# Patient Record
Sex: MALE | Race: WHITE | NOT HISPANIC OR LATINO | URBAN - METROPOLITAN AREA
[De-identification: names, ages, dates, MRNs, and addresses within clinical notes are randomized per-mention and may not be internally consistent; named-entity substitution may affect disease eponyms.]

---

## 2017-06-06 ENCOUNTER — EMERGENCY (EMERGENCY)
Facility: HOSPITAL | Age: 52
LOS: 0 days | Discharge: HOME | End: 2017-06-07
Admitting: FAMILY MEDICINE

## 2017-06-28 DIAGNOSIS — R11.2 NAUSEA WITH VOMITING, UNSPECIFIED: ICD-10-CM

## 2017-06-28 DIAGNOSIS — R42 DIZZINESS AND GIDDINESS: ICD-10-CM

## 2017-06-28 DIAGNOSIS — I10 ESSENTIAL (PRIMARY) HYPERTENSION: ICD-10-CM

## 2017-06-28 DIAGNOSIS — Z88.0 ALLERGY STATUS TO PENICILLIN: ICD-10-CM

## 2017-08-28 ENCOUNTER — OUTPATIENT (OUTPATIENT)
Dept: OUTPATIENT SERVICES | Facility: HOSPITAL | Age: 52
LOS: 1 days | Discharge: HOME | End: 2017-08-28

## 2017-08-28 DIAGNOSIS — I10 ESSENTIAL (PRIMARY) HYPERTENSION: ICD-10-CM

## 2017-08-30 ENCOUNTER — OUTPATIENT (OUTPATIENT)
Dept: OUTPATIENT SERVICES | Facility: HOSPITAL | Age: 52
LOS: 1 days | Discharge: HOME | End: 2017-08-30

## 2017-08-30 DIAGNOSIS — R52 PAIN, UNSPECIFIED: ICD-10-CM

## 2018-03-20 ENCOUNTER — OUTPATIENT (OUTPATIENT)
Dept: OUTPATIENT SERVICES | Facility: HOSPITAL | Age: 53
LOS: 1 days | Discharge: HOME | End: 2018-03-20

## 2018-03-20 DIAGNOSIS — I10 ESSENTIAL (PRIMARY) HYPERTENSION: ICD-10-CM

## 2018-10-01 ENCOUNTER — TRANSCRIPTION ENCOUNTER (OUTPATIENT)
Age: 53
End: 2018-10-01

## 2018-12-05 ENCOUNTER — OUTPATIENT (OUTPATIENT)
Dept: OUTPATIENT SERVICES | Facility: HOSPITAL | Age: 53
LOS: 1 days | Discharge: HOME | End: 2018-12-05

## 2018-12-05 DIAGNOSIS — I10 ESSENTIAL (PRIMARY) HYPERTENSION: ICD-10-CM

## 2019-09-10 ENCOUNTER — OUTPATIENT (OUTPATIENT)
Dept: OUTPATIENT SERVICES | Facility: HOSPITAL | Age: 54
LOS: 1 days | Discharge: HOME | End: 2019-09-10

## 2019-09-10 DIAGNOSIS — I10 ESSENTIAL (PRIMARY) HYPERTENSION: ICD-10-CM

## 2021-11-28 ENCOUNTER — TRANSCRIPTION ENCOUNTER (OUTPATIENT)
Age: 56
End: 2021-11-28

## 2023-09-19 ENCOUNTER — OUTPATIENT (OUTPATIENT)
Dept: OUTPATIENT SERVICES | Facility: HOSPITAL | Age: 58
LOS: 1 days | End: 2023-09-19
Payer: COMMERCIAL

## 2023-09-19 ENCOUNTER — RESULT REVIEW (OUTPATIENT)
Age: 58
End: 2023-09-19

## 2023-09-19 DIAGNOSIS — Z00.8 ENCOUNTER FOR OTHER GENERAL EXAMINATION: ICD-10-CM

## 2023-09-19 DIAGNOSIS — F43.9 REACTION TO SEVERE STRESS, UNSPECIFIED: ICD-10-CM

## 2023-09-19 PROCEDURE — 93018 CV STRESS TEST I&R ONLY: CPT

## 2023-09-19 PROCEDURE — 78452 HT MUSCLE IMAGE SPECT MULT: CPT | Mod: 26

## 2023-09-19 PROCEDURE — 78452 HT MUSCLE IMAGE SPECT MULT: CPT

## 2023-09-19 PROCEDURE — A9500: CPT

## 2023-09-20 DIAGNOSIS — F43.9 REACTION TO SEVERE STRESS, UNSPECIFIED: ICD-10-CM

## 2023-11-07 ENCOUNTER — APPOINTMENT (OUTPATIENT)
Dept: PULMONOLOGY | Facility: CLINIC | Age: 58
End: 2023-11-07
Payer: COMMERCIAL

## 2023-11-07 VITALS
OXYGEN SATURATION: 99 % | WEIGHT: 192 LBS | BODY MASS INDEX: 26.88 KG/M2 | HEIGHT: 71 IN | RESPIRATION RATE: 14 BRPM | HEART RATE: 95 BPM | SYSTOLIC BLOOD PRESSURE: 122 MMHG | DIASTOLIC BLOOD PRESSURE: 82 MMHG

## 2023-11-07 DIAGNOSIS — J44.9 CHRONIC OBSTRUCTIVE PULMONARY DISEASE, UNSPECIFIED: ICD-10-CM

## 2023-11-07 DIAGNOSIS — F17.200 NICOTINE DEPENDENCE, UNSPECIFIED, UNCOMPLICATED: ICD-10-CM

## 2023-11-07 DIAGNOSIS — Z80.1 FAMILY HISTORY OF MALIGNANT NEOPLASM OF TRACHEA, BRONCHUS AND LUNG: ICD-10-CM

## 2023-11-07 DIAGNOSIS — Z86.79 PERSONAL HISTORY OF OTHER DISEASES OF THE CIRCULATORY SYSTEM: ICD-10-CM

## 2023-11-07 PROBLEM — Z00.00 ENCOUNTER FOR PREVENTIVE HEALTH EXAMINATION: Status: ACTIVE | Noted: 2023-11-07

## 2023-11-07 PROCEDURE — G0296 VISIT TO DETERM LDCT ELIG: CPT

## 2023-11-07 PROCEDURE — 99204 OFFICE O/P NEW MOD 45 MIN: CPT | Mod: 25

## 2023-11-07 RX ORDER — ALBUTEROL SULFATE 90 UG/1
108 (90 BASE) INHALANT RESPIRATORY (INHALATION)
Qty: 1 | Refills: 0 | Status: ACTIVE | COMMUNITY
Start: 2023-11-07 | End: 1900-01-01

## 2023-11-07 RX ORDER — UMECLIDINIUM 62.5 UG/1
62.5 AEROSOL, POWDER ORAL
Qty: 1 | Refills: 3 | Status: ACTIVE | COMMUNITY
Start: 2023-11-07 | End: 1900-01-01

## 2023-11-07 RX ORDER — AMLODIPINE BESYLATE 5 MG/1
TABLET ORAL
Refills: 0 | Status: ACTIVE | COMMUNITY

## 2023-11-07 RX ORDER — CHLORTHALIDONE 50 MG/1
TABLET ORAL
Refills: 0 | Status: ACTIVE | COMMUNITY

## 2023-12-06 ENCOUNTER — RESULT REVIEW (OUTPATIENT)
Age: 58
End: 2023-12-06

## 2023-12-06 ENCOUNTER — OUTPATIENT (OUTPATIENT)
Dept: OUTPATIENT SERVICES | Facility: HOSPITAL | Age: 58
LOS: 1 days | End: 2023-12-06
Payer: COMMERCIAL

## 2023-12-06 DIAGNOSIS — R07.9 CHEST PAIN, UNSPECIFIED: ICD-10-CM

## 2023-12-06 DIAGNOSIS — Z00.8 ENCOUNTER FOR OTHER GENERAL EXAMINATION: ICD-10-CM

## 2023-12-06 PROCEDURE — 71271 CT THORAX LUNG CANCER SCR C-: CPT | Mod: 26

## 2023-12-06 PROCEDURE — 71271 CT THORAX LUNG CANCER SCR C-: CPT

## 2023-12-07 ENCOUNTER — NON-APPOINTMENT (OUTPATIENT)
Age: 58
End: 2023-12-07

## 2023-12-07 DIAGNOSIS — R07.9 CHEST PAIN, UNSPECIFIED: ICD-10-CM

## 2024-01-15 ENCOUNTER — INPATIENT (INPATIENT)
Facility: HOSPITAL | Age: 59
LOS: 0 days | Discharge: HOME | DRG: 921 | End: 2024-01-16
Attending: HOSPITALIST | Admitting: HOSPITALIST
Payer: COMMERCIAL

## 2024-01-15 VITALS
DIASTOLIC BLOOD PRESSURE: 58 MMHG | TEMPERATURE: 98 F | HEART RATE: 74 BPM | OXYGEN SATURATION: 98 % | SYSTOLIC BLOOD PRESSURE: 100 MMHG | RESPIRATION RATE: 18 BRPM | WEIGHT: 199.96 LBS

## 2024-01-15 DIAGNOSIS — M79.605 PAIN IN LEFT LEG: ICD-10-CM

## 2024-01-15 LAB
ALBUMIN SERPL ELPH-MCNC: 3.7 G/DL — SIGNIFICANT CHANGE UP (ref 3.5–5.2)
ALP SERPL-CCNC: 90 U/L — SIGNIFICANT CHANGE UP (ref 30–115)
ALT FLD-CCNC: 23 U/L — SIGNIFICANT CHANGE UP (ref 0–41)
ANION GAP SERPL CALC-SCNC: 10 MMOL/L — SIGNIFICANT CHANGE UP (ref 7–14)
APTT BLD: 46.3 SEC — HIGH (ref 27–39.2)
AST SERPL-CCNC: 18 U/L — SIGNIFICANT CHANGE UP (ref 0–41)
BASOPHILS # BLD AUTO: 0.1 K/UL — SIGNIFICANT CHANGE UP (ref 0–0.2)
BASOPHILS NFR BLD AUTO: 0.7 % — SIGNIFICANT CHANGE UP (ref 0–1)
BILIRUB SERPL-MCNC: 0.3 MG/DL — SIGNIFICANT CHANGE UP (ref 0.2–1.2)
BLD GP AB SCN SERPL QL: SIGNIFICANT CHANGE UP
BUN SERPL-MCNC: 18 MG/DL — SIGNIFICANT CHANGE UP (ref 10–20)
CALCIUM SERPL-MCNC: 8.8 MG/DL — SIGNIFICANT CHANGE UP (ref 8.4–10.5)
CHLORIDE SERPL-SCNC: 97 MMOL/L — LOW (ref 98–110)
CO2 SERPL-SCNC: 26 MMOL/L — SIGNIFICANT CHANGE UP (ref 17–32)
CREAT SERPL-MCNC: 1.2 MG/DL — SIGNIFICANT CHANGE UP (ref 0.7–1.5)
EGFR: 69 ML/MIN/1.73M2 — SIGNIFICANT CHANGE UP
EOSINOPHIL # BLD AUTO: 0.13 K/UL — SIGNIFICANT CHANGE UP (ref 0–0.7)
EOSINOPHIL NFR BLD AUTO: 0.9 % — SIGNIFICANT CHANGE UP (ref 0–8)
GLUCOSE SERPL-MCNC: 104 MG/DL — HIGH (ref 70–99)
HCT VFR BLD CALC: 28.9 % — LOW (ref 42–52)
HGB BLD-MCNC: 9.7 G/DL — LOW (ref 14–18)
IMM GRANULOCYTES NFR BLD AUTO: 1.1 % — HIGH (ref 0.1–0.3)
INR BLD: 1.56 RATIO — HIGH (ref 0.65–1.3)
LYMPHOCYTES # BLD AUTO: 1.23 K/UL — SIGNIFICANT CHANGE UP (ref 1.2–3.4)
LYMPHOCYTES # BLD AUTO: 8.3 % — LOW (ref 20.5–51.1)
MCHC RBC-ENTMCNC: 27.4 PG — SIGNIFICANT CHANGE UP (ref 27–31)
MCHC RBC-ENTMCNC: 33.6 G/DL — SIGNIFICANT CHANGE UP (ref 32–37)
MCV RBC AUTO: 81.6 FL — SIGNIFICANT CHANGE UP (ref 80–94)
MONOCYTES # BLD AUTO: 1.73 K/UL — HIGH (ref 0.1–0.6)
MONOCYTES NFR BLD AUTO: 11.7 % — HIGH (ref 1.7–9.3)
NEUTROPHILS # BLD AUTO: 11.39 K/UL — HIGH (ref 1.4–6.5)
NEUTROPHILS NFR BLD AUTO: 77.3 % — HIGH (ref 42.2–75.2)
NRBC # BLD: 0 /100 WBCS — SIGNIFICANT CHANGE UP (ref 0–0)
PLATELET # BLD AUTO: 420 K/UL — HIGH (ref 130–400)
PMV BLD: 9.4 FL — SIGNIFICANT CHANGE UP (ref 7.4–10.4)
POTASSIUM SERPL-MCNC: 4.6 MMOL/L — SIGNIFICANT CHANGE UP (ref 3.5–5)
POTASSIUM SERPL-SCNC: 4.6 MMOL/L — SIGNIFICANT CHANGE UP (ref 3.5–5)
PROT SERPL-MCNC: 6.4 G/DL — SIGNIFICANT CHANGE UP (ref 6–8)
PROTHROM AB SERPL-ACNC: 17.9 SEC — HIGH (ref 9.95–12.87)
RBC # BLD: 3.54 M/UL — LOW (ref 4.7–6.1)
RBC # FLD: 13.3 % — SIGNIFICANT CHANGE UP (ref 11.5–14.5)
SODIUM SERPL-SCNC: 133 MMOL/L — LOW (ref 135–146)
WBC # BLD: 14.74 K/UL — HIGH (ref 4.8–10.8)
WBC # FLD AUTO: 14.74 K/UL — HIGH (ref 4.8–10.8)

## 2024-01-15 PROCEDURE — 99285 EMERGENCY DEPT VISIT HI MDM: CPT

## 2024-01-15 PROCEDURE — 85025 COMPLETE CBC W/AUTO DIFF WBC: CPT

## 2024-01-15 PROCEDURE — 85610 PROTHROMBIN TIME: CPT

## 2024-01-15 PROCEDURE — 93308 TTE F-UP OR LMTD: CPT | Mod: 26

## 2024-01-15 PROCEDURE — 73502 X-RAY EXAM HIP UNI 2-3 VIEWS: CPT | Mod: 26,LT

## 2024-01-15 PROCEDURE — 93970 EXTREMITY STUDY: CPT | Mod: 26

## 2024-01-15 PROCEDURE — 83735 ASSAY OF MAGNESIUM: CPT

## 2024-01-15 PROCEDURE — 70450 CT HEAD/BRAIN W/O DYE: CPT | Mod: 26,MA

## 2024-01-15 PROCEDURE — 97162 PT EVAL MOD COMPLEX 30 MIN: CPT | Mod: GP

## 2024-01-15 PROCEDURE — 80053 COMPREHEN METABOLIC PANEL: CPT

## 2024-01-15 PROCEDURE — 36415 COLL VENOUS BLD VENIPUNCTURE: CPT

## 2024-01-15 PROCEDURE — 93925 LOWER EXTREMITY STUDY: CPT | Mod: 26

## 2024-01-15 PROCEDURE — 73706 CT ANGIO LWR EXTR W/O&W/DYE: CPT | Mod: 26,LT,MA

## 2024-01-15 PROCEDURE — 99223 1ST HOSP IP/OBS HIGH 75: CPT

## 2024-01-15 PROCEDURE — 85730 THROMBOPLASTIN TIME PARTIAL: CPT

## 2024-01-15 PROCEDURE — 0225U NFCT DS DNA&RNA 21 SARSCOV2: CPT

## 2024-01-15 PROCEDURE — 86803 HEPATITIS C AB TEST: CPT

## 2024-01-15 PROCEDURE — 93010 ELECTROCARDIOGRAM REPORT: CPT

## 2024-01-15 RX ORDER — MORPHINE SULFATE 50 MG/1
4 CAPSULE, EXTENDED RELEASE ORAL EVERY 6 HOURS
Refills: 0 | Status: DISCONTINUED | OUTPATIENT
Start: 2024-01-15 | End: 2024-01-16

## 2024-01-15 RX ORDER — AMIODARONE HYDROCHLORIDE 400 MG/1
200 TABLET ORAL
Refills: 0 | Status: DISCONTINUED | OUTPATIENT
Start: 2024-01-16 | End: 2024-01-16

## 2024-01-15 RX ORDER — MORPHINE SULFATE 50 MG/1
4 CAPSULE, EXTENDED RELEASE ORAL ONCE
Refills: 0 | Status: DISCONTINUED | OUTPATIENT
Start: 2024-01-15 | End: 2024-01-15

## 2024-01-15 RX ORDER — METOPROLOL TARTRATE 50 MG
0.5 TABLET ORAL
Refills: 0 | DISCHARGE

## 2024-01-15 RX ORDER — OXYCODONE HYDROCHLORIDE 5 MG/1
0.5 TABLET ORAL
Refills: 0 | DISCHARGE

## 2024-01-15 RX ORDER — PANTOPRAZOLE SODIUM 20 MG/1
1 TABLET, DELAYED RELEASE ORAL
Refills: 0 | DISCHARGE

## 2024-01-15 RX ORDER — AMLODIPINE BESYLATE 2.5 MG/1
1 TABLET ORAL
Refills: 0 | DISCHARGE

## 2024-01-15 RX ORDER — OXYCODONE HYDROCHLORIDE 5 MG/1
5 TABLET ORAL EVERY 6 HOURS
Refills: 0 | Status: DISCONTINUED | OUTPATIENT
Start: 2024-01-15 | End: 2024-01-16

## 2024-01-15 RX ORDER — FUROSEMIDE 40 MG
20 TABLET ORAL DAILY
Refills: 0 | Status: DISCONTINUED | OUTPATIENT
Start: 2024-01-15 | End: 2024-01-16

## 2024-01-15 RX ORDER — CLOPIDOGREL BISULFATE 75 MG/1
1 TABLET, FILM COATED ORAL
Refills: 0 | DISCHARGE

## 2024-01-15 RX ORDER — MORPHINE SULFATE 50 MG/1
2 CAPSULE, EXTENDED RELEASE ORAL ONCE
Refills: 0 | Status: DISCONTINUED | OUTPATIENT
Start: 2024-01-15 | End: 2024-01-15

## 2024-01-15 RX ORDER — NICOTINE POLACRILEX 2 MG
1 GUM BUCCAL DAILY
Refills: 0 | Status: DISCONTINUED | OUTPATIENT
Start: 2024-01-15 | End: 2024-01-16

## 2024-01-15 RX ORDER — POLYETHYLENE GLYCOL 3350 17 G/17G
17 POWDER, FOR SOLUTION ORAL DAILY
Refills: 0 | Status: DISCONTINUED | OUTPATIENT
Start: 2024-01-15 | End: 2024-01-16

## 2024-01-15 RX ORDER — ATORVASTATIN CALCIUM 80 MG/1
80 TABLET, FILM COATED ORAL AT BEDTIME
Refills: 0 | Status: DISCONTINUED | OUTPATIENT
Start: 2024-01-15 | End: 2024-01-16

## 2024-01-15 RX ORDER — FUROSEMIDE 40 MG
1 TABLET ORAL
Refills: 0 | DISCHARGE

## 2024-01-15 RX ORDER — NICOTINE POLACRILEX 2 MG
1 GUM BUCCAL
Refills: 0 | DISCHARGE

## 2024-01-15 RX ORDER — CLOPIDOGREL BISULFATE 75 MG/1
75 TABLET, FILM COATED ORAL DAILY
Refills: 0 | Status: DISCONTINUED | OUTPATIENT
Start: 2024-01-15 | End: 2024-01-16

## 2024-01-15 RX ORDER — POTASSIUM CHLORIDE 20 MEQ
1 PACKET (EA) ORAL
Refills: 0 | DISCHARGE

## 2024-01-15 RX ORDER — POTASSIUM CHLORIDE 20 MEQ
10 PACKET (EA) ORAL DAILY
Refills: 0 | Status: DISCONTINUED | OUTPATIENT
Start: 2024-01-15 | End: 2024-01-16

## 2024-01-15 RX ORDER — WARFARIN SODIUM 2.5 MG/1
2 TABLET ORAL
Refills: 0 | DISCHARGE

## 2024-01-15 RX ORDER — POTASSIUM CHLORIDE 20 MEQ
10 PACKET (EA) ORAL DAILY
Refills: 0 | Status: DISCONTINUED | OUTPATIENT
Start: 2024-01-15 | End: 2024-01-15

## 2024-01-15 RX ORDER — ACETAMINOPHEN 500 MG
3 TABLET ORAL
Refills: 0 | DISCHARGE

## 2024-01-15 RX ORDER — CELECOXIB 200 MG/1
200 CAPSULE ORAL DAILY
Refills: 0 | Status: DISCONTINUED | OUTPATIENT
Start: 2024-01-15 | End: 2024-01-16

## 2024-01-15 RX ORDER — SENNA PLUS 8.6 MG/1
2 TABLET ORAL AT BEDTIME
Refills: 0 | Status: DISCONTINUED | OUTPATIENT
Start: 2024-01-15 | End: 2024-01-16

## 2024-01-15 RX ORDER — AMIODARONE HYDROCHLORIDE 400 MG/1
1 TABLET ORAL
Refills: 0 | DISCHARGE

## 2024-01-15 RX ORDER — PANTOPRAZOLE SODIUM 20 MG/1
40 TABLET, DELAYED RELEASE ORAL
Refills: 0 | Status: DISCONTINUED | OUTPATIENT
Start: 2024-01-15 | End: 2024-01-16

## 2024-01-15 RX ORDER — ACETAMINOPHEN 500 MG
650 TABLET ORAL EVERY 6 HOURS
Refills: 0 | Status: DISCONTINUED | OUTPATIENT
Start: 2024-01-15 | End: 2024-01-16

## 2024-01-15 RX ORDER — METOPROLOL TARTRATE 50 MG
12.5 TABLET ORAL
Refills: 0 | Status: DISCONTINUED | OUTPATIENT
Start: 2024-01-15 | End: 2024-01-16

## 2024-01-15 RX ORDER — AMLODIPINE BESYLATE 2.5 MG/1
2.5 TABLET ORAL DAILY
Refills: 0 | Status: DISCONTINUED | OUTPATIENT
Start: 2024-01-15 | End: 2024-01-16

## 2024-01-15 RX ORDER — METHOCARBAMOL 500 MG/1
750 TABLET, FILM COATED ORAL
Refills: 0 | Status: DISCONTINUED | OUTPATIENT
Start: 2024-01-15 | End: 2024-01-16

## 2024-01-15 RX ORDER — ATORVASTATIN CALCIUM 80 MG/1
1 TABLET, FILM COATED ORAL
Refills: 0 | DISCHARGE

## 2024-01-15 RX ADMIN — MORPHINE SULFATE 4 MILLIGRAM(S): 50 CAPSULE, EXTENDED RELEASE ORAL at 13:12

## 2024-01-15 RX ADMIN — MORPHINE SULFATE 4 MILLIGRAM(S): 50 CAPSULE, EXTENDED RELEASE ORAL at 12:30

## 2024-01-15 RX ADMIN — MORPHINE SULFATE 4 MILLIGRAM(S): 50 CAPSULE, EXTENDED RELEASE ORAL at 14:30

## 2024-01-15 RX ADMIN — MORPHINE SULFATE 2 MILLIGRAM(S): 50 CAPSULE, EXTENDED RELEASE ORAL at 12:30

## 2024-01-15 RX ADMIN — CELECOXIB 200 MILLIGRAM(S): 200 CAPSULE ORAL at 22:57

## 2024-01-15 RX ADMIN — METHOCARBAMOL 750 MILLIGRAM(S): 500 TABLET, FILM COATED ORAL at 22:55

## 2024-01-15 RX ADMIN — MORPHINE SULFATE 2 MILLIGRAM(S): 50 CAPSULE, EXTENDED RELEASE ORAL at 11:24

## 2024-01-15 RX ADMIN — MORPHINE SULFATE 2 MILLIGRAM(S): 50 CAPSULE, EXTENDED RELEASE ORAL at 20:27

## 2024-01-15 RX ADMIN — Medication 650 MILLIGRAM(S): at 23:51

## 2024-01-15 RX ADMIN — MORPHINE SULFATE 4 MILLIGRAM(S): 50 CAPSULE, EXTENDED RELEASE ORAL at 11:23

## 2024-01-15 RX ADMIN — MORPHINE SULFATE 2 MILLIGRAM(S): 50 CAPSULE, EXTENDED RELEASE ORAL at 20:07

## 2024-01-15 NOTE — ED PROVIDER NOTE - PROGRESS NOTE DETAILS
Authored by Dr. Lino: Spoke to CT surgery team at [place] Dr. Paredes cardiac fellow, recommend neuro, recommend [facility] duplex arterial and venous which were negative.  CT angio of the left lower extremity performed. Reassessed on multiple occasions with significant pain in her left lower extremity resulting in weakness and inability to ambulate.  Neurology consulted with no concerns for stroke, CT head negative. Patient will be admitted. Authored by Dr. Lino: Spoke to CT surgery team at [place] Dr. Paredes cardiac fellow, recommend neuro, recommend VA duplex arterial and venous which were negative.  CT angio of the left lower extremity performed. Reassessed on multiple occasions with significant pain in her left lower extremity resulting in weakness and inability to ambulate.  Neurology consulted with no concerns for stroke, CT head negative. Patient will be admitted. Authored by Dr. Lino: Spoke to CT surgery team at [place] Dr. Paredes cardiac fellow, recommend neuro, recommend VA duplex arterial and venous which were negative.  CT angio of the left lower extremity performed. Reassessed on multiple occasions with significant pain in her left lower extremity resulting in weakness and inability to ambulate.  Neurology consulted with no concerns for stroke, CT head negative. Patient will be admitted. Authored by Dr. Lino: Spoke to CT surgery team at [place] Dr. Paredes cardiac fellow, recommend neuro, recommend VA duplex arterial and venous which were negative.  CT angio of the left lower extremity performed. Reassessed on multiple occasions with significant pain in her left lower extremity resulting in weakness and inability to ambulate.  Neurology consulted with no concerns for stroke, CT head negative. Patient will be admitted.

## 2024-01-15 NOTE — CONSULT NOTE ADULT - ATTENDING COMMENTS
58 year old male with PMH of HTN, Smoking, recent CABG presented to ED with severe pain in Left thigh since last evening. He underwent quadruple CABG on 01/05/23 in [facility] and was doing relatively better until yesterday. Last evening he started to experience severe pain in left thigh , the pain became so severe that he was unable to move his left leg and put weight on it.   CT head w/o contrast: IMPRESSION:  No acute intracranial hemorrhage.    Patient was seen on rounds this am.   Patient symptoms have improved with pain management.   Ct head negative.   No further inpatient neurological workup.   Will sign off. Do not hesitate to contact for any questions. 58 year old male with PMH of HTN, Smoking, recent CABG presented to ED with severe pain in Left thigh since last evening. He underwent quadruple CABG on 01/05/23 in [facility] and was doing relatively better until yesterday. Last evening he started to experience severe pain in left thigh , the pain became so severe that he was unable to move his left leg and put weight on it.   CT head w/o contrast: IMPRESSION:  No acute intracranial hemorrhage.    Patient was seen on rounds this am.   Patient symptoms have improved with pain management.   Ct head negative.   No further inpatient neurological workup.   Will sign off. Do not hesitate to contact for any questions. 58 year old male with PMH of HTN, Smoking, recent CABG presented to ED with severe pain in Left thigh since last evening. He underwent quadruple CABG on 01/05/23 in [facility] and was doing relatively better until yesterday. Last evening he started to experience severe pain in left thigh , the pain became so severe that he was unable to move his left leg and put weight on it.   CT head w/o contrast: IMPRESSION:  No acute intracranial hemorrhage.    Patient was seen on rounds this am.   Patient symptoms have improved with pain management.   Ct head negative.   No further inpatient neurological workup.   Will sign off. Do not hesitate to contact for any questions.

## 2024-01-15 NOTE — ED PROVIDER NOTE - CLINICAL SUMMARY MEDICAL DECISION MAKING FREE TEXT BOX
59 yo M presented to ED with LLE pain s/p recent CABG at [place] [place]. Labs and EKG were ordered and reviewed. Noted hemoglobin of 9.7 unknown baseline.  Imaging was ordered and reviewed by me.  CT imaging showing small hematomas. Appropriate medications for patient's presenting complaints were ordered and effects were reassessed.  Patient's records (prior hospital, ED visit, and/or nursing home notes if available) were reviewed.  Additional history was obtained from EMS, family, and/or PCP (where available).  Escalation to admission/observation was considered, pt unable to ambulate 2/2 to pain.  Patient requires inpatient hospitalization. 57 yo M presented to ED with LLE pain s/p recent CABG at [facility] [place]. Labs and EKG were ordered and reviewed. Noted hemoglobin of 9.7 unknown baseline.  Imaging was ordered and reviewed by me.  CT imaging showing small hematomas. Appropriate medications for patient's presenting complaints were ordered and effects were reassessed.  Patient's records (prior hospital, ED visit, and/or nursing home notes if available) were reviewed.  Additional history was obtained from EMS, family, and/or PCP (where available).  Escalation to admission/observation was considered, pt unable to ambulate 2/2 to pain.  Patient requires inpatient hospitalization.

## 2024-01-15 NOTE — PATIENT PROFILE ADULT - VISION (WITH CORRECTIVE LENSES IF THE PATIENT USUALLY WEARS THEM):
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  Adding on CBC.  -Will obtain bilateral hand and wrist x-rays  -Referral to rheumatology placed for high clinical suspicion for rheumatoid arthritis  -Patient to call clinic or present to the emergency department for acute worsening of symptoms including fever, chills, asymmetric severe joint swelling and pain.  -Follow up in 1 month  
Normal vision: sees adequately in most situations; can see medication labels, newsprint

## 2024-01-15 NOTE — ED PROVIDER NOTE - OTHER FREE TEXT FOR MDM DISCUSSED CASE WITH QUESTION
On call Cardiology at [facility] - Dr. Ramos recommending vascular studies, neurology consult and admission for ECHO - no need for transfer at this point in management On call Cardiology at [facility] - Dr. Ramos recommending vascular studies, neurology consult and admission for ECHO - no need for transfer at this point in management On call Cardiology at [facility] - Dr. Ramos recommending vascular studies, neurology consult and admission for ECHO - no need for transfer at this point in management On call Cardiology at [facility] - Dr. Ramos recommending vascular studies, neurology consult and admission for ECHO - no need for transfer at this point in management

## 2024-01-15 NOTE — ED ADULT NURSE REASSESSMENT NOTE - NS ED NURSE REASSESS COMMENT FT1
received handoff from RN. Pt ANOx4, IV intact, RR even and unlabored, no distress noted at this time.

## 2024-01-15 NOTE — H&P ADULT - ASSESSMENT
59 yo M with hx of HTN, HLD, CVA, CAD s/p CABG (Jan 2024), Paroxysmal A.fib (on coumadin) presents to ED for progressive Left lateral hip pain radiates down to LLE with difficulty ambulating.     #lateral hip pain- new onset  #medial knee pain  #difficulty ambulating/actively moving extremity  #greater trochanteric pain syndrome vs iliotibial band syndrome vs OA  -CTA LLE demonstrated small hematoma and diffuse atherosclerosis  -Left hip xray: No acute fracture or dislocation. Mild degenerative change in the hips.  -examination demonstrates little passive motion associated pain  -less likely iliotibial band syndrome (pt does not have lateral knee pain) or OA (while degenerative changes noted on xray pt has no groin pain)   -robaxin and tylenol standing  - morphine 4 mg q6 hr prn with bowel regimen  - celecoxib with PPI for 2 days  -f/u pt consult  -ice packs    #HTN  -c/w home meds    #CAD  #Recent CABG  #HLD  -c/w atorvastatin  -c/w clopidogrel  -pt's discharge note prohibits use of ASA 81 mg    #CVA  -f/u OP    #paroxysmal a fib s/p CABG  -c/w warfarin (reassess INR in am and dose accordingly; pt received home meds on night of admission)    #DVT ppx: warfarin  #Gi ppx: protonix  #Code: Full  #Diet: DASH diet   #Activity: IAT  #Dispo: med/surg  #pending: PT eval., warfarin am dosing         57 yo M with hx of HTN, HLD, CVA, CAD s/p CABG (Jan 2024), Paroxysmal A.fib (on coumadin) presents to ED for progressive Left lateral hip pain radiates down to LLE with difficulty ambulating.     #lateral hip pain- new onset  #medial knee pain  #difficulty ambulating/actively moving extremity  #greater trochanteric pain syndrome vs iliotibial band syndrome vs OA  -CTA LLE demonstrated small hematoma and diffuse atherosclerosis  -Left hip xray: No acute fracture or dislocation. Mild degenerative change in the hips.  -examination demonstrates little passive motion associated pain  -less likely iliotibial band syndrome (pt does not have lateral knee pain) or OA (while degenerative changes noted on xray pt has no groin pain)   -robaxin and tylenol standing  - morphine 4 mg q6 hr prn with bowel regimen  - celecoxib with PPI for 2 days  -f/u pt consult  -ice packs    #HTN  -c/w home meds    #CAD  #Recent CABG  #HLD  -c/w atorvastatin  -c/w clopidogrel  -pt's discharge note prohibits use of ASA 81 mg    #CVA  -f/u OP    #paroxysmal a fib s/p CABG  -c/w warfarin (reassess INR in am and dose accordingly; pt received home meds on night of admission)  -c/w amiodarone (200 mg BID for 7 days then 200 mg qd for 14 days)  -c/w lopressor    #DVT ppx: warfarin  #Gi ppx: protonix  #Code: Full  #Diet: DASH diet   #Activity: IAT  #Dispo: med/surg  #pending: PT eval., warfarin am dosing

## 2024-01-15 NOTE — ED ADULT TRIAGE NOTE - NS ED NURSE AMBULANCES
[facility]

## 2024-01-15 NOTE — ED PROVIDER NOTE - CARE PLAN
Principal Discharge DX:	Left leg pain  Secondary Diagnosis:	S/P CABG x 4   1 Principal Discharge DX:	Left leg pain  Assessment and plan of treatment:	Plan- labs ct angio LLE pain control reassess  Secondary Diagnosis:	S/P CABG x 4

## 2024-01-15 NOTE — H&P ADULT - NSHPPHYSICALEXAM_GEN_ALL_CORE
PHYSICAL EXAM:  GENERAL: NAD, lying in bed comfortably  HEAD:  Atraumatic, Normocephalic  EYES: EOMI, PERRLA, conjunctiva and sclera clear  ENT: Moist mucous membranes  NECK: Supple, No JVD  CHEST/LUNG: Clear to auscultation bilaterally; No rales, rhonchi, wheezing, or rubs. Unlabored respirations. Sternotomy incision clean. No drainage appreciated.   HEART: Regular rate and rhythm; No murmurs, rubs, or gallops  ABDOMEN: Bowel sounds present; Soft, Nontender, Nondistended. No hepatomegaly. Chest tube incisions noted.   EXTREMITIES:  2+ Peripheral Pulses, brisk capillary refill. No clubbing, cyanosis, or edema  NERVOUS SYSTEM:  Alert & Oriented X3, speech clear. No deficits   MSK: b/l UE full strength, Rt LE full strength, LLE severely reduced strength. Passive motion elicits significantly less pain. External/hip flexion active results in greater trochanteric pain/lateral hip.  SKIN: No rashes or lesions. Medial LLE incision (greater saphenous graft) and left wrist radial incision.

## 2024-01-15 NOTE — H&P ADULT - ATTENDING COMMENTS
Patient seen and examined at bedside independently of the residents. I read the resident's note and agree with the plan with the additions and corrections as noted below. My note supersedes the resident's note.     REVIEW OF SYSTEMS:  Negative except in HPI.     PMH:  HTN, HLD, CAD s/p CABG (Perez 10, 2024), Paroxysmal A.fib (coumadin)     FHx: Reviewed. No fhx of asthma/copd, No fhx of liver and pulmonary disease. No fhx of hematological disorder.     Physical Exam:  GEN: No acute distress. Awake, Alert and oriented x 3.   Head: Atraumatic, Normocephalic.   Eye: PEERLA. No sclera icterus. EOMI.   ENT: Normal oropharynx, no thyromegaly, no mass, no lymphadenopathy.   LUNGS: Clear to auscultation bilaterally. No wheeze/rales/crackles.   HEART: Normal. S1/S2 present. RRR. No murmur/gallops.   ABD: Soft, non-tender, non-distended. Bowel sounds present.   EXT: No pitting edema. No erythema. + tenderness to palpation of left lateral hip along from greater trochanter down the lateral knee. No significant groin pain/tenderness. Active ROM significant restricted due to severe pain from but passive ROM intact without significant pain.   Integumentary: No rash, No sore, No petechia.   NEURO: CN III-XII intact. Strength: 5/5 b/l ULE. Sensory intact b/l ULE.     Vital Signs Last 24 Hrs  T(C): 36.7 (15 Perez 2024 21:03), Max: 36.8 (15 Perez 2024 10:30)  T(F): 98 (15 Perez 2024 21:03), Max: 98.3 (15 Perez 2024 10:30)  HR: 70 (15 Perez 2024 21:03) (70 - 74)  BP: 98/55 (15 Perez 2024 21:03) (98/55 - 104/55)  BP(mean): 71 (15 Perez 2024 21:03) (71 - 76)  RR: 18 (15 Perez 2024 21:03) (18 - 18)  SpO2: 98% (15 Perez 2024 21:03) (98% - 98%)    Parameters below as of 15 Perez 2024 21:03  Patient On (Oxygen Delivery Method): room air      Please see the above notes for Labs and radiology.     Assessment and Plan:     57 yo M with hx of HTN, HLD, CAD s/p CABG (Perez 10, 2024), Paroxysmal A.fib (coumadin) presents to ED for progressive Left lateral hip pain radiates down to LLE with difficulty ambulation.     Inability to ambulate 2/2 Left lateral hip pain radiates down to lateral LLE  - Ddx: greater trochanteric pain syndrome vs. Iliotibial band  syndrome vs. OA   - On physical exam, patient is significant pain during active ROM but mild pain during passive ROM. Also has tenderness to palpation of left lateral hip along from greater trochanter down the lateral knee. No significant groin pain/tenderness.   - Both arterial duplex LE and venous duplex LE neg.   - CTA LLE: Small hematomas/postoperative change in the medial soft tissues of the proximal tibia. Degenerative change also seen in the hips.  - X-ray hip: No acute fracture or dislocation. Mild degenerative change in the hips.  - will start on robaxin and tylenol   - morphine prn with bowel regimen  - will give celecoxib with PPI tonight and tomorrow. Would avoid long term NSAIDs as patient is already on AC.   - PT/Rehab.   - Fall precaution.    HTN/ HLD - c/w home med  CAD s/p CABG - c/w home med  Paroxysmal A.fib - on amiodarone, lopressor and coumadin. Monitor INR and dose coumadin accordingly.     DVT ppx: coumadin   GI ppx:  PPI   Diet: DASH diet   Activity: as tolerated.     Date seen by the attendin/15/2024  Total time spent: 75 minutes. Patient seen and examined at bedside independently of the residents. I read the resident's note and agree with the plan with the additions and corrections as noted below. My note supersedes the resident's note.     REVIEW OF SYSTEMS:  Negative except in HPI.     PMH:  HTN, HLD, CAD s/p CABG (Perez 10, 2024), Paroxysmal A.fib (coumadin)     FHx: Reviewed. No fhx of asthma/copd, No fhx of liver and pulmonary disease. No fhx of hematological disorder.     Physical Exam:  GEN: No acute distress. Awake, Alert and oriented x 3.   Head: Atraumatic, Normocephalic.   Eye: PEERLA. No sclera icterus. EOMI.   ENT: Normal oropharynx, no thyromegaly, no mass, no lymphadenopathy.   LUNGS: Clear to auscultation bilaterally. No wheeze/rales/crackles.   HEART: Normal. S1/S2 present. RRR. No murmur/gallops.   ABD: Soft, non-tender, non-distended. Bowel sounds present.   EXT: No pitting edema. No erythema. + tenderness to palpation of left lateral hip along from greater trochanter down the lateral knee. No significant groin pain/tenderness. Active ROM significant restricted due to severe pain from but passive ROM intact without significant pain.   Integumentary: No rash, No sore, No petechia. All post-surgical scar incision looks clean.   NEURO: CN III-XII intact. Strength: 5/5 b/l ULE. Sensory intact b/l ULE.     Vital Signs Last 24 Hrs  T(C): 36.7 (15 Perez 2024 21:03), Max: 36.8 (15 Perez 2024 10:30)  T(F): 98 (15 Perez 2024 21:03), Max: 98.3 (15 Perez 2024 10:30)  HR: 70 (15 Perez 2024 21:03) (70 - 74)  BP: 98/55 (15 Perez 2024 21:03) (98/55 - 104/55)  BP(mean): 71 (15 Perez 2024 21:03) (71 - 76)  RR: 18 (15 Perez 2024 21:03) (18 - 18)  SpO2: 98% (15 Perez 2024 21:03) (98% - 98%)    Parameters below as of 15 Perez 2024 21:03  Patient On (Oxygen Delivery Method): room air      Please see the above notes for Labs and radiology.     Assessment and Plan:     59 yo M with hx of HTN, HLD, CAD s/p CABG (Pe[facility] 10, 2024), Paroxysmal A.fib (coumadin) presents to ED for progressive Left lateral hip pain radiates down to LLE with difficulty ambulation.     Inability to ambulate 2/2 Left lateral hip pain radiates down to lateral LLE  - Ddx: greater trochanteric pain syndrome vs. Iliotibial band  syndrome vs. OA   - On physical exam, patient is significant pain during active ROM but mild pain during passive ROM. Also has tenderness to palpation of left lateral hip along from greater trochanter down the lateral knee. No significant groin pain/tenderness.   - Both arterial duplex LE and venous duplex LE neg.   - CTA LLE: Small hematomas/postoperative change in the medial soft tissues of the proximal tibia. Degenerative change also seen in the hips.  - X-ray hip: No acute fracture or dislocation. Mild degenerative change in the hips.  - will start on robaxin and tylenol   - morphine prn with bowel regimen  - will give celecoxib with PPI tonight and tomorrow. Would avoid long term NSAIDs as patient is already on AC.   - PT/Rehab.   - Fall precaution.    HTN/ HLD - c/w home med  CAD s/p CABG - c/w home med  Paroxysmal A.fib - on amiodarone, lopressor and coumadin. Monitor INR and dose coumadin accordingly.     DVT ppx: coumadin   GI ppx:  PPI   Diet: DASH diet   Activity: as tolerated.     Date seen by the attendin/15/2024  Total time spent: 75 minutes.

## 2024-01-15 NOTE — ED PROVIDER NOTE - PHYSICAL EXAMINATION
VITAL SIGNS: I have reviewed nursing notes and confirm.  CONSTITUTIONAL: non-toxic, well appearing  SKIN: no petechiae. + anterior chest with well healing wounds, no oozing, no surrounding erythema   EYES: pink conjunctiva, anicteric  ENT: tongue midline, no exudates, MMM  NECK: Supple; no meningismus  CARD: RRR, no murmurs, equal radial pulses bilaterally 2+  RESP: CTAB, no respiratory distress  ABD: Soft, non-tender, non-distended  EXT: No edema, no erythema, LLE with pulses palpable to femoral, DP, PT, no pallor, no cyanosis, LLE femoral and tibia region with clean wounds, no oozing or surrounding erythema   NEURO: Alert, orientedx3, no facial droop, sensation intact, no dysmetria, upper extremities 5/5 b/l, RLE 5/5 motor strength, LL1/5 motor strength secondary to pain but able to wiggle toes    PSYCH: Cooperative, appropriate.

## 2024-01-15 NOTE — ED PROVIDER NOTE - ATTENDING CONTRIBUTION TO CARE
59 yo M pmhx HTN HLD CAD s/p CABG (on 1/5 at [facility] with Dr. Zac Castillo), on plavix and warfarin presents to ED for eval of LLE pain since last night radiating from L hip down entire L leg. Pt reports pain results in L leg weakness with now difficulty ambulating. No numbness or tingling. Pt reports they used LLE during CABG. Pt denies fever, nausea, vomiting, blurry vision, vision changes, abdominal pain, bloody stools, CP, SOB.    CONSTITUTIONAL: NAD.   SKIN: warm, dry  HEAD: Normocephalic; atraumatic.  EYES: no conjunctival injection. EOMI.   ENT: MMM.   NECK: Supple.  CARD: RRR. well healing anterior chest wall wounds no surrounding erythema or crepitus   RESP: No wheezes, rales or rhonchi.  ABD: soft ntnd.   EXT: distal pulses symmetric b/l. LLE with well healing surgical wounds no erythema no discharge to femoral and tibia, tenderness throughout L hip down L leg, no pallor or cyanosis of b/l LE, cap refill <2 seconds   NEURO: AAOx3, CN 2-12 grossly intact. 1/5 motor strength to LLE, 5/5 motor strength to rest of extremities, 5/5 sensation throughout. No slurred speech.   PSYCH: Cooperative, appropriate. 59 yo M pmhx HTN HLD CAD s/p CABG (on 1/5 at [facility] with Dr. Zac Castillo), on plavix and warfarin presents to ED for eval of LLE pain since last night radiating from L hip down entire L leg. Pt reports pain results in L leg weakness with now difficulty ambulating. No numbness or tingling. Pt reports they used LLE during CABG. Pt denies fever, nausea, vomiting, blurry vision, vision changes, abdominal pain, bloody stools, CP, SOB.    CONSTITUTIONAL: NAD.   SKIN: warm, dry  HEAD: Normocephalic; atraumatic.  EYES: no conjunctival injection. EOMI.   ENT: MMM.   NECK: Supple.  CARD: RRR. well healing anterior chest wall wounds no surrounding erythema or crepitus   RESP: No wheezes, rales or rhonchi.  ABD: soft ntnd.   EXT: distal pulses symmetric b/l. LLE with well healing surgical wounds no erythema no discharge to femoral and tibia, tenderness throughout L hip down L leg, no pallor or cyanosis of b/l LE, cap refill <2 seconds   NEURO: AAOx3, CN 2-12 grossly intact. 1/5 motor strength to LLE, 5/5 motor strength to rest of extremities, 5/5 sensation throughout. No slurred speech.   PSYCH: Cooperative, appropriate. 59 yo M pmhx HTN HLD CAD s/p CABG (on 1/5 at [facility] with Dr. Zac Castillo), on plavix and warfarin presents to ED for eval of LLE pain since last night radiating from L hip down entire L leg. Pt reports pain results in L leg weakness with now difficulty ambulating. No numbness or tingling. Pt reports they used LLE during CABG. Pt denies fever, nausea, vomiting, blurry vision, vision changes, abdominal pain, bloody stools, CP, SOB.    CONSTITUTIONAL: NAD.   SKIN: warm, dry  HEAD: Normocephalic; atraumatic.  EYES: no conjunctival injection. EOMI.   ENT: MMM.   NECK: Supple.  CARD: RRR. well healing anterior chest wall wounds no surrounding erythema or crepitus   RESP: No wheezes, rales or rhonchi.  ABD: soft ntnd.   EXT: distal pulses symmetric b/l. LLE with well healing surgical wounds no erythema no discharge to femoral and tibia, tenderness throughout L hip down L leg, no pallor or cyanosis of b/l LE, cap refill <2 seconds   NEURO: AAOx3, CN 2-12 grossly intact. 1/5 motor strength to LLE, 5/5 motor strength to rest of extremities, 5/5 sensation throughout. No slurred speech.   PSYCH: Cooperative, appropriate. 59 yo M pmhx HTN HLD CAD s/p CABG (on 1/5 at [facility] with Dr. Zac Castillo), on plavix and warfarin presents to ED for eval of LLE pain since last night radiating from L hip down entire L leg. Pt reports pain results in L leg weakness with now difficulty ambulating. No numbness or tingling. Pt reports they used LLE during CABG. Pt denies fever, nausea, vomiting, blurry vision, vision changes, abdominal pain, bloody stools, CP, SOB.    CONSTITUTIONAL: NAD.   SKIN: warm, dry  HEAD: Normocephalic; atraumatic.  EYES: no conjunctival injection. EOMI.   ENT: MMM.   NECK: Supple.  CARD: RRR. well healing anterior chest wall wounds no surrounding erythema or crepitus   RESP: No wheezes, rales or rhonchi.  ABD: soft ntnd.   EXT: distal pulses symmetric b/l. LLE with well healing surgical wounds no erythema no discharge to femoral and tibia, tenderness throughout L hip down L leg, no pallor or cyanosis of b/l LE, cap refill <2 seconds   NEURO: AAOx3, CN 2-12 grossly intact. 1/5 motor strength to LLE, 5/5 motor strength to rest of extremities, 5/5 sensation throughout. No slurred speech.   PSYCH: Cooperative, appropriate.

## 2024-01-15 NOTE — CONSULT NOTE ADULT - ASSESSMENT
58 year old male is being evaluated for severe pain and weakness of left thigh. His left thigh movement is severely restricted due to pain, and planter flexion/extension:5/5. The pain could be due to the hematomas seen on CT LLE. Unlikely to be stroke as the weakness is largely due to pain and restricted to proximal part, no sensory involvement.    Recommendation:  - Follow up CT head read: if no acute pathology, no further inpatient neurology workup.  - Will defer further management to primary team.    Discussed with Neurology attending

## 2024-01-15 NOTE — H&P ADULT - NSHPLABSRESULTS_GEN_ALL_CORE
LABS:  cret                        9.7    14.74 )-----------( 420      ( 15 Perez 2024 11:08 )             28.9     01-15    133<L>  |  97<L>  |  18  ----------------------------<  104<H>  4.6   |  26  |  1.2    Ca    8.8      15 Perez 2024 11:08    TPro  6.4  /  Alb  3.7  /  TBili  0.3  /  DBili  x   /  AST  18  /  ALT  23  /  AlkPhos  90  01-15    PT/INR - ( 15 Perez 2024 11:08 )   PT: 17.90 sec;   INR: 1.56 ratio         PTT - ( 15 Perez 2024 11:08 )  PTT:46.3 sec

## 2024-01-15 NOTE — CONSULT NOTE ADULT - SUBJECTIVE AND OBJECTIVE BOX
HPI:  58 year old male with PMH of HTN, Smoking, recent CABG presented to ED with severe pain in Left thigh since last evening. He underwent quadruple CABG on 01/05/23 in [facility] and was doing relatively better until yesterday. Last evening he started to experience severe pain in left thigh , the pain became so severe that he was unable to move his left leg and put weight on it. He never had similar symptom in the past. He denies any headache, weakness in other extremities, facial droop, trouble speaking, blurry/ double vision. He never had stroke in the past, but had an episode of dizziness 7-8 years ago and was told he had series of mini stroke??.       PAST MEDICAL & SURGICAL HISTORY:  HTN  CAD s/p CABG          Medications:      Vital Signs Last 24 Hrs  T(C): 36.8 (15 Perez 2024 10:30), Max: 36.8 (15 Perez 2024 10:30)  T(F): 98.3 (15 Perez 2024 10:30), Max: 98.3 (15 Perez 2024 10:30)  HR: 74 (15 Perez 2024 10:30) (74 - 74)  BP: 100/58 (15 Perez 2024 10:30) (100/58 - 100/58)  BP(mean): --  RR: 18 (15 Perez 2024 10:30) (18 - 18)  SpO2: 98% (15 Perez 2024 10:30) (98% - 98%)    Parameters below as of 15 Perez 2024 10:30  Patient On (Oxygen Delivery Method): room air        Neurological Exam:   Mental status: Awake, alert and oriented x3.  Recent and remote memory intact.  Naming, repetition and comprehension intact.  Attention/concentration intact.  No dysarthria, no aphasia.  Fund of knowledge appropriate.    Cranial nerves: Pupils equally round and reactive to light, visual fields full, no nystagmus, extraocular muscles intact, V1 through V3 intact bilaterally and symmetric, face symmetric,   Motor:  MRC grading : LLE: 1/5 proximally at hip, 2/5 at knee, 5/5 ankle: severely pain limited. Otherwise: 5/5 b/l UE and RLE   strength 5/5.  Normal tone and bulk.  No abnormal movements.    Sensation: Intact to light touch and pinprick B/L  Coordination: No dysmetria on finger-to-nose b/l  Reflexes: 2+ in bilateral UE/LE, downgoing toes bilaterally.       Labs:    Fingerstick Blood Glucose: CAPILLARY BLOOD GLUCOSE        LABS:                        9.7    14.74 )-----------( 420      ( 15 Perez 2024 11:08 )             28.9     01-15    133<L>  |  97<L>  |  18  ----------------------------<  104<H>  4.6   |  26  |  1.2    Ca    8.8      15 Perez 2024 11:08    TPro  6.4  /  Alb  3.7  /  TBili  0.3  /  DBili  x   /  AST  18  /  ALT  23  /  AlkPhos  90  01-15    PT/INR - ( 15 Perez 2024 11:08 )   PT: 17.90 sec;   INR: 1.56 ratio         PTT - ( 15 Perez 2024 11:08 )  PTT:46.3 sec      Urinalysis Basic - ( 15 Perez 2024 11:08 )    Color: x / Appearance: x / SG: x / pH: x  Gluc: 104 mg/dL / Ketone: x  / Bili: x / Urobili: x   Blood: x / Protein: x / Nitrite: x   Leuk Esterase: x / RBC: x / WBC x   Sq Epi: x / Non Sq Epi: x / Bacteria: x        < from: CT Angio Lower Extremity w/ IV Cont, Left (01.15.24 @ 13:36) >  1. Small hematomas/postoperative change in the medial soft tissues of the   proximal tibia.  2. Diffuse atherosclerosis.       HPI:  58 year old male with PMH of HTN, Smoking, recent CABG presented to ED with severe pain in Left thigh since last evening. He underwent quadruple CABG on 01/05/23 in [facility] and was doing relatively better until yesterday. Last evening he started to experience severe pain in left thigh , the pain became so severe that he was unable to move his left leg and put weight on it. He never had similar symptom in the past. He denies any headache, weakness in other extremities, facial droop, trouble speaking, blurry/ double vision. He never had stroke in the past, but had an episode of dizziness 7-8 years ago and was told he had series of mini stroke??.       PAST MEDICAL & SURGICAL HISTORY:  HTN  CAD s/p CABG          Medications:      Vital Signs Last 24 Hrs  T(C): 36.8 (15 Perez 2024 10:30), Max: 36.8 (15 Perez 2024 10:30)  T(F): 98.3 (15 Perez 2024 10:30), Max: 98.3 (15 Perez 2024 10:30)  HR: 74 (15 Perez 2024 10:30) (74 - 74)  BP: 100/58 (15 Perez 2024 10:30) (100/58 - 100/58)  BP(mean): --  RR: 18 (15 Perez 2024 10:30) (18 - 18)  SpO2: 98% (15 Perez 2024 10:30) (98% - 98%)    Parameters below as of 15 Perez 2024 10:30  Patient On (Oxygen Delivery Method): room air        Neurological Exam:   Mental status: Awake, alert and oriented x3.  Recent and remote memory intact.  Naming, repetition and comprehension intact.  Attention/concentration intact.  No dysarthria, no aphasia.  Fund of knowledge appropriate.    Cranial nerves: Pupils equally round and reactive to light, visual fields full, no nystagmus, extraocular muscles intact, V1 through V3 intact bilaterally and symmetric, face symmetric,   Motor:  MRC grading : LLE: 1/5 proximally at hip, 2/5 at knee, 5/5 ankle: severely pain limited. Otherwise: 5/5 b/l UE and RLE   strength 5/5.  Normal tone and bulk.  No abnormal movements.    Sensation: Intact to light touch and pinprick B/L  Coordination: No dysmetria on finger-to-nose b/l  Reflexes: 2+ in bilateral UE/LE, downgoing toes bilaterally.       Labs:    Fingerstick Blood Glucose: CAPILLARY BLOOD GLUCOSE        LABS:                        9.7    14.74 )-----------( 420      ( 15 Perez 2024 11:08 )             28.9     01-15    133<L>  |  97<L>  |  18  ----------------------------<  104<H>  4.6   |  26  |  1.2    Ca    8.8      15 Perez 2024 11:08    TPro  6.4  /  Alb  3.7  /  TBili  0.3  /  DBili  x   /  AST  18  /  ALT  23  /  AlkPhos  90  01-15    PT/INR - ( 15 Perez 2024 11:08 )   PT: 17.90 sec;   INR: 1.56 ratio         PTT - ( 15 Perez 2024 11:08 )  PTT:46.3 sec      Urinalysis Basic - ( 15 Perez 2024 11:08 )    Color: x / Appearance: x / SG: x / pH: x  Gluc: 104 mg/dL / Ketone: x  / Bili: x / Urobili: x   Blood: x / Protein: x / Nitrite: x   Leuk Esterase: x / RBC: x / WBC x   Sq Epi: x / Non Sq Epi: x / Bacteria: x        < from: CT Angio Lower Extremity w/ IV Cont, Left (01.15.24 @ 13:36) >  1. Small hematomas/postoperative change in the medial soft tissues of the   proximal tibia.  2. Diffuse atherosclerosis.       HPI:  58 year old male with PMH of HTN, Smoking, recent CABG presented to ED with severe pain in Left thigh since last evening. He underwent quadruple CABG on 01/05/23 in [facility] and was doing relatively better until yesterday. Last evening he started to experience severe pain in left thigh , the pain became so severe that he was unable to move his left leg and put weight on it. He never had similar symptom in the past. He denies any headache, weakness in other extremities, facial droop, trouble speaking, blurry/ double vision. He never had stroke in the past, but had an episode of dizziness 7-8 years ago and was told he had series of mini stroke??.       PAST MEDICAL & SURGICAL HISTORY:  HTN  CAD s/p CABG          Medications:      Vital Signs Last 24 Hrs  T(C): 36.8 (15 Perez 2024 10:30), Max: 36.8 (15 Perez 2024 10:30)  T(F): 98.3 (15 Perez 2024 10:30), Max: 98.3 (15 Perez 2024 10:30)  HR: 74 (15 Perez 2024 10:30) (74 - 74)  BP: 100/58 (15 Perez 2024 10:30) (100/58 - 100/58)  BP(mean): --  RR: 18 (15 Perez 2024 10:30) (18 - 18)  SpO2: 98% (15 Perez 2024 10:30) (98% - 98%)    Parameters below as of 15 Perez 2024 10:30  Patient On (Oxygen Delivery Method): room air        Neurological Exam:   Mental status: Awake, alert and oriented x3.  Recent and remote memory intact.  Naming, repetition and comprehension intact.  Attention/concentration intact.  No dysarthria, no aphasia.  Fund of knowledge appropriate.    Cranial nerves: Pupils equally round and reactive to light, visual fields full, no nystagmus, extraocular muscles intact, V1 through V3 intact bilaterally and symmetric, face symmetric,   Motor:  MRC grading : LLE: 1/5 proximally at hip, 2/5 at knee, 5/5 ankle: severely pain limited. Otherwise: 5/5 b/l UE and RLE   strength 5/5.  Normal tone and bulk.  No abnormal movements.    Sensation: Intact to light touch and pinprick B/L  Coordination: No dysmetria on finger-to-nose b/l  Reflexes: 2+ in bilateral UE/LE, downgoing toes bilaterally.       Labs:    Fingerstick Blood Glucose: CAPILLARY BLOOD GLUCOSE        LABS:                        9.7    14.74 )-----------( 420      ( 15 Perez 2024 11:08 )             28.9     01-15    133<L>  |  97<L>  |  18  ----------------------------<  104<H>  4.6   |  26  |  1.2    Ca    8.8      15 Perez 2024 11:08    TPro  6.4  /  Alb  3.7  /  TBili  0.3  /  DBili  x   /  AST  18  /  ALT  23  /  AlkPhos  90  01-15    PT/INR - ( 15 Perez 2024 11:08 )   PT: 17.90 sec;   INR: 1.56 ratio         PTT - ( 15 Perez 2024 11:08 )  PTT:46.3 sec      Urinalysis Basic - ( 15 Perez 2024 11:08 )    Color: x / Appearance: x / SG: x / pH: x  Gluc: 104 mg/dL / Ketone: x  / Bili: x / Urobili: x   Blood: x / Protein: x / Nitrite: x   Leuk Esterase: x / RBC: x / WBC x   Sq Epi: x / Non Sq Epi: x / Bacteria: x        < from: CT Angio Lower Extremity w/ IV Cont, Left (01.15.24 @ 13:36) >  1. Small hematomas/postoperative change in the medial soft tissues of the   proximal tibia.  2. Diffuse atherosclerosis.       HPI:  58 year old male with PMH of HTN, Smoking, recent CABG presented to ED with severe pain in Left thigh since last evening. He underwent quadruple CABG on 01/05/23 in [facility] and was doing relatively better until yesterday. Last evening he started to experience severe pain in left thigh , the pain became so severe that he was unable to move his left leg and put weight on it. He never had similar symptom in the past. He denies any headache, weakness in other extremities, facial droop, trouble speaking, blurry/ double vision. He never had stroke in the past, but had an episode of dizziness 7-8 years ago and was told he had series of mini stroke??.       PAST MEDICAL & SURGICAL HISTORY:  HTN  CAD s/p CABG          Medications:      Vital Signs Last 24 Hrs  T(C): 36.8 (15 Perez 2024 10:30), Max: 36.8 (15 Perez 2024 10:30)  T(F): 98.3 (15 Perez 2024 10:30), Max: 98.3 (15 Perez 2024 10:30)  HR: 74 (15 Perez 2024 10:30) (74 - 74)  BP: 100/58 (15 Perez 2024 10:30) (100/58 - 100/58)  BP(mean): --  RR: 18 (15 Perez 2024 10:30) (18 - 18)  SpO2: 98% (15 Perez 2024 10:30) (98% - 98%)    Parameters below as of 15 Perez 2024 10:30  Patient On (Oxygen Delivery Method): room air        Neurological Exam:   Mental status: Awake, alert and oriented x3.  Recent and remote memory intact.  Naming, repetition and comprehension intact.  Attention/concentration intact.  No dysarthria, no aphasia.  Fund of knowledge appropriate.    Cranial nerves: Pupils equally round and reactive to light, visual fields full, no nystagmus, extraocular muscles intact, V1 through V3 intact bilaterally and symmetric, face symmetric,   Motor:  MRC grading : LLE: 1/5 proximally at hip, 2/5 at knee, 5/5 ankle: severely pain limited. Otherwise: 5/5 b/l UE and RLE   strength 5/5.  Normal tone and bulk.  No abnormal movements.    Sensation: Intact to light touch and pinprick B/L  Coordination: No dysmetria on finger-to-nose b/l  Reflexes: 2+ in bilateral UE/LE, downgoing toes bilaterally.       Labs:    Fingerstick Blood Glucose: CAPILLARY BLOOD GLUCOSE        LABS:                        9.7    14.74 )-----------( 420      ( 15 Perez 2024 11:08 )             28.9     01-15    133<L>  |  97<L>  |  18  ----------------------------<  104<H>  4.6   |  26  |  1.2    Ca    8.8      15 Perez 2024 11:08    TPro  6.4  /  Alb  3.7  /  TBili  0.3  /  DBili  x   /  AST  18  /  ALT  23  /  AlkPhos  90  01-15    PT/INR - ( 15 Perez 2024 11:08 )   PT: 17.90 sec;   INR: 1.56 ratio         PTT - ( 15 Perez 2024 11:08 )  PTT:46.3 sec      Urinalysis Basic - ( 15 Perez 2024 11:08 )    Color: x / Appearance: x / SG: x / pH: x  Gluc: 104 mg/dL / Ketone: x  / Bili: x / Urobili: x   Blood: x / Protein: x / Nitrite: x   Leuk Esterase: x / RBC: x / WBC x   Sq Epi: x / Non Sq Epi: x / Bacteria: x        < from: CT Angio Lower Extremity w/ IV Cont, Left (01.15.24 @ 13:36) >  1. Small hematomas/postoperative change in the medial soft tissues of the   proximal tibia.  2. Diffuse atherosclerosis.

## 2024-01-15 NOTE — H&P ADULT - NSHPREVIEWOFSYSTEMS_GEN_ALL_CORE
REVIEW OF SYSTEMS:    CONSTITUTIONAL: No weakness, fevers or chills  EYES/ENT: No visual changes;  No vertigo or throat pain   NECK: No pain or stiffness  RESPIRATORY: No cough, wheezing, hemoptysis; No shortness of breath  CARDIOVASCULAR: moderate chest pain secondary to recent median sternotomy  GASTROINTESTINAL: No abdominal or epigastric pain. No nausea, vomiting, or hematemesis; No diarrhea or constipation. No melena or hematochezia.  GENITOURINARY: No dysuria, frequency or hematuria  NEUROLOGICAL: 9.5/10 pain while actively moving LLE. Left wrist/thumb pain (intermittent/spontaneous)   SKIN: No itching, rashes

## 2024-01-15 NOTE — ED PROVIDER NOTE - NS ED MD DISPO DIVISION
[facility]

## 2024-01-15 NOTE — H&P ADULT - HISTORY OF PRESENT ILLNESS
The pt is a 57 yo male w/ a PMH of HTN, HLD, CVA (approx. 2016), former smoker (quit 1/5/24), who recently underwent CABG at [place] where patient was admitted from January 4 to January 12 on warfarin and Plavix for post-operative  new-onset (paroxysmal) a. fib presents to the ED with severe pain in LLE. Pain begins in his left lateral hip and radiates down the leg. He is incapable of actively moving the extremity. Pt was doing relatively better upon discharge until yesterday. He never had similar symptom in the past.. Pt denies chest pain, acute sob, cough, palpitations, abd pain, headache, dysphagia, or blurry vision. Pt endorses intermittent sensation of racing heart rate.     VS in the ED were:   BP: 100 mm Hg/58 mm Hg  HR: 74 /min  RR: 18 /min  98.3 Degrees F  36.8 Degrees C  oral  SpO2 98 %  room air    < from: 12 Lead ECG (01.15.24 @ 12:17) >   Line Sinus rhythm with 1st degree A-V block  T wave abnormality, consider inferolateral ischemia  Abnormal ECG    < end of copied text >    Labs in the ED were: wbc 14.74k, Hgb/Hct 9.7/28.9, Na 133, K 4.6,    Imaging:   va duplex b/l LE arterial demonstrated nl vascular flow (prelim) and venous duplex demonstrated no DVTs (prelim).  CTH: no acute intracranial pathology  < from: CT Angio Lower Extremity w/ IV Cont, Left (01.15.24 @ 13:36) >    1. Small hematomas/postoperative change in the medial soft tissues of the   proximal tibia.  2. Diffuse atherosclerosis.    < from: Xray Hip 2-3 Views, Left (01.15.24 @ 11:59) >  No acute fracture or dislocation. Mild degenerative change in the hips.    The pt was assessed by Neurology, who recommended ordering CTH to r/o stroke. No further neurologic w/u was recommended. Pt was admitted to medicine for LLE lateral hip/knee pain.    The pt is a 59 yo male w/ a PMH of HTN, HLD, CVA (approx. 2016), former smoker (quit 1/5/24), who recently underwent CABG at [place] where patient was admitted from January 4 to January 12 on warfarin and Plavix for post-operative  new-onset (paroxysmal) a. fib presents to the ED with severe pain in LLE. Pain begins in his left lateral hip and radiates down the leg. He is incapable of actively moving the extremity. Pt was doing relatively better upon discharge until yesterday. He never had similar symptom in the past.. Pt denies chest pain, acute sob, cough, palpitations, abd pain, headache, dysphagia, or blurry vision. Pt endorses intermittent sensation of racing heart rate.     VS in the ED were:   BP: 100 mm Hg/58 mm Hg  HR: 74 /min  RR: 18 /min  98.3 Degrees F  36.8 Degrees C  oral  SpO2 98 %  room air    < from: 12 Lead ECG (01.15.24 @ 12:17) >   Line Sinus rhythm with 1st degree A-V block  T wave abnormality, consider inferolateral ischemia  Abnormal ECG    < end of copied text >    Labs in the ED were: wbc 14.74k, Hgb/Hct 9.7/28.9, Na 133, K 4.6,    Imaging:   va duplex b/l LE arterial demonstrated nl vascular flow (prelim) and venous duplex demonstrated no DVTs (prelim).  CTH: no acute intracranial pathology  < from: CT Angio Lower Extremity w/ IV Cont, Left (01.15.24 @ 13:36) >    1. Small hematomas/postoperative change in the medial soft tissues of the   proximal tibia.  2. Diffuse atherosclerosis.    < from: Xray Hip 2-3 Views, Left (01.15.24 @ 11:59) >  No acute fracture or dislocation. Mild degenerative change in the hips.    The pt was assessed by Neurology, who recommended ordering CTH to r/o stroke. No further neurologic w/u was recommended. Pt was admitted to medicine for LLE lateral hip/knee pain.

## 2024-01-15 NOTE — ED PROVIDER NOTE - OBJECTIVE STATEMENT
58 year old male with PMHx of HTN, HLD, Smoking, recent CABG at [place] where patient was admitted from January 4 to January 12 on warfarin and Plavix presents to the ED with severe pain in LLE. Pain begins in L hip and radiates down the leg. Pt is excruciating resulting in weakness. Pt was doing relatively better upon discharge until yesterday. He never had similar symptom in the past. Denies f/c, cp, sob, cough, palpitations, n/v, abd pain, headache, weakness in other extremities, facial droop, trouble speaking, blurry/double vision. 58 year old male with PMHx of HTN, HLD, Smoking, recent CABG at [place] where patient was admitted from January 4 to January 12 on warfarin and Plavix presents to the ED with severe pain in LLE. Pain begins in L hip and radiates down the leg. Pt is excruciating resulting in weakness. Pt was doing relatively better upon discharge until yesterday. He never had similar symptom in the past. Denies f/c, cp, sob, cough, palpitations, n/v, abd pain, headache, weakness in other extremities, facial droop, trouble speaking, blurry/double vision. 58 year old male with PMHx of HTN, HLD, Smoking, recent CABG at [place] where patient was admitted from January 4 to January 12 on warfarin and Plavix presents to the ED with severe pain in LLE. Pain begins in L hip and radiates down the leg. Pt is excruciating resulting in weakness. Pt was doing relatively better upon discharge until yesterday. He never had similar symptom in the past. Denies f/c, cp, sob, cough, palpitations, n/v, abd pain, headache, weakness in other extremities, facial droop, trouble speaking, blurry/double vision. 58 year old male with PMHx of HTN, HLD, Smoking, recent CABG at [place] where patient was admitted from January 4 to January 12 on warfarin and Plavix presents to the ED with severe pain in LLE. Pain begins in L hip and radiates down the leg. Pt is excruciating resulting in weakness. Pt was doing relatively better upon discharge until yesterday. He never had similar symptom in the past. Denies f/c, cp, sob, cough, palpitations, n/v, abd pain, headache, weakness in other extremities, facial droop, trouble speaking, blurry/double vision.

## 2024-01-15 NOTE — ED ADULT NURSE NOTE - NSFALLUNIVINTERV_ED_ALL_ED
Bed/Stretcher in lowest position, wheels locked, appropriate side rails in place/Call bell, personal items and telephone in reach/Instruct patient to call for assistance before getting out of bed/chair/stretcher/Non-slip footwear applied when patient is off stretcher/[place] to call system/Physically safe environment - no spills, clutter or unnecessary equipment/Purposeful proactive rounding/Room/bathroom lighting operational, light cord in reach Bed/Stretcher in lowest position, wheels locked, appropriate side rails in place/Call bell, personal items and telephone in reach/Instruct patient to call for assistance before getting out of bed/chair/stretcher/Non-slip footwear applied when patient is off stretcher/[place] to call system/Physically safe environment - no spills, clutter or unnecessary equipment/Purposeful proactive rounding/Room/bathroom lighting operational, light cord in reach Bed/Stretcher in lowest position, wheels locked, appropriate side rails in place/Call bell, personal items and telephone in reach/Instruct patient to call for assistance before getting out of bed/chair/stretcher/Non-slip footwear applied when patient is off stretcher/[place] to call system/Physically safe environment - no spills, clutter or unnecessary equipment/Purposeful proactive rounding/Room/bathroom lighting operational, light cord in reach

## 2024-01-16 ENCOUNTER — TRANSCRIPTION ENCOUNTER (OUTPATIENT)
Age: 59
End: 2024-01-16

## 2024-01-16 VITALS
DIASTOLIC BLOOD PRESSURE: 54 MMHG | TEMPERATURE: 98 F | HEART RATE: 74 BPM | SYSTOLIC BLOOD PRESSURE: 88 MMHG | RESPIRATION RATE: 18 BRPM

## 2024-01-16 LAB
ALBUMIN SERPL ELPH-MCNC: 3.6 G/DL — SIGNIFICANT CHANGE UP (ref 3.5–5.2)
ALP SERPL-CCNC: 92 U/L — SIGNIFICANT CHANGE UP (ref 30–115)
ALT FLD-CCNC: 18 U/L — SIGNIFICANT CHANGE UP (ref 0–41)
ANION GAP SERPL CALC-SCNC: 14 MMOL/L — SIGNIFICANT CHANGE UP (ref 7–14)
APTT BLD: 40.2 SEC — HIGH (ref 27–39.2)
AST SERPL-CCNC: 13 U/L — SIGNIFICANT CHANGE UP (ref 0–41)
BASOPHILS # BLD AUTO: 0.1 K/UL — SIGNIFICANT CHANGE UP (ref 0–0.2)
BASOPHILS NFR BLD AUTO: 0.8 % — SIGNIFICANT CHANGE UP (ref 0–1)
BILIRUB SERPL-MCNC: 0.4 MG/DL — SIGNIFICANT CHANGE UP (ref 0.2–1.2)
BUN SERPL-MCNC: 16 MG/DL — SIGNIFICANT CHANGE UP (ref 10–20)
CALCIUM SERPL-MCNC: 8.9 MG/DL — SIGNIFICANT CHANGE UP (ref 8.4–10.4)
CHLORIDE SERPL-SCNC: 98 MMOL/L — SIGNIFICANT CHANGE UP (ref 98–110)
CO2 SERPL-SCNC: 24 MMOL/L — SIGNIFICANT CHANGE UP (ref 17–32)
CREAT SERPL-MCNC: 1.4 MG/DL — SIGNIFICANT CHANGE UP (ref 0.7–1.5)
EGFR: 58 ML/MIN/1.73M2 — LOW
EOSINOPHIL # BLD AUTO: 0.25 K/UL — SIGNIFICANT CHANGE UP (ref 0–0.7)
EOSINOPHIL NFR BLD AUTO: 2 % — SIGNIFICANT CHANGE UP (ref 0–8)
GLUCOSE SERPL-MCNC: 122 MG/DL — HIGH (ref 70–99)
HCT VFR BLD CALC: 32 % — LOW (ref 42–52)
HCV AB S/CO SERPL IA: 0.04 COI — SIGNIFICANT CHANGE UP
HCV AB SERPL-IMP: SIGNIFICANT CHANGE UP
HGB BLD-MCNC: 10.7 G/DL — LOW (ref 14–18)
IMM GRANULOCYTES NFR BLD AUTO: 0.9 % — HIGH (ref 0.1–0.3)
INR BLD: 1.55 RATIO — HIGH (ref 0.65–1.3)
LYMPHOCYTES # BLD AUTO: 0.93 K/UL — LOW (ref 1.2–3.4)
LYMPHOCYTES # BLD AUTO: 7.5 % — LOW (ref 20.5–51.1)
MAGNESIUM SERPL-MCNC: 2.5 MG/DL — HIGH (ref 1.8–2.4)
MCHC RBC-ENTMCNC: 27.6 PG — SIGNIFICANT CHANGE UP (ref 27–31)
MCHC RBC-ENTMCNC: 33.4 G/DL — SIGNIFICANT CHANGE UP (ref 32–37)
MCV RBC AUTO: 82.7 FL — SIGNIFICANT CHANGE UP (ref 80–94)
MONOCYTES # BLD AUTO: 0.86 K/UL — HIGH (ref 0.1–0.6)
MONOCYTES NFR BLD AUTO: 6.9 % — SIGNIFICANT CHANGE UP (ref 1.7–9.3)
NEUTROPHILS # BLD AUTO: 10.15 K/UL — HIGH (ref 1.4–6.5)
NEUTROPHILS NFR BLD AUTO: 81.9 % — HIGH (ref 42.2–75.2)
NRBC # BLD: 0 /100 WBCS — SIGNIFICANT CHANGE UP (ref 0–0)
PLATELET # BLD AUTO: 488 K/UL — HIGH (ref 130–400)
PMV BLD: 9.5 FL — SIGNIFICANT CHANGE UP (ref 7.4–10.4)
POTASSIUM SERPL-MCNC: 4 MMOL/L — SIGNIFICANT CHANGE UP (ref 3.5–5)
POTASSIUM SERPL-SCNC: 4 MMOL/L — SIGNIFICANT CHANGE UP (ref 3.5–5)
PROT SERPL-MCNC: 6.4 G/DL — SIGNIFICANT CHANGE UP (ref 6–8)
PROTHROM AB SERPL-ACNC: 17.8 SEC — HIGH (ref 9.95–12.87)
RAPID RVP RESULT: SIGNIFICANT CHANGE UP
RBC # BLD: 3.87 M/UL — LOW (ref 4.7–6.1)
RBC # FLD: 13.6 % — SIGNIFICANT CHANGE UP (ref 11.5–14.5)
SARS-COV-2 RNA SPEC QL NAA+PROBE: SIGNIFICANT CHANGE UP
SODIUM SERPL-SCNC: 136 MMOL/L — SIGNIFICANT CHANGE UP (ref 135–146)
WBC # BLD: 12.4 K/UL — HIGH (ref 4.8–10.8)
WBC # FLD AUTO: 12.4 K/UL — HIGH (ref 4.8–10.8)

## 2024-01-16 PROCEDURE — 99221 1ST HOSP IP/OBS SF/LOW 40: CPT

## 2024-01-16 PROCEDURE — 99239 HOSP IP/OBS DSCHRG MGMT >30: CPT

## 2024-01-16 RX ORDER — METHOCARBAMOL 500 MG/1
1 TABLET, FILM COATED ORAL
Qty: 28 | Refills: 0
Start: 2024-01-16 | End: 2024-01-29

## 2024-01-16 RX ADMIN — METHOCARBAMOL 750 MILLIGRAM(S): 500 TABLET, FILM COATED ORAL at 05:51

## 2024-01-16 RX ADMIN — Medication 1 PATCH: at 11:37

## 2024-01-16 RX ADMIN — AMIODARONE HYDROCHLORIDE 200 MILLIGRAM(S): 400 TABLET ORAL at 05:51

## 2024-01-16 RX ADMIN — PANTOPRAZOLE SODIUM 40 MILLIGRAM(S): 20 TABLET, DELAYED RELEASE ORAL at 06:56

## 2024-01-16 RX ADMIN — CELECOXIB 200 MILLIGRAM(S): 200 CAPSULE ORAL at 13:02

## 2024-01-16 RX ADMIN — Medication 10 MILLIEQUIVALENT(S): at 11:38

## 2024-01-16 RX ADMIN — Medication 20 MILLIGRAM(S): at 06:00

## 2024-01-16 RX ADMIN — Medication 650 MILLIGRAM(S): at 11:36

## 2024-01-16 RX ADMIN — Medication 650 MILLIGRAM(S): at 13:02

## 2024-01-16 RX ADMIN — CLOPIDOGREL BISULFATE 75 MILLIGRAM(S): 75 TABLET, FILM COATED ORAL at 11:38

## 2024-01-16 RX ADMIN — CELECOXIB 200 MILLIGRAM(S): 200 CAPSULE ORAL at 11:36

## 2024-01-16 RX ADMIN — Medication 650 MILLIGRAM(S): at 06:01

## 2024-01-16 NOTE — PHYSICAL THERAPY INITIAL EVALUATION ADULT - LIVES WITH, PROFILE
pt lives with spouse in pvt home. + 2stairs, However pt would be able to stay in basement apaprtment. No ASD/spouse

## 2024-01-16 NOTE — DISCHARGE NOTE PROVIDER - NSDCCPCAREPLAN_GEN_ALL_CORE_FT
PRINCIPAL DISCHARGE DIAGNOSIS  Diagnosis: Left leg pain  Assessment and Plan of Treatment: You came to the hospital due to progressive Left lateral hip pain radiates down to Left lower extremity with difficulty ambulation. Both arterial duplex Lower extremity and venous duplex lower extremity negative.  CTA Left lower extremity demonstrated Small hematomas/postoperative change in the medial soft tissues of the proximal tibia. Degenerative change also seen in the hips.  X-ray hip demonstrated No acute fracture or dislocation. Mild degenerative change in the hips. PT saw you and recommended home PT. You have improved symptoms.  Please follow up with your primary care doctor within 1 week of discharge.

## 2024-01-16 NOTE — DISCHARGE NOTE PROVIDER - CARE PROVIDERS DIRECT ADDRESSES
,[email or web address]

## 2024-01-16 NOTE — PHYSICAL THERAPY INITIAL EVALUATION ADULT - PERTINENT HX OF CURRENT PROBLEM, REHAB EVAL
The pt is a 59 yo male w/ a PMH of HTN, HLD, CVA (approx. 2016), former smoker (quit 1/5/24), who recently underwent CABG at [place] where patient was admitted from January 4 to January 12 on warfarin and Plavix for post-operative  new-onset (paroxysmal) a. fib presents to the ED with severe pain in LLE The pt is a 57 yo male w/ a PMH of HTN, HLD, CVA (approx. 2016), former smoker (quit 1/5/24), who recently underwent CABG at [place] where patient was admitted from January 4 to January 12 on warfarin and Plavix for post-operative  new-onset (paroxysmal) a. fib presents to the ED with severe pain in LLE

## 2024-01-16 NOTE — PHYSICAL THERAPY INITIAL EVALUATION ADULT - GENERAL OBSERVATIONS, REHAB EVAL
PT IE [id number]. Chart reviewed. Pt encountered semi-reclined in hallway bed. + IV lock PT IE [id number]. Chart reviewed. Pt encountered semi-reclined in hallway bed. + IV lock

## 2024-01-16 NOTE — DISCHARGE NOTE PROVIDER - CARE PROVIDER_API CALL
Olivier [address], Admin - Room [address] [place] [address]  Phone: [phone]  Fax: [phone]  Follow Up Time: 1 week   Olivier [address], Admin - Room [address] [place] [address]  Phone: [phone]  Fax: [phone]  Follow Up Time: 1 week

## 2024-01-16 NOTE — DISCHARGE NOTE PROVIDER - NSDCMRMEDTOKEN_GEN_ALL_CORE_FT
acetaminophen 325 mg oral capsule: 3 cap(s) orally 3 times a day as needed for  mild pain  amLODIPine 2.5 mg oral tablet: 1 tab(s) orally once a day  clopidogrel 75 mg oral tablet: 1 tab(s) orally once a day  Cordarone 200 mg oral tablet: 1 tab(s) orally 2 times a day Starting on 1/12 take 1 tablet 2 times a day for 7 days Then 1 tablet daily for 14 days  Lasix 20 mg oral tablet: 1 tab(s) orally once a day  Lipitor 80 mg oral tablet: 1 tab(s) orally once a day  Metoprolol Tartrate 25 mg oral tablet: 0.5 tab(s) orally 2 times a day  nicotine 7 mg/24 hr transdermal film, extended release: 1 patch transdermally once a day  Percolone 5 mg oral tablet: 0.5 tab(s) orally every 4 hours as needed for  moderate pain  Potassium Chloride (Eqv-Klor-Con 10) 10 mEq oral tablet, extended release: 1 tab(s) orally once a day  Protonix 40 mg oral delayed release tablet: 1 tab(s) orally once a day before breakfast  warfarin 1 mg oral tablet: 2 tab(s) orally once a day   acetaminophen 325 mg oral capsule: 3 cap(s) orally 3 times a day as needed for  mild pain  amLODIPine 2.5 mg oral tablet: 1 tab(s) orally once a day  clopidogrel 75 mg oral tablet: 1 tab(s) orally once a day  Cordarone 200 mg oral tablet: 1 tab(s) orally 2 times a day Starting on 1/12 take 1 tablet 2 times a day for 7 days Then 1 tablet daily for 14 days  Lasix 20 mg oral tablet: 1 tab(s) orally once a day  Lipitor 80 mg oral tablet: 1 tab(s) orally once a day  methocarbamol 750 mg oral tablet: 1 tab(s) orally 2 times a day  Metoprolol Tartrate 25 mg oral tablet: 0.5 tab(s) orally 2 times a day  nicotine 7 mg/24 hr transdermal film, extended release: 1 patch transdermally once a day  Percolone 5 mg oral tablet: 0.5 tab(s) orally every 4 hours as needed for  moderate pain  Potassium Chloride (Eqv-Klor-Con 10) 10 mEq oral tablet, extended release: 1 tab(s) orally once a day  Protonix 40 mg oral delayed release tablet: 1 tab(s) orally once a day before breakfast  warfarin 1 mg oral tablet: 2 tab(s) orally once a day

## 2024-01-16 NOTE — DISCHARGE NOTE PROVIDER - HOSPITAL COURSE
59 yo M with hx of HTN, HLD, CAD s/p CABG (Perez 10, 2024), Paroxysmal A.fib (coumadin) presents to ED for progressive Left lateral hip pain radiates down to LLE with difficulty ambulation.     Inability to ambulate 2/2 Left lateral hip pain radiates down to lateral LLE  - Ddx: greater trochanteric pain syndrome vs. Iliotibial band  syndrome vs. OA   - On physical exam, patient is significant pain during active ROM but mild pain during passive ROM. Also has tenderness to palpation of left lateral hip along from greater trochanter down the lateral knee. No significant groin pain/tenderness.   - Both arterial duplex LE and venous duplex LE neg.   - CTA LLE: Small hematomas/postoperative change in the medial soft tissues of the proximal tibia. Degenerative change also seen in the hips.  - X-ray hip: No acute fracture or dislocation. Mild degenerative change in the hips.  - will start on robaxin and tylenol   - morphine prn with bowel regimen  - will give celecoxib with PPI tonight and tomorrow. Would avoid long term NSAIDs as patient is already on AC.   - PT/Rehab.   - Fall precaution.    HTN/ HLD - c/w home med  CAD s/p CABG - c/w home med  Paroxysmal A.fib - on amiodarone, lopressor and coumadin. Monitor INR and dose coumadin accordingly.     DVT ppx: coumadin   GI ppx:  PPI   Diet: DASH diet   Activity: as tolerated.

## 2024-01-16 NOTE — DISCHARGE NOTE PROVIDER - PROVIDER TOKENS
PROVIDER:[TOKEN:[[id number]],FOLLOWUP:[1 week]] PROVIDER:[TOKEN:[[id number]],FOLLOWUP:[1 week]]

## 2024-01-16 NOTE — DISCHARGE NOTE NURSING/CASE MANAGEMENT/SOCIAL WORK - NSDCPEFALRISK_GEN_ALL_CORE
For information on Fall & Injury Prevention, visit: [email or web address] OR  [email or web address] OR  https://www.cdc.gov/steadi/patient.html For information on Fall & Injury Prevention, visit: [email or web address] OR  [email or web address]/[email or web address]-[email or web address]-injury OR  https://www.cdc.gov/steadi/patient.html

## 2024-01-16 NOTE — PHYSICAL THERAPY INITIAL EVALUATION ADULT - PLANNED THERAPY INTERVENTIONS, PT EVAL
September 15, 2021     Patient: Toni Fontenot   YOB: 1977   Date of Visit: 9/14/2021       To Whom it May Concern:    Toni Fontenot was seen in my clinic on 9/14/2021 at 1:20 pm. Please allow Toni to return to work starting 9/16/21.     Sincerely,         Chan Lamar, DO    Medical information is confidential and cannot be disclosed without the written consent of the patient or his representative.      
September 16, 2021     Patient: Toni Fontenot   YOB: 1977   Date of Visit: 9/14/2021       To Whom it May Concern:    Toni Fontenot was seen in my clinic on 9/14/2021 at 1:20 pm.    Please excuse Toni for his absence from work from Saturday, 9/11/21 thru Wednesday 9/15/21 due to medical reasons. He may return to work as of Thursday, 9/16/21.    Sincerely,         Chan Lamar,     Medical information is confidential and cannot be disclosed without the written consent of the patient or his representative.      
stairs/balance training/bed mobility training/gait training/strengthening/transfer training

## 2024-01-16 NOTE — DISCHARGE NOTE NURSING/CASE MANAGEMENT/SOCIAL WORK - PATIENT PORTAL LINK FT
You can access the FollowMyHealth Patient Portal offered by [facility] by registering at the following website: [email or web address]. By joining [facility]’s FollowMyHealth portal, you will also be able to view your health information using other applications (apps) compatible with our system. You can access the FollowMyHealth Patient Portal offered by [facility] by registering at the following website: [email or web address]. By joining [employer] [facility]’s FollowMyHealth portal, you will also be able to view your health information using other applications (apps) compatible with our system.

## 2024-01-22 DIAGNOSIS — I10 ESSENTIAL (PRIMARY) HYPERTENSION: ICD-10-CM
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
## 2024-01-22 DIAGNOSIS — Z86.73 PERSONAL HISTORY OF TRANSIENT ISCHEMIC ATTACK (TIA), AND CEREBRAL INFARCTION WITHOUT RESIDUAL DEFICITS: ICD-10-CM

## 2024-01-22 DIAGNOSIS — I48.0 PAROXYSMAL ATRIAL FIBRILLATION: ICD-10-CM
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
## 2024-01-22 DIAGNOSIS — M96.841 POSTPROCEDURAL HEMATOMA OF A MUSCULOSKELETAL STRUCTURE FOLLOWING OTHER PROCEDURE: ICD-10-CM

## 2024-01-22 DIAGNOSIS — Z88.0 ALLERGY STATUS TO PENICILLIN: ICD-10-CM

## 2024-01-22 DIAGNOSIS — I44.0 ATRIOVENTRICULAR BLOCK, FIRST DEGREE: ICD-10-CM

## 2024-01-22 DIAGNOSIS — I25.10 ATHEROSCLEROTIC HEART DISEASE OF NATIVE CORONARY ARTERY WITHOUT ANGINA PECTORIS: ICD-10-CM

## 2024-01-22 DIAGNOSIS — Z95.1 PRESENCE OF AORTOCORONARY BYPASS GRAFT: ICD-10-CM

## 2024-01-22 DIAGNOSIS — M79.605 PAIN IN LEFT LEG: ICD-10-CM

## 2024-01-22 DIAGNOSIS — E78.5 HYPERLIPIDEMIA, UNSPECIFIED: ICD-10-CM

## 2024-01-22 DIAGNOSIS — F17.200 NICOTINE DEPENDENCE, UNSPECIFIED, UNCOMPLICATED: ICD-10-CM

## 2024-05-06 ENCOUNTER — APPOINTMENT (OUTPATIENT)
Dept: PULMONOLOGY | Facility: CLINIC | Age: 59
End: 2024-05-06
Payer: COMMERCIAL

## 2024-05-06 VITALS
WEIGHT: 209 LBS | DIASTOLIC BLOOD PRESSURE: 70 MMHG | HEART RATE: 76 BPM | BODY MASS INDEX: 29.15 KG/M2 | OXYGEN SATURATION: 99 % | SYSTOLIC BLOOD PRESSURE: 130 MMHG

## 2024-05-06 DIAGNOSIS — Z12.2 ENCOUNTER FOR SCREENING FOR MALIGNANT NEOPLASM OF RESPIRATORY ORGANS: ICD-10-CM

## 2024-05-06 DIAGNOSIS — R06.02 SHORTNESS OF BREATH: ICD-10-CM

## 2024-05-06 PROCEDURE — 99214 OFFICE O/P EST MOD 30 MIN: CPT

## 2024-05-06 RX ORDER — METOPROLOL TARTRATE 75 MG/1
TABLET, FILM COATED ORAL
Refills: 0 | Status: ACTIVE | COMMUNITY

## 2024-05-06 RX ORDER — CLOPIDOGREL 75 MG/1
75 TABLET, FILM COATED ORAL
Refills: 0 | Status: ACTIVE | COMMUNITY

## 2024-05-06 NOTE — PLAN
[TextEntry] : Encouraged to stay off smoking Counseled for weight reduction PFT was reordered Patient does not want albuterol to be ordered even as standby Told if any cough wheezing shortness of breath to call me Told that we need to repeat low-dose screening CAT scan in December 2024 Follow-up in November 2024

## 2024-05-06 NOTE — PHYSICAL EXAM
[No Acute Distress] : no acute distress [Normal Oropharynx] : normal oropharynx [Normal Appearance] : normal appearance [No Neck Mass] : no neck mass [Normal Rate/Rhythm] : normal rate/rhythm [Normal S1, S2] : normal s1, s2 [No Resp Distress] : no resp distress [Clear to Auscultation Bilaterally] : clear to auscultation bilaterally [No Abnormalities] : no abnormalities [Normal Gait] : normal gait [No Clubbing] : no clubbing [No Cyanosis] : no cyanosis [No Edema] : no edema [FROM] : FROM [Oriented x3] : oriented x3 [Normal Affect] : normal affect

## 2024-05-06 NOTE — REVIEW OF SYSTEMS
[Cough] : no cough [Dyspnea] : no dyspnea [SOB on Exertion] : no sob on exertion [Negative] : Endocrine [TextBox_44] : see HPI

## 2024-05-06 NOTE — HISTORY OF PRESENT ILLNESS
[TextBox_4] : Patient coming for follow-up seen once in November 2023 status post CT scan screening I have 4 mm nodule result discussed with patient told we need to repeat 1 end of this year He is status post cardiac catheterization followed by open heart surgery CABG four-vessel disease patient said that now he is feeling good no cough wheezing or shortness of breath even on exertion he did not start the Incruse He quit smoking he has been almost 4 months now

## 2024-05-15 ENCOUNTER — OUTPATIENT (OUTPATIENT)
Dept: OUTPATIENT SERVICES | Facility: HOSPITAL | Age: 59
LOS: 1 days | End: 2024-05-15
Payer: COMMERCIAL

## 2024-05-15 ENCOUNTER — APPOINTMENT (OUTPATIENT)
Dept: PULMONOLOGY | Facility: HOSPITAL | Age: 59
End: 2024-05-15
Payer: COMMERCIAL

## 2024-05-15 DIAGNOSIS — R06.02 SHORTNESS OF BREATH: ICD-10-CM

## 2024-05-15 PROCEDURE — 94060 EVALUATION OF WHEEZING: CPT | Mod: 26

## 2024-05-15 PROCEDURE — 94729 DIFFUSING CAPACITY: CPT | Mod: 26

## 2024-05-15 PROCEDURE — 94664 DEMO&/EVAL PT USE INHALER: CPT

## 2024-05-15 PROCEDURE — 94729 DIFFUSING CAPACITY: CPT

## 2024-05-15 PROCEDURE — 94727 GAS DIL/WSHOT DETER LNG VOL: CPT | Mod: 26

## 2024-05-15 PROCEDURE — 94070 EVALUATION OF WHEEZING: CPT

## 2024-05-15 PROCEDURE — 94726 PLETHYSMOGRAPHY LUNG VOLUMES: CPT

## 2024-05-16 DIAGNOSIS — R06.02 SHORTNESS OF BREATH: ICD-10-CM

## 2024-05-21 ENCOUNTER — NON-APPOINTMENT (OUTPATIENT)
Age: 59
End: 2024-05-21

## 2024-08-08 ENCOUNTER — APPOINTMENT (OUTPATIENT)
Dept: ORTHOPEDIC SURGERY | Facility: CLINIC | Age: 59
End: 2024-08-08

## 2024-08-08 PROBLEM — M77.8 TENDINITIS OF RIGHT WRIST: Status: ACTIVE | Noted: 2024-08-08

## 2024-08-08 PROCEDURE — 99203 OFFICE O/P NEW LOW 30 MIN: CPT

## 2024-08-08 PROCEDURE — 73130 X-RAY EXAM OF HAND: CPT | Mod: RT

## 2024-08-08 NOTE — ASSESSMENT
[FreeTextEntry1] : 58-year-old male for evaluation of pain of his right wrist.  Patient reports an aching pain on the dorsal aspect of his right wrist that which worsened after doing lots of painting a few weeks ago.  Reports the pain has been persistent.  Denies any trauma or falls.  Denies any numbness or tingling.  Physical examination of the right wrist: No swelling, ecchymosis, or erythema appreciated skin is intact.  Send palpation of the dorsal aspect of the right wrist.  Full range of motion.  Good strength.  Sensation is intact distally.  Neuro vas intact.  Negative Tinel's.  Negative Phalen's.  Negative TFCC grind.  xrays of right wrist:  No acute fractures, subluxations, or dislocations. radiocarpal arthritis appreciated.  My clinical suspicion is high for tendinitis of the right wrist given the patient's history, physical examination findings, and x-ray findings.  The patient is taking blood thinners and so he is unable to take NSAIDs.  Therefore, I advised him to take Tylenol as needed for pain.  I offered a cock up wrist brace already, he declined.  Encouraged him to range of motion activity modification within his imitations of pain.  Expresses full understanding.  Red flag symptoms were discussed. All questions and concerns addressed to patient's satisfaction. Patient expresses full understanding of treatment plan.

## 2024-11-04 ENCOUNTER — APPOINTMENT (OUTPATIENT)
Dept: PULMONOLOGY | Facility: CLINIC | Age: 59
End: 2024-11-04
Payer: COMMERCIAL

## 2024-11-04 VITALS
HEIGHT: 71 IN | SYSTOLIC BLOOD PRESSURE: 126 MMHG | DIASTOLIC BLOOD PRESSURE: 74 MMHG | RESPIRATION RATE: 14 BRPM | HEART RATE: 69 BPM | BODY MASS INDEX: 29.4 KG/M2 | OXYGEN SATURATION: 98 % | WEIGHT: 210 LBS

## 2024-11-04 DIAGNOSIS — J44.9 CHRONIC OBSTRUCTIVE PULMONARY DISEASE, UNSPECIFIED: ICD-10-CM

## 2024-11-04 DIAGNOSIS — Z12.2 ENCOUNTER FOR SCREENING FOR MALIGNANT NEOPLASM OF RESPIRATORY ORGANS: ICD-10-CM

## 2024-11-04 DIAGNOSIS — G47.33 OBSTRUCTIVE SLEEP APNEA (ADULT) (PEDIATRIC): ICD-10-CM

## 2024-11-04 DIAGNOSIS — E66.9 OBESITY, UNSPECIFIED: ICD-10-CM

## 2024-11-04 PROCEDURE — G2211 COMPLEX E/M VISIT ADD ON: CPT | Mod: NC

## 2024-11-04 PROCEDURE — 99214 OFFICE O/P EST MOD 30 MIN: CPT

## 2024-11-04 PROCEDURE — G0296 VISIT TO DETERM LDCT ELIG: CPT

## 2024-11-04 NOTE — HISTORY OF PRESENT ILLNESS
[TextBox_4] : Patient coming for follow-up he is not smoking he quit smoking for almost 11 months patient used to smoke cigarettes at least 1 pack/day for more than 25 years he meet the criteria for yearly screening CAT scan he is due for a CAT scan in December 2024. Patient status post PFT result reviewed discussed with him He is doing good he is off any inhaler only albuterol as needed and he said that he has not been requiring albuterol at all did not use it for the last couple of months Patient was counseled for sleep apnea he do snore at night he twist and turn while sleeping nocturia some daytime fatigue also complaining of insomnia waking up at night he has a Mallampati 4 He agree for sleep study

## 2024-11-04 NOTE — PLAN
[TextEntry] : Albuterol as needed Low-dose CT scan ordered told patient to call back to do the CAT scan will proceed with sleep study  counseled for weight reduction  told not to drive if feel tired  counseled for sleep hygiene

## 2024-11-09 ENCOUNTER — APPOINTMENT (OUTPATIENT)
Dept: SLEEP CENTER | Facility: HOSPITAL | Age: 59
End: 2024-11-09

## 2024-12-18 ENCOUNTER — OUTPATIENT (OUTPATIENT)
Dept: OUTPATIENT SERVICES | Facility: HOSPITAL | Age: 59
LOS: 1 days | End: 2024-12-18
Payer: COMMERCIAL

## 2024-12-18 ENCOUNTER — RESULT REVIEW (OUTPATIENT)
Age: 59
End: 2024-12-18

## 2024-12-18 DIAGNOSIS — F17.210 NICOTINE DEPENDENCE, CIGARETTES, UNCOMPLICATED: ICD-10-CM

## 2024-12-18 DIAGNOSIS — Z00.8 ENCOUNTER FOR OTHER GENERAL EXAMINATION: ICD-10-CM

## 2024-12-18 PROCEDURE — 71271 CT THORAX LUNG CANCER SCR C-: CPT | Mod: 26

## 2024-12-18 PROCEDURE — 71271 CT THORAX LUNG CANCER SCR C-: CPT

## 2024-12-19 DIAGNOSIS — F17.210 NICOTINE DEPENDENCE, CIGARETTES, UNCOMPLICATED: ICD-10-CM

## 2025-02-16 ENCOUNTER — NON-APPOINTMENT (OUTPATIENT)
Age: 60
End: 2025-02-16

## 2025-02-27 ENCOUNTER — APPOINTMENT (OUTPATIENT)
Dept: PULMONOLOGY | Facility: CLINIC | Age: 60
End: 2025-02-27
Payer: COMMERCIAL

## 2025-02-27 VITALS
RESPIRATION RATE: 14 BRPM | HEART RATE: 77 BPM | DIASTOLIC BLOOD PRESSURE: 70 MMHG | BODY MASS INDEX: 31.38 KG/M2 | OXYGEN SATURATION: 99 % | WEIGHT: 225 LBS | SYSTOLIC BLOOD PRESSURE: 124 MMHG

## 2025-02-27 DIAGNOSIS — G47.33 OBSTRUCTIVE SLEEP APNEA (ADULT) (PEDIATRIC): ICD-10-CM

## 2025-02-27 DIAGNOSIS — J44.9 CHRONIC OBSTRUCTIVE PULMONARY DISEASE, UNSPECIFIED: ICD-10-CM

## 2025-02-27 DIAGNOSIS — Z12.2 ENCOUNTER FOR SCREENING FOR MALIGNANT NEOPLASM OF RESPIRATORY ORGANS: ICD-10-CM

## 2025-02-27 PROCEDURE — 99213 OFFICE O/P EST LOW 20 MIN: CPT

## 2025-02-27 PROCEDURE — G2211 COMPLEX E/M VISIT ADD ON: CPT | Mod: NC

## 2025-02-27 NOTE — PLAN
[TextEntry] : Low-dose screening CAT scan December 2025 told need yearly CAT scan Albuterol as needed PFT ordered for next visit Counseled for weight reduction Refusing to do sleep study

## 2025-02-27 NOTE — HISTORY OF PRESENT ILLNESS
[TextBox_4] : Patient came for follow-up he said has been doing good not taking any inhaler he stopped before Incruse and said not requiring albuterol at all No cough wheezing or shortness of breath He quit smoking over 13 months ago he used to smoke at least 1 pack/day for more than 30 years CT scan December 2024 reviewed overall stable told we need repeat CAT scan December 2025

## 2025-03-26 ENCOUNTER — NON-APPOINTMENT (OUTPATIENT)
Age: 60
End: 2025-03-26

## 2025-04-01 ENCOUNTER — EMERGENCY (EMERGENCY)
Facility: HOSPITAL | Age: 60
LOS: 0 days | Discharge: HOME | End: 2025-04-01
Attending: STUDENT IN AN ORGANIZED HEALTH CARE EDUCATION/TRAINING PROGRAM
Payer: COMMERCIAL

## 2025-04-01 VITALS
HEART RATE: 102 BPM | SYSTOLIC BLOOD PRESSURE: 128 MMHG | HEIGHT: 71 IN | RESPIRATION RATE: 18 BRPM | OXYGEN SATURATION: 98 % | DIASTOLIC BLOOD PRESSURE: 78 MMHG | WEIGHT: 205.91 LBS | TEMPERATURE: 98 F

## 2025-04-01 DIAGNOSIS — Z79.02 LONG TERM (CURRENT) USE OF ANTITHROMBOTICS/ANTIPLATELETS: ICD-10-CM

## 2025-04-01 DIAGNOSIS — Z88.0 ALLERGY STATUS TO PENICILLIN: ICD-10-CM

## 2025-04-01 DIAGNOSIS — I25.10 ATHEROSCLEROTIC HEART DISEASE OF NATIVE CORONARY ARTERY WITHOUT ANGINA PECTORIS: ICD-10-CM

## 2025-04-01 DIAGNOSIS — Z87.891 PERSONAL HISTORY OF NICOTINE DEPENDENCE: ICD-10-CM

## 2025-04-01 DIAGNOSIS — K57.92 DIVERTICULITIS OF INTESTINE, PART UNSPECIFIED, WITHOUT PERFORATION OR ABSCESS WITHOUT BLEEDING: ICD-10-CM

## 2025-04-01 DIAGNOSIS — R11.0 NAUSEA: ICD-10-CM

## 2025-04-01 DIAGNOSIS — I10 ESSENTIAL (PRIMARY) HYPERTENSION: ICD-10-CM

## 2025-04-01 DIAGNOSIS — I72.3 ANEURYSM OF ILIAC ARTERY: ICD-10-CM

## 2025-04-01 DIAGNOSIS — Z95.1 PRESENCE OF AORTOCORONARY BYPASS GRAFT: ICD-10-CM

## 2025-04-01 DIAGNOSIS — E78.5 HYPERLIPIDEMIA, UNSPECIFIED: ICD-10-CM

## 2025-04-01 DIAGNOSIS — Z86.19 PERSONAL HISTORY OF OTHER INFECTIOUS AND PARASITIC DISEASES: ICD-10-CM

## 2025-04-01 DIAGNOSIS — R19.7 DIARRHEA, UNSPECIFIED: ICD-10-CM

## 2025-04-01 LAB
ALP SERPL-CCNC: 180 U/L — HIGH (ref 30–115)
ALT FLD-CCNC: 16 U/L — SIGNIFICANT CHANGE UP (ref 0–41)
ANION GAP SERPL CALC-SCNC: 13 MMOL/L — SIGNIFICANT CHANGE UP (ref 7–14)
APPEARANCE UR: CLEAR — SIGNIFICANT CHANGE UP
AST SERPL-CCNC: 20 U/L — SIGNIFICANT CHANGE UP (ref 0–41)
BACTERIA # UR AUTO: NEGATIVE /HPF — SIGNIFICANT CHANGE UP
BASOPHILS # BLD AUTO: 0.05 K/UL — SIGNIFICANT CHANGE UP (ref 0–0.2)
BASOPHILS NFR BLD AUTO: 0.7 % — SIGNIFICANT CHANGE UP (ref 0–1)
BILIRUB DIRECT SERPL-MCNC: 0.2 MG/DL — SIGNIFICANT CHANGE UP (ref 0–0.3)
BILIRUB INDIRECT FLD-MCNC: 0.5 MG/DL — SIGNIFICANT CHANGE UP (ref 0.2–1.2)
BILIRUB SERPL-MCNC: 0.7 MG/DL — SIGNIFICANT CHANGE UP (ref 0.2–1.2)
BUN SERPL-MCNC: 13 MG/DL — SIGNIFICANT CHANGE UP (ref 10–20)
CAST: 2 /LPF — SIGNIFICANT CHANGE UP (ref 0–4)
CHLORIDE SERPL-SCNC: 102 MMOL/L — SIGNIFICANT CHANGE UP (ref 98–110)
CO2 SERPL-SCNC: 25 MMOL/L — SIGNIFICANT CHANGE UP (ref 17–32)
COLOR SPEC: YELLOW — SIGNIFICANT CHANGE UP
CREAT SERPL-MCNC: 1.3 MG/DL — SIGNIFICANT CHANGE UP (ref 0.7–1.5)
DIFF PNL FLD: NEGATIVE — SIGNIFICANT CHANGE UP
EGFR: 62 ML/MIN/1.73M2 — SIGNIFICANT CHANGE UP
EGFR: 62 ML/MIN/1.73M2 — SIGNIFICANT CHANGE UP
EOSINOPHIL # BLD AUTO: 0.13 K/UL — SIGNIFICANT CHANGE UP (ref 0–0.7)
EOSINOPHIL NFR BLD AUTO: 1.8 % — SIGNIFICANT CHANGE UP (ref 0–8)
GLUCOSE SERPL-MCNC: 94 MG/DL — SIGNIFICANT CHANGE UP (ref 70–99)
GLUCOSE UR QL: NEGATIVE MG/DL — SIGNIFICANT CHANGE UP
HCT VFR BLD CALC: 38.1 % — LOW (ref 42–52)
HGB BLD-MCNC: 12.5 G/DL — LOW (ref 14–18)
IMM GRANULOCYTES NFR BLD AUTO: 0.8 % — HIGH (ref 0.1–0.3)
LEUKOCYTE ESTERASE UR-ACNC: NEGATIVE — SIGNIFICANT CHANGE UP
LIDOCAIN IGE QN: 59 U/L — SIGNIFICANT CHANGE UP (ref 7–60)
LYMPHOCYTES # BLD AUTO: 1.11 K/UL — LOW (ref 1.2–3.4)
LYMPHOCYTES # BLD AUTO: 15.1 % — LOW (ref 20.5–51.1)
MCHC RBC-ENTMCNC: 25.8 PG — LOW (ref 27–31)
MCHC RBC-ENTMCNC: 32.8 G/DL — SIGNIFICANT CHANGE UP (ref 32–37)
MCV RBC AUTO: 78.7 FL — LOW (ref 80–94)
MONOCYTES # BLD AUTO: 0.93 K/UL — HIGH (ref 0.1–0.6)
MONOCYTES NFR BLD AUTO: 12.7 % — HIGH (ref 1.7–9.3)
NITRITE UR-MCNC: NEGATIVE — SIGNIFICANT CHANGE UP
NRBC BLD AUTO-RTO: 0 /100 WBCS — SIGNIFICANT CHANGE UP (ref 0–0)
PH UR: 5.5 — SIGNIFICANT CHANGE UP (ref 5–8)
PLATELET # BLD AUTO: 264 K/UL — SIGNIFICANT CHANGE UP (ref 130–400)
PMV BLD: 9.3 FL — SIGNIFICANT CHANGE UP (ref 7.4–10.4)
POTASSIUM SERPL-MCNC: 4.2 MMOL/L — SIGNIFICANT CHANGE UP (ref 3.5–5)
POTASSIUM SERPL-SCNC: 4.2 MMOL/L — SIGNIFICANT CHANGE UP (ref 3.5–5)
PROT SERPL-MCNC: 6.9 G/DL — SIGNIFICANT CHANGE UP (ref 6–8)
PROT UR-MCNC: 30 MG/DL
RBC CASTS # UR COMP ASSIST: 3 /HPF — SIGNIFICANT CHANGE UP (ref 0–4)
SODIUM SERPL-SCNC: 140 MMOL/L — SIGNIFICANT CHANGE UP (ref 135–146)
SP GR SPEC: >1.03 — HIGH (ref 1–1.03)
SQUAMOUS # UR AUTO: 1 /HPF — SIGNIFICANT CHANGE UP (ref 0–5)
UROBILINOGEN FLD QL: 1 MG/DL — SIGNIFICANT CHANGE UP (ref 0.2–1)
WBC # BLD: 7.34 K/UL — SIGNIFICANT CHANGE UP (ref 4.8–10.8)
WBC # FLD AUTO: 7.34 K/UL — SIGNIFICANT CHANGE UP (ref 4.8–10.8)
WBC UR QL: 1 /HPF — SIGNIFICANT CHANGE UP (ref 0–5)

## 2025-04-01 PROCEDURE — 83605 ASSAY OF LACTIC ACID: CPT

## 2025-04-01 PROCEDURE — 83690 ASSAY OF LIPASE: CPT

## 2025-04-01 PROCEDURE — 99285 EMERGENCY DEPT VISIT HI MDM: CPT

## 2025-04-01 PROCEDURE — 99284 EMERGENCY DEPT VISIT MOD MDM: CPT | Mod: 25

## 2025-04-01 PROCEDURE — 74177 CT ABD & PELVIS W/CONTRAST: CPT | Mod: 26

## 2025-04-01 PROCEDURE — 96375 TX/PRO/DX INJ NEW DRUG ADDON: CPT

## 2025-04-01 PROCEDURE — 80048 BASIC METABOLIC PNL TOTAL CA: CPT

## 2025-04-01 PROCEDURE — 85025 COMPLETE CBC W/AUTO DIFF WBC: CPT

## 2025-04-01 PROCEDURE — 96374 THER/PROPH/DIAG INJ IV PUSH: CPT | Mod: XU

## 2025-04-01 PROCEDURE — 80076 HEPATIC FUNCTION PANEL: CPT

## 2025-04-01 PROCEDURE — 36415 COLL VENOUS BLD VENIPUNCTURE: CPT

## 2025-04-01 PROCEDURE — 74177 CT ABD & PELVIS W/CONTRAST: CPT | Mod: MC

## 2025-04-01 PROCEDURE — 81001 URINALYSIS AUTO W/SCOPE: CPT

## 2025-04-01 RX ORDER — AMOXICILLIN AND CLAVULANATE POTASSIUM 500; 125 MG/1; MG/1
875 TABLET, FILM COATED ORAL
Qty: 14 | Refills: 0
Start: 2025-04-01 | End: 2025-04-07

## 2025-04-01 RX ORDER — METRONIDAZOLE 250 MG
500 TABLET ORAL ONCE
Refills: 0 | Status: COMPLETED | OUTPATIENT
Start: 2025-04-01 | End: 2025-04-01

## 2025-04-01 RX ORDER — METRONIDAZOLE 250 MG
1 TABLET ORAL
Qty: 30 | Refills: 0
Start: 2025-04-01 | End: 2025-04-10

## 2025-04-01 RX ORDER — ONDANSETRON HCL/PF 4 MG/2 ML
4 VIAL (ML) INJECTION ONCE
Refills: 0 | Status: COMPLETED | OUTPATIENT
Start: 2025-04-01 | End: 2025-04-01

## 2025-04-01 RX ORDER — CIPROFLOXACIN HCL 250 MG
1 TABLET ORAL
Qty: 20 | Refills: 0
Start: 2025-04-01 | End: 2025-04-10

## 2025-04-01 RX ORDER — CIPROFLOXACIN HCL 250 MG
500 TABLET ORAL ONCE
Refills: 0 | Status: COMPLETED | OUTPATIENT
Start: 2025-04-01 | End: 2025-04-01

## 2025-04-01 RX ORDER — CIPROFLOXACIN HCL 250 MG
1 TABLET ORAL
Qty: 14 | Refills: 0
Start: 2025-04-01 | End: 2025-04-07

## 2025-04-01 RX ADMIN — Medication 500 MILLIGRAM(S): at 23:27

## 2025-04-01 RX ADMIN — Medication 4 MILLIGRAM(S): at 20:26

## 2025-04-01 RX ADMIN — Medication 1000 MILLILITER(S): at 20:26

## 2025-04-01 RX ADMIN — Medication 20 MILLIGRAM(S): at 20:26

## 2025-04-01 NOTE — ED ADULT NURSE NOTE - NSFALLUNIVINTERV_ED_ALL_ED
Bed/Stretcher in lowest position, wheels locked, appropriate side rails in place/Call bell, personal items and telephone in reach/Instruct patient to call for assistance before getting out of bed/chair/stretcher/Non-slip footwear applied when patient is off stretcher/[place] to call system/Physically safe environment - no spills, clutter or unnecessary equipment/Purposeful proactive rounding/Room/bathroom lighting operational, light cord in reach

## 2025-04-01 NOTE — ED PROVIDER NOTE - CARE PROVIDER_API CALL
Jeancarlos Sheriff.  Gastroenterology  [address] [place] [id number]  Phone: [phone]  Fax: [phone]  Follow Up Time:

## 2025-04-01 NOTE — ED PROVIDER NOTE - CLINICAL SUMMARY MEDICAL DECISION MAKING FREE TEXT BOX
59-year-old male history of coronary artery disease with CABG, hypertension hyperlipidemia presenting with abdominal pain nausea and diarrhea. Reports this all started 17 days ago after he took antibiotics for an eye infection. He reports bloating and diffuse abdominal cramping. Reports having diarrhea every time he eats and decreased appetite. Patient has not had surgery before. On exam his belly is soft and nontender. He looks otherwise well. He is not been able to have a bowel movement

## 2025-04-01 NOTE — ED PROVIDER NOTE - PATIENT PORTAL LINK FT
You can access the FollowMyHealth Patient Portal offered by [facility] by registering at the following website: [email or web address]. By joining [employer] [facility] [employer]’s FollowMyHealth portal, you will also be able to view your health information using other applications (apps) compatible with our system.

## 2025-04-01 NOTE — ED PROVIDER NOTE - OBJECTIVE STATEMENT
59-year-old male history of CAD status post CABG 01/2024 on Plavix, hypertension, hyperlipidemia presenting to ER for evaluation of abdominal pain nausea and diarrhea.  Patient states 17 days ago was started on Keflex due to infection of right eye.  After starting medication has had upper abdominal pain, bloating increased belching, nausea, decreased appetite multiple episodes of diarrhea and 20 pound weight loss.  Positive shift up subjective fever and chills.  No sick contacts, recent travel, urinary symptoms, flank pain, chest pain shortness of breath, prior intra-abdominal surgeries.

## 2025-04-01 NOTE — ED PROVIDER NOTE - NSFOLLOWUPINSTRUCTIONS_ED_ALL_ED_FT
Colitis    WHAT YOU NEED TO KNOW:    Colitis is swelling and irritation of your colon. Colitis may be caused by ulcers or a problem with your immune system. Bacteria, a virus, or a parasite may also cause colitis. The cause may not be known. You may have diarrhea, abdominal pain, fever, or blood or mucus in your bowel movement.    DISCHARGE INSTRUCTIONS:    Return to the emergency department if:    You have sudden trouble breathing.    Your bowel movements are black or have blood in them.    You have blood in your vomit.    You have severe abdominal pain or your abdomen is swollen and feels hard.    You have any of the following signs of dehydration:  Dizziness or weakness    Dry mouth, cracked lips, or severe thirst    Fast heartbeat or breathing    Urinating very little or not at all  Call your doctor if:    Your symptoms get worse or do not go away.    You have a fever, chills, cough, or feel weak and achy.    You suddenly lose weight without trying.    You have questions or concerns about your condition or care.  Medicines:    Medicines may be given to decrease inflammation in your colon and treat diarrhea.    Take your medicine as directed. Contact your healthcare provider if you think your medicine is not helping or if you have side effects. Tell your provider if you are allergic to any medicine. Keep a list of the medicines, vitamins, and herbs you take. Include the amounts, and when and why you take them. Bring the list or the pill bottles to follow-up visits. Carry your medicine list with you in case of an emergency.  Manage your symptoms:    Drink liquids as directed to help prevent dehydration. Good liquids to drink include water, juice, and broth. Ask how much liquid to drink each day. You may need to drink an oral rehydration solution (ORS). An ORS contains a balance of water, salt, and sugar to replace body fluids lost during diarrhea.    Eat a variety of healthy foods. Healthy foods include fruits, vegetables, whole-grain breads, beans, low-fat dairy products, lean meats, and fish. You may need to eat several small meals throughout the day instead of large meals. Avoid spicy foods, caffeine, chocolate, and foods high in fat.    Talk to your healthcare provider before you take NSAIDs. NSAIDs can cause worsen your symptoms if ulcers are causing your colitis.    Start to exercise when you feel better. Regular exercise helps your bowels work normally. Ask about the best exercise plan for you.  Prevent the spread of germs:      Wash your hands often. Wash your hands several times each day. Wash after you use the bathroom, change a child's diaper, and before you prepare or eat food. Use soap and water every time. Rub your soapy hands together, lacing your fingers. Wash the front and back of your hands, and in between your fingers. Use the fingers of one hand to scrub under the fingernails of the other hand. Wash for at least 20 seconds. Rinse with warm, running water for several seconds. Then dry your hands with a clean towel or paper towel. Use hand  that contains alcohol if soap and water are not available. Do not touch your eyes, nose, or mouth without washing your hands first.  Handwashing      Cover a sneeze or cough. Use a tissue that covers your mouth and nose. Throw the tissue away in a trash can right away. Use the bend of your arm if a tissue is not available. Wash your hands well with soap and water or use a hand .    Clean surfaces often. Clean doorknobs, countertops, cell phones, and other surfaces that are touched often. Use a disinfecting wipe, a single-use sponge, or a cloth you can wash and reuse. Use disinfecting  if you do not have wipes. You can create a disinfecting  by mixing 1 part bleach with 10 parts water.    Ask about vaccines you may need. Vaccines help prevent disease caused by some viruses and bacteria. Get the influenza (flu) vaccine as soon as recommended each year. The flu vaccine is usually available starting in September or October. Flu viruses change, so it is important to get a flu vaccine every year. Get the pneumonia vaccine if recommended. This vaccine is usually recommended every 5 years. Your provider will tell you when to get this vaccine, if needed. Your healthcare provider can tell you if you should get other vaccines, and when to get them.  Follow up with your doctor as directed: You may need to return for a colonoscopy or other tests. Write down how often you have a bowel movements and what they look like. Bring this to your follow-up visits. Write down your questions so you remember to ask them during your visits.eatmen

## 2025-04-02 NOTE — CHART NOTE - NSCHARTNOTEFT_GEN_A_CORE
[facility] MRN [id number] / Pt already f/u 4/2 - JL    SPECIALTY: gastroenterology

## 2025-04-03 ENCOUNTER — INPATIENT (INPATIENT)
Facility: HOSPITAL | Age: 60
LOS: 2 days | Discharge: HOME | DRG: 393 | End: 2025-04-06
Attending: INTERNAL MEDICINE | Admitting: STUDENT IN AN ORGANIZED HEALTH CARE EDUCATION/TRAINING PROGRAM
Payer: COMMERCIAL

## 2025-04-03 VITALS
HEIGHT: 71 IN | OXYGEN SATURATION: 99 % | WEIGHT: 199.08 LBS | SYSTOLIC BLOOD PRESSURE: 108 MMHG | HEART RATE: 84 BPM | DIASTOLIC BLOOD PRESSURE: 72 MMHG | TEMPERATURE: 98 F | RESPIRATION RATE: 18 BRPM

## 2025-04-03 DIAGNOSIS — R53.1 WEAKNESS: ICD-10-CM

## 2025-04-03 LAB
ALBUMIN SERPL ELPH-MCNC: 3.7 G/DL — SIGNIFICANT CHANGE UP (ref 3.5–5.2)
ALP SERPL-CCNC: 193 U/L — HIGH (ref 30–115)
ALT FLD-CCNC: 12 U/L — SIGNIFICANT CHANGE UP (ref 0–41)
ANION GAP SERPL CALC-SCNC: 11 MMOL/L — SIGNIFICANT CHANGE UP (ref 7–14)
AST SERPL-CCNC: 20 U/L — SIGNIFICANT CHANGE UP (ref 0–41)
BASOPHILS # BLD AUTO: 0.04 K/UL — SIGNIFICANT CHANGE UP (ref 0–0.2)
BASOPHILS NFR BLD AUTO: 0.7 % — SIGNIFICANT CHANGE UP (ref 0–1)
BILIRUB SERPL-MCNC: 0.7 MG/DL — SIGNIFICANT CHANGE UP (ref 0.2–1.2)
BUN SERPL-MCNC: 13 MG/DL — SIGNIFICANT CHANGE UP (ref 10–20)
CALCIUM SERPL-MCNC: 9.1 MG/DL — SIGNIFICANT CHANGE UP (ref 8.4–10.5)
CHLORIDE SERPL-SCNC: 98 MMOL/L — SIGNIFICANT CHANGE UP (ref 98–110)
CO2 SERPL-SCNC: 25 MMOL/L — SIGNIFICANT CHANGE UP (ref 17–32)
CREAT SERPL-MCNC: 1.1 MG/DL — SIGNIFICANT CHANGE UP (ref 0.7–1.5)
EGFR: 76 ML/MIN/1.73M2 — SIGNIFICANT CHANGE UP
EGFR: 76 ML/MIN/1.73M2 — SIGNIFICANT CHANGE UP
EOSINOPHIL # BLD AUTO: 0.07 K/UL — SIGNIFICANT CHANGE UP (ref 0–0.7)
EOSINOPHIL NFR BLD AUTO: 1.2 % — SIGNIFICANT CHANGE UP (ref 0–8)
GLUCOSE SERPL-MCNC: 94 MG/DL — SIGNIFICANT CHANGE UP (ref 70–99)
HCT VFR BLD CALC: 37.1 % — LOW (ref 42–52)
HGB BLD-MCNC: 12.2 G/DL — LOW (ref 14–18)
IMM GRANULOCYTES NFR BLD AUTO: 1.4 % — HIGH (ref 0.1–0.3)
LACTATE SERPL-SCNC: 0.9 MMOL/L — SIGNIFICANT CHANGE UP (ref 0.7–2)
LIDOCAIN IGE QN: 51 U/L — SIGNIFICANT CHANGE UP (ref 7–60)
LYMPHOCYTES # BLD AUTO: 0.72 K/UL — LOW (ref 1.2–3.4)
LYMPHOCYTES # BLD AUTO: 12.3 % — LOW (ref 20.5–51.1)
MAGNESIUM SERPL-MCNC: 2.2 MG/DL — SIGNIFICANT CHANGE UP (ref 1.8–2.4)
MCHC RBC-ENTMCNC: 25.9 PG — LOW (ref 27–31)
MCHC RBC-ENTMCNC: 32.9 G/DL — SIGNIFICANT CHANGE UP (ref 32–37)
MCV RBC AUTO: 78.8 FL — LOW (ref 80–94)
MONOCYTES # BLD AUTO: 0.7 K/UL — HIGH (ref 0.1–0.6)
MONOCYTES NFR BLD AUTO: 12 % — HIGH (ref 1.7–9.3)
NEUTROPHILS # BLD AUTO: 4.22 K/UL — SIGNIFICANT CHANGE UP (ref 1.4–6.5)
NEUTROPHILS NFR BLD AUTO: 72.4 % — SIGNIFICANT CHANGE UP (ref 42.2–75.2)
NRBC BLD AUTO-RTO: 0 /100 WBCS — SIGNIFICANT CHANGE UP (ref 0–0)
PLATELET # BLD AUTO: 262 K/UL — SIGNIFICANT CHANGE UP (ref 130–400)
PMV BLD: 9.5 FL — SIGNIFICANT CHANGE UP (ref 7.4–10.4)
POTASSIUM SERPL-MCNC: 4 MMOL/L — SIGNIFICANT CHANGE UP (ref 3.5–5)
POTASSIUM SERPL-SCNC: 4 MMOL/L — SIGNIFICANT CHANGE UP (ref 3.5–5)
PROT SERPL-MCNC: 6.5 G/DL — SIGNIFICANT CHANGE UP (ref 6–8)
RBC # BLD: 4.71 M/UL — SIGNIFICANT CHANGE UP (ref 4.7–6.1)
RBC # FLD: 13.4 % — SIGNIFICANT CHANGE UP (ref 11.5–14.5)
SODIUM SERPL-SCNC: 134 MMOL/L — LOW (ref 135–146)
TROPONIN T, HIGH SENSITIVITY RESULT: 11 NG/L — SIGNIFICANT CHANGE UP (ref 6–21)
WBC # BLD: 5.83 K/UL — SIGNIFICANT CHANGE UP (ref 4.8–10.8)
WBC # FLD AUTO: 5.83 K/UL — SIGNIFICANT CHANGE UP (ref 4.8–10.8)

## 2025-04-03 PROCEDURE — 86140 C-REACTIVE PROTEIN: CPT

## 2025-04-03 PROCEDURE — 99223 1ST HOSP IP/OBS HIGH 75: CPT

## 2025-04-03 PROCEDURE — 36415 COLL VENOUS BLD VENIPUNCTURE: CPT

## 2025-04-03 PROCEDURE — 87449 NOS EACH ORGANISM AG IA: CPT

## 2025-04-03 PROCEDURE — 82607 VITAMIN B-12: CPT

## 2025-04-03 PROCEDURE — 82728 ASSAY OF FERRITIN: CPT

## 2025-04-03 PROCEDURE — 83540 ASSAY OF IRON: CPT

## 2025-04-03 PROCEDURE — 87324 CLOSTRIDIUM AG IA: CPT

## 2025-04-03 PROCEDURE — 82248 BILIRUBIN DIRECT: CPT

## 2025-04-03 PROCEDURE — 87046 STOOL CULTR AEROBIC BACT EA: CPT

## 2025-04-03 PROCEDURE — 82150 ASSAY OF AMYLASE: CPT

## 2025-04-03 PROCEDURE — 83690 ASSAY OF LIPASE: CPT

## 2025-04-03 PROCEDURE — 84466 ASSAY OF TRANSFERRIN: CPT

## 2025-04-03 PROCEDURE — 83735 ASSAY OF MAGNESIUM: CPT

## 2025-04-03 PROCEDURE — 99285 EMERGENCY DEPT VISIT HI MDM: CPT

## 2025-04-03 PROCEDURE — 87507 IADNA-DNA/RNA PROBE TQ 12-25: CPT

## 2025-04-03 PROCEDURE — 85652 RBC SED RATE AUTOMATED: CPT

## 2025-04-03 PROCEDURE — 83550 IRON BINDING TEST: CPT

## 2025-04-03 PROCEDURE — 80053 COMPREHEN METABOLIC PANEL: CPT

## 2025-04-03 PROCEDURE — 0241U: CPT

## 2025-04-03 PROCEDURE — 87045 FECES CULTURE AEROBIC BACT: CPT

## 2025-04-03 PROCEDURE — 82746 ASSAY OF FOLIC ACID SERUM: CPT

## 2025-04-03 RX ORDER — ONDANSETRON HCL/PF 4 MG/2 ML
4 VIAL (ML) INJECTION ONCE
Refills: 0 | Status: COMPLETED | OUTPATIENT
Start: 2025-04-03 | End: 2025-04-03

## 2025-04-03 RX ORDER — SODIUM CHLORIDE 9 G/1000ML
1000 INJECTION, SOLUTION INTRAVENOUS ONCE
Refills: 0 | Status: COMPLETED | OUTPATIENT
Start: 2025-04-03 | End: 2025-04-03

## 2025-04-03 RX ORDER — SUCRALFATE 1 G
1 TABLET ORAL ONCE
Refills: 0 | Status: COMPLETED | OUTPATIENT
Start: 2025-04-03 | End: 2025-04-03

## 2025-04-03 RX ORDER — ONDANSETRON HCL/PF 4 MG/2 ML
4 VIAL (ML) INJECTION EVERY 6 HOURS
Refills: 0 | Status: DISCONTINUED | OUTPATIENT
Start: 2025-04-03 | End: 2025-04-06

## 2025-04-03 RX ADMIN — Medication 1 GRAM(S): at 18:14

## 2025-04-03 RX ADMIN — Medication 20 MILLIGRAM(S): at 18:14

## 2025-04-03 RX ADMIN — SODIUM CHLORIDE 1000 MILLILITER(S): 9 INJECTION, SOLUTION INTRAVENOUS at 19:30

## 2025-04-03 RX ADMIN — SODIUM CHLORIDE 1000 MILLILITER(S): 9 INJECTION, SOLUTION INTRAVENOUS at 17:33

## 2025-04-03 RX ADMIN — Medication 4 MILLIGRAM(S): at 23:12

## 2025-04-03 NOTE — H&P ADULT - NSHPPHYSICALEXAM_GEN_ALL_CORE
PHYSICAL EXAM:  GENERAL: NAD, well-groomed, well-developed  HEAD:  Atraumatic, Normocephalic  EYES: EOMI  NECK: Supple  NERVOUS SYSTEM:  Alert & Oriented X3, non focal   CHEST/LUNG: Clear to auscultation bilaterally; No rales, rhonchi, wheezing, or rubs  HEART: Regular rate and rhythm; No murmurs, rubs, or gallops  ABDOMEN: Soft, Nontender, Nondistended; Bowel sounds present  EXTREMITIES:  2+ Peripheral Pulses, No clubbing, cyanosis, or edema  LYMPH: No lymphadenopathy noted  SKIN: No rashes or lesions PHYSICAL EXAM:  GENERAL: NAD, well-groomed, well-developed  HEAD:  Atraumatic, Normocephalic  EYES: EOMI  NECK: Supple  NERVOUS SYSTEM:  Alert & Oriented X3, non focal   CHEST/LUNG: Clear to auscultation bilaterally; No rales, rhonchi, wheezing, or rubs  HEART: Regular rate and rhythm; No murmurs, rubs, or gallops  ABDOMEN: Soft, Nontender, Nondistended; no abdominal tenderness on palpation, pt endorses slight discomfort in RUQ, Vaughan's negative, no guarding or rigidity; Bowel sounds present  EXTREMITIES:  2+ Peripheral Pulses, No clubbing, cyanosis, or edema  LYMPH: No lymphadenopathy noted  SKIN: No rashes or lesions

## 2025-04-03 NOTE — PATIENT PROFILE ADULT - FALL HARM RISK - HARM RISK INTERVENTIONS
Assistance with ambulation/Assistance OOB with selected safe patient handling equipment/Communicate Risk of Fall with Harm to all staff/Discuss with provider need for PT consult/Monitor gait and stability/Reinforce activity limits and safety measures with patient and family/Tailored Fall Risk Interventions/Visual Cue: Yellow wristband and red socks/Bed in lowest position, wheels locked, appropriate side rails in place/Call bell, personal items and telephone in reach/Instruct patient to call for assistance before getting out of bed or chair/Non-slip footwear when patient is out of bed/[place] to call system/Physically safe environment - no spills, clutter or unnecessary equipment/Purposeful Proactive Rounding/Room/bathroom lighting operational, light cord in reach

## 2025-04-03 NOTE — ED PROVIDER NOTE - CLINICAL SUMMARY MEDICAL DECISION MAKING FREE TEXT BOX
58 yo M with PMHx of HTN, HLD, CAD s/p CABG (Perez 10, 2024), pAfib (on plavix), former smoker (30 pack years) presents to ED s/p intolerance PO antibiotic therapy for recent diagnosis of colitis. Pt came to ED 04/01, CTAP notable for submucosal fat in descending colon, chronic colitis related changes, discharged with 10 day course of cipro and flagyl.  Also, patient states for the past month he has lost his appetite, his ability to tolerate solid foods has declined where he avoids solids as they exacerbate the diffuse abdominal pain. No relief with Pepto Bismol.  Given pepcid, carafate, and fluids in ED.  Paitent intolerant to PO.  Admitted to medicine for further management. Render Risk Assessment In Note?: no Comment: Patient completed 60 days worth of doxycycline hyclate 100 mg capsule 1 PO QD, last took approximately 30 days ago. TW without AE.  Patient reports no flares with stopping Doxy.  \\n\\nPatient reports labs drawn by PCP to r/o PCOS were NEGATIVE for PCOS.\\n\\nPatient agrees acne is well controlled at this time and she is not breaking out. Patient agrees what remains are \"left over marks.\" Patient agrees no need for additional PO antibiotics at this time.  Discussed with patient importance of tretinoin and BPO in her acne routine, patient advised these are cornerstones of acne treatment.  Discussed with patient Doxycycline did its job to \"reset\" the skin and calm the flare, however, is not meant for a long term control/solution.  Patient advised acne may flare again, despite being improved and well controlled at this time.  Patient agrees to no change in Rx topical products at this time due to lack of new lesions. Detail Level: Detailed Detail Level: Zone Comment: Discussed at length difference between active lesions of PIP. Patient agrees what is \"left\" is old marks. \\n\\nEncourage QD SPF >40, hats when outside and in warmer climates.

## 2025-04-03 NOTE — H&P ADULT - HISTORY OF PRESENT ILLNESS
58 yo M with pmhx of HTN, HLD, CAD s/p CABG (Perez 10, 2024), pAfib (on ?), former smoker (30 pack years) presents to ED s/p intolerance PO antibiotic therapy (cipro and [id number] days) for recent diagnosis of colitis. Pt states    Vitals: T98.2 F, HR 84, /72, RR 18, SpO2 99% on RA.     Labs: Hgb 12.2 (no baseline), MCV 78.8, Na 134,   UA negative; trace blood and ketones.     EKG: NSR    Imagin/01 CTAP- Submucosal fat within the descending colon is noted which may be related to previous episodes of colitis. (601-93). Similar changes within the ascending colon also present 3.8 cm right common iliac artery aneurysm. 4.5 cm hiatal hernia.    S/p pepcid, carafate, and fluids in ED.    Admitted to medicine for further management.  58 yo M with pmhx of HTN, HLD, CAD s/p CABG (Perez 10, 2024), pAfib (on ?), former smoker (30 pack years) presents to ED s/p intolerance PO antibiotic therapy (cipro and [id number] days) for recent diagnosis of colitis c/b new onset light headedness and [id number] hours. Pt states that they had the flu early March, managed with 7 day course of antibiotic he cannot recall, where diffuse, dull aching abdominal pain began, predominantly in the right upper and lower quadrants that occurred intermittently. Shortly after patient develop right eye preseptal cellulitis which he received another antibiotic course for 10 days, during which he developed diarrhea and pre-existing abdominal became constant. Baseline BMs are daily and well formed, however during this time patient was having as many as 3-4 watery BMs per day. Associated with these symptoms was nausea and the urge to vomit, but no vomiting or dry heaving. Pt states for the past month he has lost appetite, his ability to tolerate solid foods has declined where he avoids solids as they exacerbate the diffuse abdominal pain. Pain is baseline 2/10, max 4/10.     Vitals: T98.2 F, HR 84, /72, RR 18, SpO2 99% on RA.     Labs: Hgb 12.2 (no baseline), MCV 78.8, Na 134,   UA negative; trace blood and ketones.     EKG: NSR    Imagin/01 CTAP- Submucosal fat within the descending colon is noted which may be related to previous episodes of colitis. (60165). Similar changes within the ascending colon also present 3.8 cm right common iliac artery aneurysm. 4.5 cm hiatal hernia.    S/p pepcid, carafate, and fluids in ED.    Admitted to medicine for further management.  60 yo M with pmhx of HTN, HLD, CAD s/p CABG (Perez 10, 2024), pAfib (on plavix), former smoker (30 pack years) presents to ED s/p intolerance PO antibiotic therapy (cipro and [id number] days) for recent diagnosis of colitis c/b new onset light headedness and [id number] hours. Pt states that they had the flu early March, managed with 7 day course of antibiotic he cannot recall, where diffuse, dull aching abdominal pain began, predominantly in the right upper and lower quadrants that occurred intermittently. Shortly after patient develop right eye preseptal cellulitis which he received another antibiotic course for 10 days, during which he developed diarrhea and pre-existing abdominal became constant. Baseline BMs are daily and well formed, however during this time patient was having as many as 3-4 watery BMs per day. Associated with these symptoms was nausea and the urge to vomit, but no vomiting or dry heaving. Pt states for the past month he has lost appetite, his ability to tolerate solid foods has declined where he avoids solids as they exacerbate the diffuse abdominal pain. Since Monday patient has been tolerating liquid diet.  Abdominal pain is baseline 2/10, max 4/10. Pt came to ED , CTAP notable for submucosal fat in descending colon, chronic colitis related changes, discharged with 10 day course of cipro and flagyl. Patient was taking medications but states in the past 48 hours he developed weakness, and since this morning has had lightheadedness, which prompted him to come to ED.     (-) heartburn, odynophagia, dysphagia, melena, hematochezia, mucoid stools, hematuria, hx of colicky pain/episodes,   (+) increased burping for 1 month,    Vitals: T98.2 F, HR 84, /72, RR 18, SpO2 99% on RA.     Labs: Hgb 12.2 (no baseline), MCV 78.8, Na 134,   UA negative; trace blood and ketones.     EKG: NSR    Imagin/01 CTAP- Submucosal fat within the descending colon is noted which may be related to previous episodes of colitis. (60165). Similar changes within the ascending colon also present 3.8 cm right common iliac artery aneurysm. 4.5 cm hiatal hernia.    S/p pepcid, carafate, and fluids in ED.    Admitted to medicine for further management.  58 yo M with pmhx of HTN, HLD, CAD s/p CABG (Perez 10, 2024), pAfib (on plavix), former smoker (30 pack years) presents to ED s/p intolerance PO antibiotic therapy (cipro and ppcerlv11 days) for recent diagnosis of colitis c/b new onset light headedness and [id number] hours. Pt states that they had the flu early March, managed with 7 day course of antibiotic he cannot recall, where diffuse, dull aching abdominal pain began, predominantly in the right upper and lower quadrants that occurred intermittently. Shortly after patient develop right eye preseptal cellulitis which he received another antibiotic course for 10 days, during which he developed diarrhea and pre-existing abdominal became constant. Baseline BMs are daily and well formed, however during this time patient was having as many as 3-4 watery BMs per day. Associated with these symptoms was nausea and the urge to vomit, but no vomiting or dry heaving. Pt states for the past month he has lost appetite, his ability to tolerate solid foods has declined where he avoids solids as they exacerbate the diffuse abdominal pain. No relief with Pepto Bismol. Since Monday patient has been tolerating liquid diet.  Abdominal pain is baseline 2/10, max 4/10. Pt came to ED , CTAP notable for submucosal fat in descending colon, chronic colitis related changes, discharged with 10 day course of cipro and flagyl. Patient was taking medications but states in the past 48 hours he developed weakness, and since this morning has had lightheadedness, which prompted him to come to ED.   Pt states he saw a gastroenterologist (unable to recall name, at [facility]?) who recommended EGD and colonoscopy in 1 month, OP follow up.     Endorses having a low fiber diet, poor water intake, but is conscious about fat and fried food intake; eats pizza and chips frequently though.   Occasionally smoke marijuana and social drinker.     (-) heartburn, odynophagia, dysphagia, melena, hematochezia, mucoid stools, hematuria, hx of colicky pain/episodes, recent travel, chest pain, SOB, orthopnea  (+) increased burping for 1 month, subjective fever, chills, night sweats, joint pain (b/l hips, knees) x 2wks, pet turtle at home    Vitals: T98.2 F, HR 84, /72, RR 18, SpO2 99% on RA.     Labs: Hgb 12.2 (no baseline), MCV 78.8, Na 134,   UA negative; trace blood and ketones.     EKG: NSR    Imagin/01 CTAP- Submucosal fat within the descending colon is noted which may be related to previous episodes of colitis. (261-74). Similar changes within the ascending colon also present 3.8 cm right common iliac artery aneurysm. 4.5 cm hiatal hernia.    S/p pepcid, carafate, and fluids in ED.    Admitted to medicine for further management.

## 2025-04-03 NOTE — H&P ADULT - ATTENDING COMMENTS
58 yo M with pmhx of HTN, HLD, CAD s/p CABG (Perez 10, 2024), pAfib (on ?), former smoker (30 pack years) presents to ED s/p intolerance PO antibiotic therapy (cipro and [id number] days) for recent diagnosis of colitis presents  with weakness.  Patient with decrease po intake and nausea with diarrhea x3 weeks, patient presented to ed for sx and diagnosed with colitis. patient dc on cipro/flagyl and report worsening weakness and nausea since. denies f/c, chest pain, shortness of breath, vomiting, syncope,      Agree  with assessment  except for changes below.   Vital Signs Last 24 Hrs  T(C): 36 (03 Apr 2025 21:22), Max: 36.9 (03 Apr 2025 20:52)  T(F): 96.8 (03 Apr 2025 21:22), Max: 98.4 (03 Apr 2025 20:52)  HR: 68 (03 Apr 2025 21:22) (68 - 84)  BP: 96/51 (03 Apr 2025 21:22) (96/51 - 118/70)  BP(mean): 86 (03 Apr 2025 20:52) (86 - 86)  RR: 18 (03 Apr 2025 21:22) (18 - 18)  SpO2: 97% (03 Apr 2025 21:22) (97% - 99%)    Parameters below as of 03 Apr 2025 21:22  Patient On (Oxygen Delivery Method): room air    04/01 CT A/P: Nonobstructive gas pattern. No pneumoperitoneum.  Submucosal fat within the descending colon is noted which may be related   to previous episodes of colitis. (60165). Similar changes within the   ascending colon also present  3.8 cm right common iliac artery aneurysm  4.5 cm hiatal hernia    IMPRESSION   Decreased Oral inmate and Generalized Weakness , Nausea and Diarrhea   Possible Abx induced  vs continuation of colitis   Sub Acute Colitis  Seen on prior admission  Discharged  on  PO Abx x10 Days  Ciprop Flagyl   Send GI PCR  Zofran   Calories count  Advance  Diet as tolerated   Switch abx in case  of side effects CTX   Send GI PCR           Hx HTN/ HLD - c/w home med  Hx CAD s/p CABG - c/w home med  - Paroxysmal A.fib - on amiodarone, lopressor and coumadin.  -Monitor INR and dose coumadin accordingly.

## 2025-04-03 NOTE — ED PROVIDER NOTE - PHYSICAL EXAMINATION
CONSTITUTIONAL: pale appearing male, nad  SKIN: skin exam is warm and dry  ENT: MMM  CARD: S1, S2 normal, no murmur  RESP: No wheezes, rales or rhonchi. Good air movement bilaterally  ABD: soft; non-distended; non-tender.   EXT: Normal ROM.   NEURO: awake, alert, following commands, oriented, grossly unremarkable. No Focal deficits. GCS 15.   PSYCH: Cooperative, appropriate.

## 2025-04-03 NOTE — ED ADULT NURSE NOTE - NSFALLUNIVINTERV_ED_ALL_ED
Bed/Stretcher in lowest position, wheels locked, appropriate side rails in place/Call bell, personal items and telephone in reach/Instruct patient to call for assistance before getting out of bed/chair/stretcher/Non-slip footwear applied when patient is off stretcher/[place] to call system/Physically safe environment - no spills, clutter or unnecessary equipment/Purposeful proactive rounding/Room/bathroom lighting operational, light cord in reach

## 2025-04-03 NOTE — ED ADULT TRIAGE NOTE - CHIEF COMPLAINT QUOTE
pt c/o abdominal pain and nausea unable to tolerate po for 3 weeks, today began to feel dizzy.  in triage

## 2025-04-03 NOTE — PATIENT PROFILE ADULT - FUNCTIONAL SCREEN CURRENT LEVEL: COMMUNICATION, MLM
Please drink plenty of fluids.  Please get plenty of rest.  Please return here or go to the Emergency Department for any concerns or worsening of condition.  Tamiflu prescription has been discussed and if prescribed, please take to completion unless you cannot tolerate the side effects.   If you were prescribed a narcotic medication, do not drive or operate heavy equipment or machinery while taking these medications.  If you were given a steroid shot in the clinic and have also been given a prescription for a steroid such as Prednisone or a Medrol Dose Pack, please begin taking them tomorrow.  If you do not have Hypertension or any history of palpitations, it is ok to take over the counter Sudafed or Mucinex D or Allegra-D or Claritin-D or Zyrtec-D.  If you do take one of the above, it is ok to combine that with plain over the counter Mucinex or Allegra or Claritin or Zyrtec.  If for example you are taking Zyrtec -D, you can combine that with Mucinex, but not Mucinex-D.  If you are taking Mucinex-D, you can combine that with plain Allegra or Claritin or Zyrtec.   If you do have Hypertension or palpitations, it is safe to take Coricidin HBP for relief of sinus symptoms.  If not allergic, please take over the counter Tylenol (Acetaminophen) and/or Motrin (Ibuprofen) as directed for control of pain and/or fever.  Please follow up with your primary care doctor or specialist as needed.    If you  smoke, please stop smoking.     0 = understands/communicates without difficulty

## 2025-04-03 NOTE — ED PROVIDER NOTE - CHILD ABUSE FACILITY
ASSESSMENT & PLAN: Ever Nava is a 42 y o  [id number] with normal gynecologic exam     1   Routine well woman exam done today  2  Pap and HPV:  The patient's last pap and hpv was  2018  It was abnormal   ASCUS, TERRI-  Endocervical in origin, Positive HPV, negative HPV 16 and 18  Colpo done 18-   Cx Bx-Suggestive of HPV, no dysplasia,  ECC benign endocervical    endometrial biopsy -early secretory endometrium, no hyperplasia seen  Will call results to patient  Pap and cotesting was done today  Current ASCCP Guidelines reviewed  3   The following were reviewed in today's visit: breast self exam, STD testing, adequate intake of calcium and vitamin D, exercise and healthy diet  4   Screening mammogram to be done after 38th birthday  Patient to have free mammogram through  [employer] program   Mammogram request given to patient    CC:  Annual Gynecologic Examination    HPI: Ever Nava is a 42 y o  [id number] who presents for annual gynecologic examination  Patient was last seen 19 for a yeast infection,  HSV culture was done at the time which was negative  Her yeast infection resolved completely  She has a tubal for contraception  She has the following concerns:   none    Health Maintenance:    She wears her seatbelt routinely  She does perform regular monthly self breast exams  She feels safe at home  Past Medical History:   Diagnosis Date    Heartburn        Past Surgical History:   Procedure Laterality Date    TUBAL LIGATION         Past OB/Gyn History:  OB History        8    Para   5    Term   5            AB   2    Living   5       SAB   1    TAB        Ectopic        Multiple        Live Births   5               Pt does not have menstrual issues  History of sexually transmitted infection: No   History of abnormal pap smears: Yes   Patient is currently sexually active    heterosexual   The current method of family planning is tubal Patient tore meniscus in . Disable parking permit given by Dr. Johnson. Patient requested paperwork from our office due to new pcp. Patient found additional paperwork stating permit does not  until Sep 2025. Patient states thank you but he's ok until 2025. Please advise.    ligation  Family History   Problem Relation Age of Onset    Arthritis Mother     Cervical cancer Mother     Mental illness Father     Cervical cancer Sister     Heart disease Maternal Grandmother        Social History:  Social History     Socioeconomic History    Marital status: /Civil Union     Spouse name: Not on file    Number of children: Not on file    Years of education: Not on file    Highest education level: Not on file   Occupational History    Not on file   Social Needs    Financial resource strain: Not on file    Food insecurity:     Worry: Not on file     Inability: Not on file    Transportation needs:     Medical: Not on file     Non-medical: Not on file   Tobacco Use    Smoking status: Former Smoker    Smokeless tobacco: Never Used   Substance and Sexual Activity    Alcohol use: Yes     Comment: social    Drug use: No    Sexual activity: Yes     Partners: Male     Birth control/protection: Female Sterilization   Lifestyle    Physical activity:     Days per week: Not on file     Minutes per session: Not on file    Stress: Not on file   Relationships    Social connections:     Talks on phone: Not on file     Gets together: Not on file     Attends [employer] service: Not on file     Active member of club or organization: Not on file     Attends meetings of clubs or organizations: Not on file     Relationship status: Not on file    Intimate partner violence:     Fear of current or ex partner: Not on file     Emotionally abused: Not on file     Physically abused: Not on file     Forced sexual activity: Not on file   Other Topics Concern    Not on file   Social History Narrative    Not on file     Presently lives with family  Patient is     Patient is currently employed     No Known Allergies      Current Outpatient Medications:     dicyclomine (BENTYL) 20 mg tablet, Take 1 tablet (20 mg total) by mouth 2 (two) times a day (Patient not taking: Reported on 7/15/2019), Disp: 20 tablet, Rfl: 0    omeprazole (PriLOSEC) 40 MG capsule, Take 1 capsule (40 mg total) by mouth daily for 30 days, Disp: 30 capsule, Rfl: 3    pseudoephedrine (SUDAFED) 60 mg tablet, Take 1 tablet (60 mg total) by mouth every 6 (six) hours as needed for congestion for up to 5 days, Disp: 20 tablet, Rfl: 0    ranitidine (ZANTAC) 150 MG capsule, Take 1 capsule (150 mg total) by mouth daily Take along with your nexium daily, Disp: 30 capsule, Rfl: 0      Review of Systems  Constitutional :no fever, feels well, no tiredness, no recent weight gain or loss  ENT: no ear ache, no loss of hearing, no nosebleeds or nasal discharge, no sore throat or hoarseness  Cardiovascular: no complaints of slow or fast heart beat, no chest pain, no palpitations, no leg claudication or lower extremity edema  Respiratory: no complaints of shortness of shortness of breath, no MANDUJANO  Breasts:no complaints of breast pain, breast lump, or nipple discharge  Gastrointestinal: no complaints of abdominal pain, constipation, nausea, vomiting, or diarrhea or bloody stools  Genitourinary : no complaints of dysuria, incontinence, pelvic pain, no dysmenorrhea, vaginal discharge or abnormal vaginal bleeding and as noted in HPI  Musculoskeletal: no complaints of arthralgia, no myalgia, no joint swelling or stiffness, no limb pain or swelling    Integumentary: no complaints of skin rash or lesion, itching or dry skin  Neurological: no complaints of headache, no confusion, no numbness or tingling, no dizziness or fainting    Objective      LMP 12/09/2019   General:   appears stated age, cooperative, alert normal mood and affect   Neck: normal, supple,trachea midline, no masses   Heart: regular rate and rhythm, S1, S2 normal, no murmur, click, rub or gallop   Lungs: clear to auscultation bilaterally   Breasts: normal appearance, no masses or tenderness, Inspection negative, No nipple retraction or dimpling, No nipple discharge or bleeding, No axillary or supraclavicular adenopathy, Normal to palpation without dominant masses, Taught monthly breast self examination   Abdomen: soft, non-tender, without masses or organomegaly   Vulva: normal female genitalia, Bartholin's, Urethra, [place] normal   Vagina: normal vagina, no discharge, exudate, lesion, or erythema   Urethra: normal   Cervix: PAP done  Friable  Nontender  Uterus: normal size, contour, position, consistency, mobility, non-tender and mobile   Adnexa: no mass, fullness, tenderness   Lymphatic palpation of lymph nodes in neck, axilla, groin and/or other locations: no lymphadenopathy or masses noted   Skin normal skin turgor and no rashes     Psychiatric orientation to person, place, and time: normal  mood and affect: normal [facility]

## 2025-04-03 NOTE — H&P ADULT - NSHPLABSRESULTS_GEN_ALL_CORE
LABS:                        12.2   5.83  )-----------( 262      ( 03 Apr 2025 17:48 )             37.1     04-03    134[L]  |  98  |  13  ----------------------------<  94  4.0   |  25  |  1.1    Ca    9.1      03 Apr 2025 17:48  Mg     2.2     04-03    TPro  6.5  /  Alb  3.7  /  TBili  0.7  /  DBili  x   /  AST  20  /  ALT  12  /  AlkPhos  193[H]  04-03      Urinalysis Basic - ( 03 Apr 2025 17:48 )    Color: x / Appearance: x / SG: x / pH: x  Gluc: 94 mg/dL / Ketone: x  / Bili: x / Urobili: x   Blood: x / Protein: x / Nitrite: x   Leuk Esterase: x / RBC: x / WBC x   Sq Epi: x / Non Sq Epi: x / Bacteria: x        Lactate, Blood: 0.9 mmol/L (04-03-25 @ 17:48)      Urinalysis with Rflx Culture (collected 01 Apr 2025 22:31)      RADIOLOGY:  04/01 CTAP- Submucosal fat within the descending colon is noted which may be related to previous episodes of colitis. (257-55). Similar changes within the ascending colon also present 3.8 cm right common iliac artery aneurysm. 4.5 cm hiatal hernia.

## 2025-04-03 NOTE — ED PROVIDER NOTE - OBJECTIVE STATEMENT
59 year old male, past medical history cad s/p cabg, who presents with weakness. patient with decrease po intake and nausea with diarrhea x3 weeks, patient presented to ed for sx and diagnosed with colitis. patient dc on cipro/flagyl and report worsening weakness and nausea since. denies f/c, chest pain, shortness of breath, vomiting, syncope, headache.

## 2025-04-03 NOTE — H&P ADULT - ASSESSMENT
#Acute colitis  -Pt diagnosed with colitis 04/01 in ED, sent with 10 day course of cipro and flagyl, not tolerating PO intake.   -Vitals and labs stable  -CTAP 04/01: Submucosal fat within the descending colon is noted which may be related to previous episodes of colitis. (891-69). Similar changes within the ascending colon also present 3.8 cm right common iliac artery aneurysm. 4.5 cm hiatal hernia.  -S/p fluids, carafate, and pepcid in ED.  -Physical exam is ...  Plan  -IV flagyl and cipro  -Liquid diet, advance as tolerated  -Pepcid and carafate PRN  -Last ED visit referred to vascular OP follow up for common iliac artery aneurysm     #HTN  #HLD  #CAD s/p CABG (01/10/24)  #pAfib (on ?)  -C/w home meds    DVT PPX: coumadin  GI PPX: pepcid  DIET: liquid  ACTIVITY: IAT  CODE STATUS: FULL  DISPOSITION: Acute    PENDING: tolerating PO intake, clinical improvement       60 yo M with pmhx of HTN, HLD, CAD s/p CABG (Perez 10, 2024), pAfib (on plavix), former smoker (30 pack years) presents to ED s/p intolerance PO antibiotic therapy (cipro and [id number] days) for recent diagnosis of colitis c/b new onset light headedness and [id number] hours. Pt states that they had the flu early March, managed with 7 day course of antibiotic he cannot recall, where diffuse, dull aching abdominal pain began, predominantly in the right upper and lower quadrants that occurred intermittently. Shortly after patient develop right eye preseptal cellulitis which he received another antibiotic course for 10 days, during which he developed diarrhea and pre-existing abdominal became constant. Baseline BMs are daily and well formed, however during this time patient was having as many as 3-4 watery BMs per day. Associated with these symptoms was nausea and the urge to vomit, but no vomiting or dry heaving. Pt states for the past month he has lost appetite, his ability to tolerate solid foods has declined where he avoids solids as they exacerbate the diffuse abdominal pain. No relief with Pepto Bismol. Since Monday patient has been tolerating liquid diet.  Abdominal pain is baseline 2/10, max 4/10. Pt came to ED 04/01, CTAP notable for submucosal fat in descending colon, chronic colitis related changes, discharged with 10 day course of cipro and flagyl. Patient was taking medications but states in the past 48 hours he developed weakness, and since this morning has had lightheadedness, which prompted him to come to ED.   Pt states he saw a gastroenterologist (unable to recall name, at [facility]?) who recommended EGD and colonoscopy in 1 month, OP follow up.     Endorses having a low fiber diet, poor water intake, but is conscious about fat and fried food intake; eats pizza and chips frequently though.   Occasionally smoke marijuana and social drinker.     (-) heartburn, odynophagia, dysphagia, melena, hematochezia, mucoid stools, hematuria, hx of colicky pain/episodes, recent travel, chest pain, SOB, orthopnea  (+) increased burping for 1 month, subjective fever, chills, night sweats, joint pain (b/l hips, knees) x 2wks, pet turtle at home    #Subacute Colitis in setting of antibiotic use vs progression of pre-existing colitis  #Normocytic anemia  -One month history of watery diarrhea and diffuse abdominal pain, exacerbated with meal intake, in the setting of prolonged and multiple courses of antibiotic use  -Pt diagnosed with colitis 04/01 in ED, sent with 10 day course of cipro and flagyl, new onset lightheadedness and weakness  -Vitals stable  -Labs Hgb 12.2 (no baseline), MCV 78.8, Na 134,   -CTAP 04/01: Submucosal fat within the descending colon is noted which may be related to previous episodes of colitis. (601-65). Similar changes within the ascending colon also present 3.8 cm right common iliac artery aneurysm. 4.5 cm hiatal hernia.  -S/p fluids, carafate, and pepcid in ED.  -Physical exam is negative for abdominal tenderness on palpation, pt endorses slight discomfort in RUQ, Vaughan's negative, no guarding or rigidity   Plan  -Monitor off abx  -GI PCR  -Anemia likely due to poor PO intake, f/u AM iron studies, B12, folate  -Liquid diet, advance as tolerated  -Pepcid  -Zofran and carafate PRN  -Pt has OP GI eval in 1 month for EGD and colonoscopy  -Last ED visit referred to vascular OP follow up for common iliac artery aneurysm     #HTN  #HLD  #CAD s/p CABG (01/10/24)  #pAfib (on plavix)  -C/w home meds    DVT PPX: plavix  GI PPX: pepcid  DIET: liquid  ACTIVITY: IAT  CODE STATUS: FULL  DISPOSITION: Acute    PENDING: GI PCR, tolerating PO intake, clinical improvement       58 yo M with pmhx of HTN, HLD, CAD s/p CABG (Perez 10, 2024), pAfib (on plavix), former smoker (30 pack years) presents to ED s/p intolerance PO antibiotic therapy (cipro and [id number] days) for recent diagnosis of colitis c/b new onset light headedness and [id number] hours. Pt states that they had the flu early March, managed with 7 day course of antibiotic he cannot recall, where diffuse, dull aching abdominal pain began, predominantly in the right upper and lower quadrants that occurred intermittently. Shortly after patient develop right eye preseptal cellulitis which he received another antibiotic course for 10 days, during which he developed diarrhea and pre-existing abdominal became constant. Baseline BMs are daily and well formed, however during this time patient was having as many as 3-4 watery BMs per day. Associated with these symptoms was nausea and the urge to vomit, but no vomiting or dry heaving. Pt states for the past month he has lost appetite, his ability to tolerate solid foods has declined where he avoids solids as they exacerbate the diffuse abdominal pain. No relief with Pepto Bismol. Since Monday patient has been tolerating liquid diet.  Abdominal pain is baseline 2/10, max 4/10. Pt came to ED 04/01, CTAP notable for submucosal fat in descending colon, chronic colitis related changes, discharged with 10 day course of cipro and flagyl. Patient was taking medications but states in the past 48 hours he developed weakness, and since this morning has had lightheadedness, which prompted him to come to ED.   Pt states he saw a gastroenterologist (unable to recall name, at [facility]?) who recommended EGD and colonoscopy in 1 month, OP follow up.     Endorses having a low fiber diet, poor water intake, but is conscious about fat and fried food intake; eats pizza and chips frequently though.   Occasionally smoke marijuana and social drinker.     (-) heartburn, odynophagia, dysphagia, melena, hematochezia, mucoid stools, hematuria, hx of colicky pain/episodes, recent travel, chest pain, SOB, orthopnea  (+) increased burping for 1 month, subjective fever, chills, night sweats, joint pain (b/l hips, knees) x 2wks, pet turtle at home    #Subacute Colitis in setting of antibiotic use vs progression of pre-existing colitis  #Normocytic anemia  -One month history of watery diarrhea and diffuse abdominal pain, exacerbated with meal intake, in the setting of prolonged and multiple courses of antibiotic use  -Pt diagnosed with colitis 04/01 in ED, sent with 10 day course of cipro and flagyl, new onset lightheadedness and weakness  -Vitals stable  -Labs Hgb 12.2 (no baseline), MCV 78.8, Na 134,   -CTAP 04/01: Submucosal fat within the descending colon is noted which may be related to previous episodes of colitis. ([id number]). Similar changes within the ascending colon also present 3.8 cm right common iliac artery aneurysm. 4.5 cm hiatal hernia.  -S/p fluids, carafate, and pepcid in ED.  -Physical exam is negative for abdominal tenderness on palpation, pt endorses slight discomfort in RUQ, Vaughan's negative, no guarding or rigidity   Plan  -Monitor off abx  -GI PCR  -Anemia likely due to poor PO intake, f/u AM iron studies, B12, folate  -Liquid diet, advance as tolerated  -Pepcid  -Zofran PRN  -Pt has OP GI eval in 1 month for EGD and colonoscopy  -Last ED visit referred to vascular OP follow up for common iliac artery aneurysm     #HTN  #HLD  #CAD s/p CABG (01/10/24)  #pAfib (on plavix)  -C/w home meds    DVT PPX: plavix  GI PPX: pepcid  DIET: liquid  ACTIVITY: IAT  CODE STATUS: FULL  DISPOSITION: Acute    PENDING: GI PCR, tolerating PO intake, clinical improvement       60 yo M with pmhx of HTN, HLD, CAD s/p CABG (Perez 10, 2024), pAfib (on plavix), former smoker (30 pack years) presents to ED s/p intolerance PO antibiotic therapy (cipro and [id number] days) for recent diagnosis of colitis c/b new onset light headedness and [id number] hours. Pt states that they had the flu early March, managed with 7 day course of antibiotic he cannot recall, where diffuse, dull aching abdominal pain began, predominantly in the right upper and lower quadrants that occurred intermittently. Shortly after patient develop right eye preseptal cellulitis which he received another antibiotic course for 10 days, during which he developed diarrhea and pre-existing abdominal became constant. Baseline BMs are daily and well formed, however during this time patient was having as many as 3-4 watery BMs per day. Associated with these symptoms was nausea and the urge to vomit, but no vomiting or dry heaving. Pt states for the past month he has lost appetite, his ability to tolerate solid foods has declined where he avoids solids as they exacerbate the diffuse abdominal pain. No relief with Pepto Bismol. Since Monday patient has been tolerating liquid diet.  Abdominal pain is baseline 2/10, max 4/10. Pt came to ED 04/01, CTAP notable for submucosal fat in descending colon, chronic colitis related changes, discharged with 10 day course of cipro and flagyl. Patient was taking medications but states in the past 48 hours he developed weakness, and since this morning has had lightheadedness, which prompted him to come to ED.   Pt states he saw a gastroenterologist (unable to recall name, at [facility]?) who recommended EGD and colonoscopy in 1 month, OP follow up.     Endorses having a low fiber diet, poor water intake, but is conscious about fat and fried food intake; eats pizza and chips frequently though.   Occasionally smoke marijuana and social drinker.     (-) heartburn, odynophagia, dysphagia, melena, hematochezia, mucoid stools, hematuria, hx of colicky pain/episodes, recent travel, chest pain, SOB, orthopnea  (+) increased burping for 1 month, subjective fever, chills, night sweats, joint pain (b/l hips, knees) x 2wks, pet turtle at home    #Subacute Colitis in setting of antibiotic use vs progression of pre-existing colitis  #Normocytic anemia  -One month history of watery diarrhea and diffuse abdominal pain, exacerbated with meal intake, in the setting of prolonged and multiple courses of antibiotic use  -Pt diagnosed with colitis 04/01 in ED, sent with 10 day course of cipro and flagyl, new onset lightheadedness and weakness  -Vitals stable  -Labs Hgb 12.2 (no baseline), MCV 78.8, Na 134,   -CTAP 04/01: Submucosal fat within the descending colon is noted which may be related to previous episodes of colitis. (601-65). Similar changes within the ascending colon also present 3.8 cm right common iliac artery aneurysm. 4.5 cm hiatal hernia.  -S/p fluids, carafate, and pepcid in ED.  -Physical exam is negative for abdominal tenderness on palpation, pt endorses slight discomfort in RUQ, Vaughan's negative, no guarding or rigidity   Plan  -Monitor off abx  -GI PCR  -Anemia likely due to poor PO intake, f/u AM iron studies, B12, folate  -Liquid diet, advance as tolerated  -Pepcid  -Zofran PRN  -Pt has OP GI eval in 1 month for EGD and colonoscopy  -Robaxin POx 1 dose for back pain  -Last ED visit referred to vascular OP follow up for common iliac artery aneurysm     #HTN  #HLD  #CAD s/p CABG (01/10/24)  #pAfib (on plavix)  -C/w home meds    DVT PPX: plavix  GI PPX: pepcid  DIET: liquid  ACTIVITY: IAT  CODE STATUS: FULL  DISPOSITION: Acute    PENDING: GI PCR, tolerating PO intake, clinical improvement

## 2025-04-04 LAB
ALBUMIN SERPL ELPH-MCNC: 3.3 G/DL — LOW (ref 3.5–5.2)
ALP SERPL-CCNC: 162 U/L — HIGH (ref 30–115)
ALT FLD-CCNC: 9 U/L — SIGNIFICANT CHANGE UP (ref 0–41)
AMYLASE P1 CFR SERPL: 55 U/L — SIGNIFICANT CHANGE UP (ref 25–115)
ANION GAP SERPL CALC-SCNC: 12 MMOL/L — SIGNIFICANT CHANGE UP (ref 7–14)
AST SERPL-CCNC: 18 U/L — SIGNIFICANT CHANGE UP (ref 0–41)
BILIRUB SERPL-MCNC: 0.7 MG/DL — SIGNIFICANT CHANGE UP (ref 0.2–1.2)
BUN SERPL-MCNC: 12 MG/DL — SIGNIFICANT CHANGE UP (ref 10–20)
C DIFF GDH STL QL: NEGATIVE — SIGNIFICANT CHANGE UP
C DIFF GDH STL QL: SIGNIFICANT CHANGE UP
CALCIUM SERPL-MCNC: 8.4 MG/DL — SIGNIFICANT CHANGE UP (ref 8.4–10.5)
CHLORIDE SERPL-SCNC: 102 MMOL/L — SIGNIFICANT CHANGE UP (ref 98–110)
CO2 SERPL-SCNC: 21 MMOL/L — SIGNIFICANT CHANGE UP (ref 17–32)
CREAT SERPL-MCNC: 1 MG/DL — SIGNIFICANT CHANGE UP (ref 0.7–1.5)
CRP SERPL-MCNC: 62 MG/L — HIGH
EGFR: 86 ML/MIN/1.73M2 — SIGNIFICANT CHANGE UP
EGFR: 86 ML/MIN/1.73M2 — SIGNIFICANT CHANGE UP
ERYTHROCYTE [SEDIMENTATION RATE] IN BLOOD: 48 MM/HR — HIGH (ref 0–10)
FERRITIN SERPL-MCNC: 699 NG/ML — HIGH (ref 30–400)
FOLATE SERPL-MCNC: 7.6 NG/ML — SIGNIFICANT CHANGE UP
GI PCR PANEL: SIGNIFICANT CHANGE UP
GLUCOSE SERPL-MCNC: 78 MG/DL — SIGNIFICANT CHANGE UP (ref 70–99)
IRON SATN MFR SERPL: 18 % — SIGNIFICANT CHANGE UP (ref 15–50)
IRON SATN MFR SERPL: 34 UG/DL — LOW (ref 35–150)
LIDOCAIN IGE QN: 58 U/L — SIGNIFICANT CHANGE UP (ref 7–60)
MAGNESIUM SERPL-MCNC: 1.9 MG/DL — SIGNIFICANT CHANGE UP (ref 1.8–2.4)
POTASSIUM SERPL-MCNC: 4.1 MMOL/L — SIGNIFICANT CHANGE UP (ref 3.5–5)
POTASSIUM SERPL-SCNC: 4.1 MMOL/L — SIGNIFICANT CHANGE UP (ref 3.5–5)
PROT SERPL-MCNC: 5.8 G/DL — LOW (ref 6–8)
SODIUM SERPL-SCNC: 135 MMOL/L — SIGNIFICANT CHANGE UP (ref 135–146)
TIBC SERPL-MCNC: 187 UG/DL — LOW (ref 220–430)
TRANSFERRIN SERPL-MCNC: 150 MG/DL — LOW (ref 200–360)
UIBC SERPL-MCNC: 153 UG/DL — SIGNIFICANT CHANGE UP (ref 110–370)
VIT B12 SERPL-MCNC: 496 PG/ML — SIGNIFICANT CHANGE UP (ref 232–1245)

## 2025-04-04 PROCEDURE — 99233 SBSQ HOSP IP/OBS HIGH 50: CPT

## 2025-04-04 RX ORDER — LACTOBACILLUS ACIDOPHILUS/PECT 75 MM-100
1 CAPSULE ORAL
Refills: 0 | Status: DISCONTINUED | OUTPATIENT
Start: 2025-04-04 | End: 2025-04-06

## 2025-04-04 RX ORDER — MELATONIN 5 MG
3 TABLET ORAL AT BEDTIME
Refills: 0 | Status: DISCONTINUED | OUTPATIENT
Start: 2025-04-04 | End: 2025-04-06

## 2025-04-04 RX ORDER — CLOPIDOGREL BISULFATE 75 MG/1
75 TABLET, FILM COATED ORAL DAILY
Refills: 0 | Status: DISCONTINUED | OUTPATIENT
Start: 2025-04-04 | End: 2025-04-06

## 2025-04-04 RX ORDER — ENOXAPARIN SODIUM 100 MG/ML
40 INJECTION SUBCUTANEOUS EVERY 24 HOURS
Refills: 0 | Status: DISCONTINUED | OUTPATIENT
Start: 2025-04-04 | End: 2025-04-06

## 2025-04-04 RX ORDER — METOPROLOL SUCCINATE 50 MG/1
12.5 TABLET, EXTENDED RELEASE ORAL
Refills: 0 | Status: DISCONTINUED | OUTPATIENT
Start: 2025-04-04 | End: 2025-04-06

## 2025-04-04 RX ORDER — METHOCARBAMOL 500 MG/1
500 TABLET, FILM COATED ORAL ONCE
Refills: 0 | Status: COMPLETED | OUTPATIENT
Start: 2025-04-04 | End: 2025-04-04

## 2025-04-04 RX ORDER — SUCRALFATE 1 G
1 TABLET ORAL
Refills: 0 | Status: DISCONTINUED | OUTPATIENT
Start: 2025-04-04 | End: 2025-04-06

## 2025-04-04 RX ORDER — B1/B2/B3/B5/B6/B12/VIT C/FOLIC 500-0.5 MG
1 TABLET ORAL DAILY
Refills: 0 | Status: DISCONTINUED | OUTPATIENT
Start: 2025-04-04 | End: 2025-04-06

## 2025-04-04 RX ORDER — OMEPRAZOLE 20 MG/1
1 CAPSULE, DELAYED RELEASE ORAL
Refills: 0 | DISCHARGE

## 2025-04-04 RX ORDER — ATORVASTATIN CALCIUM 80 MG/1
80 TABLET, FILM COATED ORAL AT BEDTIME
Refills: 0 | Status: DISCONTINUED | OUTPATIENT
Start: 2025-04-04 | End: 2025-04-06

## 2025-04-04 RX ORDER — ACETAMINOPHEN 500 MG/5ML
650 LIQUID (ML) ORAL EVERY 6 HOURS
Refills: 0 | Status: DISCONTINUED | OUTPATIENT
Start: 2025-04-04 | End: 2025-04-06

## 2025-04-04 RX ADMIN — METOPROLOL SUCCINATE 12.5 MILLIGRAM(S): 50 TABLET, EXTENDED RELEASE ORAL at 17:07

## 2025-04-04 RX ADMIN — CLOPIDOGREL BISULFATE 75 MILLIGRAM(S): 75 TABLET, FILM COATED ORAL at 11:45

## 2025-04-04 RX ADMIN — Medication 650 MILLIGRAM(S): at 15:44

## 2025-04-04 RX ADMIN — Medication 1 APPLICATION(S): at 11:45

## 2025-04-04 RX ADMIN — Medication 3 MILLIGRAM(S): at 21:29

## 2025-04-04 RX ADMIN — Medication 20 MILLIGRAM(S): at 05:26

## 2025-04-04 RX ADMIN — ATORVASTATIN CALCIUM 80 MILLIGRAM(S): 80 TABLET, FILM COATED ORAL at 21:29

## 2025-04-04 RX ADMIN — Medication 4 MILLIGRAM(S): at 15:44

## 2025-04-04 RX ADMIN — Medication 1 TABLET(S): at 17:13

## 2025-04-04 RX ADMIN — Medication 1 GRAM(S): at 17:06

## 2025-04-04 RX ADMIN — METOPROLOL SUCCINATE 12.5 MILLIGRAM(S): 50 TABLET, EXTENDED RELEASE ORAL at 05:25

## 2025-04-04 RX ADMIN — Medication 650 MILLIGRAM(S): at 04:42

## 2025-04-04 RX ADMIN — ENOXAPARIN SODIUM 40 MILLIGRAM(S): 100 INJECTION SUBCUTANEOUS at 14:30

## 2025-04-04 RX ADMIN — Medication 20 MILLIGRAM(S): at 17:07

## 2025-04-04 RX ADMIN — Medication 40 MILLIGRAM(S): at 15:45

## 2025-04-04 RX ADMIN — METHOCARBAMOL 500 MILLIGRAM(S): 500 TABLET, FILM COATED ORAL at 04:42

## 2025-04-04 RX ADMIN — Medication 650 MILLIGRAM(S): at 22:06

## 2025-04-04 RX ADMIN — Medication 650 MILLIGRAM(S): at 16:53

## 2025-04-04 RX ADMIN — Medication 1 TABLET(S): at 14:30

## 2025-04-04 RX ADMIN — Medication 4 MILLIGRAM(S): at 05:27

## 2025-04-04 NOTE — CONSULT NOTE ADULT - SUBJECTIVE AND OBJECTIVE BOX
Gastroenterology Consultation:    Patient is a 59y old  Male who presents with a chief complaint of Abdominal pain and dizziness (03 Apr 2025 22:24)        Admitted on: 04-03-25      HPI:  58 yo M with pmhx of HTN, HLD, CAD s/p CABG (Perez 10, 2024) on plavix , pAfib, former smoker (30 pack years) presents to ED with worsening abdominal pain, nausea and multiple episodes of diarrhea.     Patient reports that he was diagnosed with flu early march and then developed right eye stye received antibiotics. After this, patient developed sharp mid abdominal pain 6/10, diarrhea, had an ER visit received cipro/flagyl  but since last two days his abdominal pain is worsening with dec PO intake and multiple episodes of diarrhea >3/day. Patient saw  and is scheduled for EGD/Colonoscopy in 1 month. CTAP notable for submucosal fat in descending colon, ascending colon. He reports that he lost 28lbs in the last month  GI consulted for further evaluation.    Patient denies any  vomiting, swallowing difficulty, blood in stool.  No recent travel,  SSRI use.      Prior EGD: never had any    Prior Colonoscopy: >5 years ago      PAST MEDICAL & SURGICAL HISTORY:        FAMILY HISTORY:  NO GI related malignancies/disorders/IBD    Social History:  Tobacco: denies  Alcohol: denies  Drugs: denies    Home Medications:  clopidogrel 75 mg oral tablet: 1 tab(s) orally once a day (04 Apr 2025 00:42)  Lipitor 80 mg oral tablet: 1 tab(s) orally once a day (04 Apr 2025 00:42)  Metoprolol Tartrate 25 mg oral tablet: 0.5 tab(s) orally 2 times a day (04 Apr 2025 00:42)  omeprazole 40 mg oral delayed release capsule: 1 cap(s) orally once a day (04 Apr 2025 00:41)  Pepcid 20 mg oral tablet: 1 tab(s) orally once a day (04 Apr 2025 00:42)        MEDICATIONS  (STANDING):  atorvastatin 80 milliGRAM(s) Oral at bedtime  clopidogrel Tablet 75 milliGRAM(s) Oral daily  enoxaparin Injectable 40 milliGRAM(s) SubCutaneous every 24 hours  famotidine    Tablet 20 milliGRAM(s) Oral two times a day  lactobacillus acidophilus 1 Tablet(s) Oral three times a day with meals  melatonin 3 milliGRAM(s) Oral at bedtime  metoprolol tartrate 12.5 milliGRAM(s) Oral two times a day  multivitamin  Chewable 1 Tablet(s) Oral daily  pantoprazole  Injectable 40 milliGRAM(s) IV Push every 12 hours  sucralfate suspension 1 Gram(s) Oral four times a day    MEDICATIONS  (PRN):  acetaminophen     Tablet .. 650 milliGRAM(s) Oral every 6 hours PRN Mild Pain (1 - 3)  ondansetron Injectable 4 milliGRAM(s) IV Push every 6 hours PRN Nausea and/or Vomiting      Allergies  penicillins (Unknown)      Review of Systems:   Constitutional:  No Fever, No Chills  ENT/Mouth:  No Hearing Changes,  No Difficulty Swallowing  Eyes:  No Eye Pain, No Vision Changes  Cardiovascular:  No Chest Pain, No Palpitations  Respiratory:  No Cough, No Dyspnea  Gastrointestinal:  As described in HPI          Physical Examination:  T(C): 36.9 (04-04-25 @ 13:01), Max: 36.9 (04-03-25 @ 20:52)  HR: 65 (04-04-25 @ 13:01) (65 - 84)  BP: 125/79 (04-04-25 @ 13:01) (96/51 - 125/79)  RR: 18 (04-04-25 @ 13:01) (18 - 18)  SpO2: 99% (04-04-25 @ 13:01) (97% - 99%)  Height (cm): 180.3 (04-03-25 @ 21:22)  Weight (kg): 90.3 (04-03-25 @ 21:22)        GENERAL: AAOx3, no acute distress.  HEAD:  Atraumatic, Normocephalic  EYES: conjunctiva and sclera clear  CHEST/LUNG: Clear to auscultation bilaterally; No wheeze, rhonchi, or rales  HEART: Regular rate and rhythm; normal S1, S2, No murmurs.  ABDOMEN: Soft, nontender, nondistended; Bowel sounds present  SKIN: Intact, no jaundice        Data:                        12.2   5.83  )-----------( 262      ( 03 Apr 2025 17:48 )             37.1     Hgb Trend:  12.2  04-03-25 @ 17:48  12.5  04-01-25 @ 20:30      04-04    135  |  102  |  12  ----------------------------<  78  4.1   |  21  |  1.0    Ca    8.4      04 Apr 2025 06:01  Mg     1.9     04-04    TPro  5.8[L]  /  Alb  3.3[L]  /  TBili  0.7  /  DBili  0.2  /  AST  18  /  ALT  9   /  AlkPhos  162[H]  04-04    Liver panel trend:  TBili 0.7   /   AST 18   /   ALT 9   /   AlkP 162   /   Tptn 5.8   /   Alb 3.3    /   DBili 0.2      04-04  TBili 0.7   /   AST 20   /   ALT 12   /   AlkP 193   /   Tptn 6.5   /   Alb 3.7    /   DBili --      04-03  TBili 0.7   /   AST 20   /   ALT 16   /   AlkP 180   /   Tptn 6.9   /   Alb 4.1    /   DBili 0.2      04-01          Urinalysis with Rflx Culture (collected 01 Apr 2025 22:31)          Radiology:      < from: CT Abdomen and Pelvis w/ IV Cont (04.01.25 @ 21:57) >    Nonobstructive gas pattern. No pneumoperitoneum.    Submucosal fat within the descending colon is noted which may be related   to previous episodes of colitis. (041-09). Similar changes within the   ascending colon also present    3.8 cm right common iliac artery aneurysm    4.5 cm hiatal hernia    < end of copied text >  
LIGIB

## 2025-04-04 NOTE — CONSULT NOTE ADULT - ASSESSMENT
60 yo M with pmhx of HTN, HLD, CAD s/p CABG (Perez 10, 2024) on plavix , pAfib, former smoker (30 pack years) presents to ED with worsening abdominal pain, nausea and multiple episodes of diarrhea. Patient reports that he was diagnosed with flu early march and then developed right eye stye received antibiotics. After this, patient developed sharp mid abdominal pain 6/10, diarrhea, had an ER visit received cipro/flagyl  but since last two days his abdominal pain is worsening with dec PO intake and multiple episodes of diarrhea >3/day. Patient saw  and is scheduled for EGD/Colonoscopy in 1 month. CTAP notable for submucosal fat in descending colon, ascending colon. He reports that he lost 28lbs in the last month. GI consulted for further evaluation.    #Abdominal pain with acute diarrhea, nausea r/o infectious vs inflammatory vs ischemic colitis vs others  #Large Hiatal Hernia  - CTAP: IC : Submucosal fat within the descending colon is noted which may be related to previous episodes of colitis. Similar changes within the ascending colon also present  - Currently  58 yo M with pmhx of HTN, HLD, CAD s/p CABG (Perez 10, 2024) on plavix , pAfib, former smoker (30 pack years) presents to ED with worsening abdominal pain, nausea and multiple episodes of diarrhea. Patient reports that he was diagnosed with flu early march and then developed right eye stye received antibiotics. After this, patient developed sharp mid abdominal pain 6/10, diarrhea, had an ER visit received cipro/flagyl  but since last two days his abdominal pain is worsening with dec PO intake and multiple episodes of diarrhea >3/day. Patient saw  and is scheduled for EGD/Colonoscopy in 1 month. CTAP notable for submucosal fat in descending colon, ascending colon. He reports that he lost 28lbs in the last month. GI consulted for further evaluation.    #Abdominal pain with acute diarrhea, nausea r/o infectious vs inflammatory vs ischemic colitis vs others  #Anemia with CHI component  #Large Hiatal Hernia  - CTAP: IC : Submucosal fat within the descending colon is noted which may be related to previous episodes of colitis. Similar changes within the ascending colon also present  - Currently abdominal pain improving and nausea better  - ESR:48  CRP: 62  - On plavix  -  C diff -    RECS  - Recommend to follow up with GI PCR, stool lactoferrin, calprotectin  - Full liquid diet and advance diet as tolerated  - Monitor BMs  - If infectious etiology will recommend to follow up with outpatient GI Dr. West  - WE will follow

## 2025-04-05 PROCEDURE — 99232 SBSQ HOSP IP/OBS MODERATE 35: CPT

## 2025-04-05 RX ORDER — BENZONATATE 100 MG
100 CAPSULE ORAL ONCE
Refills: 0 | Status: COMPLETED | OUTPATIENT
Start: 2025-04-05 | End: 2025-04-05

## 2025-04-05 RX ORDER — SIMETHICONE 80 MG
80 TABLET,CHEWABLE ORAL EVERY 6 HOURS
Refills: 0 | Status: DISCONTINUED | OUTPATIENT
Start: 2025-04-05 | End: 2025-04-06

## 2025-04-05 RX ADMIN — ENOXAPARIN SODIUM 40 MILLIGRAM(S): 100 INJECTION SUBCUTANEOUS at 13:12

## 2025-04-05 RX ADMIN — Medication 20 MILLIGRAM(S): at 05:11

## 2025-04-05 RX ADMIN — METOPROLOL SUCCINATE 12.5 MILLIGRAM(S): 50 TABLET, EXTENDED RELEASE ORAL at 17:40

## 2025-04-05 RX ADMIN — Medication 1 GRAM(S): at 23:59

## 2025-04-05 RX ADMIN — CLOPIDOGREL BISULFATE 75 MILLIGRAM(S): 75 TABLET, FILM COATED ORAL at 12:07

## 2025-04-05 RX ADMIN — Medication 80 MILLIGRAM(S): at 09:17

## 2025-04-05 RX ADMIN — Medication 1 GRAM(S): at 00:22

## 2025-04-05 RX ADMIN — Medication 40 MILLIGRAM(S): at 17:41

## 2025-04-05 RX ADMIN — Medication 1 GRAM(S): at 17:41

## 2025-04-05 RX ADMIN — Medication 100 MILLIGRAM(S): at 20:12

## 2025-04-05 RX ADMIN — ATORVASTATIN CALCIUM 80 MILLIGRAM(S): 80 TABLET, FILM COATED ORAL at 21:37

## 2025-04-05 RX ADMIN — Medication 3 MILLIGRAM(S): at 21:37

## 2025-04-05 RX ADMIN — Medication 1 TABLET(S): at 12:10

## 2025-04-05 RX ADMIN — METOPROLOL SUCCINATE 12.5 MILLIGRAM(S): 50 TABLET, EXTENDED RELEASE ORAL at 05:11

## 2025-04-05 RX ADMIN — Medication 40 MILLIGRAM(S): at 05:10

## 2025-04-05 RX ADMIN — Medication 1 TABLET(S): at 12:08

## 2025-04-05 RX ADMIN — Medication 650 MILLIGRAM(S): at 14:04

## 2025-04-05 RX ADMIN — Medication 20 MILLIGRAM(S): at 17:40

## 2025-04-05 RX ADMIN — Medication 1 TABLET(S): at 17:39

## 2025-04-05 RX ADMIN — Medication 650 MILLIGRAM(S): at 14:41

## 2025-04-05 RX ADMIN — Medication 1 TABLET(S): at 09:17

## 2025-04-05 RX ADMIN — Medication 1 GRAM(S): at 12:08

## 2025-04-05 RX ADMIN — Medication 1 GRAM(S): at 05:11

## 2025-04-05 NOTE — DISCHARGE NOTE PROVIDER - NSDCMRMEDTOKEN_GEN_ALL_CORE_FT
clopidogrel 75 mg oral tablet: 1 tab(s) orally once a day  lactobacillus acidophilus oral capsule: 1 tab(s) orally 3 times a day  Lipitor 80 mg oral tablet: 1 tab(s) orally once a day  Metoprolol Tartrate 25 mg oral tablet: 0.5 tab(s) orally 2 times a day  Multiple Vitamins oral tablet, chewable: 1 tab(s) orally once a day  omeprazole 40 mg oral delayed release capsule: 1 cap(s) orally once a day  Pepcid 20 mg oral tablet: 1 tab(s) orally once a day  simethicone 80 mg oral tablet, chewable: 1 tab(s) orally every 6 hours as needed for Gas  sucralfate 1 g/10 mL oral suspension: 10 milliliter(s) orally 4 times a day

## 2025-04-05 NOTE — DIETITIAN INITIAL EVALUATION ADULT - PERTINENT MEDS FT
MEDICATIONS  (STANDING):  atorvastatin 80 milliGRAM(s) Oral at bedtime  clopidogrel Tablet 75 milliGRAM(s) Oral daily  enoxaparin Injectable 40 milliGRAM(s) SubCutaneous every 24 hours  famotidine    Tablet 20 milliGRAM(s) Oral two times a day  lactobacillus acidophilus 1 Tablet(s) Oral three times a day with meals  melatonin 3 milliGRAM(s) Oral at bedtime  metoprolol tartrate 12.5 milliGRAM(s) Oral two times a day  multivitamin  Chewable 1 Tablet(s) Oral daily  pantoprazole  Injectable 40 milliGRAM(s) IV Push every 12 hours  sucralfate suspension 1 Gram(s) Oral four times a day

## 2025-04-05 NOTE — DIETITIAN INITIAL EVALUATION ADULT - NSFNSGIASSESSMENTFT_GEN_A_CORE
endoreses abdominal pain --   Last Bowel Movement: 04-Apr-2025 (04-05-25 @ 08:00)  Last Bowel Movement: 04-Apr-2025 (04-04-25 @ 19:43)  Last Bowel Movement: 04-Apr-2025 (04-04-25 @ 08:00)

## 2025-04-05 NOTE — DISCHARGE NOTE PROVIDER - HOSPITAL COURSE
60 yo M with pmhx of HTN, HLD, CAD s/p CABG (Perez 10, 2024), pAfib (on plavix), former smoker (30 pack years) presents to ED s/p intolerance PO antibiotic therapy (cipro and [id number] days) for recent diagnosis of colitis c/b new onset light headedness and [id number] hours. Pt states that they had the flu early March, managed with 7 day course of antibiotic he cannot recall, where diffuse, dull aching abdominal pain began, predominantly in the right upper and lower quadrants that occurred intermittently. Shortly after patient develop right eye preseptal cellulitis which he received another antibiotic course for 10 days, during which he developed diarrhea and pre-existing abdominal became constant. Baseline BMs are daily and well formed, however during this time patient was having as many as 3-4 watery BMs per day. Associated with these symptoms was nausea and the urge to vomit, but no vomiting or dry heaving. Pt states for the past month he has lost appetite, his ability to tolerate solid foods has declined where he avoids solids as they exacerbate the diffuse abdominal pain. No relief with Pepto Bismol. Since Monday patient has been tolerating liquid diet.  Abdominal pain is baseline 2/10, max 4/10. Pt came to ED 04/01, CTAP notable for submucosal fat in descending colon, chronic colitis related changes, discharged with 10 day course of cipro and flagyl. Patient was taking medications but states in the past 48 hours he developed weakness, and since this morning has had lightheadedness, which prompted him to come to ED.   Pt states he saw a gastroenterologist (unable to recall name, at [facility] who recommended EGD and colonoscopy in 1 month, OP follow up.     Endorses having a low fiber diet, poor water intake, but is conscious about fat and fried food intake; eats pizza and chips frequently though.   Occasionally smoke marijuana and social drinker.     (-) heartburn, odynophagia, dysphagia, melena, hematochezia, mucoid stools, hematuria, hx of colicky pain/episodes, recent travel, chest pain, SOB, orthopnea  (+) increased burping for 1 month, subjective fever, chills, night sweats, joint pain (b/l hips, knees) x 2wks, pet turtle at home    #Subacute Colitis in setting of antibiotic use vs progression of pre-existing colitis  #Normocytic anemia  -One month history of watery diarrhea and diffuse abdominal pain, exacerbated with meal intake, in the setting of prolonged and multiple courses of antibiotic use  -Pt diagnosed with colitis 04/01 in ED, sent with 10 day course of cipro and flagyl, new onset lightheadedness and weakness  -Vitals stable  -Labs Hgb 12.2 (no baseline), MCV 78.8, Na 134,   -CTAP 04/01: Submucosal fat within the descending colon is noted which may be related to previous episodes of colitis. ([id number]). Similar changes within the ascending colon also present 3.8 cm right common iliac artery aneurysm. 4.5 cm hiatal hernia.  -S/p fluids, carafate, and pepcid in ED.  -Physical exam is negative for abdominal tenderness on palpation, pt endorses slight discomfort in RUQ, Vaughan's negative, no guarding or rigidity   Plan  -Monitor off abx  -GI PCR---->negative   -Anemia likely due to poor PO intake, f/u AM iron studies, B12, folate  -Liquid diet, advance as tolerated  -Pepcid  -Zofran PRN  -Pt has OP GI eval in 1 month for EGD and colonoscopy  -Robaxin POx 1 dose for back pain  -Last ED visit referred to vascular OP follow up for common iliac artery aneurysm     #HTN  #HLD  #CAD s/p CABG (01/10/24)  #pAfib (on plavix)  -C/w home meds    DVT PPX: plavix  GI PPX: pepcid  DIET: liquid  ACTIVITY: IAT  CODE STATUS: FULL  DISPOSITION: Acute

## 2025-04-05 NOTE — DISCHARGE NOTE PROVIDER - PROVIDER TOKENS
PROVIDER:[TOKEN:[[id number]],FOLLOWUP:[1 week]],FREE:[LAST:[Primary Medical Doctor],PHONE:[(   )    -],FAX:[(   )    -],FOLLOWUP:[1 week]]

## 2025-04-05 NOTE — DISCHARGE NOTE PROVIDER - CARE PROVIDER_API CALL
Serjio West  Internal Medicine  [address] [place] [address]  Phone: [phone]  Fax: [phone]  Follow Up Time: 1 week    Primary Medical Doctor,   Phone: (   )    -  Fax: (   )    -  Follow Up Time: 1 week

## 2025-04-05 NOTE — DISCHARGE NOTE PROVIDER - NSDCCPCAREPLAN_GEN_ALL_CORE_FT
PRINCIPAL DISCHARGE DIAGNOSIS  Diagnosis: Colitis  Assessment and Plan of Treatment: you presented with nausea, diarrhea, weight loss. We believe that it is prolonged antibiotics caused your symptoms. You are now improving and we expect you to make full recovery. We did find yeast cells in your stool but we suspect that this is normal see of you intestines. Please follow up with PMD and GI. If your symptoms worsen, discuss with your doctors treating the yeast.  SEEK IMMEDIATE MEDICAL CARE IF YOU HAVE ANY OF THE FOLLOWING SYMPTOMS: severe headache, pain in your chest/abdomen/back, bleeding from your mouth or rectum, palpitations, shortness of breath, pain with breathing, seizure, inability to tolerate diet, confusion, or trouble walking.      SECONDARY DISCHARGE DIAGNOSES  Diagnosis: Intractable nausea  Assessment and Plan of Treatment:      PRINCIPAL DISCHARGE DIAGNOSIS  Diagnosis: Colitis  Assessment and Plan of Treatment: you presented with nausea, diarrhea, weight loss. We believe that it is prolonged antibiotics caused your symptoms. You are now improving and we expect you to make full recovery. We did find yeast cells in your stool but we suspect that this is normal see of you intestines. Please follow up with PMD and GI. If your symptoms worsen, discuss with your doctors treating the yeast.  SEEK IMMEDIATE MEDICAL CARE IF YOU HAVE ANY OF THE FOLLOWING SYMPTOMS: severe headache, pain in your chest/abdomen/back, bleeding from your mouth or rectum, palpitations, shortness of breath, pain with breathing, seizure, inability to tolerate diet, confusion, or trouble walking.      SECONDARY DISCHARGE DIAGNOSES  Diagnosis: Iliac artery aneurysm  Assessment and Plan of Treatment: please follow up with vascular surgeon

## 2025-04-05 NOTE — DIETITIAN INITIAL EVALUATION ADULT - PERTINENT LABORATORY DATA
04-04    135  |  102  |  12  ----------------------------<  78  4.1   |  21  |  1.0    Ca    8.4      04 Apr 2025 06:01  Mg     1.9     04-04    TPro  5.8[L]  /  Alb  3.3[L]  /  TBili  0.7  /  DBili  0.2  /  AST  18  /  ALT  9   /  AlkPhos  162[H]  04-04

## 2025-04-05 NOTE — DISCHARGE NOTE PROVIDER - NSDCFUSCHEDAPPT_GEN_ALL_CORE_FT
[facility] Physician [facility]  VASCULAR [address] [place] [address] A  Scheduled Appointment: 04/07/2025

## 2025-04-05 NOTE — DIETITIAN INITIAL EVALUATION ADULT - ORAL INTAKE PTA/DIET HISTORY
Pt Aox4.         Per [facility] HEI:  11/7/2023-87.1 kg  1/15/24- 90.7 kg   4/4/24-95.3 kg   2/27/.1 kg  4/1/25-93.4 kg  4/3/25- 90.3 kg    - 11.5% wt loss in 2 months vs -5.2% wt loss in 12 months.  IBW: 78 kg using HAMWI equation.  Pt Aox4. w/ family at bedside.  lbs 1 month ago.  lbs. Pt states he got sick in march and has had poor PO intake every since. Meeting <50% EER >/= 5 days (only drinking some apple juice) 2/2 lack of appetite. Pt endorses having 'diet of a 12 year old'. Prefers eating pizza. Rarely eats fruits/vegetables.     currently, diet advanced from clears to full liquid. Currently w/ good PO intake per flowsheet, % . pt endorses starting to feel hungry and increased appetite. Discussed Pre/probiotics w/ gut health. Provided examples of foods and symbiotic pills to aid in gut health. Discussed current diet order and provided formulary options for oral nutrition supplements. pt declined any. RD discussed option for Banana flakes to aid w/ bulkening stool . Pt declined option.     NFPE: pt at risk for malnutrition 2/2 Poor PO intake and rapid weight loss. 12.3% unintentional wt loss in 1 month.      Per [facility] HEI:  11/7/2023-87.1 kg  1/15/24- 90.7 kg   4/4/24-95.3 kg   2/27/.1 kg  3/1/25- 103 kg  4/1/25-93.4 kg  4/3/25- 90.3 kg    -  12.3% wt loss in 1 months vs -5.2% wt loss in 12 months.  IBW: 78 kg using HAMWI equation.

## 2025-04-05 NOTE — DIETITIAN INITIAL EVALUATION ADULT - NAME AND PHONE
Tiffanie Morales x [phone] or [address] [facility]   High risk follow up      Monitoring/Evaluating: PO intake, Labs, Lytes, Wts,  Diet and texture tolerance, NFPF, GI S/S, BM.

## 2025-04-05 NOTE — DIETITIAN INITIAL EVALUATION ADULT - OTHER CALCULATIONS
Energy: MSJ x 1.2-1.5 =  8861-7608  kcal/kg   Protein: 1.2-1.3 gm/kg = 108-117 gm protein /day  Fluid: 1 ml/kcal or per MD recommendations     Estimated energy and protein needs with consideration for weight maintenance, BMI, age, mobility, nutritional risk, skin integrity, and comorbidities.  Energy: MSJ x 1.2-1.5 =  4937-0781  kcal/kg   Protein: 1.2-1.3 gm/kg = 108-117 gm protein /day  Fluid: 1 ml/kcal or per MD recommendations     Estimated energy and protein needs with consideration for weight maintenance, BMI, age, inflammation mobility, nutritional risk, skin integrity, and comorbidities.

## 2025-04-05 NOTE — DIETITIAN INITIAL EVALUATION ADULT - NSFNSADHERENCEPTAFT_GEN_A_CORE
Reports 25lbs in 3weeks  Endorses having a low fiber diet, poor water intake, but is conscious about fat and fried food intake; eats pizza and chips frequently though.   Occasionally smoke marijuana and social drinker.

## 2025-04-05 NOTE — DISCHARGE NOTE PROVIDER - DETAILS OF MALNUTRITION DIAGNOSIS/DIAGNOSES
This patient has been assessed with a concern for Malnutrition and was treated during this hospitalization for the following Nutrition diagnosis/diagnoses:     -  04/05/2025: Severe protein-calorie malnutrition

## 2025-04-05 NOTE — DIETITIAN INITIAL EVALUATION ADULT - OTHER INFO
-One month history of watery diarrhea and diffuse abdominal pain, exacerbated with meal intake, in the setting of prolonged and multiple courses of antibiotic use  #Nausea/Vomiting/Diarrhea/Weight Loss  #Subacute Colitis in setting of antibiotic use vs progression of pre-existing colitis  #Normocytic anemia  #HTN  #HLD

## 2025-04-06 VITALS
TEMPERATURE: 98 F | OXYGEN SATURATION: 99 % | SYSTOLIC BLOOD PRESSURE: 108 MMHG | RESPIRATION RATE: 18 BRPM | HEART RATE: 82 BPM | DIASTOLIC BLOOD PRESSURE: 71 MMHG

## 2025-04-06 LAB
CULTURE RESULTS: ABNORMAL
FLUAV AG NPH QL: SIGNIFICANT CHANGE UP
FLUBV AG NPH QL: SIGNIFICANT CHANGE UP
RSV RNA NPH QL NAA+NON-PROBE: SIGNIFICANT CHANGE UP
SARS-COV-2 RNA SPEC QL NAA+PROBE: SIGNIFICANT CHANGE UP
SOURCE RESPIRATORY: SIGNIFICANT CHANGE UP
SPECIMEN SOURCE: SIGNIFICANT CHANGE UP

## 2025-04-06 PROCEDURE — 99239 HOSP IP/OBS DSCHRG MGMT >30: CPT

## 2025-04-06 RX ORDER — SUCRALFATE 1 G
10 TABLET ORAL
Qty: 600 | Refills: 0
Start: 2025-04-06 | End: 2025-04-20

## 2025-04-06 RX ORDER — DEXTROMETHORPHAN HBR, GUAIFENESIN 200 MG/10ML
600 LIQUID ORAL ONCE
Refills: 0 | Status: COMPLETED | OUTPATIENT
Start: 2025-04-06 | End: 2025-04-06

## 2025-04-06 RX ORDER — LACTOBACILLUS ACIDOPHILUS/PECT 75 MM-100
1 CAPSULE ORAL
Qty: 90 | Refills: 0
Start: 2025-04-06 | End: 2025-05-05

## 2025-04-06 RX ORDER — B1/B2/B3/B5/B6/B12/VIT C/FOLIC 500-0.5 MG
1 TABLET ORAL
Qty: 0 | Refills: 0 | DISCHARGE
Start: 2025-04-06

## 2025-04-06 RX ORDER — SIMETHICONE 80 MG
1 TABLET,CHEWABLE ORAL
Qty: 40 | Refills: 0
Start: 2025-04-06 | End: 2025-04-15

## 2025-04-06 RX ADMIN — Medication 1 TABLET(S): at 08:39

## 2025-04-06 RX ADMIN — METOPROLOL SUCCINATE 12.5 MILLIGRAM(S): 50 TABLET, EXTENDED RELEASE ORAL at 05:53

## 2025-04-06 RX ADMIN — Medication 40 MILLIGRAM(S): at 05:53

## 2025-04-06 RX ADMIN — Medication 650 MILLIGRAM(S): at 09:35

## 2025-04-06 RX ADMIN — CLOPIDOGREL BISULFATE 75 MILLIGRAM(S): 75 TABLET, FILM COATED ORAL at 11:53

## 2025-04-06 RX ADMIN — Medication 1 GRAM(S): at 11:53

## 2025-04-06 RX ADMIN — Medication 1 TABLET(S): at 11:54

## 2025-04-06 RX ADMIN — Medication 650 MILLIGRAM(S): at 03:08

## 2025-04-06 RX ADMIN — DEXTROMETHORPHAN HBR, GUAIFENESIN 600 MILLIGRAM(S): 200 LIQUID ORAL at 09:32

## 2025-04-06 RX ADMIN — Medication 1 GRAM(S): at 05:53

## 2025-04-06 NOTE — PROGRESS NOTE ADULT - SUBJECTIVE AND OBJECTIVE BOX
Patient is a 59y old  Male who presents with a chief complaint of Abdominal pain and dizziness (2025 14:47)    INTERVAL HPI/OVERNIGHT EVENTS: Patient was examined and seen at bedside. This morning pt is resting comfortably in bed and reports feeling better overall. Decreased AP. Had 1 semiformed BM last night. Tolerating regular diet. No other complaints.  ROS: Denies CP, SOB  All other systems reviewed and are within normal limits.  InitialHPI:  58 yo M with pmhx of HTN, HLD, CAD s/p CABG (Perez 10, 2024), pAfib (on plavix), former smoker (30 pack years) presents to ED s/p intolerance PO antibiotic therapy (cipro and uriqqos68 days) for recent diagnosis of colitis c/b new onset light headedness and [id number] hours. Pt states that they had the flu early March, managed with 7 day course of antibiotic he cannot recall, where diffuse, dull aching abdominal pain began, predominantly in the right upper and lower quadrants that occurred intermittently. Shortly after patient develop right eye preseptal cellulitis which he received another antibiotic course for 10 days, during which he developed diarrhea and pre-existing abdominal became constant. Baseline BMs are daily and well formed, however during this time patient was having as many as 3-4 watery BMs per day. Associated with these symptoms was nausea and the urge to vomit, but no vomiting or dry heaving. Pt states for the past month he has lost appetite, his ability to tolerate solid foods has declined where he avoids solids as they exacerbate the diffuse abdominal pain. No relief with Pepto Bismol. Since Monday patient has been tolerating liquid diet.  Abdominal pain is baseline 2/10, max 4/10. Pt came to ED , CTAP notable for submucosal fat in descending colon, chronic colitis related changes, discharged with 10 day course of cipro and flagyl. Patient was taking medications but states in the past 48 hours he developed weakness, and since this morning has had lightheadedness, which prompted him to come to ED.  Reports 25lbs in 3weeks  Pt states he saw a gastroenterologist (unable to recall name, at [facility]?) who recommended EGD and colonoscopy in 1 month, OP follow up.     Endorses having a low fiber diet, poor water intake, but is conscious about fat and fried food intake; eats pizza and chips frequently though.   Occasionally smoke marijuana and social drinker.     (-) heartburn, odynophagia, dysphagia, melena, hematochezia, mucoid stools, hematuria, hx of colicky pain/episodes, recent travel, chest pain, SOB, orthopnea  (+) increased burping for 1 month, subjective fever, chills, night sweats, joint pain (b/l hips, knees) x 2wks, pet turtle at home    Vitals: T98.2 F, HR 84, /72, RR 18, SpO2 99% on RA.     Labs: Hgb 12.2 (no baseline), MCV 78.8, Na 134,   UA negative; trace blood and ketones.     EKG: NSR    Imagin/01 CTAP- Submucosal fat within the descending colon is noted which may be related to previous episodes of colitis. (866-52). Similar changes within the ascending colon also present 3.8 cm right common iliac artery aneurysm. 4.5 cm hiatal hernia.    S/p pepcid, carafate, and fluids in ED.    Admitted to medicine for further management.  (2025 22:24)    PAST MEDICAL & SURGICAL HISTORY:      General: NAD, AAO3  HEENT:  EOMI, no LAD  CV: S1 S2  Resp: decreased breath sounds at bases  GI: NT/ND/S +BS, obese  MS: no clubbing/cyanosis/edema, + pulses b/l  Neuro: nonfocal, +reflexes thruout            Home Medications:  clopidogrel 75 mg oral tablet: 1 tab(s) orally once a day (2025 00:42)  Lipitor 80 mg oral tablet: 1 tab(s) orally once a day (2025 00:42)  Metoprolol Tartrate 25 mg oral tablet: 0.5 tab(s) orally 2 times a day (2025 00:42)  Multiple Vitamins oral tablet, chewable: 1 tab(s) orally once a day (2025 12:34)  omeprazole 40 mg oral delayed release capsule: 1 cap(s) orally once a day (2025 00:41)  Pepcid 20 mg oral tablet: 1 tab(s) orally once a day (2025 00:42)    MEDICATIONS  (STANDING):  atorvastatin 80 milliGRAM(s) Oral at bedtime  clopidogrel Tablet 75 milliGRAM(s) Oral daily  enoxaparin Injectable 40 milliGRAM(s) SubCutaneous every 24 hours  famotidine    Tablet 20 milliGRAM(s) Oral two times a day  lactobacillus acidophilus 1 Tablet(s) Oral three times a day with meals  melatonin 3 milliGRAM(s) Oral at bedtime  metoprolol tartrate 12.5 milliGRAM(s) Oral two times a day  multivitamin  Chewable 1 Tablet(s) Oral daily  pantoprazole  Injectable 40 milliGRAM(s) IV Push every 12 hours  sucralfate suspension 1 Gram(s) Oral four times a day    MEDICATIONS  (PRN):  acetaminophen     Tablet .. 650 milliGRAM(s) Oral every 6 hours PRN Mild Pain (1 - 3)  ondansetron Injectable 4 milliGRAM(s) IV Push every 6 hours PRN Nausea and/or Vomiting  simethicone 80 milliGRAM(s) Chew every 6 hours PRN Gas    Vital Signs Last 24 Hrs  T(C): 37 (2025 04:40), Max: 37.2 (2025 13:30)  T(F): 98.6 (2025 04:40), Max: 99 (2025 13:30)  HR: 80 (2025 04:40) (80 - 96)  BP: 113/71 (2025 04:40) (107/68 - 116/73)  BP(mean): 85 (2025 04:40) (84 - 85)  RR: 18 (2025 04:40) (18 - 18)  SpO2: 97% (2025 04:40) (97% - 99%)    Parameters below as of 2025 04:40  Patient On (Oxygen Delivery Method): room air      CAPILLARY BLOOD GLUCOSE        LABS:                            Culture - Stool (collected 2025 11:40)  Source: Stool Feces  Preliminary Report (2025 22:04):    No enteric pathogens to date: Final culture pending    No enteric gram negative rods isolated    Moderate Yeast like cells      Consultant Notes Reviewed:  [x ] YES  [ ] NO  Care Discussed with Consultants/Other Providers/ Housestaff [ x] YES  [ ] NO  Radiology, labs, EKGs, new studies personally reviewed.                                                                
[facility]      SUBJECTIVE/OVERNIGHT EVENTS  Today is hospital day 1d. Patient was seen and examined today at bedside. No acute events overnight     HOSPITAL COURSE  Day 1:   Day 2:   Day 3:     CODE STATUS:      MEDICATIONS  STANDING MEDICATIONS  atorvastatin 80 milliGRAM(s) Oral at bedtime  clopidogrel Tablet 75 milliGRAM(s) Oral daily  enoxaparin Injectable 40 milliGRAM(s) SubCutaneous every 24 hours  famotidine    Tablet 20 milliGRAM(s) Oral two times a day  lactobacillus acidophilus 1 Tablet(s) Oral three times a day with meals  melatonin 3 milliGRAM(s) Oral at bedtime  metoprolol tartrate 12.5 milliGRAM(s) Oral two times a day  multivitamin  Chewable 1 Tablet(s) Oral daily  pantoprazole  Injectable 40 milliGRAM(s) IV Push every 12 hours  sucralfate suspension 1 Gram(s) Oral four times a day    PRN MEDICATIONS  acetaminophen     Tablet .. 650 milliGRAM(s) Oral every 6 hours PRN  ondansetron Injectable 4 milliGRAM(s) IV Push every 6 hours PRN    VITALS  T(F): 98.4 (04-04-25 @ 13:01), Max: 98.4 (04-03-25 @ 20:52)  HR: 65 (04-04-25 @ 13:01) (65 - 84)  BP: 125/79 (04-04-25 @ 13:01) (96/51 - 125/79)  RR: 18 (04-04-25 @ 13:01) (18 - 18)  SpO2: 99% (04-04-25 @ 13:01) (97% - 99%)  POCT Blood Glucose.: 104 mg/dL (04-03-25 @ 16:39)        PHYSICAL EXAM:  GENERAL: NAD, lying in bed comfortably  HEAD:  Atraumatic, Normocephalic  EYES: EOMI, PERRLA, conjunctiva and sclera clear  ENT: Moist mucous membranes  NECK: Supple, No JVD  CHEST/LUNG: Clear to auscultation bilaterally; No rales, rhonchi, wheezing, or rubs. Unlabored respirations  HEART: Regular rate and rhythm; No murmurs, rubs, or gallops  ABDOMEN: Bowel sounds present; Soft, Nontender, Nondistended. No hepatomegaly  EXTREMITIES:  2+ Peripheral Pulses, brisk capillary refill. No clubbing, cyanosis, or edema  NERVOUS SYSTEM:  Alert & Oriented X3, speech clear. No deficits   MSK: FROM all 4 extremities, full and equal strength  SKIN: No rashes or lesions      LABS             12.2   5.83  )-----------( 262      ( 04-03-25 @ 17:48 )             37.1     135  |  102  |  12  -------------------------<  78   04-04-25 @ 06:01  4.1  |  21  |  1.0    Ca      8.4     04-04-25 @ 06:01  Mg     1.9     04-04-25 @ 06:01    TPro  5.8  /  Alb  3.3  /  TBili  0.7  /  DBili  0.2  /  AST  18  /  ALT  9   /  AlkPhos  162  /  GGT  x     04-04-25 @ 06:01      Troponin T, High Sensitivity Result: 11 ng/L (04-03-25 @ 19:28)    Urinalysis Basic - ( 04 Apr 2025 06:01 )    Color: x / Appearance: x / SG: x / pH: x  Gluc: 78 mg/dL / Ketone: x  / Bili: x / Urobili: x   Blood: x / Protein: x / Nitrite: x   Leuk Esterase: x / RBC: x / WBC x   Sq Epi: x / Non Sq Epi: x / Bacteria: x          Urinalysis with Rflx Culture (collected 01 Apr 2025 22:31)      IMAGING
Patient is a 59y old  Male who presents with a chief complaint of Abdominal pain and dizziness (2025 14:47)    INTERVAL HPI/OVERNIGHT EVENTS: Patient was examined and seen at bedside. This morning pt is resting comfortably in bed and reports feeling better overall. Decreased AP. Had 1lose BM last night. Feels "gassy". No other complaints.  ROS: Denies CP, SOB  All other systems reviewed and are within normal limits.  InitialHPI:  58 yo M with pmhx of HTN, HLD, CAD s/p CABG (Perez 10, 2024), pAfib (on plavix), former smoker (30 pack years) presents to ED s/p intolerance PO antibiotic therapy (cipro and [id number] days) for recent diagnosis of colitis c/b new onset light headedness and [id number] hours. Pt states that they had the flu early March, managed with 7 day course of antibiotic he cannot recall, where diffuse, dull aching abdominal pain began, predominantly in the right upper and lower quadrants that occurred intermittently. Shortly after patient develop right eye preseptal cellulitis which he received another antibiotic course for 10 days, during which he developed diarrhea and pre-existing abdominal became constant. Baseline BMs are daily and well formed, however during this time patient was having as many as 3-4 watery BMs per day. Associated with these symptoms was nausea and the urge to vomit, but no vomiting or dry heaving. Pt states for the past month he has lost appetite, his ability to tolerate solid foods has declined where he avoids solids as they exacerbate the diffuse abdominal pain. No relief with Pepto Bismol. Since Monday patient has been tolerating liquid diet.  Abdominal pain is baseline 2/10, max 4/10. Pt came to ED , CTAP notable for submucosal fat in descending colon, chronic colitis related changes, discharged with 10 day course of cipro and flagyl. Patient was taking medications but states in the past 48 hours he developed weakness, and since this morning has had lightheadedness, which prompted him to come to ED.  Reports 25lbs in 3weeks  Pt states he saw a gastroenterologist (unable to recall name, at [facility]?) who recommended EGD and colonoscopy in 1 month, OP follow up.     Endorses having a low fiber diet, poor water intake, but is conscious about fat and fried food intake; eats pizza and chips frequently though.   Occasionally smoke marijuana and social drinker.     (-) heartburn, odynophagia, dysphagia, melena, hematochezia, mucoid stools, hematuria, hx of colicky pain/episodes, recent travel, chest pain, SOB, orthopnea  (+) increased burping for 1 month, subjective fever, chills, night sweats, joint pain (b/l hips, knees) x 2wks, pet turtle at home    Vitals: T98.2 F, HR 84, /72, RR 18, SpO2 99% on RA.     Labs: Hgb 12.2 (no baseline), MCV 78.8, Na 134,   UA negative; trace blood and ketones.     EKG: NSR    Imagin/01 CTAP- Submucosal fat within the descending colon is noted which may be related to previous episodes of colitis. (149-81). Similar changes within the ascending colon also present 3.8 cm right common iliac artery aneurysm. 4.5 cm hiatal hernia.    S/p pepcid, carafate, and fluids in ED.    Admitted to medicine for further management.  (2025 22:24)    PAST MEDICAL & SURGICAL HISTORY:      General: NAD, AAO3  HEENT:  EOMI, no LAD  CV: S1 S2  Resp: decreased breath sounds at bases  GI: NT/ND/S +BS, obese  MS: no clubbing/cyanosis/edema, + pulses b/l  Neuro: nonfocal, +reflexes thruout          Home Medications:  clopidogrel 75 mg oral tablet: 1 tab(s) orally once a day (2025 00:42)  Lipitor 80 mg oral tablet: 1 tab(s) orally once a day (2025 00:42)  Metoprolol Tartrate 25 mg oral tablet: 0.5 tab(s) orally 2 times a day (2025 00:42)  omeprazole 40 mg oral delayed release capsule: 1 cap(s) orally once a day (2025 00:41)  Pepcid 20 mg oral tablet: 1 tab(s) orally once a day (2025 00:42)    MEDICATIONS  (STANDING):  atorvastatin 80 milliGRAM(s) Oral at bedtime  clopidogrel Tablet 75 milliGRAM(s) Oral daily  enoxaparin Injectable 40 milliGRAM(s) SubCutaneous every 24 hours  famotidine    Tablet 20 milliGRAM(s) Oral two times a day  lactobacillus acidophilus 1 Tablet(s) Oral three times a day with meals  melatonin 3 milliGRAM(s) Oral at bedtime  metoprolol tartrate 12.5 milliGRAM(s) Oral two times a day  multivitamin  Chewable 1 Tablet(s) Oral daily  pantoprazole  Injectable 40 milliGRAM(s) IV Push every 12 hours  sucralfate suspension 1 Gram(s) Oral four times a day    MEDICATIONS  (PRN):  acetaminophen     Tablet .. 650 milliGRAM(s) Oral every 6 hours PRN Mild Pain (1 - 3)  ondansetron Injectable 4 milliGRAM(s) IV Push every 6 hours PRN Nausea and/or Vomiting  simethicone 80 milliGRAM(s) Chew every 6 hours PRN Gas    Vital Signs Last 24 Hrs  T(C): 36.6 (2025 05:01), Max: 36.9 (2025 13:01)  T(F): 97.8 (2025 05:01), Max: 98.4 (2025 13:01)  HR: 78 (2025 05:01) (65 - 114)  BP: 115/70 (2025 05:01) (114/69 - 125/79)  BP(mean): 85 (2025 05:01) (84 - 95)  RR: 18 (2025 05:01) (16 - 18)  SpO2: 99% (2025 05:01) (99% - 100%)    Parameters below as of 2025 05:01  Patient On (Oxygen Delivery Method): room air      CAPILLARY BLOOD GLUCOSE        LABS:                        12.2   5.83  )-----------( 262      ( 2025 17:48 )             37.1     04-04    135  |  102  |  12  ----------------------------<  78  4.1   |  21  |  1.0    Ca    8.4      2025 06:01  Mg     1.9     04-04    TPro  5.8[L]  /  Alb  3.3[L]  /  TBili  0.7  /  DBili  0.2  /  AST  18  /  ALT  9   /  AlkPhos  162[H]  04-04    LIVER FUNCTIONS - ( 2025 06:01 )  Alb: 3.3 g/dL / Pro: 5.8 g/dL / ALK PHOS: 162 U/L / ALT: 9 U/L / AST: 18 U/L / GGT: x                 Urinalysis Basic - ( 2025 06:01 )    Color: x / Appearance: x / SG: x / pH: x  Gluc: 78 mg/dL / Ketone: x  / Bili: x / Urobili: x   Blood: x / Protein: x / Nitrite: x   Leuk Esterase: x / RBC: x / WBC x   Sq Epi: x / Non Sq Epi: x / Bacteria: x              Consultant Notes Reviewed:  [x ] YES  [ ] NO  Care Discussed with Consultants/Other Providers/ Housestaff [ x] YES  [ ] NO  Radiology, labs, EKGs, new studies personally reviewed.                                                    
[facility]      SUBJECTIVE/OVERNIGHT EVENTS  Today is hospital day 2d. Patient was seen and examined today at bedside. No acute events overnight     HOSPITAL COURSE  Day 1:   Day 2:   Day 3:     CODE STATUS:      MEDICATIONS  STANDING MEDICATIONS  atorvastatin 80 milliGRAM(s) Oral at bedtime  clopidogrel Tablet 75 milliGRAM(s) Oral daily  enoxaparin Injectable 40 milliGRAM(s) SubCutaneous every 24 hours  famotidine    Tablet 20 milliGRAM(s) Oral two times a day  lactobacillus acidophilus 1 Tablet(s) Oral three times a day with meals  melatonin 3 milliGRAM(s) Oral at bedtime  metoprolol tartrate 12.5 milliGRAM(s) Oral two times a day  multivitamin  Chewable 1 Tablet(s) Oral daily  pantoprazole  Injectable 40 milliGRAM(s) IV Push every 12 hours  sucralfate suspension 1 Gram(s) Oral four times a day    PRN MEDICATIONS  acetaminophen     Tablet .. 650 milliGRAM(s) Oral every 6 hours PRN  ondansetron Injectable 4 milliGRAM(s) IV Push every 6 hours PRN  simethicone 80 milliGRAM(s) Chew every 6 hours PRN    VITALS  T(F): 97.8 (04-05-25 @ 05:01), Max: 98.4 (04-04-25 @ 13:01)  HR: 78 (04-05-25 @ 05:01) (65 - 114)  BP: 115/70 (04-05-25 @ 05:01) (114/69 - 125/79)  RR: 18 (04-05-25 @ 05:01) (16 - 18)  SpO2: 99% (04-05-25 @ 05:01) (99% - 100%)        PHYSICAL EXAM:  GENERAL: NAD, lying in bed comfortably  HEAD:  Atraumatic, Normocephalic  EYES: EOMI, PERRLA, conjunctiva and sclera clear  ENT: Moist mucous membranes  NECK: Supple, No JVD  CHEST/LUNG: Clear to auscultation bilaterally; No rales, rhonchi, wheezing, or rubs. Unlabored respirations  HEART: Regular rate and rhythm; No murmurs, rubs, or gallops  ABDOMEN: Bowel sounds present; Soft, Nontender, Nondistended. No hepatomegaly  EXTREMITIES:  2+ Peripheral Pulses, brisk capillary refill. No clubbing, cyanosis, or edema  NERVOUS SYSTEM:  Alert & Oriented X3, speech clear. No deficits   MSK: FROM all 4 extremities, full and equal strength  SKIN: No rashes or lesions      LABS             12.2   5.83  )-----------( 262      ( 04-03-25 @ 17:48 )             37.1     135  |  102  |  12  -------------------------<  78   04-04-25 @ 06:01  4.1  |  21  |  1.0    Ca      8.4     04-04-25 @ 06:01  Mg     1.9     04-04-25 @ 06:01    TPro  5.8  /  Alb  3.3  /  TBili  0.7  /  DBili  0.2  /  AST  18  /  ALT  9   /  AlkPhos  162  /  GGT  x     04-04-25 @ 06:01      Troponin T, High Sensitivity Result: 11 ng/L (04-03-25 @ 19:28)    Urinalysis Basic - ( 04 Apr 2025 06:01 )    Color: x / Appearance: x / SG: x / pH: x  Gluc: 78 mg/dL / Ketone: x  / Bili: x / Urobili: x   Blood: x / Protein: x / Nitrite: x   Leuk Esterase: x / RBC: x / WBC x   Sq Epi: x / Non Sq Epi: x / Bacteria: x          IMAGING
Patient is a 59y old  Male who presents with a chief complaint of Abdominal pain and dizziness (2025 14:47)    INTERVAL HPI/OVERNIGHT EVENTS: Patient was examined and seen at bedside. This morning pt is resting comfortably in bed and reports feeling better overall. Decreased AP. Pain is mostly sub-epigastric. 2-3 lose BMs in 24 hrs. Reports 25lbs in 3weeks  No other complaints.  ROS: Denies CP, SOB  All other systems reviewed and are within normal limits.  InitialHPI:  60 yo M with pmhx of HTN, HLD, CAD s/p CABG (Perez 10, 2024), pAfib (on plavix), former smoker (30 pack years) presents to ED s/p intolerance PO antibiotic therapy (cipro and [id number] days) for recent diagnosis of colitis c/b new onset light headedness and [id number] hours. Pt states that they had the flu early March, managed with 7 day course of antibiotic he cannot recall, where diffuse, dull aching abdominal pain began, predominantly in the right upper and lower quadrants that occurred intermittently. Shortly after patient develop right eye preseptal cellulitis which he received another antibiotic course for 10 days, during which he developed diarrhea and pre-existing abdominal became constant. Baseline BMs are daily and well formed, however during this time patient was having as many as 3-4 watery BMs per day. Associated with these symptoms was nausea and the urge to vomit, but no vomiting or dry heaving. Pt states for the past month he has lost appetite, his ability to tolerate solid foods has declined where he avoids solids as they exacerbate the diffuse abdominal pain. No relief with Pepto Bismol. Since Monday patient has been tolerating liquid diet.  Abdominal pain is baseline 2/10, max 410. Pt came to ED , CTAP notable for submucosal fat in descending colon, chronic colitis related changes, discharged with 10 day course of cipro and flagyl. Patient was taking medications but states in the past 48 hours he developed weakness, and since this morning has had lightheadedness, which prompted him to come to ED.   Pt states he saw a gastroenterologist (unable to recall name, at [facility]?) who recommended EGD and colonoscopy in 1 month, OP follow up.     Endorses having a low fiber diet, poor water intake, but is conscious about fat and fried food intake; eats pizza and chips frequently though.   Occasionally smoke marijuana and social drinker.     (-) heartburn, odynophagia, dysphagia, melena, hematochezia, mucoid stools, hematuria, hx of colicky pain/episodes, recent travel, chest pain, SOB, orthopnea  (+) increased burping for 1 month, subjective fever, chills, night sweats, joint pain (b/l hips, knees) x 2wks, pet turtle at home    Vitals: T98.2 F, HR 84, /72, RR 18, SpO2 99% on RA.     Labs: Hgb 12.2 (no baseline), MCV 78.8, Na 134,   UA negative; trace blood and ketones.     EKG: NSR    Imagin/01 CTAP- Submucosal fat within the descending colon is noted which may be related to previous episodes of colitis. (891-99). Similar changes within the ascending colon also present 3.8 cm right common iliac artery aneurysm. 4.5 cm hiatal hernia.    S/p pepcid, carafate, and fluids in ED.    Admitted to medicine for further management.  (2025 22:24)    PAST MEDICAL & SURGICAL HISTORY:      General: NAD, AAO3  HEENT:  EOMI, no LAD  CV: S1 S2  Resp: decreased breath sounds at bases  GI: NT/ND/S +BS  MS: no clubbing/cyanosis/edema, + pulses b/l  Neuro: nonfocal, +reflexes thruout    MEDICATIONS  (STANDING):  atorvastatin 80 milliGRAM(s) Oral at bedtime  clopidogrel Tablet 75 milliGRAM(s) Oral daily  enoxaparin Injectable 40 milliGRAM(s) SubCutaneous every 24 hours  famotidine    Tablet 20 milliGRAM(s) Oral two times a day  lactobacillus acidophilus 1 Tablet(s) Oral three times a day with meals  melatonin 3 milliGRAM(s) Oral at bedtime  metoprolol tartrate 12.5 milliGRAM(s) Oral two times a day  multivitamin  Chewable 1 Tablet(s) Oral daily  pantoprazole  Injectable 40 milliGRAM(s) IV Push every 12 hours  sucralfate suspension 1 Gram(s) Oral four times a day    MEDICATIONS  (PRN):  acetaminophen     Tablet .. 650 milliGRAM(s) Oral every 6 hours PRN Mild Pain (1 - 3)  ondansetron Injectable 4 milliGRAM(s) IV Push every 6 hours PRN Nausea and/or Vomiting    Vital Signs Last 24 Hrs  T(C): 36.9 (2025 13:01), Max: 36.9 (2025 20:52)  T(F): 98.4 (2025 13:01), Max: 98.4 (2025 20:52)  HR: 65 (:) (65 - 84)  BP: 125/79 (:) (96/51 - 125/79)  BP(mean): 95 (:) (81 - 95)  RR: 18 (:) (18 - 18)  SpO2: 99% (:) (97% - 99%)    Parameters below as of 2025 13:  Patient On (Oxygen Delivery Method): room air      CAPILLARY BLOOD GLUCOSE      POCT Blood Glucose.: 104 mg/dL (2025 16:39)                          12.2   5.83  )-----------( 262      ( 2025 17:48 )             37.1     04-04    135  |  102  |  12  ----------------------------<  78  4.1   |  21  |  1.0    Ca    8.4      2025 06:01  Mg     1.9     04-04    TPro  5.8[L]  /  Alb  3.3[L]  /  TBili  0.7  /  DBili  0.2  /  AST  18  /  ALT  9   /  AlkPhos  162[H]  04-04    LIVER FUNCTIONS - ( 2025 06:01 )  Alb: 3.3 g/dL / Pro: 5.8 g/dL / ALK PHOS: 162 U/L / ALT: 9 U/L / AST: 18 U/L / GGT: x                 Urinalysis Basic - ( 2025 06:01 )    Color: x / Appearance: x / SG: x / pH: x  Gluc: 78 mg/dL / Ketone: x  / Bili: x / Urobili: x   Blood: x / Protein: x / Nitrite: x   Leuk Esterase: x / RBC: x / WBC x   Sq Epi: x / Non Sq Epi: x / Bacteria: x              Urinalysis with Rflx Culture (collected 2025 22:31)      Chart, Consultant(s) Notes Reviewed:  [x ] YES  [ ] NO  Care Discussed with Consultants/Other Providers/ Housestaff [ x] YES  [ ] NO  Radiology, labs, old available records personally reviewed.

## 2025-04-06 NOTE — PROGRESS NOTE ADULT - REASON FOR ADMISSION
Abdominal pain and dizziness

## 2025-04-06 NOTE — PROGRESS NOTE ADULT - ASSESSMENT
58 yo M with pmhx of HTN, HLD, CAD s/p CABG (Perez 10, 2024), pAfib (on plavix), former smoker (30 pack years) presents to ED s/p intolerance PO antibiotic therapy (cipro and [id number] days) for recent diagnosis of colitis c/b new onset light headedness and [id number] hours. Pt states that they had the flu early March, managed with 7 day course of antibiotic he cannot recall, where diffuse, dull aching abdominal pain began, predominantly in the right upper and lower quadrants that occurred intermittently. Shortly after patient develop right eye preseptal cellulitis which he received another antibiotic course for 10 days, during which he developed diarrhea and pre-existing abdominal became constant. Baseline BMs are daily and well formed, however during this time patient was having as many as 3-4 watery BMs per day. Associated with these symptoms was nausea and the urge to vomit, but no vomiting or dry heaving. Pt states for the past month he has lost appetite, his ability to tolerate solid foods has declined where he avoids solids as they exacerbate the diffuse abdominal pain. No relief with Pepto Bismol. Since Monday patient has been tolerating liquid diet.  Abdominal pain is baseline 2/10, max 4/10. Pt came to ED 04/01, CTAP notable for submucosal fat in descending colon, chronic colitis related changes, discharged with 10 day course of cipro and flagyl. Patient was taking medications but states in the past 48 hours he developed weakness, and since this morning has had lightheadedness, which prompted him to come to ED.   Pt states he saw a gastroenterologist (unable to recall name, at [facility]?) who recommended EGD and colonoscopy in 1 month, OP follow up.     Endorses having a low fiber diet, poor water intake, but is conscious about fat and fried food intake; eats pizza and chips frequently though.   Occasionally smoke marijuana and social drinker.     (-) heartburn, odynophagia, dysphagia, melena, hematochezia, mucoid stools, hematuria, hx of colicky pain/episodes, recent travel, chest pain, SOB, orthopnea  (+) increased burping for 1 month, subjective fever, chills, night sweats, joint pain (b/l hips, knees) x 2wks, pet turtle at home    #Nausea/Vomiting/Diarrhea/Weight Loss  #Subacute Colitis in setting of antibiotic use vs progression of pre-existing colitis  #Normocytic anemia  -One month history of watery diarrhea and diffuse abdominal pain, exacerbated with meal intake, in the setting of prolonged and multiple courses of antibiotic use  -Pt diagnosed with colitis 04/01 in ED, sent with 10 day course of cipro and flagyl, new onset lightheadedness and weakness  -Vitals stable  -Labs Hgb 12.2 (no baseline), MCV 78.8, Na 134,   -CTAP 04/01: Submucosal fat within the descending colon is noted which may be related to previous episodes of colitis. (601-65). Similar changes within the ascending colon also present 3.8 cm right common iliac artery aneurysm. 4.5 cm hiatal hernia.  -S/p fluids, carafate, and pepcid in ED.  -Physical exam is negative for abdominal tenderness on palpation, pt endorses slight discomfort in RUQ, Vaughan's negative, no guarding or rigidity   Plan  -Monitor off abx  -f/u lactoferrin, calprotectin, GI PCR NG, stool culture, C. dif NG  -Anemia likely due to poor PO intake, f/u AM iron studies, B12, folate  -Liquid diet, advance as tolerated to soft GI  -Protonix IV BID, Carafate  -Zofran PRN  -Pt has OP GI eval in 1 month for EGD and colonoscopy  -Robaxin POx 1 dose for back pain  -Last ED visit referred to vascular OP follow up for common iliac artery aneurysm     #HTN  #HLD  #CAD s/p CABG (01/10/24)  #H/o pAfib (on plavix). No longer on A/c  -C/w home meds    DVT PPX: plavix  GI PPX: pepcid  DIET: liquid  ACTIVITY: IAT  CODE STATUS: FULL  DISPOSITION: Acute    #Progress Note Handoff  Pending: Clinical improvement and stability__x___Tolerance of diet  Pt/Family discussion: Pt/family informed and agree with the current plan  Disposition: Home_    My note supersedes the residents note should a discrepancy arise.    Chart and notes personally reviewed.  Care Discussed with Consultants/Other Providers/ Housestaff [ x] YES [ ] NO   Radiology, labs, old records personally reviewed.    discussed w/ housestaff, nursing, case management    Time-based billing (NON-critical care).     50 minutes spent on total encounter. The necessity of the time spent during the encounter on this date of service was due to:     time spent on review of labs, imaging studies, old records, obtaining history, personally examining patient, counselling and communicating with patient/ family, entering orders for medications/tests/etc, discussions with other health care providers, documentation in electronic health records, independent interpretation of labs, imaging/procedure results and care coordination.    
58 yo M with pmhx of HTN, HLD, CAD s/p CABG (Perez 10, 2024), pAfib (on plavix), former smoker (30 pack years) presents to ED s/p intolerance PO antibiotic therapy (cipro and [id number] days) for recent diagnosis of colitis c/b new onset light headedness and [id number] hours. Pt states that they had the flu early March, managed with 7 day course of antibiotic he cannot recall, where diffuse, dull aching abdominal pain began, predominantly in the right upper and lower quadrants that occurred intermittently. Shortly after patient develop right eye preseptal cellulitis which he received another antibiotic course for 10 days, during which he developed diarrhea and pre-existing abdominal became constant. Baseline BMs are daily and well formed, however during this time patient was having as many as 3-4 watery BMs per day. Associated with these symptoms was nausea and the urge to vomit, but no vomiting or dry heaving. Pt states for the past month he has lost appetite, his ability to tolerate solid foods has declined where he avoids solids as they exacerbate the diffuse abdominal pain. No relief with Pepto Bismol. Since Monday patient has been tolerating liquid diet.  Abdominal pain is baseline 2/10, max 4/10. Pt came to ED 04/01, CTAP notable for submucosal fat in descending colon, chronic colitis related changes, discharged with 10 day course of cipro and flagyl. Patient was taking medications but states in the past 48 hours he developed weakness, and since this morning has had lightheadedness, which prompted him to come to ED.   Pt states he saw a gastroenterologist (unable to recall name, at [facility]?) who recommended EGD and colonoscopy in 1 month, OP follow up.     Endorses having a low fiber diet, poor water intake, but is conscious about fat and fried food intake; eats pizza and chips frequently though.   Occasionally smoke marijuana and social drinker.     (-) heartburn, odynophagia, dysphagia, melena, hematochezia, mucoid stools, hematuria, hx of colicky pain/episodes, recent travel, chest pain, SOB, orthopnea  (+) increased burping for 1 month, subjective fever, chills, night sweats, joint pain (b/l hips, knees) x 2wks, pet turtle at home    #Nausea/Vomiting/Diarrhea/Weight Loss  #Subacute Colitis in setting of antibiotic use vs progression of pre-existing colitis  #Normocytic anemia  -One month history of watery diarrhea and diffuse abdominal pain, exacerbated with meal intake, in the setting of prolonged and multiple courses of antibiotic use  -Pt diagnosed with colitis 04/01 in ED, sent with 10 day course of cipro and flagyl, new onset lightheadedness and weakness  -Vitals stable  -Labs Hgb 12.2 (no baseline), MCV 78.8, Na 134,   -CTAP 04/01: Submucosal fat within the descending colon is noted which may be related to previous episodes of colitis. (601-65). Similar changes within the ascending colon also present 3.8 cm right common iliac artery aneurysm. 4.5 cm hiatal hernia.  -S/p fluids, carafate, and pepcid in ED.  -Physical exam is negative for abdominal tenderness on palpation, pt endorses slight discomfort in RUQ, Vaughan's negative, no guarding or rigidity   Plan  -Monitor off abx  -GI PCR, lactoferrin, calprotectin, GI PCR, stool culture, C. dif  -Anemia likely due to poor PO intake, f/u AM iron studies, B12, folate  -Liquid diet, advance as tolerated  -Protonix IV BID, Carafate  -Zofran PRN  -Pt has OP GI eval in 1 month for EGD and colonoscopy  -Robaxin POx 1 dose for back pain  -Last ED visit referred to vascular OP follow up for common iliac artery aneurysm     #HTN  #HLD  #CAD s/p CABG (01/10/24)  #H/o pAfib (on plavix). No longer on A/c  -C/w home meds    DVT PPX: plavix  GI PPX: pepcid  DIET: liquid  ACTIVITY: IAT  CODE STATUS: FULL  DISPOSITION: Acute    #Progress Note Handoff  Pending: Clinical improvement and stability__x___Tolerance of diet  Pt/Family discussion: Pt/family informed and agree with the current plan  Disposition: Home_    My note supersedes the residents note should a discrepancy arise.    Chart and notes personally reviewed.  Care Discussed with Consultants/Other Providers/ Housestaff [ x] YES [ ] NO   Radiology, labs, old records personally reviewed.    discussed w/ housestaff, nursing, case management    Attestation Statements:    Attestation Statements:  Risk Statement (NON-critical care).     On this date of service, level of risk to patient is considered: High.     Due to: pt with illness causing risk to life or bodily fxn    Time-based billing (NON-critical care).     50 minutes spent on total encounter. The necessity of the time spent during the encounter on this date of service was due to:     time spent on review of labs, imaging studies, old records, obtaining history, personally examining patient, counselling and communicating with patient/ family, entering orders for medications/tests/etc, discussions with other health care providers, documentation in electronic health records, independent interpretation of labs, imaging/procedure results and care coordination.    
  #Subacute Colitis in setting of antibiotic use vs progression of pre-existing colitis  #Normocytic anemia  -One month history of watery diarrhea and diffuse abdominal pain, exacerbated with meal intake, in the setting of prolonged and multiple courses of antibiotic use  -Pt diagnosed with colitis 04/01 in ED, sent with 10 day course of cipro and flagyl, new onset lightheadedness and weakness  -Vitals stable  -Labs Hgb 12.2 (no baseline), MCV 78.8, Na 134,   -CTAP 04/01: Submucosal fat within the descending colon is noted which may be related to previous episodes of colitis. (601-65). Similar changes within the ascending colon also present 3.8 cm right common iliac artery aneurysm. 4.5 cm hiatal hernia.  -S/p fluids, carafate, and pepcid in ED.  -Physical exam is negative for abdominal tenderness on palpation, pt endorses slight discomfort in RUQ, Vaughan's negative, no guarding or rigidity   Plan  -Monitor off abx  -GI PCR---->negative   -Anemia likely due to poor PO intake, f/u AM iron studies, B12, folate  -Liquid diet, advance as tolerated  -Pepcid  -Zofran PRN  -Pt has OP GI eval in 1 month for EGD and colonoscopy  -Robaxin POx 1 dose for back pain  -Last ED visit referred to vascular OP follow up for common iliac artery aneurysm     #HTN  #HLD  #CAD s/p CABG (01/10/24)  #pAfib (on plavix)  -C/w home meds    DVT PPX: plavix  GI PPX: pepcid  DIET: liquid  ACTIVITY: IAT  CODE STATUS: FULL  DISPOSITION: Acute  
  #Subacute Colitis in setting of antibiotic use vs progression of pre-existing colitis  #Normocytic anemia  -One month history of watery diarrhea and diffuse abdominal pain, exacerbated with meal intake, in the setting of prolonged and multiple courses of antibiotic use  -Pt diagnosed with colitis 04/01 in ED, sent with 10 day course of cipro and flagyl, new onset lightheadedness and weakness  -Vitals stable  -Labs Hgb 12.2 (no baseline), MCV 78.8, Na 134,   -CTAP 04/01: Submucosal fat within the descending colon is noted which may be related to previous episodes of colitis. (601-65). Similar changes within the ascending colon also present 3.8 cm right common iliac artery aneurysm. 4.5 cm hiatal hernia.  -S/p fluids, carafate, and pepcid in ED.  -Physical exam is negative for abdominal tenderness on palpation, pt endorses slight discomfort in RUQ, Vaughan's negative, no guarding or rigidity   Plan  -Monitor off abx  -GI PCR  -Anemia likely due to poor PO intake, f/u AM iron studies, B12, folate  -Liquid diet, advance as tolerated  -Pepcid  -Zofran PRN  -Pt has OP GI eval in 1 month for EGD and colonoscopy  -Robaxin POx 1 dose for back pain  -Last ED visit referred to vascular OP follow up for common iliac artery aneurysm     #HTN  #HLD  #CAD s/p CABG (01/10/24)  #pAfib (on plavix)  -C/w home meds    DVT PPX: plavix  GI PPX: pepcid  DIET: liquid  ACTIVITY: IAT  CODE STATUS: FULL  DISPOSITION: Acute  
58 yo M with pmhx of HTN, HLD, CAD s/p CABG (Perez 10, 2024), pAfib (on plavix), former smoker (30 pack years) presents to ED s/p intolerance PO antibiotic therapy (cipro and [id number] days) for recent diagnosis of colitis c/b new onset light headedness and [id number] hours. Pt states that they had the flu early March, managed with 7 day course of antibiotic he cannot recall, where diffuse, dull aching abdominal pain began, predominantly in the right upper and lower quadrants that occurred intermittently. Shortly after patient develop right eye preseptal cellulitis which he received another antibiotic course for 10 days, during which he developed diarrhea and pre-existing abdominal became constant. Baseline BMs are daily and well formed, however during this time patient was having as many as 3-4 watery BMs per day. Associated with these symptoms was nausea and the urge to vomit, but no vomiting or dry heaving. Pt states for the past month he has lost appetite, his ability to tolerate solid foods has declined where he avoids solids as they exacerbate the diffuse abdominal pain. No relief with Pepto Bismol. Since Monday patient has been tolerating liquid diet.  Abdominal pain is baseline 2/10, max 4/10. Pt came to ED 04/01, CTAP notable for submucosal fat in descending colon, chronic colitis related changes, discharged with 10 day course of cipro and flagyl. Patient was taking medications but states in the past 48 hours he developed weakness, and since this morning has had lightheadedness, which prompted him to come to ED.   Pt states he saw a gastroenterologist (unable to recall name, at [facility]?) who recommended EGD and colonoscopy in 1 month, OP follow up.     Endorses having a low fiber diet, poor water intake, but is conscious about fat and fried food intake; eats pizza and chips frequently though.   Occasionally smoke marijuana and social drinker.     (-) heartburn, odynophagia, dysphagia, melena, hematochezia, mucoid stools, hematuria, hx of colicky pain/episodes, recent travel, chest pain, SOB, orthopnea  (+) increased burping for 1 month, subjective fever, chills, night sweats, joint pain (b/l hips, knees) x 2wks, pet turtle at home    #Nausea/Vomiting/Diarrhea/Weight Loss  #Subacute Colitis in setting of antibiotic use vs progression of pre-existing colitis  #Normocytic anemia  -One month history of watery diarrhea and diffuse abdominal pain, exacerbated with meal intake, in the setting of prolonged and multiple courses of antibiotic use  -Pt diagnosed with colitis 04/01 in ED, sent with 10 day course of cipro and flagyl, new onset lightheadedness and weakness  -Vitals stable  -Labs Hgb 12.2 (no baseline), MCV 78.8, Na 134,   -CTAP 04/01: Submucosal fat within the descending colon is noted which may be related to previous episodes of colitis. (601-65). Similar changes within the ascending colon also present 3.8 cm right common iliac artery aneurysm. 4.5 cm hiatal hernia.  -S/p fluids, carafate, and pepcid in ED.  -Physical exam is negative for abdominal tenderness on palpation, pt endorses slight discomfort in RUQ, Vaughan's negative, no guarding or rigidity   Plan  -Monitor off abx  -f/u lactoferrin, calprotectin, GI PCR NG, stool culture, C. dif NG  -Anemia likely due to poor PO intake, f/u AM iron studies, B12, folate  -Liquid diet, advance as tolerated to soft GI  -Protonix IV BID, Carafate  -Zofran PRN  -Pt has OP GI eval in 1 month for EGD and colonoscopy  -Robaxin POx 1 dose for back pain  -Last ED visit referred to vascular OP follow up for common iliac artery aneurysm   4/6 sky regular diet. Yeast in stool. D/w ID, likely colonizer and no need to treat    #HTN  #HLD  #CAD s/p CABG (01/10/24)  #H/o pAfib (on plavix). No longer on A/c  -C/w home meds    Discharge instructions discussed and patient/spouse know when to seek immediate medical attention.  Patient has proper follow up.  All results discussed and patient/spouse aware they require further follow up/work up.  Stressed importance of proper follow up.  Medications prescribed and changes discussed.  All questions and concerns from patient and family addressed. Understanding of instructions verbalized.    My note supersedes the residents note should a discrepancy arise.    Chart and notes personally reviewed.  Care Discussed with Consultants/Other Providers/ Housestaff [ x] YES [ ] NO   Radiology, labs, old records personally reviewed.    discussed w/ housestaff, nursing, case management, spouse    Time-based billing (NON-critical care).     35 minutes spent on total encounter. The necessity of the time spent during the encounter on this date of service was due to:     time spent on review of labs, imaging studies, old records, obtaining history, personally examining patient, counselling and communicating with patient/ family, entering orders for medications/tests/etc, discussions with other health care providers, documentation in electronic health records, independent interpretation of labs, imaging/procedure results and care coordination.

## 2025-04-07 ENCOUNTER — APPOINTMENT (OUTPATIENT)
Dept: VASCULAR SURGERY | Facility: CLINIC | Age: 60
End: 2025-04-07
Payer: COMMERCIAL

## 2025-04-07 VITALS
SYSTOLIC BLOOD PRESSURE: 131 MMHG | BODY MASS INDEX: 30.1 KG/M2 | DIASTOLIC BLOOD PRESSURE: 73 MMHG | HEIGHT: 71 IN | WEIGHT: 215 LBS

## 2025-04-07 DIAGNOSIS — I70.219 ATHEROSCLEROSIS OF NATIVE ARTERIES OF EXTREMITIES WITH INTERMITTENT CLAUDICATION, UNSPECIFIED EXTREMITY: ICD-10-CM

## 2025-04-07 DIAGNOSIS — I72.4 ANEURYSM OF ARTERY OF LOWER EXTREMITY: ICD-10-CM

## 2025-04-07 DIAGNOSIS — I72.3 ANEURYSM OF ILIAC ARTERY: ICD-10-CM

## 2025-04-07 PROCEDURE — 93925 LOWER EXTREMITY STUDY: CPT

## 2025-04-07 PROCEDURE — 99204 OFFICE O/P NEW MOD 45 MIN: CPT

## 2025-04-07 NOTE — DATA REVIEWED
[FreeTextEntry1] : Arterial duplex   ACC: [id number] EXAM: CT ABDOMEN AND PELVIS IC ORDERED BY: MEGGAN LOCKE  PROCEDURE DATE: 04/01/2025    INTERPRETATION: Reason for study: Upper abdominal pain and nausea. TECHNIQUE: Contiguous axial CT images were obtained from the diaphragms to the pubic symphysis after administration intravenous contrast.  CONTRAST VOLUME: Omnipaque 350. 95 cc administered. 5 cc discarded.  Reformatted images in the coronal and sagittal planes were acquired.    COMPARISON: No previous abdominal CT scans. CT chest December 18, 2024   FINDINGS: LOWER THORAX: Sternotomy. Aortic and coronary artery calcifications. 4.5 cm hiatal hernia, previously 3.7 cm HEPATOBILIARY: Unremarkable GALLBLADDER: Unremarkable SPLEEN: Unremarkable PANCREAS: Unremarkable ADRENAL GLANDS:unremarkable KIDNEYS: Symmetric renal enhancement. No hydronephrosis. 1.8 cm cyst superior pole right kidney (3-100)..   ABDOMINOPELVIC NODES: No lymphadenopathy. PELVIC ORGANS: Normal sized prostate gland with central calcifications. Submucosal fat within the descending colon is noted which may be related to previous episodes of colitis. (451-75). Similar changes within the ascending colon also present.. Unremarkable urinary bladder PERITONEUM /MESENTERY/BOWEL: Nonobstructive bowel gas pattern. ABDOMINAL WALL: Unremarkable. BONES/SOFT TISSUES: No suspicious osseous lesions. VASCULAR ANATOMY: Approximate 3.8 cm wide right iliac artery aneurysm, 3-2 36. Normal caliber abdominal aorta with extensive calcifications. Patent common femoral arteries measuring 1.6 cm in diameter.   IMPRESSION:  Nonobstructive gas pattern. No pneumoperitoneum.  Submucosal fat within the descending colon is noted which may be related to previous episodes of colitis. (674-79). Similar changes within the ascending colon also present  3.8 cm right common iliac artery aneurysm  4.5 cm hiatal hernia  --- End of Report ---      RADHA CALL MD; Attending Radiologist  Arterial duplex right there is focal aneurysmal dilatation seen in the common femoral with a maximum diameter of 1.7 cm comparing to the proximal segment diameter of 9 mm.  The proximal superficial femoral artery with large lumen of thrombus measuring 1.55 cm.  All major veins are patent and compressible with no evidence of significant disease.  Left multiple aneurysmal dilatation noted in the throughout the femoral-popliteal arteries location diameter dilatations common femoral artery 1.7 cm proximal and distal superficial femoral artery 1.4 cm popliteal artery 1.5 cm.  There is no evidence of hemodynamically significant disease in lower extremity from the groin to the knee.

## 2025-04-07 NOTE — HISTORY OF PRESENT ILLNESS
[FreeTextEntry1] : The patient is a 59-year-old male who was recently admitted to the hospital for C. difficile colitis.  The patient is currently on metronidazole 500 mg p.o. every 12.  During his hospitalization the patient had a CT scan of his abdomen pelvis which showed an incidental finding of a 3.8 cm right common iliac artery aneurysm.  The patient has a history of coronary artery bypass graft in 2024.

## 2025-04-07 NOTE — ASSESSMENT
[FreeTextEntry1] : The patient has a large right common iliac artery aneurysm present.  I have discussed the risk benefits of surgery with the patient including the risk of bleeding thrombosis rupture.  The patient is in agreement.  I have discussed this in detail also with Dr. Orozco and he reviewed the CAT scan as well.  The patient will require a right endovascular repair of a right common iliac artery aneurysm. I performed an arterial duplex of his lower extremities today my office to rule out popliteal artery aneurysms.  The patient has bilateral common iliac artery aneurysms 1.7 cm as well as bilateral popliteal artery aneurysms 1.5 cm. The patient will require preoperative cardiac medical clearance prior to the procedure.  I have spent 40 minutes with this patient performing physical exam, reviewing duplex results,  scan findings, discussing treatment plan and followup with this patient.

## 2025-04-10 DIAGNOSIS — I10 ESSENTIAL (PRIMARY) HYPERTENSION: ICD-10-CM

## 2025-04-10 DIAGNOSIS — K44.9 DIAPHRAGMATIC HERNIA WITHOUT OBSTRUCTION OR GANGRENE: ICD-10-CM

## 2025-04-10 DIAGNOSIS — Z87.891 PERSONAL HISTORY OF NICOTINE DEPENDENCE: ICD-10-CM

## 2025-04-10 DIAGNOSIS — I25.10 ATHEROSCLEROTIC HEART DISEASE OF NATIVE CORONARY ARTERY WITHOUT ANGINA PECTORIS: ICD-10-CM

## 2025-04-10 DIAGNOSIS — Z79.01 LONG TERM (CURRENT) USE OF ANTICOAGULANTS: ICD-10-CM

## 2025-04-10 DIAGNOSIS — D50.9 IRON DEFICIENCY ANEMIA, UNSPECIFIED: ICD-10-CM

## 2025-04-10 DIAGNOSIS — E78.5 HYPERLIPIDEMIA, UNSPECIFIED: ICD-10-CM

## 2025-04-10 DIAGNOSIS — Z88.0 ALLERGY STATUS TO PENICILLIN: ICD-10-CM

## 2025-04-10 DIAGNOSIS — I72.3 ANEURYSM OF ILIAC ARTERY: ICD-10-CM

## 2025-04-10 DIAGNOSIS — K52.1 TOXIC GASTROENTERITIS AND COLITIS: ICD-10-CM

## 2025-04-10 DIAGNOSIS — R63.4 ABNORMAL WEIGHT LOSS: ICD-10-CM

## 2025-04-10 DIAGNOSIS — R11.0 NAUSEA: ICD-10-CM

## 2025-04-10 DIAGNOSIS — Z95.1 PRESENCE OF AORTOCORONARY BYPASS GRAFT: ICD-10-CM

## 2025-04-10 DIAGNOSIS — T36.95XA ADVERSE EFFECT OF UNSPECIFIED SYSTEMIC ANTIBIOTIC, INITIAL ENCOUNTER: ICD-10-CM

## 2025-04-10 DIAGNOSIS — E43 UNSPECIFIED SEVERE PROTEIN-CALORIE MALNUTRITION: ICD-10-CM

## 2025-04-10 DIAGNOSIS — I48.0 PAROXYSMAL ATRIAL FIBRILLATION: ICD-10-CM

## 2025-04-18 ENCOUNTER — INPATIENT (INPATIENT)
Facility: HOSPITAL | Age: 60
LOS: 17 days | Discharge: HOME HEALTH | DRG: 374 | End: 2025-05-06
Attending: INTERNAL MEDICINE | Admitting: INTERNAL MEDICINE
Payer: COMMERCIAL

## 2025-04-18 VITALS
HEART RATE: 116 BPM | DIASTOLIC BLOOD PRESSURE: 78 MMHG | RESPIRATION RATE: 18 BRPM | HEIGHT: 71 IN | WEIGHT: 202.38 LBS | OXYGEN SATURATION: 98 % | TEMPERATURE: 98 F | SYSTOLIC BLOOD PRESSURE: 122 MMHG

## 2025-04-18 DIAGNOSIS — K52.9 NONINFECTIVE GASTROENTERITIS AND COLITIS, UNSPECIFIED: ICD-10-CM

## 2025-04-18 LAB
ALBUMIN SERPL ELPH-MCNC: 3.8 G/DL — SIGNIFICANT CHANGE UP (ref 3.5–5.2)
ALP SERPL-CCNC: 275 U/L — HIGH (ref 30–115)
ALT FLD-CCNC: 23 U/L — SIGNIFICANT CHANGE UP (ref 0–41)
ANION GAP SERPL CALC-SCNC: 15 MMOL/L — HIGH (ref 7–14)
APPEARANCE UR: CLEAR — SIGNIFICANT CHANGE UP
AST SERPL-CCNC: 31 U/L — SIGNIFICANT CHANGE UP (ref 0–41)
BASOPHILS # BLD AUTO: 0.06 K/UL — SIGNIFICANT CHANGE UP (ref 0–0.2)
BASOPHILS NFR BLD AUTO: 0.9 % — SIGNIFICANT CHANGE UP (ref 0–1)
BILIRUB SERPL-MCNC: 0.7 MG/DL — SIGNIFICANT CHANGE UP (ref 0.2–1.2)
BILIRUB UR-MCNC: NEGATIVE — SIGNIFICANT CHANGE UP
BUN SERPL-MCNC: 17 MG/DL — SIGNIFICANT CHANGE UP (ref 10–20)
CALCIUM SERPL-MCNC: 9.5 MG/DL — SIGNIFICANT CHANGE UP (ref 8.4–10.5)
CHLORIDE SERPL-SCNC: 100 MMOL/L — SIGNIFICANT CHANGE UP (ref 98–110)
CO2 SERPL-SCNC: 23 MMOL/L — SIGNIFICANT CHANGE UP (ref 17–32)
COLOR SPEC: YELLOW — SIGNIFICANT CHANGE UP
CREAT SERPL-MCNC: 1.2 MG/DL — SIGNIFICANT CHANGE UP (ref 0.7–1.5)
DIFF PNL FLD: NEGATIVE — SIGNIFICANT CHANGE UP
EGFR: 69 ML/MIN/1.73M2 — SIGNIFICANT CHANGE UP
EGFR: 69 ML/MIN/1.73M2 — SIGNIFICANT CHANGE UP
EOSINOPHIL # BLD AUTO: 0 K/UL — SIGNIFICANT CHANGE UP (ref 0–0.7)
EOSINOPHIL NFR BLD AUTO: 0 % — SIGNIFICANT CHANGE UP (ref 0–8)
GLUCOSE SERPL-MCNC: 121 MG/DL — HIGH (ref 70–99)
GLUCOSE UR QL: NEGATIVE MG/DL — SIGNIFICANT CHANGE UP
HCT VFR BLD CALC: 36.2 % — LOW (ref 42–52)
HGB BLD-MCNC: 11.6 G/DL — LOW (ref 14–18)
KETONES UR-MCNC: ABNORMAL MG/DL
LACTATE SERPL-SCNC: 1.2 MMOL/L — SIGNIFICANT CHANGE UP (ref 0.7–2)
LEUKOCYTE ESTERASE UR-ACNC: NEGATIVE — SIGNIFICANT CHANGE UP
LIDOCAIN IGE QN: 67 U/L — HIGH (ref 7–60)
LYMPHOCYTES # BLD AUTO: 1.22 K/UL — SIGNIFICANT CHANGE UP (ref 1.2–3.4)
LYMPHOCYTES # BLD AUTO: 17.2 % — LOW (ref 20.5–51.1)
MCHC RBC-ENTMCNC: 25.4 PG — LOW (ref 27–31)
MCHC RBC-ENTMCNC: 32 G/DL — SIGNIFICANT CHANGE UP (ref 32–37)
MCV RBC AUTO: 79.2 FL — LOW (ref 80–94)
MONOCYTES # BLD AUTO: 0.37 K/UL — SIGNIFICANT CHANGE UP (ref 0.1–0.6)
MONOCYTES NFR BLD AUTO: 5.2 % — SIGNIFICANT CHANGE UP (ref 1.7–9.3)
NEUTROPHILS # BLD AUTO: 5.33 K/UL — SIGNIFICANT CHANGE UP (ref 1.4–6.5)
NEUTROPHILS NFR BLD AUTO: 75 % — SIGNIFICANT CHANGE UP (ref 42.2–75.2)
NITRITE UR-MCNC: NEGATIVE — SIGNIFICANT CHANGE UP
PH UR: 5.5 — SIGNIFICANT CHANGE UP (ref 5–8)
PLATELET # BLD AUTO: 222 K/UL — SIGNIFICANT CHANGE UP (ref 130–400)
PMV BLD: 9.2 FL — SIGNIFICANT CHANGE UP (ref 7.4–10.4)
POTASSIUM SERPL-MCNC: 3.9 MMOL/L — SIGNIFICANT CHANGE UP (ref 3.5–5)
POTASSIUM SERPL-SCNC: 3.9 MMOL/L — SIGNIFICANT CHANGE UP (ref 3.5–5)
PROT SERPL-MCNC: 6.9 G/DL — SIGNIFICANT CHANGE UP (ref 6–8)
PROT UR-MCNC: SIGNIFICANT CHANGE UP MG/DL
RBC # BLD: 4.57 M/UL — LOW (ref 4.7–6.1)
RBC # FLD: 14.1 % — SIGNIFICANT CHANGE UP (ref 11.5–14.5)
SODIUM SERPL-SCNC: 138 MMOL/L — SIGNIFICANT CHANGE UP (ref 135–146)
SP GR SPEC: >1.03 — HIGH (ref 1–1.03)
UROBILINOGEN FLD QL: 1 MG/DL — SIGNIFICANT CHANGE UP (ref 0.2–1)
WBC # BLD: 7.11 K/UL — SIGNIFICANT CHANGE UP (ref 4.8–10.8)
WBC # FLD AUTO: 7.11 K/UL — SIGNIFICANT CHANGE UP (ref 4.8–10.8)

## 2025-04-18 PROCEDURE — 84466 ASSAY OF TRANSFERRIN: CPT

## 2025-04-18 PROCEDURE — 97110 THERAPEUTIC EXERCISES: CPT | Mod: GP

## 2025-04-18 PROCEDURE — 88360 TUMOR IMMUNOHISTOCHEM/MANUAL: CPT

## 2025-04-18 PROCEDURE — 99285 EMERGENCY DEPT VISIT HI MDM: CPT

## 2025-04-18 PROCEDURE — 99222 1ST HOSP IP/OBS MODERATE 55: CPT

## 2025-04-18 PROCEDURE — 86901 BLOOD TYPING SEROLOGIC RH(D): CPT

## 2025-04-18 PROCEDURE — 72100 X-RAY EXAM L-S SPINE 2/3 VWS: CPT

## 2025-04-18 PROCEDURE — 71045 X-RAY EXAM CHEST 1 VIEW: CPT | Mod: 26

## 2025-04-18 PROCEDURE — 83631 LACTOFERRIN FECAL (QUANT): CPT

## 2025-04-18 PROCEDURE — 80053 COMPREHEN METABOLIC PANEL: CPT

## 2025-04-18 PROCEDURE — 71045 X-RAY EXAM CHEST 1 VIEW: CPT

## 2025-04-18 PROCEDURE — 85025 COMPLETE CBC W/AUTO DIFF WBC: CPT

## 2025-04-18 PROCEDURE — 87040 BLOOD CULTURE FOR BACTERIA: CPT

## 2025-04-18 PROCEDURE — 80048 BASIC METABOLIC PNL TOTAL CA: CPT

## 2025-04-18 PROCEDURE — C1052: CPT

## 2025-04-18 PROCEDURE — 93005 ELECTROCARDIOGRAM TRACING: CPT

## 2025-04-18 PROCEDURE — 83550 IRON BINDING TEST: CPT

## 2025-04-18 PROCEDURE — 82728 ASSAY OF FERRITIN: CPT

## 2025-04-18 PROCEDURE — 88342 IMHCHEM/IMCYTCHM 1ST ANTB: CPT

## 2025-04-18 PROCEDURE — 97530 THERAPEUTIC ACTIVITIES: CPT | Mod: GP

## 2025-04-18 PROCEDURE — 83540 ASSAY OF IRON: CPT

## 2025-04-18 PROCEDURE — 88341 IMHCHEM/IMCYTCHM EA ADD ANTB: CPT

## 2025-04-18 PROCEDURE — 86850 RBC ANTIBODY SCREEN: CPT

## 2025-04-18 PROCEDURE — 88312 SPECIAL STAINS GROUP 1: CPT

## 2025-04-18 PROCEDURE — C1874: CPT

## 2025-04-18 PROCEDURE — 82150 ASSAY OF AMYLASE: CPT

## 2025-04-18 PROCEDURE — 82977 ASSAY OF GGT: CPT

## 2025-04-18 PROCEDURE — C1769: CPT

## 2025-04-18 PROCEDURE — 85610 PROTHROMBIN TIME: CPT

## 2025-04-18 PROCEDURE — 97116 GAIT TRAINING THERAPY: CPT | Mod: GP

## 2025-04-18 PROCEDURE — 86140 C-REACTIVE PROTEIN: CPT

## 2025-04-18 PROCEDURE — 85730 THROMBOPLASTIN TIME PARTIAL: CPT

## 2025-04-18 PROCEDURE — 74177 CT ABD & PELVIS W/CONTRAST: CPT | Mod: 26

## 2025-04-18 PROCEDURE — 85027 COMPLETE CBC AUTOMATED: CPT

## 2025-04-18 PROCEDURE — 86900 BLOOD TYPING SEROLOGIC ABO: CPT

## 2025-04-18 PROCEDURE — 83735 ASSAY OF MAGNESIUM: CPT

## 2025-04-18 PROCEDURE — 88305 TISSUE EXAM BY PATHOLOGIST: CPT

## 2025-04-18 PROCEDURE — C1889: CPT

## 2025-04-18 PROCEDURE — 97162 PT EVAL MOD COMPLEX 30 MIN: CPT | Mod: GP

## 2025-04-18 PROCEDURE — 85652 RBC SED RATE AUTOMATED: CPT

## 2025-04-18 PROCEDURE — 83993 ASSAY FOR CALPROTECTIN FECAL: CPT

## 2025-04-18 PROCEDURE — 71260 CT THORAX DX C+: CPT | Mod: MC

## 2025-04-18 PROCEDURE — 36415 COLL VENOUS BLD VENIPUNCTURE: CPT

## 2025-04-18 RX ORDER — ONDANSETRON HCL/PF 4 MG/2 ML
4 VIAL (ML) INJECTION ONCE
Refills: 0 | Status: COMPLETED | OUTPATIENT
Start: 2025-04-18 | End: 2025-04-18

## 2025-04-18 RX ADMIN — Medication 4 MILLIGRAM(S): at 19:47

## 2025-04-18 RX ADMIN — Medication 2 MILLIGRAM(S): at 21:42

## 2025-04-18 RX ADMIN — Medication 4 MILLIGRAM(S): at 19:46

## 2025-04-18 RX ADMIN — Medication 1000 MILLILITER(S): at 19:47

## 2025-04-18 RX ADMIN — Medication 20 MILLIGRAM(S): at 19:40

## 2025-04-18 NOTE — ED ADULT NURSE NOTE - NSFALLRISKINTERV_ED_ALL_ED

## 2025-04-18 NOTE — H&P ADULT - HISTORY OF PRESENT ILLNESS
58 yo M pmhx htn, hiatal hernia, hld, cad, afib, recently diagnosed with colitis and treated during his admission 4/1-4/5 presenting to the ED for evaluation of persistent abdominal pain and weakness x 3 weeks. pt states he also has lower back pain. admits to chills, states he had a fever 3 days ago. denies chest pain, sob, nausea, vomiting, diarrhea, urinary symptoms. 60 yo M with a PMH of HTN, hiatal hernia, HLD, CAD s/p CABG in Jan 2024, paroxysmal A. fib on plavix, former smoker (30 pack years), recently diagnosed with colitis and treated during his admission 4/1-4/5 presenting to the ED for evaluation of persistent abdominal pain and weakness x 3 weeks. Patient states he also has lower back pain and endorses chills, states he had a fever 3 days ago. Patient denies chest pain, sob, nausea, vomiting, diarrhea, urinary symptoms.    Of note, patient was admitted 4/3-4/5 for colitis 2/2 abx use on 4/1 (cipro and flagyl x10d). Patient was monitored off abx during this admission.     In the ED:  - Vitals were /78, , RR 18, Temp 98F, SpO2 98% on room air  - Labs were significant for WBC wnl, Hb 11.6 (baseline 12), MCV 79.2, Plt wnl, AG 15, Alk Phos 275, LFTs wnl, lactate wnl, lipase 67; UA: trace ketone, inc specific gravity  - Imaging:       - CXR- Low lung volume. No radiographic evidence of acute cardiopulmonary disease.       - CT abd/pelv w/ IVC- Redemonstrated prominent submucosal fat/bowel wall thickening involving descending colon, possibly related to prior episodes of colitis. Otherwise, no new acute findings in the abdomen or pelvis  - Interventions: Zofran 4mg IVP x1, Morphine 2mg IVP x1, Morphine 4mg IVP x1, Famotidine 20mg IVP x1, NS bolus 1L x1  Admitted for abdominal pain. 60 yo M with a PMH of HTN, hiatal hernia, HLD, CAD s/p CABG in Jan 2024, paroxysmal A. fib not on AC, former smoker (30 pack years, quit 1yr ago), recently diagnosed with colitis and treated during his admission 4/1-4/5 presenting to the ED for evaluation of persistent abdominal pain and weakness x 3 weeks. Patient states he also has lower back pain and endorses chills, states he had a fever 3 days ago. Patient endorses a history of diarrhea that resolved 1 week ago as well as decreased PO intake 2/2 pain with eating. Patient denies chest pain, sob, nausea, vomiting, diarrhea, constipation urinary symptoms.    Of note, patient was admitted 4/3-4/5 for colitis 2/2 abx use on 4/1 (cipro and flagyl x10d). Patient was monitored off abx during this admission.     In the ED:  - Vitals were /78, , RR 18, Temp 98F, SpO2 98% on room air  - Labs were significant for WBC wnl, Hb 11.6 (baseline 12), MCV 79.2, Plt wnl, AG 15, Alk Phos 275, LFTs wnl, lactate wnl, lipase 67; UA: trace ketone, inc specific gravity  - Imaging:       - CXR- Low lung volume. No radiographic evidence of acute cardiopulmonary disease.       - CT abd/pelv w/ IVC- Redemonstrated prominent submucosal fat/bowel wall thickening involving descending colon, possibly related to prior episodes of colitis. Otherwise, no new acute findings in the abdomen or pelvis  - Interventions: Zofran 4mg IVP x1, Morphine 2mg IVP x1, Morphine 4mg IVP x1, Famotidine 20mg IVP x1, NS bolus 1L x1  Admitted for abdominal pain.

## 2025-04-18 NOTE — H&P ADULT - NSHPPHYSICALEXAM_GEN_ALL_CORE
Vital Signs Last 24 Hrs  T(C): 37.1 (18 Apr 2025 23:22), Max: 37.1 (18 Apr 2025 23:22)  T(F): 98.8 (18 Apr 2025 23:22), Max: 98.8 (18 Apr 2025 23:22)  HR: 73 (18 Apr 2025 23:22) (73 - 116)  BP: 120/75 (18 Apr 2025 23:22) (120/75 - 122/78)  BP(mean): --  RR: 18 (18 Apr 2025 23:22) (18 - 18)  SpO2: 97% (18 Apr 2025 23:22) (97% - 98%)    Parameters below as of 18 Apr 2025 23:22  Patient On (Oxygen Delivery Method): room air      PHYSICAL EXAM:  GENERAL: NAD, well-groomed, well-developed  HEAD:  Atraumatic, Normocephalic  EYES: EOMI, PERRLA, conjunctiva and sclera clear  ENMT: No tonsillar erythema, exudates, or enlargement; Moist mucous membranes  NECK: Supple, No JVD, Normal thyroid  HEART: Regular rate and rhythm; No murmurs, rubs, or gallops  RESPIRATORY: CTA B/L, No W/R/R  ABDOMEN: Soft, Nontender, Nondistended; Bowel sounds present  NEUROLOGY: A&Ox3, nonfocal, moving all extremities  EXTREMITIES:  2+ Peripheral Pulses, No clubbing, cyanosis, or edema  SKIN: warm, dry, normal color, no rash or abnormal lesions Vital Signs Last 24 Hrs  T(C): 37.1 (18 Apr 2025 23:22), Max: 37.1 (18 Apr 2025 23:22)  T(F): 98.8 (18 Apr 2025 23:22), Max: 98.8 (18 Apr 2025 23:22)  HR: 73 (18 Apr 2025 23:22) (73 - 116)  BP: 120/75 (18 Apr 2025 23:22) (120/75 - 122/78)  BP(mean): --  RR: 18 (18 Apr 2025 23:22) (18 - 18)  SpO2: 97% (18 Apr 2025 23:22) (97% - 98%)    Parameters below as of 18 Apr 2025 23:22  Patient On (Oxygen Delivery Method): room air      PHYSICAL EXAM:  GENERAL: NAD, well-groomed, well-developed  HEAD:  Atraumatic, Normocephalic  EYES: EOMI  ENMT: Moist mucous membranes  NECK: Supple,   HEART: Regular rate and rhythm; No murmurs, rubs, or gallops  RESPIRATORY: CTA B/L, No W/R/R  ABDOMEN: Soft, Nontender, Distended; Bowel sounds present  NEUROLOGY: A&Ox3, nonfocal, moving all extremities  EXTREMITIES:  2+ Peripheral Pulses, No clubbing, cyanosis, or edema  SKIN: warm, dry, normal color, no rash or abnormal lesions

## 2025-04-18 NOTE — H&P ADULT - NSHPLABSRESULTS_GEN_ALL_CORE
11.6   7.11  )-----------( 222      ( 18 Apr 2025 19:24 )             36.2       04-18    138  |  100  |  17  ----------------------------<  121[H]  3.9   |  23  |  1.2    Ca    9.5      18 Apr 2025 19:24    TPro  6.9  /  Alb  3.8  /  TBili  0.7  /  DBili  x   /  AST  31  /  ALT  23  /  AlkPhos  275[H]  04-18              Urinalysis Basic - ( 18 Apr 2025 19:24 )    Color: Yellow / Appearance: Clear / SG: >1.030 / pH: x  Gluc: 121 mg/dL / Ketone: Trace mg/dL  / Bili: Negative / Urobili: 1.0 mg/dL   Blood: x / Protein: Trace mg/dL / Nitrite: Negative   Leuk Esterase: Negative / RBC: x / WBC x   Sq Epi: x / Non Sq Epi: x / Bacteria: x            Lactate Trend  04-18 @ 19:24 Lactate:1.2             CAPILLARY BLOOD GLUCOSE            Culture Results:   No enteric pathogens isolated.  (Stool culture examined for Salmonella,  Shigella, Campylobacter, Aeromonas, Plesiomonas,  Vibrio, E.coli O157 and Yersinia)  No enteric gram negative rods isolated  Moderate Yeast like cells (04-04 @ 11:40)    IMAGING  < from: CT Abdomen and Pelvis w/ IV Cont (04.18.25 @ 21:24) >    IMPRESSION: Since CT abdomen and pelvis April 1, 2025,    Redemonstrated prominent submucosal fat/bowel wall thickening involving   descending colon, possibly related to prior episodes of colitis.    Otherwise, no new acute findings in the abdomen or pelvis.    < end of copied text >    < from: Xray Chest 1 View- PORTABLE-Urgent (04.18.25 @ 20:07) >    IMPRESSION: Low lung volume.    No radiographic evidence of acute cardiopulmonary disease.    < end of copied text >

## 2025-04-18 NOTE — H&P ADULT - NSHPREVIEWOFSYSTEMS_GEN_ALL_CORE
REVIEW OF SYSTEMS:    CONSTITUTIONAL: No weakness, fevers or chills  EYES/ENT: No visual changes;  No vertigo or throat pain   NECK: No pain or stiffness  RESPIRATORY: No cough, wheezing, hemoptysis; No shortness of breath  CARDIOVASCULAR: No chest pain or palpitations  GASTROINTESTINAL: No abdominal or epigastric pain. No nausea, vomiting, or hematemesis; No diarrhea or constipation. No melena or hematochezia.  GENITOURINARY: No dysuria, frequency or hematuria  NEUROLOGICAL: No numbness or weakness  SKIN: No itching, rashes REVIEW OF SYSTEMS:    CONSTITUTIONAL: + weakness, No fevers or chills  EYES/ENT: No visual changes;  No vertigo or throat pain   NECK: No pain or stiffness  RESPIRATORY: No cough, wheezing, hemoptysis; No shortness of breath  CARDIOVASCULAR: No chest pain or palpitations  GASTROINTESTINAL: + abdominal and epigastric pain. No nausea, vomiting, or hematemesis; No diarrhea or constipation. No melena or hematochezia.  GENITOURINARY: No dysuria, frequency or hematuria  NEUROLOGICAL: No numbness or weakness  SKIN: No itching, rashes

## 2025-04-18 NOTE — H&P ADULT - NSHPSOCIALHISTORY_GEN_ALL_CORE
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  Lives with wife  Occasional marijuana use

## 2025-04-18 NOTE — H&P ADULT - NSICDXPASTMEDICALHX_GEN_ALL_CORE_FT
PAST MEDICAL HISTORY:  CAD (coronary artery disease)     Hiatal hernia     History of colitis     HLD (hyperlipidemia)     HTN (hypertension)     Paroxysmal atrial fibrillation

## 2025-04-18 NOTE — H&P ADULT - ATTENDING COMMENTS
HPI:  60 yo M with a PMH of HTN, hiatal hernia, HLD, CAD s/p CABG in Jan 2024, paroxysmal A. fib not on AC, former smoker (30 pack years, quit 1yr ago), recently diagnosed with colitis and treated during his admission 4/1-4/5 presenting to the ED for evaluation of persistent abdominal pain and weakness x 3 weeks. Patient states he also has lower back pain and endorses chills, states he had a fever 3 days ago. Patient endorses a history of diarrhea that resolved 1 week ago as well as decreased PO intake 2/2 pain with eating. Patient denies chest pain, sob, nausea, vomiting, diarrhea, constipation urinary symptoms.    Of note, patient was admitted 4/3-4/5 for colitis 2/2 abx use on 4/1 (cipro and flagyl x10d). Patient was monitored off abx during this admission.     In the ED:  - Vitals were /78, , RR 18, Temp 98F, SpO2 98% on room air  - Labs were significant for WBC wnl, Hb 11.6 (baseline 12), MCV 79.2, Plt wnl, AG 15, Alk Phos 275, LFTs wnl, lactate wnl, lipase 67; UA: trace ketone, inc specific gravity  - Imaging:       - CXR- Low lung volume. No radiographic evidence of acute cardiopulmonary disease.       - CT abd/pelv w/ IVC- Re-demonstrated prominent submucosal fat/bowel wall thickening involving descending colon, possibly related to prior episodes of colitis. Otherwise, no new acute findings in the abdomen or pelvis  - Interventions: Zofran 4mg IVP x1, Morphine 2mg IVP x1, Morphine 4mg IVP x1, Famotidine 20mg IVP x1, NS bolus 1L x1  Admitted for abdominal pain. (18 Apr 2025 23:49)    REVIEW OF SYSTEMS: see cc/HPI   CONSTITUTIONAL: No weakness, fevers or chills  EYES/ENT: No visual changes;  No vertigo or throat pain   NECK: No pain or stiffness  RESPIRATORY: No cough, wheezing, hemoptysis; No shortness of breath  CARDIOVASCULAR: No chest pain or palpitations  GASTROINTESTINAL: No abdominal or epigastric pain. No nausea, vomiting, or hematemesis; No diarrhea or constipation. No melena or hematochezia.  GENITOURINARY: No dysuria, frequency or hematuria  NEUROLOGICAL: No numbness or weakness  SKIN: No itching, rashes  ENDO: No hyperglycemia, No thyroid disorder, No dyslipidemia   HEM: No bleeding, No easy bruising, No anemia   PSYCHE: No psychosis, No mood disorder No hallucinations No delusion   MSK: No deformity, No fracture, No Joint swelling    Physical Exam:  General: WN/WD NAD  Neurology: A&Ox3, nonfocal, follows commands  Eyes: PERRLA/ EOMI  ENT/Neck: Neck supple, trachea midline, No JVD  Respiratory: CTA B/L, No wheezing, rales, rhonchi  CV: Normal rate regular rhythm, S1S2, no murmurs, rubs or gallops  Abdominal: Soft, NT, ND +BS,   Extremities: No edema, + peripheral pulses  Skin: No Rashes, Hematoma, Ecchymosis    A/p HPI:  58 yo M with a PMH of HTN, hiatal hernia, HLD, CAD s/p CABG in Jan 2024, paroxysmal A. fib not on AC, former smoker (30 pack years, quit 1yr ago), recently diagnosed with colitis and treated during his admission 4/1-4/5 presenting to the ED for evaluation of persistent abdominal pain and weakness x 3 weeks. Patient states he also has lower back pain and endorses chills, states he had a fever 3 days ago. Patient endorses a history of diarrhea that resolved 1 week ago as well as decreased PO intake 2/2 pain with eating. Patient denies chest pain, sob, nausea, vomiting, diarrhea, constipation urinary symptoms.    Of note, patient was admitted 4/3-4/5 for colitis 2/2 abx use on 4/1 (cipro and flagyl x10d). Patient was monitored off abx during this admission.     In the ED:  - Vitals were /78, , RR 18, Temp 98F, SpO2 98% on room air  - Labs were significant for WBC wnl, Hb 11.6 (baseline 12), MCV 79.2, Plt wnl, AG 15, Alk Phos 275, LFTs wnl, lactate wnl, lipase 67; UA: trace ketone, inc specific gravity  - Imaging:       - CXR- Low lung volume. No radiographic evidence of acute cardiopulmonary disease.       - CT abd/pelv w/ IVC- Re-demonstrated prominent submucosal fat/bowel wall thickening involving descending colon, possibly related to prior episodes of colitis. Otherwise, no new acute findings in the abdomen or pelvis  - Interventions: Zofran 4mg IVP x1, Morphine 2mg IVP x1, Morphine 4mg IVP x1, Famotidine 20mg IVP x1, NS bolus 1L x1  Admitted for abdominal pain. (18 Apr 2025 23:49)    REVIEW OF SYSTEMS: see cc/HPI   CONSTITUTIONAL: No weakness, fevers or chills  EYES/ENT: No visual changes;  No vertigo or throat pain   NECK: No pain or stiffness  RESPIRATORY: No cough, wheezing, hemoptysis; No shortness of breath  CARDIOVASCULAR: No chest pain or palpitations  GASTROINTESTINAL: No abdominal or epigastric pain. No nausea, vomiting, or hematemesis; No diarrhea or constipation. No melena or hematochezia.  GENITOURINARY: No dysuria, frequency or hematuria  NEUROLOGICAL: No numbness or weakness  SKIN: No itching, rashes  ENDO: No hyperglycemia, No thyroid disorder, No dyslipidemia   HEM: No bleeding, No easy bruising, No anemia   PSYCHE: No psychosis, No mood disorder No hallucinations No delusion   MSK: No deformity, No fracture, No Joint swelling    Physical Exam:  General: WN/WD NAD  Neurology: A&Ox3, nonfocal, follows commands  Eyes: PERRLA/ EOMI  ENT/Neck: Neck supple, trachea midline, No JVD  Respiratory: CTA B/L, No wheezing, rales, rhonchi  CV: Normal rate regular rhythm, S1S2, no murmurs, rubs or gallops  Abdominal: Soft, ND +BS, mild epigastric tenderness, (lower abd pain has resolved)  Extremities: No edema, + peripheral pulses  Skin: No Rashes, Hematoma, Ecchymosis    A/p  Abdominal pain / colitis recently admitted and treated ( no recent fever)  Diarrhea possibly related to diarrhea  - IV fluids   -if diarrhea continue consider stool studies   -PRN pain Rx   -check blood Cx     Chronic anemia -stable   -agree w/ iron studies and PRN supplementation     H/o CAD   H.o chronic P. A. fib   HTN   -c/w OP Rx     R iliac artery aneurysm  -Vascular eval - impatient ( patient was to have surgery 4/21)    DVT prophylaxis HPI:  60 yo M with a PMH of HTN, hiatal hernia, HLD, CAD s/p CABG in Jan 2024, paroxysmal A. fib not on AC, former smoker (30 pack years, quit 1yr ago), recently diagnosed with colitis and treated during his admission 4/1-4/5 presenting to the ED for evaluation of persistent abdominal pain and weakness x 3 weeks. Patient states he also has lower back pain and endorses chills, states he had a fever 3 days ago. Patient endorses a history of diarrhea that resolved 1 week ago as well as decreased PO intake 2/2 pain with eating. Patient denies chest pain, sob, nausea, vomiting, diarrhea, constipation urinary symptoms.    Of note, patient was admitted 4/3-4/5 for colitis 2/2 abx use on 4/1 (cipro and flagyl x10d). Patient was monitored off abx during this admission.     In the ED:  - Vitals were /78, , RR 18, Temp 98F, SpO2 98% on room air  - Labs were significant for WBC wnl, Hb 11.6 (baseline 12), MCV 79.2, Plt wnl, AG 15, Alk Phos 275, LFTs wnl, lactate wnl, lipase 67; UA: trace ketone, inc specific gravity  - Imaging:       - CXR- Low lung volume. No radiographic evidence of acute cardiopulmonary disease.       - CT abd/pelv w/ IVC- Re-demonstrated prominent submucosal fat/bowel wall thickening involving descending colon, possibly related to prior episodes of colitis. Otherwise, no new acute findings in the abdomen or pelvis  - Interventions: Zofran 4mg IVP x1, Morphine 2mg IVP x1, Morphine 4mg IVP x1, Famotidine 20mg IVP x1, NS bolus 1L x1  Admitted for abdominal pain. (18 Apr 2025 23:49)    REVIEW OF SYSTEMS: see cc/HPI   CONSTITUTIONAL: No weakness, fevers or chills  EYES/ENT: No visual changes;  No vertigo or throat pain   NECK: No pain or stiffness  RESPIRATORY: No cough, wheezing, hemoptysis; No shortness of breath  CARDIOVASCULAR: No chest pain or palpitations  GASTROINTESTINAL: No abdominal or epigastric pain. No nausea, vomiting, or hematemesis; No diarrhea or constipation. No melena or hematochezia.  GENITOURINARY: No dysuria, frequency or hematuria  NEUROLOGICAL: No numbness or weakness  SKIN: No itching, rashes  ENDO: No hyperglycemia, No thyroid disorder, No dyslipidemia   HEM: No bleeding, No easy bruising, No anemia   PSYCHE: No psychosis, No mood disorder No hallucinations No delusion   MSK: No deformity, No fracture, No Joint swelling    Physical Exam:  General: WN/WD NAD  Neurology: A&Ox3, nonfocal, follows commands  Eyes: PERRLA/ EOMI  ENT/Neck: Neck supple, trachea midline, No JVD  Respiratory: CTA B/L, No wheezing, rales, rhonchi  CV: Normal rate regular rhythm, S1S2, no murmurs, rubs or gallops  Abdominal: Soft, ND +BS, mild epigastric tenderness, (lower abd pain has resolved)  Extremities: No edema, + peripheral pulses  Skin: No Rashes, Hematoma, Ecchymosis    A/p  Abdominal pain / colitis recently admitted and treated ( no recent fever)  Diarrhea possibly related to antibiotics   Mildly elevated lipase /.alk phos  but now biliary/pancreatic disease on imagine  - IV fluids  -full liquid diet for now and advance as tolerated  -if diarrhea continue consider stool studies   -PRN pain Rx   -check blood Cx   -trend lipase / alk phos     Chronic anemia, CHI -stable   -Fe supplementation     H/o CAD   H.o chronic P. A. fib   HTN   -c/w OP Rx     R iliac artery aneurysm  -Vascular eval - impatient ( patient was to have surgery 4/21)    DVT prophylaxis

## 2025-04-18 NOTE — ED PROVIDER NOTE - OBJECTIVE STATEMENT
60 yo M pmhx htn, hiatal hernia, hld, cad, afib, recently diagnosed with colitis and treated during his admission 4/1-4/5 presenting to the ED for evaluation of persistent abdominal pain and weakness x 3 weeks. pt states he also has lower back pain. admits to chills, states he had a fever 3 days ago. denies chest pain, sob, nausea, vomiting, diarrhea, urinary symptoms.

## 2025-04-18 NOTE — H&P ADULT - ASSESSMENT
58 yo M with a PMH of HTN, hiatal hernia, HLD, CAD s/p CABG in Jan 2024, paroxysmal A. fib on plavix, former smoker (30 pack years), recently diagnosed with colitis and treated during his admission 4/1-4/5 presenting to the ED for evaluation of persistent abdominal pain and weakness x 3 weeks. Patient states he also has lower back pain and endorses chills, states he had a fever 3 days ago. Patient denies chest pain, sob, nausea, vomiting, diarrhea, urinary symptoms.    #Abdominal Pain 2/2 recent colitis  #Inc Alk Phos  - CT abd/pelv w/ IVC- Redemonstrated prominent submucosal fat/bowel wall thickening involving descending colon, possibly related to prior episodes of colitis. Otherwise, no new acute findings in the abdomen or pelvis.  - F/u GGT  - F/u GI c/s for possible irritable bowel management  - Monitor off antibiotics  - Pain control: tylenol and toradol    # 60 yo M with a PMH of HTN, hiatal hernia, HLD, CAD s/p CABG in Jan 2024, paroxysmal A. fib on plavix, former smoker (30 pack years), recently diagnosed with colitis and treated during his admission 4/1-4/5 presenting to the ED for evaluation of persistent abdominal pain and weakness x 3 weeks. Patient states he also has lower back pain and endorses chills, states he had a fever 3 days ago. Patient denies chest pain, sob, nausea, vomiting, diarrhea, urinary symptoms.    #Abdominal Pain 2/2 recent colitis  #Inc Alk Phos  - CT abd/pelv w/ IVC- Redemonstrated prominent submucosal fat/bowel wall thickening involving descending colon, possibly related to prior episodes of colitis. Otherwise, no new acute findings in the abdomen or pelvis.  - F/u GGT  - F/u GI c/s for possible irritable bowel management  - Monitor off antibiotics  - Pain control: tylenol and toradol    #Chronic Anemia  - In the ED, Hb 11.6, baseline ~12  - MCV 79.2  - F/u iron studies    #3.8 cm wide R iliac artery aneurysm  - F/u vascular o/p    #HTN  #CAD s/p CABG in Jan 2024  #Paroxysmal A.fib  - C/w home meds    #MISC  - DVT ppx:   - GI ppx: protonix  - Diet: DASH  - Activity: as tolerated    Pending: GGT, GI c/s, iron studies 58 yo M with a PMH of HTN, hiatal hernia, HLD, CAD s/p CABG in Jan 2024, paroxysmal A. fib on plavix, former smoker (30 pack years), recently diagnosed with colitis and treated during his admission 4/1-4/5 presenting to the ED for evaluation of persistent abdominal pain and weakness x 3 weeks. Patient states he also has lower back pain and endorses chills, states he had a fever 3 days ago. Patient denies chest pain, sob, nausea, vomiting, diarrhea, urinary symptoms.    #Abdominal Pain 2/2 recent colitis  #Inc Alk Phos  - CT abd/pelv w/ IVC- Redemonstrated prominent submucosal fat/bowel wall thickening involving descending colon, possibly related to prior episodes of colitis. Otherwise, no new acute findings in the abdomen or pelvis.  - F/u GGT  - F/u GI c/s for possible irritable bowel management  - Monitor off antibiotics  - Pain control: tylenol and toradol  - F/u amylase    #Chronic Anemia  - In the ED, Hb 11.6, baseline ~12  - MCV 79.2  - F/u iron studies    #3.8 cm wide R iliac artery aneurysm- stable  - F/u vascular o/p- surgery scheduled with Dr. Tavera on 4/21/2025    #HTN  #CAD s/p CABG in Jan 2024  #Paroxysmal A.fib  - C/w home meds    #MISC  - DVT ppx: lovenox  - GI ppx: protonix  - Diet: DASH  - Activity: as tolerated    Pending: GGT, GI c/s, iron studies, amylase 60 yo M with a PMH of HTN, hiatal hernia, HLD, CAD s/p CABG in Jan 2024, paroxysmal A. fib on plavix, former smoker (30 pack years), recently diagnosed with colitis and treated during his admission 4/1-4/5 presenting to the ED for evaluation of persistent abdominal pain and weakness x 3 weeks. Patient states he also has lower back pain and endorses chills, states he had a fever 3 days ago. Patient denies chest pain, sob, nausea, vomiting, diarrhea, urinary symptoms.    #Abdominal Pain 2/2 recent colitis vs possible Pancreatitis   - CT abd/pelv w/ IVC- Redemonstrated prominent submucosal fat/bowel wall thickening involving descending colon, possibly related to prior episodes of colitis. Otherwise, no new acute findings in the abdomen or pelvis.  - F/u GGT  - F/u GI c/s for possible irritable bowel management  - Monitor off antibiotics  - Pain control: tylenol and toradol  - F/u amylase  - CLD   - Lipase increased  - F/u Bcx  - Started LR at 48    #Chronic Anemia  - In the ED, Hb 11.6, baseline ~12  - MCV 79.2  - F/u iron studies    #3.8 cm wide R iliac artery aneurysm- stable  - F/u vascular o/p- surgery scheduled with Dr. Tavera on 4/21/2025    #HTN  #CAD s/p CABG in Jan 2024  #Paroxysmal A.fib  - C/w home meds    #MISC  - DVT ppx: lovenox  - GI ppx: protonix  - Diet: DASH  - Activity: as tolerated    Pending: GGT, GI c/s, iron studies, amylase

## 2025-04-18 NOTE — ED PROVIDER NOTE - PHYSICAL EXAMINATION
GENERAL: Well-nourished, Well-developed. NAD.  HEAD: No visible or palpable bumps or hematomas. No ecchymosis behind ears B/L.  Eyes: PERRLA, EOMI.   ENMT: MMM.   Neck: Supple. FROM  CVS: Normal S1,S2. No murmurs appreciated on auscultation   RESP: No use of accessory muscles. Chest rise symmetrical with good expansion. Lungs clear to auscultation B/L. No wheezing, rales, or rhonchi auscultated.  GI: Normal auscultation of bowel sounds in all 4 quadrants. (+)mild lower abd ttp. Soft, Nondistended. No guarding or rebound tenderness. No CVAT B/L.  Skin: Warm, Dry. No rashes or lesions. Good cap refill < 2 sec B/L.  EXT: Radial and pedal pulses present B/L. No calf tenderness or swelling B/L. No palpable cords. No pedal edema B/L.

## 2025-04-18 NOTE — ED PROVIDER NOTE - ATTENDING APP SHARED VISIT CONTRIBUTION OF CARE
abd pain, h/o recent colitiis s/p admission now with similar symptoms.  CT shows colitis, no WBC elevation, no lactate.  will admit for GI consult and eval for IBD.

## 2025-04-18 NOTE — ED PROVIDER NOTE - COVID-19 RESULT DATE/TIME
PHYSICIAN NEXT STEPS:  Review Only    CHIEF COMPLAINT:  Chief Complaint/Protocol Used: Fever - 3 Months or Older  Onset: 10th      ASSESSMENT:  ? Onset: 10th  ? Fever Level: 101.3   ? Measurement: Temporal  ? Onset: 7pm tonight, but has been running intermittently since the 10th.   ? Child's Appearance: Acting ok, age appropriate, eating and drinking well.   ? Pain: Denies  ? Symptoms: Cough present for last week.   ? Cause: Unknown  ? Contacts: Sister was sick last week, doing better now.   ? Fever Medicine: Ibuprofen.   -------------------------------------------------------    DISPOSITION:  Disposition Recommendation: See Physician within 24 Hours  Questions that led to disposition:  ? Fever present > 3 days (72 hours)  Patient Directed To: Unspecified  Patient Intended Action: Unspecified    CALL NOTES:  07/21/2021 at 9:09 PM by Mary Olivarez  ? Guideline reached with no fever ongoing over 72 hours, upgraded.07/21/2021 at 9:08 PM by Mary Olivarez  ? Due to tech issues, doctor is calling mom back to discuss. 07/21/2021 at 8:50 PM by Mary Olivarez  ? Correction fever since the 15th.     DISPOSITION OVERRIDE/PROVIDER CONSULT:  Disposition Override: N/A  Override Source: Unspecified  Consulted with PCP: No  Consulted with On-Call Physician: No    CALLER CONTACT INFO:  Name: constance (Mother)  Phone 1: [phone]      ENCOUNTER STARTED:  07/21/21 08:38:38 PM  ENCOUNTER ASSIGNED TO/CLOSED BY:  Mary Olivarez @ 07/21/21 09:09:30 PM      -------------------------------------------------------    CARE ADVICE given per Fever - 3 Months or Older guideline.  SEE PHYSICIAN WITHIN 24 HOURS:  * IF OFFICE WILL BE OPEN: Your child needs to be examined within the next 24 hours. Call your child's doctor when the office opens, and make an appointment.  * IF OFFICE WILL BE CLOSED AND NO PCP TRIAGE: Your child needs to be examined within the next 24 hours. An Urgent Care Center is often a good source of care if your  doctor's office is closed. Go to _________ .  * IF OFFICE WILL BE CLOSED AND PCP TRIAGE REQUIRED: Your child may need to be seen within the next 24 hours. Your doctor will want to talk with you to decide what's best. I'll page him now. (Exception: from 10 pm to 7 am. Since this isn't serious, we'll   hold the page until morning.)  * IF PATIENT HAS NO PCP: Refer patient to an Urgent Care Center or Retail clinic.  Also try to help caller find a PCP (medical home) for their child.      UNDERSTANDS CARE ADVICE: No    AGREES WITH CARE ADVICE: No    WILL FOLLOW CARE ADVICE: No    -------------------------------------------------------   06-Apr-2025 13:11

## 2025-04-19 DIAGNOSIS — Z95.1 PRESENCE OF AORTOCORONARY BYPASS GRAFT: Chronic | ICD-10-CM

## 2025-04-19 LAB
ALBUMIN SERPL ELPH-MCNC: 3.4 G/DL — LOW (ref 3.5–5.2)
ALP SERPL-CCNC: 249 U/L — HIGH (ref 30–115)
ALT FLD-CCNC: 21 U/L — SIGNIFICANT CHANGE UP (ref 0–41)
AMYLASE P1 CFR SERPL: 62 U/L — SIGNIFICANT CHANGE UP (ref 25–115)
ANION GAP SERPL CALC-SCNC: 14 MMOL/L — SIGNIFICANT CHANGE UP (ref 7–14)
APTT BLD: 34 SEC — SIGNIFICANT CHANGE UP (ref 27–39.2)
AST SERPL-CCNC: 28 U/L — SIGNIFICANT CHANGE UP (ref 0–41)
BASOPHILS # BLD AUTO: 0.04 K/UL — SIGNIFICANT CHANGE UP (ref 0–0.2)
BASOPHILS NFR BLD AUTO: 0.6 % — SIGNIFICANT CHANGE UP (ref 0–1)
BILIRUB SERPL-MCNC: 0.7 MG/DL — SIGNIFICANT CHANGE UP (ref 0.2–1.2)
BUN SERPL-MCNC: 16 MG/DL — SIGNIFICANT CHANGE UP (ref 10–20)
CALCIUM SERPL-MCNC: 8.8 MG/DL — SIGNIFICANT CHANGE UP (ref 8.4–10.5)
CHLORIDE SERPL-SCNC: 100 MMOL/L — SIGNIFICANT CHANGE UP (ref 98–110)
CO2 SERPL-SCNC: 23 MMOL/L — SIGNIFICANT CHANGE UP (ref 17–32)
CREAT SERPL-MCNC: 0.9 MG/DL — SIGNIFICANT CHANGE UP (ref 0.7–1.5)
CRP SERPL-MCNC: 60 MG/L — HIGH
EGFR: 97 ML/MIN/1.73M2 — SIGNIFICANT CHANGE UP
EGFR: 97 ML/MIN/1.73M2 — SIGNIFICANT CHANGE UP
EOSINOPHIL # BLD AUTO: 0.07 K/UL — SIGNIFICANT CHANGE UP (ref 0–0.7)
EOSINOPHIL NFR BLD AUTO: 1 % — SIGNIFICANT CHANGE UP (ref 0–8)
GGT SERPL-CCNC: 30 U/L — SIGNIFICANT CHANGE UP (ref 1–40)
GLUCOSE SERPL-MCNC: 89 MG/DL — SIGNIFICANT CHANGE UP (ref 70–99)
HCT VFR BLD CALC: 31.3 % — LOW (ref 42–52)
HGB BLD-MCNC: 10.3 G/DL — LOW (ref 14–18)
IMM GRANULOCYTES NFR BLD AUTO: 5.1 % — HIGH (ref 0.1–0.3)
INR BLD: 1.36 RATIO — HIGH (ref 0.65–1.3)
IRON SATN MFR SERPL: 29 % — SIGNIFICANT CHANGE UP (ref 15–50)
IRON SATN MFR SERPL: 54 UG/DL — SIGNIFICANT CHANGE UP (ref 35–150)
LYMPHOCYTES # BLD AUTO: 1.29 K/UL — SIGNIFICANT CHANGE UP (ref 1.2–3.4)
LYMPHOCYTES # BLD AUTO: 18.4 % — LOW (ref 20.5–51.1)
MAGNESIUM SERPL-MCNC: 2 MG/DL — SIGNIFICANT CHANGE UP (ref 1.8–2.4)
MCHC RBC-ENTMCNC: 25.7 PG — LOW (ref 27–31)
MCHC RBC-ENTMCNC: 32.9 G/DL — SIGNIFICANT CHANGE UP (ref 32–37)
MCV RBC AUTO: 78.1 FL — LOW (ref 80–94)
MONOCYTES # BLD AUTO: 0.8 K/UL — HIGH (ref 0.1–0.6)
MONOCYTES NFR BLD AUTO: 11.4 % — HIGH (ref 1.7–9.3)
NEUTROPHILS # BLD AUTO: 4.46 K/UL — SIGNIFICANT CHANGE UP (ref 1.4–6.5)
NEUTROPHILS NFR BLD AUTO: 63.5 % — SIGNIFICANT CHANGE UP (ref 42.2–75.2)
NRBC BLD AUTO-RTO: 0 /100 WBCS — SIGNIFICANT CHANGE UP (ref 0–0)
PLATELET # BLD AUTO: 187 K/UL — SIGNIFICANT CHANGE UP (ref 130–400)
PMV BLD: 9.7 FL — SIGNIFICANT CHANGE UP (ref 7.4–10.4)
POTASSIUM SERPL-MCNC: 4.4 MMOL/L — SIGNIFICANT CHANGE UP (ref 3.5–5)
POTASSIUM SERPL-SCNC: 4.4 MMOL/L — SIGNIFICANT CHANGE UP (ref 3.5–5)
PROT SERPL-MCNC: 6.1 G/DL — SIGNIFICANT CHANGE UP (ref 6–8)
PROTHROM AB SERPL-ACNC: 16.1 SEC — HIGH (ref 9.95–12.87)
RBC # BLD: 4.01 M/UL — LOW (ref 4.7–6.1)
RBC # FLD: 14.2 % — SIGNIFICANT CHANGE UP (ref 11.5–14.5)
SODIUM SERPL-SCNC: 137 MMOL/L — SIGNIFICANT CHANGE UP (ref 135–146)
TIBC SERPL-MCNC: 186 UG/DL — LOW (ref 220–430)
TRANSFERRIN SERPL-MCNC: 156 MG/DL — LOW (ref 200–360)
UIBC SERPL-MCNC: 132 UG/DL — SIGNIFICANT CHANGE UP (ref 110–370)
WBC # BLD: 7.02 K/UL — SIGNIFICANT CHANGE UP (ref 4.8–10.8)
WBC # FLD AUTO: 7.02 K/UL — SIGNIFICANT CHANGE UP (ref 4.8–10.8)

## 2025-04-19 PROCEDURE — 93010 ELECTROCARDIOGRAM REPORT: CPT

## 2025-04-19 PROCEDURE — 99232 SBSQ HOSP IP/OBS MODERATE 35: CPT

## 2025-04-19 RX ORDER — LACTOBACILLUS ACIDOPHILUS/PECT 75 MM-100
1 CAPSULE ORAL
Refills: 0 | Status: DISCONTINUED | OUTPATIENT
Start: 2025-04-19 | End: 2025-05-06

## 2025-04-19 RX ORDER — B1/B2/B3/B5/B6/B12/VIT C/FOLIC 500-0.5 MG
1 TABLET ORAL DAILY
Refills: 0 | Status: DISCONTINUED | OUTPATIENT
Start: 2025-04-19 | End: 2025-05-06

## 2025-04-19 RX ORDER — SIMETHICONE 80 MG
80 TABLET,CHEWABLE ORAL EVERY 6 HOURS
Refills: 0 | Status: DISCONTINUED | OUTPATIENT
Start: 2025-04-19 | End: 2025-05-06

## 2025-04-19 RX ORDER — SUCRALFATE 1 G
1 TABLET ORAL
Refills: 0 | Status: DISCONTINUED | OUTPATIENT
Start: 2025-04-19 | End: 2025-05-06

## 2025-04-19 RX ORDER — KETOROLAC TROMETHAMINE 30 MG/ML
15 INJECTION, SOLUTION INTRAMUSCULAR; INTRAVENOUS EVERY 6 HOURS
Refills: 0 | Status: DISCONTINUED | OUTPATIENT
Start: 2025-04-19 | End: 2025-04-20

## 2025-04-19 RX ORDER — ACETAMINOPHEN 500 MG/5ML
650 LIQUID (ML) ORAL EVERY 6 HOURS
Refills: 0 | Status: DISCONTINUED | OUTPATIENT
Start: 2025-04-19 | End: 2025-04-28

## 2025-04-19 RX ORDER — ENOXAPARIN SODIUM 100 MG/ML
40 INJECTION SUBCUTANEOUS EVERY 24 HOURS
Refills: 0 | Status: DISCONTINUED | OUTPATIENT
Start: 2025-04-19 | End: 2025-05-06

## 2025-04-19 RX ORDER — SODIUM CHLORIDE 9 G/1000ML
1000 INJECTION, SOLUTION INTRAVENOUS
Refills: 0 | Status: DISCONTINUED | OUTPATIENT
Start: 2025-04-19 | End: 2025-04-22

## 2025-04-19 RX ORDER — ATORVASTATIN CALCIUM 80 MG/1
80 TABLET, FILM COATED ORAL AT BEDTIME
Refills: 0 | Status: DISCONTINUED | OUTPATIENT
Start: 2025-04-19 | End: 2025-05-06

## 2025-04-19 RX ORDER — METOPROLOL SUCCINATE 50 MG/1
12.5 TABLET, EXTENDED RELEASE ORAL
Refills: 0 | Status: DISCONTINUED | OUTPATIENT
Start: 2025-04-19 | End: 2025-05-06

## 2025-04-19 RX ORDER — CLOPIDOGREL BISULFATE 75 MG/1
75 TABLET, FILM COATED ORAL DAILY
Refills: 0 | Status: DISCONTINUED | OUTPATIENT
Start: 2025-04-19 | End: 2025-04-21

## 2025-04-19 RX ADMIN — Medication 1 TABLET(S): at 12:06

## 2025-04-19 RX ADMIN — KETOROLAC TROMETHAMINE 15 MILLIGRAM(S): 30 INJECTION, SOLUTION INTRAMUSCULAR; INTRAVENOUS at 01:16

## 2025-04-19 RX ADMIN — Medication 1 GRAM(S): at 05:25

## 2025-04-19 RX ADMIN — Medication 20 MILLIGRAM(S): at 11:31

## 2025-04-19 RX ADMIN — CLOPIDOGREL BISULFATE 75 MILLIGRAM(S): 75 TABLET, FILM COATED ORAL at 11:31

## 2025-04-19 RX ADMIN — Medication 1 TABLET(S): at 11:30

## 2025-04-19 RX ADMIN — KETOROLAC TROMETHAMINE 15 MILLIGRAM(S): 30 INJECTION, SOLUTION INTRAMUSCULAR; INTRAVENOUS at 15:56

## 2025-04-19 RX ADMIN — Medication 1 TABLET(S): at 08:37

## 2025-04-19 RX ADMIN — Medication 1 TABLET(S): at 18:10

## 2025-04-19 RX ADMIN — SODIUM CHLORIDE 50 MILLILITER(S): 9 INJECTION, SOLUTION INTRAVENOUS at 04:08

## 2025-04-19 RX ADMIN — Medication 1 APPLICATION(S): at 12:05

## 2025-04-19 RX ADMIN — ENOXAPARIN SODIUM 40 MILLIGRAM(S): 100 INJECTION SUBCUTANEOUS at 08:38

## 2025-04-19 RX ADMIN — Medication 40 MILLIGRAM(S): at 05:27

## 2025-04-19 RX ADMIN — Medication 1 GRAM(S): at 23:07

## 2025-04-19 RX ADMIN — Medication 1 GRAM(S): at 11:31

## 2025-04-19 RX ADMIN — METOPROLOL SUCCINATE 12.5 MILLIGRAM(S): 50 TABLET, EXTENDED RELEASE ORAL at 18:10

## 2025-04-19 RX ADMIN — Medication 1 GRAM(S): at 18:10

## 2025-04-19 RX ADMIN — KETOROLAC TROMETHAMINE 15 MILLIGRAM(S): 30 INJECTION, SOLUTION INTRAMUSCULAR; INTRAVENOUS at 08:45

## 2025-04-19 RX ADMIN — ATORVASTATIN CALCIUM 80 MILLIGRAM(S): 80 TABLET, FILM COATED ORAL at 21:54

## 2025-04-19 RX ADMIN — Medication 80 MILLIGRAM(S): at 05:27

## 2025-04-19 RX ADMIN — KETOROLAC TROMETHAMINE 15 MILLIGRAM(S): 30 INJECTION, SOLUTION INTRAMUSCULAR; INTRAVENOUS at 21:54

## 2025-04-19 RX ADMIN — METOPROLOL SUCCINATE 12.5 MILLIGRAM(S): 50 TABLET, EXTENDED RELEASE ORAL at 05:26

## 2025-04-19 NOTE — PROGRESS NOTE ADULT - SUBJECTIVE AND OBJECTIVE BOX
patient seen and examined.  still c/o abd. discomfort and decreased appetite.  no vomiting. no actual nausea. no diarrhea. no constipation. no fever. no chills.    Vital Signs Last 24 Hrs  T(C): 36.2 (19 Apr 2025 07:46), Max: 37.1 (18 Apr 2025 23:22)  T(F): 97.1 (19 Apr 2025 07:46), Max: 98.8 (18 Apr 2025 23:22)  HR: 68 (19 Apr 2025 07:46) (68 - 116)  BP: 115/70 (19 Apr 2025 07:46) (107/68 - 122/78)  BP(mean): --  RR: 16 (19 Apr 2025 07:46) (16 - 18)  SpO2: 97% (19 Apr 2025 07:46) (97% - 98%)    Parameters below as of 19 Apr 2025 07:46  Patient On (Oxygen Delivery Method): room air    conj pink, no jaundice  neck supple. no JVD  lungs clear to auscultation. no crackles or wheezing  heart regular rate and rhythm. no murmur or gallop  abd BS pos. soft pos. tender mid-epigastrium and larisa-umbilical but no rebound or rigidity  extremiites no edema. skin turgor OK    Labs:                    10.3   7.02  )-----------( 187      ( 19 Apr 2025 06:20 )             31.3     04-19    137  |  100  |  16  ----------------------------<  89  4.4   |  23  |  0.9    Ca    8.8      19 Apr 2025 06:20  Mg     2.0     04-19    TPro  6.1  /  Alb  3.4[L]  /  TBili  0.7  /  DBili  x   /  AST  28  /  ALT  21  /  AlkPhos  249[H]  04-19      MEDICATIONS  (STANDING):  atorvastatin 80 milliGRAM(s) Oral at bedtime  chlorhexidine 2% Cloths 1 Application(s) Topical daily  clopidogrel Tablet 75 milliGRAM(s) Oral daily  enoxaparin Injectable 40 milliGRAM(s) SubCutaneous every 24 hours  famotidine    Tablet 20 milliGRAM(s) Oral daily  lactated ringers. 1000 milliLiter(s) (50 mL/Hr) IV Continuous <Continuous>  lactobacillus acidophilus 1 Tablet(s) Oral three times a day with meals  metoprolol tartrate 12.5 milliGRAM(s) Oral two times a day  multivitamin  Chewable 1 Tablet(s) Oral daily  pantoprazole    Tablet 40 milliGRAM(s) Oral before breakfast  sucralfate 1 Gram(s) Oral four times a day    MEDICATIONS  (PRN):  acetaminophen     Tablet .. 650 milliGRAM(s) Oral every 6 hours PRN Temp greater or equal to 38C (100.4F), Mild Pain (1 - 3)  ketorolac   Injectable 15 milliGRAM(s) IV Push every 6 hours PRN Moderate Pain (4 - 6)  simethicone 80 milliGRAM(s) Chew every 6 hours PRN Gas

## 2025-04-19 NOTE — PROGRESS NOTE ADULT - ASSESSMENT
Abdominal pain -  pain started s/p multiple courses of antibiotics which does suggest C. diff however C. diff antigen negative.    patient has elevated alkaline phosphatase suggesting cholestatic pattern but no dilatation of biliary tract noted on CT and liver normal size and consistency on CT   CT does show thickened colon so inflammatory bowel disease or possible an infection, such as a parasitic infection that would not be treated with antibiotics that patient took may be responsible.  another possibility is ischemic bowel since he has extensive calcification of aorta but lactate was normal    CAD - s/p CABG earlier this year - asymptomatic  h/o tobacco use - quit more than 1 year ago  Anemia - iron studies normal.  hypochromic, microcytic - ?anemia of chronic disease but no obvious chronic disease    Plan:  patient seen by GI, they advise outpatient colonoscopy  will check GGTP to see if alk phos is bone vs liver  consider MRCP if alk phos remains elevated to further elucidate possibility of bilirary tract disease  anti mitochondrial and antimicrosomal antibodies to r/o primary biliary cirrhosis (unlikely in this male patient)

## 2025-04-19 NOTE — PATIENT PROFILE ADULT - FALL HARM RISK - RISK INTERVENTIONS

## 2025-04-19 NOTE — CONSULT NOTE ADULT - SUBJECTIVE AND OBJECTIVE BOX
Gastroenterology Consultation:    Patient is a 59y old  Male who presents with a chief complaint of abdominal pain (18 Apr 2025 23:49)        Admitted on: 04-18-25      HPI:  58 yo M with a PMH of HTN, hiatal hernia, HLD, CAD s/p CABG in Jan 2024, paroxysmal A. fib not on AC, former smoker (30 pack years, quit 1yr ago), recently diagnosed with colitis and treated during his admission 4/1-4/5 presenting to the ED for evaluation of persistent abdominal pain and weakness x 3 weeks. Patient states he also has lower back pain and endorses chills, states he had a fever 3 days ago. Patient endorses a history of diarrhea that resolved 1 week ago as well as decreased PO intake 2/2 pain with eating. Patient denies chest pain, sob, nausea, vomiting, diarrhea, constipation urinary symptoms.    Of note, patient was admitted 4/3-4/5 for colitis 2/2 abx use on 4/1 (cipro and flagyl x10d). Patient was monitored off abx during this admission.     In the ED:  - Vitals were /78, , RR 18, Temp 98F, SpO2 98% on room air  - Labs were significant for WBC wnl, Hb 11.6 (baseline 12), MCV 79.2, Plt wnl, AG 15, Alk Phos 275, LFTs wnl, lactate wnl, lipase 67; UA: trace ketone, inc specific gravity  - Imaging:       - CXR- Low lung volume. No radiographic evidence of acute cardiopulmonary disease.       - CT abd/pelv w/ IVC- Redemonstrated prominent submucosal fat/bowel wall thickening involving descending colon, possibly related to prior episodes of colitis. Otherwise, no new acute findings in the abdomen or pelvis  - Interventions: Zofran 4mg IVP x1, Morphine 2mg IVP x1, Morphine 4mg IVP x1, Famotidine 20mg IVP x1, NS bolus 1L x1  Admitted for abdominal pain. (18 Apr 2025 23:49)          Prior EGD: never had any    Prior Colonoscopy: >5 years ago        PAST MEDICAL & SURGICAL HISTORY:  HTN (hypertension)  Hiatal hernia  HLD (hyperlipidemia)  CAD (coronary artery disease)  Paroxysmal atrial fibrillation  History of colitis  S/P CABG (coronary artery bypass graft)            FAMILY HISTORY:  FHx: lung cancer (Father)        Social History:  former smoker (30 pack years, quit 1yr ago)    Home Medications:  clopidogrel 75 mg oral tablet: 1 tab(s) orally once a day (04 Apr 2025 00:42)  Lipitor 80 mg oral tablet: 1 tab(s) orally once a day (04 Apr 2025 00:42)  Metoprolol Tartrate 25 mg oral tablet: 0.5 tab(s) orally 2 times a day (04 Apr 2025 00:42)  Multiple Vitamins oral tablet, chewable: 1 tab(s) orally once a day (06 Apr 2025 12:34)  omeprazole 40 mg oral delayed release capsule: 1 cap(s) orally once a day (04 Apr 2025 00:41)  Pepcid 20 mg oral tablet: 1 tab(s) orally once a day (04 Apr 2025 00:42)        MEDICATIONS  (STANDING):  atorvastatin 80 milliGRAM(s) Oral at bedtime  chlorhexidine 2% Cloths 1 Application(s) Topical daily  clopidogrel Tablet 75 milliGRAM(s) Oral daily  enoxaparin Injectable 40 milliGRAM(s) SubCutaneous every 24 hours  famotidine    Tablet 20 milliGRAM(s) Oral daily  lactated ringers. 1000 milliLiter(s) (50 mL/Hr) IV Continuous <Continuous>  lactobacillus acidophilus 1 Tablet(s) Oral three times a day with meals  metoprolol tartrate 12.5 milliGRAM(s) Oral two times a day  multivitamin  Chewable 1 Tablet(s) Oral daily  pantoprazole    Tablet 40 milliGRAM(s) Oral before breakfast  sucralfate 1 Gram(s) Oral four times a day    MEDICATIONS  (PRN):  acetaminophen     Tablet .. 650 milliGRAM(s) Oral every 6 hours PRN Temp greater or equal to 38C (100.4F), Mild Pain (1 - 3)  ketorolac   Injectable 15 milliGRAM(s) IV Push every 6 hours PRN Moderate Pain (4 - 6)  simethicone 80 milliGRAM(s) Chew every 6 hours PRN Gas      Allergies  penicillins (Unknown)      Review of Systems:   Constitutional:  No Fever, No Chills  ENT/Mouth:  No Hearing Changes,  No Difficulty Swallowing  Eyes:  No Eye Pain, No Vision Changes  Cardiovascular:  No Chest Pain, No Palpitations  Respiratory:  No Cough, No Dyspnea  Gastrointestinal:  As described in HPI  Musculoskeletal:  No Joint Swelling, No Back Pain  Skin:  No Skin Lesions, No Jaundice  Neuro:  No Syncope, No Dizziness  Heme/Lymph:  No Bruising, No Bleeding.          Physical Examination:  T(C): 36.2 (04-19-25 @ 07:46), Max: 37.1 (04-18-25 @ 23:22)  HR: 68 (04-19-25 @ 07:46) (68 - 116)  BP: 115/70 (04-19-25 @ 07:46) (107/68 - 122/78)  RR: 16 (04-19-25 @ 07:46) (16 - 18)  SpO2: 97% (04-19-25 @ 07:46) (97% - 98%)  Height (cm): 180.3 (04-19-25 @ 00:00)  Weight (kg): 90.718 (04-19-25 @ 00:00)    04-19-25 @ 07:01  -  04-19-25 @ 09:51  --------------------------------------------------------  IN: 0 mL / OUT: 200 mL / NET: -200 mL          GENERAL: AAOx3, no acute distress.  HEAD:  Atraumatic, Normocephalic  EYES: conjunctiva and sclera clear  NECK: Supple, no JVD or thyromegaly  CHEST/LUNG: Clear to auscultation bilaterally; No wheeze, rhonchi, or rales  HEART: Regular rate and rhythm; normal S1, S2, No murmurs.  ABDOMEN: Soft, nontender, nondistended; Bowel sounds present  NEUROLOGY: No asterixis or tremor.   SKIN: Intact, no jaundice        Data:                        10.3   7.02  )-----------( 187      ( 19 Apr 2025 06:20 )             31.3     Hgb Trend:  10.3  04-19-25 @ 06:20  11.6  04-18-25 @ 19:24        04-19    137  |  100  |  16  ----------------------------<  89  4.4   |  23  |  0.9    Ca    8.8      19 Apr 2025 06:20  Mg     2.0     04-19    TPro  6.1  /  Alb  3.4[L]  /  TBili  0.7  /  DBili  x   /  AST  28  /  ALT  21  /  AlkPhos  249[H]  04-19    Liver panel trend:  TBili 0.7   /   AST 28   /   ALT 21   /   AlkP 249   /   Tptn 6.1   /   Alb 3.4    /   DBili --      04-19  TBili 0.7   /   AST 31   /   ALT 23   /   AlkP 275   /   Tptn 6.9   /   Alb 3.8    /   DBili --      04-18      PT/INR - ( 19 Apr 2025 06:20 )   PT: 16.10 sec;   INR: 1.36 ratio         PTT - ( 19 Apr 2025 06:20 )  PTT:34.0 sec        Radiology:  CT Abdomen and Pelvis w/ IV Cont:   ACC: [id number] EXAM:  CT ABDOMEN AND PELVIS IC   ORDERED BY: DENNYS MARKS     PROCEDURE DATE:  04/18/2025          INTERPRETATION:  CLINICAL INFORMATION: Lower abdominal and back pain.    COMPARISON: CT abdomen and pelvis April 1, 2025.    CONTRAST/COMPLICATIONS:  IV Contrast: Omnipaque 350  95 cc administered   5 cc discarded  Oral Contrast: NONE      PROCEDURE:  CT of the Abdomen and Pelvis was performed.  Sagittal and coronal reformats were performed.    FINDINGS:    LOWER THORAX: Sternotomy. Aortic and coronary artery calcifications.   Redemonstrated hiatal hernia.  HEPATOBILIARY: Unremarkable  GALLBLADDER: Unremarkable  SPLEEN: Unremarkable  PANCREAS: Unremarkable  ADRENAL GLANDS:unremarkable  KIDNEYS: Symmetric renal enhancement. No hydronephrosis. Right renal cyst.  ABDOMINOPELVIC NODES: No lymphadenopathy.  PELVIC ORGANS: Prostate calcifications.  Unremarkable urinary bladder  PERITONEUM /MESENTERY/BOWEL: No bowel obstruction. No free fluid or   pneumoperitoneum. Colonic diverticulosis. Redemonstrated prominent   submucosal fat/bowel wall thickening involving descending colon.  ABDOMINAL WALL: Fat-containing umbilical hernia.  BONES/SOFT TISSUES: No suspicious osseous lesions. No acute fracture.  VASCULAR ANATOMY: Redemonstrated 3.8 cm wide right iliac artery aneurysm.   Normal caliber abdominal aorta with extensive calcifications.      IMPRESSION: Since CT abdomen and pelvis April 1, 2025,    Redemonstrated prominent submucosal fat/bowel wall thickening involving   descending colon, possibly related to prior episodes of colitis.    Otherwise, no new acute findings in the abdomen or pelvis.    --- End of Report ---            LEANDER HIRSCH MD; Attending Radiologist  This document has been electronically signed. Apr 18 2025 10:29PM (04-18-25 @ 21:24)

## 2025-04-19 NOTE — CONSULT NOTE ADULT - ASSESSMENT
58 yo M with pmhx of HTN, HLD, CAD s/p CABG (Perez 10, 2024) on plavix , pAfib, former smoker (30 pack years) presents to ED with worsening abdominal pain, nausea and multiple episodes of diarrhea. Patient reports that he was diagnosed with flu early march and then developed right eye stye received antibiotics. After this, patient developed sharp mid abdominal pain 6/10, diarrhea, had an ER visit received cipro/flagyl  but since last two days his abdominal pain is worsening with dec PO intake and multiple episodes of diarrhea >3/day. Patient saw  and is scheduled for EGD/Colonoscopy in 1 month. CTAP notable for submucosal fat in descending colon, ascending colon. He reports that he lost 28lbs in the last month. GI consulted for further evaluation.    #Abdominal pain with prior diagnosis of colitis  # Diarrhea that has now resolves  #Microcytic anemia   #Large Hiatal Hernia  # Elevated Alk Phos with normal GGT   - Not septic on admission   - CTAP 4/18/25: Redemonstrated prominent submucosal fat/bowel wall thickening involving  descending colon, possibly related to prior episodes of colitis. Otherwise, no new acute findings in the abdomen or pelvis.  - CTAP 4/1/25 : IC : Submucosal fat within the descending colon is noted which may be related to previous episodes of colitis. Similar changes within the ascending colon also present  - Currently abdominal pain improving and nausea better  - ESR 4/4/25:48  CRP: 62  - On Plavix  - C diff and GI PCR 4/4/25: negative  - Lipase 67   - Alk Phos noted with elevation since April 1, previously normal in Jan, however GGT normal,  rest of LFTs wnl      RECS  - Obtain Calprotectin and Lactoferrin   - Trend ESR and CRP   - Repeat GI pcr and C diff if recurrent diarrhea   - Diet as tolerated  - Monitor BMs  - will benefit from colonoscopy as OP    60 yo M with pmhx of HTN, HLD, CAD s/p CABG (Perez 10, 2024) on plavix , pAfib, former smoker (30 pack years) presents to ED with worsening abdominal pain, nausea and multiple episodes of diarrhea. Patient reports that he was diagnosed with flu early march and then developed right eye stye received antibiotics. After this, patient developed sharp mid abdominal pain 6/10, diarrhea, had an ER visit received cipro/flagyl  but since last two days his abdominal pain is worsening with dec PO intake and multiple episodes of diarrhea >3/day. Patient saw  and is scheduled for EGD/Colonoscopy in 1 month. CTAP notable for submucosal fat in descending colon, ascending colon. He reports that he lost 28lbs in the last month. GI consulted for further evaluation.    # Abdominal pain with prior diagnosis of colitis  # Weight loss  # Former smoker   # Diarrhea that has now resolves  # Microcytic anemia   # Large Hiatal Hernia  # Elevated Alk Phos with normal GGT   - Not septic on admission   - CTAP 4/18/25: Redemonstrated prominent submucosal fat/bowel wall thickening involving  descending colon, possibly related to prior episodes of colitis. Otherwise, no new acute findings in the abdomen or pelvis.  - CTAP 4/1/25 : IC : Submucosal fat within the descending colon is noted which may be related to previous episodes of colitis. Similar changes within the ascending colon also present  - Currently abdominal pain improving and nausea better  - ESR 4/4/25:48  CRP: 62  - On Plavix  - C diff and GI PCR 4/4/25: negative  - Lipase 67   - Alk Phos noted with elevation since April 1, previously normal in Jan, however GGT normal,  rest of LFTs wnl      RECS  - will benefit from colonoscopy as OP vs inpatient if no clinical improvement   - Will need cardio clearance to see if okay to hold Plavix for 5 days prior to procedure   - Obtain Calprotectin and Lactoferrin   - Trend ESR and CRP   - Repeat GI pcr and C diff if recurrent diarrhea   - Diet as tolerated  - Monitor BMs      60 yo M with pmhx of HTN, HLD, CAD s/p CABG (Perez 10, 2024) on plavix , pAfib, former smoker (30 pack years) presents to ED with worsening abdominal pain, nausea and multiple episodes of diarrhea. Patient reports that he was diagnosed with flu early march and then developed right eye stye received antibiotics. After this, patient developed sharp mid abdominal pain 6/10, diarrhea, had an ER visit received cipro/flagyl  but since last two days his abdominal pain is worsening with dec PO intake and multiple episodes of diarrhea >3/day. Patient saw  and is scheduled for EGD/Colonoscopy in 1 month. CTAP notable for submucosal fat in descending colon, ascending colon. He reports that he lost 28lbs in the last month. GI consulted for further evaluation.    # Abdominal pain with prior diagnosis of colitis  # Weight loss  # Former smoker   # Diarrhea that has now resolved  # Microcytic anemia   # Large Hiatal Hernia  # Elevated Alk Phos with normal GGT   - Not septic on admission   - CTAP 4/18/25: Redemonstrated prominent submucosal fat/bowel wall thickening involving  descending colon, possibly related to prior episodes of colitis. Otherwise, no new acute findings in the abdomen or pelvis.  - CTAP 4/1/25 : IC : Submucosal fat within the descending colon is noted which may be related to previous episodes of colitis. Similar changes within the ascending colon also present  - Currently abdominal pain improving and nausea better  - ESR 4/4/25:48  CRP: 62  - On Plavix  - C diff and GI PCR 4/4/25: negative  - Lipase 67   - Alk Phos noted with elevation since April 1, previously normal in Jan, however GGT normal,  rest of LFTs wnl      RECS  - will benefit from colonoscopy/ EGD as OP vs inpatient if no clinical improvement   - Will need cardio clearance to see if okay to hold Plavix for 5 days prior to procedure   - Obtain Calprotectin and Lactoferrin   - Trend ESR and CRP   - Repeat GI pcr and C diff if recurrent diarrhea   - Diet as tolerated  - Monitor BMs

## 2025-04-20 LAB
ALBUMIN SERPL ELPH-MCNC: 3.5 G/DL — SIGNIFICANT CHANGE UP (ref 3.5–5.2)
ALP SERPL-CCNC: 255 U/L — HIGH (ref 30–115)
ALT FLD-CCNC: 22 U/L — SIGNIFICANT CHANGE UP (ref 0–41)
ANION GAP SERPL CALC-SCNC: 11 MMOL/L — SIGNIFICANT CHANGE UP (ref 7–14)
AST SERPL-CCNC: 36 U/L — SIGNIFICANT CHANGE UP (ref 0–41)
BASOPHILS # BLD AUTO: 0.05 K/UL — SIGNIFICANT CHANGE UP (ref 0–0.2)
BASOPHILS NFR BLD AUTO: 0.9 % — SIGNIFICANT CHANGE UP (ref 0–1)
BILIRUB SERPL-MCNC: 0.6 MG/DL — SIGNIFICANT CHANGE UP (ref 0.2–1.2)
BUN SERPL-MCNC: 13 MG/DL — SIGNIFICANT CHANGE UP (ref 10–20)
CALCIUM SERPL-MCNC: 8.7 MG/DL — SIGNIFICANT CHANGE UP (ref 8.4–10.5)
CHLORIDE SERPL-SCNC: 105 MMOL/L — SIGNIFICANT CHANGE UP (ref 98–110)
CO2 SERPL-SCNC: 25 MMOL/L — SIGNIFICANT CHANGE UP (ref 17–32)
CREAT SERPL-MCNC: 0.8 MG/DL — SIGNIFICANT CHANGE UP (ref 0.7–1.5)
CULTURE RESULTS: SIGNIFICANT CHANGE UP
EGFR: 101 ML/MIN/1.73M2 — SIGNIFICANT CHANGE UP
EGFR: 101 ML/MIN/1.73M2 — SIGNIFICANT CHANGE UP
EOSINOPHIL # BLD AUTO: 0.13 K/UL — SIGNIFICANT CHANGE UP (ref 0–0.7)
EOSINOPHIL NFR BLD AUTO: 2.4 % — SIGNIFICANT CHANGE UP (ref 0–8)
FERRITIN SERPL-MCNC: 1192 NG/ML — HIGH (ref 30–400)
GLUCOSE SERPL-MCNC: 88 MG/DL — SIGNIFICANT CHANGE UP (ref 70–99)
HCT VFR BLD CALC: 32.1 % — LOW (ref 42–52)
HGB BLD-MCNC: 10.5 G/DL — LOW (ref 14–18)
IMM GRANULOCYTES NFR BLD AUTO: 7.6 % — HIGH (ref 0.1–0.3)
INR BLD: 1.36 RATIO — HIGH (ref 0.65–1.3)
LYMPHOCYTES # BLD AUTO: 1.15 K/UL — LOW (ref 1.2–3.4)
LYMPHOCYTES # BLD AUTO: 20.8 % — SIGNIFICANT CHANGE UP (ref 20.5–51.1)
MAGNESIUM SERPL-MCNC: 2.2 MG/DL — SIGNIFICANT CHANGE UP (ref 1.8–2.4)
MCHC RBC-ENTMCNC: 25.9 PG — LOW (ref 27–31)
MCHC RBC-ENTMCNC: 32.7 G/DL — SIGNIFICANT CHANGE UP (ref 32–37)
MCV RBC AUTO: 79.3 FL — LOW (ref 80–94)
MELD SCORE WITH DIALYSIS: 23 POINTS — SIGNIFICANT CHANGE UP
MELD SCORE WITHOUT DIALYSIS: 10 POINTS — SIGNIFICANT CHANGE UP
MONOCYTES # BLD AUTO: 0.51 K/UL — SIGNIFICANT CHANGE UP (ref 0.1–0.6)
MONOCYTES NFR BLD AUTO: 9.2 % — SIGNIFICANT CHANGE UP (ref 1.7–9.3)
NEUTROPHILS # BLD AUTO: 3.26 K/UL — SIGNIFICANT CHANGE UP (ref 1.4–6.5)
NEUTROPHILS NFR BLD AUTO: 59.1 % — SIGNIFICANT CHANGE UP (ref 42.2–75.2)
NRBC BLD AUTO-RTO: 0 /100 WBCS — SIGNIFICANT CHANGE UP (ref 0–0)
PLATELET # BLD AUTO: 181 K/UL — SIGNIFICANT CHANGE UP (ref 130–400)
PMV BLD: 9.5 FL — SIGNIFICANT CHANGE UP (ref 7.4–10.4)
POTASSIUM SERPL-MCNC: 4.7 MMOL/L — SIGNIFICANT CHANGE UP (ref 3.5–5)
POTASSIUM SERPL-SCNC: 4.7 MMOL/L — SIGNIFICANT CHANGE UP (ref 3.5–5)
PROT SERPL-MCNC: 5.9 G/DL — LOW (ref 6–8)
PROTHROM AB SERPL-ACNC: 16.1 SEC — HIGH (ref 9.95–12.87)
RBC # BLD: 4.05 M/UL — LOW (ref 4.7–6.1)
RBC # FLD: 14.2 % — SIGNIFICANT CHANGE UP (ref 11.5–14.5)
SODIUM SERPL-SCNC: 141 MMOL/L — SIGNIFICANT CHANGE UP (ref 135–146)
SPECIMEN SOURCE: SIGNIFICANT CHANGE UP
WBC # BLD: 5.52 K/UL — SIGNIFICANT CHANGE UP (ref 4.8–10.8)
WBC # FLD AUTO: 5.52 K/UL — SIGNIFICANT CHANGE UP (ref 4.8–10.8)

## 2025-04-20 PROCEDURE — 99232 SBSQ HOSP IP/OBS MODERATE 35: CPT

## 2025-04-20 RX ORDER — KETOROLAC TROMETHAMINE 30 MG/ML
10 INJECTION, SOLUTION INTRAMUSCULAR; INTRAVENOUS ONCE
Refills: 0 | Status: DISCONTINUED | OUTPATIENT
Start: 2025-04-20 | End: 2025-04-20

## 2025-04-20 RX ADMIN — Medication 650 MILLIGRAM(S): at 01:49

## 2025-04-20 RX ADMIN — Medication 1 GRAM(S): at 23:40

## 2025-04-20 RX ADMIN — KETOROLAC TROMETHAMINE 15 MILLIGRAM(S): 30 INJECTION, SOLUTION INTRAMUSCULAR; INTRAVENOUS at 11:50

## 2025-04-20 RX ADMIN — Medication 1 APPLICATION(S): at 11:53

## 2025-04-20 RX ADMIN — METOPROLOL SUCCINATE 12.5 MILLIGRAM(S): 50 TABLET, EXTENDED RELEASE ORAL at 17:23

## 2025-04-20 RX ADMIN — Medication 1 TABLET(S): at 17:22

## 2025-04-20 RX ADMIN — Medication 20 MILLIGRAM(S): at 11:51

## 2025-04-20 RX ADMIN — Medication 1 GRAM(S): at 11:51

## 2025-04-20 RX ADMIN — Medication 1 TABLET(S): at 13:40

## 2025-04-20 RX ADMIN — CLOPIDOGREL BISULFATE 75 MILLIGRAM(S): 75 TABLET, FILM COATED ORAL at 11:50

## 2025-04-20 RX ADMIN — KETOROLAC TROMETHAMINE 10 MILLIGRAM(S): 30 INJECTION, SOLUTION INTRAMUSCULAR; INTRAVENOUS at 09:23

## 2025-04-20 RX ADMIN — ENOXAPARIN SODIUM 40 MILLIGRAM(S): 100 INJECTION SUBCUTANEOUS at 09:23

## 2025-04-20 RX ADMIN — Medication 1 GRAM(S): at 17:22

## 2025-04-20 RX ADMIN — Medication 1 TABLET(S): at 09:23

## 2025-04-20 RX ADMIN — KETOROLAC TROMETHAMINE 15 MILLIGRAM(S): 30 INJECTION, SOLUTION INTRAMUSCULAR; INTRAVENOUS at 04:04

## 2025-04-20 RX ADMIN — ATORVASTATIN CALCIUM 80 MILLIGRAM(S): 80 TABLET, FILM COATED ORAL at 21:37

## 2025-04-20 RX ADMIN — Medication 1 TABLET(S): at 11:51

## 2025-04-20 RX ADMIN — Medication 40 MILLIGRAM(S): at 05:20

## 2025-04-20 RX ADMIN — Medication 1 GRAM(S): at 05:20

## 2025-04-20 NOTE — PROGRESS NOTE ADULT - SUBJECTIVE AND OBJECTIVE BOX
SUBJECTIVE/OVERNIGHT EVENTS  Today is hospital day 2d. This morning patient was seen and examined at bedside, resting comfortably in bed. No acute or major events overnight.    MEDICATIONS  STANDING MEDICATIONS  atorvastatin 80 milliGRAM(s) Oral at bedtime  chlorhexidine 2% Cloths 1 Application(s) Topical daily  clopidogrel Tablet 75 milliGRAM(s) Oral daily  enoxaparin Injectable 40 milliGRAM(s) SubCutaneous every 24 hours  famotidine    Tablet 20 milliGRAM(s) Oral daily  lactated ringers. 1000 milliLiter(s) IV Continuous <Continuous>  lactobacillus acidophilus 1 Tablet(s) Oral three times a day with meals  metoprolol tartrate 12.5 milliGRAM(s) Oral two times a day  multivitamin  Chewable 1 Tablet(s) Oral daily  pantoprazole    Tablet 40 milliGRAM(s) Oral before breakfast  sucralfate 1 Gram(s) Oral four times a day    PRN MEDICATIONS  acetaminophen     Tablet .. 650 milliGRAM(s) Oral every 6 hours PRN  ketorolac   Injectable 15 milliGRAM(s) IV Push every 6 hours PRN  simethicone 80 milliGRAM(s) Chew every 6 hours PRN    VITALS  T(F): 98.5 (04-19-25 @ 15:42), Max: 98.5 (04-19-25 @ 15:42)  HR: 69 (04-19-25 @ 15:42) (68 - 87)  BP: 110/69 (04-19-25 @ 15:42) (107/68 - 115/70)  RR: 14 (04-19-25 @ 15:42) (14 - 16)  SpO2: 98% (04-19-25 @ 15:42) (97% - 98%)    PHYSICAL EXAM  GENERAL  ( + ) NAD, lying in bed comfortably     (  ) obtunded     (  ) lethargic     (  ) somnolent    HEAD  ( + ) Atraumatic     (  ) hematoma     (  ) laceration (specify location:       )     NECK  (+  ) Supple     (  ) neck stiffness     (  ) nuchal rigidity     (  )  no JVD     (  ) JVD present ( -- cm)    HEART  Rate -->  (  +) normal rate    (  ) bradycardic    (  ) tachycardic  Rhythm -->  ( + ) regular    (  ) regularly irregular    (  ) irregularly irregular  Murmurs -->  (  ) normal s1/s2    (  ) systolic murmur    (  ) diastolic murmur    (  ) continuous murmur     (  ) S3 present    (  ) S4 present    LUNGS  ( + )Unlabored respirations     (  ) tachypnea  ( + ) B/L air entry     (  ) decreased breath sounds in:  (location     )    (  ) no adventitious sound     (  ) crackles     (  ) wheezing      (  ) rhonchi      (specify location:       )  (  ) chest wall tenderness (specify location:       )    ABDOMEN  ( + ) Soft     (  ) tense   |   (  ) nondistended     (+  ) distended   |   (  ) +BS     (  ) hypoactive bowel sounds     (  ) hyperactive bowel sounds  (  ) nontender     (  ) RUQ tenderness     (  ) RLQ tenderness     (  ) LLQ tenderness     (  ) epigastric tenderness     (  ) diffuse tenderness  (  ) Splenomegaly      (  ) Hepatomegaly      (  ) Jaundice     (  ) ecchymosis     EXTREMITIES  (+  ) Normal     (  ) Rash     (  ) ecchymosis     (  ) varicose veins      (  ) pitting edema     (  ) non-pitting edema   (  ) ulceration     (  ) gangrene:     (location:     )    NERVOUS SYSTEM  (+  ) A&Ox3     (  ) confused     (  ) lethargic  CN II-XII:     (  ) Intact     (  ) focal deficits  (Specify:     )   Upper extremities:     (  ) strength X/5     (  ) focal deficit (specify:    )  Lower extremities:     (  ) strength  X/5    (  ) focal deficit (specify:    )    SKIN  (+  ) No rashes or lesions     (  ) maculopapular rash     (  ) pustules     (  ) vesicles     (  ) ulcer     (  ) ecchymosis     (specify location:     )    LABS             10.3   7.02  )-----------( 187      ( 04-19-25 @ 06:20 )             31.3     137  |  100  |  16  -------------------------<  89   04-19-25 @ 06:20  4.4  |  23  |  0.9    Ca      8.8     04-19-25 @ 06:20  Mg     2.0     04-19-25 @ 06:20    TPro  6.1  /  Alb  3.4  /  TBili  0.7  /  DBili  x   /  AST  28  /  ALT  21  /  AlkPhos  249  /  GGT  30    04-19-25 @ 06:20    PT/INR - ( 04-19-25 @ 06:20 )   PT: 16.10 sec[H];   INR: 1.36 ratio[H]  PTT - ( 04-19-25 @ 06:20 )  PTT:34.0 sec    Urinalysis Basic - ( 19 Apr 2025 06:20 )    Color: x / Appearance: x / SG: x / pH: x  Gluc: 89 mg/dL / Ketone: x  / Bili: x / Urobili: x   Blood: x / Protein: x / Nitrite: x   Leuk Esterase: x / RBC: x / WBC x   Sq Epi: x / Non Sq Epi: x / Bacteria: x

## 2025-04-20 NOTE — PROGRESS NOTE ADULT - ASSESSMENT
60 yo M with a PMH of HTN, hiatal hernia, HLD, CAD s/p CABG in Jan 2024, paroxysmal A. fib on plavix, former smoker (30 pack years), recently diagnosed with colitis and treated during his admission 4/1-4/5 presenting to the ED for evaluation of persistent abdominal pain and weakness x 3 weeks. Patient states he also has lower back pain and endorses chills, states he had a fever 3 days ago. Patient denies chest pain, sob, nausea, vomiting, diarrhea, urinary symptoms.    #abdominal Pain 2/2 recent colitis vs possible pancreatitis   - CT abd/pelv w/ IVC- Redemonstrated prominent submucosal fat/bowel wall thickening involving descending colon, possibly related to prior episodes of colitis. Otherwise, no new acute findings in the abdomen or pelvis.  - F/u GGT  - F/u GI c/s for possible irritable bowel management  - Monitor off antibiotics  - Pain control: tylenol and toradol  - amylase 62  - Lipase increased  - F/u Bcx  - Started LR at 50  - seen by GI, they advise outpatient colonoscopy  - f/u GGTP to see if alk phos is bone vs liver  - consider MRCP if alk phos remains elevated to further elucidate possibility of bilirary tract disease  - anti mitochondrial and antimicrosomal antibodies to r/o primary biliary cirrhosis     #Chronic Anemia  - In the ED, Hb 11.6, baseline ~12  - MCV 79.2  - F/u iron studies    #3.8 cm wide R iliac artery aneurysm- stable  - F/u vascular o/p- surgery scheduled with Dr. Tavera on 4/21/2025    #HTN  #CAD s/p CABG in Jan 2024  #Paroxysmal A.fib  - C/w home meds    DVT ppx: lovenox  GI ppx: protonix  Diet: DASH  Activity: as tolerated  Pending: GGT, GI c/s, iron studies

## 2025-04-20 NOTE — PROGRESS NOTE ADULT - ASSESSMENT
58 yo man with a PMH of HTN, hiatal hernia, HLD, CAD s/p CABG in Jan 2024, paroxysmal A. fib on plavix, former smoker (30 pack years), recently diagnosed with colitis and treated during his admission 4/1-4/5 presented for persistent abdominal pain and weakness x 3 weeks. Patient states he also has lower back pain .    #abdominal Pain 2/2 recent colitis vs possible pancreatitis   - CT abd/pelv w/ IVC- Redemonstrated prominent submucosal fat/bowel wall thickening involving descending colon, possibly related to prior episodes of colitis. Otherwise, no new acute findings in the abdomen or pelvis.  - F/u GGT  - F/u GI c/s for possible irritable bowel management  - Monitor off antibiotics  - Pain control: percocet as needed avoid NSAIDS  - Lipase increased  - F/u Bcx  - Started LR at 50  - seen by GI, they advise outpatient colonoscopy  - f/u GGTP to see if alk phos is bone vs liver  - consider MRCP if alk phos remains elevated to further elucidate possibility of bilirary tract disease  - anti mitochondrial and antimicrosomal antibodies to r/o primary biliary cirrhosis     #Chronic Anemia  -No active signs of bleeding, continue trending H/H  -Follow up PCP outpatient for age/risk appropriate cancer screening     #3.8 cm wide R iliac artery aneurysm- stable  - F/u vascular o/p- surgery scheduled with Dr. Tavera on 4/21/2025    #HTN  #CAD s/p CABG in Jan 2024    #Paroxysmal A.fib  - C/w home meds    DVT ppx: lovenox  GI ppx: protonix  Diet: CLD will advance to FLD today   Activity: as tolerated  Pending: Clinical improvement

## 2025-04-20 NOTE — PROGRESS NOTE ADULT - SUBJECTIVE AND OBJECTIVE BOX
Subjective: No acute event overnight. Patient seen and examine by the bedside. Patient c/o middle back dull pain, 8/10 not much relieved by pain medications. Denies chest pain, nausea, vomiting, diarrhea, cough, fever, chills, dysuria, hematuria, blood in stools or black stool endorse weight loss due to GI issues         MEDICATIONS  STANDING MEDICATIONS  atorvastatin 80 milliGRAM(s) Oral at bedtime  chlorhexidine 2% Cloths 1 Application(s) Topical daily  clopidogrel Tablet 75 milliGRAM(s) Oral daily  enoxaparin Injectable 40 milliGRAM(s) SubCutaneous every 24 hours  famotidine    Tablet 20 milliGRAM(s) Oral daily  lactated ringers. 1000 milliLiter(s) IV Continuous <Continuous>  lactobacillus acidophilus 1 Tablet(s) Oral three times a day with meals  metoprolol tartrate 12.5 milliGRAM(s) Oral two times a day  multivitamin  Chewable 1 Tablet(s) Oral daily  pantoprazole    Tablet 40 milliGRAM(s) Oral before breakfast  sucralfate 1 Gram(s) Oral four times a day    PRN MEDICATIONS  acetaminophen     Tablet .. 650 milliGRAM(s) Oral every 6 hours PRN  ketorolac   Injectable 15 milliGRAM(s) IV Push every 6 hours PRN  oxycodone    5 mG/acetaminophen 325 mG 1 Tablet(s) Oral every 4 hours PRN  simethicone 80 milliGRAM(s) Chew every 6 hours PRN    VITALS:  T(F): 97.8  HR: 63  BP: 129/76  RR: 18  SpO2: 97%    PHYSICAL EXAM  GEN: Sitting comfortably, NAD  HEENT: NC/AT, EOMI, trachea midline, no lymphadenopathy  PULM: BS heard b/l equal, No wheezing  CVS: S1/S2 present, no rubs or gallops  ABD: Soft, non-distended, no guarding; non-tender  EXT: No lower extremity edema or varicose   : No suprapubic tenderness  Skin: no rashes  NEURO: A&Ox3, speech clear, follow commands, moving all extremities    LABS                        10.5   5.52  )-----------( 181      ( 20 Apr 2025 06:27 )             32.1     04-20    141  |  105  |  13  ----------------------------<  88  4.7   |  25  |  0.8    Ca    8.7      20 Apr 2025 06:27  Mg     2.2     04-20    TPro  5.9[L]  /  Alb  3.5  /  TBili  0.6  /  DBili  x   /  AST  36  /  ALT  22  /  AlkPhos  255[H]  04-20    PT/INR - ( 20 Apr 2025 06:27 )   PT: 16.10 sec;   INR: 1.36 ratio         PTT - ( 19 Apr 2025 06:20 )  PTT:34.0 sec  Urinalysis Basic - ( 20 Apr 2025 06:27 )    Color: x / Appearance: x / SG: x / pH: x  Gluc: 88 mg/dL / Ketone: x  / Bili: x / Urobili: x   Blood: x / Protein: x / Nitrite: x   Leuk Esterase: x / RBC: x / WBC x   Sq Epi: x / Non Sq Epi: x / Bacteria: x            Culture - Blood (collected 19 Apr 2025 06:20)  Source: Blood None  Preliminary Report (20 Apr 2025 11:02):    No growth at 24 hours    Culture - Urine (collected 18 Apr 2025 19:24)  Source: Clean Catch Clean Catch (Midstream)  Final Report (20 Apr 2025 07:37):    <10,000 CFU/mL Normal Urogenital Berna          RADIOLOGY

## 2025-04-21 PROCEDURE — 99233 SBSQ HOSP IP/OBS HIGH 50: CPT

## 2025-04-21 PROCEDURE — 99232 SBSQ HOSP IP/OBS MODERATE 35: CPT

## 2025-04-21 RX ORDER — ONDANSETRON HCL/PF 4 MG/2 ML
4 VIAL (ML) INJECTION ONCE
Refills: 0 | Status: COMPLETED | OUTPATIENT
Start: 2025-04-21 | End: 2025-04-21

## 2025-04-21 RX ADMIN — Medication 1 GRAM(S): at 05:12

## 2025-04-21 RX ADMIN — METOPROLOL SUCCINATE 12.5 MILLIGRAM(S): 50 TABLET, EXTENDED RELEASE ORAL at 18:14

## 2025-04-21 RX ADMIN — Medication 1 TABLET(S): at 12:11

## 2025-04-21 RX ADMIN — Medication 1 TABLET(S): at 09:14

## 2025-04-21 RX ADMIN — Medication 1 GRAM(S): at 18:15

## 2025-04-21 RX ADMIN — Medication 20 MILLIGRAM(S): at 12:11

## 2025-04-21 RX ADMIN — Medication 40 MILLIGRAM(S): at 05:12

## 2025-04-21 RX ADMIN — ATORVASTATIN CALCIUM 80 MILLIGRAM(S): 80 TABLET, FILM COATED ORAL at 21:35

## 2025-04-21 RX ADMIN — Medication 1 TABLET(S): at 18:14

## 2025-04-21 RX ADMIN — Medication 1 APPLICATION(S): at 12:13

## 2025-04-21 RX ADMIN — Medication 1 GRAM(S): at 12:11

## 2025-04-21 RX ADMIN — Medication 1 TABLET(S): at 14:15

## 2025-04-21 RX ADMIN — ENOXAPARIN SODIUM 40 MILLIGRAM(S): 100 INJECTION SUBCUTANEOUS at 09:14

## 2025-04-21 RX ADMIN — METOPROLOL SUCCINATE 12.5 MILLIGRAM(S): 50 TABLET, EXTENDED RELEASE ORAL at 05:11

## 2025-04-21 RX ADMIN — Medication 4 MILLIGRAM(S): at 09:14

## 2025-04-21 NOTE — PROGRESS NOTE ADULT - SUBJECTIVE AND OBJECTIVE BOX
SUBJECTIVE/OVERNIGHT EVENTS  Today is hospital day 3d. This morning patient was seen and examined at bedside, resting comfortably in bed. No acute or major events overnight.    MEDICATIONS  STANDING MEDICATIONS  atorvastatin 80 milliGRAM(s) Oral at bedtime  chlorhexidine 2% Cloths 1 Application(s) Topical daily  clopidogrel Tablet 75 milliGRAM(s) Oral daily  enoxaparin Injectable 40 milliGRAM(s) SubCutaneous every 24 hours  famotidine    Tablet 20 milliGRAM(s) Oral daily  lactated ringers. 1000 milliLiter(s) IV Continuous <Continuous>  lactobacillus acidophilus 1 Tablet(s) Oral three times a day with meals  metoprolol tartrate 12.5 milliGRAM(s) Oral two times a day  multivitamin  Chewable 1 Tablet(s) Oral daily  pantoprazole    Tablet 40 milliGRAM(s) Oral before breakfast  sucralfate 1 Gram(s) Oral four times a day    PRN MEDICATIONS  acetaminophen     Tablet .. 650 milliGRAM(s) Oral every 6 hours PRN  ketorolac   Injectable 15 milliGRAM(s) IV Push every 6 hours PRN  oxycodone    5 mG/acetaminophen 325 mG 1 Tablet(s) Oral every 4 hours PRN  simethicone 80 milliGRAM(s) Chew every 6 hours PRN    VITALS  T(F): 98.4 (04-21-25 @ 08:06), Max: 98.4 (04-21-25 @ 08:06)  HR: 65 (04-21-25 @ 08:06) (65 - 76)  BP: 126/71 (04-21-25 @ 08:06) (117/74 - 137/77)  RR: 18 (04-21-25 @ 08:06) (17 - 18)  SpO2: 99% (04-21-25 @ 08:06) (98% - 100%)    PHYSICAL EXAM  GENERAL  ( +  ) NAD, lying in bed comfortably     (  ) obtunded     (  ) lethargic     (  ) somnolent    HEAD  ( + ) Atraumatic     (  ) hematoma     (  ) laceration (specify location:       )     NECK  ( + ) Supple     (  ) neck stiffness     (  ) nuchal rigidity     (  )  no JVD     (  ) JVD present ( -- cm)    HEART  Rate -->  ( + ) normal rate    (  ) bradycardic    (  ) tachycardic  Rhythm -->  ( + ) regular    (  ) regularly irregular    (  ) irregularly irregular  Murmurs -->  (  ) normal s1/s2    (  ) systolic murmur    (  ) diastolic murmur    (  ) continuous murmur     (  ) S3 present    (  ) S4 present    LUNGS  ( + )Unlabored respirations     (  ) tachypnea  ( + ) B/L air entry     (  ) decreased breath sounds in:  (location     )    (  ) no adventitious sound     (  ) crackles     (  ) wheezing      (  ) rhonchi      (specify location:       )  (  ) chest wall tenderness (specify location:       )    ABDOMEN  ( + ) Soft     (+  ) mild tenderness |   (+  ) nondistended     (  ) distended   |   (  ) +BS     (  ) hypoactive bowel sounds     (  ) hyperactive bowel sounds  (  ) nontender     (  ) RUQ tenderness     (  ) RLQ tenderness     (  ) LLQ tenderness     (  ) epigastric tenderness     (  ) diffuse tenderness  (  ) Splenomegaly      (  ) Hepatomegaly      (  ) Jaundice     (  ) ecchymosis     EXTREMITIES  (+  ) Normal     (  ) Rash     (  ) ecchymosis     (  ) varicose veins      (  ) pitting edema     (  ) non-pitting edema   (  ) ulceration     (  ) gangrene:     (location:     )    NERVOUS SYSTEM  ( + ) A&Ox3     (  ) confused     (  ) lethargic  CN II-XII:     (  ) Intact     (  ) focal deficits  (Specify:     )   Upper extremities:     (  ) strength X/5     (  ) focal deficit (specify:    )  Lower extremities:     (  ) strength  X/5    (  ) focal deficit (specify:    )    SKIN  ( + ) No rashes or lesions     (  ) maculopapular rash     (  ) pustules     (  ) vesicles     (  ) ulcer     (  ) ecchymosis     (specify location:     )    LABS             10.5   5.52  )-----------( 181      ( 04-20-25 @ 06:27 )             32.1     141  |  105  |  13  -------------------------<  88   04-20-25 @ 06:27  4.7  |  25  |  0.8    Ca      8.7     04-20-25 @ 06:27  Mg     2.2     04-20-25 @ 06:27    TPro  5.9  /  Alb  3.5  /  TBili  0.6  /  DBili  x   /  AST  36  /  ALT  22  /  AlkPhos  255  /  GGT  x     04-20-25 @ 06:27    PT/INR - ( 04-20-25 @ 06:27 )   PT: 16.10 sec[H];   INR: 1.36 ratio[H]  Urinlysis Basic - ( 20 Apr 2025 06:27 )    Color: x / Appearance: x / SG: x / pH: x  Gluc: 88 mg/dL / Ketone: x  / Bili: x / Urobili: x   Blood: x / Protein: x / Nitrite: x   Leuk Esterase: x / RBC: x / WBC x   Sq Epi: x / Non Sq Epi: x / Bacteria: x    Culture - Blood (collected 19 Apr 2025 06:20)  Source: Blood None  Preliminary Report (20 Apr 2025 11:02):    No growth at 24 hours    Culture - Urine (collected 18 Apr 2025 19:24)  Source: Clean Catch Clean Catch (Midstream)  Final Report (20 Apr 2025 07:37):    <10,000 CFU/mL Normal Urogenital Berna

## 2025-04-21 NOTE — PROGRESS NOTE ADULT - ASSESSMENT
58 yo man with a PMH of HTN, hiatal hernia, HLD, CAD s/p CABG in Jan 2024, paroxysmal A. fib on plavix, former smoker (30 pack years), recently diagnosed with colitis and treated during his admission 4/1-4/5 presented for persistent abdominal pain and weakness x 3 weeks. Patient states he also has lower back pain .    #abdominal Pain 2/2 recent colitis vs possible pancreatitis   - CT abd/pelv w/ IVC- Redemonstrated prominent submucosal fat/bowel wall thickening involving descending colon, possibly related to prior episodes of colitis. Otherwise, no new acute findings in the abdomen or pelvis.  GI following for planned endoscopic investigation     #Chronic Anemia    #3.8 cm wide R iliac artery aneurysm- stable  vascular op     #HTN  BP: 126/71 (21 Apr 2025 08:06) (117/74 - 137/77)  controlled     #CAD s/p CABG in Jan 2024    #Paroxysmal A.fib  - C/w home meds    PROGRESS NOTE HANDOFF    Pending:  GI follow up  , pain improvement     Family discussion: patient verbalized understanding and agreeable to plan of care     Disposition: Home

## 2025-04-21 NOTE — DIETITIAN INITIAL EVALUATION ADULT - MALNUTRITION
Ndc (300 Mg Prefilled Pen): [address]-22 Ndc (300 Mg Prefilled Pen): [address]-40 Severe protein-calorie malnutrition in the context of chronic illness or injury

## 2025-04-21 NOTE — DIETITIAN INITIAL EVALUATION ADULT - ADD RECOMMEND
Interventions:  -Continue current diet order; advance as tolerated when medically feasible  -Cater to food preferences as able  -Consider appetite stimulant if patient is agreeable    Monitor:  -PO intake  -Diet/texture tolerance  -Weight  -Nutrition related labs  -Nutrition focused physical findings    High Nutrition Risk Follow Up

## 2025-04-21 NOTE — DIETITIAN INITIAL EVALUATION ADULT - PERTINENT MEDS FT
MEDICATIONS  (STANDING):  atorvastatin 80 milliGRAM(s) Oral at bedtime  chlorhexidine 2% Cloths 1 Application(s) Topical daily  enoxaparin Injectable 40 milliGRAM(s) SubCutaneous every 24 hours  famotidine    Tablet 20 milliGRAM(s) Oral daily  lactated ringers. 1000 milliLiter(s) (50 mL/Hr) IV Continuous <Continuous>  lactobacillus acidophilus 1 Tablet(s) Oral three times a day with meals  metoprolol tartrate 12.5 milliGRAM(s) Oral two times a day  multivitamin  Chewable 1 Tablet(s) Oral daily  pantoprazole    Tablet 40 milliGRAM(s) Oral before breakfast  sucralfate 1 Gram(s) Oral four times a day    MEDICATIONS  (PRN):  acetaminophen     Tablet .. 650 milliGRAM(s) Oral every 6 hours PRN Temp greater or equal to 38C (100.4F), Mild Pain (1 - 3)  ketorolac   Injectable 15 milliGRAM(s) IV Push every 6 hours PRN Moderate Pain (4 - 6)  oxycodone    5 mG/acetaminophen 325 mG 1 Tablet(s) Oral every 4 hours PRN Severe Pain (7 - 10)  simethicone 80 milliGRAM(s) Chew every 6 hours PRN Gas

## 2025-04-21 NOTE — PROGRESS NOTE ADULT - ASSESSMENT
60 yo M with pmhx of HTN, HLD, CAD s/p CABG ([facility] 10, 2024) on plavix , pAfib, former smoker (30 pack years) presents to ED with worsening abdominal pain, nausea and multiple episodes of diarrhea. Patient reports that he was diagnosed with flu early march and then developed right eye stye received antibiotics. After this, patient developed sharp mid abdominal pain 6/10, diarrhea, had an ER visit received cipro/flagyl  but since last two days his abdominal pain is worsening with dec PO intake and multiple episodes of diarrhea >3/day. Patient saw  and is scheduled for EGD/Colonoscopy in 1 month. CTAP notable for submucosal fat in descending colon, ascending colon. He reports that he lost 28lbs in the last month. GI following for abdominal pain.    # Abdominal pain with prior diagnosis of colitis- mostly post prandial   # Weight loss  # Former smoker   # Diarrhea that has now resolved  # Microcytic anemia   # Large Hiatal Hernia  # Elevated Alk Phos with normal GGT   - Not septic on admission   - CTAP 4/18/25: Redemonstrated prominent submucosal fat/bowel wall thickening involving  descending colon, possibly related to prior episodes of colitis. Otherwise, no new acute findings in the abdomen or pelvis.  - CTAP 4/1/25 : IC : Submucosal fat within the descending colon is noted which may be related to previous episodes of colitis. Similar changes within the ascending colon also present  - Currently abdominal pain improving and nausea better  - ESR 4/4/25:48  CRP: 62 repeat 60  - On Plavix for CABG last dose 4/20   - C diff and GI PCR 4/4/25: negative  - Lipase 67   - Alk Phos noted with elevation since April 1, previously normal in Jan, however GGT normal,  rest of LFTs wnl      RECS  - Start Abx Cipro and Flagyl   - will benefit from colonoscopy/ EGD as OP vs inpatient if no clinical improvement   - Will need cardio clearance to see if okay to hold Plavix for 5 days prior to procedure   - Obtain Calprotectin and Lactoferrin   - Trend ESR and CRP Q48 hrs   - Repeat GI pcr and C diff if recurrent diarrhea   - Diet as tolerated  - Monitor BMs  - we will follow    60 yo M with pmhx of HTN, HLD, CAD s/p CABG ([facility] 10, 2024) on plavix , pAfib, former smoker (30 pack years) presents to ED with worsening abdominal pain, nausea and multiple episodes of diarrhea. Patient reports that he was diagnosed with flu early march and then developed right eye stye received antibiotics. After this, patient developed sharp mid abdominal pain 6/10, diarrhea, had an ER visit received cipro/flagyl  but since last two days his abdominal pain is worsening with dec PO intake and multiple episodes of diarrhea >3/day. Patient saw  and is scheduled for EGD/Colonoscopy in 1 month. CTAP notable for submucosal fat in descending colon, ascending colon. He reports that he lost 28lbs in the last month. GI following for abdominal pain.    # Abdominal pain with prior diagnosis of colitis- mostly post prandial   # Weight loss  # Former smoker   # Diarrhea that has now resolved  # Microcytic anemia   # Large Hiatal Hernia  # Elevated Alk Phos with normal GGT   - Not septic on admission   - CTAP 4/18/25: Redemonstrated prominent submucosal fat/bowel wall thickening involving  descending colon, possibly related to prior episodes of colitis. Otherwise, no new acute findings in the abdomen or pelvis.  - CTAP 4/1/25 : IC : Submucosal fat within the descending colon is noted which may be related to previous episodes of colitis. Similar changes within the ascending colon also present  - Currently abdominal pain improving and nausea better  - ESR 4/4/25:48  CRP: 62 repeat 60  - On Plavix for CABG last dose 4/20   - C diff and GI PCR 4/4/25: negative  - Lipase 67   - Alk Phos noted with elevation since April 1, previously normal in Jan, however GGT normal,  rest of LFTs wnl      RECS  - Optimize hemodynamics and volume status   - Cipro and Flagyl if no contraindication   - will benefit from colonoscopy/ EGD as OP vs inpatient if no clinical improvement   - Will need cardio clearance to see if okay to hold Plavix for 5 days prior to procedure   - Obtain Calprotectin and Lactoferrin   - Trend ESR and CRP Q48 hrs   - Repeat GI pcr and C diff if recurrent diarrhea   - Diet as tolerated  - Monitor BMs  - we will follow

## 2025-04-21 NOTE — PROGRESS NOTE ADULT - SUBJECTIVE AND OBJECTIVE BOX
Gastroenterology progress note:     Patient is a 59y old  Male who presents with a chief complaint of abdominal pain (21 Apr 2025 10:10)       Admitted on: 04-18-25    We are following the patient for: colitis        Interval History:    No acute events overnight.   - Diet - Full liquids   - last BM - today formed   - Abdominal pain - still present 1/10 and 4/10 when he eats       PAST MEDICAL & SURGICAL HISTORY:  HTN (hypertension)  Hiatal hernia  HLD (hyperlipidemia)  CAD (coronary artery disease)  Paroxysmal atrial fibrillation  History of colitis  S/P CABG (coronary artery bypass graft)          MEDICATIONS  (STANDING):  atorvastatin 80 milliGRAM(s) Oral at bedtime  chlorhexidine 2% Cloths 1 Application(s) Topical daily  enoxaparin Injectable 40 milliGRAM(s) SubCutaneous every 24 hours  famotidine    Tablet 20 milliGRAM(s) Oral daily  lactated ringers. 1000 milliLiter(s) (50 mL/Hr) IV Continuous <Continuous>  lactobacillus acidophilus 1 Tablet(s) Oral three times a day with meals  metoprolol tartrate 12.5 milliGRAM(s) Oral two times a day  multivitamin  Chewable 1 Tablet(s) Oral daily  pantoprazole    Tablet 40 milliGRAM(s) Oral before breakfast  sucralfate 1 Gram(s) Oral four times a day    MEDICATIONS  (PRN):  acetaminophen     Tablet .. 650 milliGRAM(s) Oral every 6 hours PRN Temp greater or equal to 38C (100.4F), Mild Pain (1 - 3)  ketorolac   Injectable 15 milliGRAM(s) IV Push every 6 hours PRN Moderate Pain (4 - 6)  oxycodone    5 mG/acetaminophen 325 mG 1 Tablet(s) Oral every 4 hours PRN Severe Pain (7 - 10)  simethicone 80 milliGRAM(s) Chew every 6 hours PRN Gas      Allergies  penicillins (Unknown)      Review of Systems:   Cardiovascular:  No Chest Pain, No Palpitations  Respiratory:  No Cough, No Dyspnea  Gastrointestinal:  As described in HPI  Skin:  No Skin Lesions, No Jaundice  Neuro:  No Syncope, No Dizziness    Physical Examination:  T(C): 36.9 (04-21-25 @ 08:06), Max: 36.9 (04-21-25 @ 08:06)  HR: 65 (04-21-25 @ 08:06) (65 - 76)  BP: 126/71 (04-21-25 @ 08:06) (117/74 - 137/77)  RR: 18 (04-21-25 @ 08:06) (17 - 18)  SpO2: 99% (04-21-25 @ 08:06) (98% - 100%)      04-20-25 @ 07:01  -  04-21-25 @ 07:00  --------------------------------------------------------  IN: 660 mL / OUT: 500 mL / NET: 160 mL        GENERAL: AAOx3, no acute distress.  HEAD:  Atraumatic, Normocephalic  EYES: conjunctiva and sclera clear  NECK: Supple, no JVD or thyromegaly  CHEST/LUNG: Clear to auscultation bilaterally; No wheeze, rhonchi, or rales  HEART: Regular rate and rhythm; normal S1, S2, No murmurs.  ABDOMEN: Soft, nontender, nondistended; Bowel sounds present  NEUROLOGY: No asterixis or tremor.   SKIN: Intact, no jaundice     Data:                        10.5   5.52  )-----------( 181      ( 20 Apr 2025 06:27 )             32.1     Hgb trend:  10.5  04-20-25 @ 06:27  10.3  04-19-25 @ 06:20  11.6  04-18-25 @ 19:24        04-20    141  |  105  |  13  ----------------------------<  88  4.7   |  25  |  0.8    Ca    8.7      20 Apr 2025 06:27  Mg     2.2     04-20    TPro  5.9[L]  /  Alb  3.5  /  TBili  0.6  /  DBili  x   /  AST  36  /  ALT  22  /  AlkPhos  255[H]  04-20    Liver panel trend:  TBili 0.6   /   AST 36   /   ALT 22   /   AlkP 255   /   Tptn 5.9   /   Alb 3.5    /   DBili --      04-20  TBili 0.7   /   AST 28   /   ALT 21   /   AlkP 249   /   Tptn 6.1   /   Alb 3.4    /   DBili --      04-19  TBili 0.7   /   AST 31   /   ALT 23   /   AlkP 275   /   Tptn 6.9   /   Alb 3.8    /   DBili --      04-18      PT/INR - ( 20 Apr 2025 06:27 )   PT: 16.10 sec;   INR: 1.36 ratio             Culture - Blood (collected 19 Apr 2025 06:20)  Source: Blood None  Preliminary Report (21 Apr 2025 11:01):    No growth at 48 Hours    Culture - Urine (collected 18 Apr 2025 19:24)  Source: Clean Catch Clean Catch (Midstream)  Final Report (20 Apr 2025 07:37):    <10,000 CFU/mL Normal Urogenital Berna

## 2025-04-21 NOTE — DIETITIAN INITIAL EVALUATION ADULT - OTHER CALCULATIONS
Energy: 2084-2605kcal/day (using MSJ x 1.2-1.5) with consideration for age, weight status, malnutrition  Protein: 93-108g/day (using 1.2-1.4g/kg of IBW 77.6kg) with considerations for same reasons as above  Fluid: 1mL/kcal/day

## 2025-04-21 NOTE — DIETITIAN INITIAL EVALUATION ADULT - OTHER INFO
Patient is a 58 yo man with a PMH of HTN, hiatal hernia, HLD, CAD s/p CABG in Jan 2024, paroxysmal A. fib on plavix, former smoker (30 pack years), recently diagnosed with colitis and treated during his admission 4/1-4/5 presented for persistent abdominal pain and weakness x 3 weeks.    Patient noted with anemia, elevated ALP, elevated CRP.

## 2025-04-21 NOTE — DIETITIAN INITIAL EVALUATION ADULT - ORAL INTAKE PTA/DIET HISTORY
Patient reports adequate appetite at home usually eating 3 meals/day, although has had poor PO appetite within the past 6 weeks due to persistent abdominal pain. Denies difficulty chewing/swallowing. NKFA or Hinduism/cultural food restrictions.     Reports UBW is 100kg, with unintentional weight loss within the past 6 weeks. Current weight documented is 90.7kg, reflective of 9% weight loss within 6 weeks (equivalent to 6.7% loss within 1 month).     Patient reports consuming <25% of Full Liquid diet in hospital recently, having had 0% of breakfast today and <25% of lunch today as he was not hungry. Denies symptoms of V/D/Constipation; reports N and abdominal pain. Last BM was on 4/19/25 per flowsheet.     Patient declined all forms of ONS when offered.   .

## 2025-04-21 NOTE — PROGRESS NOTE ADULT - ASSESSMENT
60 yo M with a PMH of HTN, hiatal hernia, HLD, CAD s/p CABG in Jan 2024, paroxysmal A. fib on plavix, former smoker (30 pack years), recently diagnosed with colitis and treated during his admission 4/1-4/5 presenting to the ED for evaluation of persistent abdominal pain and weakness x 3 weeks. Patient states he also has lower back pain and endorses chills, states he had a fever 3 days ago. Patient denies chest pain, sob, nausea, vomiting, diarrhea, urinary symptoms.    #abdominal Pain 2/2 recent colitis vs possible pancreatitis   - CT abd/pelv w/ IVC- Redemonstrated prominent submucosal fat/bowel wall thickening involving descending colon, possibly related to prior episodes of colitis. Otherwise, no new acute findings in the abdomen or pelvis.  - F/u GGT  - F/u GI c/s for possible irritable bowel management  - Monitor off antibiotics  - Pain control: tylenol and toradol  - amylase 62  - Lipase 67  - blood cx negative   - urine cx negative   - GI: possible EGD/colonoscopy this week   - cardio consulted for clearance and if ok to hold Plavix     #Chronic Anemia  - In the ED, Hb 11.6, baseline ~12  - MCV 79.2    #3.8 cm wide R iliac artery aneurysm- stable  - F/u vascular o/p- surgery scheduled with Dr. Tavera, was scheduled for 4/21/2025    #HTN  #CAD s/p CABG in Jan 2024  #Paroxysmal A.fib  - C/w home meds    DVT ppx: lovenox  GI ppx: protonix  Diet: DASH  Activity: as tolerated  Pending: possible EDG/colon, cardio recs  58 yo M with a PMH of HTN, hiatal hernia, HLD, CAD s/p CABG in Jan 2024, paroxysmal A. fib on plavix, former smoker (30 pack years), recently diagnosed with colitis and treated during his admission 4/1-4/5 presenting to the ED for evaluation of persistent abdominal pain and weakness x 3 weeks. Patient states he also has lower back pain and endorses chills, states he had a fever 3 days ago. Patient denies chest pain, sob, nausea, vomiting, diarrhea, urinary symptoms.    #abdominal Pain 2/2 recent colitis vs possible pancreatitis   - CT abd/pelv w/ IVC- Redemonstrated prominent submucosal fat/bowel wall thickening involving descending colon, possibly related to prior episodes of colitis. Otherwise, no new acute findings in the abdomen or pelvis.  - F/u GGT  - F/u GI c/s for possible irritable bowel management  - Monitor off antibiotics  - Pain control: tylenol and toradol  - amylase 62  - Lipase 67  - blood cx negative   - urine cx negative   - GI: possible EGD/colonoscopy this week   - cardio cleared, ok to hold plavix     #Chronic Anemia  - In the ED, Hb 11.6, baseline ~12  - MCV 79.2    #3.8 cm wide R iliac artery aneurysm- stable  - F/u vascular o/p- surgery scheduled with Dr. Tavera, was scheduled for 4/21/2025    #HTN  #CAD s/p CABG in Jan 2024  #Paroxysmal A.fib  - C/w home meds  - holding plavix     DVT ppx: lovenox  GI ppx: protonix  Diet: DASH  Activity: as tolerated  Pending: possible EDG/colon, cardio recs

## 2025-04-21 NOTE — DIETITIAN INITIAL EVALUATION ADULT - PERTINENT LABORATORY DATA
04-20    141  |  105  |  13  ----------------------------<  88  4.7   |  25  |  0.8    Ca    8.7      20 Apr 2025 06:27  Mg     2.2     04-20    TPro  5.9[L]  /  Alb  3.5  /  TBili  0.6  /  DBili  x   /  AST  36  /  ALT  22  /  AlkPhos  255[H]  04-20

## 2025-04-22 PROCEDURE — 99232 SBSQ HOSP IP/OBS MODERATE 35: CPT

## 2025-04-22 PROCEDURE — 99233 SBSQ HOSP IP/OBS HIGH 50: CPT

## 2025-04-22 RX ORDER — ONDANSETRON HCL/PF 4 MG/2 ML
4 VIAL (ML) INJECTION ONCE
Refills: 0 | Status: COMPLETED | OUTPATIENT
Start: 2025-04-22 | End: 2025-04-22

## 2025-04-22 RX ADMIN — Medication 1 TABLET(S): at 08:26

## 2025-04-22 RX ADMIN — Medication 4 MILLIGRAM(S): at 08:25

## 2025-04-22 RX ADMIN — Medication 1 GRAM(S): at 05:31

## 2025-04-22 RX ADMIN — Medication 1 APPLICATION(S): at 12:07

## 2025-04-22 RX ADMIN — Medication 1 GRAM(S): at 23:33

## 2025-04-22 RX ADMIN — ENOXAPARIN SODIUM 40 MILLIGRAM(S): 100 INJECTION SUBCUTANEOUS at 08:26

## 2025-04-22 RX ADMIN — Medication 1 TABLET(S): at 12:05

## 2025-04-22 RX ADMIN — Medication 1 GRAM(S): at 17:52

## 2025-04-22 RX ADMIN — Medication 40 MILLIGRAM(S): at 05:44

## 2025-04-22 RX ADMIN — Medication 1 GRAM(S): at 12:05

## 2025-04-22 RX ADMIN — ATORVASTATIN CALCIUM 80 MILLIGRAM(S): 80 TABLET, FILM COATED ORAL at 21:37

## 2025-04-22 RX ADMIN — METOPROLOL SUCCINATE 12.5 MILLIGRAM(S): 50 TABLET, EXTENDED RELEASE ORAL at 05:34

## 2025-04-22 RX ADMIN — METOPROLOL SUCCINATE 12.5 MILLIGRAM(S): 50 TABLET, EXTENDED RELEASE ORAL at 17:53

## 2025-04-22 RX ADMIN — Medication 20 MILLIGRAM(S): at 12:06

## 2025-04-22 RX ADMIN — Medication 1 TABLET(S): at 17:53

## 2025-04-22 RX ADMIN — Medication 1 TABLET(S): at 12:06

## 2025-04-22 NOTE — PROGRESS NOTE ADULT - SUBJECTIVE AND OBJECTIVE BOX
Gastroenterology progress note:     Patient is a 59y old  Male who presents with a chief complaint of abdominal pain (22 Apr 2025 10:53)       Admitted on: 04-18-25    We are following the patient for: colitis        Interval History:    No acute events overnight. Still complaining of abdominal pain. Only tolerating liquid diet.       PAST MEDICAL & SURGICAL HISTORY:  HTN (hypertension)  Hiatal hernia  HLD (hyperlipidemia)  CAD (coronary artery disease)  Paroxysmal atrial fibrillation  History of colitis  S/P CABG (coronary artery bypass graft)          MEDICATIONS  (STANDING):  atorvastatin 80 milliGRAM(s) Oral at bedtime  chlorhexidine 2% Cloths 1 Application(s) Topical daily  enoxaparin Injectable 40 milliGRAM(s) SubCutaneous every 24 hours  famotidine    Tablet 20 milliGRAM(s) Oral daily  lactobacillus acidophilus 1 Tablet(s) Oral three times a day with meals  metoprolol tartrate 12.5 milliGRAM(s) Oral two times a day  multivitamin  Chewable 1 Tablet(s) Oral daily  pantoprazole    Tablet 40 milliGRAM(s) Oral before breakfast  sucralfate 1 Gram(s) Oral four times a day    MEDICATIONS  (PRN):  acetaminophen     Tablet .. 650 milliGRAM(s) Oral every 6 hours PRN Temp greater or equal to 38C (100.4F), Mild Pain (1 - 3)  ketorolac   Injectable 15 milliGRAM(s) IV Push every 6 hours PRN Moderate Pain (4 - 6)  oxycodone    5 mG/acetaminophen 325 mG 1 Tablet(s) Oral every 4 hours PRN Severe Pain (7 - 10)  simethicone 80 milliGRAM(s) Chew every 6 hours PRN Gas      Allergies  penicillins (Unknown)      Review of Systems:   Cardiovascular:  No Chest Pain, No Palpitations  Respiratory:  No Cough, No Dyspnea  Gastrointestinal:  As described in HPI  Skin:  No Skin Lesions, No Jaundice  Neuro:  No Syncope, No Dizziness    Physical Examination:  T(C): 37.1 (04-22-25 @ 07:00), Max: 37.1 (04-22-25 @ 07:00)  HR: 61 (04-22-25 @ 07:00) (61 - 84)  BP: 104/65 (04-22-25 @ 07:00) (104/65 - 126/80)  RR: 17 (04-22-25 @ 07:00) (17 - 18)  SpO2: 94% (04-22-25 @ 07:00) (94% - 97%)        GENERAL: AAOx3, no acute distress.  HEAD:  Atraumatic, Normocephalic  EYES: conjunctiva and sclera clear  NECK: Supple, no JVD or thyromegaly  CHEST/LUNG: Clear to auscultation bilaterally; No wheeze, rhonchi, or rales  HEART: Regular rate and rhythm; normal S1, S2, No murmurs.  ABDOMEN: Soft, nontender, nondistended; Bowel sounds present  NEUROLOGY: No asterixis or tremor.   SKIN: Intact, no jaundice     Data:    Hgb trend:  10.5  04-20-25 @ 06:27              Liver panel trend:  TBili 0.6   /   AST 36   /   ALT 22   /   AlkP 255   /   Tptn 5.9   /   Alb 3.5    /   DBili --      04-20  TBili 0.7   /   AST 28   /   ALT 21   /   AlkP 249   /   Tptn 6.1   /   Alb 3.4    /   DBili --      04-19  TBili 0.7   /   AST 31   /   ALT 23   /   AlkP 275   /   Tptn 6.9   /   Alb 3.8    /   DBili --      04-18                  Gastroenterology progress note:     Patient is a 59y old  Male who presents with a chief complaint of abdominal pain (22 Apr 2025 10:53)       Admitted on: 04-18-25    We are following the patient for: colitis, weight loss and abdominal pain        Interval History:    No acute events overnight. Still complaining of abdominal pain. Only tolerating liquid diet.       PAST MEDICAL & SURGICAL HISTORY:  HTN (hypertension)  Hiatal hernia  HLD (hyperlipidemia)  CAD (coronary artery disease)  Paroxysmal atrial fibrillation  History of colitis  S/P CABG (coronary artery bypass graft)          MEDICATIONS  (STANDING):  atorvastatin 80 milliGRAM(s) Oral at bedtime  chlorhexidine 2% Cloths 1 Application(s) Topical daily  enoxaparin Injectable 40 milliGRAM(s) SubCutaneous every 24 hours  famotidine    Tablet 20 milliGRAM(s) Oral daily  lactobacillus acidophilus 1 Tablet(s) Oral three times a day with meals  metoprolol tartrate 12.5 milliGRAM(s) Oral two times a day  multivitamin  Chewable 1 Tablet(s) Oral daily  pantoprazole    Tablet 40 milliGRAM(s) Oral before breakfast  sucralfate 1 Gram(s) Oral four times a day    MEDICATIONS  (PRN):  acetaminophen     Tablet .. 650 milliGRAM(s) Oral every 6 hours PRN Temp greater or equal to 38C (100.4F), Mild Pain (1 - 3)  ketorolac   Injectable 15 milliGRAM(s) IV Push every 6 hours PRN Moderate Pain (4 - 6)  oxycodone    5 mG/acetaminophen 325 mG 1 Tablet(s) Oral every 4 hours PRN Severe Pain (7 - 10)  simethicone 80 milliGRAM(s) Chew every 6 hours PRN Gas      Allergies  penicillins (Unknown)      Review of Systems:   Cardiovascular:  No Chest Pain, No Palpitations  Respiratory:  No Cough, No Dyspnea  Gastrointestinal:  As described in HPI  Skin:  No Skin Lesions, No Jaundice  Neuro:  No Syncope, No Dizziness    Physical Examination:  T(C): 37.1 (04-22-25 @ 07:00), Max: 37.1 (04-22-25 @ 07:00)  HR: 61 (04-22-25 @ 07:00) (61 - 84)  BP: 104/65 (04-22-25 @ 07:00) (104/65 - 126/80)  RR: 17 (04-22-25 @ 07:00) (17 - 18)  SpO2: 94% (04-22-25 @ 07:00) (94% - 97%)        GENERAL: AAOx3, no acute distress.  HEAD:  Atraumatic, Normocephalic  EYES: conjunctiva and sclera clear  NECK: Supple, no JVD or thyromegaly  CHEST/LUNG: Clear to auscultation bilaterally; No wheeze, rhonchi, or rales  HEART: Regular rate and rhythm; normal S1, S2, No murmurs.  ABDOMEN: Soft, nontender, nondistended; Bowel sounds present  NEUROLOGY: No asterixis or tremor.   SKIN: Intact, no jaundice     Data:    Hgb trend:  10.5  04-20-25 @ 06:27              Liver panel trend:  TBili 0.6   /   AST 36   /   ALT 22   /   AlkP 255   /   Tptn 5.9   /   Alb 3.5    /   DBili --      04-20  TBili 0.7   /   AST 28   /   ALT 21   /   AlkP 249   /   Tptn 6.1   /   Alb 3.4    /   DBili --      04-19  TBili 0.7   /   AST 31   /   ALT 23   /   AlkP 275   /   Tptn 6.9   /   Alb 3.8    /   DBili --      04-18

## 2025-04-22 NOTE — PROGRESS NOTE ADULT - SUBJECTIVE AND OBJECTIVE BOX
SUBJECTIVE/OVERNIGHT EVENTS  Today is hospital day 4d. This morning patient was seen and examined at bedside, resting comfortably in bed. No acute or major events overnight.    MEDICATIONS  STANDING MEDICATIONS  atorvastatin 80 milliGRAM(s) Oral at bedtime  chlorhexidine 2% Cloths 1 Application(s) Topical daily  enoxaparin Injectable 40 milliGRAM(s) SubCutaneous every 24 hours  famotidine    Tablet 20 milliGRAM(s) Oral daily  lactobacillus acidophilus 1 Tablet(s) Oral three times a day with meals  metoprolol tartrate 12.5 milliGRAM(s) Oral two times a day  multivitamin  Chewable 1 Tablet(s) Oral daily  pantoprazole    Tablet 40 milliGRAM(s) Oral before breakfast  sucralfate 1 Gram(s) Oral four times a day    PRN MEDICATIONS  acetaminophen     Tablet .. 650 milliGRAM(s) Oral every 6 hours PRN  ketorolac   Injectable 15 milliGRAM(s) IV Push every 6 hours PRN  oxycodone    5 mG/acetaminophen 325 mG 1 Tablet(s) Oral every 4 hours PRN  simethicone 80 milliGRAM(s) Chew every 6 hours PRN    VITALS  T(F): 98.7 (04-22-25 @ 07:00), Max: 98.7 (04-22-25 @ 07:00)  HR: 61 (04-22-25 @ 07:00) (61 - 84)  BP: 104/65 (04-22-25 @ 07:00) (104/65 - 126/80)  RR: 17 (04-22-25 @ 07:00) (17 - 18)  SpO2: 94% (04-22-25 @ 07:00) (94% - 97%)    PHYSICAL EXAM  GENERAL  ( + ) NAD, lying in bed comfortably     (  ) obtunded     (  ) lethargic     (  ) somnolent    HEAD  ( + ) Atraumatic     (  ) hematoma     (  ) laceration (specify location:       )     NECK  ( + ) Supple     (  ) neck stiffness     (  ) nuchal rigidity     (  )  no JVD     (  ) JVD present ( -- cm)    HEART  Rate -->  (+  ) normal rate    (  ) bradycardic    (  ) tachycardic  Rhythm -->  ( + ) regular    (  ) regularly irregular    (  ) irregularly irregular  Murmurs -->  (  ) normal s1/s2    (  ) systolic murmur    (  ) diastolic murmur    (  ) continuous murmur     (  ) S3 present    (  ) S4 present    LUNGS  (+  )Unlabored respirations     (  ) tachypnea  ( + ) B/L air entry     (  ) decreased breath sounds in:  (location     )    (  ) no adventitious sound     (  ) crackles     (  ) wheezing      (  ) rhonchi      (specify location:       )  (  ) chest wall tenderness (specify location:       )    ABDOMEN  ( + ) Soft     (  ) tense   |   ( + ) nondistended     (  ) distended   |   (  ) +BS     (  ) hypoactive bowel sounds     (  ) hyperactive bowel sounds  (  ) nontender     (  ) RUQ tenderness     (  ) RLQ tenderness     (  ) LLQ tenderness     (  ) epigastric tenderness     (  ) diffuse tenderness  (  ) Splenomegaly      (  ) Hepatomegaly      (  ) Jaundice     (  ) ecchymosis     EXTREMITIES  ( + ) Normal     (  ) Rash     (  ) ecchymosis     (  ) varicose veins      (  ) pitting edema     (  ) non-pitting edema   (  ) ulceration     (  ) gangrene:     (location:     )    NERVOUS SYSTEM  (+  ) A&Ox3     (  ) confused     (  ) lethargic  CN II-XII:     (  ) Intact     (  ) focal deficits  (Specify:     )   Upper extremities:     (  ) strength X/5     (  ) focal deficit (specify:    )  Lower extremities:     (  ) strength  X/5    (  ) focal deficit (specify:    )    SKIN  ( + ) No rashes or lesions     (  ) maculopapular rash     (  ) pustules     (  ) vesicles     (  ) ulcer     (  ) ecchymosis     (specify location:     )

## 2025-04-22 NOTE — PROGRESS NOTE ADULT - ASSESSMENT
58 yo M with a PMH of HTN, hiatal hernia, HLD, CAD s/p CABG in Jan 2024, paroxysmal A. fib on plavix, former smoker (30 pack years), recently diagnosed with colitis and treated during his admission 4/1-4/5 presenting to the ED for evaluation of persistent abdominal pain and weakness x 3 weeks. Patient states he also has lower back pain and endorses chills, states he had a fever 3 days ago. Patient denies chest pain, sob, nausea, vomiting, diarrhea, urinary symptoms.    #abdominal Pain 2/2 recent colitis vs possible pancreatitis   - CT abd/pelv w/ IVC- Redemonstrated prominent submucosal fat/bowel wall thickening involving descending colon, possibly related to prior episodes of colitis. Otherwise, no new acute findings in the abdomen or pelvis.  - F/u GGT  - F/u GI c/s for possible irritable bowel management  - Monitor off antibiotics  - Pain control: tylenol and toradol  - amylase 62  - Lipase 67  - blood cx negative   - urine cx negative   - GI: possible EGD/colonoscopy this week   - cardio cleared, ok to hold plavix     #Chronic Anemia  - In the ED, Hb 11.6, baseline ~12  - MCV 79.2    #3.8 cm wide R iliac artery aneurysm- stable  - F/u vascular o/p- surgery scheduled with Dr. Tavera, was scheduled for 4/21/2025    #HTN  #CAD s/p CABG in Jan 2024  #Paroxysmal A.fib  - C/w home meds  - holding plavix     DVT ppx: lovenox  GI ppx: protonix  Diet: DASH  Activity: as tolerated  Pending: pending EDG/colon 58 yo M with a PMH of HTN, hiatal hernia, HLD, CAD s/p CABG in Jan 2024, paroxysmal A. fib on plavix, former smoker (30 pack years), recently diagnosed with colitis and treated during his admission 4/1-4/5 presenting to the ED for evaluation of persistent abdominal pain and weakness x 3 weeks. Patient states he also has lower back pain and endorses chills, states he had a fever 3 days ago. Patient denies chest pain, sob, nausea, vomiting, diarrhea, urinary symptoms.    #abdominal Pain 2/2 recent colitis vs possible pancreatitis   - CT abd/pelv w/ IVC- Redemonstrated prominent submucosal fat/bowel wall thickening involving descending colon, possibly related to prior episodes of colitis. Otherwise, no new acute findings in the abdomen or pelvis.  - F/u GGT  - F/u GI c/s for possible irritable bowel management  - Monitor off antibiotics  - Pain control: tylenol and toradol  - amylase 62  - Lipase 67  - blood cx negative   - urine cx negative   - cardio cleared  - holding plavix since 4/21   - GI: EGD/colonoscopy after off plavix for 5 days, if dc will do it outpatient     #Chronic Anemia  - In the ED, Hb 11.6, baseline ~12  - MCV 79.2    #3.8 cm wide R iliac artery aneurysm- stable  - F/u vascular o/p- surgery scheduled with Dr. Tavera, was scheduled for 4/21/2025    #HTN  #CAD s/p CABG in Jan 2024  #Paroxysmal A.fib  - C/w home meds  - holding plavix     DVT ppx: lovenox  GI ppx: protonix  Diet: DASH  Activity: as tolerated  Pending: pending EDG/colon

## 2025-04-22 NOTE — PROGRESS NOTE ADULT - ASSESSMENT
60 yo man with a PMH of HTN, hiatal hernia, HLD, CAD s/p CABG in Jan 2024, paroxysmal A. fib on plavix, former smoker (30 pack years), recently diagnosed with colitis and treated during his admission 4/1-4/5 presented for persistent abdominal pain and weakness x 3 weeks. Patient states he also has lower back pain .    #abdominal Pain 2/2 recent colitis vs possible pancreatitis   - CT abd/pelv w/ IVC- Redemonstrated prominent submucosal fat/bowel wall thickening involving descending colon, possibly related to prior episodes of colitis. Otherwise, no new acute findings in the abdomen or pelvis.  GI following for planned endoscopic investigation   cardiology - ok to hold plavix for 5 days prior to procedure     #Chronic Anemia    #3.8 cm wide R iliac artery aneurysm- stable  vascular op     #HTN  BP: 104/65 (22 Apr 2025 07:00) (104/65 - 126/80)  controlled     #CAD s/p CABG in Jan 2024    #Paroxysmal A.fib  - C/w home meds    PROGRESS NOTE HANDOFF    Pending:   holding plavix for 5 days for egd/ colon , currently able to eat clear liquids but if not - > iv fluids     Family discussion: patient verbalized understanding and agreeable to plan of care     Disposition: Home

## 2025-04-22 NOTE — PROGRESS NOTE ADULT - ASSESSMENT
58 yo M with pmhx of HTN, HLD, CAD s/p CABG (Perez 10, 2024) on plavix , pAfib, former smoker (30 pack years) presents to ED with worsening abdominal pain, nausea and multiple episodes of diarrhea. Patient reports that he was diagnosed with flu early march and then developed right eye stye received antibiotics. After this, patient developed sharp mid abdominal pain 6/10, diarrhea, had an ER visit received cipro/flagyl  but since last two days his abdominal pain is worsening with dec PO intake and multiple episodes of diarrhea >3/day. Patient saw  and is scheduled for EGD/Colonoscopy in 1 month. CTAP notable for submucosal fat in descending colon, ascending colon. He reports that he lost 28lbs in the last month. GI following for abdominal pain.    # Abdominal pain with prior diagnosis of colitis- mostly post prandial   # Weight loss  # Former smoker   # Diarrhea that has now resolved  # Microcytic anemia   # Large Hiatal Hernia  # Elevated Alk Phos with normal GGT   - Not septic on admission   - CTAP 4/18/25: Redemonstrated prominent submucosal fat/bowel wall thickening involving  descending colon, possibly related to prior episodes of colitis. Otherwise, no new acute findings in the abdomen or pelvis.  - CTAP 4/1/25 : IC : Submucosal fat within the descending colon is noted which may be related to previous episodes of colitis. Similar changes within the ascending colon also present  - Currently abdominal pain improving and nausea better  - ESR 4/4/25:48  CRP: 62 repeat 60  - On Plavix for CABG last dose 4/20   - C diff and GI PCR 4/4/25: negative  - Lipase 67   - Alk Phos noted with elevation since April 1, previously normal in Jan, however GGT normal,  rest of LFTs wnl      RECS  - Will schedule for EGD/colonoscopy on Friday  - as per primary team (spoke to Dr Wilcox Cardiologist) who cleared him to be 5 days off Plavix  - Optimize hemodynamics and volume status   - Cipro and Flagyl if no contraindication   - Follow up Calprotectin and Lactoferrin   - Trend ESR and CRP Q48 hrs   - Repeat GI pcr and C diff if recurrent diarrhea   - Diet as tolerated  - Monitor BMs  - we will follow    58 yo M with pmhx of HTN, HLD, CAD s/p CABG (Perez 10, 2024) on plavix , pAfib, former smoker (30 pack years) presents to ED with worsening abdominal pain, nausea and multiple episodes of diarrhea. Patient reports that he was diagnosed with flu early march and then developed right eye stye received antibiotics. After this, patient developed sharp mid abdominal pain 6/10, diarrhea, had an ER visit received cipro/flagyl  but since last two days his abdominal pain is worsening with dec PO intake and multiple episodes of diarrhea >3/day. Patient saw Dr. West and is scheduled for EGD/Colonoscopy in 1 month. CTAP notable for submucosal fat in descending colon, ascending colon. He reports that he lost 28lbs in the last month. GI following for abdominal pain.    # Abdominal pain with prior diagnosis of colitis- mostly post prandial   # Weight loss, rule out GI malignancy   # Former smoker   # Diarrhea that has now resolved  # Microcytic anemia   # Large Hiatal Hernia  # Elevated Alk Phos with normal GGT   - Not septic on admission   - CTAP 4/18/25: Redemonstrated prominent submucosal fat/bowel wall thickening involving  descending colon, possibly related to prior episodes of colitis. Otherwise, no new acute findings in the abdomen or pelvis.  - CTAP 4/1/25 : IC : Submucosal fat within the descending colon is noted which may be related to previous episodes of colitis. Similar changes within the ascending colon also present  - Currently abdominal pain improving and nausea better  - ESR 4/4/25:48  CRP: 62 repeat 60  - On Plavix for CABG last dose 4/20   - C diff and GI PCR 4/4/25: negative  - Lipase 67   - Alk Phos noted with elevation since April 1, previously normal in Jan, however GGT normal,  rest of LFTs wnl      RECS  - Will schedule for EGD/colonoscopy on Friday, risks /benefits and alternatives are discussed.   - as per primary team (spoke to Dr Wilcox Cardiologist) who cleared him to be 5 days off Plavix  - Optimize hemodynamics and volume status   - Cipro and Flagyl if no contraindication   - Follow up Calprotectin and Lactoferrin   - Trend ESR and CRP Q48 hrs   - Repeat GI pcr and C diff if recurrent diarrhea   - Diet as tolerated  - Monitor BMs  - we will follow

## 2025-04-22 NOTE — PROGRESS NOTE ADULT - SUBJECTIVE AND OBJECTIVE BOX
JONAS CELESTIN  59y  [facility] 4B 023 B      Patient is a 59y old  Male who presents with a chief complaint of abdominal pain (22 Apr 2025 09:44)      My note supersedes the resident's note      INTERVAL HPI/OVERNIGHT EVENTS:  no acute events overnight , pain unchanged       REVIEW OF SYSTEMS:  CONSTITUTIONAL: No fever, weight loss, or fatigue  EYES: No eye pain, visual disturbances, or discharge  ENMT:  No difficulty hearing, tinnitus, vertigo; No sinus or throat pain  NECK: No pain or stiffness  BREASTS: No pain, masses, or nipple discharge  RESPIRATORY: No cough, wheezing, chills or hemoptysis; No shortness of breath  CARDIOVASCULAR: No chest pain, palpitations, dizziness, or leg swelling  GASTROINTESTINAL: abdominal pain   GENITOURINARY: No dysuria, frequency, hematuria, or incontinence  NEUROLOGICAL: No headaches, memory loss, loss of strength, numbness, or tremors  SKIN: No itching, burning, rashes, or lesions   LYMPH NODES: No enlarged glands  ENDOCRINE: No heat or cold intolerance; No hair loss  MUSCULOSKELETAL: No joint pain or swelling; No muscle, back, or extremity pain  PSYCHIATRIC: No depression, anxiety, mood swings, or difficulty sleeping  HEME/LYMPH: No easy bruising, or bleeding gums  ALLERY AND IMMUNOLOGIC: No hives or eczema  FAMILY HISTORY:  FHx: lung cancer (Father)      T(C): 37.1 (04-22-25 @ 07:00), Max: 37.1 (04-22-25 @ 07:00)  HR: 61 (04-22-25 @ 07:00) (61 - 84)  BP: 104/65 (04-22-25 @ 07:00) (104/65 - 126/80)  RR: 17 (04-22-25 @ 07:00) (17 - 18)  SpO2: 94% (04-22-25 @ 07:00) (94% - 97%)  Wt(kg): --Vital Signs Last 24 Hrs  T(C): 37.1 (22 Apr 2025 07:00), Max: 37.1 (22 Apr 2025 07:00)  T(F): 98.7 (22 Apr 2025 07:00), Max: 98.7 (22 Apr 2025 07:00)  HR: 61 (22 Apr 2025 07:00) (61 - 84)  BP: 104/65 (22 Apr 2025 07:00) (104/65 - 126/80)  BP(mean): --  RR: 17 (22 Apr 2025 07:00) (17 - 18)  SpO2: 94% (22 Apr 2025 07:00) (94% - 97%)    Parameters below as of 22 Apr 2025 07:00  Patient On (Oxygen Delivery Method): room air        PHYSICAL EXAM:  GENERAL: NAD,   HEAD:  Atraumatic, Normocephalic  EYES: EOMI, PERRLA, conjunctiva and sclera clear  ENMT: No tonsillar erythema, exudates, or enlargement; Moist mucous membranes, Good dentition, No lesions  NECK: Supple, No JVD, Normal thyroid  NERVOUS SYSTEM:  Alert & Oriented X3, Good concentration; Motor Strength 5/5 B/L upper and lower extremities; DTRs 2+ intact and symmetric  PULM: Clear to auscultation bilaterally  CARDIAC: Regular rate and rhythm; No murmurs, rubs, or gallops  GI: Soft, tender   EXTREMITIES:  2+ Peripheral Pulses, No clubbing, cyanosis, or edema  LYMPH: No lymphadenopathy noted  SKIN: No rashes or lesions    Consultant(s) Notes Reviewed:  [x ] YES  [ ] NO  Care Discussed with Consultants/Other Providers [ x] YES  [ ] NO    LABS:                    acetaminophen     Tablet .. 650 milliGRAM(s) Oral every 6 hours PRN  atorvastatin 80 milliGRAM(s) Oral at bedtime  chlorhexidine 2% Cloths 1 Application(s) Topical daily  enoxaparin Injectable 40 milliGRAM(s) SubCutaneous every 24 hours  famotidine    Tablet 20 milliGRAM(s) Oral daily  ketorolac   Injectable 15 milliGRAM(s) IV Push every 6 hours PRN  lactobacillus acidophilus 1 Tablet(s) Oral three times a day with meals  metoprolol tartrate 12.5 milliGRAM(s) Oral two times a day  multivitamin  Chewable 1 Tablet(s) Oral daily  oxycodone    5 mG/acetaminophen 325 mG 1 Tablet(s) Oral every 4 hours PRN  pantoprazole    Tablet 40 milliGRAM(s) Oral before breakfast  simethicone 80 milliGRAM(s) Chew every 6 hours PRN  sucralfate 1 Gram(s) Oral four times a day      HEALTH ISSUES - PROBLEM Dx:          Case Discussed with House Staff   Spectra [phone]

## 2025-04-23 LAB — CALPROTECTIN STL-MCNT: 585 UG/G — HIGH (ref 0–120)

## 2025-04-23 PROCEDURE — 93010 ELECTROCARDIOGRAM REPORT: CPT

## 2025-04-23 PROCEDURE — 99232 SBSQ HOSP IP/OBS MODERATE 35: CPT

## 2025-04-23 RX ORDER — ONDANSETRON HCL/PF 4 MG/2 ML
4 VIAL (ML) INJECTION EVERY 8 HOURS
Refills: 0 | Status: DISCONTINUED | OUTPATIENT
Start: 2025-04-23 | End: 2025-05-01

## 2025-04-23 RX ADMIN — Medication 1 TABLET(S): at 17:27

## 2025-04-23 RX ADMIN — Medication 1 APPLICATION(S): at 11:34

## 2025-04-23 RX ADMIN — Medication 1 TABLET(S): at 11:32

## 2025-04-23 RX ADMIN — ATORVASTATIN CALCIUM 80 MILLIGRAM(S): 80 TABLET, FILM COATED ORAL at 21:05

## 2025-04-23 RX ADMIN — Medication 1 TABLET(S): at 11:33

## 2025-04-23 RX ADMIN — Medication 4 MILLIGRAM(S): at 13:41

## 2025-04-23 RX ADMIN — ENOXAPARIN SODIUM 40 MILLIGRAM(S): 100 INJECTION SUBCUTANEOUS at 08:03

## 2025-04-23 RX ADMIN — Medication 1 GRAM(S): at 05:30

## 2025-04-23 RX ADMIN — METOPROLOL SUCCINATE 12.5 MILLIGRAM(S): 50 TABLET, EXTENDED RELEASE ORAL at 05:30

## 2025-04-23 RX ADMIN — Medication 1 GRAM(S): at 23:20

## 2025-04-23 RX ADMIN — Medication 40 MILLIGRAM(S): at 05:57

## 2025-04-23 RX ADMIN — Medication 1 GRAM(S): at 17:27

## 2025-04-23 RX ADMIN — METOPROLOL SUCCINATE 12.5 MILLIGRAM(S): 50 TABLET, EXTENDED RELEASE ORAL at 17:27

## 2025-04-23 RX ADMIN — Medication 1 TABLET(S): at 08:03

## 2025-04-23 RX ADMIN — Medication 20 MILLIGRAM(S): at 11:32

## 2025-04-23 RX ADMIN — Medication 1 GRAM(S): at 11:32

## 2025-04-23 NOTE — PROGRESS NOTE ADULT - ASSESSMENT
60 yo M with pmhx of HTN, HLD, CAD s/p CABG (Perez 10, 2024) on plavix , pAfib, former smoker (30 pack years) presents to ED with worsening abdominal pain, nausea and multiple episodes of diarrhea. Patient reports that he was diagnosed with flu early march and then developed right eye stye received antibiotics. After this, patient developed sharp mid abdominal pain 6/10, diarrhea, had an ER visit received cipro/flagyl  but since last two days his abdominal pain is worsening with dec PO intake and multiple episodes of diarrhea >3/day. Patient saw  and is scheduled for EGD/Colonoscopy in 1 month. CTAP notable for submucosal fat in descending colon, ascending colon. He reports that he lost 28lbs in the last month. GI following for abdominal pain.    # Abdominal pain with prior diagnosis of colitis- mostly post prandial, r/o IBD    # Weight loss  # Former smoker   # Diarrhea that has now resolved  # Microcytic anemia   # Large Hiatal Hernia  # Elevated Alk Phos with normal GGT   - Not septic on admission   - CTAP 4/18/25: Redemonstrated prominent submucosal fat/bowel wall thickening involving  descending colon, possibly related to prior episodes of colitis. Otherwise, no new acute findings in the abdomen or pelvis.  - CTAP 4/1/25 : IC : Submucosal fat within the descending colon is noted which may be related to previous episodes of colitis. Similar changes within the ascending colon also present  - ESR 4/4/25:48  CRP: 62 repeat 60  - calprotectin 585   - On Plavix for CABG last dose 4/20   - C diff and GI PCR 4/4/25: negative  - Lipase 67   - Alk Phos noted with elevation since April 1, previously normal in Jan, however GGT normal,  rest of LFTs wnl      RECS  - Please obtain updated blood work including CRP and ESR  - Will schedule for EGD/colonoscopy on Friday  - Clear liquid on Thursday and NPO after midnight   - as per primary team (spoke to Dr Wilcox Cardiologist) who cleared him to be 5 days off Plavix  - Optimize hemodynamics and volume status   - Cipro and Flagyl if no contraindication   - Follow up Calprotectin and Lactoferrin   - Trend ESR and CRP Q48 hrs   - Repeat GI pcr and C diff if recurrent diarrhea   - Diet as tolerated  - Monitor BMs  - we will follow

## 2025-04-23 NOTE — PROGRESS NOTE ADULT - ASSESSMENT
60 yo M with a PMH of HTN, hiatal hernia, HLD, CAD s/p CABG in Jan 2024, paroxysmal A. fib on plavix, former smoker (30 pack years), recently diagnosed with colitis and treated during his admission 4/1-4/5 presenting to the ED for evaluation of persistent abdominal pain and weakness x 3 weeks. Patient states he also has lower back pain and endorses chills, states he had a fever 3 days ago. Patient denies chest pain, sob, nausea, vomiting, diarrhea, urinary symptoms.    #abdominal Pain 2/2 recent colitis vs possible pancreatitis   - CT abd/pelv w/ IVC- Redemonstrated prominent submucosal fat/bowel wall thickening involving descending colon, possibly related to prior episodes of colitis. Otherwise, no new acute findings in the abdomen or pelvis.  - F/u GGT  - F/u GI c/s for possible irritable bowel management  - Monitor off antibiotics  - Pain control: tylenol and toradol  - amylase 62  - Lipase 67  - blood cx negative   - urine cx negative   - cardio cleared  - holding plavix since 4/21   - GI: EGD/colonoscopy after off plavix for 5 days, if dc will do it outpatient     #Chronic Anemia  - In the ED, Hb 11.6, baseline ~12  - MCV 79.2    #3.8 cm wide R iliac artery aneurysm- stable  - F/u vascular o/p- surgery scheduled with Dr. Tavera, was scheduled for 4/21/2025    #HTN  #CAD s/p CABG in Jan 2024  #Paroxysmal A.fib  - C/w home meds  - holding plavix     DVT ppx: lovenox  GI ppx: protonix  Diet: DASH  Activity: as tolerated  Pending: pending EDG/colon 58 yo M with a PMH of HTN, hiatal hernia, HLD, CAD s/p CABG in Jan 2024, paroxysmal A. fib on plavix, former smoker (30 pack years), recently diagnosed with colitis and treated during his admission 4/1-4/5 presenting to the ED for evaluation of persistent abdominal pain and weakness x 3 weeks. Patient states he also has lower back pain and endorses chills, states he had a fever 3 days ago. Patient denies chest pain, sob, nausea, vomiting, diarrhea, urinary symptoms.    #abdominal Pain 2/2 recent colitis vs possible pancreatitis   - CT abd/pelv w/ IVC- Redemonstrated prominent submucosal fat/bowel wall thickening involving descending colon, possibly related to prior episodes of colitis. Otherwise, no new acute findings in the abdomen or pelvis.  - F/u GGT  - F/u GI c/s for possible irritable bowel management  - Monitor off antibiotics  - Pain control: tylenol and toradol  - amylase 62  - Lipase 67  - blood cx negative   - urine cx negative   - cardio cleared  - holding plavix since 4/21   - GI: EGD/colonoscopy on Friday     #Chronic Anemia  - In the ED, Hb 11.6, baseline ~12  - MCV 79.2    #3.8 cm wide R iliac artery aneurysm- stable  - F/u vascular o/p- surgery scheduled with Dr. Tavera, was scheduled for 4/21/2025    #HTN  #CAD s/p CABG in Jan 2024  #Paroxysmal A.fib  - C/w home meds  - holding plavix     DVT ppx: lovenox  GI ppx: protonix  Diet: DASH  Activity: as tolerated  Pending: pending EDG/colon

## 2025-04-23 NOTE — PROGRESS NOTE ADULT - SUBJECTIVE AND OBJECTIVE BOX
Gastroenterology progress note:     Patient is a 59y old  Male who presents with a chief complaint of abdominal pain (23 Apr 2025 08:27)       Admitted on: 04-18-25    We are following the patient for: abdominal pain        Interval History:    No acute events overnight.       PAST MEDICAL & SURGICAL HISTORY:  HTN (hypertension)  Hiatal hernia  HLD (hyperlipidemia)  CAD (coronary artery disease)  Paroxysmal atrial fibrillation  History of colitis  S/P CABG (coronary artery bypass graft)          MEDICATIONS  (STANDING):  atorvastatin 80 milliGRAM(s) Oral at bedtime  chlorhexidine 2% Cloths 1 Application(s) Topical daily  enoxaparin Injectable 40 milliGRAM(s) SubCutaneous every 24 hours  famotidine    Tablet 20 milliGRAM(s) Oral daily  lactobacillus acidophilus 1 Tablet(s) Oral three times a day with meals  metoprolol tartrate 12.5 milliGRAM(s) Oral two times a day  multivitamin  Chewable 1 Tablet(s) Oral daily  pantoprazole    Tablet 40 milliGRAM(s) Oral before breakfast  sucralfate 1 Gram(s) Oral four times a day    MEDICATIONS  (PRN):  acetaminophen     Tablet .. 650 milliGRAM(s) Oral every 6 hours PRN Temp greater or equal to 38C (100.4F), Mild Pain (1 - 3)  ketorolac   Injectable 15 milliGRAM(s) IV Push every 6 hours PRN Moderate Pain (4 - 6)  ondansetron Injectable 4 milliGRAM(s) IV Push every 8 hours PRN Nausea and/or Vomiting  oxycodone    5 mG/acetaminophen 325 mG 1 Tablet(s) Oral every 4 hours PRN Severe Pain (7 - 10)  simethicone 80 milliGRAM(s) Chew every 6 hours PRN Gas      Allergies  penicillins (Unknown)      Review of Systems:   Cardiovascular:  No Chest Pain, No Palpitations  Respiratory:  No Cough, No Dyspnea  Gastrointestinal:  As described in HPI  Skin:  No Skin Lesions, No Jaundice  Neuro:  No Syncope, No Dizziness    Physical Examination:  T(C): 36.2 (04-23-25 @ 15:30), Max: 36.9 (04-23-25 @ 07:00)  HR: 74 (04-23-25 @ 15:30) (68 - 74)  BP: 117/71 (04-23-25 @ 15:30) (108/67 - 117/71)  RR: 18 (04-23-25 @ 15:30) (17 - 18)  SpO2: 95% (04-23-25 @ 15:30) (95% - 96%)        GENERAL: AAOx3, no acute distress.  HEAD:  Atraumatic, Normocephalic  EYES: conjunctiva and sclera clear  NECK: Supple, no JVD or thyromegaly  CHEST/LUNG: Clear to auscultation bilaterally; No wheeze, rhonchi, or rales  HEART: Regular rate and rhythm; normal S1, S2, No murmurs.  ABDOMEN: Soft, nontender, nondistended; Bowel sounds present  NEUROLOGY: No asterixis or tremor.   SKIN: Intact, no jaundice     Data:    Liver panel trend:  TBili 0.6   /   AST 36   /   ALT 22   /   AlkP 255   /   Tptn 5.9   /   Alb 3.5    /   DBili --      04-20  TBili 0.7   /   AST 28   /   ALT 21   /   AlkP 249   /   Tptn 6.1   /   Alb 3.4    /   DBili --      04-19  TBili 0.7   /   AST 31   /   ALT 23   /   AlkP 275   /   Tptn 6.9   /   Alb 3.8    /   DBili --      04-18                          Gastroenterology progress note:     Patient is a 59y old  Male who presents with a chief complaint of abdominal pain (23 Apr 2025 08:27)       Admitted on: 04-18-25    We are following the patient for: abdominal pain, colitis and weight loss        Interval History:    No acute events overnight.       PAST MEDICAL & SURGICAL HISTORY:  HTN (hypertension)  Hiatal hernia  HLD (hyperlipidemia)  CAD (coronary artery disease)  Paroxysmal atrial fibrillation  History of colitis  S/P CABG (coronary artery bypass graft)          MEDICATIONS  (STANDING):  atorvastatin 80 milliGRAM(s) Oral at bedtime  chlorhexidine 2% Cloths 1 Application(s) Topical daily  enoxaparin Injectable 40 milliGRAM(s) SubCutaneous every 24 hours  famotidine    Tablet 20 milliGRAM(s) Oral daily  lactobacillus acidophilus 1 Tablet(s) Oral three times a day with meals  metoprolol tartrate 12.5 milliGRAM(s) Oral two times a day  multivitamin  Chewable 1 Tablet(s) Oral daily  pantoprazole    Tablet 40 milliGRAM(s) Oral before breakfast  sucralfate 1 Gram(s) Oral four times a day    MEDICATIONS  (PRN):  acetaminophen     Tablet .. 650 milliGRAM(s) Oral every 6 hours PRN Temp greater or equal to 38C (100.4F), Mild Pain (1 - 3)  ketorolac   Injectable 15 milliGRAM(s) IV Push every 6 hours PRN Moderate Pain (4 - 6)  ondansetron Injectable 4 milliGRAM(s) IV Push every 8 hours PRN Nausea and/or Vomiting  oxycodone    5 mG/acetaminophen 325 mG 1 Tablet(s) Oral every 4 hours PRN Severe Pain (7 - 10)  simethicone 80 milliGRAM(s) Chew every 6 hours PRN Gas      Allergies  penicillins (Unknown)      Review of Systems:   Cardiovascular:  No Chest Pain, No Palpitations  Respiratory:  No Cough, No Dyspnea  Gastrointestinal:  As described in HPI  Skin:  No Skin Lesions, No Jaundice  Neuro:  No Syncope, No Dizziness    Physical Examination:  T(C): 36.2 (04-23-25 @ 15:30), Max: 36.9 (04-23-25 @ 07:00)  HR: 74 (04-23-25 @ 15:30) (68 - 74)  BP: 117/71 (04-23-25 @ 15:30) (108/67 - 117/71)  RR: 18 (04-23-25 @ 15:30) (17 - 18)  SpO2: 95% (04-23-25 @ 15:30) (95% - 96%)        GENERAL: AAOx3, no acute distress.  HEAD:  Atraumatic, Normocephalic  EYES: conjunctiva and sclera clear  NECK: Supple, no JVD or thyromegaly  CHEST/LUNG: Clear to auscultation bilaterally; No wheeze, rhonchi, or rales  HEART: Regular rate and rhythm; normal S1, S2, No murmurs.  ABDOMEN: Soft, nontender, nondistended; Bowel sounds present  NEUROLOGY: No asterixis or tremor.   SKIN: Intact, no jaundice     Data:    Liver panel trend:  TBili 0.6   /   AST 36   /   ALT 22   /   AlkP 255   /   Tptn 5.9   /   Alb 3.5    /   DBili --      04-20  TBili 0.7   /   AST 28   /   ALT 21   /   AlkP 249   /   Tptn 6.1   /   Alb 3.4    /   DBili --      04-19  TBili 0.7   /   AST 31   /   ALT 23   /   AlkP 275   /   Tptn 6.9   /   Alb 3.8    /   DBili --      04-18

## 2025-04-23 NOTE — PROGRESS NOTE ADULT - SUBJECTIVE AND OBJECTIVE BOX
SUBJECTIVE/OVERNIGHT EVENTS  Today is hospital day 5d. This morning patient was seen and examined at bedside, resting comfortably in bed. No acute or major events overnight.    MEDICATIONS  STANDING MEDICATIONS  atorvastatin 80 milliGRAM(s) Oral at bedtime  chlorhexidine 2% Cloths 1 Application(s) Topical daily  enoxaparin Injectable 40 milliGRAM(s) SubCutaneous every 24 hours  famotidine    Tablet 20 milliGRAM(s) Oral daily  lactobacillus acidophilus 1 Tablet(s) Oral three times a day with meals  metoprolol tartrate 12.5 milliGRAM(s) Oral two times a day  multivitamin  Chewable 1 Tablet(s) Oral daily  pantoprazole    Tablet 40 milliGRAM(s) Oral before breakfast  sucralfate 1 Gram(s) Oral four times a day    PRN MEDICATIONS  acetaminophen     Tablet .. 650 milliGRAM(s) Oral every 6 hours PRN  ketorolac   Injectable 15 milliGRAM(s) IV Push every 6 hours PRN  oxycodone    5 mG/acetaminophen 325 mG 1 Tablet(s) Oral every 4 hours PRN  simethicone 80 milliGRAM(s) Chew every 6 hours PRN    VITALS  T(F): 98.4 (04-23-25 @ 07:00), Max: 98.4 (04-23-25 @ 07:00)  HR: 68 (04-23-25 @ 07:00) (68 - 78)  BP: 111/68 (04-23-25 @ 07:00) (108/67 - 118/82)  RR: 17 (04-23-25 @ 07:00) (17 - 18)  SpO2: 95% (04-23-25 @ 07:00) (95% - 96%)    PHYSICAL EXAM  GENERAL  ( + ) NAD, lying in bed comfortably     (  ) obtunded     (  ) lethargic     (  ) somnolent    HEAD  ( + ) Atraumatic     (  ) hematoma     (  ) laceration (specify location:       )     NECK  (+  ) Supple     (  ) neck stiffness     (  ) nuchal rigidity     (  )  no JVD     (  ) JVD present ( -- cm)    HEART  Rate -->  (   +) normal rate    (  ) bradycardic    (  ) tachycardic  Rhythm -->  (  + ) regular    (  ) regularly irregular    (  ) irregularly irregular  Murmurs -->  (  ) normal s1/s2    (  ) systolic murmur    (  ) diastolic murmur    (  ) continuous murmur     (  ) S3 present    (  ) S4 present    LUNGS  ( + )Unlabored respirations     (  ) tachypnea  (+  ) B/L air entry     (  ) decreased breath sounds in:  (location     )    (  ) no adventitious sound     (  ) crackles     (  ) wheezing      (  ) rhonchi      (specify location:       )  (  ) chest wall tenderness (specify location:       )    ABDOMEN  (+  ) Soft     (  +) diffuse tenderness |   (  ) nondistended     (  ) distended   |   (  ) +BS     (  ) hypoactive bowel sounds     (  ) hyperactive bowel sounds  (  ) nontender     (  ) RUQ tenderness     (  ) RLQ tenderness     (  ) LLQ tenderness     (  ) epigastric tenderness     (  ) diffuse tenderness  (  ) Splenomegaly      (  ) Hepatomegaly      (  ) Jaundice     (  ) ecchymosis     EXTREMITIES  ( + ) Normal     (  ) Rash     (  ) ecchymosis     (  ) varicose veins      (  ) pitting edema     (  ) non-pitting edema   (  ) ulceration     (  ) gangrene:     (location:     )    NERVOUS SYSTEM  (  + ) A&Ox3     (  ) confused     (  ) lethargic  CN II-XII:     (  ) Intact     (  ) focal deficits  (Specify:     )   Upper extremities:     (  ) strength X/5     (  ) focal deficit (specify:    )  Lower extremities:     (  ) strength  X/5    (  ) focal deficit (specify:    )    SKIN  ( +  ) No rashes or lesions     (  ) maculopapular rash     (  ) pustules     (  ) vesicles     (  ) ulcer     (  ) ecchymosis     (specify location:     )

## 2025-04-24 LAB
ANION GAP SERPL CALC-SCNC: 13 MMOL/L — SIGNIFICANT CHANGE UP (ref 7–14)
BASOPHILS # BLD AUTO: 0.07 K/UL — SIGNIFICANT CHANGE UP (ref 0–0.2)
BASOPHILS NFR BLD AUTO: 1.1 % — HIGH (ref 0–1)
BUN SERPL-MCNC: 15 MG/DL — SIGNIFICANT CHANGE UP (ref 10–20)
CALCIUM SERPL-MCNC: 9.3 MG/DL — SIGNIFICANT CHANGE UP (ref 8.4–10.5)
CHLORIDE SERPL-SCNC: 99 MMOL/L — SIGNIFICANT CHANGE UP (ref 98–110)
CO2 SERPL-SCNC: 21 MMOL/L — SIGNIFICANT CHANGE UP (ref 17–32)
CREAT SERPL-MCNC: 1 MG/DL — SIGNIFICANT CHANGE UP (ref 0.7–1.5)
CRP SERPL-MCNC: 111.1 MG/L — HIGH
CULTURE RESULTS: SIGNIFICANT CHANGE UP
EGFR: 86 ML/MIN/1.73M2 — SIGNIFICANT CHANGE UP
EGFR: 86 ML/MIN/1.73M2 — SIGNIFICANT CHANGE UP
EOSINOPHIL # BLD AUTO: 0.07 K/UL — SIGNIFICANT CHANGE UP (ref 0–0.7)
EOSINOPHIL NFR BLD AUTO: 1.1 % — SIGNIFICANT CHANGE UP (ref 0–8)
ERYTHROCYTE [SEDIMENTATION RATE] IN BLOOD: 25 MM/HR — HIGH (ref 0–10)
GLUCOSE SERPL-MCNC: 97 MG/DL — SIGNIFICANT CHANGE UP (ref 70–99)
HCT VFR BLD CALC: 34.3 % — LOW (ref 42–52)
HGB BLD-MCNC: 11.1 G/DL — LOW (ref 14–18)
IMM GRANULOCYTES NFR BLD AUTO: 7.5 % — HIGH (ref 0.1–0.3)
LYMPHOCYTES # BLD AUTO: 1.17 K/UL — LOW (ref 1.2–3.4)
LYMPHOCYTES # BLD AUTO: 19 % — LOW (ref 20.5–51.1)
MAGNESIUM SERPL-MCNC: 2.2 MG/DL — SIGNIFICANT CHANGE UP (ref 1.8–2.4)
MCHC RBC-ENTMCNC: 25.3 PG — LOW (ref 27–31)
MCHC RBC-ENTMCNC: 32.4 G/DL — SIGNIFICANT CHANGE UP (ref 32–37)
MCV RBC AUTO: 78.1 FL — LOW (ref 80–94)
MONOCYTES # BLD AUTO: 0.76 K/UL — HIGH (ref 0.1–0.6)
MONOCYTES NFR BLD AUTO: 12.3 % — HIGH (ref 1.7–9.3)
NEUTROPHILS # BLD AUTO: 3.63 K/UL — SIGNIFICANT CHANGE UP (ref 1.4–6.5)
NEUTROPHILS NFR BLD AUTO: 59 % — SIGNIFICANT CHANGE UP (ref 42.2–75.2)
NRBC BLD AUTO-RTO: 0 /100 WBCS — SIGNIFICANT CHANGE UP (ref 0–0)
PLATELET # BLD AUTO: 145 K/UL — SIGNIFICANT CHANGE UP (ref 130–400)
PMV BLD: 9.5 FL — SIGNIFICANT CHANGE UP (ref 7.4–10.4)
POTASSIUM SERPL-MCNC: 4.4 MMOL/L — SIGNIFICANT CHANGE UP (ref 3.5–5)
POTASSIUM SERPL-SCNC: 4.4 MMOL/L — SIGNIFICANT CHANGE UP (ref 3.5–5)
RBC # BLD: 4.39 M/UL — LOW (ref 4.7–6.1)
RBC # FLD: 14.3 % — SIGNIFICANT CHANGE UP (ref 11.5–14.5)
SODIUM SERPL-SCNC: 133 MMOL/L — LOW (ref 135–146)
SPECIMEN SOURCE: SIGNIFICANT CHANGE UP
WBC # BLD: 6.16 K/UL — SIGNIFICANT CHANGE UP (ref 4.8–10.8)
WBC # FLD AUTO: 6.16 K/UL — SIGNIFICANT CHANGE UP (ref 4.8–10.8)

## 2025-04-24 PROCEDURE — 99233 SBSQ HOSP IP/OBS HIGH 50: CPT

## 2025-04-24 RX ORDER — POLYETHYLENE GLYCOL-3350 AND ELECTROLYTES 236; 6.74; 5.86; 2.97; 22.74 G/274.31G; G/274.31G; G/274.31G; G/274.31G; G/274.31G
4000 POWDER, FOR SOLUTION ORAL ONCE
Refills: 0 | Status: COMPLETED | OUTPATIENT
Start: 2025-04-24 | End: 2025-04-24

## 2025-04-24 RX ADMIN — Medication 1 TABLET(S): at 17:23

## 2025-04-24 RX ADMIN — Medication 1 GRAM(S): at 05:57

## 2025-04-24 RX ADMIN — POLYETHYLENE GLYCOL-3350 AND ELECTROLYTES 4000 MILLILITER(S): 236; 6.74; 5.86; 2.97; 22.74 POWDER, FOR SOLUTION ORAL at 16:02

## 2025-04-24 RX ADMIN — Medication 40 MILLIGRAM(S): at 05:57

## 2025-04-24 RX ADMIN — Medication 1 TABLET(S): at 11:26

## 2025-04-24 RX ADMIN — Medication 1 GRAM(S): at 11:24

## 2025-04-24 RX ADMIN — ENOXAPARIN SODIUM 40 MILLIGRAM(S): 100 INJECTION SUBCUTANEOUS at 07:52

## 2025-04-24 RX ADMIN — Medication 4 MILLIGRAM(S): at 11:24

## 2025-04-24 RX ADMIN — Medication 1 TABLET(S): at 11:25

## 2025-04-24 RX ADMIN — Medication 1 APPLICATION(S): at 11:25

## 2025-04-24 RX ADMIN — Medication 1 TABLET(S): at 07:52

## 2025-04-24 RX ADMIN — METOPROLOL SUCCINATE 12.5 MILLIGRAM(S): 50 TABLET, EXTENDED RELEASE ORAL at 17:23

## 2025-04-24 RX ADMIN — ATORVASTATIN CALCIUM 80 MILLIGRAM(S): 80 TABLET, FILM COATED ORAL at 21:09

## 2025-04-24 RX ADMIN — Medication 1 GRAM(S): at 17:23

## 2025-04-24 RX ADMIN — Medication 20 MILLIGRAM(S): at 11:24

## 2025-04-24 RX ADMIN — Medication 1 GRAM(S): at 23:27

## 2025-04-24 NOTE — PROGRESS NOTE ADULT - ASSESSMENT
58 yo M with a PMH of HTN, hiatal hernia, HLD, CAD s/p CABG in Jan 2024, paroxysmal A. fib on plavix, former smoker (30 pack years), recently diagnosed with colitis and treated during his admission 4/1-4/5 presenting to the ED for evaluation of persistent abdominal pain and weakness x 3 weeks. Patient states he also has lower back pain and endorses chills, states he had a fever 3 days ago. Patient denies chest pain, sob, nausea, vomiting, diarrhea, urinary symptoms.    #abdominal Pain 2/2 recent colitis vs possible pancreatitis   - CT abd/pelv w/ IVC- Redemonstrated prominent submucosal fat/bowel wall thickening involving descending colon, possibly related to prior episodes of colitis. Otherwise, no new acute findings in the abdomen or pelvis.  - GGT: 30, amylase 62. lipase 67  - monitor off antibiotics  - pain control: tylenol and toradol  - blood cx negative   - urine cx negative   - per cardio: may proceed with GI procedures without further cardiac workup. Pt is at moderate risk for a perioperative cardiac event. Ok to hold plavix.    - holding plavix since 4/21   - GI: EGD/colonoscopy on 4/25   - NPO after midnight   - bowel prep today w/ clear liquid diet    #Chronic Anemia  - In the ED, Hb 11.6, baseline ~12  - MCV 79.2    #3.8 cm wide R iliac artery aneurysm- stable  - vascular o/p- surgery was scheduled with Dr. Tavera     #HTN  #CAD s/p CABG in Jan 2024  #Paroxysmal A.fib  - c/w home meds  - holding plavix     DVT ppx: lovenox  GI ppx: protonix  Diet: DASH  Activity: as tolerated  Pending: pending EDG/colon, NPO after MN, bowel prep

## 2025-04-24 NOTE — PROGRESS NOTE ADULT - SUBJECTIVE AND OBJECTIVE BOX
58 yo M with a PMH of HTN, hiatal hernia, HLD, CAD s/p CABG in Jan 2024, paroxysmal A. fib on plavix, former smoker (30 pack years), recently diagnosed with colitis and treated during his admission 4/1-4/5 presenting to the ED for evaluation of persistent abdominal pain and weakness x 3 weeks. Patient states he also has lower back pain and endorses chills, states he had a fever 3 days ago. Patient denies chest pain, sob, nausea, vomiting, diarrhea, urinary symptoms.    #abdominal Pain 2/2 recent colitis vs possible pancreatitis   - CT abd/pelv w/ IVC- Redemonstrated prominent submucosal fat/bowel wall thickening involving descending colon, possibly related to prior episodes of colitis. Otherwise, no new acute findings in the abdomen or pelvis.  - GGT: 30, amylase 62. lipase 67  - monitor off antibiotics  - pain control: tylenol and toradol  - blood cx negative   - urine cx negative   - per cardio: may proceed with GI procedures without further cardiac workup. Pt is at moderate risk for a perioperative cardiac event. Ok to hold plavix.    - holding plavix since 4/21   - GI: EGD/colonoscopy on 4/25   - NPO after midnight   - bowel prep today w/ clear liquid diet    #Chronic Anemia  - In the ED, Hb 11.6, baseline ~12  - MCV 79.2    #3.8 cm wide R iliac artery aneurysm- stable  - vascular o/p- surgery was scheduled with Dr. Tavera     #HTN  #CAD s/p CABG in Jan 2024  #Paroxysmal A.fib  - c/w home meds  - holding plavix     DVT ppx: lovenox  GI ppx: protonix  Diet: DASH  Activity: as tolerated  Pending: pending EDG/colon, NPO after MN, bowel prep  SUBJECTIVE/OVERNIGHT EVENTS  Today is hospital day 7d. This morning patient was seen and examined at bedside, resting comfortably in bed. No acute or major events overnight.    MEDICATIONS  STANDING MEDICATIONS  atorvastatin 80 milliGRAM(s) Oral at bedtime  chlorhexidine 2% Cloths 1 Application(s) Topical daily  enoxaparin Injectable 40 milliGRAM(s) SubCutaneous every 24 hours  famotidine    Tablet 20 milliGRAM(s) Oral daily  lactobacillus acidophilus 1 Tablet(s) Oral three times a day with meals  metoprolol tartrate 12.5 milliGRAM(s) Oral two times a day  multivitamin  Chewable 1 Tablet(s) Oral daily  pantoprazole    Tablet 40 milliGRAM(s) Oral before breakfast  sucralfate 1 Gram(s) Oral four times a day    PRN MEDICATIONS  acetaminophen     Tablet .. 650 milliGRAM(s) Oral every 6 hours PRN  ondansetron Injectable 4 milliGRAM(s) IV Push every 8 hours PRN  oxycodone    5 mG/acetaminophen 325 mG 1 Tablet(s) Oral every 4 hours PRN  simethicone 80 milliGRAM(s) Chew every 6 hours PRN    VITALS  T(F): 100 (04-25-25 @ 00:07), Max: 100 (04-25-25 @ 00:07)  HR: 74 (04-25-25 @ 04:52) (67 - 85)  BP: 99/61 (04-25-25 @ 04:52) (93/59 - 127/70)  RR: 18 (04-25-25 @ 00:07) (18 - 18)  SpO2: 98% (04-25-25 @ 00:07) (96% - 98%)    PHYSICAL EXAM  GENERAL  (  +) NAD, lying in bed comfortably     (  ) obtunded     (  ) lethargic     (  ) somnolent    HEAD  (+  ) Atraumatic     (  ) hematoma     (  ) laceration (specify location:       )     NECK  (  +) Supple     (  ) neck stiffness     (  ) nuchal rigidity     (  )  no JVD     (  ) JVD present ( -- cm)    HEART  Rate -->  ( + ) normal rate    (  ) bradycardic    (  ) tachycardic  Rhythm -->  ( + ) regular    (  ) regularly irregular    (  ) irregularly irregular  Murmurs -->  (  ) normal s1/s2    (  ) systolic murmur    (  ) diastolic murmur    (  ) continuous murmur     (  ) S3 present    (  ) S4 present    LUNGS  ( + )Unlabored respirations     (  ) tachypnea  ( + ) B/L air entry     (  ) decreased breath sounds in:  (location     )    (  ) no adventitious sound     (  ) crackles     (  ) wheezing      (  ) rhonchi      (specify location:       )  (  ) chest wall tenderness (specify location:       )    ABDOMEN  ( + ) Soft     ( + ) tense   |   (  ) nondistended     (  ) distended   |   (  ) +BS     (  ) hypoactive bowel sounds     (  ) hyperactive bowel sounds  (  ) nontender     (  ) RUQ tenderness     (  ) RLQ tenderness     (  ) LLQ tenderness     (  ) epigastric tenderness     (  ) diffuse tenderness  (  ) Splenomegaly      (  ) Hepatomegaly      (  ) Jaundice     (  ) ecchymosis     EXTREMITIES  (+  ) Normal     (  ) Rash     (  ) ecchymosis     (  ) varicose veins      (  ) pitting edema     (  ) non-pitting edema   (  ) ulceration     (  ) gangrene:     (location:     )    NERVOUS SYSTEM  (  +) A&Ox3     (  ) confused     (  ) lethargic  CN II-XII:     (  ) Intact     (  ) focal deficits  (Specify:     )   Upper extremities:     (  ) strength X/5     (  ) focal deficit (specify:    )  Lower extremities:     (  ) strength  X/5    (  ) focal deficit (specify:    )    SKIN  (+  ) No rashes or lesions     (  ) maculopapular rash     (  ) pustules     (  ) vesicles     (  ) ulcer     (  ) ecchymosis     (specify location:     )    LABS             11.1   6.16  )-----------( 145      ( 04-24-25 @ 06:16 )             34.3     133  |  99  |  15  -------------------------<  97   04-24-25 @ 06:16  4.4  |  21  |  1.0    Ca      9.3     04-24-25 @ 06:16  Mg     2.2     04-24-25 @ 06:16          Urinalysis Basic - ( 24 Apr 2025 06:16 )    Color: x / Appearance: x / SG: x / pH: x  Gluc: 97 mg/dL / Ketone: x  / Bili: x / Urobili: x   Blood: x / Protein: x / Nitrite: x   Leuk Esterase: x / RBC: x / WBC x   Sq Epi: x / Non Sq Epi: x / Bacteria: x

## 2025-04-25 ENCOUNTER — TRANSCRIPTION ENCOUNTER (OUTPATIENT)
Age: 60
End: 2025-04-25

## 2025-04-25 ENCOUNTER — RESULT REVIEW (OUTPATIENT)
Age: 60
End: 2025-04-25

## 2025-04-25 LAB
ANION GAP SERPL CALC-SCNC: 14 MMOL/L — SIGNIFICANT CHANGE UP (ref 7–14)
BASOPHILS # BLD AUTO: 0.06 K/UL — SIGNIFICANT CHANGE UP (ref 0–0.2)
BASOPHILS NFR BLD AUTO: 0.9 % — SIGNIFICANT CHANGE UP (ref 0–1)
BUN SERPL-MCNC: 14 MG/DL — SIGNIFICANT CHANGE UP (ref 10–20)
CALCIUM SERPL-MCNC: 9.5 MG/DL — SIGNIFICANT CHANGE UP (ref 8.4–10.5)
CHLORIDE SERPL-SCNC: 98 MMOL/L — SIGNIFICANT CHANGE UP (ref 98–110)
CO2 SERPL-SCNC: 25 MMOL/L — SIGNIFICANT CHANGE UP (ref 17–32)
CREAT SERPL-MCNC: 1 MG/DL — SIGNIFICANT CHANGE UP (ref 0.7–1.5)
EGFR: 86 ML/MIN/1.73M2 — SIGNIFICANT CHANGE UP
EGFR: 86 ML/MIN/1.73M2 — SIGNIFICANT CHANGE UP
EOSINOPHIL # BLD AUTO: 0.08 K/UL — SIGNIFICANT CHANGE UP (ref 0–0.7)
EOSINOPHIL NFR BLD AUTO: 1.3 % — SIGNIFICANT CHANGE UP (ref 0–8)
GLUCOSE SERPL-MCNC: 89 MG/DL — SIGNIFICANT CHANGE UP (ref 70–99)
HCT VFR BLD CALC: 31.2 % — LOW (ref 42–52)
HGB BLD-MCNC: 10.5 G/DL — LOW (ref 14–18)
IMM GRANULOCYTES NFR BLD AUTO: 7.2 % — HIGH (ref 0.1–0.3)
LYMPHOCYTES # BLD AUTO: 1.27 K/UL — SIGNIFICANT CHANGE UP (ref 1.2–3.4)
LYMPHOCYTES # BLD AUTO: 19.9 % — LOW (ref 20.5–51.1)
MAGNESIUM SERPL-MCNC: 2.3 MG/DL — SIGNIFICANT CHANGE UP (ref 1.8–2.4)
MCHC RBC-ENTMCNC: 26.3 PG — LOW (ref 27–31)
MCHC RBC-ENTMCNC: 33.7 G/DL — SIGNIFICANT CHANGE UP (ref 32–37)
MCV RBC AUTO: 78 FL — LOW (ref 80–94)
MONOCYTES # BLD AUTO: 0.81 K/UL — HIGH (ref 0.1–0.6)
MONOCYTES NFR BLD AUTO: 12.7 % — HIGH (ref 1.7–9.3)
NEUTROPHILS # BLD AUTO: 3.7 K/UL — SIGNIFICANT CHANGE UP (ref 1.4–6.5)
NEUTROPHILS NFR BLD AUTO: 58 % — SIGNIFICANT CHANGE UP (ref 42.2–75.2)
NRBC BLD AUTO-RTO: 0 /100 WBCS — SIGNIFICANT CHANGE UP (ref 0–0)
PLATELET # BLD AUTO: 149 K/UL — SIGNIFICANT CHANGE UP (ref 130–400)
PMV BLD: 9.8 FL — SIGNIFICANT CHANGE UP (ref 7.4–10.4)
POTASSIUM SERPL-MCNC: 5.2 MMOL/L — HIGH (ref 3.5–5)
POTASSIUM SERPL-SCNC: 5.2 MMOL/L — HIGH (ref 3.5–5)
RBC # BLD: 4 M/UL — LOW (ref 4.7–6.1)
RBC # FLD: 14.3 % — SIGNIFICANT CHANGE UP (ref 11.5–14.5)
SODIUM SERPL-SCNC: 137 MMOL/L — SIGNIFICANT CHANGE UP (ref 135–146)
WBC # BLD: 6.38 K/UL — SIGNIFICANT CHANGE UP (ref 4.8–10.8)
WBC # FLD AUTO: 6.38 K/UL — SIGNIFICANT CHANGE UP (ref 4.8–10.8)

## 2025-04-25 PROCEDURE — 88305 TISSUE EXAM BY PATHOLOGIST: CPT | Mod: 26

## 2025-04-25 PROCEDURE — 88341 IMHCHEM/IMCYTCHM EA ADD ANTB: CPT | Mod: 26

## 2025-04-25 PROCEDURE — 43239 EGD BIOPSY SINGLE/MULTIPLE: CPT | Mod: XU

## 2025-04-25 PROCEDURE — 45380 COLONOSCOPY AND BIOPSY: CPT | Mod: 53

## 2025-04-25 PROCEDURE — 88312 SPECIAL STAINS GROUP 1: CPT | Mod: 26

## 2025-04-25 PROCEDURE — 88342 IMHCHEM/IMCYTCHM 1ST ANTB: CPT | Mod: 26

## 2025-04-25 PROCEDURE — 88360 TUMOR IMMUNOHISTOCHEM/MANUAL: CPT | Mod: 26,59

## 2025-04-25 PROCEDURE — 43255 EGD CONTROL BLEEDING ANY: CPT | Mod: XS

## 2025-04-25 PROCEDURE — 99233 SBSQ HOSP IP/OBS HIGH 50: CPT

## 2025-04-25 RX ADMIN — Medication 40 MILLIGRAM(S): at 05:52

## 2025-04-25 RX ADMIN — Medication 1 TABLET(S): at 11:39

## 2025-04-25 RX ADMIN — METOPROLOL SUCCINATE 12.5 MILLIGRAM(S): 50 TABLET, EXTENDED RELEASE ORAL at 17:09

## 2025-04-25 RX ADMIN — Medication 1 TABLET(S): at 17:09

## 2025-04-25 RX ADMIN — Medication 1 GRAM(S): at 05:52

## 2025-04-25 RX ADMIN — Medication 1 GRAM(S): at 17:09

## 2025-04-25 RX ADMIN — Medication 20 MILLIGRAM(S): at 11:39

## 2025-04-25 RX ADMIN — Medication 1 GRAM(S): at 11:39

## 2025-04-25 RX ADMIN — ATORVASTATIN CALCIUM 80 MILLIGRAM(S): 80 TABLET, FILM COATED ORAL at 21:39

## 2025-04-25 RX ADMIN — Medication 1 APPLICATION(S): at 11:42

## 2025-04-25 RX ADMIN — Medication 1 TABLET(S): at 11:43

## 2025-04-25 RX ADMIN — Medication 1 GRAM(S): at 23:52

## 2025-04-25 NOTE — PROGRESS NOTE ADULT - ASSESSMENT
60 yo M with a PMH of HTN, hiatal hernia, HLD, CAD s/p CABG in Jan 2024, paroxysmal A. fib on plavix, former smoker (30 pack years), recently diagnosed with colitis and treated during his admission 4/1-4/5 presenting to the ED for evaluation of persistent abdominal pain and weakness x 3 weeks. Patient states he also has lower back pain and endorses chills, states he had a fever 3 days ago. Patient denies chest pain, sob, nausea, vomiting, diarrhea, urinary symptoms.    #abdominal Pain 2/2 recent colitis vs possible pancreatitis   - CT abd/pelv w/ IVC- Redemonstrated prominent submucosal fat/bowel wall thickening involving descending colon, possibly related to prior episodes of colitis. Otherwise, no new acute findings in the abdomen or pelvis.  - GGT: 30, amylase 62. lipase 67  - monitor off antibiotics  - pain control: tylenol and toradol  - blood cx negative   - urine cx negative   - per cardio: may proceed with GI procedures without further cardiac workup. Pt is at moderate risk for a perioperative cardiac event. Ok to hold plavix.    - holding plavix since 4/21 - restart post procedure   - GI: EGD/colonoscopy on 4/25     #Chronic Anemia  - In the ED, Hb 11.6, baseline ~12  - MCV 79.2    #3.8 cm wide R iliac artery aneurysm- stable  - vascular o/p- surgery was scheduled with Dr. Tavera     #HTN  #CAD s/p CABG in Jan 2024  #Paroxysmal A.fib  - c/w home meds  - holding plavix     DVT ppx: lovenox  GI ppx: protonix  Diet: DASH  Activity: as tolerated  Pending: pending EDG/colon today

## 2025-04-25 NOTE — PROGRESS NOTE ADULT - SUBJECTIVE AND OBJECTIVE BOX
SUBJECTIVE/OVERNIGHT EVENTS  Today is hospital day 7d. This morning patient was seen and examined at bedside, resting comfortably in bed. No acute or major events overnight.    MEDICATIONS  STANDING MEDICATIONS  atorvastatin 80 milliGRAM(s) Oral at bedtime  chlorhexidine 2% Cloths 1 Application(s) Topical daily  enoxaparin Injectable 40 milliGRAM(s) SubCutaneous every 24 hours  famotidine    Tablet 20 milliGRAM(s) Oral daily  lactobacillus acidophilus 1 Tablet(s) Oral three times a day with meals  metoprolol tartrate 12.5 milliGRAM(s) Oral two times a day  multivitamin  Chewable 1 Tablet(s) Oral daily  pantoprazole    Tablet 40 milliGRAM(s) Oral before breakfast  sucralfate 1 Gram(s) Oral four times a day    PRN MEDICATIONS  acetaminophen     Tablet .. 650 milliGRAM(s) Oral every 6 hours PRN  ondansetron Injectable 4 milliGRAM(s) IV Push every 8 hours PRN  oxycodone    5 mG/acetaminophen 325 mG 1 Tablet(s) Oral every 4 hours PRN  simethicone 80 milliGRAM(s) Chew every 6 hours PRN    VITALS  T(F): 100 (04-25-25 @ 00:07), Max: 100 (04-25-25 @ 00:07)  HR: 74 (04-25-25 @ 04:52) (67 - 85)  BP: 99/61 (04-25-25 @ 04:52) (93/59 - 127/70)  RR: 18 (04-25-25 @ 00:07) (18 - 18)  SpO2: 98% (04-25-25 @ 00:07) (96% - 98%)    PHYSICAL EXAM  GENERAL  ( + ) NAD, lying in bed comfortably     (  ) obtunded     (  ) lethargic     (  ) somnolent    HEAD  ( + ) Atraumatic     (  ) hematoma     (  ) laceration (specify location:       )     NECK  ( + ) Supple     (  ) neck stiffness     (  ) nuchal rigidity     (  )  no JVD     (  ) JVD present ( -- cm)    HEART  Rate -->  ( + ) normal rate    (  ) bradycardic    (  ) tachycardic  Rhythm -->  ( + ) regular    (  ) regularly irregular    (  ) irregularly irregular  Murmurs -->  (  ) normal s1/s2    (  ) systolic murmur    (  ) diastolic murmur    (  ) continuous murmur     (  ) S3 present    (  ) S4 present    LUNGS  ( + )Unlabored respirations     (  ) tachypnea  ( + ) B/L air entry     (  ) decreased breath sounds in:  (location     )    (  ) no adventitious sound     (  ) crackles     (  ) wheezing      (  ) rhonchi      (specify location:       )  (  ) chest wall tenderness (specify location:       )    ABDOMEN  ( + ) Soft     (  ) tense   |   (  ) nondistended     (  ) distended   |   (  ) +BS     (  ) hypoactive bowel sounds     (  ) hyperactive bowel sounds  (  ) nontender     (  ) RUQ tenderness     (  ) RLQ tenderness     (  ) LLQ tenderness     (  ) epigastric tenderness     (  ) diffuse tenderness  (  ) Splenomegaly      (  ) Hepatomegaly      (  ) Jaundice     (  ) ecchymosis     EXTREMITIES  (+  ) Normal     (  ) Rash     (  ) ecchymosis     (  ) varicose veins      (  ) pitting edema     (  ) non-pitting edema   (  ) ulceration     (  ) gangrene:     (location:     )    NERVOUS SYSTEM  ( + ) A&Ox3     (  ) confused     (  ) lethargic  CN II-XII:     (  ) Intact     (  ) focal deficits  (Specify:     )   Upper extremities:     (  ) strength X/5     (  ) focal deficit (specify:    )  Lower extremities:     (  ) strength  X/5    (  ) focal deficit (specify:    )    SKIN  ( + ) No rashes or lesions     (  ) maculopapular rash     (  ) pustules     (  ) vesicles     (  ) ulcer     (  ) ecchymosis     (specify location:     )    LABS             11.1   6.16  )-----------( 145      ( 04-24-25 @ 06:16 )             34.3     133  |  99  |  15  -------------------------<  97   04-24-25 @ 06:16  4.4  |  21  |  1.0    Ca      9.3     04-24-25 @ 06:16  Mg     2.2     04-24-25 @ 06:16    Urinalysis Basic - ( 24 Apr 2025 06:16 )    Color: x / Appearance: x / SG: x / pH: x  Gluc: 97 mg/dL / Ketone: x  / Bili: x / Urobili: x   Blood: x / Protein: x / Nitrite: x   Leuk Esterase: x / RBC: x / WBC x   Sq Epi: x / Non Sq Epi: x / Bacteria: x

## 2025-04-26 LAB
ANION GAP SERPL CALC-SCNC: 12 MMOL/L — SIGNIFICANT CHANGE UP (ref 7–14)
BASOPHILS # BLD AUTO: 0.05 K/UL — SIGNIFICANT CHANGE UP (ref 0–0.2)
BASOPHILS NFR BLD AUTO: 0.9 % — SIGNIFICANT CHANGE UP (ref 0–1)
BUN SERPL-MCNC: 16 MG/DL — SIGNIFICANT CHANGE UP (ref 10–20)
CALCIUM SERPL-MCNC: 9.4 MG/DL — SIGNIFICANT CHANGE UP (ref 8.4–10.5)
CHLORIDE SERPL-SCNC: 97 MMOL/L — LOW (ref 98–110)
CO2 SERPL-SCNC: 25 MMOL/L — SIGNIFICANT CHANGE UP (ref 17–32)
CREAT SERPL-MCNC: 0.9 MG/DL — SIGNIFICANT CHANGE UP (ref 0.7–1.5)
EGFR: 97 ML/MIN/1.73M2 — SIGNIFICANT CHANGE UP
EGFR: 97 ML/MIN/1.73M2 — SIGNIFICANT CHANGE UP
EOSINOPHIL # BLD AUTO: 0.08 K/UL — SIGNIFICANT CHANGE UP (ref 0–0.7)
EOSINOPHIL NFR BLD AUTO: 1.4 % — SIGNIFICANT CHANGE UP (ref 0–8)
GLUCOSE SERPL-MCNC: 108 MG/DL — HIGH (ref 70–99)
HCT VFR BLD CALC: 31.8 % — LOW (ref 42–52)
HGB BLD-MCNC: 10.3 G/DL — LOW (ref 14–18)
IMM GRANULOCYTES NFR BLD AUTO: 7.7 % — HIGH (ref 0.1–0.3)
LYMPHOCYTES # BLD AUTO: 0.84 K/UL — LOW (ref 1.2–3.4)
LYMPHOCYTES # BLD AUTO: 15 % — LOW (ref 20.5–51.1)
MAGNESIUM SERPL-MCNC: 2.2 MG/DL — SIGNIFICANT CHANGE UP (ref 1.8–2.4)
MCHC RBC-ENTMCNC: 25.5 PG — LOW (ref 27–31)
MCHC RBC-ENTMCNC: 32.4 G/DL — SIGNIFICANT CHANGE UP (ref 32–37)
MCV RBC AUTO: 78.7 FL — LOW (ref 80–94)
MONOCYTES # BLD AUTO: 0.58 K/UL — SIGNIFICANT CHANGE UP (ref 0.1–0.6)
MONOCYTES NFR BLD AUTO: 10.3 % — HIGH (ref 1.7–9.3)
NEUTROPHILS # BLD AUTO: 3.63 K/UL — SIGNIFICANT CHANGE UP (ref 1.4–6.5)
NEUTROPHILS NFR BLD AUTO: 64.7 % — SIGNIFICANT CHANGE UP (ref 42.2–75.2)
NRBC BLD AUTO-RTO: 0 /100 WBCS — SIGNIFICANT CHANGE UP (ref 0–0)
PLATELET # BLD AUTO: 148 K/UL — SIGNIFICANT CHANGE UP (ref 130–400)
PMV BLD: 9.6 FL — SIGNIFICANT CHANGE UP (ref 7.4–10.4)
POTASSIUM SERPL-MCNC: 5 MMOL/L — SIGNIFICANT CHANGE UP (ref 3.5–5)
POTASSIUM SERPL-SCNC: 5 MMOL/L — SIGNIFICANT CHANGE UP (ref 3.5–5)
RBC # BLD: 4.04 M/UL — LOW (ref 4.7–6.1)
RBC # FLD: 14.2 % — SIGNIFICANT CHANGE UP (ref 11.5–14.5)
SODIUM SERPL-SCNC: 134 MMOL/L — LOW (ref 135–146)
WBC # BLD: 5.61 K/UL — SIGNIFICANT CHANGE UP (ref 4.8–10.8)
WBC # FLD AUTO: 5.61 K/UL — SIGNIFICANT CHANGE UP (ref 4.8–10.8)

## 2025-04-26 PROCEDURE — 71260 CT THORAX DX C+: CPT | Mod: 26

## 2025-04-26 PROCEDURE — 72100 X-RAY EXAM L-S SPINE 2/3 VWS: CPT | Mod: 26

## 2025-04-26 PROCEDURE — 99233 SBSQ HOSP IP/OBS HIGH 50: CPT

## 2025-04-26 RX ORDER — SENNA 187 MG
2 TABLET ORAL AT BEDTIME
Refills: 0 | Status: DISCONTINUED | OUTPATIENT
Start: 2025-04-26 | End: 2025-04-30

## 2025-04-26 RX ORDER — POLYETHYLENE GLYCOL 3350 17 G/17G
17 POWDER, FOR SOLUTION ORAL
Refills: 0 | Status: DISCONTINUED | OUTPATIENT
Start: 2025-04-26 | End: 2025-04-30

## 2025-04-26 RX ORDER — LIDOCAINE HYDROCHLORIDE 20 MG/ML
1 JELLY TOPICAL EVERY 24 HOURS
Refills: 0 | Status: DISCONTINUED | OUTPATIENT
Start: 2025-04-26 | End: 2025-05-06

## 2025-04-26 RX ADMIN — Medication 1 TABLET(S): at 12:11

## 2025-04-26 RX ADMIN — Medication 1 GRAM(S): at 17:10

## 2025-04-26 RX ADMIN — Medication 1 GRAM(S): at 12:01

## 2025-04-26 RX ADMIN — METOPROLOL SUCCINATE 12.5 MILLIGRAM(S): 50 TABLET, EXTENDED RELEASE ORAL at 17:10

## 2025-04-26 RX ADMIN — METOPROLOL SUCCINATE 12.5 MILLIGRAM(S): 50 TABLET, EXTENDED RELEASE ORAL at 06:16

## 2025-04-26 RX ADMIN — ENOXAPARIN SODIUM 40 MILLIGRAM(S): 100 INJECTION SUBCUTANEOUS at 07:53

## 2025-04-26 RX ADMIN — Medication 2 MILLIGRAM(S): at 17:45

## 2025-04-26 RX ADMIN — LIDOCAINE HYDROCHLORIDE 1 PATCH: 20 JELLY TOPICAL at 12:01

## 2025-04-26 RX ADMIN — LIDOCAINE HYDROCHLORIDE 1 PATCH: 20 JELLY TOPICAL at 19:52

## 2025-04-26 RX ADMIN — Medication 1 TABLET(S): at 07:53

## 2025-04-26 RX ADMIN — Medication 20 MILLIGRAM(S): at 12:01

## 2025-04-26 RX ADMIN — POLYETHYLENE GLYCOL 3350 17 GRAM(S): 17 POWDER, FOR SOLUTION ORAL at 17:10

## 2025-04-26 RX ADMIN — Medication 1 TABLET(S): at 17:10

## 2025-04-26 RX ADMIN — Medication 2 MILLIGRAM(S): at 17:09

## 2025-04-26 RX ADMIN — Medication 1 GRAM(S): at 06:16

## 2025-04-26 RX ADMIN — Medication 40 MILLIGRAM(S): at 06:01

## 2025-04-26 RX ADMIN — Medication 1 TABLET(S): at 12:01

## 2025-04-26 RX ADMIN — Medication 2 MILLIGRAM(S): at 12:11

## 2025-04-26 RX ADMIN — Medication 1 APPLICATION(S): at 12:04

## 2025-04-26 RX ADMIN — Medication 2 MILLIGRAM(S): at 10:36

## 2025-04-26 NOTE — PROGRESS NOTE ADULT - ASSESSMENT
60 yo M with a PMH of HTN, hiatal hernia, HLD, CAD s/p CABG in Jan 2024, paroxysmal A. fib on plavix, former smoker (30 pack years), recently diagnosed with colitis and treated during his admission 4/1-4/5 presenting to the ED for evaluation of persistent abdominal pain and weakness x 3 weeks. Patient states he also has lower back pain and endorses chills, states he had a fever 3 days ago. Patient denies chest pain, sob, nausea, vomiting, diarrhea, urinary symptoms.    #abdominal Pain- etiology?  #New GE junction mass r/o esophageal cancer  - CT abd/pelv w/ IVC- Re-demonstrated prominent submucosal fat/bowel wall thickening involving descending colon, possibly related to prior episodes of colitis. Otherwise, no new acute findings in the abdomen or pelvis.  - not on abx  - s/p EGD/Colonoscopy on 4/25 -   EGD Impressions:  	A friable circumferential mass seen from the GE junction at 38cm to 28 cm of mid esophagus. Multiple biopsies were obtained. NEX powder was sprayed all over the mass to achieve adequate hemostasis. Another     5mm lesion was noted at 20cm from incisors in the proximal esophagus.  	Erosions in the stomach compatible with erosive gastritis. (Biopsy).  	A pulsating ulcerated lesion was noted in the fundus. Biopsy was not obtained.  	Normal mucosa in the whole examined duodenum. (Biopsy).    Colonoscopy Impressions:  	Multiple semi-pedunculated polyps were seen in the left colon. A 2cm polyp was seen in the descending colon, biopsy was obtained. Polyps were not removed in the setting of poor prep.  	External hemorrhoids.  	Solid stool noted in the whole colon. Cecum was not reached- holding plavix since 4/21   - GI plans on repeating C-Scope on Monday; start prep on Sunday  - resume Plavix post scopes next weeks if cleared by GI  - CT chest with IV contrast to r/o mets today  - hold off on oncology eval until pathology resulted   - Pain control - added morphine  - added miralax q12 and senna qhs  - added zofran prn 4/23  - blood cx negative   - urine cx negative   - cardio cleared for scopes  - tolerating diet in small quantities    #Chronic Anemia  - In the ED, Hb 11.6, baseline ~12  - MCV 79.2    #3.8 cm wide R iliac artery aneurysm- stable  - F/u vascular o/p- surgery scheduled with Dr. Tavera, was scheduled for 4/21/2025    #HTN with borderline BP  #CAD s/p CABG in Jan 2024  #Paroxysmal A.fib - not on AC?  - C/w home meds  - holding plavix for scopes - resume post repeat colonoscopy once cleared by GI    #Acute on chronic back pain  - etiology?  - check LS spine - ordered by me  - added morphine  - added lidoderm patch    Progress Note Handoff  Pending Consults: GI  Pending Tests: labs, xray, ct  Pending Results: labs, xray, ct  Family Discussion: Discussed labs, meds, GI eval, planned scope, diet and overall plan of care with pt and medical staff. All questions answered. PFD for over 72hrs pending results of scopes/pathology/ct scan  Disposition: Home__x___/SNF______/Other_____/Unknown at this time_____  Spent 57 min reviewing chart, speaking with patient/family and on coordinating patient care during interdisciplinary rounds .

## 2025-04-26 NOTE — PROGRESS NOTE ADULT - SUBJECTIVE AND OBJECTIVE BOX
JONAS CELESTIN  59y  Male      Patient is a 59y old Male who presents with a chief complaint of abdominal pain (25 Apr 2025 08:48)      INTERVAL HPI/OVERNIGHT EVENTS:  Patient seen and examined earlier this morning  lying comfortably in bed  complains of worsening back pain  s/p EGD yesterday - GE junction mass    T(C): 36.6 (04-26-25 @ 08:56), Max: 36.8 (04-25-25 @ 16:24)  HR: 88 (04-26-25 @ 08:56) (69 - 88)  BP: 93/54 (04-26-25 @ 08:56) (93/54 - 136/75)  RR: 18 (04-26-25 @ 08:56) (16 - 24)  SpO2: 98% (04-26-25 @ 08:56) (95% - 98%)    PHYSICAL EXAM:  GENERAL: NAD, well-groomed,   HEAD:  Atraumatic, Normocephalic  EYES: conjunctiva and sclera clear  ENMT: Moist mucous membranes,  No visible lesions  NECK: Supple, No JVD, Normal thyroid  NERVOUS SYSTEM:  Alert & Oriented X3, Good concentration; moves all extremities   CHEST/LUNG: good air entry   HEART: Regular rate and rhythm; No murmurs, rubs, or gallops  ABDOMEN: Soft, Nontender, Nondistended; Bowel sounds present  EXTREMITIES:  2+ Peripheral Pulses, No clubbing, cyanosis, or edema  LYMPH: No lymphadenopathy noted  SKIN: No rashes or lesions    Consultant(s) Notes Reviewed:  [x ] YES  [ ] NO  Care Discussed with Consultants/Other Providers [ x] YES  [ ] NO    LAB:                        10.3   5.61  )-----------( 148      ( 26 Apr 2025 08:35 )             31.8     04-26    134[L]  |  97[L]  |  16  ----------------------------<  108[H]  5.0   |  25  |  0.9    Ca    9.4      26 Apr 2025 08:35  Mg     2.2     04-26      Drug Dosing Weight  Height (cm): 180.3 (25 Apr 2025 09:52)  Weight (kg): 89.8 (25 Apr 2025 09:52)  BMI (kg/m2): 27.6 (25 Apr 2025 09:52)  BSA (m2): 2.1 (25 Apr 2025 09:52)    Urinalysis Basic - ( 26 Apr 2025 08:35 )    Color: x / Appearance: x / SG: x / pH: x  Gluc: 108 mg/dL / Ketone: x  / Bili: x / Urobili: x   Blood: x / Protein: x / Nitrite: x   Leuk Esterase: x / RBC: x / WBC x   Sq Epi: x / Non Sq Epi: x / Bacteria: x        RADIOLOGY & ADDITIONAL TESTS:  Imaging Personally Reviewed:  [x] YES  [ ] NO      MEDS:  acetaminophen     Tablet .. 650 milliGRAM(s) Oral every 6 hours PRN  atorvastatin 80 milliGRAM(s) Oral at bedtime  chlorhexidine 2% Cloths 1 Application(s) Topical daily  enoxaparin Injectable 40 milliGRAM(s) SubCutaneous every 24 hours  famotidine    Tablet 20 milliGRAM(s) Oral daily  lactobacillus acidophilus 1 Tablet(s) Oral three times a day with meals  metoprolol tartrate 12.5 milliGRAM(s) Oral two times a day  morphine  - Injectable 2 milliGRAM(s) IV Push every 6 hours PRN  multivitamin  Chewable 1 Tablet(s) Oral daily  ondansetron Injectable 4 milliGRAM(s) IV Push every 8 hours PRN  oxycodone    5 mG/acetaminophen 325 mG 1 Tablet(s) Oral every 4 hours PRN  pantoprazole    Tablet 40 milliGRAM(s) Oral before breakfast  polyethylene glycol 3350 17 Gram(s) Oral two times a day  senna 2 Tablet(s) Oral at bedtime  simethicone 80 milliGRAM(s) Chew every 6 hours PRN  sucralfate 1 Gram(s) Oral four times a day

## 2025-04-26 NOTE — PROGRESS NOTE ADULT - ASSESSMENT
58 yo M with a PMH of HTN, hiatal hernia, HLD, CAD s/p CABG in Jan 2024, paroxysmal A. fib on plavix, former smoker (30 pack years), recently diagnosed with colitis and treated during his admission 4/1-4/5 presenting to the ED for evaluation of persistent abdominal pain and weakness x 3 weeks. Patient states he also has lower back pain and endorses chills, states he had a fever 3 days ago. Patient denies chest pain, sob, nausea, vomiting, diarrhea, urinary symptoms.    #abdominal Pain 2/2 recent colitis vs possible pancreatitis   - CT abd/pelv w/ IVC- Redemonstrated prominent submucosal fat/bowel wall thickening involving descending colon, possibly related to prior episodes of colitis. Otherwise, no new acute findings in the abdomen or pelvis.  - GGT: 30, amylase 62. lipase 67  - monitor off antibiotics  - pain control: tylenol and toradol  - blood cx negative   - urine cx negative   - per cardio: may proceed with GI procedures without further cardiac workup. Pt is at moderate risk for a perioperative cardiac event. Ok to hold plavix.    - holding plavix since 4/21, continue holding until after colonoscopy   - EGD Impressions:A friable circumferential mass seen from the GE junction at 38cm to 28 cm of mid esophagus. Multiple biopsies were obtained. NEX powder was sprayed all over the mass to achieve adequate hemostasis. Another 5mm lesion was noted at 20cm from incisors in the proximal esophagus. Erosions in the stomach compatible with erosive gastritis. (Biopsy). A pulsating ulcerated lesion was noted in the fundus. Biopsy was not obtained. Normal mucosa in the whole examined duodenum. (Biopsy).  - Colonoscopy Impressions: Multiple semi-pedunculated polyps were seen in the left colon. A 2cm polyp was seen in the descending colon, biopsy was obtained. Polyps were not removed in the setting of poor prep. External hemorrhoids.Solid stool noted in the whole colon. Cecum was not reached  - await pathology results   - plan for repeat colonoscopy tentatively on Monday, clears from Sunday with bowel prep  - f/u CT chest IV con to rule out metastasis  - diet: full liquid   - Will need heme/oncology evaluation eventually     #chronic lower back pain   - f/u xray lumbar spine   - pain control w/ morphine     #chronic anemia  - In the ED, Hb 11.6, baseline ~12  - MCV 79.2    #3.8 cm wide R iliac artery aneurysm- stable  - vascular o/p- surgery was scheduled with Dr. Tavera     #HTN  #CAD s/p CABG in Jan 2024  #Paroxysmal A.fib  - c/w home meds  - holding plavix     DVT ppx: lovenox  GI ppx: protonix  Diet: DASH  Activity: as tolerated  Pending: pending colonoscopy Monday

## 2025-04-26 NOTE — PROGRESS NOTE ADULT - SUBJECTIVE AND OBJECTIVE BOX
SUBJECTIVE/OVERNIGHT EVENTS  Today is hospital day 8d. This morning patient was seen and examined at bedside, resting comfortably in bed. No acute or major events overnight.    MEDICATIONS  STANDING MEDICATIONS  atorvastatin 80 milliGRAM(s) Oral at bedtime  chlorhexidine 2% Cloths 1 Application(s) Topical daily  enoxaparin Injectable 40 milliGRAM(s) SubCutaneous every 24 hours  famotidine    Tablet 20 milliGRAM(s) Oral daily  lactobacillus acidophilus 1 Tablet(s) Oral three times a day with meals  lidocaine   4% Patch 1 Patch Transdermal every 24 hours  metoprolol tartrate 12.5 milliGRAM(s) Oral two times a day  multivitamin  Chewable 1 Tablet(s) Oral daily  pantoprazole    Tablet 40 milliGRAM(s) Oral before breakfast  polyethylene glycol 3350 17 Gram(s) Oral two times a day  senna 2 Tablet(s) Oral at bedtime  sucralfate 1 Gram(s) Oral four times a day    PRN MEDICATIONS  acetaminophen     Tablet .. 650 milliGRAM(s) Oral every 6 hours PRN  morphine  - Injectable 2 milliGRAM(s) IV Push every 6 hours PRN  ondansetron Injectable 4 milliGRAM(s) IV Push every 8 hours PRN  oxycodone    5 mG/acetaminophen 325 mG 1 Tablet(s) Oral every 4 hours PRN  simethicone 80 milliGRAM(s) Chew every 6 hours PRN    VITALS  T(F): 97.9 (04-26-25 @ 08:56), Max: 98.3 (04-25-25 @ 16:24)  HR: 97 (04-26-25 @ 10:25) (69 - 97)  BP: 99/65 (04-26-25 @ 10:25) (93/54 - 136/75)  RR: 18 (04-26-25 @ 08:56) (16 - 24)  SpO2: 98% (04-26-25 @ 08:56) (95% - 98%)    PHYSICAL EXAM  GENERAL  ( + ) NAD, lying in bed comfortably     (  ) obtunded     (  ) lethargic     (  ) somnolent    HEAD  ( + ) Atraumatic     (  ) hematoma     (  ) laceration (specify location:       )     NECK  ( + ) Supple     (  ) neck stiffness     (  ) nuchal rigidity     (  )  no JVD     (  ) JVD present ( -- cm)    HEART  Rate -->  (+  ) normal rate    (  ) bradycardic    (  ) tachycardic  Rhythm -->  ( + ) regular    (  ) regularly irregular    (  ) irregularly irregular  Murmurs -->  (  ) normal s1/s2    (  ) systolic murmur    (  ) diastolic murmur    (  ) continuous murmur     (  ) S3 present    (  ) S4 present    LUNGS  ( + )Unlabored respirations     (  ) tachypnea  ( + ) B/L air entry     (  ) decreased breath sounds in:  (location     )    (  ) no adventitious sound     (  ) crackles     (  ) wheezing      (  ) rhonchi      (specify location:       )  (  ) chest wall tenderness (specify location:       )    ABDOMEN  ( + ) Soft     ( + ) diffuse tenderness |   (  ) nondistended     (  ) distended   |   (  ) +BS     (  ) hypoactive bowel sounds     (  ) hyperactive bowel sounds  (  ) nontender     (  ) RUQ tenderness     (  ) RLQ tenderness     (  ) LLQ tenderness     (  ) epigastric tenderness     (  ) diffuse tenderness  (  ) Splenomegaly      (  ) Hepatomegaly      (  ) Jaundice     (  ) ecchymosis     EXTREMITIES  (+  ) Normal     (  ) Rash     (  ) ecchymosis     (  ) varicose veins      (  ) pitting edema     (  ) non-pitting edema   (  ) ulceration     (  ) gangrene:     (location:     )    NERVOUS SYSTEM  ( + ) A&Ox3     (  ) confused     (  ) lethargic  CN II-XII:     (  ) Intact     (  ) focal deficits  (Specify:     )   Upper extremities:     (  ) strength X/5     (  ) focal deficit (specify:    )  Lower extremities:     (  ) strength  X/5    (  ) focal deficit (specify:    )    SKIN  ( + ) No rashes or lesions     (  ) maculopapular rash     (  ) pustules     (  ) vesicles     (  ) ulcer     (  ) ecchymosis     (specify location:     )    LABS             10.3   5.61  )-----------( 148      ( 04-26-25 @ 08:35 )             31.8     134  |  97  |  16  -------------------------<  108   04-26-25 @ 08:35  5.0  |  25  |  0.9    Ca      9.4     04-26-25 @ 08:35  Mg     2.2     04-26-25 @ 08:35    Urinalysis Basic - ( 26 Apr 2025 08:35 )    Color: x / Appearance: x / SG: x / pH: x  Gluc: 108 mg/dL / Ketone: x  / Bili: x / Urobili: x   Blood: x / Protein: x / Nitrite: x   Leuk Esterase: x / RBC: x / WBC x   Sq Epi: x / Non Sq Epi: x / Bacteria: x

## 2025-04-27 LAB
ANION GAP SERPL CALC-SCNC: 15 MMOL/L — HIGH (ref 7–14)
BASOPHILS # BLD AUTO: 0.06 K/UL — SIGNIFICANT CHANGE UP (ref 0–0.2)
BASOPHILS NFR BLD AUTO: 0.9 % — SIGNIFICANT CHANGE UP (ref 0–1)
BUN SERPL-MCNC: 17 MG/DL — SIGNIFICANT CHANGE UP (ref 10–20)
CALCIUM SERPL-MCNC: 9.3 MG/DL — SIGNIFICANT CHANGE UP (ref 8.4–10.5)
CHLORIDE SERPL-SCNC: 96 MMOL/L — LOW (ref 98–110)
CO2 SERPL-SCNC: 23 MMOL/L — SIGNIFICANT CHANGE UP (ref 17–32)
CREAT SERPL-MCNC: 0.9 MG/DL — SIGNIFICANT CHANGE UP (ref 0.7–1.5)
EGFR: 97 ML/MIN/1.73M2 — SIGNIFICANT CHANGE UP
EGFR: 97 ML/MIN/1.73M2 — SIGNIFICANT CHANGE UP
EOSINOPHIL # BLD AUTO: 0.07 K/UL — SIGNIFICANT CHANGE UP (ref 0–0.7)
EOSINOPHIL NFR BLD AUTO: 1.1 % — SIGNIFICANT CHANGE UP (ref 0–8)
GLUCOSE SERPL-MCNC: 90 MG/DL — SIGNIFICANT CHANGE UP (ref 70–99)
HCT VFR BLD CALC: 30.2 % — LOW (ref 42–52)
HGB BLD-MCNC: 10 G/DL — LOW (ref 14–18)
IMM GRANULOCYTES NFR BLD AUTO: 7.7 % — HIGH (ref 0.1–0.3)
LYMPHOCYTES # BLD AUTO: 0.86 K/UL — LOW (ref 1.2–3.4)
LYMPHOCYTES # BLD AUTO: 13.4 % — LOW (ref 20.5–51.1)
MAGNESIUM SERPL-MCNC: 2.1 MG/DL — SIGNIFICANT CHANGE UP (ref 1.8–2.4)
MCHC RBC-ENTMCNC: 25.8 PG — LOW (ref 27–31)
MCHC RBC-ENTMCNC: 33.1 G/DL — SIGNIFICANT CHANGE UP (ref 32–37)
MCV RBC AUTO: 77.8 FL — LOW (ref 80–94)
MONOCYTES # BLD AUTO: 0.87 K/UL — HIGH (ref 0.1–0.6)
MONOCYTES NFR BLD AUTO: 13.6 % — HIGH (ref 1.7–9.3)
NEUTROPHILS # BLD AUTO: 4.05 K/UL — SIGNIFICANT CHANGE UP (ref 1.4–6.5)
NEUTROPHILS NFR BLD AUTO: 63.3 % — SIGNIFICANT CHANGE UP (ref 42.2–75.2)
NRBC BLD AUTO-RTO: 0 /100 WBCS — SIGNIFICANT CHANGE UP (ref 0–0)
PLATELET # BLD AUTO: 146 K/UL — SIGNIFICANT CHANGE UP (ref 130–400)
PMV BLD: 9.8 FL — SIGNIFICANT CHANGE UP (ref 7.4–10.4)
POTASSIUM SERPL-MCNC: 4.1 MMOL/L — SIGNIFICANT CHANGE UP (ref 3.5–5)
POTASSIUM SERPL-SCNC: 4.1 MMOL/L — SIGNIFICANT CHANGE UP (ref 3.5–5)
RBC # BLD: 3.88 M/UL — LOW (ref 4.7–6.1)
RBC # FLD: 14.3 % — SIGNIFICANT CHANGE UP (ref 11.5–14.5)
SODIUM SERPL-SCNC: 134 MMOL/L — LOW (ref 135–146)
WBC # BLD: 6.4 K/UL — SIGNIFICANT CHANGE UP (ref 4.8–10.8)
WBC # FLD AUTO: 6.4 K/UL — SIGNIFICANT CHANGE UP (ref 4.8–10.8)

## 2025-04-27 PROCEDURE — 99233 SBSQ HOSP IP/OBS HIGH 50: CPT

## 2025-04-27 RX ADMIN — LIDOCAINE HYDROCHLORIDE 1 PATCH: 20 JELLY TOPICAL at 11:39

## 2025-04-27 RX ADMIN — Medication 1 TABLET(S): at 17:22

## 2025-04-27 RX ADMIN — Medication 1 GRAM(S): at 17:22

## 2025-04-27 RX ADMIN — Medication 1 TABLET(S): at 08:59

## 2025-04-27 RX ADMIN — Medication 1 GRAM(S): at 00:08

## 2025-04-27 RX ADMIN — Medication 2 TABLET(S): at 21:37

## 2025-04-27 RX ADMIN — Medication 4 MILLIGRAM(S): at 04:54

## 2025-04-27 RX ADMIN — ATORVASTATIN CALCIUM 80 MILLIGRAM(S): 80 TABLET, FILM COATED ORAL at 21:38

## 2025-04-27 RX ADMIN — Medication 1 TABLET(S): at 11:39

## 2025-04-27 RX ADMIN — Medication 1 APPLICATION(S): at 11:41

## 2025-04-27 RX ADMIN — Medication 1 GRAM(S): at 11:39

## 2025-04-27 RX ADMIN — LIDOCAINE HYDROCHLORIDE 1 PATCH: 20 JELLY TOPICAL at 00:12

## 2025-04-27 RX ADMIN — POLYETHYLENE GLYCOL 3350 17 GRAM(S): 17 POWDER, FOR SOLUTION ORAL at 17:22

## 2025-04-27 RX ADMIN — LIDOCAINE HYDROCHLORIDE 1 PATCH: 20 JELLY TOPICAL at 19:00

## 2025-04-27 RX ADMIN — Medication 2 MILLIGRAM(S): at 00:07

## 2025-04-27 RX ADMIN — Medication 1 GRAM(S): at 05:31

## 2025-04-27 RX ADMIN — Medication 20 MILLIGRAM(S): at 11:39

## 2025-04-27 RX ADMIN — METOPROLOL SUCCINATE 12.5 MILLIGRAM(S): 50 TABLET, EXTENDED RELEASE ORAL at 05:31

## 2025-04-27 RX ADMIN — Medication 2 MILLIGRAM(S): at 00:51

## 2025-04-27 RX ADMIN — Medication 40 MILLIGRAM(S): at 05:31

## 2025-04-27 RX ADMIN — METOPROLOL SUCCINATE 12.5 MILLIGRAM(S): 50 TABLET, EXTENDED RELEASE ORAL at 17:22

## 2025-04-27 RX ADMIN — ENOXAPARIN SODIUM 40 MILLIGRAM(S): 100 INJECTION SUBCUTANEOUS at 09:00

## 2025-04-27 RX ADMIN — POLYETHYLENE GLYCOL 3350 17 GRAM(S): 17 POWDER, FOR SOLUTION ORAL at 05:34

## 2025-04-27 RX ADMIN — LIDOCAINE HYDROCHLORIDE 1 PATCH: 20 JELLY TOPICAL at 23:00

## 2025-04-27 NOTE — PROGRESS NOTE ADULT - SUBJECTIVE AND OBJECTIVE BOX
JONAS CELESTIN  59y  Male      Patient is a 59y old Male who presents with a chief complaint of abdominal pain (25 Apr 2025 08:48)    INTERVAL HPI/OVERNIGHT EVENTS:  Patient seen and examined earlier this morning  lying comfortably in bed with his wife bedside  s/p EGD 4/25- GE junction mass  coloscopy to be repeated this week    Vital Signs Last 24 Hrs  T(C): 36.5 (27 Apr 2025 10:17), Max: 36.8 (27 Apr 2025 01:11)  T(F): 97.7 (27 Apr 2025 10:17), Max: 98.2 (27 Apr 2025 01:11)  HR: 88 (27 Apr 2025 10:17) (76 - 92)  BP: 109/65 (27 Apr 2025 10:17) (109/65 - 117/69)  BP(mean): 82 (26 Apr 2025 13:19) (82 - 82)  RR: 18 (27 Apr 2025 10:17) (18 - 18)  SpO2: 96% (27 Apr 2025 10:17) (95% - 96%)    Parameters below as of 27 Apr 2025 01:11  Patient On (Oxygen Delivery Method): room air    PHYSICAL EXAM:  GENERAL: NAD, well-groomed,   HEAD:  Atraumatic, Normocephalic  EYES: conjunctiva and sclera clear  ENMT: Moist mucous membranes,  No visible lesions  NECK: Supple, No JVD, Normal thyroid  NERVOUS SYSTEM:  Alert & Oriented X3, Good concentration; moves all extremities   CHEST/LUNG: good air entry   HEART: Regular rate and rhythm; No murmurs, rubs, or gallops  ABDOMEN: Soft, Nontender, Nondistended; Bowel sounds present  EXTREMITIES:  2+ Peripheral Pulses, No clubbing, cyanosis, or edema  LYMPH: No lymphadenopathy noted  SKIN: No rashes or lesions    Consultant(s) Notes Reviewed:  [x ] YES  [ ] NO  Care Discussed with Consultants/Other Providers [ x] YES  [ ] NO    LAB:                         10.0   6.40  )-----------( 146      ( 27 Apr 2025 07:46 )             30.2     04-27    134[L]  |  96[L]  |  17  ----------------------------<  90  4.1   |  23  |  0.9    Ca    9.3      27 Apr 2025 07:46  Mg     2.1     04-27        Drug Dosing Weight  Height (cm): 180.3 (25 Apr 2025 09:52)  Weight (kg): 89.8 (25 Apr 2025 09:52)  BMI (kg/m2): 27.6 (25 Apr 2025 09:52)  BSA (m2): 2.1 (25 Apr 2025 09:52)    Urinalysis Basic - ( 26 Apr 2025 08:35 )    Color: x / Appearance: x / SG: x / pH: x  Gluc: 108 mg/dL / Ketone: x  / Bili: x / Urobili: x   Blood: x / Protein: x / Nitrite: x   Leuk Esterase: x / RBC: x / WBC x   Sq Epi: x / Non Sq Epi: x / Bacteria: x        RADIOLOGY & ADDITIONAL TESTS:  Imaging Personally Reviewed:  [x] YES  [ ] NO      MEDS:  acetaminophen     Tablet .. 650 milliGRAM(s) Oral every 6 hours PRN  atorvastatin 80 milliGRAM(s) Oral at bedtime  chlorhexidine 2% Cloths 1 Application(s) Topical daily  enoxaparin Injectable 40 milliGRAM(s) SubCutaneous every 24 hours  famotidine    Tablet 20 milliGRAM(s) Oral daily  lactobacillus acidophilus 1 Tablet(s) Oral three times a day with meals  metoprolol tartrate 12.5 milliGRAM(s) Oral two times a day  morphine  - Injectable 2 milliGRAM(s) IV Push every 6 hours PRN  multivitamin  Chewable 1 Tablet(s) Oral daily  ondansetron Injectable 4 milliGRAM(s) IV Push every 8 hours PRN  oxycodone    5 mG/acetaminophen 325 mG 1 Tablet(s) Oral every 4 hours PRN  pantoprazole    Tablet 40 milliGRAM(s) Oral before breakfast  polyethylene glycol 3350 17 Gram(s) Oral two times a day  senna 2 Tablet(s) Oral at bedtime  simethicone 80 milliGRAM(s) Chew every 6 hours PRN  sucralfate 1 Gram(s) Oral four times a day

## 2025-04-27 NOTE — PROGRESS NOTE ADULT - ASSESSMENT
60 yo M with a PMH of HTN, hiatal hernia, HLD, CAD s/p CABG in Jan 2024, paroxysmal A. fib on plavix, former smoker (30 pack years), recently diagnosed with colitis and treated during his admission 4/1-4/5 presenting to the ED for evaluation of persistent abdominal pain and weakness x 3 weeks. Patient states he also has lower back pain and endorses chills, states he had a fever 3 days ago. Patient denies chest pain, sob, nausea, vomiting, diarrhea, urinary symptoms.    #abdominal Pain- etiology?  #New GE junction mass r/o esophageal cancer  - CT abd/pelv w/ IVC- Re-demonstrated prominent submucosal fat/bowel wall thickening involving descending colon, possibly related to prior episodes of colitis. Otherwise, no new acute findings in the abdomen or pelvis.  - not on abx  - s/p EGD/Colonoscopy on 4/25 -   EGD Impressions:  	A friable circumferential mass seen from the GE junction at 38cm to 28 cm of mid esophagus. Multiple biopsies were obtained. NEX powder was sprayed all over the mass to achieve adequate hemostasis. Another     5mm lesion was noted at 20cm from incisors in the proximal esophagus.  	Erosions in the stomach compatible with erosive gastritis. (Biopsy).  	A pulsating ulcerated lesion was noted in the fundus. Biopsy was not obtained.  	Normal mucosa in the whole examined duodenum. (Biopsy).    Colonoscopy Impressions:  	Multiple semi-pedunculated polyps were seen in the left colon. A 2cm polyp was seen in the descending colon, biopsy was obtained. Polyps were not removed in the setting of poor prep.  	External hemorrhoids.  	Solid stool noted in the whole colon. Cecum was not reached- holding plavix since 4/21   - GI plans on repeating C-Scope this week? Not 4/28 - spoke to GI fellow - not on the schedule  - resume Plavix post scopes next weeks if cleared by GI  - CT chest with IV contrast to r/o mets - no evidence of mets  - hold off on oncology eval until pathology resulted   - Pain control - added morphine  - added miralax q12 and senna qhs  - added zofran prn 4/23  - blood cx negative   - urine cx negative   - cardio cleared for scopes  - tolerating diet in small quantities    #Chronic Anemia  - In the ED, Hb 11.6, baseline ~12  - MCV 79.2    #3.8 cm wide R iliac artery aneurysm- stable  - F/u vascular o/p- surgery scheduled with Dr. Tavera, was scheduled for 4/21/2025    #HTN with borderline BP  #CAD s/p CABG in Jan 2024  #Paroxysmal A.fib - not on AC?  - C/w home meds  - holding plavix for scopes - resume post repeat colonoscopy once cleared by GI    #Acute on chronic back pain  - etiology?  - check LS spine results - ordered by me  - added morphine  - added lidoderm patch    Progress Note Handoff  Pending Consults: GI  Pending Tests: labs, xray, ct  Pending Results: labs, xray, ct  Family Discussion: Discussed labs, meds, GI eval, planned scope, diet and overall plan of care with pt,  his wife and medical staff. All questions answered. PFD for over 72hrs pending results of scopes/pathology/ct scan  Disposition: Home__x___/SNF______/Other_____/Unknown at this time_____  Spent 57 min reviewing chart, speaking with patient/family and on coordinating patient care during interdisciplinary rounds .

## 2025-04-28 LAB
ANION GAP SERPL CALC-SCNC: 12 MMOL/L — SIGNIFICANT CHANGE UP (ref 7–14)
BASOPHILS # BLD AUTO: 0.05 K/UL — SIGNIFICANT CHANGE UP (ref 0–0.2)
BASOPHILS NFR BLD AUTO: 0.9 % — SIGNIFICANT CHANGE UP (ref 0–1)
BUN SERPL-MCNC: 17 MG/DL — SIGNIFICANT CHANGE UP (ref 10–20)
CALCIUM SERPL-MCNC: 9 MG/DL — SIGNIFICANT CHANGE UP (ref 8.4–10.5)
CHLORIDE SERPL-SCNC: 96 MMOL/L — LOW (ref 98–110)
CO2 SERPL-SCNC: 26 MMOL/L — SIGNIFICANT CHANGE UP (ref 17–32)
CREAT SERPL-MCNC: 0.8 MG/DL — SIGNIFICANT CHANGE UP (ref 0.7–1.5)
EGFR: 101 ML/MIN/1.73M2 — SIGNIFICANT CHANGE UP
EGFR: 101 ML/MIN/1.73M2 — SIGNIFICANT CHANGE UP
EOSINOPHIL # BLD AUTO: 0.06 K/UL — SIGNIFICANT CHANGE UP (ref 0–0.7)
EOSINOPHIL NFR BLD AUTO: 1.1 % — SIGNIFICANT CHANGE UP (ref 0–8)
GLUCOSE SERPL-MCNC: 105 MG/DL — HIGH (ref 70–99)
HCT VFR BLD CALC: 29.8 % — LOW (ref 42–52)
HGB BLD-MCNC: 9.8 G/DL — LOW (ref 14–18)
IMM GRANULOCYTES NFR BLD AUTO: 7.1 % — HIGH (ref 0.1–0.3)
LACTOFERRIN STL-MCNC: 8.87 CD:794062635 — HIGH (ref 0–7.24)
LYMPHOCYTES # BLD AUTO: 0.73 K/UL — LOW (ref 1.2–3.4)
LYMPHOCYTES # BLD AUTO: 13.5 % — LOW (ref 20.5–51.1)
MAGNESIUM SERPL-MCNC: 2.3 MG/DL — SIGNIFICANT CHANGE UP (ref 1.8–2.4)
MCHC RBC-ENTMCNC: 25.8 PG — LOW (ref 27–31)
MCHC RBC-ENTMCNC: 32.9 G/DL — SIGNIFICANT CHANGE UP (ref 32–37)
MCV RBC AUTO: 78.4 FL — LOW (ref 80–94)
MONOCYTES # BLD AUTO: 0.66 K/UL — HIGH (ref 0.1–0.6)
MONOCYTES NFR BLD AUTO: 12.2 % — HIGH (ref 1.7–9.3)
NEUTROPHILS # BLD AUTO: 3.51 K/UL — SIGNIFICANT CHANGE UP (ref 1.4–6.5)
NEUTROPHILS NFR BLD AUTO: 65.2 % — SIGNIFICANT CHANGE UP (ref 42.2–75.2)
NRBC BLD AUTO-RTO: 0 /100 WBCS — SIGNIFICANT CHANGE UP (ref 0–0)
PLATELET # BLD AUTO: 135 K/UL — SIGNIFICANT CHANGE UP (ref 130–400)
PMV BLD: 10 FL — SIGNIFICANT CHANGE UP (ref 7.4–10.4)
POTASSIUM SERPL-MCNC: 3.8 MMOL/L — SIGNIFICANT CHANGE UP (ref 3.5–5)
POTASSIUM SERPL-SCNC: 3.8 MMOL/L — SIGNIFICANT CHANGE UP (ref 3.5–5)
RBC # BLD: 3.8 M/UL — LOW (ref 4.7–6.1)
RBC # FLD: 14.4 % — SIGNIFICANT CHANGE UP (ref 11.5–14.5)
SODIUM SERPL-SCNC: 134 MMOL/L — LOW (ref 135–146)
SURGICAL PATHOLOGY STUDY: SIGNIFICANT CHANGE UP
WBC # BLD: 5.39 K/UL — SIGNIFICANT CHANGE UP (ref 4.8–10.8)
WBC # FLD AUTO: 5.39 K/UL — SIGNIFICANT CHANGE UP (ref 4.8–10.8)

## 2025-04-28 PROCEDURE — 99233 SBSQ HOSP IP/OBS HIGH 50: CPT

## 2025-04-28 RX ORDER — CLOPIDOGREL BISULFATE 75 MG/1
75 TABLET, FILM COATED ORAL DAILY
Refills: 0 | Status: DISCONTINUED | OUTPATIENT
Start: 2025-04-28 | End: 2025-04-30

## 2025-04-28 RX ORDER — SODIUM CHLORIDE 9 G/1000ML
1000 INJECTION, SOLUTION INTRAVENOUS
Refills: 0 | Status: COMPLETED | OUTPATIENT
Start: 2025-04-28 | End: 2025-04-28

## 2025-04-28 RX ADMIN — Medication 2 TABLET(S): at 21:09

## 2025-04-28 RX ADMIN — Medication 1 GRAM(S): at 00:28

## 2025-04-28 RX ADMIN — Medication 2 MILLIGRAM(S): at 14:55

## 2025-04-28 RX ADMIN — Medication 40 MILLIGRAM(S): at 05:36

## 2025-04-28 RX ADMIN — Medication 2 MILLIGRAM(S): at 02:32

## 2025-04-28 RX ADMIN — Medication 1 APPLICATION(S): at 11:16

## 2025-04-28 RX ADMIN — Medication 2 MILLIGRAM(S): at 03:30

## 2025-04-28 RX ADMIN — Medication 1 GRAM(S): at 23:03

## 2025-04-28 RX ADMIN — Medication 20 MILLIGRAM(S): at 11:14

## 2025-04-28 RX ADMIN — ATORVASTATIN CALCIUM 80 MILLIGRAM(S): 80 TABLET, FILM COATED ORAL at 21:09

## 2025-04-28 RX ADMIN — METOPROLOL SUCCINATE 12.5 MILLIGRAM(S): 50 TABLET, EXTENDED RELEASE ORAL at 05:36

## 2025-04-28 RX ADMIN — SODIUM CHLORIDE 100 MILLILITER(S): 9 INJECTION, SOLUTION INTRAVENOUS at 10:17

## 2025-04-28 RX ADMIN — Medication 1 TABLET(S): at 11:15

## 2025-04-28 RX ADMIN — METOPROLOL SUCCINATE 12.5 MILLIGRAM(S): 50 TABLET, EXTENDED RELEASE ORAL at 17:14

## 2025-04-28 RX ADMIN — Medication 1 GRAM(S): at 17:14

## 2025-04-28 RX ADMIN — Medication 1 TABLET(S): at 17:14

## 2025-04-28 RX ADMIN — CLOPIDOGREL BISULFATE 75 MILLIGRAM(S): 75 TABLET, FILM COATED ORAL at 17:14

## 2025-04-28 RX ADMIN — Medication 1 TABLET(S): at 11:24

## 2025-04-28 RX ADMIN — Medication 1 GRAM(S): at 05:36

## 2025-04-28 RX ADMIN — Medication 2 MILLIGRAM(S): at 08:43

## 2025-04-28 RX ADMIN — Medication 1 GRAM(S): at 11:15

## 2025-04-28 RX ADMIN — ENOXAPARIN SODIUM 40 MILLIGRAM(S): 100 INJECTION SUBCUTANEOUS at 08:21

## 2025-04-28 RX ADMIN — Medication 2 MILLIGRAM(S): at 15:30

## 2025-04-28 RX ADMIN — POLYETHYLENE GLYCOL 3350 17 GRAM(S): 17 POWDER, FOR SOLUTION ORAL at 17:14

## 2025-04-28 RX ADMIN — Medication 80 MILLIGRAM(S): at 08:21

## 2025-04-28 RX ADMIN — Medication 2 MILLIGRAM(S): at 09:25

## 2025-04-28 RX ADMIN — Medication 1 TABLET(S): at 08:21

## 2025-04-28 NOTE — PROGRESS NOTE ADULT - SUBJECTIVE AND OBJECTIVE BOX
Gastroenterology progress note:         Interval History:    No acute events overnight.         PAST MEDICAL & SURGICAL HISTORY:  HTN (hypertension)  Hiatal hernia  HLD (hyperlipidemia)  CAD (coronary artery disease)  Paroxysmal atrial fibrillation  History of colitis  S/P CABG (coronary artery bypass graft)      MEDICATIONS  (STANDING):  atorvastatin 80 milliGRAM(s) Oral at bedtime  chlorhexidine 2% Cloths 1 Application(s) Topical daily  enoxaparin Injectable 40 milliGRAM(s) SubCutaneous every 24 hours  famotidine    Tablet 20 milliGRAM(s) Oral daily  lactated ringers. 1000 milliLiter(s) (100 mL/Hr) IV Continuous <Continuous>  lactobacillus acidophilus 1 Tablet(s) Oral three times a day with meals  lidocaine   4% Patch 1 Patch Transdermal every 24 hours  metoprolol tartrate 12.5 milliGRAM(s) Oral two times a day  multivitamin  Chewable 1 Tablet(s) Oral daily  pantoprazole    Tablet 40 milliGRAM(s) Oral before breakfast  polyethylene glycol 3350 17 Gram(s) Oral two times a day  senna 2 Tablet(s) Oral at bedtime  sucralfate 1 Gram(s) Oral four times a day    MEDICATIONS  (PRN):  morphine  - Injectable 2 milliGRAM(s) IV Push every 6 hours PRN Severe Pain (7 - 10)  ondansetron Injectable 4 milliGRAM(s) IV Push every 8 hours PRN Nausea and/or Vomiting  oxycodone    5 mG/acetaminophen 325 mG 1 Tablet(s) Oral every 4 hours PRN Severe Pain (7 - 10)  simethicone 80 milliGRAM(s) Chew every 6 hours PRN Gas      Allergies  penicillins (Unknown)      Review of Systems:   Cardiovascular:  No Chest Pain, No Palpitations  Respiratory:  No Cough, No Dyspnea  Gastrointestinal:  As described in HPI  Skin:  No Skin Lesions, No Jaundice  Neuro:  No Syncope, No Dizziness    Physical Examination:  T(C): 36.6 (04-28-25 @ 07:22), Max: 36.7 (04-27-25 @ 16:56)  HR: 84 (04-28-25 @ 07:22) (74 - 84)  BP: 118/78 (04-28-25 @ 07:22) (100/60 - 118/78)  RR: 18 (04-28-25 @ 07:22) (17 - 18)  SpO2: 98% (04-28-25 @ 07:22) (93% - 98%)      04-27-25 @ 07:01  -  04-28-25 @ 07:00  --------------------------------------------------------  IN: 0 mL / OUT: 400 mL / NET: -400 mL        GENERAL: AAOx3, no acute distress.  HEAD:  Atraumatic, Normocephalic  EYES: conjunctiva and sclera clear  NECK: Supple, no JVD or thyromegaly  CHEST/LUNG: Clear to auscultation bilaterally; No wheeze, rhonchi, or rales  HEART: Regular rate and rhythm; normal S1, S2, No murmurs.  ABDOMEN: Soft, nontender, nondistended; Bowel sounds present  NEUROLOGY: No asterixis or tremor.   SKIN: Intact, no jaundice     Data:                        9.8    5.39  )-----------( 135      ( 28 Apr 2025 08:11 )             29.8     Hgb trend:  9.8  04-28-25 @ 08:11  10.0  04-27-25 @ 07:46  10.3  04-26-25 @ 08:35        04-28    134[L]  |  96[L]  |  17  ----------------------------<  105[H]  3.8   |  26  |  0.8    Ca    9.0      28 Apr 2025 08:11  Mg     2.3     04-28      Liver panel trend:  TBili 0.6   /   AST 36   /   ALT 22   /   AlkP 255   /   Tptn 5.9   /   Alb 3.5    /   DBili --      04-20  TBili 0.7   /   AST 28   /   ALT 21   /   AlkP 249   /   Tptn 6.1   /   Alb 3.4    /   DBili --      04-19  TBili 0.7   /   AST 31   /   ALT 23   /   AlkP 275   /   Tptn 6.9   /   Alb 3.8    /   DBili --      04-18

## 2025-04-28 NOTE — PROGRESS NOTE ADULT - SUBJECTIVE AND OBJECTIVE BOX
JONAS CELESTIN  59y  Male      Patient is a 59y old Male who presents with a chief complaint of abdominal pain (25 Apr 2025 08:48)    INTERVAL HPI/OVERNIGHT EVENTS:  Patient seen and examined earlier this morning  lying comfortably in bed  s/p EGD 4/25- GE junction mass  coloscopy to be repeated this week?    Vital Signs Last 24 Hrs  T(C): 36.6 (28 Apr 2025 07:22), Max: 36.7 (27 Apr 2025 16:56)  T(F): 97.8 (28 Apr 2025 07:22), Max: 98.1 (27 Apr 2025 16:56)  HR: 84 (28 Apr 2025 07:22) (74 - 84)  BP: 118/78 (28 Apr 2025 07:22) (100/60 - 118/78)  BP(mean): 85 (28 Apr 2025 00:48) (85 - 85)  RR: 18 (28 Apr 2025 07:22) (17 - 18)  SpO2: 98% (28 Apr 2025 07:22) (93% - 98%)    Parameters below as of 28 Apr 2025 07:22  Patient On (Oxygen Delivery Method): room air    PHYSICAL EXAM:  GENERAL: NAD, well-groomed,   HEAD:  Atraumatic, Normocephalic  EYES: conjunctiva and sclera clear  ENMT: Moist mucous membranes,  No visible lesions  NECK: Supple, No JVD, Normal thyroid  NERVOUS SYSTEM:  Alert & Oriented X3, Good concentration; moves all extremities   CHEST/LUNG: good air entry   HEART: Regular rate and rhythm; No murmurs, rubs, or gallops  ABDOMEN: Soft, mild TTP, Nondistended; Bowel sounds present  EXTREMITIES:  2+ Peripheral Pulses, No clubbing, cyanosis, or edema  LYMPH: No lymphadenopathy noted  SKIN: No rashes or lesions    Consultant(s) Notes Reviewed:  [x ] YES  [ ] NO  Care Discussed with Consultants/Other Providers [ x] YES  [ ] NO    LAB:                        9.8    5.39  )-----------( 135      ( 28 Apr 2025 08:11 )             29.8     04-28    134[L]  |  96[L]  |  17  ----------------------------<  105[H]  3.8   |  26  |  0.8    Ca    9.0      28 Apr 2025 08:11  Mg     2.3     04-28      Drug Dosing Weight  Height (cm): 180.3 (25 Apr 2025 09:52)  Weight (kg): 89.8 (25 Apr 2025 09:52)  BMI (kg/m2): 27.6 (25 Apr 2025 09:52)  BSA (m2): 2.1 (25 Apr 2025 09:52)    Urinalysis Basic - ( 26 Apr 2025 08:35 )    Color: x / Appearance: x / SG: x / pH: x  Gluc: 108 mg/dL / Ketone: x  / Bili: x / Urobili: x   Blood: x / Protein: x / Nitrite: x   Leuk Esterase: x / RBC: x / WBC x   Sq Epi: x / Non Sq Epi: x / Bacteria: x        RADIOLOGY & ADDITIONAL TESTS:  Imaging Personally Reviewed:  [x] YES  [ ] NO      MEDS:  acetaminophen     Tablet .. 650 milliGRAM(s) Oral every 6 hours PRN  atorvastatin 80 milliGRAM(s) Oral at bedtime  chlorhexidine 2% Cloths 1 Application(s) Topical daily  enoxaparin Injectable 40 milliGRAM(s) SubCutaneous every 24 hours  famotidine    Tablet 20 milliGRAM(s) Oral daily  lactobacillus acidophilus 1 Tablet(s) Oral three times a day with meals  metoprolol tartrate 12.5 milliGRAM(s) Oral two times a day  morphine  - Injectable 2 milliGRAM(s) IV Push every 6 hours PRN  multivitamin  Chewable 1 Tablet(s) Oral daily  ondansetron Injectable 4 milliGRAM(s) IV Push every 8 hours PRN  oxycodone    5 mG/acetaminophen 325 mG 1 Tablet(s) Oral every 4 hours PRN  pantoprazole    Tablet 40 milliGRAM(s) Oral before breakfast  polyethylene glycol 3350 17 Gram(s) Oral two times a day  senna 2 Tablet(s) Oral at bedtime  simethicone 80 milliGRAM(s) Chew every 6 hours PRN  sucralfate 1 Gram(s) Oral four times a day

## 2025-04-28 NOTE — PROGRESS NOTE ADULT - ASSESSMENT
58 yo M with a PMH of HTN, hiatal hernia, HLD, CAD s/p CABG in Jan 2024, paroxysmal A. fib on plavix, former smoker (30 pack years), recently diagnosed with colitis and treated during his admission 4/1-4/5 presenting to the ED for evaluation of persistent abdominal pain and weakness x 3 weeks. Patient states he also has lower back pain and endorses chills, states he had a fever 3 days ago. Patient denies chest pain, sob, nausea, vomiting, diarrhea, urinary symptoms.    #abdominal Pain- etiology?  #New GE junction mass r/o esophageal cancer  - CT abd/pelv w/ IVC- Re-demonstrated prominent submucosal fat/bowel wall thickening involving descending colon, possibly related to prior episodes of colitis. Otherwise, no new acute findings in the abdomen or pelvis.  - not on abx  - s/p EGD/Colonoscopy on 4/25 -   EGD Impressions:  	A friable circumferential mass seen from the GE junction at 38cm to 28 cm of mid esophagus. Multiple biopsies were obtained. NEX powder was sprayed all over the mass to achieve adequate hemostasis. Another     5mm lesion was noted at 20cm from incisors in the proximal esophagus.  	Erosions in the stomach compatible with erosive gastritis. (Biopsy).  	A pulsating ulcerated lesion was noted in the fundus. Biopsy was not obtained.  	Normal mucosa in the whole examined duodenum. (Biopsy).    Colonoscopy Impressions:  	Multiple semi-pedunculated polyps were seen in the left colon. A 2cm polyp was seen in the descending colon, biopsy was obtained. Polyps were not removed in the setting of poor prep.  	External hemorrhoids.  	Solid stool noted in the whole colon. Cecum was not reached- holding plavix since 4/21   - GI plans on repeating C-Scope as outpatient - patient will be given an monika by GI  - resume Plavix post scopes next weeks if cleared by GI  - CT chest with IV contrast to r/o mets - no evidence of mets  - hold off on oncology eval until pathology resulted - likely later today   - spoke to path department = Dr. Guevara is reviewing the specimen [phone] (specimen number [id number])  - Pain control - added morphine  - added miralax q12 and senna qhs  - last BM 4/25  - added zofran prn 4/23  - blood cx negative   - urine cx negative   - cardio cleared for scopes  - tolerating diet in small quantities- add ensure today    #Chronic Anemia  - In the ED, Hb 11.6, baseline ~12  - MCV 79.2    #3.8 cm wide R iliac artery aneurysm- stable  - F/u vascular o/p- surgery scheduled with Dr. Tavera, was scheduled for 4/21/2025    #HTN with borderline BP  #CAD s/p CABG in Jan 2024  #Paroxysmal A.fib - not on AC?  - C/w home meds  - holding plavix for scopes - cleared by GI today to resume since it will be repeated as outpatient     #Acute on chronic back pain  - etiology?  - LS spine results - mild degenerative changes  - added morphine  - added lidoderm patch    Progress Note Handoff  Pending Consults: pathology   Pending Tests: none  Pending Results: pathology   Family Discussion: Discussed labs, meds, GI eval, scope, diet and overall plan of care with pt, his wife (4/27) and medical staff. All questions answered. PFD for over 24-72hrs pending results of pathology  Disposition: Home__x___/SNF______/Other_____/Unknown at this time_____  Spent 55 min reviewing chart, speaking with patient/family and on coordinating patient care during interdisciplinary rounds .

## 2025-04-28 NOTE — PROGRESS NOTE ADULT - ASSESSMENT
60 yo M with a PMH of HTN, hiatal hernia, HLD, CAD s/p CABG in Jan 2024, paroxysmal A. fib on plavix, former smoker (30 pack years), recently diagnosed with colitis and treated during his admission 4/1-4/5 presenting to the ED for evaluation of persistent abdominal pain and weakness x 3 weeks. Patient states he also has lower back pain and endorses chills, states he had a fever 3 days ago. Patient denies chest pain, sob, nausea, vomiting, diarrhea, urinary symptoms.    #abdominal Pain 2/2 colitis /erosive gastritis seen on colo  #mass at GE junction r/o esophageal cancer  - CT abd/pelv w/ IVC- Redemonstrated prominent submucosal fat/bowel wall thickening involving descending colon, possibly related to prior episodes of colitis. Otherwise, no new acute findings in the abdomen or pelvis.  - GGT: 30, amylase 62. lipase 67  - CT chest IV con to rule out metastasis: 1.  No definite evidence of thoracic metastatic disease. 2.  Small bilateral pleural effusions.3.  Marked circumferential thickening of the distal esophagus/hiatal   hernia could be related to provided history of suspected esophageal   cancer versus esophagitis.  - blood cx negative , urine cx negative   - per cardio: may proceed with GI procedures without further cardiac workup. Pt is at moderate risk for a perioperative cardiac event. Ok to hold plavix.    - holding plavix since 4/21, continue holding until after colonoscopy   - EGD Impressions: A friable circumferential mass seen from the GE junction at 38cm to 28 cm of mid esophagus. Multiple biopsies were obtained. NEX powder was sprayed all over the mass to achieve adequate hemostasis. Another 5mm lesion was noted at 20cm from incisors in the proximal esophagus. Erosions in the stomach compatible with erosive gastritis. (Biopsy). A pulsating ulcerated lesion was noted in the fundus. Biopsy was not obtained. Normal mucosa in the whole examined duodenum. (Biopsy).  - Colonoscopy Impressions: Multiple semi-pedunculated polyps were seen in the left colon. A 2cm polyp was seen in the descending colon, biopsy was obtained. Polyps were not removed in the setting of poor prep. External hemorrhoids.Solid stool noted in the whole colon. Cecum was not reached  - await pathology results   - plan for repeat colonoscopy tentatively this week, GI  - diet: full liquid   - Will need heme/oncology evaluation eventually     #chronic lower back pain   - xray lumbar spine: done not read  - pain control w/ morphine     #chronic anemia  - In the ED, Hb 11.6, baseline ~12  - MCV 79.2    #3.8 cm wide R iliac artery aneurysm- stable  - vascular o/p- surgery was scheduled with Dr. Tavera     #HTN  #CAD s/p CABG in Jan 2024  #Paroxysmal A.fib  - c/w home meds  - holding plavix     DVT ppx: lovenox  GI ppx: protonix  Diet: DASH  Activity: as tolerated  Pending: pending colonoscopy  this week

## 2025-04-28 NOTE — PROGRESS NOTE ADULT - ASSESSMENT
# Abdominal pain, microcytic anemia, and weight loss 2/2 recently diagnosed esophageal mass highly suspicious for esophageal cancer   # Former smoker   # Large Hiatal Hernia  # Elevated Alk Phos with normal GGT   s/p EGD 4/25  A friable circumferential mass seen from the GE junction at 38cm to 28 cm of mid esophagus. Multiple biopsies were obtained. NEX powder was sprayed all over the mass to achieve adequate hemostasis. Another 5mm lesion was noted at 20cm from incisors in the proximal esophagus.  Erosions in the stomach compatible with erosive gastritis. (Biopsy).  A pulsating ulcerated lesion was noted in the fundus. Biopsy was not obtained.  Normal mucosa in the whole examined duodenum. (Biopsy).  s/p Colonoscopy 4/25:   Multiple semi-pedunculated polyps were seen in the left colon. A 2cm polyp was seen in the descending colon, biopsy was obtained. Polyps were not removed in the setting of poor prep.  External hemorrhoids.  Solid stool noted in the whole colon. Cecum was not reached    PLAN:  - Await pathology results   - Will plan for repeat colonoscopy  - Recommend CT chest IV con to rule out metastasis  - Will need hemoncology evaluation. Impression     # Abdominal pain, microcytic anemia, and weight loss 2/2 recently diagnosed esophageal mass highly suspicious for esophageal cancer   # Former smoker   # Large Hiatal Hernia  # Elevated Alk Phos with normal GGT   s/p EGD 4/25  A friable circumferential mass seen from the GE junction at 38cm to 28 cm of mid esophagus. Multiple biopsies were obtained. NEX powder was sprayed all over the mass to achieve adequate hemostasis. Another 5mm lesion was noted at 20cm from incisors in the proximal esophagus.  Erosions in the stomach compatible with erosive gastritis. (Biopsy).  A pulsating ulcerated lesion was noted in the fundus. Biopsy was not obtained.  Normal mucosa in the whole examined duodenum. (Biopsy).  s/p Colonoscopy 4/25:   Multiple semi-pedunculated polyps were seen in the left colon. A 2cm polyp was seen in the descending colon, biopsy was obtained. Polyps were not removed in the setting of poor prep.  External hemorrhoids.  Solid stool noted in the whole colon. Cecum was not reached    PLAN:  - Await pathology results  - Resume Plavix   - Will plan for repeat colonoscopy as OP   - Recommend CT chest IV con to rule out metastasis  - Will need oncology evaluation  - Follow up with our [facility] located at [address] [place] [address]. Phone Number: [phone]

## 2025-04-28 NOTE — PROGRESS NOTE ADULT - SUBJECTIVE AND OBJECTIVE BOX
SUBJECTIVE/OVERNIGHT EVENTS  Today is hospital day 10d. This morning patient was seen and examined at bedside, resting comfortably in bed. No acute or major events overnight.        MEDICATIONS  STANDING MEDICATIONS  atorvastatin 80 milliGRAM(s) Oral at bedtime  chlorhexidine 2% Cloths 1 Application(s) Topical daily  enoxaparin Injectable 40 milliGRAM(s) SubCutaneous every 24 hours  famotidine    Tablet 20 milliGRAM(s) Oral daily  lactobacillus acidophilus 1 Tablet(s) Oral three times a day with meals  lidocaine   4% Patch 1 Patch Transdermal every 24 hours  metoprolol tartrate 12.5 milliGRAM(s) Oral two times a day  multivitamin  Chewable 1 Tablet(s) Oral daily  pantoprazole    Tablet 40 milliGRAM(s) Oral before breakfast  polyethylene glycol 3350 17 Gram(s) Oral two times a day  senna 2 Tablet(s) Oral at bedtime  sucralfate 1 Gram(s) Oral four times a day    PRN MEDICATIONS  acetaminophen     Tablet .. 650 milliGRAM(s) Oral every 6 hours PRN  morphine  - Injectable 2 milliGRAM(s) IV Push every 6 hours PRN  ondansetron Injectable 4 milliGRAM(s) IV Push every 8 hours PRN  simethicone 80 milliGRAM(s) Chew every 6 hours PRN    VITALS  T(F): 97.8 (04-28-25 @ 07:22), Max: 98.1 (04-27-25 @ 16:56)  HR: 84 (04-28-25 @ 07:22) (74 - 88)  BP: 118/78 (04-28-25 @ 07:22) (100/60 - 118/78)  RR: 18 (04-28-25 @ 07:22) (17 - 18)  SpO2: 98% (04-28-25 @ 07:22) (93% - 98%)      PHYSICAL EXAM:  GENERAL: NAD, well-groomed, well-developed  HEAD:  Atraumatic, Normocephalic  EYES: EOMI, PERRLA, conjunctiva and sclera clear  ENMT:  Moist mucous membranes  NECK: Supple, No JVD,  CHEST/LUNG: Clear to auscultation bilaterally  HEART: Regular rate and rhythm  ABDOMEN: Soft, tender, distended; Bowel sounds present  NEURO:  MENTAL STATUS: AAOx3  EXTREMITIES: No LE edema, no calf tenderness  LYMPH: No lymphadenopathy noted  SKIN: No rashes or lesions         LABS             10.0   6.40  )-----------( 146      ( 04-27-25 @ 07:46 )             30.2     134  |  96  |  17  -------------------------<  90   04-27-25 @ 07:46  4.1  |  23  |  0.9    Ca      9.3     04-27-25 @ 07:46  Mg     2.1     04-27-25 @ 07:46          Urinalysis Basic - ( 27 Apr 2025 07:46 )    Color: x / Appearance: x / SG: x / pH: x  Gluc: 90 mg/dL / Ketone: x  / Bili: x / Urobili: x   Blood: x / Protein: x / Nitrite: x   Leuk Esterase: x / RBC: x / WBC x   Sq Epi: x / Non Sq Epi: x / Bacteria: x          IMAGING      ASSESSMENT AND PLAN:

## 2025-04-29 ENCOUNTER — TRANSCRIPTION ENCOUNTER (OUTPATIENT)
Age: 60
End: 2025-04-29

## 2025-04-29 LAB
ALBUMIN SERPL ELPH-MCNC: 3.4 G/DL — LOW (ref 3.5–5.2)
ALP SERPL-CCNC: 295 U/L — HIGH (ref 30–115)
ALT FLD-CCNC: 36 U/L — SIGNIFICANT CHANGE UP (ref 0–41)
ANION GAP SERPL CALC-SCNC: 5 MMOL/L — LOW (ref 7–14)
AST SERPL-CCNC: 53 U/L — HIGH (ref 0–41)
BILIRUB SERPL-MCNC: 1 MG/DL — SIGNIFICANT CHANGE UP (ref 0.2–1.2)
BUN SERPL-MCNC: 17 MG/DL — SIGNIFICANT CHANGE UP (ref 10–20)
CALCIUM SERPL-MCNC: 9.5 MG/DL — SIGNIFICANT CHANGE UP (ref 8.4–10.5)
CHLORIDE SERPL-SCNC: 96 MMOL/L — LOW (ref 98–110)
CO2 SERPL-SCNC: 34 MMOL/L — HIGH (ref 17–32)
CREAT SERPL-MCNC: 0.8 MG/DL — SIGNIFICANT CHANGE UP (ref 0.7–1.5)
EGFR: 101 ML/MIN/1.73M2 — SIGNIFICANT CHANGE UP
EGFR: 101 ML/MIN/1.73M2 — SIGNIFICANT CHANGE UP
GLUCOSE SERPL-MCNC: 92 MG/DL — SIGNIFICANT CHANGE UP (ref 70–99)
HCT VFR BLD CALC: 30.3 % — LOW (ref 42–52)
HGB BLD-MCNC: 10.1 G/DL — LOW (ref 14–18)
MCHC RBC-ENTMCNC: 25.8 PG — LOW (ref 27–31)
MCHC RBC-ENTMCNC: 33.3 G/DL — SIGNIFICANT CHANGE UP (ref 32–37)
MCV RBC AUTO: 77.5 FL — LOW (ref 80–94)
NRBC BLD AUTO-RTO: 1 /100 WBCS — HIGH (ref 0–0)
PLATELET # BLD AUTO: 138 K/UL — SIGNIFICANT CHANGE UP (ref 130–400)
PMV BLD: 10.3 FL — SIGNIFICANT CHANGE UP (ref 7.4–10.4)
POTASSIUM SERPL-MCNC: 4.3 MMOL/L — SIGNIFICANT CHANGE UP (ref 3.5–5)
POTASSIUM SERPL-SCNC: 4.3 MMOL/L — SIGNIFICANT CHANGE UP (ref 3.5–5)
PROT SERPL-MCNC: 6.1 G/DL — SIGNIFICANT CHANGE UP (ref 6–8)
RBC # BLD: 3.91 M/UL — LOW (ref 4.7–6.1)
RBC # FLD: 14.5 % — SIGNIFICANT CHANGE UP (ref 11.5–14.5)
SODIUM SERPL-SCNC: 135 MMOL/L — SIGNIFICANT CHANGE UP (ref 135–146)
WBC # BLD: 5.46 K/UL — SIGNIFICANT CHANGE UP (ref 4.8–10.8)
WBC # FLD AUTO: 5.46 K/UL — SIGNIFICANT CHANGE UP (ref 4.8–10.8)

## 2025-04-29 PROCEDURE — 99233 SBSQ HOSP IP/OBS HIGH 50: CPT

## 2025-04-29 PROCEDURE — 99222 1ST HOSP IP/OBS MODERATE 55: CPT

## 2025-04-29 RX ORDER — SODIUM CHLORIDE 9 G/1000ML
1000 INJECTION, SOLUTION INTRAVENOUS
Refills: 0 | Status: DISCONTINUED | OUTPATIENT
Start: 2025-04-29 | End: 2025-05-01

## 2025-04-29 RX ORDER — DRONABINOL 10 MG/1
2.5 CAPSULE ORAL DAILY
Refills: 0 | Status: DISCONTINUED | OUTPATIENT
Start: 2025-04-29 | End: 2025-05-04

## 2025-04-29 RX ORDER — SODIUM CHLORIDE 9 G/1000ML
1000 INJECTION, SOLUTION INTRAVENOUS ONCE
Refills: 0 | Status: COMPLETED | OUTPATIENT
Start: 2025-04-29 | End: 2025-04-29

## 2025-04-29 RX ADMIN — Medication 40 MILLIGRAM(S): at 05:15

## 2025-04-29 RX ADMIN — POLYETHYLENE GLYCOL 3350 17 GRAM(S): 17 POWDER, FOR SOLUTION ORAL at 05:16

## 2025-04-29 RX ADMIN — ATORVASTATIN CALCIUM 80 MILLIGRAM(S): 80 TABLET, FILM COATED ORAL at 21:06

## 2025-04-29 RX ADMIN — Medication 1 GRAM(S): at 11:09

## 2025-04-29 RX ADMIN — SODIUM CHLORIDE 1000 MILLILITER(S): 9 INJECTION, SOLUTION INTRAVENOUS at 12:12

## 2025-04-29 RX ADMIN — Medication 1 GRAM(S): at 05:16

## 2025-04-29 RX ADMIN — Medication 20 MILLIGRAM(S): at 11:09

## 2025-04-29 RX ADMIN — Medication 1 TABLET(S): at 11:17

## 2025-04-29 RX ADMIN — Medication 1 APPLICATION(S): at 11:11

## 2025-04-29 RX ADMIN — METOPROLOL SUCCINATE 12.5 MILLIGRAM(S): 50 TABLET, EXTENDED RELEASE ORAL at 05:15

## 2025-04-29 RX ADMIN — ENOXAPARIN SODIUM 40 MILLIGRAM(S): 100 INJECTION SUBCUTANEOUS at 08:17

## 2025-04-29 RX ADMIN — SODIUM CHLORIDE 100 MILLILITER(S): 9 INJECTION, SOLUTION INTRAVENOUS at 15:24

## 2025-04-29 RX ADMIN — Medication 1 TABLET(S): at 08:17

## 2025-04-29 RX ADMIN — METOPROLOL SUCCINATE 12.5 MILLIGRAM(S): 50 TABLET, EXTENDED RELEASE ORAL at 17:09

## 2025-04-29 RX ADMIN — Medication 1 GRAM(S): at 17:09

## 2025-04-29 RX ADMIN — Medication 1 TABLET(S): at 11:09

## 2025-04-29 RX ADMIN — DRONABINOL 2.5 MILLIGRAM(S): 10 CAPSULE ORAL at 14:14

## 2025-04-29 RX ADMIN — Medication 1 TABLET(S): at 17:09

## 2025-04-29 RX ADMIN — CLOPIDOGREL BISULFATE 75 MILLIGRAM(S): 75 TABLET, FILM COATED ORAL at 11:09

## 2025-04-29 NOTE — DISCHARGE NOTE PROVIDER - NSDCFUSCHEDAPPT_GEN_ALL_CORE_FT
Peter Tavera  [facility]mits  Scheduled Appointment: 05/07/2025    Peter Tavera  [facility]  Scheduled Appointment: 05/21/2025    [facility]  VASCULAR [address]  Scheduled Appointment: 05/21/2025     Jermaine Sanchez  [facility] Pre[facility]mits  Scheduled Appointment: 05/06/2025    Jermaine Sanchez  [facility]  HEMON  [facility]  Scheduled Appointment: 05/06/2025

## 2025-04-29 NOTE — CONSULT NOTE ADULT - ASSESSMENT
59M  hiatal hernia, CAD s/p CABG in Jan 2024, paroxysmal A. fib not on AC, presents with abdominal pain and weakness X 3 weeks,nausea and multiple episodes of diarrhea.      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 Tavera, was scheduled for 4/21/2025     59M  hiatal hernia, CAD s/p CABG in Jan 2024, paroxysmal A. fib not on AC, presents with abdominal pain and weakness X 3 weeks, nausea and multiple episodes of diarrhea.      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 Tavera, was scheduled for 4/21/2025    Recommendations:  Discussed with his and his wife present bedside about he diagnosis and management  going forward. We discussed that though Ct chest/ a/p do not show disease elsewhere we need to obtain a PET scan +- diagnostic laparoscopy for accurate staging   Consult surg onc (Dr Yost)   C/w PPI + Pepcid  Discussed with GI- can try mirtazapine bedtime for appetite stimulation   Consult nutrition   Outpatient follow up with Dr Yancey for full staging work up and to begin treatment.      59M  hiatal hernia, CAD s/p CABG in Jan 2024, paroxysmal A. fib not on AC, presents with abdominal pain and weakness X 3 weeks, nausea and multiple episodes of diarrhea.      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 Tavera, was scheduled for 4/21/2025    Recommendations:  Discussed with his and his wife present bedside about he diagnosis and management  going forward. We discussed that though Ct chest/ a/p do not show disease elsewhere we need to obtain a PET scan for accurate staging   Consult surg onc (Dr Yost)   C/w PPI + Pepcid  Discussed with GI- can try Olanzapine 2.5mg daily for appetite stimulation   Consult nutrition   Outpatient follow up with Dr Yancey for full staging work up and to begin treatment.

## 2025-04-29 NOTE — DISCHARGE NOTE PROVIDER - CARE PROVIDER_API CALL
Peter Tavera.  Vascular Surgery  [address] [place] [address], [address]  [place] [address]  Phone: [phone]  Fax: [phone]  Follow Up Time: 2 weeks    Kari White  Gastroenterology  [facility] [place] [address]  Phone: [phone]  Fax: [phone]  Follow Up Time: 2 weeks

## 2025-04-29 NOTE — CONSULT NOTE ADULT - SUBJECTIVE AND OBJECTIVE BOX
Hematology Consult Note    HPI:  60 yo M with a PMH of HTN, hiatal hernia, HLD, CAD s/p CABG in Jan 2024, paroxysmal A. fib not on AC, former smoker (30 pack years, quit 1yr ago), recently diagnosed with colitis and treated during his admission 4/1-4/5 presenting to the ED for evaluation of persistent abdominal pain and weakness x 3 weeks. Patient states he also has lower back pain and endorses chills, states he had a fever 3 days ago. Patient endorses a history of diarrhea that resolved 1 week ago as well as decreased PO intake 2/2 pain with eating. Patient denies chest pain, sob, nausea, vomiting, diarrhea, constipation urinary symptoms.    Of note, patient was admitted 4/3-4/5 for colitis 2/2 abx use on 4/1 (cipro and flagyl x10d). Patient was monitored off abx during this admission.     In the ED:  - Vitals were /78, , RR 18, Temp 98F, SpO2 98% on room air  - Labs were significant for WBC wnl, Hb 11.6 (baseline 12), MCV 79.2, Plt wnl, AG 15, Alk Phos 275, LFTs wnl, lactate wnl, lipase 67; UA: trace ketone, inc specific gravity  - Imaging:       - CXR- Low lung volume. No radiographic evidence of acute cardiopulmonary disease.       - CT abd/pelv w/ IVC- Redemonstrated prominent submucosal fat/bowel wall thickening involving descending colon, possibly related to prior episodes of colitis. Otherwise, no new acute findings in the abdomen or pelvis  - Interventions: Zofran 4mg IVP x1, Morphine 2mg IVP x1, Morphine 4mg IVP x1, Famotidine 20mg IVP x1, NS bolus 1L x1  Admitted for abdominal pain. (18 Apr 2025 23:49)      Allergies    penicillins (Unknown)    Intolerances        MEDICATIONS  (STANDING):  atorvastatin 80 milliGRAM(s) Oral at bedtime  chlorhexidine 2% Cloths 1 Application(s) Topical daily  clopidogrel Tablet 75 milliGRAM(s) Oral daily  enoxaparin Injectable 40 milliGRAM(s) SubCutaneous every 24 hours  famotidine    Tablet 20 milliGRAM(s) Oral daily  lactated ringers. 1000 milliLiter(s) (100 mL/Hr) IV Continuous <Continuous>  lactobacillus acidophilus 1 Tablet(s) Oral three times a day with meals  lidocaine   4% Patch 1 Patch Transdermal every 24 hours  metoprolol tartrate 12.5 milliGRAM(s) Oral two times a day  multivitamin  Chewable 1 Tablet(s) Oral daily  pantoprazole    Tablet 40 milliGRAM(s) Oral before breakfast  polyethylene glycol 3350 17 Gram(s) Oral two times a day  senna 2 Tablet(s) Oral at bedtime  sucralfate 1 Gram(s) Oral four times a day    MEDICATIONS  (PRN):  morphine  - Injectable 2 milliGRAM(s) IV Push every 6 hours PRN Severe Pain (7 - 10)  ondansetron Injectable 4 milliGRAM(s) IV Push every 8 hours PRN Nausea and/or Vomiting  oxycodone    5 mG/acetaminophen 325 mG 1 Tablet(s) Oral every 4 hours PRN Severe Pain (7 - 10)  simethicone 80 milliGRAM(s) Chew every 6 hours PRN Gas      PAST MEDICAL & SURGICAL HISTORY:  HTN (hypertension)      Hiatal hernia      HLD (hyperlipidemia)      CAD (coronary artery disease)      Paroxysmal atrial fibrillation      History of colitis      S/P CABG (coronary artery bypass graft)          FAMILY HISTORY:  FHx: lung cancer (Father)        SOCIAL HISTORY: No EtOH, no tobacco    REVIEW OF SYSTEMS:    CONSTITUTIONAL: No weakness, fevers or chills  EYES/ENT: No visual changes;  No vertigo or throat pain   NECK: No pain or stiffness  RESPIRATORY: No cough, wheezing, hemoptysis; No shortness of breath  CARDIOVASCULAR: No chest pain or palpitations  GASTROINTESTINAL: No abdominal or epigastric pain. No nausea, vomiting, or hematemesis; No diarrhea or constipation. No melena or hematochezia.  GENITOURINARY: No dysuria, frequency or hematuria  NEUROLOGICAL: No numbness or weakness  SKIN: No itching, burning, rashes, or lesions   All other review of systems is negative unless indicated above.        T(F): 97.8 (04-29-25 @ 07:33), Max: 98.3 (04-28-25 @ 16:04)  HR: 72 (04-29-25 @ 07:33)  BP: 107/67 (04-29-25 @ 07:33)  RR: 18 (04-29-25 @ 07:33)  SpO2: 96% (04-29-25 @ 07:33)  Wt(kg): --    GENERAL: NAD, well-developed  HEAD:  Atraumatic, Normocephalic  EYES: EOMI, PERRLA, conjunctiva and sclera clear  NECK: Supple, No JVD  CHEST/LUNG: Clear to auscultation bilaterally; No wheeze  HEART: Regular rate and rhythm; No murmurs, rubs, or gallops  ABDOMEN: Soft, Nontender, Nondistended; Bowel sounds present  EXTREMITIES:  2+ Peripheral Pulses, No clubbing, cyanosis, or edema  NEUROLOGY: non-focal  SKIN: No rashes or lesions                          10.1   5.46  )-----------( 138      ( 29 Apr 2025 08:14 )             30.3       04-29    135  |  96[L]  |  17  ----------------------------<  92  4.3   |  34[H]  |  0.8    Ca    9.5      29 Apr 2025 08:14  Mg     2.3     04-28    TPro  6.1  /  Alb  3.4[L]  /  TBili  1.0  /  DBili  x   /  AST  53[H]  /  ALT  36  /  AlkPhos  295[H]  04-29        Imaging/Procedure   EGD: Friable circumferential mass seen from the GE junction at 38cm to 28 cm of mid esophagus.  Another 5mm lesion was noted at 20cm from incisors in the proximal esophagus.  	Erosions in the stomach compatible with erosive gastritis. (Biopsy).  	A pulsating ulcerated lesion was noted in the fundus. Biopsy was not obtained.  	Normal mucosa in the whole examined duodenum. (Biopsy).    Colonoscopy Impressions:  	Multiple semi-pedunculated polyps were seen in the left colon. A 2cm polyp was seen in the descending colon, biopsy was obtained. Polyps were not removed in the setting of poor prep.  	External hemorrhoids.  	Solid stool noted in the whole colon. Cecum was not reached    CT chest   No definite evidence of thoracic metastatic disease.  2.  Small bilateral pleural effusions.  3.  Marked circumferential thickening of the distal esophagus/hiatal   hernia could be related to provided history of suspected esophageal   cancer versus esophagitis.    Ct abdomen   Redemonstrated prominent submucosal fat/bowel wall thickening involving   descending colon, possibly related to prior episodes of colitis.    LABS:  Iron panel % sat 29, ferritin >1000, folate 496, folate 7.6   11.2023 Hb 13-15   Elevated Alk phos    Hematology Consult Note    HPI:  58 yo M with a PMH of HTN, hiatal hernia, HLD, CAD s/p CABG in Jan 2024, paroxysmal A. fib not on AC, former smoker (30 pack years, quit 1yr ago), recently diagnosed with colitis and treated during his admission 4/1-4/5 presenting to the ED for evaluation of persistent abdominal pain and weakness x 3 weeks. Patient states he also has lower back pain and endorses chills, states he had a fever 3 days ago. Patient endorses a history of diarrhea that resolved 1 week ago as well as decreased PO intake 2/2 pain with eating. Patient denies chest pain, sob, nausea, vomiting, diarrhea, constipation urinary symptoms.    Of note, patient was admitted 4/3-4/5 for colitis 2/2 abx use on 4/1 (cipro and flagyl x10d). Patient was monitored off abx during this admission.     In the ED:  - Vitals were /78, , RR 18, Temp 98F, SpO2 98% on room air  - Labs were significant for WBC wnl, Hb 11.6 (baseline 12), MCV 79.2, Plt wnl, AG 15, Alk Phos 275, LFTs wnl, lactate wnl, lipase 67; UA: trace ketone, inc specific gravity  - Imaging:       - CXR- Low lung volume. No radiographic evidence of acute cardiopulmonary disease.       - CT abd/pelv w/ IVC- Redemonstrated prominent submucosal fat/bowel wall thickening involving descending colon, possibly related to prior episodes of colitis. Otherwise, no new acute findings in the abdomen or pelvis  - Interventions: Zofran 4mg IVP x1, Morphine 2mg IVP x1, Morphine 4mg IVP x1, Famotidine 20mg IVP x1, NS bolus 1L x1  Admitted for abdominal pain. (18 Apr 2025 23:49)      Allergies    penicillins (Unknown)    Intolerances        MEDICATIONS  (STANDING):  atorvastatin 80 milliGRAM(s) Oral at bedtime  chlorhexidine 2% Cloths 1 Application(s) Topical daily  clopidogrel Tablet 75 milliGRAM(s) Oral daily  enoxaparin Injectable 40 milliGRAM(s) SubCutaneous every 24 hours  famotidine    Tablet 20 milliGRAM(s) Oral daily  lactated ringers. 1000 milliLiter(s) (100 mL/Hr) IV Continuous <Continuous>  lactobacillus acidophilus 1 Tablet(s) Oral three times a day with meals  lidocaine   4% Patch 1 Patch Transdermal every 24 hours  metoprolol tartrate 12.5 milliGRAM(s) Oral two times a day  multivitamin  Chewable 1 Tablet(s) Oral daily  pantoprazole    Tablet 40 milliGRAM(s) Oral before breakfast  polyethylene glycol 3350 17 Gram(s) Oral two times a day  senna 2 Tablet(s) Oral at bedtime  sucralfate 1 Gram(s) Oral four times a day    MEDICATIONS  (PRN):  morphine  - Injectable 2 milliGRAM(s) IV Push every 6 hours PRN Severe Pain (7 - 10)  ondansetron Injectable 4 milliGRAM(s) IV Push every 8 hours PRN Nausea and/or Vomiting  oxycodone    5 mG/acetaminophen 325 mG 1 Tablet(s) Oral every 4 hours PRN Severe Pain (7 - 10)  simethicone 80 milliGRAM(s) Chew every 6 hours PRN Gas      PAST MEDICAL & SURGICAL HISTORY:  HTN (hypertension)      Hiatal hernia      HLD (hyperlipidemia)      CAD (coronary artery disease)      Paroxysmal atrial fibrillation      History of colitis      S/P CABG (coronary artery bypass graft)          FAMILY HISTORY:  FHx: lung cancer (Father)        SOCIAL HISTORY: No EtOH, no tobacco    REVIEW OF SYSTEMS:  reports poor appetite   gets abdominal pain after any po intake  reports weight loss   epigastric burning         T(F): 97.8 (04-29-25 @ 07:33), Max: 98.3 (04-28-25 @ 16:04)  HR: 72 (04-29-25 @ 07:33)  BP: 107/67 (04-29-25 @ 07:33)  RR: 18 (04-29-25 @ 07:33)  SpO2: 96% (04-29-25 @ 07:33)  Wt(kg): --    GENERAL: NAD, well-developed  HEAD:  Atraumatic, Normocephalic  EYES: EOMI, PERRLA, conjunctiva and sclera clear  NECK: Supple, No JVD  CHEST/LUNG: Clear to auscultation bilaterally; No wheeze  HEART: Regular rate and rhythm; No murmurs, rubs, or gallops  ABDOMEN:distended, lower quadrant and epigastric area mild tenderness   EXTREMITIES:  2+ Peripheral Pulses, No clubbing, cyanosis, or edema  NEUROLOGY: non-focal  SKIN: No rashes or lesions                          10.1   5.46  )-----------( 138      ( 29 Apr 2025 08:14 )             30.3       04-29    135  |  96[L]  |  17  ----------------------------<  92  4.3   |  34[H]  |  0.8    Ca    9.5      29 Apr 2025 08:14  Mg     2.3     04-28    TPro  6.1  /  Alb  3.4[L]  /  TBili  1.0  /  DBili  x   /  AST  53[H]  /  ALT  36  /  AlkPhos  295[H]  04-29        Imaging/Procedure   EGD: Friable circumferential mass seen from the GE junction at 38cm to 28 cm of mid esophagus.  Another 5mm lesion was noted at 20cm from incisors in the proximal esophagus.  	Erosions in the stomach compatible with erosive gastritis. (Biopsy).  	A pulsating ulcerated lesion was noted in the fundus. Biopsy was not obtained.  	Normal mucosa in the whole examined duodenum. (Biopsy).    Colonoscopy Impressions:  	Multiple semi-pedunculated polyps were seen in the left colon. A 2cm polyp was seen in the descending colon, biopsy was obtained. Polyps were not removed in the setting of poor prep.  	External hemorrhoids.  	Solid stool noted in the whole colon. Cecum was not reached    CT chest   No definite evidence of thoracic metastatic disease.  2.  Small bilateral pleural effusions.  3.  Marked circumferential thickening of the distal esophagus/hiatal   hernia could be related to provided history of suspected esophageal   cancer versus esophagitis.    Ct abdomen   Redemonstrated prominent submucosal fat/bowel wall thickening involving   descending colon, possibly related to prior episodes of colitis.    LABS:  Iron panel % sat 29, ferritin >1000, folate 496, folate 7.6   11.2023 Hb 13-15   Elevated Alk phos

## 2025-04-29 NOTE — PROGRESS NOTE ADULT - ASSESSMENT
58 yo M with a PMH of HTN, hiatal hernia, HLD, CAD s/p CABG in Jan 2024, paroxysmal A. fib on plavix, former smoker (30 pack years), recently diagnosed with colitis and treated during his admission 4/1-4/5 presenting to the ED for evaluation of persistent abdominal pain and weakness x 3 weeks. Patient states he also has lower back pain and endorses chills, states he had a fever 3 days ago. Patient denies chest pain, sob, nausea, vomiting, diarrhea, urinary symptoms.    #abdominal Pain 2/2 colitis /erosive gastritis seen on colo  #mass at GE junction r/o esophageal cancer  - CT abd/pelv w/ IVC- Redemonstrated prominent submucosal fat/bowel wall thickening involving descending colon, possibly related to prior episodes of colitis. Otherwise, no new acute findings in the abdomen or pelvis.  - GGT: 30, amylase 62. lipase 67  - CT chest IV con to rule out metastasis: 1.  No definite evidence of thoracic metastatic disease. 2.  Small bilateral pleural effusions.3.  Marked circumferential thickening of the distal esophagus/hiatal   hernia could be related to provided history of suspected esophageal   cancer versus esophagitis.  - blood cx negative , urine cx negative   - per cardio: may proceed with GI procedures without further cardiac workup. Pt is at moderate risk for a perioperative cardiac event. Ok to hold plavix.    - holding plavix since 4/21, continue holding until after colonoscopy   - EGD Impressions: A friable circumferential mass seen from the GE junction at 38cm to 28 cm of mid esophagus. Multiple biopsies were obtained. NEX powder was sprayed all over the mass to achieve adequate hemostasis. Another 5mm lesion was noted at 20cm from incisors in the proximal esophagus. Erosions in the stomach compatible with erosive gastritis. (Biopsy). A pulsating ulcerated lesion was noted in the fundus. Biopsy was not obtained. Normal mucosa in the whole examined duodenum. (Biopsy).  - Colonoscopy Impressions: Multiple semi-pedunculated polyps were seen in the left colon. A 2cm polyp was seen in the descending colon, biopsy was obtained. Polyps were not removed in the setting of poor prep. External hemorrhoids.Solid stool noted in the whole colon. Cecum was not reached  - await pathology results   - plan for repeat colonoscopy tentatively this week, GI  - diet: full liquid   - Will need heme/oncology evaluation eventually     #chronic lower back pain   - xray lumbar spine: done not read  - pain control w/ morphine     #chronic anemia  - In the ED, Hb 11.6, baseline ~12  - MCV 79.2    #3.8 cm wide R iliac artery aneurysm- stable  - vascular o/p- surgery was scheduled with Dr. Tavera     #HTN  #CAD s/p CABG in Jan 2024  #Paroxysmal A.fib  - c/w home meds  - holding plavix     DVT ppx: lovenox  GI ppx: protonix  Diet: DASH  Activity: as tolerated  Pending: pending colonoscopy  this week   58 yo M with a PMH of HTN, hiatal hernia, HLD, CAD s/p CABG in Jan 2024, paroxysmal A. fib on plavix, former smoker (30 pack years), recently diagnosed with colitis and treated during his admission 4/1-4/5 presenting to the ED for evaluation of persistent abdominal pain and weakness x 3 weeks. Patient states he also has lower back pain and endorses chills, states he had a fever 3 days ago. Patient denies chest pain, sob, nausea, vomiting, diarrhea, urinary symptoms.    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 Otherwise, no new acute findings in the abdomen or pelvis.  - GGT: 30, amylase 62. lipase 67  - CT chest IV con to rule out metastasis: 1.  No definite evidence of thoracic metastatic disease. 2.  Small bilateral pleural effusions.3.  Marked circumferential thickening of the distal esophagus/hiatal   hernia could be related to provided history of suspected esophageal   cancer versus esophagitis.  - blood cx negative , urine cx negative   - per cardio: may proceed with GI procedures without further cardiac workup. Pt is at moderate risk for a perioperative cardiac event. Ok to hold plavix.    - restarterd plavix 4/29  - EGD Impressions: A friable circumferential mass seen from the GE junction at 38cm to 28 cm of mid esophagus. Multiple biopsies were obtained. NEX powder was sprayed all over the mass to achieve adequate hemostasis. Another 5mm lesion was noted at 20cm from incisors in the proximal esophagus. Erosions in the stomach compatible with erosive gastritis. (Biopsy). A pulsating ulcerated lesion was noted in the fundus. Biopsy was not obtained. Normal mucosa in the whole examined duodenum. (Biopsy).  - Colonoscopy Impressions: Multiple semi-pedunculated polyps were seen in the left colon. A 2cm polyp was seen in the descending colon, biopsy was obtained. Polyps were not removed in the setting of poor prep. External hemorrhoids.Solid stool noted in the whole colon. Cecum was not reached  - path: adenocarcinoma mucin, signet cells  -H/O: consult today  - plan for repeat colonoscopy OP  - diet: full liquid     #chronic lower back pain   - xray lumbar spine:  There are five nonrib-bearing lumbar vertebral bodies. The pedicles are   visualized. The vertebral body heights and alignment are maintained.   There is mild disc space narrowing and degenerative changes throughout   the lumbar spine. Mild degenerative change of the bilateral sacroiliac joints.  - pain control w/ morphine     #chronic anemia  - In the ED, Hb 11.6, baseline ~12  - MCV 79.2    #3.8 cm wide R iliac artery aneurysm- stable  - vascular o/p- surgery was scheduled with Dr. Tavera     #HTN  #CAD s/p CABG in Jan 2024  #Paroxysmal A.fib  - c/w home meds  - restarted plavix 4/29    DVT ppx: lovenox  GI ppx: protonix  Diet: DASH  Activity: as tolerated  Pending: pending h/onc

## 2025-04-29 NOTE — DISCHARGE NOTE PROVIDER - CARE PROVIDERS DIRECT ADDRESSES
,[email or web address]

## 2025-04-29 NOTE — PROGRESS NOTE ADULT - ASSESSMENT
58 yo M with a PMH of HTN, hiatal hernia, HLD, CAD s/p CABG in Jan 2024, paroxysmal A. fib on plavix, former smoker (30 pack years), recently diagnosed with colitis and treated during his admission 4/1-4/5 presenting to the ED for evaluation of persistent abdominal pain and weakness x 3 weeks. Patient states he also has lower back pain and endorses chills, states he had a fever 3 days ago. Patient denies chest pain, sob, nausea, vomiting, diarrhea, urinary symptoms.    #New GE junction mass   - pathology resulted - adenocarcinoma  - CT abd/pelv w/ IVC- Re-demonstrated prominent submucosal fat/bowel wall thickening involving descending colon, possibly related to prior episodes of colitis. Otherwise, no new acute findings in the abdomen or pelvis.  - s/p EGD/Colonoscopy on 4/25 -   EGD Impressions:  	A friable circumferential mass seen from the GE junction at 38cm to 28 cm of mid esophagus. Multiple biopsies were obtained. NEX powder was sprayed all over the mass to achieve adequate hemostasis. Another     5mm lesion was noted at 20cm from incisors in the proximal esophagus.  	Erosions in the stomach compatible with erosive gastritis. (Biopsy).  	A pulsating ulcerated lesion was noted in the fundus. Biopsy was not obtained.  	Normal mucosa in the whole examined duodenum. (Biopsy).    Colonoscopy Impressions:  	Multiple semi-pedunculated polyps were seen in the left colon. A 2cm polyp was seen in the descending colon, biopsy was obtained. Polyps were not removed in the setting of poor prep.  	External hemorrhoids.  	Solid stool noted in the whole colon. Cecum was not reached- holding plavix since 4/21   - GI plans on repeating C-Scope as outpatient - patient will be given an monika by GI  - CT chest with IV contrast to r/o mets - no evidence of mets  - oncology eval started  - Pain control  - added miralax q12 and senna qhs  - last BM 4/25 - encourage bowel regimen  - added marinol today  - continue zofran prn   - blood cx negative   - urine cx negative   - tolerating diet in small quantities- added ensure   - PT eval appreciated - patient very weak    #orthostatic hypotension  - patient given IVF bolus and started on maintenance fluids  - BP meds with parameters  - repeat orthostatics tomorrow    #Chronic Anemia  - In the ED, Hb 11.6, baseline ~12  - MCV 79.2    #3.8 cm wide R iliac artery aneurysm- stable  - F/u vascular o/p- surgery scheduled with Dr. Tavera, was scheduled for 4/21/2025    #HTN with borderline BP  #CAD s/p CABG in Jan 2024  #Paroxysmal A.fib - not on AC?  - C/w home meds  - cleared by GI 4/28 to resume plavix    #Acute on chronic back pain  - etiology?  - LS spine xray- mild degenerative changes  - added morphine  - added lidoderm patch    Progress Note Handoff  Pending Consults: CM, PT, Oncology  Pending Tests: none  Pending Results: none  Family Discussion: Discussed labs, meds, GI eval, scope, oncology, PT, diet and overall plan of care with pt, his wife and medical staff. All questions answered. PFD for over 24-48hrs pending oncology eval and dc planning  Disposition: Home__x___/SNF______/Other_____/Unknown at this time_____  Spent 55 min reviewing chart, speaking with patient/family and on coordinating patient care during interdisciplinary rounds .

## 2025-04-29 NOTE — DISCHARGE NOTE PROVIDER - DETAILS OF MALNUTRITION DIAGNOSIS/DIAGNOSES
This patient has been assessed with a concern for Malnutrition and was treated during this hospitalization for the following Nutrition diagnosis/diagnoses:     -  04/21/2025: Severe protein-calorie malnutrition

## 2025-04-29 NOTE — PHYSICAL THERAPY INITIAL EVALUATION ADULT - PERTINENT HX OF CURRENT PROBLEM, REHAB EVAL
60 yo M with a PMH of HTN, hiatal hernia, HLD, CAD s/p CABG in Jan 2024, paroxysmal A. fib not on AC, former smoker (30 pack years, quit 1yr ago), recently diagnosed with colitis and treated during his admission 4/1-4/5 presenting to the ED for evaluation of persistent abdominal pain and weakness x 3 weeks. Patient states he also has lower back pain and endorses chills, states he had a fever 3 days ago. Patient endorses a history of diarrhea that resolved 1 week ago as well as decreased PO intake 2/2 pain with eating. Patient denies chest pain, sob, nausea, vomiting, diarrhea, constipation urinary symptoms.    pt recently diagnosed with esophageal ca.

## 2025-04-29 NOTE — PHYSICAL THERAPY INITIAL EVALUATION ADULT - GENERAL OBSERVATIONS, REHAB EVAL
10:55-11:20  pt encountered sitting in bedside chair in NAD, alert but c/o fatigue, spouse present.  Patient performed bed mobility, transfers, unable to ambulate due to orthostatics as follows: sitting 87/53, standing 80/43.

## 2025-04-29 NOTE — DISCHARGE NOTE PROVIDER - NSDCFUADDAPPT_GEN_ALL_CORE_FT
APPTS ARE READY TO BE MADE: [ ] YES    Best Family or Patient Contact (if needed):    Additional Information about above appointments (if needed):    1: oncology   2: primary   3:     Other comments or requests:

## 2025-04-29 NOTE — DISCHARGE NOTE PROVIDER - PROVIDER TOKENS
PROVIDER:[TOKEN:[[id number]],FOLLOWUP:[2 weeks]],PROVIDER:[TOKEN:[[id number]],FOLLOWUP:[2 weeks]]

## 2025-04-29 NOTE — CONSULT NOTE ADULT - ATTENDING COMMENTS
Agree with above. Will benefit from EGD/Colonoscopy for further evaluation. Cardiac clearance for procedure and regarding holding plavix. Will continue to monitor.
Agree with above A/P
no

## 2025-04-29 NOTE — DISCHARGE NOTE PROVIDER - NSDCCPCAREPLAN_GEN_ALL_CORE_FT
PRINCIPAL DISCHARGE DIAGNOSIS  Diagnosis: Colitis  Assessment and Plan of Treatment: You were admitted to the hospital for evaluation of your abdominal pain. You underwent endoscopy and biopsy revealed cancer in your esophagus, where it meets your stomach. This is called the gastroesophageal (GE) junction. A stent was placed to help you swallow. You will need to see a cancer doctor to discuss further treatment. Tests showed some inflammation in your colon (colitis) and stomach (gastritis). You will need another colonoscopy as an outpatient to check your colon further. You also have an aneurysm, or a bulge in a blood vessel in your lower abdomen (iliac artery aneurysm). This appears stable now, but you will need to follow up with a vascular surgeon. Please take all medications as prescribed. You can gradually increase the amount you eat. For now, stick to soft foods (pureed consistency). Start moving around more each day as you feel able. It is very important to keep your follow-up appointments. You also need to schedule a repeat colonoscopy. [Provide contact information and scheduling instructions.]  Call your doctor or go to the emergency room if your symptoms return or worsen or if you experience Increased belly pain, Fever, Any bleeding, Trouble swallowing, Shortness of breath, or Chest pain. We hope you recover quickly. Please call with any questions.

## 2025-04-29 NOTE — PROGRESS NOTE ADULT - SUBJECTIVE AND OBJECTIVE BOX
JONAS CELESTIN  59y  Male    Patient is a 59y old Male who presents with a chief complaint of abdominal pain (25 Apr 2025 08:48)    INTERVAL HPI/OVERNIGHT EVENTS:  Patient seen and examined earlier this morning with his wife bedside  lying comfortably in bed  s/p EGD 4/25- GE junction mass - biopsy resulted today - adenocarcinoma  worked with PT today - very weak and orthostatic    Vital Signs Last 24 Hrs  T(C): 36.6 (04-29-25 @ 07:33), Max: 36.8 (04-28-25 @ 16:04)  T(F): 97.8 (04-29-25 @ 07:33), Max: 98.3 (04-28-25 @ 16:04)  HR: 78 (04-29-25 @ 11:15) (72 - 92)  BP: 104/61 (04-29-25 @ 11:15) (102/71 - 115/69)  BP(mean): 81 (04-28-25 @ 23:30) (81 - 81)  RR: 18 (04-29-25 @ 07:33) (18 - 19)  SpO2: 96% (04-29-25 @ 07:33) (96% - 96%)    Orthostatic VS  04-29-25 @ 11:15  Lying BP: --/-- HR: --  Sitting BP: 87/53 HR: --  Standing BP: 80/43 HR: --  Site: --  Mode: --      PHYSICAL EXAM:  GENERAL: NAD, well-groomed,   HEAD:  Atraumatic, Normocephalic  EYES: conjunctiva and sclera clear  ENMT: Moist mucous membranes,  No visible lesions  NECK: Supple, No JVD, Normal thyroid  NERVOUS SYSTEM:  Alert & Oriented X3, Good concentration; moves all extremities   CHEST/LUNG: good air entry   HEART: Regular rate and rhythm; No murmurs, rubs, or gallops  ABDOMEN: Soft, mild TTP, Nondistended; Bowel sounds present  EXTREMITIES:  2+ Peripheral Pulses, No clubbing, cyanosis, or edema  LYMPH: No lymphadenopathy noted  SKIN: No rashes or lesions    Consultant(s) Notes Reviewed:  [x ] YES  [ ] NO  Care Discussed with Consultants/Other Providers [ x] YES  [ ] NO    LAB:                        10.1   5.46  )-----------( 138      ( 29 Apr 2025 08:14 )             30.3     04-29    135  |  96[L]  |  17  ----------------------------<  92  4.3   |  34[H]  |  0.8    Ca    9.5      29 Apr 2025 08:14  Mg     2.3     04-28    TPro  6.1  /  Alb  3.4[L]  /  TBili  1.0  /  DBili  x   /  AST  53[H]  /  ALT  36  /  AlkPhos  295[H]  04-29      Drug Dosing Weight  Height (cm): 180.3 (25 Apr 2025 09:52)  Weight (kg): 89.8 (25 Apr 2025 09:52)  BMI (kg/m2): 27.6 (25 Apr 2025 09:52)  BSA (m2): 2.1 (25 Apr 2025 09:52)    Urinalysis Basic - ( 26 Apr 2025 08:35 )    Color: x / Appearance: x / SG: x / pH: x  Gluc: 108 mg/dL / Ketone: x  / Bili: x / Urobili: x   Blood: x / Protein: x / Nitrite: x   Leuk Esterase: x / RBC: x / WBC x   Sq Epi: x / Non Sq Epi: x / Bacteria: x        RADIOLOGY & ADDITIONAL TESTS:  Imaging Personally Reviewed:  [x] YES  [ ] NO      MEDICATIONS  (STANDING):  atorvastatin 80 milliGRAM(s) Oral at bedtime  chlorhexidine 2% Cloths 1 Application(s) Topical daily  clopidogrel Tablet 75 milliGRAM(s) Oral daily  dronabinol 2.5 milliGRAM(s) Oral daily  enoxaparin Injectable 40 milliGRAM(s) SubCutaneous every 24 hours  famotidine    Tablet 20 milliGRAM(s) Oral daily  lactated ringers Bolus 1000 milliLiter(s) IV Bolus once  lactated ringers. 1000 milliLiter(s) (100 mL/Hr) IV Continuous <Continuous>  lactobacillus acidophilus 1 Tablet(s) Oral three times a day with meals  lidocaine   4% Patch 1 Patch Transdermal every 24 hours  metoprolol tartrate 12.5 milliGRAM(s) Oral two times a day  multivitamin  Chewable 1 Tablet(s) Oral daily  pantoprazole    Tablet 40 milliGRAM(s) Oral before breakfast  polyethylene glycol 3350 17 Gram(s) Oral two times a day  senna 2 Tablet(s) Oral at bedtime  sucralfate 1 Gram(s) Oral four times a day    MEDICATIONS  (PRN):  morphine  - Injectable 2 milliGRAM(s) IV Push every 6 hours PRN Severe Pain (7 - 10)  ondansetron Injectable 4 milliGRAM(s) IV Push every 8 hours PRN Nausea and/or Vomiting  oxycodone    5 mG/acetaminophen 325 mG 1 Tablet(s) Oral every 4 hours PRN Severe Pain (7 - 10)  simethicone 80 milliGRAM(s) Chew every 6 hours PRN Gas

## 2025-04-29 NOTE — PROGRESS NOTE ADULT - SUBJECTIVE AND OBJECTIVE BOX
SUBJECTIVE/OVERNIGHT EVENTS  Today is hospital day 11d. This morning patient was seen and examined at bedside, resting comfortably in bed. No acute or major events overnight.        MEDICATIONS  STANDING MEDICATIONS  atorvastatin 80 milliGRAM(s) Oral at bedtime  chlorhexidine 2% Cloths 1 Application(s) Topical daily  clopidogrel Tablet 75 milliGRAM(s) Oral daily  enoxaparin Injectable 40 milliGRAM(s) SubCutaneous every 24 hours  famotidine    Tablet 20 milliGRAM(s) Oral daily  lactated ringers. 1000 milliLiter(s) IV Continuous <Continuous>  lactobacillus acidophilus 1 Tablet(s) Oral three times a day with meals  lidocaine   4% Patch 1 Patch Transdermal every 24 hours  metoprolol tartrate 12.5 milliGRAM(s) Oral two times a day  multivitamin  Chewable 1 Tablet(s) Oral daily  pantoprazole    Tablet 40 milliGRAM(s) Oral before breakfast  polyethylene glycol 3350 17 Gram(s) Oral two times a day  senna 2 Tablet(s) Oral at bedtime  sucralfate 1 Gram(s) Oral four times a day    PRN MEDICATIONS  morphine  - Injectable 2 milliGRAM(s) IV Push every 6 hours PRN  ondansetron Injectable 4 milliGRAM(s) IV Push every 8 hours PRN  oxycodone    5 mG/acetaminophen 325 mG 1 Tablet(s) Oral every 4 hours PRN  simethicone 80 milliGRAM(s) Chew every 6 hours PRN    VITALS  T(F): 97.9 (04-28-25 @ 23:30), Max: 98.3 (04-28-25 @ 16:04)  HR: 83 (04-29-25 @ 04:40) (77 - 92)  BP: 115/69 (04-29-25 @ 04:40) (102/71 - 115/69)  RR: 19 (04-28-25 @ 23:30) (18 - 19)  SpO2: 96% (04-28-25 @ 23:30) (96% - 96%)      PHYSICAL EXAM:  GENERAL: NAD, well-groomed, well-developed  HEAD:  Atraumatic, Normocephalic  EYES: EOMI, PERRLA, conjunctiva and sclera clear  ENMT:  Moist mucous membranes  NECK: Supple,  CHEST/LUNG: Clear to auscultation bilaterally  HEART: Regular rate and rhythm  ABDOMEN: Soft, distended; Bowel sounds present  NEURO:  MENTAL STATUS: AAOx3  EXTREMITIES: No LE edema, no calf tenderness  LYMPH: No lymphadenopathy noted  SKIN: No rashes or lesions         LABS             9.8    5.39  )-----------( 135      ( 04-28-25 @ 08:11 )             29.8     134  |  96  |  17  -------------------------<  105   04-28-25 @ 08:11  3.8  |  26  |  0.8    Ca      9.0     04-28-25 @ 08:11  Mg     2.3     04-28-25 @ 08:11          Urinalysis Basic - ( 28 Apr 2025 08:11 )    Color: x / Appearance: x / SG: x / pH: x  Gluc: 105 mg/dL / Ketone: x  / Bili: x / Urobili: x   Blood: x / Protein: x / Nitrite: x   Leuk Esterase: x / RBC: x / WBC x   Sq Epi: x / Non Sq Epi: x / Bacteria: x          IMAGING      ASSESSMENT AND PLAN:

## 2025-04-29 NOTE — PHYSICAL THERAPY INITIAL EVALUATION ADULT - SIT-TO-STAND BALANCE
No protocol for requested medication.    Medication:   gabapentin (NEURONTIN) 300 MG capsule [Pharmacy Med Name: GABAPENTIN CAP 300MG (NEUR)]   TAKE 1 CAPSULE BY MOUTH AT BEDTIME     Last office visit date: 1/19/24  Pharmacy: [facility] - [address]    Order pended, routed to clinician for review.     Last OV Plan of care: Return in about 1 year (around 1/19/2025) for Medicare Wellness Visit with labs prior,   Last Refill:  11/6/24  Number of Refills Sent:  0  Quantity of Medication Given:  90 day supply     Controlled Substance: Per PDMP last dispensed on:  11/16/24  Medication pended with a start date of: 1/2/25         fair balance

## 2025-04-29 NOTE — DISCHARGE NOTE PROVIDER - HOSPITAL COURSE
60 yo M with a PMH of HTN, hiatal hernia, HLD, CAD s/p CABG in Jan 2024, paroxysmal A. fib not on AC, former smoker (30 pack years, quit 1yr ago), recently diagnosed with colitis and treated during his admission 4/1-4/5 presenting to the ED for evaluation of persistent abdominal pain and weakness x 3 weeks. Patient states he also has lower back pain and endorses chills, states he had a fever 3 days ago. Patient endorses a history of diarrhea that resolved 1 week ago as well as decreased PO intake 2/2 pain with eating. Patient denies chest pain, sob, nausea, vomiting, diarrhea, constipation urinary symptoms.    Of note, patient was admitted 4/3-4/5 for colitis 2/2 abx use on 4/1 (cipro and flagyl x10d). Patient was monitored off abx during this admission.     In the ED:  - Vitals were /78, , RR 18, Temp 98F, SpO2 98% on room air  - Labs were significant for WBC wnl, Hb 11.6 (baseline 12), MCV 79.2, Plt wnl, AG 15, Alk Phos 275, LFTs wnl, lactate wnl, lipase 67; UA: trace ketone, inc specific gravity  - Imaging:       - CXR- Low lung volume. No radiographic evidence of acute cardiopulmonary disease.       - CT abd/pelv w/ IVC- Redemonstrated prominent submucosal fat/bowel wall thickening involving descending colon, possibly related to prior episodes of colitis. Otherwise, no new acute findings in the abdomen or pelvis  - Interventions: Zofran 4mg IVP x1, Morphine 2mg IVP x1, Morphine 4mg IVP x1, Famotidine 20mg IVP x1, NS bolus 1L x1    Pt admitted for abdominal pain to medicine. While admitted pt underwent EGD and biopsy revealed adenocarcinoma mucin, signet cells. Pt had esophageal stent placed 5/1. Pt should follow up outpatient for full cancer workup and treatment. Full A/P below:    #Abdominal Pain 2/2 colitis/erosive gastritis seen on colo  #Adenocarcinoma at GE Junction  - CT abd/pelv w/ IVC- Redemonstrated prominent submucosal fat/bowel wall thickening involving descending colon, possibly related to prior episodes of colitis. Otherwise, no new acute findings in the abdomen or pelvis.  - GGT: 30, amylase 62. lipase 67  - CT chest IV con to rule out metastasis: 1.  No definite evidence of thoracic metastatic disease. 2.  Small bilateral pleural effusions.3.  Marked circumferential thickening of the distal esophagus/hiatal   hernia could be related to provided history of suspected esophageal   cancer versus esophagitis.  - blood cx negative, urine cx negative   - per cardio: may proceed with GI procedures without further cardiac workup. Pt is at moderate risk for a perioperative cardiac event. Ok to hold plavix.    - restarterd plavix 4/29  - EGD Impressions: A friable circumferential mass seen from the GE junction at 38cm to 28 cm of mid esophagus. Multiple biopsies were obtained. NEX powder was sprayed all over the mass to achieve adequate hemostasis. Another 5mm lesion was noted at 20cm from incisors in the proximal esophagus. Erosions in the stomach compatible with erosive gastritis. (Biopsy). A pulsating ulcerated lesion was noted in the fundus. Biopsy was not obtained. Normal mucosa in the whole examined duodenum. (Biopsy).  - Colonoscopy Impressions: Multiple semi-pedunculated polyps were seen in the left colon. A 2cm polyp was seen in the descending colon, biopsy was obtained. Polyps were not removed in the setting of poor prep. External hemorrhoids.Solid stool noted in the whole colon. Cecum was not reached  -path: adenocarcinoma mucin, signet cells  - H/O: consult  PET scan, Surg Onc Dr Yost for possible resection  - f/u outpatient with Dr. Palacios  - Surg/Onc consult: possible esophagus stent  - GI: Esophageal Stent/ EUS 5/1/25  - plan for repeat colonoscopy OP  - diet advanced to puree (max)   - PPI BID for one month per GI  - PT/OT consult for dispo planning and increasing strength. suspect debilitation 2/2 illness    #chronic lower back pain   - xray lumbar spine:  There are five nonrib-bearing lumbar vertebral bodies. The pedicles are visualized. The vertebral body heights and alignment are maintained. There is mild disc space narrowing and degenerative changes throughout the lumbar spine. Mild degenerative change of the bilateral sacroiliac joints.  - pain control     #chronic anemia  - In the ED, Hb 11.6, baseline ~12  - MCV 79.2    #3.8 cm wide R iliac artery aneurysm- stable  - vascular o/p- surgery was scheduled with Dr. Tavera     #HTN  #CAD s/p CABG in Jan 2024  #Paroxysmal A.fib  - c/w home meds  - 5/3- restarted plavix. OK by GI      Discussion of discharge plan of care, including discharge diagnoses, medication reconciliation, and follow-ups was conducted with Dr. Silva on 5/6/25 and discharge was approved.

## 2025-04-30 ENCOUNTER — TRANSCRIPTION ENCOUNTER (OUTPATIENT)
Age: 60
End: 2025-04-30

## 2025-04-30 PROCEDURE — 99232 SBSQ HOSP IP/OBS MODERATE 35: CPT

## 2025-04-30 RX ORDER — OLANZAPINE 10 MG/1
2.5 TABLET ORAL DAILY
Refills: 0 | Status: DISCONTINUED | OUTPATIENT
Start: 2025-04-30 | End: 2025-05-06

## 2025-04-30 RX ORDER — ACETAMINOPHEN 500 MG/5ML
650 LIQUID (ML) ORAL EVERY 6 HOURS
Refills: 0 | Status: DISCONTINUED | OUTPATIENT
Start: 2025-04-30 | End: 2025-05-06

## 2025-04-30 RX ADMIN — Medication 1 GRAM(S): at 12:26

## 2025-04-30 RX ADMIN — Medication 1 GRAM(S): at 18:56

## 2025-04-30 RX ADMIN — Medication 1 TABLET(S): at 18:56

## 2025-04-30 RX ADMIN — Medication 1 GRAM(S): at 05:27

## 2025-04-30 RX ADMIN — Medication 1 TABLET(S): at 13:11

## 2025-04-30 RX ADMIN — Medication 650 MILLIGRAM(S): at 18:56

## 2025-04-30 RX ADMIN — Medication 20 MILLIGRAM(S): at 12:26

## 2025-04-30 RX ADMIN — Medication 1 TABLET(S): at 07:59

## 2025-04-30 RX ADMIN — Medication 1 GRAM(S): at 00:07

## 2025-04-30 RX ADMIN — DRONABINOL 2.5 MILLIGRAM(S): 10 CAPSULE ORAL at 12:26

## 2025-04-30 RX ADMIN — ENOXAPARIN SODIUM 40 MILLIGRAM(S): 100 INJECTION SUBCUTANEOUS at 08:00

## 2025-04-30 RX ADMIN — Medication 1 TABLET(S): at 12:26

## 2025-04-30 RX ADMIN — ATORVASTATIN CALCIUM 80 MILLIGRAM(S): 80 TABLET, FILM COATED ORAL at 21:46

## 2025-04-30 RX ADMIN — SODIUM CHLORIDE 100 MILLILITER(S): 9 INJECTION, SOLUTION INTRAVENOUS at 08:01

## 2025-04-30 RX ADMIN — Medication 40 MILLIGRAM(S): at 06:12

## 2025-04-30 RX ADMIN — CLOPIDOGREL BISULFATE 75 MILLIGRAM(S): 75 TABLET, FILM COATED ORAL at 12:26

## 2025-04-30 RX ADMIN — Medication 1 APPLICATION(S): at 12:27

## 2025-04-30 RX ADMIN — METOPROLOL SUCCINATE 12.5 MILLIGRAM(S): 50 TABLET, EXTENDED RELEASE ORAL at 18:57

## 2025-04-30 RX ADMIN — METOPROLOL SUCCINATE 12.5 MILLIGRAM(S): 50 TABLET, EXTENDED RELEASE ORAL at 05:27

## 2025-04-30 NOTE — DISCHARGE NOTE NURSING/CASE MANAGEMENT/SOCIAL WORK - FINANCIAL ASSISTANCE
[facility] provides services at a reduced cost to those who are determined to be eligible through [facility] [employer]’s financial assistance program. Information regarding [facility] [employer]’s financial assistance program can be found by going to [email or web address] or by calling [phone].

## 2025-04-30 NOTE — PROGRESS NOTE ADULT - SUBJECTIVE AND OBJECTIVE BOX
JONAS CELESTIN 59y Male  MRN#: [id number]     Hospital Day: 12d      SUBJECTIVE  No acute events overnight, pt seen and examined this morning.                                          ----------------------------------------------------------  OBJECTIVE  PAST MEDICAL & SURGICAL HISTORY  HTN (hypertension)    Hiatal hernia    HLD (hyperlipidemia)    CAD (coronary artery disease)    Paroxysmal atrial fibrillation    History of colitis    S/P CABG (coronary artery bypass graft)                                              -----------------------------------------------------------  ALLERGIES:  penicillins (Unknown)                                            ------------------------------------------------------------    HOME MEDICATIONS  Home Medications:  clopidogrel 75 mg oral tablet: 1 tab(s) orally once a day (04 Apr 2025 00:42)  Lipitor 80 mg oral tablet: 1 tab(s) orally once a day (04 Apr 2025 00:42)  Metoprolol Tartrate 25 mg oral tablet: 0.5 tab(s) orally 2 times a day (04 Apr 2025 00:42)  Multiple Vitamins oral tablet, chewable: 1 tab(s) orally once a day (06 Apr 2025 12:34)  omeprazole 40 mg oral delayed release capsule: 1 cap(s) orally once a day (04 Apr 2025 00:41)  Pepcid 20 mg oral tablet: 1 tab(s) orally once a day (04 Apr 2025 00:42)                           MEDICATIONS:  STANDING MEDICATIONS  atorvastatin 80 milliGRAM(s) Oral at bedtime  chlorhexidine 2% Cloths 1 Application(s) Topical daily  clopidogrel Tablet 75 milliGRAM(s) Oral daily  dronabinol 2.5 milliGRAM(s) Oral daily  enoxaparin Injectable 40 milliGRAM(s) SubCutaneous every 24 hours  famotidine    Tablet 20 milliGRAM(s) Oral daily  lactated ringers. 1000 milliLiter(s) IV Continuous <Continuous>  lactobacillus acidophilus 1 Tablet(s) Oral three times a day with meals  lidocaine   4% Patch 1 Patch Transdermal every 24 hours  metoprolol tartrate 12.5 milliGRAM(s) Oral two times a day  multivitamin  Chewable 1 Tablet(s) Oral daily  pantoprazole    Tablet 40 milliGRAM(s) Oral before breakfast  sucralfate 1 Gram(s) Oral four times a day    PRN MEDICATIONS  morphine  - Injectable 2 milliGRAM(s) IV Push every 6 hours PRN  ondansetron Injectable 4 milliGRAM(s) IV Push every 8 hours PRN  oxycodone    5 mG/acetaminophen 325 mG 1 Tablet(s) Oral every 4 hours PRN  simethicone 80 milliGRAM(s) Chew every 6 hours PRN                                            ------------------------------------------------------------  VITAL SIGNS: Last 24 Hours  T(C): 36.2 (30 Apr 2025 08:00), Max: 36.7 (29 Apr 2025 15:26)  T(F): 97.2 (30 Apr 2025 08:00), Max: 98.1 (29 Apr 2025 15:26)  HR: 74 (30 Apr 2025 08:00) (74 - 85)  BP: 111/68 (30 Apr 2025 08:00) (101/64 - 125/65)  BP(mean): --  RR: 18 (30 Apr 2025 08:00) (18 - 18)  SpO2: 97% (30 Apr 2025 08:00) (96% - 97%)      04-29-25 @ 07:01  -  04-30-25 @ 07:00  --------------------------------------------------------  IN: 360 mL / OUT: 500 mL / NET: -140 mL                                             --------------------------------------------------------------  LABS:                        10.1   5.46  )-----------( 138      ( 29 Apr 2025 08:14 )             30.3     04-29    135  |  96[L]  |  17  ----------------------------<  92  4.3   |  34[H]  |  0.8    Ca    9.5      29 Apr 2025 08:14    TPro  6.1  /  Alb  3.4[L]  /  TBili  1.0  /  DBili  x   /  AST  53[H]  /  ALT  36  /  AlkPhos  295[H]  04-29      Urinalysis Basic - ( 29 Apr 2025 08:14 )    Color: x / Appearance: x / SG: x / pH: x  Gluc: 92 mg/dL / Ketone: x  / Bili: x / Urobili: x   Blood: x / Protein: x / Nitrite: x   Leuk Esterase: x / RBC: x / WBC x   Sq Epi: x / Non Sq Epi: x / Bacteria: x                                                            -------------------------------------------------------------  RADIOLOGY:  CXR      CT                                            --------------------------------------------------------------    PHYSICAL EXAM:  GENERAL: NAD, lying in bed comfortably  CHEST/LUNG: Clear to auscultation bilaterally; No rales, rhonchi, wheezing, or rubs. Unlabored respirations  HEART: Regular rate and rhythm; No murmurs, rubs, or gallops  ABDOMEN: Soft, nontender, nondistended  EXTREMITIES:  No clubbing, cyanosis, or edema  NERVOUS SYSTEM:  A&Ox3

## 2025-04-30 NOTE — PROGRESS NOTE ADULT - ASSESSMENT
58 yo M with a PMH of HTN, hiatal hernia, HLD, CAD s/p CABG in Jan 2024, paroxysmal A. fib on plavix, former smoker (30 pack years), recently diagnosed with colitis and treated during his admission 4/1-4/5 presenting to the ED for evaluation of persistent abdominal pain and weakness x 3 weeks. Patient states he also has lower back pain and endorses chills, states he had a fever 3 days ago. Patient denies chest pain, sob, nausea, vomiting, diarrhea, urinary symptoms.    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 Otherwise, no new acute findings in the abdomen or pelvis.  - GGT: 30, amylase 62. lipase 67  - CT chest IV con to rule out metastasis: 1.  No definite evidence of thoracic metastatic disease. 2.  Small bilateral pleural effusions.3.  Marked circumferential thickening of the distal esophagus/hiatal   hernia could be related to provided history of suspected esophageal   cancer versus esophagitis.  - blood cx negative , urine cx negative   - per cardio: may proceed with GI procedures without further cardiac workup. Pt is at moderate risk for a perioperative cardiac event. Ok to hold plavix.    - restarterd plavix 4/29  - EGD Impressions: A friable circumferential mass seen from the GE junction at 38cm to 28 cm of mid esophagus. Multiple biopsies were obtained. NEX powder was sprayed all over the mass to achieve adequate hemostasis. Another 5mm lesion was noted at 20cm from incisors in the proximal esophagus. Erosions in the stomach compatible with erosive gastritis. (Biopsy). A pulsating ulcerated lesion was noted in the fundus. Biopsy was not obtained. Normal mucosa in the whole examined duodenum. (Biopsy).  - Colonoscopy Impressions: Multiple semi-pedunculated polyps were seen in the left colon. A 2cm polyp was seen in the descending colon, biopsy was obtained. Polyps were not removed in the setting of poor prep. External hemorrhoids.Solid stool noted in the whole colon. Cecum was not reached  -path: adenocarcinoma mucin, signet cells  -H/O: consult  PET scan, Surg Onc Dr Yost for possible resection  -Surg/Onc consult  - plan for repeat colonoscopy OP  - diet: full liquid , started Marinol appetite stimulant     #chronic lower back pain   - xray lumbar spine:  There are five nonrib-bearing lumbar vertebral bodies. The pedicles are   visualized. The vertebral body heights and alignment are maintained.   There is mild disc space narrowing and degenerative changes throughout   the lumbar spine. Mild degenerative change of the bilateral sacroiliac joints.  - pain control w/ morphine     #chronic anemia  - In the ED, Hb 11.6, baseline ~12  - MCV 79.2    #3.8 cm wide R iliac artery aneurysm- stable  - vascular o/p- surgery was scheduled with Dr. Tavera     #HTN  #CAD s/p CABG in Jan 2024  #Paroxysmal A.fib  - c/w home meds  - restarted plavix 4/29    DVT ppx: lovenox  GI ppx: protonix  Diet: DASH  Activity: as tolerated  Pending: pending h/onc, surg onc

## 2025-04-30 NOTE — PROGRESS NOTE ADULT - ASSESSMENT
60 yo M with a PMH of HTN, hiatal hernia, HLD, CAD s/p CABG in Jan 2024, paroxysmal A. fib on plavix, former smoker (30 pack years), recently diagnosed with colitis and treated during his admission 4/1-4/5 presenting to the ED for evaluation of persistent abdominal pain and weakness x 3 weeks. Patient states he also has lower back pain and endorses chills, states he had a fever 3 days ago. Patient denies chest pain, sob, nausea, vomiting, diarrhea, urinary symptoms.    #New GE junction mass-esophageal adenocarcinoma    - pathology resulted - adenocarcinoma  - CT abd/pelv w/ IVC- Re-demonstrated prominent submucosal fat/bowel wall thickening involving descending colon, possibly related to prior episodes of colitis. Otherwise, no new acute findings in the abdomen or pelvis.  - s/p EGD/Colonoscopy on 4/25 -   EGD Impressions:  	A friable circumferential mass seen from the GE junction at 38cm to 28 cm of mid esophagus. Multiple biopsies were obtained. NEX powder was sprayed all over the mass to achieve adequate hemostasis. Another     5mm lesion was noted at 20cm from incisors in the proximal esophagus.  	Erosions in the stomach compatible with erosive gastritis. (Biopsy).  	A pulsating ulcerated lesion was noted in the fundus. Biopsy was not obtained.  	Normal mucosa in the whole examined duodenum. (Biopsy).    Colonoscopy Impressions:  	Multiple semi-pedunculated polyps were seen in the left colon. A 2cm polyp was seen in the descending colon, biopsy was obtained. Polyps were not removed in the setting of poor prep.  	External hemorrhoids.  	Solid stool noted in the whole colon. Cecum was not reached- holding plavix since 4/21   - GI plans on repeating C-Scope as outpatient - patient will be given an monika by GI  - CT chest with IV contrast to r/o mets - no evidence of mets  - oncology eval apprecaited-get surg/onc eval, otherwise will be outpt PET and outpt treatment   - Pain control  - added miralax q12 and senna qhs  - last BM 4/25 - encourage bowel regimen-though pt not really eating   - added marinol 4/29  - continue zofran prn   - blood cx negative   - urine cx negative  -PT eval-home PT    -on full liquid diet, really not eating much, has no appetite, marrinol only started yesterday, only ate some broth, random liquids sips and a little bit of ice cream yesterday. Today 4/30 I had pt try ensure-he liked it-Changed diet to add ensure 3 cans each meal, I told him to keep sipping throughout the day as he gets cramps and nausea when having too much at once-which will unfortunately be difficult to control with medications given that the mass is at the GE junction, Get nutrition eval, need to have proper nutrition plan on dc in order for pt to maintain some level of strength and weight in anticipation for chemo outpt   -Also start olanzapine 2.5 daily as rec by GI and onc for appetite stimulation   -c/w pantoprazole, pepcid, carafate, and simethicone     #orthostatic hypotension  -c/w IVF, is not drinking enough as above, will improve if increased po intake     #Chronic Anemia  - In the ED, Hb 11.6, baseline ~12  - MCV 79.2    #3.8 cm wide R iliac artery aneurysm- stable  - F/u vascular o/p- surgery scheduled with Dr. Tavera, was scheduled for 4/21/2025?    #HTN with borderline BP  #CAD s/p CABG in Jan 2024  #Paroxysmal A.fib - not on AC?  - C/w home meds  - cleared by GI 4/28 to resume plavix    #Acute on chronic back pain  - etiology?  - LS spine xray- mild degenerative changes  - added morphine  - added lidoderm patch    Handoff: surg/onc eval, nutrition eval, add zyprexa for appetitte, hopefully 24-48   Discussed in detail with pt and wife at bedside

## 2025-04-30 NOTE — DISCHARGE NOTE NURSING/CASE MANAGEMENT/SOCIAL WORK - PATIENT PORTAL LINK FT
You can access the FollowMyHealth Patient Portal offered by [facility] by registering at the following website: [email or web address]. By joining [facility]’s FollowMyHealth portal, you will also be able to view your health information using other applications (apps) compatible with our system.

## 2025-04-30 NOTE — CONSULT NOTE ADULT - SUBJECTIVE AND OBJECTIVE BOX
THORACIC SURGERY CONSULT NOTE    Patient: JONAS CELESTIN , 59y ([birth year])Male   MRN: [id number]  Location: [address]  Visit: 04-18-25 Inpatient  Date: 04-30-25 @ 17:37    HPI:  58 yo M with a PMH of HTN, hiatal hernia, HLD, CAD s/p CABG in Jan 2024, paroxysmal A. fib not on AC, former smoker (30 pack years, quit 1yr ago), recently diagnosed with colitis and treated during his admission 4/1-4/5 presenting to the ED for evaluation of persistent abdominal pain and weakness x 3 weeks. Patient states he also has lower back pain and endorses chills, states he had a fever 3 days ago. Patient endorses a history of diarrhea that resolved 1 week ago as well as decreased PO intake 2/2 pain with eating. Patient denies chest pain, sob, nausea, vomiting, diarrhea, constipation urinary symptoms.    Of note, patient was admitted 4/3-4/5 for colitis 2/2 abx use on 4/1 (cipro and flagyl x10d). Patient was monitored off abx during this admission.     In the ED:  - Vitals were /78, , RR 18, Temp 98F, SpO2 98% on room air  - Labs were significant for WBC wnl, Hb 11.6 (baseline 12), MCV 79.2, Plt wnl, AG 15, Alk Phos 275, LFTs wnl, lactate wnl, lipase 67; UA: trace ketone, inc specific gravity  - Imaging:       - CXR- Low lung volume. No radiographic evidence of acute cardiopulmonary disease.       - CT abd/pelv w/ IVC- Redemonstrated prominent submucosal fat/bowel wall thickening involving descending colon, possibly related to prior episodes of colitis. Otherwise, no new acute findings in the abdomen or pelvis  - Interventions: Zofran 4mg IVP x1, Morphine 2mg IVP x1, Morphine 4mg IVP x1, Famotidine 20mg IVP x1, NS bolus 1L x1  Admitted for abdominal pain. (18 Apr 2025 23:49)    ---  THORACIC:  Patient admitted 4/18 with abdominal pain, poor PO intake 2/2 dyphagia initially to solids now also to liquids, and weight loss. CT chest showed thickening of the distal esophagus concerning for stricture ? malignancy. Since being here in the hospital the patient has had workup by GI, went for EGD which showed esophageal mass 28-38cm from GE junction as well as proximal esophageal mass and ulcerated pulsatile mass in gastric fundus. Biopsy came back positive for adenocarcinoma with signet cell features. Patient has otherwise been stable, on CLD and ensure supplementation.     Thoracic surgery consulted for evaluation of esophageal mass.       PAST MEDICAL & SURGICAL HISTORY:  HTN (hypertension)      Hiatal hernia      HLD (hyperlipidemia)      CAD (coronary artery disease)      Paroxysmal atrial fibrillation      History of colitis      S/P CABG (coronary artery bypass graft)          Home Medications:  clopidogrel 75 mg oral tablet: 1 tab(s) orally once a day (04 Apr 2025 00:42)  Lipitor 80 mg oral tablet: 1 tab(s) orally once a day (04 Apr 2025 00:42)  Metoprolol Tartrate 25 mg oral tablet: 0.5 tab(s) orally 2 times a day (04 Apr 2025 00:42)  Multiple Vitamins oral tablet, chewable: 1 tab(s) orally once a day (06 Apr 2025 12:34)  omeprazole 40 mg oral delayed release capsule: 1 cap(s) orally once a day (04 Apr 2025 00:41)  Pepcid 20 mg oral tablet: 1 tab(s) orally once a day (04 Apr 2025 00:42)        VITALS:  T(F): 97.8 (04-30-25 @ 15:16), Max: 97.8 (04-29-25 @ 23:15)  HR: 81 (04-30-25 @ 15:16) (74 - 81)  BP: 104/59 (04-30-25 @ 15:16) (104/59 - 118/81)  RR: 18 (04-30-25 @ 15:16) (18 - 18)  SpO2: 96% (04-30-25 @ 15:16) (96% - 97%)    PHYSICAL EXAM:    General: well appearing, no acute distress  HEENT: pupils equal and reactive, EOM intact, mucous membranes moist, no scleral icterus  CV: RRR on radial exam  Pulm: breathing well on room air, no acute respiratory distress  Abdomen: soft, nontender, no rebound or guarding. No palpable masses.  LYMPH: no enlarged lymph nodes appreciated in axilla, supraclavicular, cervical chains.  Ext: well perfused, no peripheral edema, no ROM or strength deficits  Neuro: alert and oriented x3, no focal sensory/motor deficits    MEDICATIONS  (STANDING):  atorvastatin 80 milliGRAM(s) Oral at bedtime  chlorhexidine 2% Cloths 1 Application(s) Topical daily  clopidogrel Tablet 75 milliGRAM(s) Oral daily  dronabinol 2.5 milliGRAM(s) Oral daily  enoxaparin Injectable 40 milliGRAM(s) SubCutaneous every 24 hours  famotidine    Tablet 20 milliGRAM(s) Oral daily  lactated ringers. 1000 milliLiter(s) (100 mL/Hr) IV Continuous <Continuous>  lactobacillus acidophilus 1 Tablet(s) Oral three times a day with meals  lidocaine   4% Patch 1 Patch Transdermal every 24 hours  metoprolol tartrate 12.5 milliGRAM(s) Oral two times a day  multivitamin  Chewable 1 Tablet(s) Oral daily  OLANZapine 2.5 milliGRAM(s) Oral daily  pantoprazole    Tablet 40 milliGRAM(s) Oral before breakfast  sucralfate 1 Gram(s) Oral four times a day    MEDICATIONS  (PRN):  morphine  - Injectable 2 milliGRAM(s) IV Push every 6 hours PRN Severe Pain (7 - 10)  ondansetron Injectable 4 milliGRAM(s) IV Push every 8 hours PRN Nausea and/or Vomiting  oxycodone    5 mG/acetaminophen 325 mG 1 Tablet(s) Oral every 4 hours PRN Severe Pain (7 - 10)  simethicone 80 milliGRAM(s) Chew every 6 hours PRN Gas      LAB/STUDIES:                        10.1   5.46  )-----------( 138      ( 29 Apr 2025 08:14 )             30.3     04-29    135  |  96[L]  |  17  ----------------------------<  92  4.3   |  34[H]  |  0.8    Ca    9.5      29 Apr 2025 08:14    TPro  6.1  /  Alb  3.4[L]  /  TBili  1.0  /  DBili  x   /  AST  53[H]  /  ALT  36  /  AlkPhos  295[H]  04-29      LIVER FUNCTIONS - ( 29 Apr 2025 08:14 )  Alb: 3.4 g/dL / Pro: 6.1 g/dL / ALK PHOS: 295 U/L / ALT: 36 U/L / AST: 53 U/L / GGT: x           Urinalysis Basic - ( 29 Apr 2025 08:14 )    Color: x / Appearance: x / SG: x / pH: x  Gluc: 92 mg/dL / Ketone: x  / Bili: x / Urobili: x   Blood: x / Protein: x / Nitrite: x   Leuk Esterase: x / RBC: x / WBC x   Sq Epi: x / Non Sq Epi: x / Bacteria: x                  IMAGING:    < from: CT Chest w/ IV Cont (04.26.25 @ 11:19) >    IMPRESSION:  1.  No definite evidence of thoracic metastatic disease.  2.  Small bilateral pleural effusions.  3.  Marked circumferential thickening of the distal esophagus/hiatal   hernia could be related to provided history of suspected esophageal   cancer versus esophagitis.        --- End of Report ---        < end of copied text >    ACCESS DEVICES:  [ ] Peripheral IV  [ ] Central Venous Line	[ ] R	[ ] L	[ ] IJ	[ ] Fem	[ ] SC	Placed:   [ ] Arterial Line		[ ] R	[ ] L	[ ] Fem	[ ] Rad	[ ] Ax	Placed:   [ ] PICC:					[ ] Mediport  [ ] Urinary Catheter, Date Placed:     ASSESSMENT:  59yM w/ PMHx of  HTN, hiatal hernia, HLD, CAD s/p CABG in Jan 2024, paroxysmal A. fib not on AC, former smoker (30 pack years, quit 1yr ago), colitis presenting with dysphagia. EGD shows esophageal narrowing with distal thoracic esophageal mass. Thoracic surgery consulted for evaluation    PLAN:  - NPO @ midnight, will add on for esophageal stenting for tomorrow to improve PO intake in interim, then likely surgery as outpatient +/- neoadjuvant chemotherapy, pending further workup and evaluation  - recommend oncology consult for recommendations  - pain control PRN  - continue IVF     Discussed with thoracic surgery attending Dr. Nacho Serrano    CONSULT SPECTRA: [id number]

## 2025-04-30 NOTE — PROGRESS NOTE ADULT - SUBJECTIVE AND OBJECTIVE BOX
SUBJECTIVE/OVERNIGHT EVENTS  Today is hospital day 12d. This morning patient was seen and examined at bedside, resting comfortably in bed.  Patient had diarrhea overnight. Patient took Miralax yesterday. Patient appetite has not improved much         MEDICATIONS  STANDING MEDICATIONS  atorvastatin 80 milliGRAM(s) Oral at bedtime  chlorhexidine 2% Cloths 1 Application(s) Topical daily  clopidogrel Tablet 75 milliGRAM(s) Oral daily  dronabinol 2.5 milliGRAM(s) Oral daily  enoxaparin Injectable 40 milliGRAM(s) SubCutaneous every 24 hours  famotidine    Tablet 20 milliGRAM(s) Oral daily  lactated ringers. 1000 milliLiter(s) IV Continuous <Continuous>  lactobacillus acidophilus 1 Tablet(s) Oral three times a day with meals  lidocaine   4% Patch 1 Patch Transdermal every 24 hours  metoprolol tartrate 12.5 milliGRAM(s) Oral two times a day  multivitamin  Chewable 1 Tablet(s) Oral daily  pantoprazole    Tablet 40 milliGRAM(s) Oral before breakfast  polyethylene glycol 3350 17 Gram(s) Oral two times a day  senna 2 Tablet(s) Oral at bedtime  sucralfate 1 Gram(s) Oral four times a day    PRN MEDICATIONS  morphine  - Injectable 2 milliGRAM(s) IV Push every 6 hours PRN  ondansetron Injectable 4 milliGRAM(s) IV Push every 8 hours PRN  oxycodone    5 mG/acetaminophen 325 mG 1 Tablet(s) Oral every 4 hours PRN  simethicone 80 milliGRAM(s) Chew every 6 hours PRN    VITALS  T(F): 97.8 (04-29-25 @ 23:15), Max: 98.1 (04-29-25 @ 15:26)  HR: 79 (04-30-25 @ 04:35) (78 - 85)  BP: 118/81 (04-30-25 @ 04:35) (101/64 - 125/65)  RR: 18 (04-29-25 @ 23:15) (18 - 18)  SpO2: 97% (04-29-25 @ 23:15) (96% - 97%)      PHYSICAL EXAM:  GENERAL: NAD, well-groomed, well-developed  HEAD:  Atraumatic, Normocephalic  EYES: EOMI, PERRLA, conjunctiva and sclera clear  ENMT:  Moist mucous membranes  NECK: Supple   CHEST/LUNG: Clear    HEART: Regular rate and rhythm  ABDOMEN: Soft,tender  istended; Bowel sounds present  NEURO:  MENTAL STATUS: AAOx3  EXTREMITIES: No LE edema, no calf tenderness  LYMPH: No lymphadenopathy noted  SKIN: No rashes or lesions         LABS             10.1   5.46  )-----------( 138      ( 04-29-25 @ 08:14 )             30.3     135  |  96  |  17  -------------------------<  92   04-29-25 @ 08:14  4.3  |  34  |  0.8    Ca      9.5     04-29-25 @ 08:14    TPro  6.1  /  Alb  3.4  /  TBili  1.0  /  DBili  x   /  AST  53  /  ALT  36  /  AlkPhos  295  /  GGT  x     04-29-25 @ 08:14        Urinalysis Basic - ( 29 Apr 2025 08:14 )    Color: x / Appearance: x / SG: x / pH: x  Gluc: 92 mg/dL / Ketone: x  / Bili: x / Urobili: x   Blood: x / Protein: x / Nitrite: x   Leuk Esterase: x / RBC: x / WBC x   Sq Epi: x / Non Sq Epi: x / Bacteria: x          IMAGING      ASSESSMENT AND PLAN:

## 2025-05-01 ENCOUNTER — RESULT REVIEW (OUTPATIENT)
Age: 60
End: 2025-05-01

## 2025-05-01 ENCOUNTER — TRANSCRIPTION ENCOUNTER (OUTPATIENT)
Age: 60
End: 2025-05-01

## 2025-05-01 LAB
ANION GAP SERPL CALC-SCNC: 14 MMOL/L — SIGNIFICANT CHANGE UP (ref 7–14)
APTT BLD: 40.4 SEC — HIGH (ref 27–39.2)
BUN SERPL-MCNC: 14 MG/DL — SIGNIFICANT CHANGE UP (ref 10–20)
CALCIUM SERPL-MCNC: 9.4 MG/DL — SIGNIFICANT CHANGE UP (ref 8.4–10.5)
CHLORIDE SERPL-SCNC: 97 MMOL/L — LOW (ref 98–110)
CO2 SERPL-SCNC: 26 MMOL/L — SIGNIFICANT CHANGE UP (ref 17–32)
CREAT SERPL-MCNC: 0.8 MG/DL — SIGNIFICANT CHANGE UP (ref 0.7–1.5)
EGFR: 101 ML/MIN/1.73M2 — SIGNIFICANT CHANGE UP
EGFR: 101 ML/MIN/1.73M2 — SIGNIFICANT CHANGE UP
GLUCOSE SERPL-MCNC: 96 MG/DL — SIGNIFICANT CHANGE UP (ref 70–99)
HCT VFR BLD CALC: 29.9 % — LOW (ref 42–52)
HGB BLD-MCNC: 9.7 G/DL — LOW (ref 14–18)
INR BLD: 1.5 RATIO — HIGH (ref 0.65–1.3)
MCHC RBC-ENTMCNC: 25.5 PG — LOW (ref 27–31)
MCHC RBC-ENTMCNC: 32.4 G/DL — SIGNIFICANT CHANGE UP (ref 32–37)
MCV RBC AUTO: 78.5 FL — LOW (ref 80–94)
NRBC BLD AUTO-RTO: 2 /100 WBCS — HIGH (ref 0–0)
PLATELET # BLD AUTO: 123 K/UL — LOW (ref 130–400)
PMV BLD: 9.8 FL — SIGNIFICANT CHANGE UP (ref 7.4–10.4)
POTASSIUM SERPL-MCNC: 4.2 MMOL/L — SIGNIFICANT CHANGE UP (ref 3.5–5)
POTASSIUM SERPL-SCNC: 4.2 MMOL/L — SIGNIFICANT CHANGE UP (ref 3.5–5)
PROTHROM AB SERPL-ACNC: 17.9 SEC — HIGH (ref 9.95–12.87)
RBC # BLD: 3.81 M/UL — LOW (ref 4.7–6.1)
RBC # FLD: 14.5 % — SIGNIFICANT CHANGE UP (ref 11.5–14.5)
SODIUM SERPL-SCNC: 137 MMOL/L — SIGNIFICANT CHANGE UP (ref 135–146)
WBC # BLD: 4.85 K/UL — SIGNIFICANT CHANGE UP (ref 4.8–10.8)
WBC # FLD AUTO: 4.85 K/UL — SIGNIFICANT CHANGE UP (ref 4.8–10.8)

## 2025-05-01 PROCEDURE — 88305 TISSUE EXAM BY PATHOLOGIST: CPT | Mod: 26

## 2025-05-01 PROCEDURE — 88342 IMHCHEM/IMCYTCHM 1ST ANTB: CPT | Mod: 26

## 2025-05-01 PROCEDURE — 43239 EGD BIOPSY SINGLE/MULTIPLE: CPT | Mod: XU

## 2025-05-01 PROCEDURE — 93010 ELECTROCARDIOGRAM REPORT: CPT

## 2025-05-01 PROCEDURE — 43266 EGD ENDOSCOPIC STENT PLACE: CPT | Mod: XU

## 2025-05-01 PROCEDURE — 88312 SPECIAL STAINS GROUP 1: CPT | Mod: 26

## 2025-05-01 PROCEDURE — 88360 TUMOR IMMUNOHISTOCHEM/MANUAL: CPT | Mod: 26,59

## 2025-05-01 PROCEDURE — 88341 IMHCHEM/IMCYTCHM EA ADD ANTB: CPT | Mod: 26

## 2025-05-01 PROCEDURE — 99232 SBSQ HOSP IP/OBS MODERATE 35: CPT

## 2025-05-01 PROCEDURE — 43259 EGD US EXAM DUODENUM/JEJUNUM: CPT | Mod: XU

## 2025-05-01 RX ORDER — DIPHENHYDRAMINE HCL 12.5MG/5ML
25 ELIXIR ORAL ONCE
Refills: 0 | Status: COMPLETED | OUTPATIENT
Start: 2025-05-01 | End: 2025-05-01

## 2025-05-01 RX ORDER — SCOPOLAMINE 1 MG/3D
1 PATCH, EXTENDED RELEASE TRANSDERMAL ONCE
Refills: 0 | Status: COMPLETED | OUTPATIENT
Start: 2025-05-01 | End: 2025-05-01

## 2025-05-01 RX ORDER — SODIUM CHLORIDE 9 G/1000ML
1000 INJECTION, SOLUTION INTRAVENOUS
Refills: 0 | Status: DISCONTINUED | OUTPATIENT
Start: 2025-05-01 | End: 2025-05-02

## 2025-05-01 RX ORDER — ONDANSETRON HCL/PF 4 MG/2 ML
4 VIAL (ML) INJECTION ONCE
Refills: 0 | Status: COMPLETED | OUTPATIENT
Start: 2025-05-01 | End: 2025-05-01

## 2025-05-01 RX ORDER — ONDANSETRON HCL/PF 4 MG/2 ML
8 VIAL (ML) INJECTION EVERY 8 HOURS
Refills: 0 | Status: DISCONTINUED | OUTPATIENT
Start: 2025-05-02 | End: 2025-05-02

## 2025-05-01 RX ADMIN — Medication 40 MILLIGRAM(S): at 20:01

## 2025-05-01 RX ADMIN — Medication 1 TABLET(S): at 09:33

## 2025-05-01 RX ADMIN — Medication 1 GRAM(S): at 05:52

## 2025-05-01 RX ADMIN — Medication 4 MILLIGRAM(S): at 17:53

## 2025-05-01 RX ADMIN — SCOPOLAMINE 1 PATCH: 1 PATCH, EXTENDED RELEASE TRANSDERMAL at 20:02

## 2025-05-01 RX ADMIN — SODIUM CHLORIDE 75 MILLILITER(S): 9 INJECTION, SOLUTION INTRAVENOUS at 19:54

## 2025-05-01 RX ADMIN — METOPROLOL SUCCINATE 12.5 MILLIGRAM(S): 50 TABLET, EXTENDED RELEASE ORAL at 06:25

## 2025-05-01 RX ADMIN — Medication 40 MILLIGRAM(S): at 05:52

## 2025-05-01 RX ADMIN — Medication 25 MILLIGRAM(S): at 20:01

## 2025-05-01 RX ADMIN — Medication 4 MILLIGRAM(S): at 18:06

## 2025-05-01 RX ADMIN — Medication 1 GRAM(S): at 00:02

## 2025-05-01 NOTE — PROGRESS NOTE ADULT - ASSESSMENT
60 yo M with a PMH of HTN, hiatal hernia, HLD, CAD s/p CABG in Jan 2024, paroxysmal A. fib on plavix, former smoker (30 pack years), recently diagnosed with colitis and treated during his admission 4/1-4/5 presenting to the ED for evaluation of persistent abdominal pain and weakness x 3 weeks. Patient states he also has lower back pain and endorses chills, states he had a fever 3 days ago. Patient denies chest pain, sob, nausea, vomiting, diarrhea, urinary symptoms.    #New GE junction mass-esophageal adenocarcinoma    - pathology resulted - adenocarcinoma with signet ring features   - CT abd/pelv w/ IVC- Re-demonstrated prominent submucosal fat/bowel wall thickening involving descending colon, possibly related to prior episodes of colitis. Otherwise, no new acute findings in the abdomen or pelvis.  - s/p EGD/Colonoscopy on 4/25 -   EGD Impressions:  	A friable circumferential mass seen from the GE junction at 38cm to 28 cm of mid esophagus. Multiple biopsies were obtained. NEX powder was sprayed all over the mass to achieve adequate hemostasis. Another     5mm lesion was noted at 20cm from incisors in the proximal esophagus.  	Erosions in the stomach compatible with erosive gastritis. (Biopsy).  	A pulsating ulcerated lesion was noted in the fundus. Biopsy was not obtained.  	Normal mucosa in the whole examined duodenum. (Biopsy).    Colonoscopy Impressions:  	Multiple semi-pedunculated polyps were seen in the left colon. A 2cm polyp was seen in the descending colon, biopsy was obtained. Polyps were not removed in the setting of poor prep.  	External hemorrhoids.  	Solid stool noted in the whole colon. Cecum was not reached- holding plavix since 4/21   - GI plans on repeating C-Scope as outpatient - patient will be given an monika by GI  - CT chest with IV contrast to r/o mets - no evidence of mets  - oncology eval apprecaited-get surg/onc eval, otherwise will be outpt PET and outpt treatment   - Pain control  - added marinol 4/29  - continue zofran prn   - blood cx negative   - urine cx negative  -PT eval-home PT    -c/w pantoprazole, pepcid, carafate, and simethicone   -4/30: on full liquid diet, really not eating much, has no appetite, marrinol only started yesterday, only ate some broth, random liquids sips and a little bit of ice cream yesterday. Today 4/30 I had pt try ensure-he liked it-Changed diet to add ensure 3 cans each meal, I told him to keep sipping throughout the day as he gets cramps and nausea when having too much at once-which will unfortunately be difficult to control with medications given that the mass is at the GE junction, Get nutrition eval, need to have proper nutrition plan on dc in order for pt to maintain some level of strength and weight in anticipation for chemo outpt. Also start olanzapine 2.5 daily as rec by GI and onc for appetite stimulation   -5/1: appetitte in intake not improved, initially CT surgery planned for esophagreal stent but now pt going with GI for EGD/EUS-with stent placement?-f/u GI/CTS     #orthostatic hypotension  -c/w IVF, is not drinking enough as above, will improve if increased po intake     #Chronic Anemia  - In the ED, Hb 11.6, baseline ~12  - MCV 79.2    #3.8 cm wide R iliac artery aneurysm- stable  - F/u vascular o/p- surgery scheduled with Dr. Tavera, was scheduled for 4/21/2025?    #HTN with borderline BP  #CAD s/p CABG in Jan 2024  #Paroxysmal A.fib - not on AC?  - C/w home meds  - hold plavix for endoscopy     #Acute on chronic back pain  - etiology?  - LS spine xray- mild degenerative changes  - added morphine  - added lidoderm patch    Handoff: EGD/EUS today with GI-stent placement?, then monitor for increased po intake  Discussed in detail with pt and wife at bedside

## 2025-05-01 NOTE — PROGRESS NOTE ADULT - SUBJECTIVE AND OBJECTIVE BOX
GENERAL SURGERY PROGRESS NOTE     SABIJONAS  [address] [place] [address] day :14d    POD:  Procedure:   Surgical Attending: Palmer Serrano  Overnight events: No acute events overnight. Pt denies any nausea or vomiting. Reports only eating small amount of food as he feels full quickly. Pt going with GI for EGD and EUS for staging on 5/1.    T(F): 98.3 (05-01-25 @ 11:47), Max: 98.4 (05-01-25 @ 08:00)  HR: 76 (05-01-25 @ 11:47) (73 - 83)  BP: 110/65 (05-01-25 @ 11:47) (104/59 - 125/73)  ABP: --  ABP(mean): --  RR: 18 (05-01-25 @ 11:47) (18 - 18)  SpO2: 98% (05-01-25 @ 11:47) (96% - 98%)    IN'S / OUT's:    04-30-25 @ 07:01  -  05-01-25 @ 07:00  --------------------------------------------------------  IN:    Oral Fluid: 450 mL  Total IN: 450 mL    OUT:    Voided (mL): 350 mL  Total OUT: 350 mL    Total NET: 100 mL          PHYSICAL EXAM:  GENERAL: NAD  CHEST/LUNG: Equal chest rise b/l, non labored breathing  HEART: Regular rate and rhythm  ABDOMEN: Soft, Nontender, Nondistended; no reound or guarding  EXTREMITIES:  No clubbing, cyanosis, or edema      LABS  Labs:  CAPILLARY BLOOD GLUCOSE                              9.7    4.85  )-----------( 123      ( 01 May 2025 08:05 )             29.9         05-01    137  |  97[L]  |  14  ----------------------------<  96  4.2   |  26  |  0.8      Calcium: 9.4 mg/dL (05-01-25 @ 08:05)      LFTs:         Coags:     17.90  ----< 1.50    ( 01 May 2025 08:05 )     40.4                Urinalysis Basic - ( 01 May 2025 08:05 )    Color: x / Appearance: x / SG: x / pH: x  Gluc: 96 mg/dL / Ketone: x  / Bili: x / Urobili: x   Blood: x / Protein: x / Nitrite: x   Leuk Esterase: x / RBC: x / WBC x   Sq Epi: x / Non Sq Epi: x / Bacteria: x            RADIOLOGY & ADDITIONAL TESTS:      A/P:  JONAS CELESTIN is a 59yM w/ PMHx of  HTN, hiatal hernia, HLD, CAD s/p CABG in Jan 2024, paroxysmal A. fib not on AC, former smoker (30 pack years, quit 1yr ago), colitis presenting with dysphagia. EGD shows esophageal narrowing with distal thoracic esophageal mass. Thoracic surgery consulted for evaluation      PLAN:   - Pt to go with GI for EGD/EUS for staging of mass  - Thoracic surgery plan pending EGD EUS by GI  - Continue DVT and GI ppx  - multi modal pain control  - Rest of care per primary team  - Thoracic Surgery to follow      #Antibiotics:  Day **  #DVT ppx: enoxaparin Injectable 40 milliGRAM(s) SubCutaneous every 24 hours    #GI ppx: famotidine    Tablet 20 milliGRAM(s) Oral daily  pantoprazole    Tablet 40 milliGRAM(s) Oral before breakfast    Disposition:  4B    Above plan to be discussed with Attending Surgeon Dr. Nacho Serrano , patient, patient family, and rest of health care team    Green Spectra [id number]

## 2025-05-01 NOTE — PROGRESS NOTE ADULT - ASSESSMENT
58 yo M with a PMH of HTN, hiatal hernia, HLD, CAD s/p CABG in Jan 2024, paroxysmal A. fib on plavix, former smoker (30 pack years), recently diagnosed with colitis and treated during his admission 4/1-4/5 presenting to the ED for evaluation of persistent abdominal pain and weakness x 3 weeks. Patient states he also has lower back pain and endorses chills, states he had a fever 3 days ago. Patient denies chest pain, sob, nausea, vomiting, diarrhea, urinary symptoms.    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 Otherwise, no new acute findings in the abdomen or pelvis.  - GGT: 30, amylase 62. lipase 67  - CT chest IV con to rule out metastasis: 1.  No definite evidence of thoracic metastatic disease. 2.  Small bilateral pleural effusions.3.  Marked circumferential thickening of the distal esophagus/hiatal   hernia could be related to provided history of suspected esophageal   cancer versus esophagitis.  - blood cx negative , urine cx negative   - per cardio: may proceed with GI procedures without further cardiac workup. Pt is at moderate risk for a perioperative cardiac event. Ok to hold plavix.    - restarterd plavix 4/29  - EGD Impressions: A friable circumferential mass seen from the GE junction at 38cm to 28 cm of mid esophagus. Multiple biopsies were obtained. NEX powder was sprayed all over the mass to achieve adequate hemostasis. Another 5mm lesion was noted at 20cm from incisors in the proximal esophagus. Erosions in the stomach compatible with erosive gastritis. (Biopsy). A pulsating ulcerated lesion was noted in the fundus. Biopsy was not obtained. Normal mucosa in the whole examined duodenum. (Biopsy).  - Colonoscopy Impressions: Multiple semi-pedunculated polyps were seen in the left colon. A 2cm polyp was seen in the descending colon, biopsy was obtained. Polyps were not removed in the setting of poor prep. External hemorrhoids.Solid stool noted in the whole colon. Cecum was not reached  -path: adenocarcinoma mucin, signet cells  -H/O: consult  PET scan, Surg Onc Dr oYst for possible resection  -Surg/Onc consult: possible esophagus stent  -GI: EUS today 5/1/25  - plan for repeat colonoscopy OP  - diet: full liquid , started Marinol appetite stimulant     #chronic lower back pain   - xray lumbar spine:  There are five nonrib-bearing lumbar vertebral bodies. The pedicles are   visualized. The vertebral body heights and alignment are maintained.   There is mild disc space narrowing and degenerative changes throughout   the lumbar spine. Mild degenerative change of the bilateral sacroiliac joints.  - pain control w/ morphine     #chronic anemia  - In the ED, Hb 11.6, baseline ~12  - MCV 79.2    #3.8 cm wide R iliac artery aneurysm- stable  - vascular o/p- surgery was scheduled with Dr. Tavera     #HTN  #CAD s/p CABG in Jan 2024  #Paroxysmal A.fib  - c/w home meds  - PLAVIX held for procedure 5/1/25    DVT ppx: lovenox  GI ppx: protonix  Diet: DASH  Activity: as tolerated  Pending: pending h/onc, surg onc

## 2025-05-01 NOTE — PROGRESS NOTE ADULT - SUBJECTIVE AND OBJECTIVE BOX
JONAS CELESTIN 59y Male  MRN#: [id number]     Hospital Day: 13d      SUBJECTIVE  No acute events overnight, pt seen and examined this morning.                                          ----------------------------------------------------------  OBJECTIVE  PAST MEDICAL & SURGICAL HISTORY  HTN (hypertension)    Hiatal hernia    HLD (hyperlipidemia)    CAD (coronary artery disease)    Paroxysmal atrial fibrillation    History of colitis    S/P CABG (coronary artery bypass graft)                                              -----------------------------------------------------------  ALLERGIES:  penicillins (Unknown)                                            ------------------------------------------------------------    HOME MEDICATIONS  Home Medications:  clopidogrel 75 mg oral tablet: 1 tab(s) orally once a day (04 Apr 2025 00:42)  Lipitor 80 mg oral tablet: 1 tab(s) orally once a day (04 Apr 2025 00:42)  Metoprolol Tartrate 25 mg oral tablet: 0.5 tab(s) orally 2 times a day (04 Apr 2025 00:42)  Multiple Vitamins oral tablet, chewable: 1 tab(s) orally once a day (06 Apr 2025 12:34)  omeprazole 40 mg oral delayed release capsule: 1 cap(s) orally once a day (04 Apr 2025 00:41)  Pepcid 20 mg oral tablet: 1 tab(s) orally once a day (04 Apr 2025 00:42)                           MEDICATIONS:  STANDING MEDICATIONS  atorvastatin 80 milliGRAM(s) Oral at bedtime  chlorhexidine 2% Cloths 1 Application(s) Topical daily  dronabinol 2.5 milliGRAM(s) Oral daily  enoxaparin Injectable 40 milliGRAM(s) SubCutaneous every 24 hours  famotidine    Tablet 20 milliGRAM(s) Oral daily  lactated ringers. 1000 milliLiter(s) IV Continuous <Continuous>  lactobacillus acidophilus 1 Tablet(s) Oral three times a day with meals  lidocaine   4% Patch 1 Patch Transdermal every 24 hours  metoprolol tartrate 12.5 milliGRAM(s) Oral two times a day  multivitamin  Chewable 1 Tablet(s) Oral daily  OLANZapine 2.5 milliGRAM(s) Oral daily  pantoprazole    Tablet 40 milliGRAM(s) Oral before breakfast  sucralfate 1 Gram(s) Oral four times a day    PRN MEDICATIONS  acetaminophen     Tablet .. 650 milliGRAM(s) Oral every 6 hours PRN  morphine  - Injectable 2 milliGRAM(s) IV Push every 6 hours PRN  ondansetron Injectable 4 milliGRAM(s) IV Push every 8 hours PRN  oxycodone    5 mG/acetaminophen 325 mG 1 Tablet(s) Oral every 4 hours PRN  simethicone 80 milliGRAM(s) Chew every 6 hours PRN                                            ------------------------------------------------------------  VITAL SIGNS: Last 24 Hours  T(C): 36.9 (01 May 2025 11:22), Max: 36.9 (01 May 2025 08:00)  T(F): 98.4 (01 May 2025 08:00), Max: 98.4 (01 May 2025 08:00)  HR: 73 (01 May 2025 11:22) (73 - 83)  BP: 125/71 (01 May 2025 11:22) (104/59 - 125/73)  BP(mean): 90 (01 May 2025 11:22) (90 - 90)  RR: 18 (01 May 2025 11:22) (18 - 18)  SpO2: 97% (01 May 2025 11:22) (96% - 97%)      04-30-25 @ 07:01  -  05-01-25 @ 07:00  --------------------------------------------------------  IN: 450 mL / OUT: 350 mL / NET: 100 mL                                             --------------------------------------------------------------  LABS:                        9.7    4.85  )-----------( 123      ( 01 May 2025 08:05 )             29.9     05-01    137  |  97[L]  |  14  ----------------------------<  96  4.2   |  26  |  0.8    Ca    9.4      01 May 2025 08:05      PT/INR - ( 01 May 2025 08:05 )   PT: 17.90 sec;   INR: 1.50 ratio         PTT - ( 01 May 2025 08:05 )  PTT:40.4 sec  Urinalysis Basic - ( 01 May 2025 08:05 )    Color: x / Appearance: x / SG: x / pH: x  Gluc: 96 mg/dL / Ketone: x  / Bili: x / Urobili: x   Blood: x / Protein: x / Nitrite: x   Leuk Esterase: x / RBC: x / WBC x   Sq Epi: x / Non Sq Epi: x / Bacteria: x                                                            -------------------------------------------------------------  RADIOLOGY:  CXR      CT                                            --------------------------------------------------------------    PHYSICAL EXAM:  GENERAL: NAD, lying in bed comfortably  CHEST/LUNG: Clear to auscultation bilaterally; No rales, rhonchi, wheezing, or rubs. Unlabored respirations  HEART: Regular rate and rhythm; No murmurs, rubs, or gallops  ABDOMEN: Soft, nontender, nondistended  EXTREMITIES:  No clubbing, cyanosis, or edema  NERVOUS SYSTEM:  A&Ox3

## 2025-05-01 NOTE — PROGRESS NOTE ADULT - SUBJECTIVE AND OBJECTIVE BOX
SUBJECTIVE/OVERNIGHT EVENTS  Today is hospital day 13d. This morning patient was seen and examined at bedside, resting comfortably in bed. No acute or major events overnight. Patient still reporting some diarrhea.         MEDICATIONS  STANDING MEDICATIONS  atorvastatin 80 milliGRAM(s) Oral at bedtime  chlorhexidine 2% Cloths 1 Application(s) Topical daily  dronabinol 2.5 milliGRAM(s) Oral daily  enoxaparin Injectable 40 milliGRAM(s) SubCutaneous every 24 hours  famotidine    Tablet 20 milliGRAM(s) Oral daily  lactated ringers. 1000 milliLiter(s) IV Continuous <Continuous>  lactobacillus acidophilus 1 Tablet(s) Oral three times a day with meals  lidocaine   4% Patch 1 Patch Transdermal every 24 hours  metoprolol tartrate 12.5 milliGRAM(s) Oral two times a day  multivitamin  Chewable 1 Tablet(s) Oral daily  OLANZapine 2.5 milliGRAM(s) Oral daily  pantoprazole    Tablet 40 milliGRAM(s) Oral before breakfast  sucralfate 1 Gram(s) Oral four times a day    PRN MEDICATIONS  acetaminophen     Tablet .. 650 milliGRAM(s) Oral every 6 hours PRN  morphine  - Injectable 2 milliGRAM(s) IV Push every 6 hours PRN  ondansetron Injectable 4 milliGRAM(s) IV Push every 8 hours PRN  oxycodone    5 mG/acetaminophen 325 mG 1 Tablet(s) Oral every 4 hours PRN  simethicone 80 milliGRAM(s) Chew every 6 hours PRN    VITALS  T(F): 98.4 (05-01-25 @ 08:00), Max: 98.4 (05-01-25 @ 08:00)  HR: 73 (05-01-25 @ 08:00) (73 - 83)  BP: 125/71 (05-01-25 @ 08:00) (104/59 - 125/73)  RR: 18 (05-01-25 @ 08:00) (18 - 18)  SpO2: 97% (05-01-25 @ 08:00) (96% - 97%)      PHYSICAL EXAM:  GENERAL: NAD, well-groomed, well-developed  HEAD:  Atraumatic, Normocephalic  EYES: EOMI, PERRLA, conjunctiva and sclera clear  ENMT:  Moist mucous membranes  NECK: Supple, No JVD,  CHEST/LUNG: Clear to auscultation bilaterally  HEART: Regular rate and rhythm  ABDOMEN: Soft, Nontender, Nondistended; Bowel sounds present  NEURO: AO x3  EXTREMITIES: No LE edema, no calf tenderness  LYMPH: No lymphadenopathy noted  SKIN: No rashes or lesions         LABS             9.7    4.85  )-----------( 123      ( 05-01-25 @ 08:05 )             29.9     137  |  97  |  14  -------------------------<  96   05-01-25 @ 08:05  4.2  |  26  |  0.8    Ca      9.4     05-01-25 @ 08:05      PT/INR - ( 05-01-25 @ 08:05 )   PT: 17.90 sec[H];   INR: 1.50 ratio[H]  PTT - ( 05-01-25 @ 08:05 )  PTT:40.4 sec      Urinalysis Basic - ( 01 May 2025 08:05 )    Color: x / Appearance: x / SG: x / pH: x  Gluc: 96 mg/dL / Ketone: x  / Bili: x / Urobili: x   Blood: x / Protein: x / Nitrite: x   Leuk Esterase: x / RBC: x / WBC x   Sq Epi: x / Non Sq Epi: x / Bacteria: x          IMAGING      ASSESSMENT AND PLAN:

## 2025-05-02 ENCOUNTER — NON-APPOINTMENT (OUTPATIENT)
Age: 60
End: 2025-05-02

## 2025-05-02 DIAGNOSIS — C15.9 MALIGNANT NEOPLASM OF ESOPHAGUS, UNSPECIFIED: ICD-10-CM

## 2025-05-02 PROBLEM — I48.0 PAROXYSMAL ATRIAL FIBRILLATION: Chronic | Status: ACTIVE | Noted: 2025-04-19

## 2025-05-02 PROBLEM — I25.10 ATHEROSCLEROTIC HEART DISEASE OF NATIVE CORONARY ARTERY WITHOUT ANGINA PECTORIS: Chronic | Status: ACTIVE | Noted: 2025-04-19

## 2025-05-02 PROBLEM — E78.5 HYPERLIPIDEMIA, UNSPECIFIED: Chronic | Status: ACTIVE | Noted: 2025-04-19

## 2025-05-02 PROBLEM — K44.9 DIAPHRAGMATIC HERNIA WITHOUT OBSTRUCTION OR GANGRENE: Chronic | Status: ACTIVE | Noted: 2025-04-19

## 2025-05-02 PROBLEM — Z87.19 PERSONAL HISTORY OF OTHER DISEASES OF THE DIGESTIVE SYSTEM: Chronic | Status: ACTIVE | Noted: 2025-04-19

## 2025-05-02 PROBLEM — I10 ESSENTIAL (PRIMARY) HYPERTENSION: Chronic | Status: ACTIVE | Noted: 2025-04-19

## 2025-05-02 LAB
ANION GAP SERPL CALC-SCNC: 11 MMOL/L — SIGNIFICANT CHANGE UP (ref 7–14)
BASOPHILS # BLD AUTO: 0 K/UL — SIGNIFICANT CHANGE UP (ref 0–0.2)
BASOPHILS NFR BLD AUTO: 0 % — SIGNIFICANT CHANGE UP (ref 0–1)
BUN SERPL-MCNC: 13 MG/DL — SIGNIFICANT CHANGE UP (ref 10–20)
CALCIUM SERPL-MCNC: 8.7 MG/DL — SIGNIFICANT CHANGE UP (ref 8.4–10.5)
CHLORIDE SERPL-SCNC: 101 MMOL/L — SIGNIFICANT CHANGE UP (ref 98–110)
CO2 SERPL-SCNC: 24 MMOL/L — SIGNIFICANT CHANGE UP (ref 17–32)
CREAT SERPL-MCNC: 0.8 MG/DL — SIGNIFICANT CHANGE UP (ref 0.7–1.5)
EGFR: 101 ML/MIN/1.73M2 — SIGNIFICANT CHANGE UP
EGFR: 101 ML/MIN/1.73M2 — SIGNIFICANT CHANGE UP
EOSINOPHIL # BLD AUTO: 0.13 K/UL — SIGNIFICANT CHANGE UP (ref 0–0.7)
EOSINOPHIL NFR BLD AUTO: 2.6 % — SIGNIFICANT CHANGE UP (ref 0–8)
GLUCOSE SERPL-MCNC: 89 MG/DL — SIGNIFICANT CHANGE UP (ref 70–99)
HCT VFR BLD CALC: 27.1 % — LOW (ref 42–52)
HGB BLD-MCNC: 8.6 G/DL — LOW (ref 14–18)
LYMPHOCYTES # BLD AUTO: 0.66 K/UL — LOW (ref 1.2–3.4)
LYMPHOCYTES # BLD AUTO: 13.3 % — LOW (ref 20.5–51.1)
MCHC RBC-ENTMCNC: 25.2 PG — LOW (ref 27–31)
MCHC RBC-ENTMCNC: 31.7 G/DL — LOW (ref 32–37)
MCV RBC AUTO: 79.5 FL — LOW (ref 80–94)
MONOCYTES # BLD AUTO: 0.66 K/UL — HIGH (ref 0.1–0.6)
MONOCYTES NFR BLD AUTO: 13.3 % — HIGH (ref 1.7–9.3)
NEUTROPHILS # BLD AUTO: 3.13 K/UL — SIGNIFICANT CHANGE UP (ref 1.4–6.5)
NEUTROPHILS NFR BLD AUTO: 59.3 % — SIGNIFICANT CHANGE UP (ref 42.2–75.2)
NRBC BLD AUTO-RTO: SIGNIFICANT CHANGE UP /100 WBCS (ref 0–0)
PLATELET # BLD AUTO: 107 K/UL — LOW (ref 130–400)
PMV BLD: 9.9 FL — SIGNIFICANT CHANGE UP (ref 7.4–10.4)
POTASSIUM SERPL-MCNC: 4.3 MMOL/L — SIGNIFICANT CHANGE UP (ref 3.5–5)
POTASSIUM SERPL-SCNC: 4.3 MMOL/L — SIGNIFICANT CHANGE UP (ref 3.5–5)
RBC # BLD: 3.41 M/UL — LOW (ref 4.7–6.1)
RBC # FLD: 14.7 % — HIGH (ref 11.5–14.5)
SODIUM SERPL-SCNC: 136 MMOL/L — SIGNIFICANT CHANGE UP (ref 135–146)
WBC # BLD: 4.99 K/UL — SIGNIFICANT CHANGE UP (ref 4.8–10.8)
WBC # FLD AUTO: 4.99 K/UL — SIGNIFICANT CHANGE UP (ref 4.8–10.8)

## 2025-05-02 PROCEDURE — 71045 X-RAY EXAM CHEST 1 VIEW: CPT | Mod: 26

## 2025-05-02 PROCEDURE — 99233 SBSQ HOSP IP/OBS HIGH 50: CPT

## 2025-05-02 RX ORDER — ONDANSETRON HCL/PF 4 MG/2 ML
8 VIAL (ML) INJECTION EVERY 8 HOURS
Refills: 0 | Status: DISCONTINUED | OUTPATIENT
Start: 2025-05-02 | End: 2025-05-06

## 2025-05-02 RX ADMIN — SCOPOLAMINE 1 PATCH: 1 PATCH, EXTENDED RELEASE TRANSDERMAL at 19:00

## 2025-05-02 RX ADMIN — Medication 40 MILLIGRAM(S): at 17:20

## 2025-05-02 RX ADMIN — Medication 40 MILLIGRAM(S): at 05:32

## 2025-05-02 RX ADMIN — Medication 1 GRAM(S): at 17:20

## 2025-05-02 RX ADMIN — ATORVASTATIN CALCIUM 80 MILLIGRAM(S): 80 TABLET, FILM COATED ORAL at 21:29

## 2025-05-02 RX ADMIN — SODIUM CHLORIDE 75 MILLILITER(S): 9 INJECTION, SOLUTION INTRAVENOUS at 08:19

## 2025-05-02 RX ADMIN — Medication 1 APPLICATION(S): at 12:02

## 2025-05-02 RX ADMIN — OLANZAPINE 2.5 MILLIGRAM(S): 10 TABLET ORAL at 12:01

## 2025-05-02 RX ADMIN — Medication 1 GRAM(S): at 12:01

## 2025-05-02 RX ADMIN — METOPROLOL SUCCINATE 12.5 MILLIGRAM(S): 50 TABLET, EXTENDED RELEASE ORAL at 05:31

## 2025-05-02 RX ADMIN — ENOXAPARIN SODIUM 40 MILLIGRAM(S): 100 INJECTION SUBCUTANEOUS at 08:10

## 2025-05-02 RX ADMIN — SCOPOLAMINE 1 PATCH: 1 PATCH, EXTENDED RELEASE TRANSDERMAL at 06:11

## 2025-05-02 RX ADMIN — Medication 1 GRAM(S): at 23:25

## 2025-05-02 RX ADMIN — Medication 1 TABLET(S): at 17:20

## 2025-05-02 RX ADMIN — Medication 1 GRAM(S): at 05:31

## 2025-05-02 RX ADMIN — METOPROLOL SUCCINATE 12.5 MILLIGRAM(S): 50 TABLET, EXTENDED RELEASE ORAL at 17:20

## 2025-05-02 RX ADMIN — Medication 1 TABLET(S): at 12:01

## 2025-05-02 RX ADMIN — DRONABINOL 2.5 MILLIGRAM(S): 10 CAPSULE ORAL at 12:02

## 2025-05-02 RX ADMIN — Medication 8 MILLIGRAM(S): at 01:30

## 2025-05-02 NOTE — PROGRESS NOTE ADULT - ASSESSMENT
60 yo M with a PMH of HTN, hiatal hernia, HLD, CAD s/p CABG in Jan 2024, paroxysmal A. fib on plavix, former smoker (30 pack years), recently diagnosed with colitis and treated during his admission 4/1-4/5 presenting to the ED for evaluation of persistent abdominal pain and weakness x 3 weeks. Patient states he also has lower back pain and endorses chills, states he had a fever 3 days ago. Patient denies chest pain, sob, nausea, vomiting, diarrhea, urinary symptoms.    #New GE junction mass-esophageal adenocarcinoma    - pathology resulted - adenocarcinoma with signet ring features   stent placed   trial of liquids     #orthostatic hypotension  repeat today     #Chronic Anemia  monitor no indication for treatment     #3.8 cm wide R iliac artery aneurysm- stable  - F/u vascular o/p- surgery scheduled with Dr. Tavera, was scheduled for 4/21/2025?    #HTN with borderline BP    #CAD s/p CABG in Jan 2024    #Paroxysmal A.fib - not on AC?  - C/w home meds  - hold plavix for endoscopy     #Acute on chronic back pain  controlled with morphine and lidocaine patch      PROGRESS NOTE HANDOFF    Pending:  trial of diet , gi follow up     Family discussion: patient verbalized understanding and agreeable to plan of care     Disposition: Home

## 2025-05-02 NOTE — PROGRESS NOTE ADULT - SUBJECTIVE AND OBJECTIVE BOX
Patient is a 60 y/o male with a PMHx of HTN, hiatal hernia, HLD, CAD s/p CABG in Jan 2024, paroxysmal A. fib not on AC, former smoker (30 pack years, quit 1yr ago), recently diagnosed with colitis and treated during his admission 4/1-4/5 presenting to the ED for evaluation of persistent abdominal pain and weakness x 3 weeks. Patient transferred to [facility] for notable Esophageal Mass. Patient s/p EUS/ EGD of Esophageal mass followed by stent placement. Patient had nausea after procedure but currently doing well. No overnight issues.        PAST MEDICAL & SURGICAL HISTORY:  HTN (hypertension)  Hiatal hernia  HLD (hyperlipidemia)  CAD (coronary artery disease)  Paroxysmal atrial fibrillation  History of colitis  S/P CABG (coronary artery bypass graft)        MEDICATIONS  (STANDING):  atorvastatin 80 milliGRAM(s) Oral at bedtime  chlorhexidine 2% Cloths 1 Application(s) Topical daily  dronabinol 2.5 milliGRAM(s) Oral daily  enoxaparin Injectable 40 milliGRAM(s) SubCutaneous every 24 hours  lactated ringers. 1000 milliLiter(s) (75 mL/Hr) IV Continuous <Continuous>  lactobacillus acidophilus 1 Tablet(s) Oral three times a day with meals  lidocaine   4% Patch 1 Patch Transdermal every 24 hours  metoprolol tartrate 12.5 milliGRAM(s) Oral two times a day  multivitamin  Chewable 1 Tablet(s) Oral daily  OLANZapine 2.5 milliGRAM(s) Oral daily  ondansetron Injectable 8 milliGRAM(s) IV Push every 8 hours  pantoprazole  Injectable 40 milliGRAM(s) IV Push two times a day  sucralfate 1 Gram(s) Oral four times a day    MEDICATIONS  (PRN):  acetaminophen     Tablet .. 650 milliGRAM(s) Oral every 6 hours PRN Mild Pain (1 - 3), Moderate Pain (4 - 6)  morphine  - Injectable 2 milliGRAM(s) IV Push every 6 hours PRN Severe Pain (7 - 10)  oxycodone    5 mG/acetaminophen 325 mG 1 Tablet(s) Oral every 4 hours PRN Severe Pain (7 - 10)  simethicone 80 milliGRAM(s) Chew every 6 hours PRN Gas      Allergies  penicillins (Unknown)        Review of Systems  General:  Denies Fatigue, Denies Fever, Denies Weakness ,Denies Weight Loss   HEENT: Denies Trouble Swallowing ,Denies  Sore Throat , Denies Change in hearing/vision/speech ,Denies Dizziness    Cardio: Denies  Chest Pain , Palpitations    Respiratory: Denies worsening of SOB, Denies Cough  Abdomen: See detailed HPI  Neuro: Denies Headache Denies Dizziness, Denies Paresthesias  MSK: Denies pain in Bones/Joints/Muscles   Psych: Patient denies depression, denies suicidal or homicidal ideations  Integ: Patient Denies rash, or new skin lesions     Vital Signs Last 24 Hrs  T(C): 36.9 (02 May 2025 08:00), Max: 37.1 (01 May 2025 23:35)  T(F): 98.4 (02 May 2025 08:00), Max: 98.7 (01 May 2025 23:35)  HR: 64 (02 May 2025 08:00) (64 - 95)  BP: 130/73 (02 May 2025 08:00) (86/59 - 133/73)  BP(mean): 74 (01 May 2025 23:35) (74 - 74)  RR: 18 (02 May 2025 08:00) (17 - 25)  SpO2: 99% (02 May 2025 08:00) (93% - 99%)    Parameters below as of 02 May 2025 08:00  Patient On (Oxygen Delivery Method): room air      PHYSICAL EXAM:  GENERAL: NAD, well-appearing  CHEST/LUNG: Clear to auscultation bilaterally  HEART: Regular rate and rhythm  ABDOMEN: Soft, Nontender, Nondistended;   EXTREMITIES:  No clubbing, cyanosis, or edema      Labs:                     8.6    4.99  )-----------( 107      ( 02 May 2025 07:54 )             27.1         05-02    136  |  101  |  13  ----------------------------<  89  4.3   |  24  |  0.8    Calcium: 8.7 mg/dL (05-02-25 @ 07:54)      Coags:     17.90  ----< 1.50    ( 01 May 2025 08:05 )     40.4        Urinalysis Basic - ( 02 May 2025 07:54 )  Color: x / Appearance: x / SG: x / pH: x  Gluc: 89 mg/dL / Ketone: x  / Bili: x / Urobili: x   Blood: x / Protein: x / Nitrite: x   Leuk Esterase: x / RBC: x / WBC x   Sq Epi: x / Non Sq Epi: x / Bacteria: x      RADIOLOGY & ADDITIONAL STUDIES:  CT Chest w/ IV Cont (04.26.25 @ 11:19) >  IMPRESSION:  1.  No definite evidence of thoracic metastatic disease.  2.  Small bilateral pleural effusions.  3.  Marked circumferential thickening of the distal esophagus/hiatal   hernia could be related to provided history of suspected esophageal   cancer versus esophagitis.

## 2025-05-02 NOTE — PROGRESS NOTE ADULT - SUBJECTIVE AND OBJECTIVE BOX
SUBJECTIVE/OVERNIGHT EVENTS  Today is hospital day 14d. This morning patient was seen and examined at bedside, resting comfortably in bed. Patient threw up once after esophageal stent once.         MEDICATIONS  STANDING MEDICATIONS  atorvastatin 80 milliGRAM(s) Oral at bedtime  chlorhexidine 2% Cloths 1 Application(s) Topical daily  dronabinol 2.5 milliGRAM(s) Oral daily  enoxaparin Injectable 40 milliGRAM(s) SubCutaneous every 24 hours  lactated ringers. 1000 milliLiter(s) IV Continuous <Continuous>  lactobacillus acidophilus 1 Tablet(s) Oral three times a day with meals  lidocaine   4% Patch 1 Patch Transdermal every 24 hours  metoprolol tartrate 12.5 milliGRAM(s) Oral two times a day  multivitamin  Chewable 1 Tablet(s) Oral daily  OLANZapine 2.5 milliGRAM(s) Oral daily  ondansetron Injectable 8 milliGRAM(s) IV Push every 8 hours  pantoprazole  Injectable 40 milliGRAM(s) IV Push two times a day  sucralfate 1 Gram(s) Oral four times a day    PRN MEDICATIONS  acetaminophen     Tablet .. 650 milliGRAM(s) Oral every 6 hours PRN  morphine  - Injectable 2 milliGRAM(s) IV Push every 6 hours PRN  oxycodone    5 mG/acetaminophen 325 mG 1 Tablet(s) Oral every 4 hours PRN  simethicone 80 milliGRAM(s) Chew every 6 hours PRN    VITALS  T(F): 98.4 (05-02-25 @ 08:00), Max: 98.7 (05-01-25 @ 23:35)  HR: 64 (05-02-25 @ 08:00) (64 - 95)  BP: 130/73 (05-02-25 @ 08:00) (86/59 - 133/73)  RR: 18 (05-02-25 @ 08:00) (17 - 25)  SpO2: 99% (05-02-25 @ 08:00) (93% - 99%)      PHYSICAL EXAM:  GENERAL: NAD, well-groomed, well-developed  HEAD:  Atraumatic, Normocephalic  EYES: EOMI, PERRLA, conjunctiva and sclera clear  ENMT:  Moist mucous membranes  NECK: Supple, No JVD,  CHEST/LUNG: Clear   HEART: Regular rate and rhythm  ABDOMEN: Soft, Nontender  NEURO:  MENTAL STATUS: AAOx3  EXTREMITIES: No LE edema, no calf tenderness  LYMPH: No lymphadenopathy noted  SKIN: No rashes or lesions         LABS             8.6    4.99  )-----------( 107      ( 05-02-25 @ 07:54 )             27.1     136  |  101  |  13  -------------------------<  89   05-02-25 @ 07:54  4.3  |  24  |  0.8    Ca      8.7     05-02-25 @ 07:54      PT/INR - ( 05-01-25 @ 08:05 )   PT: 17.90 sec[H];   INR: 1.50 ratio[H]  PTT - ( 05-01-25 @ 08:05 )  PTT:40.4 sec      Urinalysis Basic - ( 02 May 2025 07:54 )    Color: x / Appearance: x / SG: x / pH: x  Gluc: 89 mg/dL / Ketone: x  / Bili: x / Urobili: x   Blood: x / Protein: x / Nitrite: x   Leuk Esterase: x / RBC: x / WBC x   Sq Epi: x / Non Sq Epi: x / Bacteria: x          IMAGING      ASSESSMENT AND PLAN:

## 2025-05-02 NOTE — PROGRESS NOTE ADULT - SUBJECTIVE AND OBJECTIVE BOX
JONAS CELESTIN  59y  [facility] [address] 023 [address]      Patient is a 59y old  Male who presents with a chief complaint of abdominal pain (02 May 2025 09:45)      My note supersedes the resident's note      INTERVAL HPI/OVERNIGHT EVENTS:  episode of nausea yesterday       REVIEW OF SYSTEMS:  CONSTITUTIONAL: No fever, weight loss, or fatigue  EYES: No eye pain, visual disturbances, or discharge  ENMT:  No difficulty hearing, tinnitus, vertigo; No sinus or throat pain  NECK: No pain or stiffness  BREASTS: No pain, masses, or nipple discharge  RESPIRATORY: No cough, wheezing, chills or hemoptysis; No shortness of breath  CARDIOVASCULAR: No chest pain, palpitations, dizziness, or leg swelling  GASTROINTESTINAL: episode of nausea and vomiting yesterday   GENITOURINARY: No dysuria, frequency, hematuria, or incontinence  NEUROLOGICAL: No headaches, memory loss, loss of strength, numbness, or tremors  SKIN: No itching, burning, rashes, or lesions   LYMPH NODES: No enlarged glands  ENDOCRINE: No heat or cold intolerance; No hair loss  MUSCULOSKELETAL: No joint pain or swelling; No muscle, back, or extremity pain  PSYCHIATRIC: No depression, anxiety, mood swings, or difficulty sleeping  HEME/LYMPH: No easy bruising, or bleeding gums  ALLERY AND IMMUNOLOGIC: No hives or eczema  FAMILY HISTORY:  FHx: lung cancer (Father)      T(C): 36.9 (05-02-25 @ 08:00), Max: 37.1 (05-01-25 @ 23:35)  HR: 64 (05-02-25 @ 08:00) (64 - 95)  BP: 130/73 (05-02-25 @ 08:00) (86/59 - 133/73)  RR: 18 (05-02-25 @ 08:00) (17 - 25)  SpO2: 99% (05-02-25 @ 08:00) (93% - 99%)  Wt(kg): --Vital Signs Last 24 Hrs  T(C): 36.9 (02 May 2025 08:00), Max: 37.1 (01 May 2025 23:35)  T(F): 98.4 (02 May 2025 08:00), Max: 98.7 (01 May 2025 23:35)  HR: 64 (02 May 2025 08:00) (64 - 95)  BP: 130/73 (02 May 2025 08:00) (86/59 - 133/73)  BP(mean): 74 (01 May 2025 23:35) (74 - 74)  RR: 18 (02 May 2025 08:00) (17 - 25)  SpO2: 99% (02 May 2025 08:00) (93% - 99%)    Parameters below as of 02 May 2025 08:00  Patient On (Oxygen Delivery Method): room air        PHYSICAL EXAM:  GENERAL: NAD,   HEAD:  Atraumatic, Normocephalic  EYES: EOMI, PERRLA, conjunctiva and sclera clear  ENMT: No tonsillar erythema, exudates, or enlargement; Moist mucous membranes, Good dentition, No lesions  NECK: Supple, No JVD, Normal thyroid  NERVOUS SYSTEM:  Alert & Oriented X3, Good concentration; Motor Strength 5/5 B/L upper and lower extremities; DTRs 2+ intact and symmetric  PULM: Clear to auscultation bilaterally  CARDIAC: Regular rate and rhythm; No murmurs, rubs, or gallops  GI: Soft, Nontender, Nondistended; Bowel sounds present  EXTREMITIES:  2+ Peripheral Pulses, No clubbing, cyanosis, or edema  LYMPH: No lymphadenopathy noted  SKIN: No rashes or lesions    Consultant(s) Notes Reviewed:  [x ] YES  [ ] NO  Care Discussed with Consultants/Other Providers [ x] YES  [ ] NO    LABS:                            8.6    4.99  )-----------( 107      ( 02 May 2025 07:54 )             27.1   05-02    136  |  101  |  13  ----------------------------<  89  4.3   |  24  |  0.8    Ca    8.7      02 May 2025 07:54              acetaminophen     Tablet .. 650 milliGRAM(s) Oral every 6 hours PRN  atorvastatin 80 milliGRAM(s) Oral at bedtime  chlorhexidine 2% Cloths 1 Application(s) Topical daily  dronabinol 2.5 milliGRAM(s) Oral daily  enoxaparin Injectable 40 milliGRAM(s) SubCutaneous every 24 hours  lactated ringers. 1000 milliLiter(s) IV Continuous <Continuous>  lactobacillus acidophilus 1 Tablet(s) Oral three times a day with meals  lidocaine   4% Patch 1 Patch Transdermal every 24 hours  metoprolol tartrate 12.5 milliGRAM(s) Oral two times a day  morphine  - Injectable 2 milliGRAM(s) IV Push every 6 hours PRN  multivitamin  Chewable 1 Tablet(s) Oral daily  OLANZapine 2.5 milliGRAM(s) Oral daily  ondansetron Injectable 8 milliGRAM(s) IV Push every 8 hours  oxycodone    5 mG/acetaminophen 325 mG 1 Tablet(s) Oral every 4 hours PRN  pantoprazole  Injectable 40 milliGRAM(s) IV Push two times a day  simethicone 80 milliGRAM(s) Chew every 6 hours PRN  sucralfate 1 Gram(s) Oral four times a day      HEALTH ISSUES - PROBLEM Dx:          Case Discussed with House Staff   50 minutes spent on total time on encounter , this excludes teaching , reviewing plan of care and speaking to jessica and theodore   Spectra [phone]

## 2025-05-02 NOTE — PROGRESS NOTE ADULT - ASSESSMENT
Patient is a 58 y/o male with a PMHx of HTN, hiatal hernia, HLD, CAD s/p CABG in Jan 2024, paroxysmal A. fib not on AC, former smoker (30 pack years, quit 1yr ago), recently diagnosed with colitis and treated during his admission 4/1-4/5 presenting to the ED for evaluation of persistent abdominal pain and weakness x 3 weeks. Patient transferred to [facility] for notable Esophageal Mass. Patient s/p EUS/ EGD of Esophageal mass followed by stent placement. Patient had nausea after procedure but currently doing well. No overnight issues.      Adenocarcinoma of Esophagus  - EUS and EGD done with additional Biopsies 5/1  - Stent placed and tolerating clears  - Can advance to full liquids- Max consistency would be puree to avoid clogging stent  - PPI BID for one month  - Heme Onc has already seen patient and advise outpatient follow up   - Dr Nacho Serrano team on board   - Can follow up with DR White as outpatient   - Can resume Anti Platelet tomorrow morning

## 2025-05-02 NOTE — PROGRESS NOTE ADULT - ASSESSMENT
60 yo M with a PMH of HTN, hiatal hernia, HLD, CAD s/p CABG in Jan 2024, paroxysmal A. fib on plavix, former smoker (30 pack years), recently diagnosed with colitis and treated during his admission 4/1-4/5 presenting to the ED for evaluation of persistent abdominal pain and weakness x 3 weeks. Patient states he also has lower back pain and endorses chills, states he had a fever 3 days ago. Patient denies chest pain, sob, nausea, vomiting, diarrhea, urinary symptoms.    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 Otherwise, no new acute findings in the abdomen or pelvis.  - GGT: 30, amylase 62. lipase 67  - CT chest IV con to rule out metastasis: 1.  No definite evidence of thoracic metastatic disease. 2.  Small bilateral pleural effusions.3.  Marked circumferential thickening of the distal esophagus/hiatal   hernia could be related to provided history of suspected esophageal   cancer versus esophagitis.  - blood cx negative , urine cx negative   - per cardio: may proceed with GI procedures without further cardiac workup. Pt is at moderate risk for a perioperative cardiac event. Ok to hold plavix.    - restarterd plavix 4/29  - EGD Impressions: A friable circumferential mass seen from the GE junction at 38cm to 28 cm of mid esophagus. Multiple biopsies were obtained. NEX powder was sprayed all over the mass to achieve adequate hemostasis. Another 5mm lesion was noted at 20cm from incisors in the proximal esophagus. Erosions in the stomach compatible with erosive gastritis. (Biopsy). A pulsating ulcerated lesion was noted in the fundus. Biopsy was not obtained. Normal mucosa in the whole examined duodenum. (Biopsy).  - Colonoscopy Impressions: Multiple semi-pedunculated polyps were seen in the left colon. A 2cm polyp was seen in the descending colon, biopsy was obtained. Polyps were not removed in the setting of poor prep. External hemorrhoids.Solid stool noted in the whole colon. Cecum was not reached  -path: adenocarcinoma mucin, signet cells  -H/O: consult  PET scan, Surg Onc Dr Yost for possible resection  -Surg/Onc consult: possible esophagus stent  -GI: Esophageal Stent/ EUS 5/1/25  - plan for repeat colonoscopy OP  - diet: full liquid , started Marinol appetite stimulant     #chronic lower back pain   - xray lumbar spine:  There are five nonrib-bearing lumbar vertebral bodies. The pedicles are   visualized. The vertebral body heights and alignment are maintained.   There is mild disc space narrowing and degenerative changes throughout   the lumbar spine. Mild degenerative change of the bilateral sacroiliac joints.  - pain control w/ morphine     #chronic anemia  - In the ED, Hb 11.6, baseline ~12  - MCV 79.2    #3.8 cm wide R iliac artery aneurysm- stable  - vascular o/p- surgery was scheduled with Dr. Tavera     #HTN  #CAD s/p CABG in Jan 2024  #Paroxysmal A.fib  - c/w home meds  - PLAVIX held for procedure 5/1/25    DVT ppx: lovenox  GI ppx: protonix  Diet: DASH  Activity: as tolerated  Pending: Gi

## 2025-05-03 PROCEDURE — 99232 SBSQ HOSP IP/OBS MODERATE 35: CPT

## 2025-05-03 RX ORDER — CLOPIDOGREL BISULFATE 75 MG/1
75 TABLET, FILM COATED ORAL DAILY
Refills: 0 | Status: DISCONTINUED | OUTPATIENT
Start: 2025-05-03 | End: 2025-05-06

## 2025-05-03 RX ADMIN — CLOPIDOGREL BISULFATE 75 MILLIGRAM(S): 75 TABLET, FILM COATED ORAL at 11:59

## 2025-05-03 RX ADMIN — Medication 1 APPLICATION(S): at 11:59

## 2025-05-03 RX ADMIN — Medication 650 MILLIGRAM(S): at 19:53

## 2025-05-03 RX ADMIN — SCOPOLAMINE 1 PATCH: 1 PATCH, EXTENDED RELEASE TRANSDERMAL at 19:45

## 2025-05-03 RX ADMIN — METOPROLOL SUCCINATE 12.5 MILLIGRAM(S): 50 TABLET, EXTENDED RELEASE ORAL at 17:32

## 2025-05-03 RX ADMIN — Medication 1 GRAM(S): at 17:32

## 2025-05-03 RX ADMIN — Medication 1 GRAM(S): at 05:33

## 2025-05-03 RX ADMIN — Medication 1 TABLET(S): at 11:59

## 2025-05-03 RX ADMIN — Medication 1 GRAM(S): at 11:59

## 2025-05-03 RX ADMIN — Medication 1 TABLET(S): at 08:28

## 2025-05-03 RX ADMIN — Medication 40 MILLIGRAM(S): at 19:53

## 2025-05-03 RX ADMIN — METOPROLOL SUCCINATE 12.5 MILLIGRAM(S): 50 TABLET, EXTENDED RELEASE ORAL at 05:33

## 2025-05-03 RX ADMIN — Medication 8 MILLIGRAM(S): at 17:32

## 2025-05-03 RX ADMIN — Medication 650 MILLIGRAM(S): at 20:53

## 2025-05-03 RX ADMIN — SCOPOLAMINE 1 PATCH: 1 PATCH, EXTENDED RELEASE TRANSDERMAL at 07:00

## 2025-05-03 RX ADMIN — ATORVASTATIN CALCIUM 80 MILLIGRAM(S): 80 TABLET, FILM COATED ORAL at 21:32

## 2025-05-03 RX ADMIN — OLANZAPINE 2.5 MILLIGRAM(S): 10 TABLET ORAL at 11:59

## 2025-05-03 RX ADMIN — Medication 1 TABLET(S): at 17:32

## 2025-05-03 RX ADMIN — Medication 40 MILLIGRAM(S): at 05:33

## 2025-05-03 RX ADMIN — ENOXAPARIN SODIUM 40 MILLIGRAM(S): 100 INJECTION SUBCUTANEOUS at 08:28

## 2025-05-03 NOTE — PROGRESS NOTE ADULT - ASSESSMENT
Problem: INFECTION - ADULT  Goal: Absence or prevention of progression during hospitalization  Description: INTERVENTIONS:  - Assess and monitor for signs and symptoms of infection  - Monitor lab/diagnostic results  - Monitor all insertion sites, i.e. indwelling lines, tubes, and drains  - Monitor endotracheal if appropriate and nasal secretions for changes in amount and color  - [place] appropriate cooling/warming therapies per order  - Administer medications as ordered  - Instruct and encourage patient and family to use good hand hygiene technique  - Identify and instruct in appropriate isolation precautions for identified infection/condition  Outcome: Progressing  Goal: Absence of fever/infection during neutropenic period  Description: INTERVENTIONS:  - Monitor WBC    Outcome: Progressing     Problem: SAFETY ADULT  Goal: Patient will remain free of falls  Description: INTERVENTIONS:  - Educate patient/family on patient safety including physical limitations  - Instruct patient to call for assistance with activity   - Consult OT/PT to assist with strengthening/mobility   - Keep Call bell within reach  - Keep bed low and locked with side rails adjusted as appropriate  - Keep care items and personal belongings within reach  - Initiate and maintain comfort rounds  - Make Fall Risk Sign visible to staff  - Offer Toileting every  Hours, in advance of need  - Initiate/Maintain alarm  - Obtain necessary fall risk management equipment:   Problem: DISCHARGE PLANNING  Goal: Discharge to home or other facility with appropriate resources  Description: INTERVENTIONS:  - Identify barriers to discharge w/patient and caregiver  - Arrange for needed discharge resources and transportation as appropriate  - Identify discharge learning needs (meds, wound care, etc.)  - Arrange for interpretive services to assist at discharge as needed  - Refer to Case Management Department for coordinating discharge planning if the patient needs  58 yo M with a PMH of HTN, hiatal hernia, HLD, CAD s/p CABG in Jan 2024, paroxysmal A. fib on plavix, former smoker (30 pack years), recently diagnosed with colitis and treated during his admission 4/1-4/5 presenting to the ED for evaluation of persistent abdominal pain and weakness x 3 weeks. Patient states he also has lower back pain and endorses chills, states he had a fever     #Abdominal Pain 2/2 colitis/erosive gastritis seen on colo  #Adenocarcinoma at [facility] [place]  - CT abd/pelv w/ IVC- Redemonstrated prominent submucosal fat/bowel wall thickening involving descending colon, possibly related to prior episodes of colitis. Otherwise, no new acute findings in the abdomen or pelvis.  - GGT: 30, amylase 62. lipase 67  - CT chest IV con to rule out metastasis: 1.  No definite evidence of thoracic metastatic disease. 2.  Small bilateral pleural effusions.3.  Marked circumferential thickening of the distal esophagus/hiatal   hernia could be related to provided history of suspected esophageal   cancer versus esophagitis.  - blood cx negative , urine cx negative   - per cardio: may proceed with GI procedures without further cardiac workup. Pt is at moderate risk for a perioperative cardiac event. Ok to hold plavix.    - restarterd plavix 4/29  - EGD Impressions: A friable circumferential mass seen from the GE junction at 38cm to 28 cm of mid esophagus. Multiple biopsies were obtained. NEX powder was sprayed all over the mass to achieve adequate hemostasis. Another 5mm lesion was noted at 20cm from incisors in the proximal esophagus. Erosions in the stomach compatible with erosive gastritis. (Biopsy). A pulsating ulcerated lesion was noted in the fundus. Biopsy was not obtained. Normal mucosa in the whole examined duodenum. (Biopsy).  - Colonoscopy Impressions: Multiple semi-pedunculated polyps were seen in the left colon. A 2cm polyp was seen in the descending colon, biopsy was obtained. Polyps were not removed in the setting of poor prep. External hemorrhoids.Solid stool noted in the whole colon. Cecum was not reached  -path: adenocarcinoma mucin, signet cells  -H/O: consult  PET scan, Surg Onc Dr Yost for possible resection  -Surg/Onc consult: possible esophagus stent  -GI: Esophageal Stent/ EUS 5/1/25  - plan for repeat colonoscopy OP  - diet: puree  - PT/oT  - obtain orthostats    #chronic lower back pain   - xray lumbar spine:  There are five nonrib-bearing lumbar vertebral bodies. The pedicles are   visualized. The vertebral body heights and alignment are maintained.   There is mild disc space narrowing and degenerative changes throughout   the lumbar spine. Mild degenerative change of the bilateral sacroiliac joints.  - pain control w/ morphine     #chronic anemia  - In the ED, Hb 11.6, baseline ~12  - MCV 79.2    #3.8 cm wide R iliac artery aneurysm- stable  - vascular o/p- surgery was scheduled with Dr. Tavera     #HTN  #CAD s/p CABG in Jan 2024  #Paroxysmal A.fib  - c/w home meds     post-hospital services based on physician/advanced practitioner order or complex needs related to functional status, cognitive ability, or social support system  Outcome: Progressing     - Apply yellow socks and bracelet for high fall risk patients  - Consider moving patient to room near nurses station  Outcome: Progressing  Goal: Maintain or return to baseline ADL function  Description: INTERVENTIONS:  -  Assess patient's ability to carry out ADLs; assess patient's baseline for ADL function and identify physical deficits which impact ability to perform ADLs (bathing, care of mouth/teeth, toileting, grooming, dressing, etc.)  - Assess/evaluate cause of self-care deficits   - Assess range of motion  - Assess patient's mobility; develop plan if impaired  - Assess patient's need for assistive devices and provide as appropriate  - Encourage maximum independence but intervene and supervise when necessary  - Involve family in performance of ADLs  - Assess for home care needs following discharge   - Consider OT consult to assist with ADL evaluation and planning for discharge  - Provide patient education as appropriate  Outcome: Progressing  Goal: Maintains/Returns to pre admission functional level  Description: INTERVENTIONS:  - Perform AM-PAC 6 Click Basic Mobility/ Daily Activity assessment daily.  - Set and communicate daily mobility goal to care team and patient/family/caregiver.   - Collaborate with rehabilitation services on mobility goals if consulted  - Perform Range of Motion  times a day.  - Reposition patient every  hours.  - Dangle patient  times a day  - Stand patient  times a day  - Ambulate patient  times a day  - Out of bed to chair  times a day   - Out of bed for meals times a day  - Out of bed for toileting  - Record patient progress and toleration of activity level   Outcome: Progressing      58 yo M with a PMH of HTN, hiatal hernia, HLD, CAD s/p CABG in Jan 2024, paroxysmal A. fib on plavix, former smoker (30 pack years), recently diagnosed with colitis and treated during his admission 4/1-4/5 presenting to the ED for evaluation of persistent abdominal pain and weakness x 3 weeks. Patient states he also has lower back pain and endorses chills, states he had a fever     #Abdominal Pain 2/2 colitis/erosive gastritis seen on colo  #Adenocarcinoma at [facility] [place]  - CT abd/pelv w/ IVC- Redemonstrated prominent submucosal fat/bowel wall thickening involving descending colon, possibly related to prior episodes of colitis. Otherwise, no new acute findings in the abdomen or pelvis.  - GGT: 30, amylase 62. lipase 67  - CT chest IV con to rule out metastasis: 1.  No definite evidence of thoracic metastatic disease. 2.  Small bilateral pleural effusions.3.  Marked circumferential thickening of the distal esophagus/hiatal   hernia could be related to provided history of suspected esophageal   cancer versus esophagitis.  - blood cx negative , urine cx negative   - per cardio: may proceed with GI procedures without further cardiac workup. Pt is at moderate risk for a perioperative cardiac event. Ok to hold plavix.    - restarterd plavix 4/29  - EGD Impressions: A friable circumferential mass seen from the GE junction at 38cm to 28 cm of mid esophagus. Multiple biopsies were obtained. NEX powder was sprayed all over the mass to achieve adequate hemostasis. Another 5mm lesion was noted at 20cm from incisors in the proximal esophagus. Erosions in the stomach compatible with erosive gastritis. (Biopsy). A pulsating ulcerated lesion was noted in the fundus. Biopsy was not obtained. Normal mucosa in the whole examined duodenum. (Biopsy).  - Colonoscopy Impressions: Multiple semi-pedunculated polyps were seen in the left colon. A 2cm polyp was seen in the descending colon, biopsy was obtained. Polyps were not removed in the setting of poor prep. External hemorrhoids.Solid stool noted in the whole colon. Cecum was not reached  -path: adenocarcinoma mucin, signet cells  -H/O: consult  PET scan, Surg Onc Dr Yost for possible resection  -Surg/Onc consult: possible esophagus stent  -GI: Esophageal Stent/ EUS 5/1/25  - plan for repeat colonoscopy OP  - 5/3: Patient's diet is advanced. Will try to reach to a diet consistency of pureed today  - PT/OT consult for dispo planning and increasing strength. suspect debilitation 2/2 illness  - Obtain orthostats today. Yesterday, patient had trouble getting out of bed and standing up to do physical activity.     #chronic lower back pain   - xray lumbar spine:  There are five nonrib-bearing lumbar vertebral bodies. The pedicles are   visualized. The vertebral body heights and alignment are maintained.   There is mild disc space narrowing and degenerative changes throughout   the lumbar spine. Mild degenerative change of the bilateral sacroiliac joints.  - pain control w/ morphine     #chronic anemia  - In the ED, Hb 11.6, baseline ~12  - MCV 79.2    #3.8 cm wide R iliac artery aneurysm- stable  - vascular o/p- surgery was scheduled with Dr. Tavera     #HTN  #CAD s/p CABG in Jan 2024  #Paroxysmal A.fib  - c/w home meds  - 5/3- restarted plavix today. OK by GI    #DVT ppx: lovenox 40mg subQ QD  #GI ppx: 40mg PO BID (will switch IV to PO today)  #Activity: AAT  #Code: full  #Dispo: dc planning. advance diet (maximum allowed is pureed consistency to avoid clogging new CBD stent), need PT eval. restarted plavix today. check orthostatics

## 2025-05-03 NOTE — PROGRESS NOTE ADULT - ATTENDING COMMENTS
My note supersedes the resident's note in the event of a discrepancy -    Patient seen and examined this morning  lying in bed  in nad   denies nausea or vomiting    T(C): 36.8 (04-24-25 @ 07:00), Max: 36.8 (04-24-25 @ 07:00)  HR: 75 (04-24-25 @ 07:00) (74 - 83)  BP: 113/66 (04-24-25 @ 07:00) (105/63 - 117/71)  RR: 17 (04-24-25 @ 07:00) (17 - 18)  SpO2: 96% (04-24-25 @ 07:00) (95% - 96%)    acetaminophen     Tablet .. 650 milliGRAM(s) Oral every 6 hours PRN  atorvastatin 80 milliGRAM(s) Oral at bedtime  chlorhexidine 2% Cloths 1 Application(s) Topical daily  enoxaparin Injectable 40 milliGRAM(s) SubCutaneous every 24 hours  famotidine    Tablet 20 milliGRAM(s) Oral daily  ketorolac   Injectable 15 milliGRAM(s) IV Push every 6 hours PRN  lactobacillus acidophilus 1 Tablet(s) Oral three times a day with meals  metoprolol tartrate 12.5 milliGRAM(s) Oral two times a day  multivitamin  Chewable 1 Tablet(s) Oral daily  ondansetron Injectable 4 milliGRAM(s) IV Push every 8 hours PRN  oxycodone    5 mG/acetaminophen 325 mG 1 Tablet(s) Oral every 4 hours PRN  pantoprazole    Tablet 40 milliGRAM(s) Oral before breakfast  polyethylene glycol/electrolyte Solution. 4000 milliLiter(s) Oral once  simethicone 80 milliGRAM(s) Chew every 6 hours PRN  sucralfate 1 Gram(s) Oral four times a day      58 yo M with a PMH of HTN, hiatal hernia, HLD, CAD s/p CABG in Jan 2024, paroxysmal A. fib on plavix, former smoker (30 pack years), recently diagnosed with colitis and treated during his admission 4/1-4/5 presenting to the ED for evaluation of persistent abdominal pain and weakness x 3 weeks. Patient states he also has lower back pain and endorses chills, states he had a fever 3 days ago. Patient denies chest pain, sob, nausea, vomiting, diarrhea, urinary symptoms.    #abdominal Pain 2/2 recent colitis vs possible pancreatitis??   - CT abd/pelv w/ IVC- Re-demonstrated prominent submucosal fat/bowel wall thickening involving descending colon, possibly related to prior episodes of colitis. Otherwise, no new acute findings in the abdomen or pelvis.  - not on abx  - GI following for scopes planned for Friday  - holding plavix since 4/21  - Pain control: tylenol and toradol  - added zofran prn 4/23  - blood cx negative   - urine cx negative   - cardio cleared for scopes  - tolerating diet in small quantities  - start clears and prep today  - NPO after midnight     #Chronic Anemia  - In the ED, Hb 11.6, baseline ~12  - MCV 79.2    #3.8 cm wide R iliac artery aneurysm- stable  - F/u vascular o/p- surgery scheduled with Dr. Tavera, was scheduled for 4/21/2025    #HTN  #CAD s/p CABG in Jan 2024  #Paroxysmal A.fib  - C/w home meds  - holding plavix for scopes    Progress Note Handoff  Pending Consults: GI  Pending Tests: labs   Pending Results: labs  Family Discussion: Discussed labs, meds, GI eval, planned scopes, diet and overall plan of care with pt and medical staff. All questions answered. PFD for 24-48hrs  Disposition: Home__x___/SNF______/Other_____/Unknown at this time_____  Spent 55 min reviewing chart, speaking with patient/family and on coordinating patient care during interdisciplinary rounds .
My note supersedes the resident's note in the event of a discrepancy -    Patient seen and examined this morning  lying in bed  in nad   denies nausea or vomiting  EGD/Colonoscopy today    Vital Signs Last 24 Hrs  T(C): 36.4 (25 Apr 2025 11:20), Max: 37.8 (25 Apr 2025 00:07)  T(F): 97.5 (25 Apr 2025 11:20), Max: 100 (25 Apr 2025 00:07)  HR: 74 (25 Apr 2025 11:20) (67 - 85)  BP: 136/72 (25 Apr 2025 11:20) (93/59 - 137/77)  BP(mean): --  RR: 16 (25 Apr 2025 11:20) (16 - 24)  SpO2: 96% (25 Apr 2025 11:20) (96% - 98%)    Parameters below as of 25 Apr 2025 11:20  Patient On (Oxygen Delivery Method): room air      acetaminophen     Tablet .. 650 milliGRAM(s) Oral every 6 hours PRN  atorvastatin 80 milliGRAM(s) Oral at bedtime  chlorhexidine 2% Cloths 1 Application(s) Topical daily  enoxaparin Injectable 40 milliGRAM(s) SubCutaneous every 24 hours  famotidine    Tablet 20 milliGRAM(s) Oral daily  ketorolac   Injectable 15 milliGRAM(s) IV Push every 6 hours PRN  lactobacillus acidophilus 1 Tablet(s) Oral three times a day with meals  metoprolol tartrate 12.5 milliGRAM(s) Oral two times a day  multivitamin  Chewable 1 Tablet(s) Oral daily  ondansetron Injectable 4 milliGRAM(s) IV Push every 8 hours PRN  oxycodone    5 mG/acetaminophen 325 mG 1 Tablet(s) Oral every 4 hours PRN  pantoprazole    Tablet 40 milliGRAM(s) Oral before breakfast  polyethylene glycol/electrolyte Solution. 4000 milliLiter(s) Oral once  simethicone 80 milliGRAM(s) Chew every 6 hours PRN  sucralfate 1 Gram(s) Oral four times a day      58 yo M with a PMH of HTN, hiatal hernia, HLD, CAD s/p CABG in Jan 2024, paroxysmal A. fib on plavix, former smoker (30 pack years), recently diagnosed with colitis and treated during his admission 4/1-4/5 presenting to the ED for evaluation of persistent abdominal pain and weakness x 3 weeks. Patient states he also has lower back pain and endorses chills, states he had a fever 3 days ago. Patient denies chest pain, sob, nausea, vomiting, diarrhea, urinary symptoms.    #abdominal Pain 2/2 recent colitis vs possible pancreatitis??   - CT abd/pelv w/ IVC- Re-demonstrated prominent submucosal fat/bowel wall thickening involving descending colon, possibly related to prior episodes of colitis. Otherwise, no new acute findings in the abdomen or pelvis.  - not on abx  - EGD/Colonoscopy today   - holding plavix since 4/21 - resume post scopes if cleared by GI  - Pain control: tylenol and toradol  - added zofran prn 4/23  - blood cx negative   - urine cx negative   - cardio cleared for scopes  - tolerating diet in small quantities    #Chronic Anemia  - In the ED, Hb 11.6, baseline ~12  - MCV 79.2    #3.8 cm wide R iliac artery aneurysm- stable  - F/u vascular o/p- surgery scheduled with Dr. Tavera, was scheduled for 4/21/2025    #HTN  #CAD s/p CABG in Jan 2024  #Paroxysmal A.fib - not on AC?  - C/w home meds  - holding plavix for scopes - resume post EGD    Progress Note Handoff  Pending Consults: GI  Pending Tests: labs   Pending Results: labs  Family Discussion: Discussed labs, meds, GI eval, planned scopes, diet and overall plan of care with pt and medical staff. All questions answered. PFD for 24hrs pending results of scopes  Disposition: Home__x___/SNF______/Other_____/Unknown at this time_____  Spent 55 min reviewing chart, speaking with patient/family and on coordinating patient care during interdisciplinary rounds .
My note supersedes the resident's note in the event of a discrepancy -    Patient seen and examined this morning  lying in bed  in nad   complains of mild abdominal pain and mild nausea    T(C): 36.9 (04-23-25 @ 07:00), Max: 36.9 (04-23-25 @ 07:00)  HR: 68 (04-23-25 @ 07:00) (68 - 78)  BP: 111/68 (04-23-25 @ 07:00) (108/67 - 118/82)  RR: 17 (04-23-25 @ 07:00) (17 - 18)  SpO2: 95% (04-23-25 @ 07:00) (95% - 96%)    acetaminophen     Tablet .. 650 milliGRAM(s) Oral every 6 hours PRN  atorvastatin 80 milliGRAM(s) Oral at bedtime  chlorhexidine 2% Cloths 1 Application(s) Topical daily  enoxaparin Injectable 40 milliGRAM(s) SubCutaneous every 24 hours  famotidine    Tablet 20 milliGRAM(s) Oral daily  ketorolac   Injectable 15 milliGRAM(s) IV Push every 6 hours PRN  lactobacillus acidophilus 1 Tablet(s) Oral three times a day with meals  metoprolol tartrate 12.5 milliGRAM(s) Oral two times a day  multivitamin  Chewable 1 Tablet(s) Oral daily  ondansetron Injectable 4 milliGRAM(s) IV Push every 8 hours PRN  oxycodone    5 mG/acetaminophen 325 mG 1 Tablet(s) Oral every 4 hours PRN  pantoprazole    Tablet 40 milliGRAM(s) Oral before breakfast  simethicone 80 milliGRAM(s) Chew every 6 hours PRN  sucralfate 1 Gram(s) Oral four times a day    58 yo M with a PMH of HTN, hiatal hernia, HLD, CAD s/p CABG in Jan 2024, paroxysmal A. fib on plavix, former smoker (30 pack years), recently diagnosed with colitis and treated during his admission 4/1-4/5 presenting to the ED for evaluation of persistent abdominal pain and weakness x 3 weeks. Patient states he also has lower back pain and endorses chills, states he had a fever 3 days ago. Patient denies chest pain, sob, nausea, vomiting, diarrhea, urinary symptoms.    #abdominal Pain 2/2 recent colitis vs possible pancreatitis??   - CT abd/pelv w/ IVC- Re-demonstrated prominent submucosal fat/bowel wall thickening involving descending colon, possibly related to prior episodes of colitis. Otherwise, no new acute findings in the abdomen or pelvis.  - not on abx  - GI following for scopes planned for Friday  - holding plavix since 4/21  - Pain control: tylenol and toradol  - added zofran prn today   - blood cx negative   - urine cx negative   - cardio cleared  - tolerating fulls in limited quantities - diet advanced today    #Chronic Anemia  - In the ED, Hb 11.6, baseline ~12  - MCV 79.2    #3.8 cm wide R iliac artery aneurysm- stable  - F/u vascular o/p- surgery scheduled with Dr. Tavera, was scheduled for 4/21/2025    #HTN  #CAD s/p CABG in Jan 2024  #Paroxysmal A.fib  - C/w home meds  - holding plavix for scopes    Progress Note Handoff  Pending Consults: GI  Pending Tests: labs   Pending Results: labs  Family Discussion: Discussed labs, meds, GI eval, planned scopes, diet and overall plan of care with pt and medical staff. All questions answered. PFD for 72hrs  Disposition: Home__x___/SNF______/Other_____/Unknown at this time_____  Spent 55 min reviewing chart, speaking with patient/family and on coordinating patient care during interdisciplinary rounds
Likely esophageal ca pending path. to follow up at the [facility] and oncology. Will need mets work up and if favorable CT surgery eval.
Patient seen and examined during the GI rounds, case discussed with the GI fellow, plan communicated to the primary team, assessment, recommendations and plan as above.
Patient seen and examined independently. Agree with resident note with no exceptions. Case discussed with housestaff, nursing and patient/pt decision maker.

## 2025-05-03 NOTE — PROGRESS NOTE ADULT - TIME BILLING
Reviewed all pertinent clinical information and reviewed all relevant imaging and diagnostic studies. Counseled the patient /HCP about diagnostic testing and treatment plan. All questions were answered.  Discussed recommendations with the primary team and coordinated care.
35 minutes spent on total encounter; more than 50% of the visit was spent counseling and / or coordinating care by the attending physician.  The necessity of the time spent during the encounter on this date of service was due to: Coordination of care.
Reviewed all pertinent clinical information and reviewed all relevant imaging and diagnostic studies. Counseled the patient about diagnostic testing and treatment plan. All questions were answered.  Discussed recommendations with the primary team and coordinated care.
Reviewed all pertinent clinical information and reviewed all relevant imaging and diagnostic studies. Counseled the patient /HCP about diagnostic testing and treatment plan. All questions were answered.  Discussed recommendations with the primary team and coordinated care.
Patient seen at bedside time spent evaluating and treating the patient's acute illness as well as time spent reviewing labs, radiology, discussing with patient and/or patient's family, and discussing the case with a multidisciplinary team.

## 2025-05-03 NOTE — PROGRESS NOTE ADULT - SUBJECTIVE AND OBJECTIVE BOX
SUBJECTIVE/OVERNIGHT EVENTS  Today is hospital day 15d. This morning patient was seen and examined at bedside, resting comfortably in bed. No acute or major events overnight.      MEDICATIONS  STANDING MEDICATIONS  atorvastatin 80 milliGRAM(s) Oral at bedtime  chlorhexidine 2% Cloths 1 Application(s) Topical daily  clopidogrel Tablet 75 milliGRAM(s) Oral daily  dronabinol 2.5 milliGRAM(s) Oral daily  enoxaparin Injectable 40 milliGRAM(s) SubCutaneous every 24 hours  lactobacillus acidophilus 1 Tablet(s) Oral three times a day with meals  lidocaine   4% Patch 1 Patch Transdermal every 24 hours  metoprolol tartrate 12.5 milliGRAM(s) Oral two times a day  multivitamin  Chewable 1 Tablet(s) Oral daily  OLANZapine 2.5 milliGRAM(s) Oral daily  pantoprazole  Injectable 40 milliGRAM(s) IV Push two times a day  sucralfate 1 Gram(s) Oral four times a day    PRN MEDICATIONS  acetaminophen     Tablet .. 650 milliGRAM(s) Oral every 6 hours PRN  morphine  - Injectable 2 milliGRAM(s) IV Push every 6 hours PRN  ondansetron Injectable 8 milliGRAM(s) IV Push every 8 hours PRN  oxycodone    5 mG/acetaminophen 325 mG 1 Tablet(s) Oral every 4 hours PRN  simethicone 80 milliGRAM(s) Chew every 6 hours PRN    VITALS  T(F): 98.2 (05-03-25 @ 08:37), Max: 98.9 (05-02-25 @ 15:41)  HR: 73 (05-03-25 @ 08:37) (73 - 91)  BP: 115/63 (05-03-25 @ 08:37) (112/67 - 124/70)  RR: 18 (05-03-25 @ 08:37) (16 - 18)  SpO2: 94% (05-02-25 @ 23:45) (94% - 95%)    PHYSICAL EXAM  GENERAL  ( x ) NAD, lying in bed comfortably     (  ) obtunded     (  ) lethargic     (  ) somnolent    HEAD  (  ) Atraumatic     (  ) hematoma     (  ) laceration (specify location:       )     NECK  (  ) Supple     (  ) neck stiffness     (  ) nuchal rigidity     (  )  no JVD     (  ) JVD present ( -- cm)    HEART  Rate -->  (x  ) normal rate    (  ) bradycardic    (  ) tachycardic  Rhythm -->  ( x ) regular    (  ) regularly irregular    (  ) irregularly irregular  Murmurs -->  (  ) normal s1/s2    (  ) systolic murmur    (  ) diastolic murmur    (  ) continuous murmur     (  ) S3 present    (  ) S4 present    LUNGS  (  )Unlabored respirations     (  ) tachypnea  ( x ) B/L air entry     (  ) decreased breath sounds in:  (location     )    (  ) no adventitious sound     (  ) crackles     (  ) wheezing      (  ) rhonchi      (specify location:       )  (  ) chest wall tenderness (specify location:       )    ABDOMEN  ( x ) Soft     (  ) tense   |   (  ) nondistended     (  ) distended   |   (  ) +BS     (  ) hypoactive bowel sounds     (  ) hyperactive bowel sounds  (  ) nontender     (  ) RUQ tenderness     (  ) RLQ tenderness     (  ) LLQ tenderness     (  ) epigastric tenderness     (  ) diffuse tenderness  (  ) Splenomegaly      (  ) Hepatomegaly      (  ) Jaundice     (  ) ecchymosis     EXTREMITIES  ( x ) Normal     (  ) Rash     (  ) ecchymosis     (  ) varicose veins      (  ) pitting edema     (  ) non-pitting edema   (  ) ulceration     (  ) gangrene:     (location:     )    NERVOUS SYSTEM  ( x ) A&Ox3     (  ) confused     (  ) lethargic  CN II-XII:     (  ) Intact     (  ) focal deficits  (Specify:     )   Upper extremities:     (  ) strength X/5     (  ) focal deficit (specify:    )  Lower extremities:     (  ) strength  X/5    (  ) focal deficit (specify:    )  a  LABS             8.6    4.99  )-----------( 107      ( 05-02-25 @ 07:54 )             27.1     136  |  101  |  13  -------------------------<  89   05-02-25 @ 07:54  4.3  |  24  |  0.8    Ca      8.7     05-02-25 @ 07:54          Urinalysis Basic - ( 02 May 2025 07:54 )    Color: x / Appearance: x / SG: x / pH: x  Gluc: 89 mg/dL / Ketone: x  / Bili: x / Urobili: x   Blood: x / Protein: x / Nitrite: x   Leuk Esterase: x / RBC: x / WBC x   Sq Epi: x / Non Sq Epi: x / Bacteria: x          IMAGING SUBJECTIVE/OVERNIGHT EVENTS  Today is hospital day 15d. This morning patient was seen and examined at bedside, resting comfortably in bed. No acute or major events overnight.  Patient did complain he has mild lower abd pain, but it is tolerable. Denies any shortness of breath, chest pain, severe abdominal pain, n/v, fevers. Wants to eat more solid food today.       MEDICATIONS  STANDING MEDICATIONS  atorvastatin 80 milliGRAM(s) Oral at bedtime  chlorhexidine 2% Cloths 1 Application(s) Topical daily  clopidogrel Tablet 75 milliGRAM(s) Oral daily  dronabinol 2.5 milliGRAM(s) Oral daily  enoxaparin Injectable 40 milliGRAM(s) SubCutaneous every 24 hours  lactobacillus acidophilus 1 Tablet(s) Oral three times a day with meals  lidocaine   4% Patch 1 Patch Transdermal every 24 hours  metoprolol tartrate 12.5 milliGRAM(s) Oral two times a day  multivitamin  Chewable 1 Tablet(s) Oral daily  OLANZapine 2.5 milliGRAM(s) Oral daily  pantoprazole  Injectable 40 milliGRAM(s) IV Push two times a day  sucralfate 1 Gram(s) Oral four times a day    PRN MEDICATIONS  acetaminophen     Tablet .. 650 milliGRAM(s) Oral every 6 hours PRN  morphine  - Injectable 2 milliGRAM(s) IV Push every 6 hours PRN  ondansetron Injectable 8 milliGRAM(s) IV Push every 8 hours PRN  oxycodone    5 mG/acetaminophen 325 mG 1 Tablet(s) Oral every 4 hours PRN  simethicone 80 milliGRAM(s) Chew every 6 hours PRN    VITALS  T(F): 98.2 (05-03-25 @ 08:37), Max: 98.9 (05-02-25 @ 15:41)  HR: 73 (05-03-25 @ 08:37) (73 - 91)  BP: 115/63 (05-03-25 @ 08:37) (112/67 - 124/70)  RR: 18 (05-03-25 @ 08:37) (16 - 18)  SpO2: 94% (05-02-25 @ 23:45) (94% - 95%)    PHYSICAL EXAM  PHYSICAL EXAM:  GENERAL: NAD, lying in bed comfortably  HEAD:  Atraumatic, Normocephalic  EYES: EOMI, PERRLA, conjunctiva and sclera clear  ENT: Moist mucous membranes  NECK: Supple, No JVD  CHEST/LUNG: Clear to auscultation bilaterally; No rales, rhonchi, wheezing, or rubs. Unlabored respirations  HEART: Regular rate and rhythm; No murmurs, rubs, or gallops  ABDOMEN: Bowel sounds present; Soft, LLQ and RLQ mild tenderness to palpation, no rebound tenderness, No CVA tenderness, Abdomen is nondistended. No hepatomegaly  EXTREMITIES:  2+ Peripheral Pulses, brisk capillary refill. No clubbing, cyanosis, or edema  NERVOUS SYSTEM:  Alert & Oriented X3, speech clear. No deficits   MSK: FROM all 4 extremities, full and equal strength  SKIN: sternotomy scar  LABS             8.6    4.99  )-----------( 107      ( 05-02-25 @ 07:54 )             27.1     136  |  101  |  13  -------------------------<  89   05-02-25 @ 07:54  4.3  |  24  |  0.8    Ca      8.7     05-02-25 @ 07:54          Urinalysis Basic - ( 02 May 2025 07:54 )    Color: x / Appearance: x / SG: x / pH: x  Gluc: 89 mg/dL / Ketone: x  / Bili: x / Urobili: x   Blood: x / Protein: x / Nitrite: x   Leuk Esterase: x / RBC: x / WBC x   Sq Epi: x / Non Sq Epi: x / Bacteria: x          IMAGING

## 2025-05-04 LAB
ALBUMIN SERPL ELPH-MCNC: 3 G/DL — LOW (ref 3.5–5.2)
ALP SERPL-CCNC: 232 U/L — HIGH (ref 30–115)
ALT FLD-CCNC: 29 U/L — SIGNIFICANT CHANGE UP (ref 0–41)
ANION GAP SERPL CALC-SCNC: 12 MMOL/L — SIGNIFICANT CHANGE UP (ref 7–14)
AST SERPL-CCNC: 36 U/L — SIGNIFICANT CHANGE UP (ref 0–41)
BASOPHILS # BLD AUTO: 0.06 K/UL — SIGNIFICANT CHANGE UP (ref 0–0.2)
BASOPHILS NFR BLD AUTO: 0.9 % — SIGNIFICANT CHANGE UP (ref 0–1)
BILIRUB SERPL-MCNC: 0.9 MG/DL — SIGNIFICANT CHANGE UP (ref 0.2–1.2)
BUN SERPL-MCNC: 15 MG/DL — SIGNIFICANT CHANGE UP (ref 10–20)
CALCIUM SERPL-MCNC: 8.9 MG/DL — SIGNIFICANT CHANGE UP (ref 8.4–10.5)
CHLORIDE SERPL-SCNC: 101 MMOL/L — SIGNIFICANT CHANGE UP (ref 98–110)
CO2 SERPL-SCNC: 24 MMOL/L — SIGNIFICANT CHANGE UP (ref 17–32)
CREAT SERPL-MCNC: 0.7 MG/DL — SIGNIFICANT CHANGE UP (ref 0.7–1.5)
EGFR: 105 ML/MIN/1.73M2 — SIGNIFICANT CHANGE UP
EGFR: 105 ML/MIN/1.73M2 — SIGNIFICANT CHANGE UP
EOSINOPHIL # BLD AUTO: 0.1 K/UL — SIGNIFICANT CHANGE UP (ref 0–0.7)
EOSINOPHIL NFR BLD AUTO: 1.5 % — SIGNIFICANT CHANGE UP (ref 0–8)
GLUCOSE SERPL-MCNC: 97 MG/DL — SIGNIFICANT CHANGE UP (ref 70–99)
HCT VFR BLD CALC: 26.5 % — LOW (ref 42–52)
HGB BLD-MCNC: 8.7 G/DL — LOW (ref 14–18)
IMM GRANULOCYTES NFR BLD AUTO: 13.8 % — HIGH (ref 0.1–0.3)
LYMPHOCYTES # BLD AUTO: 1.1 K/UL — LOW (ref 1.2–3.4)
LYMPHOCYTES # BLD AUTO: 16.5 % — LOW (ref 20.5–51.1)
MAGNESIUM SERPL-MCNC: 2.2 MG/DL — SIGNIFICANT CHANGE UP (ref 1.8–2.4)
MCHC RBC-ENTMCNC: 25.6 PG — LOW (ref 27–31)
MCHC RBC-ENTMCNC: 32.8 G/DL — SIGNIFICANT CHANGE UP (ref 32–37)
MCV RBC AUTO: 77.9 FL — LOW (ref 80–94)
MONOCYTES # BLD AUTO: 0.86 K/UL — HIGH (ref 0.1–0.6)
MONOCYTES NFR BLD AUTO: 12.9 % — HIGH (ref 1.7–9.3)
NEUTROPHILS # BLD AUTO: 3.63 K/UL — SIGNIFICANT CHANGE UP (ref 1.4–6.5)
NEUTROPHILS NFR BLD AUTO: 54.4 % — SIGNIFICANT CHANGE UP (ref 42.2–75.2)
NRBC BLD AUTO-RTO: 2 /100 WBCS — HIGH (ref 0–0)
PLATELET # BLD AUTO: 119 K/UL — LOW (ref 130–400)
PMV BLD: 9.9 FL — SIGNIFICANT CHANGE UP (ref 7.4–10.4)
POTASSIUM SERPL-MCNC: 4.1 MMOL/L — SIGNIFICANT CHANGE UP (ref 3.5–5)
POTASSIUM SERPL-SCNC: 4.1 MMOL/L — SIGNIFICANT CHANGE UP (ref 3.5–5)
PROT SERPL-MCNC: 5.5 G/DL — LOW (ref 6–8)
RBC # BLD: 3.4 M/UL — LOW (ref 4.7–6.1)
RBC # FLD: 14.9 % — HIGH (ref 11.5–14.5)
SODIUM SERPL-SCNC: 137 MMOL/L — SIGNIFICANT CHANGE UP (ref 135–146)
WBC # BLD: 6.67 K/UL — SIGNIFICANT CHANGE UP (ref 4.8–10.8)
WBC # FLD AUTO: 6.67 K/UL — SIGNIFICANT CHANGE UP (ref 4.8–10.8)

## 2025-05-04 PROCEDURE — 99232 SBSQ HOSP IP/OBS MODERATE 35: CPT

## 2025-05-04 RX ORDER — MEGESTROL ACETATE 40 MG
20 TABLET ORAL DAILY
Refills: 0 | Status: DISCONTINUED | OUTPATIENT
Start: 2025-05-04 | End: 2025-05-06

## 2025-05-04 RX ADMIN — Medication 40 MILLIGRAM(S): at 17:31

## 2025-05-04 RX ADMIN — Medication 1 GRAM(S): at 00:03

## 2025-05-04 RX ADMIN — OLANZAPINE 2.5 MILLIGRAM(S): 10 TABLET ORAL at 11:02

## 2025-05-04 RX ADMIN — Medication 1 GRAM(S): at 05:09

## 2025-05-04 RX ADMIN — Medication 20 MILLIGRAM(S): at 11:02

## 2025-05-04 RX ADMIN — Medication 40 MILLIGRAM(S): at 05:03

## 2025-05-04 RX ADMIN — SCOPOLAMINE 1 PATCH: 1 PATCH, EXTENDED RELEASE TRANSDERMAL at 20:19

## 2025-05-04 RX ADMIN — METOPROLOL SUCCINATE 12.5 MILLIGRAM(S): 50 TABLET, EXTENDED RELEASE ORAL at 05:09

## 2025-05-04 RX ADMIN — Medication 1 GRAM(S): at 23:50

## 2025-05-04 RX ADMIN — CLOPIDOGREL BISULFATE 75 MILLIGRAM(S): 75 TABLET, FILM COATED ORAL at 11:02

## 2025-05-04 RX ADMIN — SCOPOLAMINE 1 PATCH: 1 PATCH, EXTENDED RELEASE TRANSDERMAL at 07:26

## 2025-05-04 RX ADMIN — SCOPOLAMINE 1 PATCH: 1 PATCH, EXTENDED RELEASE TRANSDERMAL at 19:42

## 2025-05-04 RX ADMIN — Medication 8 MILLIGRAM(S): at 11:01

## 2025-05-04 RX ADMIN — METOPROLOL SUCCINATE 12.5 MILLIGRAM(S): 50 TABLET, EXTENDED RELEASE ORAL at 17:30

## 2025-05-04 RX ADMIN — ATORVASTATIN CALCIUM 80 MILLIGRAM(S): 80 TABLET, FILM COATED ORAL at 21:36

## 2025-05-04 RX ADMIN — Medication 1 TABLET(S): at 13:51

## 2025-05-04 RX ADMIN — Medication 1 TABLET(S): at 17:30

## 2025-05-04 RX ADMIN — Medication 1 GRAM(S): at 11:02

## 2025-05-04 RX ADMIN — ENOXAPARIN SODIUM 40 MILLIGRAM(S): 100 INJECTION SUBCUTANEOUS at 10:08

## 2025-05-04 RX ADMIN — Medication 1 APPLICATION(S): at 11:02

## 2025-05-04 RX ADMIN — Medication 1 TABLET(S): at 11:02

## 2025-05-04 RX ADMIN — Medication 1 TABLET(S): at 07:37

## 2025-05-04 RX ADMIN — Medication 1 GRAM(S): at 18:17

## 2025-05-04 NOTE — PROGRESS NOTE ADULT - SUBJECTIVE AND OBJECTIVE BOX
SUBJECTIVE/OVERNIGHT EVENTS  Today is hospital day 16d.    MEDICATIONS  STANDING MEDICATIONS  atorvastatin 80 milliGRAM(s) Oral at bedtime  chlorhexidine 2% Cloths 1 Application(s) Topical daily  clopidogrel Tablet 75 milliGRAM(s) Oral daily  dronabinol 2.5 milliGRAM(s) Oral daily  enoxaparin Injectable 40 milliGRAM(s) SubCutaneous every 24 hours  lactobacillus acidophilus 1 Tablet(s) Oral three times a day with meals  lidocaine   4% Patch 1 Patch Transdermal every 24 hours  metoprolol tartrate 12.5 milliGRAM(s) Oral two times a day  multivitamin  Chewable 1 Tablet(s) Oral daily  OLANZapine 2.5 milliGRAM(s) Oral daily  pantoprazole    Tablet 40 milliGRAM(s) Oral two times a day  sucralfate 1 Gram(s) Oral four times a day    PRN MEDICATIONS  acetaminophen     Tablet .. 650 milliGRAM(s) Oral every 6 hours PRN  ondansetron Injectable 8 milliGRAM(s) IV Push every 8 hours PRN  oxycodone    5 mG/acetaminophen 325 mG 1 Tablet(s) Oral every 4 hours PRN  simethicone 80 milliGRAM(s) Chew every 6 hours PRN    VITALS  T(F): 98.2 (05-04-25 @ 00:01), Max: 98.4 (05-03-25 @ 15:00)  HR: 82 (05-04-25 @ 00:01) (73 - 88)  BP: 113/70 (05-04-25 @ 00:01) (112/67 - 115/63)  RR: 18 (05-03-25 @ 08:37) (18 - 18)  SpO2: --      LABS             8.6    4.99  )-----------( 107      ( 05-02-25 @ 07:54 )             27.1     136  |  101  |  13  -------------------------<  89   05-02-25 @ 07:54  4.3  |  24  |  0.8    Ca      8.7     05-02-25 @ 07:54          Urinalysis Basic - ( 02 May 2025 07:54 )    Color: x / Appearance: x / SG: x / pH: x  Gluc: 89 mg/dL / Ketone: x  / Bili: x / Urobili: x   Blood: x / Protein: x / Nitrite: x   Leuk Esterase: x / RBC: x / WBC x   Sq Epi: x / Non Sq Epi: x / Bacteria: x

## 2025-05-04 NOTE — PROGRESS NOTE ADULT - SUBJECTIVE AND OBJECTIVE BOX
JONAS CELESTIN  59y  [facility] 4B 023 B      Patient is a 59y old  Male who presents with a chief complaint of abdominal pain (04 May 2025 03:52)      My note supersedes the resident's note      INTERVAL HPI/OVERNIGHT EVENTS:    no acute events overnight     REVIEW OF SYSTEMS:  CONSTITUTIONAL: No fever, weight loss, or fatigue  EYES: No eye pain, visual disturbances, or discharge  ENMT:  No difficulty hearing, tinnitus, vertigo; No sinus or throat pain  NECK: No pain or stiffness  BREASTS: No pain, masses, or nipple discharge  RESPIRATORY: No cough, wheezing, chills or hemoptysis; No shortness of breath  CARDIOVASCULAR: No chest pain, palpitations, dizziness, or leg swelling  GASTROINTESTINAL:low appetitie   GENITOURINARY: No dysuria, frequency, hematuria, or incontinence  NEUROLOGICAL: No headaches, memory loss, loss of strength, numbness, or tremors  SKIN: No itching, burning, rashes, or lesions   LYMPH NODES: No enlarged glands  ENDOCRINE: No heat or cold intolerance; No hair loss  MUSCULOSKELETAL: No joint pain or swelling; No muscle, back, or extremity pain  PSYCHIATRIC: No depression, anxiety, mood swings, or difficulty sleeping  HEME/LYMPH: No easy bruising, or bleeding gums  ALLERY AND IMMUNOLOGIC: No hives or eczema  FAMILY HISTORY:  FHx: lung cancer (Father)      T(C): 36.7 (05-04-25 @ 08:00), Max: 36.9 (05-03-25 @ 15:00)  HR: 84 (05-04-25 @ 08:00) (81 - 88)  BP: 107/64 (05-04-25 @ 08:00) (107/64 - 113/70)  RR: 18 (05-04-25 @ 08:00) (18 - 18)  SpO2: 95% (05-04-25 @ 08:00) (94% - 95%)  Wt(kg): --Vital Signs Last 24 Hrs  T(C): 36.7 (04 May 2025 08:00), Max: 36.9 (03 May 2025 15:00)  T(F): 98 (04 May 2025 08:00), Max: 98.4 (03 May 2025 15:00)  HR: 84 (04 May 2025 08:00) (81 - 88)  BP: 107/64 (04 May 2025 08:00) (107/64 - 113/70)  BP(mean): 79 (04 May 2025 05:15) (79 - 79)  RR: 18 (04 May 2025 08:00) (18 - 18)  SpO2: 95% (04 May 2025 08:00) (94% - 95%)    Parameters below as of 04 May 2025 05:15  Patient On (Oxygen Delivery Method): room air        PHYSICAL EXAM:  GENERAL: NAD,   HEAD:  Atraumatic, Normocephalic  EYES: EOMI, PERRLA, conjunctiva and sclera clear  ENMT: No tonsillar erythema, exudates, or enlargement; Moist mucous membranes, Good dentition, No lesions  NECK: Supple, No JVD, Normal thyroid  NERVOUS SYSTEM:  Alert & Oriented X3, Good concentration; Motor Strength 5/5 B/L upper and lower extremities; DTRs 2+ intact and symmetric  PULM: Clear to auscultation bilaterally  CARDIAC: Regular rate and rhythm; No murmurs, rubs, or gallops  GI: Soft, Nontender, Nondistended; Bowel sounds present  EXTREMITIES:  2+ Peripheral Pulses, No clubbing, cyanosis, or edema  LYMPH: No lymphadenopathy noted  SKIN: No rashes or lesions    Consultant(s) Notes Reviewed:  [x ] YES  [ ] NO  Care Discussed with Consultants/Other Providers [ x] YES  [ ] NO    LABS:                            8.7    6.67  )-----------( 119      ( 04 May 2025 08:28 )             26.5   05-04    137  |  101  |  15  ----------------------------<  97  4.1   |  24  |  0.7    Ca    8.9      04 May 2025 08:28  Mg     2.2     05-04    TPro  5.5[L]  /  Alb  3.0[L]  /  TBili  0.9  /  DBili  x   /  AST  36  /  ALT  29  /  AlkPhos  232[H]  05-04            acetaminophen     Tablet .. 650 milliGRAM(s) Oral every 6 hours PRN  atorvastatin 80 milliGRAM(s) Oral at bedtime  chlorhexidine 2% Cloths 1 Application(s) Topical daily  clopidogrel Tablet 75 milliGRAM(s) Oral daily  enoxaparin Injectable 40 milliGRAM(s) SubCutaneous every 24 hours  lactobacillus acidophilus 1 Tablet(s) Oral three times a day with meals  lidocaine   4% Patch 1 Patch Transdermal every 24 hours  megestrol 20 milliGRAM(s) Oral daily  metoprolol tartrate 12.5 milliGRAM(s) Oral two times a day  multivitamin  Chewable 1 Tablet(s) Oral daily  OLANZapine 2.5 milliGRAM(s) Oral daily  ondansetron Injectable 8 milliGRAM(s) IV Push every 8 hours PRN  oxycodone    5 mG/acetaminophen 325 mG 1 Tablet(s) Oral every 4 hours PRN  pantoprazole    Tablet 40 milliGRAM(s) Oral two times a day  simethicone 80 milliGRAM(s) Chew every 6 hours PRN  sucralfate 1 Gram(s) Oral four times a day      HEALTH ISSUES - PROBLEM Dx:          Case Discussed with House Staff     Spectra [phone]

## 2025-05-04 NOTE — PROGRESS NOTE ADULT - ASSESSMENT
60 yo M with a PMH of HTN, hiatal hernia, HLD, CAD s/p CABG in Jan 2024, paroxysmal A. fib on plavix, former smoker (30 pack years), recently diagnosed with colitis and treated during his admission 4/1-4/5 presenting to the ED for evaluation of persistent abdominal pain and weakness x 3 weeks. Patient states he also has lower back pain and endorses chills, states he had a fever 3 days ago. Patient denies chest pain, sob, nausea, vomiting, diarrhea, urinary symptoms.    #New GE junction mass-esophageal adenocarcinoma    - pathology resulted - adenocarcinoma with signet ring features   stent placed   tolerating current diet , but does not have appetite , will change to megace from marinol     #orthostatic hypotension  05-03-25 @ 12:12  Lying BP: Orthostatic BP (Lying Systolic): 109/Orthostatic BP (Lying Diastolic (mm Hg)): 65 HR: Orthostatic Pulse (Heart Rate (beats/min)): 86   Sitting BP: Orthostatic BP (Sitting Systolic): 107/Orthostatic BP (Sitting Diastolic (mm Hg)): 66 HR: Orthostatic Pulse (Heart Rate (beats/min)): 108  Standing BP: Orthostatic BP (Standing Systolic): 100/Orthostatic BP (Standing Diastolic (mm Hg)): 65 HR: Orthostatic Pulse (Heart Rate (beats/min)): 108  Site: Orthostatic BP/Pulse (Site): upper right arm   Mode: Orthostatic BP/ Pulse (Mode): electronic   resolved     #Chronic Anemia  monitor no indication for treatment     #3.8 cm wide R iliac artery aneurysm- stable  - F/u vascular o/p- surgery scheduled with Dr. Tavera, was scheduled for 4/21/2025?    #CAD s/p CABG in Jan 2024    #Paroxysmal A.fib - not on AC?  - C/w home meds  -plavix resumed     #Acute on chronic back pain  controlled with morphine and lidocaine patch    #Debility pt     PROGRESS NOTE HANDOFF    Pending:  pt rehab      Family discussion: patient verbalized understanding and agreeable to plan of care     Disposition:  possible home if able to ambulate safely

## 2025-05-04 NOTE — PROGRESS NOTE ADULT - ASSESSMENT
58 yo M with a PMH of HTN, hiatal hernia, HLD, CAD s/p CABG in Jan 2024, paroxysmal A. fib on plavix, former smoker (30 pack years), recently diagnosed with colitis and treated during his admission 4/1-4/5 presenting to the ED for evaluation of persistent abdominal pain and weakness x 3 weeks. Patient states he also has lower back pain and endorses chills, states he had a fever     #Abdominal Pain 2/2 colitis/erosive gastritis seen on colo  #Adenocarcinoma at GE Junction  - CT abd/pelv w/ IVC- Redemonstrated prominent submucosal fat/bowel wall thickening involving descending colon, possibly related to prior episodes of colitis. Otherwise, no new acute findings in the abdomen or pelvis.  - GGT: 30, amylase 62. lipase 67  - CT chest IV con to rule out metastasis: 1.  No definite evidence of thoracic metastatic disease. 2.  Small bilateral pleural effusions.3.  Marked circumferential thickening of the distal esophagus/hiatal   hernia could be related to provided history of suspected esophageal   cancer versus esophagitis.  - blood cx negative , urine cx negative   - per cardio: may proceed with GI procedures without further cardiac workup. Pt is at moderate risk for a perioperative cardiac event. Ok to hold plavix.    - restarterd plavix 4/29  - EGD Impressions: A friable circumferential mass seen from the GE junction at 38cm to 28 cm of mid esophagus. Multiple biopsies were obtained. NEX powder was sprayed all over the mass to achieve adequate hemostasis. Another 5mm lesion was noted at 20cm from incisors in the proximal esophagus. Erosions in the stomach compatible with erosive gastritis. (Biopsy). A pulsating ulcerated lesion was noted in the fundus. Biopsy was not obtained. Normal mucosa in the whole examined duodenum. (Biopsy).  - Colonoscopy Impressions: Multiple semi-pedunculated polyps were seen in the left colon. A 2cm polyp was seen in the descending colon, biopsy was obtained. Polyps were not removed in the setting of poor prep. External hemorrhoids.Solid stool noted in the whole colon. Cecum was not reached  -path: adenocarcinoma mucin, signet cells  -H/O: consult  PET scan, Surg Onc Dr Yost for possible resection  -Surg/Onc consult: possible esophagus stent  -GI: Esophageal Stent/ EUS 5/1/25  - plan for repeat colonoscopy OP  - 5/3: Patient's diet is advanced. Will try to reach to a diet consistency of pureed today  - PT/OT consult for dispo planning and increasing strength. suspect debilitation 2/2 illness  - Obtain orthostats today. On 5/2/25, patient had trouble getting out of bed and standing up to do physical activity.     #chronic lower back pain   - xray lumbar spine:  There are five nonrib-bearing lumbar vertebral bodies. The pedicles are   visualized. The vertebral body heights and alignment are maintained.   There is mild disc space narrowing and degenerative changes throughout   the lumbar spine. Mild degenerative change of the bilateral sacroiliac joints.  - pain control w/ morphine     #chronic anemia  - In the ED, Hb 11.6, baseline ~12  - MCV 79.2    #3.8 cm wide R iliac artery aneurysm- stable  - vascular o/p- surgery was scheduled with Dr. Tavera     #HTN  #CAD s/p CABG in Jan 2024  #Paroxysmal A.fib  - c/w home meds  - 5/3- restarted plavix today. OK by GI    #DVT ppx: lovenox 40mg subQ QD  #GI ppx: 40mg PO BID   #Activity: AAT  #Code: full  #Dispo: dc planning. advance diet (maximum allowed is pureed consistency to avoid clogging new CBD stent), need PT eval. check orthostatics

## 2025-05-05 LAB
ALBUMIN SERPL ELPH-MCNC: 3.1 G/DL — LOW (ref 3.5–5.2)
ALP SERPL-CCNC: 229 U/L — HIGH (ref 30–115)
ALT FLD-CCNC: 25 U/L — SIGNIFICANT CHANGE UP (ref 0–41)
ANION GAP SERPL CALC-SCNC: 9 MMOL/L — SIGNIFICANT CHANGE UP (ref 7–14)
AST SERPL-CCNC: 32 U/L — SIGNIFICANT CHANGE UP (ref 0–41)
BASOPHILS # BLD AUTO: 0.04 K/UL — SIGNIFICANT CHANGE UP (ref 0–0.2)
BASOPHILS NFR BLD AUTO: 0.7 % — SIGNIFICANT CHANGE UP (ref 0–1)
BILIRUB SERPL-MCNC: 0.9 MG/DL — SIGNIFICANT CHANGE UP (ref 0.2–1.2)
BUN SERPL-MCNC: 14 MG/DL — SIGNIFICANT CHANGE UP (ref 10–20)
CALCIUM SERPL-MCNC: 9.2 MG/DL — SIGNIFICANT CHANGE UP (ref 8.4–10.5)
CHLORIDE SERPL-SCNC: 96 MMOL/L — LOW (ref 98–110)
CO2 SERPL-SCNC: 26 MMOL/L — SIGNIFICANT CHANGE UP (ref 17–32)
CREAT SERPL-MCNC: 0.7 MG/DL — SIGNIFICANT CHANGE UP (ref 0.7–1.5)
EGFR: 105 ML/MIN/1.73M2 — SIGNIFICANT CHANGE UP
EGFR: 105 ML/MIN/1.73M2 — SIGNIFICANT CHANGE UP
EOSINOPHIL # BLD AUTO: 0.11 K/UL — SIGNIFICANT CHANGE UP (ref 0–0.7)
EOSINOPHIL NFR BLD AUTO: 2 % — SIGNIFICANT CHANGE UP (ref 0–8)
GLUCOSE SERPL-MCNC: 95 MG/DL — SIGNIFICANT CHANGE UP (ref 70–99)
HCT VFR BLD CALC: 26 % — LOW (ref 42–52)
HGB BLD-MCNC: 8.5 G/DL — LOW (ref 14–18)
IMM GRANULOCYTES NFR BLD AUTO: 13.7 % — HIGH (ref 0.1–0.3)
LYMPHOCYTES # BLD AUTO: 0.99 K/UL — LOW (ref 1.2–3.4)
LYMPHOCYTES # BLD AUTO: 18.1 % — LOW (ref 20.5–51.1)
MAGNESIUM SERPL-MCNC: 2.2 MG/DL — SIGNIFICANT CHANGE UP (ref 1.8–2.4)
MCHC RBC-ENTMCNC: 25.4 PG — LOW (ref 27–31)
MCHC RBC-ENTMCNC: 32.7 G/DL — SIGNIFICANT CHANGE UP (ref 32–37)
MCV RBC AUTO: 77.6 FL — LOW (ref 80–94)
MONOCYTES # BLD AUTO: 0.67 K/UL — HIGH (ref 0.1–0.6)
MONOCYTES NFR BLD AUTO: 12.2 % — HIGH (ref 1.7–9.3)
NEUTROPHILS # BLD AUTO: 2.91 K/UL — SIGNIFICANT CHANGE UP (ref 1.4–6.5)
NEUTROPHILS NFR BLD AUTO: 53.3 % — SIGNIFICANT CHANGE UP (ref 42.2–75.2)
NRBC BLD AUTO-RTO: 3 /100 WBCS — HIGH (ref 0–0)
PLATELET # BLD AUTO: 110 K/UL — LOW (ref 130–400)
PMV BLD: 10.4 FL — SIGNIFICANT CHANGE UP (ref 7.4–10.4)
POTASSIUM SERPL-MCNC: 3.8 MMOL/L — SIGNIFICANT CHANGE UP (ref 3.5–5)
POTASSIUM SERPL-SCNC: 3.8 MMOL/L — SIGNIFICANT CHANGE UP (ref 3.5–5)
PROT SERPL-MCNC: 5.6 G/DL — LOW (ref 6–8)
RBC # BLD: 3.35 M/UL — LOW (ref 4.7–6.1)
RBC # FLD: 15 % — HIGH (ref 11.5–14.5)
SODIUM SERPL-SCNC: 131 MMOL/L — LOW (ref 135–146)
WBC # BLD: 5.47 K/UL — SIGNIFICANT CHANGE UP (ref 4.8–10.8)
WBC # FLD AUTO: 5.47 K/UL — SIGNIFICANT CHANGE UP (ref 4.8–10.8)

## 2025-05-05 PROCEDURE — 99232 SBSQ HOSP IP/OBS MODERATE 35: CPT

## 2025-05-05 RX ORDER — SODIUM CHLORIDE 9 G/1000ML
1000 INJECTION, SOLUTION INTRAVENOUS
Refills: 0 | Status: DISCONTINUED | OUTPATIENT
Start: 2025-05-05 | End: 2025-05-06

## 2025-05-05 RX ADMIN — Medication 1 APPLICATION(S): at 11:48

## 2025-05-05 RX ADMIN — SODIUM CHLORIDE 80 MILLILITER(S): 9 INJECTION, SOLUTION INTRAVENOUS at 17:01

## 2025-05-05 RX ADMIN — Medication 20 MILLIGRAM(S): at 11:48

## 2025-05-05 RX ADMIN — OLANZAPINE 2.5 MILLIGRAM(S): 10 TABLET ORAL at 11:45

## 2025-05-05 RX ADMIN — Medication 1 TABLET(S): at 17:56

## 2025-05-05 RX ADMIN — ENOXAPARIN SODIUM 40 MILLIGRAM(S): 100 INJECTION SUBCUTANEOUS at 07:56

## 2025-05-05 RX ADMIN — Medication 40 MILLIGRAM(S): at 17:56

## 2025-05-05 RX ADMIN — ATORVASTATIN CALCIUM 80 MILLIGRAM(S): 80 TABLET, FILM COATED ORAL at 21:08

## 2025-05-05 RX ADMIN — Medication 1 TABLET(S): at 07:55

## 2025-05-05 RX ADMIN — Medication 8 MILLIGRAM(S): at 11:45

## 2025-05-05 RX ADMIN — CLOPIDOGREL BISULFATE 75 MILLIGRAM(S): 75 TABLET, FILM COATED ORAL at 11:45

## 2025-05-05 RX ADMIN — Medication 1 TABLET(S): at 11:45

## 2025-05-05 RX ADMIN — Medication 40 MILLIGRAM(S): at 05:06

## 2025-05-05 RX ADMIN — Medication 1 GRAM(S): at 05:06

## 2025-05-05 RX ADMIN — Medication 1 TABLET(S): at 11:44

## 2025-05-05 RX ADMIN — Medication 1 GRAM(S): at 21:08

## 2025-05-05 RX ADMIN — Medication 1 GRAM(S): at 11:45

## 2025-05-05 RX ADMIN — METOPROLOL SUCCINATE 12.5 MILLIGRAM(S): 50 TABLET, EXTENDED RELEASE ORAL at 05:06

## 2025-05-05 RX ADMIN — Medication 1 GRAM(S): at 17:56

## 2025-05-05 NOTE — PROGRESS NOTE ADULT - SUBJECTIVE AND OBJECTIVE BOX
SUBJECTIVE/OVERNIGHT EVENTS  Today is hospital day 17d. This morning patient was seen and examined at bedside, resting comfortably in bed. No acute or major events overnight. Pt has not had BM since Saturday, but pt said he has also been eating very little, passing gas.     MEDICATIONS  STANDING MEDICATIONS  atorvastatin 80 milliGRAM(s) Oral at bedtime  chlorhexidine 2% Cloths 1 Application(s) Topical daily  clopidogrel Tablet 75 milliGRAM(s) Oral daily  enoxaparin Injectable 40 milliGRAM(s) SubCutaneous every 24 hours  lactobacillus acidophilus 1 Tablet(s) Oral three times a day with meals  lidocaine   4% Patch 1 Patch Transdermal every 24 hours  megestrol 20 milliGRAM(s) Oral daily  metoprolol tartrate 12.5 milliGRAM(s) Oral two times a day  multivitamin  Chewable 1 Tablet(s) Oral daily  OLANZapine 2.5 milliGRAM(s) Oral daily  pantoprazole    Tablet 40 milliGRAM(s) Oral two times a day  sucralfate 1 Gram(s) Oral four times a day    PRN MEDICATIONS  acetaminophen     Tablet .. 650 milliGRAM(s) Oral every 6 hours PRN  ondansetron Injectable 8 milliGRAM(s) IV Push every 8 hours PRN  oxycodone    5 mG/acetaminophen 325 mG 1 Tablet(s) Oral every 4 hours PRN  simethicone 80 milliGRAM(s) Chew every 6 hours PRN    VITALS  T(F): 98.2 (05-05-25 @ 08:22), Max: 98.8 (05-05-25 @ 00:20)  HR: 80 (05-05-25 @ 08:22) (75 - 91)  BP: 106/65 (05-05-25 @ 08:22) (94/57 - 110/65)  RR: 17 (05-05-25 @ 08:22) (17 - 18)  SpO2: 100% (05-05-25 @ 08:22) (95% - 100%)    PHYSICAL EXAM  GENERAL: NAD, lying in bed comfortably  HEAD:  Atraumatic, normocephalic  EYES: EOMI, conjunctiva and sclera clear  NECK: Supple, no JVD  HEART: Regular rate and rhythm, no murmurs  LUNGS: Unlabored respirations.  Clear to auscultation bilaterally, no crackles, wheezing, or rhonchi  ABDOMEN: Soft, nontender, nondistended, +BS  EXTREMITIES: 2+ peripheral pulses bilaterally. No clubbing, cyanosis, or edema  NERVOUS SYSTEM:  A&Ox3, no focal deficits   SKIN: No rashes or lesions    LABS             8.5    5.47  )-----------( 110      ( 05-05-25 @ 07:48 )             26.0     131  |  96  |  14  -------------------------<  95   05-05-25 @ 07:48  3.8  |  26  |  0.7    Ca      9.2     05-05-25 @ 07:48  Mg     2.2     05-05-25 @ 07:48    TPro  5.6  /  Alb  3.1  /  TBili  0.9  /  DBili  x   /  AST  32  /  ALT  25  /  AlkPhos  229  /  GGT  x     05-05-25 @ 07:48        Urinalysis Basic - ( 05 May 2025 07:48 )    Color: x / Appearance: x / SG: x / pH: x  Gluc: 95 mg/dL / Ketone: x  / Bili: x / Urobili: x   Blood: x / Protein: x / Nitrite: x   Leuk Esterase: x / RBC: x / WBC x   Sq Epi: x / Non Sq Epi: x / Bacteria: x          IMAGING SUBJECTIVE/OVERNIGHT EVENTS  Today is hospital day 17d. This morning patient was seen and examined at bedside, resting comfortably in bed. No acute or major events overnight. Pt has not had BM since Saturday, but pt said he has also been eating very little, passing gas.     MEDICATIONS  STANDING MEDICATIONS  atorvastatin 80 milliGRAM(s) Oral at bedtime  chlorhexidine 2% Cloths 1 Application(s) Topical daily  clopidogrel Tablet 75 milliGRAM(s) Oral daily  enoxaparin Injectable 40 milliGRAM(s) SubCutaneous every 24 hours  lactobacillus acidophilus 1 Tablet(s) Oral three times a day with meals  lidocaine   4% Patch 1 Patch Transdermal every 24 hours  megestrol 20 milliGRAM(s) Oral daily  metoprolol tartrate 12.5 milliGRAM(s) Oral two times a day  multivitamin  Chewable 1 Tablet(s) Oral daily  OLANZapine 2.5 milliGRAM(s) Oral daily  pantoprazole    Tablet 40 milliGRAM(s) Oral two times a day  sucralfate 1 Gram(s) Oral four times a day    PRN MEDICATIONS  acetaminophen     Tablet .. 650 milliGRAM(s) Oral every 6 hours PRN  ondansetron Injectable 8 milliGRAM(s) IV Push every 8 hours PRN  oxycodone    5 mG/acetaminophen 325 mG 1 Tablet(s) Oral every 4 hours PRN  simethicone 80 milliGRAM(s) Chew every 6 hours PRN    VITALS  T(F): 98.2 (05-05-25 @ 08:22), Max: 98.8 (05-05-25 @ 00:20)  HR: 80 (05-05-25 @ 08:22) (75 - 91)  BP: 106/65 (05-05-25 @ 08:22) (94/57 - 110/65)  RR: 17 (05-05-25 @ 08:22) (17 - 18)  SpO2: 100% (05-05-25 @ 08:22) (95% - 100%)    PHYSICAL EXAM  GENERAL: NAD, lying in bed comfortably  HEAD:  Atraumatic, normocephalic  EYES: EOMI, conjunctiva and sclera clear  NECK: Supple, no JVD  HEART: Regular rate and rhythm, no murmurs  LUNGS: Unlabored respirations.  Clear to auscultation bilaterally, no crackles, wheezing, or rhonchi  ABDOMEN: Soft, nontender, nondistended, +BS  EXTREMITIES: 2+ peripheral pulses bilaterally. No clubbing, cyanosis, or edema  NERVOUS SYSTEM:  A&Ox3, no focal deficits   SKIN: No rashes or lesions    LABS             8.5    5.47  )-----------( 110      ( 05-05-25 @ 07:48 )             26.0     131  |  96  |  14  -------------------------<  95   05-05-25 @ 07:48  3.8  |  26  |  0.7    Ca      9.2     05-05-25 @ 07:48  Mg     2.2     05-05-25 @ 07:48    TPro  5.6  /  Alb  3.1  /  TBili  0.9  /  DBili  x   /  AST  32  /  ALT  25  /  AlkPhos  229  /  GGT  x     05-05-25 @ 07:48        Urinalysis Basic - ( 05 May 2025 07:48 )    Color: x / Appearance: x / SG: x / pH: x  Gluc: 95 mg/dL / Ketone: x  / Bili: x / Urobili: x   Blood: x / Protein: x / Nitrite: x   Leuk Esterase: x / RBC: x / WBC x   Sq Epi: x / Non Sq Epi: x / Bacteria: x

## 2025-05-05 NOTE — PROGRESS NOTE ADULT - ASSESSMENT
60 yo M with a PMH of HTN, hiatal hernia, HLD, CAD s/p CABG in Jan 2024, paroxysmal A. fib on plavix, former smoker (30 pack years), recently diagnosed with colitis and treated during his admission 4/1-4/5 presenting to the ED for evaluation of persistent abdominal pain and weakness x 3 weeks. Patient states he also has lower back pain and endorses chills, states he had a fever 3 days ago. Patient denies chest pain, sob, nausea, vomiting, diarrhea, urinary symptoms.    #New GE junction mass-esophageal adenocarcinoma    - pathology resulted - adenocarcinoma with signet ring features   stent placed   tolerating diet , eating more today , continue with appetite stimulant     #Chronic Anemia  monitor no indication for treatment     #3.8 cm wide R iliac artery aneurysm- stable  op follow up with vascular     #CAD s/p CABG in Jan 2024    #Paroxysmal A.fib - not on AC?  - C/w home meds  -plavix resumed     #Acute on chronic back pain  controlled with morphine and lidocaine patch    #Debility pt     PROGRESS NOTE HANDOFF    Pending:  pt rehab      Family discussion: patient verbalized understanding and agreeable to plan of care     Disposition:  possible home if able to ambulate safely

## 2025-05-05 NOTE — PROGRESS NOTE ADULT - SUBJECTIVE AND OBJECTIVE BOX
JONAS CELESTIN  59y  [facility] [address]      Patient is a 59y old  Male who presents with a chief complaint of abdominal pain (05 May 2025 08:37)      My note supersedes the resident's note      INTERVAL HPI/OVERNIGHT EVENTS:    no events overnight    REVIEW OF SYSTEMS:  CONSTITUTIONAL: No fever, weight loss, or fatigue  EYES: No eye pain, visual disturbances, or discharge  ENMT:  No difficulty hearing, tinnitus, vertigo; No sinus or throat pain  NECK: No pain or stiffness  BREASTS: No pain, masses, or nipple discharge  RESPIRATORY: No cough, wheezing, chills or hemoptysis; No shortness of breath  CARDIOVASCULAR: No chest pain, palpitations, dizziness, or leg swelling  GASTROINTESTINAL: trying to eat  GENITOURINARY: No dysuria, frequency, hematuria, or incontinence  NEUROLOGICAL: No headaches, memory loss, loss of strength, numbness, or tremors  SKIN: No itching, burning, rashes, or lesions   LYMPH NODES: No enlarged glands  ENDOCRINE: No heat or cold intolerance; No hair loss  MUSCULOSKELETAL: No joint pain or swelling; No muscle, back, or extremity pain  PSYCHIATRIC: No depression, anxiety, mood swings, or difficulty sleeping  HEME/LYMPH: No easy bruising, or bleeding gums  ALLERY AND IMMUNOLOGIC: No hives or eczema  FAMILY HISTORY:  FHx: lung cancer (Father)      T(C): 36.8 (05-05-25 @ 08:22), Max: 37.1 (05-05-25 @ 00:20)  HR: 80 (05-05-25 @ 08:22) (75 - 91)  BP: 106/65 (05-05-25 @ 08:22) (94/57 - 110/65)  RR: 17 (05-05-25 @ 08:22) (17 - 18)  SpO2: 100% (05-05-25 @ 08:22) (95% - 100%)  Wt(kg): --Vital Signs Last 24 Hrs  T(C): 36.8 (05 May 2025 08:22), Max: 37.1 (05 May 2025 00:20)  T(F): 98.2 (05 May 2025 08:22), Max: 98.8 (05 May 2025 00:20)  HR: 80 (05 May 2025 08:22) (75 - 91)  BP: 106/65 (05 May 2025 08:22) (94/57 - 110/65)  BP(mean): 69 (05 May 2025 00:20) (69 - 79)  RR: 17 (05 May 2025 08:22) (17 - 18)  SpO2: 100% (05 May 2025 08:22) (95% - 100%)    Parameters below as of 05 May 2025 08:22  Patient On (Oxygen Delivery Method): room air        PHYSICAL EXAM:  GENERAL: NAD,   HEAD:  Atraumatic, Normocephalic  EYES: EOMI, PERRLA, conjunctiva and sclera clear  ENMT: No tonsillar erythema, exudates, or enlargement; Moist mucous membranes, Good dentition, No lesions  NECK: Supple, No JVD, Normal thyroid  NERVOUS SYSTEM:  Alert & Oriented X3, Good concentration; Motor Strength 5/5 B/L upper and lower extremities; DTRs 2+ intact and symmetric  PULM: Clear to auscultation bilaterally  CARDIAC: Regular rate and rhythm; No murmurs, rubs, or gallops  GI: Soft, Nontender, Nondistended; Bowel sounds present  EXTREMITIES:  2+ Peripheral Pulses, No clubbing, cyanosis, or edema  LYMPH: No lymphadenopathy noted  SKIN: No rashes or lesions    Consultant(s) Notes Reviewed:  [x ] YES  [ ] NO  Care Discussed with Consultants/Other Providers [ x] YES  [ ] NO    LABS:                            8.5    5.47  )-----------( 110      ( 05 May 2025 07:48 )             26.0   05-05    131[L]  |  96[L]  |  14  ----------------------------<  95  3.8   |  26  |  0.7    Ca    9.2      05 May 2025 07:48  Mg     2.2     05-05    TPro  5.6[L]  /  Alb  3.1[L]  /  TBili  0.9  /  DBili  x   /  AST  32  /  ALT  25  /  AlkPhos  229[H]  05-05            acetaminophen     Tablet .. 650 milliGRAM(s) Oral every 6 hours PRN  atorvastatin 80 milliGRAM(s) Oral at bedtime  chlorhexidine 2% Cloths 1 Application(s) Topical daily  clopidogrel Tablet 75 milliGRAM(s) Oral daily  enoxaparin Injectable 40 milliGRAM(s) SubCutaneous every 24 hours  lactobacillus acidophilus 1 Tablet(s) Oral three times a day with meals  lidocaine   4% Patch 1 Patch Transdermal every 24 hours  megestrol 20 milliGRAM(s) Oral daily  metoprolol tartrate 12.5 milliGRAM(s) Oral two times a day  multivitamin  Chewable 1 Tablet(s) Oral daily  OLANZapine 2.5 milliGRAM(s) Oral daily  ondansetron Injectable 8 milliGRAM(s) IV Push every 8 hours PRN  oxycodone    5 mG/acetaminophen 325 mG 1 Tablet(s) Oral every 4 hours PRN  pantoprazole    Tablet 40 milliGRAM(s) Oral two times a day  simethicone 80 milliGRAM(s) Chew every 6 hours PRN  sucralfate 1 Gram(s) Oral four times a day      HEALTH ISSUES - PROBLEM Dx:          Case Discussed with House Staff   Spectra [phone]

## 2025-05-05 NOTE — PROGRESS NOTE ADULT - ASSESSMENT
58 yo M with a PMH of HTN, hiatal hernia, HLD, CAD s/p CABG in Jan 2024, paroxysmal A. fib on plavix, former smoker (30 pack years), recently diagnosed with colitis and treated during his admission 4/1-4/5 presenting to the ED for evaluation of persistent abdominal pain and weakness x 3 weeks. Patient states he also has lower back pain and endorses chills, states he had a fever     #Abdominal Pain 2/2 colitis/erosive gastritis seen on colo  #Adenocarcinoma at GE Junction  - CT abd/pelv w/ IVC- Redemonstrated prominent submucosal fat/bowel wall thickening involving descending colon, possibly related to prior episodes of colitis. Otherwise, no new acute findings in the abdomen or pelvis.  - GGT: 30, amylase 62. lipase 67  - CT chest IV con to rule out metastasis: 1.  No definite evidence of thoracic metastatic disease. 2.  Small bilateral pleural effusions.3.  Marked circumferential thickening of the distal esophagus/hiatal   hernia could be related to provided history of suspected esophageal   cancer versus esophagitis.  - blood cx negative, urine cx negative   - per cardio: may proceed with GI procedures without further cardiac workup. Pt is at moderate risk for a perioperative cardiac event. Ok to hold plavix.    - restarterd plavix 4/29  - EGD Impressions: A friable circumferential mass seen from the GE junction at 38cm to 28 cm of mid esophagus. Multiple biopsies were obtained. NEX powder was sprayed all over the mass to achieve adequate hemostasis. Another 5mm lesion was noted at 20cm from incisors in the proximal esophagus. Erosions in the stomach compatible with erosive gastritis. (Biopsy). A pulsating ulcerated lesion was noted in the fundus. Biopsy was not obtained. Normal mucosa in the whole examined duodenum. (Biopsy).  - Colonoscopy Impressions: Multiple semi-pedunculated polyps were seen in the left colon. A 2cm polyp was seen in the descending colon, biopsy was obtained. Polyps were not removed in the setting of poor prep. External hemorrhoids.Solid stool noted in the whole colon. Cecum was not reached  -path: adenocarcinoma mucin, signet cells  - H/O: consult  PET scan, Surg Onc Dr Yost for possible resection  - f/u outpatient with Dr. Palacios  - Surg/Onc consult: possible esophagus stent  - GI: Esophageal Stent/ EUS 5/1/25  - plan for repeat colonoscopy OP  - diet advanced to puree (max)   - PPI BID for one month per GI  - weakness, unable to walk/stand  - orthostats negative  - encourage PO intake  - PT/OT consult for dispo planning and increasing strength. suspect debilitation 2/2 illness  - DC planning pending increased strength/PT/OT    #chronic lower back pain   - xray lumbar spine:  There are five nonrib-bearing lumbar vertebral bodies. The pedicles are visualized. The vertebral body heights and alignment are maintained. There is mild disc space narrowing and degenerative changes throughout the lumbar spine. Mild degenerative change of the bilateral sacroiliac joints.  - pain control     #chronic anemia  - In the ED, Hb 11.6, baseline ~12  - MCV 79.2    #3.8 cm wide R iliac artery aneurysm- stable  - vascular o/p- surgery was scheduled with Dr. Tavera     #HTN  #CAD s/p CABG in Jan 2024  #Paroxysmal A.fib  - c/w home meds  - 5/3- restarted plavix today. OK by GI    #MISC  #DVT ppx: lovenox 40mg subQ QD  #GI ppx: 40mg PO BID   #Activity: AAT  #Code: full  #Dispo: dc planning, need PT eval.     #PENDING  - DC planning pending PT  - encourage PO intake (puree)

## 2025-05-05 NOTE — PROGRESS NOTE ADULT - SUBJECTIVE AND OBJECTIVE BOX
Patient is a 60 y/o male with a PMHx of HTN, hiatal hernia, HLD, CAD s/p CABG in Jan 2024, paroxysmal A. fib not on AC, former smoker (30 pack years, quit 1yr ago), recently diagnosed with colitis and treated during his admission 4/1-4/5 presenting to the ED for evaluation of persistent abdominal pain and weakness x 3 weeks. Patient transferred to [facility] for notable Esophageal Mass. Patient s/p EUS/ EGD of Esophageal mass followed by stent placement. Patient had nausea after procedure but currently doing well. No overnight issues. He is trying to eat what he can. He feels like food is passing.       PAST MEDICAL & SURGICAL HISTORY:  HTN (hypertension)  Hiatal hernia  HLD (hyperlipidemia)  CAD (coronary artery disease)  Paroxysmal atrial fibrillation  History of colitis  S/P CABG (coronary artery bypass graft)        MEDICATIONS  (STANDING):  atorvastatin 80 milliGRAM(s) Oral at bedtime  chlorhexidine 2% Cloths 1 Application(s) Topical daily  dronabinol 2.5 milliGRAM(s) Oral daily  enoxaparin Injectable 40 milliGRAM(s) SubCutaneous every 24 hours  lactated ringers. 1000 milliLiter(s) (75 mL/Hr) IV Continuous <Continuous>  lactobacillus acidophilus 1 Tablet(s) Oral three times a day with meals  lidocaine   4% Patch 1 Patch Transdermal every 24 hours  metoprolol tartrate 12.5 milliGRAM(s) Oral two times a day  multivitamin  Chewable 1 Tablet(s) Oral daily  OLANZapine 2.5 milliGRAM(s) Oral daily  ondansetron Injectable 8 milliGRAM(s) IV Push every 8 hours  pantoprazole  Injectable 40 milliGRAM(s) IV Push two times a day  sucralfate 1 Gram(s) Oral four times a day    MEDICATIONS  (PRN):  acetaminophen     Tablet .. 650 milliGRAM(s) Oral every 6 hours PRN Mild Pain (1 - 3), Moderate Pain (4 - 6)  morphine  - Injectable 2 milliGRAM(s) IV Push every 6 hours PRN Severe Pain (7 - 10)  oxycodone    5 mG/acetaminophen 325 mG 1 Tablet(s) Oral every 4 hours PRN Severe Pain (7 - 10)  simethicone 80 milliGRAM(s) Chew every 6 hours PRN Gas      Allergies  penicillins (Unknown)        Review of Systems  General:  Denies Fatigue, Denies Fever, Denies Weakness ,Denies Weight Loss   HEENT: Denies Trouble Swallowing ,Denies  Sore Throat , Denies Change in hearing/vision/speech ,Denies Dizziness    Cardio: Denies  Chest Pain , Palpitations    Respiratory: Denies worsening of SOB, Denies Cough  Abdomen: See detailed HPI  Neuro: Denies Headache Denies Dizziness, Denies Paresthesias  MSK: Denies pain in Bones/Joints/Muscles   Psych: Patient denies depression, denies suicidal or homicidal ideations  Integ: Patient Denies rash, or new skin lesions     Vital Signs Last 24 Hrs  T(C): 36.8 (05 May 2025 08:22), Max: 37.1 (05 May 2025 00:20)  T(F): 98.2 (05 May 2025 08:22), Max: 98.8 (05 May 2025 00:20)  HR: 80 (05 May 2025 08:22) (75 - 91)  BP: 106/65 (05 May 2025 08:22) (94/57 - 110/65)  BP(mean): 69 (05 May 2025 00:20) (69 - 79)  RR: 17 (05 May 2025 08:22) (17 - 18)  SpO2: 100% (05 May 2025 08:22) (95% - 100%)    Parameters below as of 05 May 2025 08:22  Patient On (Oxygen Delivery Method): room air          PHYSICAL EXAM:  GENERAL: NAD, well-appearing  CHEST/LUNG: Clear to auscultation bilaterally  HEART: Regular rate and rhythm  ABDOMEN: Soft, Nontender, Nondistended;   EXTREMITIES:  No clubbing, cyanosis, or edema      Labs:                        8.7    6.67  )-----------( 119      ( 04 May 2025 08:28 )             26.5     05-04    137  |  101  |  15  ----------------------------<  97  4.1   |  24  |  0.7    Ca    8.9      04 May 2025 08:28  Mg     2.2     05-04    TPro  5.5[L]  /  Alb  3.0[L]  /  TBili  0.9  /  DBili  x   /  AST  36  /  ALT  29  /  AlkPhos  232[H]  05-04

## 2025-05-05 NOTE — PROGRESS NOTE ADULT - ASSESSMENT
Patient is a 60 y/o male with a PMHx of HTN, hiatal hernia, HLD, CAD s/p CABG in Jan 2024, paroxysmal A. fib not on AC, former smoker (30 pack years, quit 1yr ago), recently diagnosed with colitis and treated during his admission 4/1-4/5 presenting to the ED for evaluation of persistent abdominal pain and weakness x 3 weeks. Patient transferred to [facility] for notable Esophageal Mass. Patient s/p EUS/ EGD of Esophageal mass followed by stent placement. Patient had nausea after procedure but currently doing well. No overnight issues. He is trying to eat what he can. He feels like food is passing.     Adenocarcinoma of Esophagus  - EUS and EGD done with additional Biopsies 5/1  - Stent placed and tolerating full/ Puree  - Max consistency would be puree to avoid clogging stent  - PPI BID for one month  - Heme Onc has already seen patient and advise outpatient follow up   - Dr Nacho Serrano team on board   - Can follow up with DR White as outpatient   - Re-Call [facility] as needed

## 2025-05-06 ENCOUNTER — APPOINTMENT (OUTPATIENT)
Dept: CARDIOTHORACIC SURGERY | Facility: CLINIC | Age: 60
End: 2025-05-06

## 2025-05-06 VITALS
OXYGEN SATURATION: 96 % | RESPIRATION RATE: 18 BRPM | DIASTOLIC BLOOD PRESSURE: 68 MMHG | SYSTOLIC BLOOD PRESSURE: 129 MMHG | TEMPERATURE: 99 F | HEART RATE: 83 BPM

## 2025-05-06 LAB
ANION GAP SERPL CALC-SCNC: 10 MMOL/L — SIGNIFICANT CHANGE UP (ref 7–14)
BUN SERPL-MCNC: 14 MG/DL — SIGNIFICANT CHANGE UP (ref 10–20)
CALCIUM SERPL-MCNC: 9.1 MG/DL — SIGNIFICANT CHANGE UP (ref 8.4–10.5)
CHLORIDE SERPL-SCNC: 101 MMOL/L — SIGNIFICANT CHANGE UP (ref 98–110)
CO2 SERPL-SCNC: 26 MMOL/L — SIGNIFICANT CHANGE UP (ref 17–32)
CREAT SERPL-MCNC: 0.8 MG/DL — SIGNIFICANT CHANGE UP (ref 0.7–1.5)
EGFR: 101 ML/MIN/1.73M2 — SIGNIFICANT CHANGE UP
EGFR: 101 ML/MIN/1.73M2 — SIGNIFICANT CHANGE UP
GLUCOSE SERPL-MCNC: 101 MG/DL — HIGH (ref 70–99)
HCT VFR BLD CALC: 25.8 % — LOW (ref 42–52)
HGB BLD-MCNC: 8.2 G/DL — LOW (ref 14–18)
MAGNESIUM SERPL-MCNC: 2.3 MG/DL — SIGNIFICANT CHANGE UP (ref 1.8–2.4)
MCHC RBC-ENTMCNC: 25.2 PG — LOW (ref 27–31)
MCHC RBC-ENTMCNC: 31.8 G/DL — LOW (ref 32–37)
MCV RBC AUTO: 79.1 FL — LOW (ref 80–94)
NRBC BLD AUTO-RTO: 4 /100 WBCS — HIGH (ref 0–0)
PLATELET # BLD AUTO: 106 K/UL — LOW (ref 130–400)
PMV BLD: 10.7 FL — HIGH (ref 7.4–10.4)
POTASSIUM SERPL-MCNC: 4.1 MMOL/L — SIGNIFICANT CHANGE UP (ref 3.5–5)
POTASSIUM SERPL-SCNC: 4.1 MMOL/L — SIGNIFICANT CHANGE UP (ref 3.5–5)
RBC # BLD: 3.26 M/UL — LOW (ref 4.7–6.1)
RBC # FLD: 15.1 % — HIGH (ref 11.5–14.5)
SODIUM SERPL-SCNC: 137 MMOL/L — SIGNIFICANT CHANGE UP (ref 135–146)
WBC # BLD: 5.47 K/UL — SIGNIFICANT CHANGE UP (ref 4.8–10.8)
WBC # FLD AUTO: 5.47 K/UL — SIGNIFICANT CHANGE UP (ref 4.8–10.8)

## 2025-05-06 PROCEDURE — 99232 SBSQ HOSP IP/OBS MODERATE 35: CPT

## 2025-05-06 RX ORDER — OXYCODONE HYDROCHLORIDE AND ACETAMINOPHEN 10; 325 MG/1; MG/1
1 TABLET ORAL
Qty: 24 | Refills: 0
Start: 2025-05-06 | End: 2025-05-09

## 2025-05-06 RX ORDER — OLANZAPINE 10 MG/1
1 TABLET ORAL
Qty: 30 | Refills: 0
Start: 2025-05-06 | End: 2025-06-04

## 2025-05-06 RX ORDER — LIDOCAINE HYDROCHLORIDE 20 MG/ML
1 JELLY TOPICAL
Qty: 1 | Refills: 0
Start: 2025-05-06 | End: 2025-05-10

## 2025-05-06 RX ORDER — MEGESTROL ACETATE 40 MG
1 TABLET ORAL
Qty: 10 | Refills: 0
Start: 2025-05-06 | End: 2025-05-15

## 2025-05-06 RX ADMIN — METOPROLOL SUCCINATE 12.5 MILLIGRAM(S): 50 TABLET, EXTENDED RELEASE ORAL at 05:27

## 2025-05-06 RX ADMIN — Medication 1 GRAM(S): at 05:26

## 2025-05-06 RX ADMIN — METOPROLOL SUCCINATE 12.5 MILLIGRAM(S): 50 TABLET, EXTENDED RELEASE ORAL at 17:15

## 2025-05-06 RX ADMIN — Medication 40 MILLIGRAM(S): at 17:15

## 2025-05-06 RX ADMIN — Medication 1 APPLICATION(S): at 12:19

## 2025-05-06 RX ADMIN — Medication 40 MILLIGRAM(S): at 05:27

## 2025-05-06 RX ADMIN — Medication 1 GRAM(S): at 17:15

## 2025-05-06 RX ADMIN — Medication 1 TABLET(S): at 12:19

## 2025-05-06 RX ADMIN — Medication 1 TABLET(S): at 12:17

## 2025-05-06 RX ADMIN — ENOXAPARIN SODIUM 40 MILLIGRAM(S): 100 INJECTION SUBCUTANEOUS at 09:21

## 2025-05-06 RX ADMIN — Medication 1 TABLET(S): at 17:15

## 2025-05-06 RX ADMIN — CLOPIDOGREL BISULFATE 75 MILLIGRAM(S): 75 TABLET, FILM COATED ORAL at 12:17

## 2025-05-06 RX ADMIN — Medication 20 MILLIGRAM(S): at 12:18

## 2025-05-06 RX ADMIN — Medication 1 GRAM(S): at 12:18

## 2025-05-06 RX ADMIN — OLANZAPINE 2.5 MILLIGRAM(S): 10 TABLET ORAL at 12:18

## 2025-05-06 RX ADMIN — Medication 1 TABLET(S): at 09:20

## 2025-05-06 NOTE — PROGRESS NOTE ADULT - ASSESSMENT
58 yo M with a PMH of HTN, hiatal hernia, HLD, CAD s/p CABG in Jan 2024, paroxysmal A. fib on plavix, former smoker (30 pack years), recently diagnosed with colitis and treated during his admission 4/1-4/5 presenting to the ED for evaluation of persistent abdominal pain and weakness x 3 weeks. Patient states he also has lower back pain and endorses chills, states he had a fever 3 days ago. Patient denies chest pain, sob, nausea, vomiting, diarrhea, urinary symptoms.    #New GE junction mass-esophageal adenocarcinoma    adenocarcinoma with signet ring features   stent placed   tolerating diet    #Chronic Anemia  monitor no indication for treatment     #3.8 cm wide R iliac artery aneurysm- stable  op follow up with vascular     #CAD s/p CABG in Jan 2024    #Paroxysmal A.fib - not on AC?  - C/w home meds  -plavix resumed     #Acute on chronic back pain  controlled with morphine and lidocaine patch    #Debility pt     PROGRESS NOTE HANDOFF    Pending:  pt rehab   , if able to walk safely, will dc home     Family discussion: patient verbalized understanding and agreeable to plan of care     Disposition:  possible home if able to ambulate safely

## 2025-05-06 NOTE — PROGRESS NOTE ADULT - NUTRITIONAL ASSESSMENT
This patient has been assessed with a concern for Malnutrition and has been determined to have a diagnosis/diagnoses of Severe protein-calorie malnutrition.    This patient is being managed with:   Diet Full Liquid-  Entered: Apr 26 2025  9:22AM  
This patient has been assessed with a concern for Malnutrition and has been determined to have a diagnosis/diagnoses of Severe protein-calorie malnutrition.    This patient is being managed with:   Diet Full Liquid-  Supplement Feeding Modality:  Oral  Ensure Plus High Protein Cans or Servings Per Day:  1       Frequency:  Two Times a day  Entered: Apr 29 2025  9:28AM  
This patient has been assessed with a concern for Malnutrition and has been determined to have a diagnosis/diagnoses of Severe protein-calorie malnutrition.    This patient is being managed with:   Diet NPO after Midnight-     NPO Start Date: 24-Apr-2025   NPO Start Time: 23:59  Entered: Apr 24 2025  9:01AM    Diet Clear Liquid-  Entered: Apr 24 2025  8:54AM  
This patient has been assessed with a concern for Malnutrition and has been determined to have a diagnosis/diagnoses of Severe protein-calorie malnutrition.    This patient is being managed with:   Diet NPO after Midnight-     NPO Start Date: 24-Apr-2025   NPO Start Time: 23:59  Entered: Apr 24 2025  9:01AM    Diet Clear Liquid-  Entered: Apr 24 2025  8:54AM  
This patient has been assessed with a concern for Malnutrition and has been determined to have a diagnosis/diagnoses of Severe protein-calorie malnutrition.    This patient is being managed with:   Diet NPO-  Except Medications  Entered: May  1 2025  7:11PM  
This patient has been assessed with a concern for Malnutrition and has been determined to have a diagnosis/diagnoses of Severe protein-calorie malnutrition.    This patient is being managed with:   Diet Pureed-  Supplement Feeding Modality:  Oral  Ensure Plus High Protein Cans or Servings Per Day:  1       Frequency:  Three Times a day  Entered: May  4 2025  3:58AM  
This patient has been assessed with a concern for Malnutrition and has been determined to have a diagnosis/diagnoses of Severe protein-calorie malnutrition.    This patient is being managed with:   Diet Full Liquid-  Entered: Apr 20 2025  1:45PM  
This patient has been assessed with a concern for Malnutrition and has been determined to have a diagnosis/diagnoses of Severe protein-calorie malnutrition.    This patient is being managed with:   Diet Pureed-  Supplement Feeding Modality:  Oral  Ensure Plus High Protein Cans or Servings Per Day:  1       Frequency:  Three Times a day  Entered: May  4 2025  3:58AM  
This patient has been assessed with a concern for Malnutrition and has been determined to have a diagnosis/diagnoses of Severe protein-calorie malnutrition.    This patient is being managed with:   Diet NPO-  Except Medications  Entered: May  1 2025  7:11PM  
This patient has been assessed with a concern for Malnutrition and has been determined to have a diagnosis/diagnoses of Severe protein-calorie malnutrition.    This patient is being managed with:   Diet Pureed-  Entered: May  3 2025  8:45AM  
This patient has been assessed with a concern for Malnutrition and has been determined to have a diagnosis/diagnoses of Severe protein-calorie malnutrition.    This patient is being managed with:   Diet Pureed-  Entered: May  3 2025  8:45AM  
This patient has been assessed with a concern for Malnutrition and has been determined to have a diagnosis/diagnoses of Severe protein-calorie malnutrition.    This patient is being managed with:   Diet Pureed-  Supplement Feeding Modality:  Oral  Ensure Plus High Protein Cans or Servings Per Day:  1       Frequency:  Three Times a day  Entered: May  4 2025  3:58AM  
This patient has been assessed with a concern for Malnutrition and has been determined to have a diagnosis/diagnoses of Severe protein-calorie malnutrition.    This patient is being managed with:   Diet Pureed-  Supplement Feeding Modality:  Oral  Ensure Plus High Protein Cans or Servings Per Day:  1       Frequency:  Three Times a day  Entered: May  4 2025  3:58AM  
This patient has been assessed with a concern for Malnutrition and has been determined to have a diagnosis/diagnoses of Severe protein-calorie malnutrition.    This patient is being managed with:   Diet Full Liquid-  Supplement Feeding Modality:  Oral  Ensure Plus High Protein Cans or Servings Per Day:  1       Frequency:  Two Times a day  Entered: Apr 29 2025  9:28AM  
This patient has been assessed with a concern for Malnutrition and has been determined to have a diagnosis/diagnoses of Severe protein-calorie malnutrition.    This patient is being managed with:   Diet Full Liquid-  Supplement Feeding Modality:  Oral  Ensure Plus High Protein Cans or Servings Per Day:  3       Frequency:  Three Times a day  Entered: Apr 30 2025 12:27PM  
This patient has been assessed with a concern for Malnutrition and has been determined to have a diagnosis/diagnoses of Severe protein-calorie malnutrition.    This patient is being managed with:   Diet NPO after Midnight-     NPO Start Date: 30-Apr-2025   NPO Start Time: 23:59  Entered: Apr 30 2025  5:46PM    Diet Full Liquid-  Supplement Feeding Modality:  Oral  Ensure Plus High Protein Cans or Servings Per Day:  3       Frequency:  Three Times a day  Entered: Apr 30 2025 12:27PM  
This patient has been assessed with a concern for Malnutrition and has been determined to have a diagnosis/diagnoses of Severe protein-calorie malnutrition.    This patient is being managed with:   Diet NPO after Midnight-     NPO Start Date: 30-Apr-2025   NPO Start Time: 23:59  Entered: Apr 30 2025  5:46PM    Diet Full Liquid-  Supplement Feeding Modality:  Oral  Ensure Plus High Protein Cans or Servings Per Day:  3       Frequency:  Three Times a day  Entered: Apr 30 2025 12:27PM  
This patient has been assessed with a concern for Malnutrition and has been determined to have a diagnosis/diagnoses of Severe protein-calorie malnutrition.    This patient is being managed with:   Diet Full Liquid-  Entered: Apr 20 2025  1:45PM  
This patient has been assessed with a concern for Malnutrition and has been determined to have a diagnosis/diagnoses of Severe protein-calorie malnutrition.    This patient is being managed with:   Diet Full Liquid-  Entered: Apr 20 2025  1:45PM  
This patient has been assessed with a concern for Malnutrition and has been determined to have a diagnosis/diagnoses of Severe protein-calorie malnutrition.    This patient is being managed with:   Diet Full Liquid-  Entered: Apr 26 2025  9:22AM  
This patient has been assessed with a concern for Malnutrition and has been determined to have a diagnosis/diagnoses of Severe protein-calorie malnutrition.    This patient is being managed with:   Diet NPO after Midnight-     NPO Start Date: 30-Apr-2025   NPO Start Time: 23:59  Entered: Apr 30 2025  5:46PM    Diet Full Liquid-  Supplement Feeding Modality:  Oral  Ensure Plus High Protein Cans or Servings Per Day:  3       Frequency:  Three Times a day  Entered: Apr 30 2025 12:27PM  
This patient has been assessed with a concern for Malnutrition and has been determined to have a diagnosis/diagnoses of Severe protein-calorie malnutrition.    This patient is being managed with:   Diet Pureed-  Supplement Feeding Modality:  Oral  Ensure Plus High Protein Cans or Servings Per Day:  1       Frequency:  Three Times a day  Entered: May  4 2025  3:58AM    This patient has been assessed with a concern for Malnutrition and has been determined to have a diagnosis/diagnoses of Severe protein-calorie malnutrition.    This patient is being managed with:   Diet Pureed-  Supplement Feeding Modality:  Oral  Ensure Plus High Protein Cans or Servings Per Day:  1       Frequency:  Three Times a day  Entered: May  4 2025  3:58AM

## 2025-05-06 NOTE — PROGRESS NOTE ADULT - REASON FOR ADMISSION
abdominal pain

## 2025-05-06 NOTE — PROGRESS NOTE ADULT - SUBJECTIVE AND OBJECTIVE BOX
SUBJECTIVE/OVERNIGHT EVENTS  Today is hospital day 18d. This morning patient was seen and examined at bedside, resting comfortably in bed. No acute or major events overnight.    MEDICATIONS  STANDING MEDICATIONS  atorvastatin 80 milliGRAM(s) Oral at bedtime  chlorhexidine 2% Cloths 1 Application(s) Topical daily  clopidogrel Tablet 75 milliGRAM(s) Oral daily  enoxaparin Injectable 40 milliGRAM(s) SubCutaneous every 24 hours  lactated ringers. 1000 milliLiter(s) IV Continuous <Continuous>  lactobacillus acidophilus 1 Tablet(s) Oral three times a day with meals  lidocaine   4% Patch 1 Patch Transdermal every 24 hours  megestrol 20 milliGRAM(s) Oral daily  metoprolol tartrate 12.5 milliGRAM(s) Oral two times a day  multivitamin  Chewable 1 Tablet(s) Oral daily  OLANZapine 2.5 milliGRAM(s) Oral daily  pantoprazole    Tablet 40 milliGRAM(s) Oral two times a day  sucralfate 1 Gram(s) Oral four times a day    PRN MEDICATIONS  acetaminophen     Tablet .. 650 milliGRAM(s) Oral every 6 hours PRN  ondansetron Injectable 8 milliGRAM(s) IV Push every 8 hours PRN  oxycodone    5 mG/acetaminophen 325 mG 1 Tablet(s) Oral every 4 hours PRN  simethicone 80 milliGRAM(s) Chew every 6 hours PRN    VITALS  T(F): 98.4 (05-06-25 @ 07:30), Max: 98.7 (05-05-25 @ 15:40)  HR: 84 (05-06-25 @ 07:30) (77 - 90)  BP: 104/66 (05-06-25 @ 07:30) (95/58 - 109/66)  RR: 17 (05-06-25 @ 07:30) (17 - 18)  SpO2: 96% (05-06-25 @ 07:30) (95% - 97%)    PHYSICAL EXAM  GENERAL: NAD, lying in bed comfortably  HEAD:  Atraumatic, normocephalic  EYES: EOMI, conjunctiva and sclera clear  NECK: Supple, no JVD  HEART: Regular rate and rhythm, no murmurs  LUNGS: Unlabored respirations.  Clear to auscultation bilaterally, no crackles, wheezing, or rhonchi  ABDOMEN: Soft, nontender, nondistended, +BS  EXTREMITIES: 2+ peripheral pulses bilaterally. No clubbing, cyanosis, or edema  NERVOUS SYSTEM:  A&Ox3, no focal deficits   SKIN: No rashes or lesions    LABS             8.2    5.47  )-----------( 106      ( 05-06-25 @ 07:02 )             25.8     137  |  101  |  14  -------------------------<  101   05-06-25 @ 07:02  4.1  |  26  |  0.8    Ca      9.1     05-06-25 @ 07:02  Mg     2.3     05-06-25 @ 07:02    TPro  5.6  /  Alb  3.1  /  TBili  0.9  /  DBili  x   /  AST  32  /  ALT  25  /  AlkPhos  229  /  GGT  x     05-05-25 @ 07:48        Urinalysis Basic - ( 06 May 2025 07:02 )    Color: x / Appearance: x / SG: x / pH: x  Gluc: 101 mg/dL / Ketone: x  / Bili: x / Urobili: x   Blood: x / Protein: x / Nitrite: x   Leuk Esterase: x / RBC: x / WBC x   Sq Epi: x / Non Sq Epi: x / Bacteria: x          IMAGING

## 2025-05-06 NOTE — PROGRESS NOTE ADULT - ASSESSMENT
58 yo M with a PMH of HTN, hiatal hernia, HLD, CAD s/p CABG in Jan 2024, paroxysmal A. fib on plavix, former smoker (30 pack years), recently diagnosed with colitis and treated during his admission 4/1-4/5 presenting to the ED for evaluation of persistent abdominal pain and weakness x 3 weeks. Patient states he also has lower back pain and endorses chills, states he had a fever     #Abdominal Pain 2/2 colitis/erosive gastritis seen on colo  #Adenocarcinoma at GE Junction  - CT abd/pelv w/ IVC- Redemonstrated prominent submucosal fat/bowel wall thickening involving descending colon, possibly related to prior episodes of colitis. Otherwise, no new acute findings in the abdomen or pelvis.  - GGT: 30, amylase 62. lipase 67  - CT chest IV con to rule out metastasis: 1.  No definite evidence of thoracic metastatic disease. 2.  Small bilateral pleural effusions.3.  Marked circumferential thickening of the distal esophagus/hiatal   hernia could be related to provided history of suspected esophageal   cancer versus esophagitis.  - blood cx negative, urine cx negative   - per cardio: may proceed with GI procedures without further cardiac workup. Pt is at moderate risk for a perioperative cardiac event. Ok to hold plavix.    - restarterd plavix 4/29  - EGD Impressions: A friable circumferential mass seen from the GE junction at 38cm to 28 cm of mid esophagus. Multiple biopsies were obtained. NEX powder was sprayed all over the mass to achieve adequate hemostasis. Another 5mm lesion was noted at 20cm from incisors in the proximal esophagus. Erosions in the stomach compatible with erosive gastritis. (Biopsy). A pulsating ulcerated lesion was noted in the fundus. Biopsy was not obtained. Normal mucosa in the whole examined duodenum. (Biopsy).  - Colonoscopy Impressions: Multiple semi-pedunculated polyps were seen in the left colon. A 2cm polyp was seen in the descending colon, biopsy was obtained. Polyps were not removed in the setting of poor prep. External hemorrhoids.Solid stool noted in the whole colon. Cecum was not reached  -path: adenocarcinoma mucin, signet cells  - H/O: consult  PET scan, Surg Onc Dr Yost for possible resection  - f/u outpatient with Dr. Palacios  - Surg/Onc consult: possible esophagus stent  - GI: Esophageal Stent/ EUS 5/1/25  - plan for repeat colonoscopy OP  - diet advanced to puree (max)   - PPI BID for one month per GI  - weakness, unable to walk/stand  - orthostats negative  - encourage PO intake  - PT/OT consult for dispo planning and increasing strength. suspect debilitation 2/2 illness  - DC planning pending increased strength/PT/OT  - will need walker on DC and home PT/OT    #chronic lower back pain   - xray lumbar spine:  There are five nonrib-bearing lumbar vertebral bodies. The pedicles are visualized. The vertebral body heights and alignment are maintained. There is mild disc space narrowing and degenerative changes throughout the lumbar spine. Mild degenerative change of the bilateral sacroiliac joints.  - pain control     #chronic anemia  - In the ED, Hb 11.6, baseline ~12  - MCV 79.2    #3.8 cm wide R iliac artery aneurysm- stable  - vascular o/p- surgery was scheduled with Dr. Tavera     #HTN  #CAD s/p CABG in Jan 2024  #Paroxysmal A.fib  - c/w home meds  - 5/3- restarted plavix today. OK by GI    #MISC  #DVT ppx: lovenox 40mg subQ QD  #GI ppx: 40mg PO BID   #Activity: AAT  #Code: full  #Dispo: dc planning, need PT eval.     #PENDING  - DC planning pending PT  - encourage PO intake (puree)  - will need walker on DC and home PT/OT

## 2025-05-06 NOTE — PROGRESS NOTE ADULT - SUBJECTIVE AND OBJECTIVE BOX
JONAS CELESTIN  59y  [facility]-[address] 4[address]      Patient is a 59y old  Male who presents with a chief complaint of abdominal pain (06 May 2025 09:35)      My note supersedes the resident's note      INTERVAL HPI/OVERNIGHT EVENTS:    no acute events overnight , worked with pt yesterdya     REVIEW OF SYSTEMS:  CONSTITUTIONAL: No fever, weight loss, or fatigue  EYES: No eye pain, visual disturbances, or discharge  ENMT:  No difficulty hearing, tinnitus, vertigo; No sinus or throat pain  NECK: No pain or stiffness  BREASTS: No pain, masses, or nipple discharge  RESPIRATORY: No cough, wheezing, chills or hemoptysis; No shortness of breath  CARDIOVASCULAR: No chest pain, palpitations, dizziness, or leg swelling  GASTROINTESTINAL:able to tolerate more po   GENITOURINARY: No dysuria, frequency, hematuria, or incontinence  NEUROLOGICAL: No headaches, memory loss, loss of strength, numbness, or tremors  SKIN: No itching, burning, rashes, or lesions   LYMPH NODES: No enlarged glands  ENDOCRINE: No heat or cold intolerance; No hair loss  MUSCULOSKELETAL: No joint pain or swelling; No muscle, back, or extremity pain  PSYCHIATRIC: No depression, anxiety, mood swings, or difficulty sleeping  HEME/LYMPH: No easy bruising, or bleeding gums  ALLERY AND IMMUNOLOGIC: No hives or eczema  FAMILY HISTORY:  FHx: lung cancer (Father)      T(C): 36.9 (05-06-25 @ 07:30), Max: 37.1 (05-05-25 @ 15:40)  HR: 84 (05-06-25 @ 07:30) (77 - 90)  BP: 104/66 (05-06-25 @ 07:30) (95/58 - 109/66)  RR: 17 (05-06-25 @ 07:30) (17 - 18)  SpO2: 96% (05-06-25 @ 07:30) (95% - 97%)  Wt(kg): --Vital Signs Last 24 Hrs  T(C): 36.9 (06 May 2025 07:30), Max: 37.1 (05 May 2025 15:40)  T(F): 98.4 (06 May 2025 07:30), Max: 98.7 (05 May 2025 15:40)  HR: 84 (06 May 2025 07:30) (77 - 90)  BP: 104/66 (06 May 2025 07:30) (95/58 - 109/66)  BP(mean): --  RR: 17 (06 May 2025 07:30) (17 - 18)  SpO2: 96% (06 May 2025 07:30) (95% - 97%)    Parameters below as of 06 May 2025 07:30  Patient On (Oxygen Delivery Method): room air        PHYSICAL EXAM:  GENERAL: NAD,   HEAD:  Atraumatic, Normocephalic  EYES: EOMI, PERRLA, conjunctiva and sclera clear  ENMT: No tonsillar erythema, exudates, or enlargement; Moist mucous membranes, Good dentition, No lesions  NECK: Supple, No JVD, Normal thyroid  NERVOUS SYSTEM:  Alert & Oriented X3, Good concentration; Motor Strength 5/5 B/L upper and lower extremities; DTRs 2+ intact and symmetric  PULM: Clear to auscultation bilaterally  CARDIAC: Regular rate and rhythm; No murmurs, rubs, or gallops  GI: Soft, Nontender, Nondistended; Bowel sounds present  EXTREMITIES:  2+ Peripheral Pulses, No clubbing, cyanosis, or edema  LYMPH: No lymphadenopathy noted  SKIN: No rashes or lesions    Consultant(s) Notes Reviewed:  [x ] YES  [ ] NO  Care Discussed with Consultants/Other Providers [ x] YES  [ ] NO    LABS:                            8.2    5.47  )-----------( 106      ( 06 May 2025 07:02 )             25.8   05-06    137  |  101  |  14  ----------------------------<  101[H]  4.1   |  26  |  0.8    Ca    9.1      06 May 2025 07:02  Mg     2.3     05-06    TPro  5.6[L]  /  Alb  3.1[L]  /  TBili  0.9  /  DBili  x   /  AST  32  /  ALT  25  /  AlkPhos  229[H]  05-05            acetaminophen     Tablet .. 650 milliGRAM(s) Oral every 6 hours PRN  atorvastatin 80 milliGRAM(s) Oral at bedtime  chlorhexidine 2% Cloths 1 Application(s) Topical daily  clopidogrel Tablet 75 milliGRAM(s) Oral daily  enoxaparin Injectable 40 milliGRAM(s) SubCutaneous every 24 hours  lactated ringers. 1000 milliLiter(s) IV Continuous <Continuous>  lactobacillus acidophilus 1 Tablet(s) Oral three times a day with meals  lidocaine   4% Patch 1 Patch Transdermal every 24 hours  megestrol 20 milliGRAM(s) Oral daily  metoprolol tartrate 12.5 milliGRAM(s) Oral two times a day  multivitamin  Chewable 1 Tablet(s) Oral daily  OLANZapine 2.5 milliGRAM(s) Oral daily  ondansetron Injectable 8 milliGRAM(s) IV Push every 8 hours PRN  oxycodone    5 mG/acetaminophen 325 mG 1 Tablet(s) Oral every 4 hours PRN  pantoprazole    Tablet 40 milliGRAM(s) Oral two times a day  simethicone 80 milliGRAM(s) Chew every 6 hours PRN  sucralfate 1 Gram(s) Oral four times a day      HEALTH ISSUES - PROBLEM Dx:          Case Discussed with House Staff   Spectra [phone]

## 2025-05-06 NOTE — PROGRESS NOTE ADULT - PROVIDER SPECIALTY LIST ADULT
Gastroenterology
Hospitalist
Internal Medicine
Internal Medicine
Gastroenterology
Hospitalist
Internal Medicine
Thoracic Surgery
Gastroenterology
Hospitalist
Internal Medicine
Gastroenterology
Hospitalist
Hospitalist
Internal Medicine
Hospitalist
Hospitalist
Internal Medicine

## 2025-05-07 ENCOUNTER — NON-APPOINTMENT (OUTPATIENT)
Age: 60
End: 2025-05-07

## 2025-05-08 NOTE — CHART NOTE - NSCHARTNOTESELECT_GEN_ALL_CORE
Dietitian Follow-Up/Nutrition Services
Event Note
Event Note
non clinical appointment info/Event Note
EGD,colonoscopy/Event Note
PACU admission note
RD f/u/Nutrition Services

## 2025-05-08 NOTE — CHART NOTE - NSCHARTNOTEFT_GEN_A_CORE
EGD Impressions:  	A friable circumferential mass seen from the GE junction at 38cm to 28 cm of mid esophagus. Multiple biopsies were obtained. NEX powder was sprayed all over the mass to achieve adequate hemostasis. Another 5mm lesion was noted at 20cm from incisors in the proximal esophagus.  	Erosions in the stomach compatible with erosive gastritis. (Biopsy).  	A pulsating ulcerated lesion was noted in the fundus. Biopsy was not obtained.  	Normal mucosa in the whole examined duodenum. (Biopsy).    Colonoscopy Impressions:  	Multiple semi-pedunculated polyps were seen in the left colon. A 2cm polyp was seen in the descending colon, biopsy was obtained. Polyps were not removed in the setting of poor prep.  	External hemorrhoids.  	Solid stool noted in the whole colon. Cecum was not reached    PLAN:  Can have soft, low fiber diet      - Await pathology results   - Will plan for repeat colonoscopy tentatively on Monday, clears from Sunday with bowel prep    - Hold plavix for now    - Recommend CT chest IV con to rule out metastasis    - Will need hemoncology evaluation
PACU ANESTHESIA ADMISSION NOTE      Procedure:   Post op diagnosis:      ____  Intubated  TV:______       Rate: ______      FiO2: ______    _x___  Patent Airway    _x___  Full return of protective reflexes    _x___  Full recovery from anesthesia / back to baseline status    Vitals:    see anesthesia record    Mental Status:  _x___ Awake   _____ Alert   _____ Drowsy   _____ Sedated    Nausea/Vomiting:  _x___  NO       ______Yes,   See Post - Op Orders         Pain Scale (0-10):  _____    Treatment: ____ None    __x__ See Post - Op/PCA Orders    Post - Operative Fluids:   ____ Oral   ___x_ See Post - Op Orders    Plan: Discharge:   ____Home       __x___Floor     _____Critical Care    _____  Other:_________________    Comments:  No anesthesia issues or complications noted.  Discharge when criteria met.
Registered Dietitian Follow-Up     Patient Profile Reviewed                           Yes [x]   No []     Nutrition History Previously Obtained        Yes [x]  No []            Pertinent Medical Interventions: Patient is a 58yo M with a PMHx of HTN, HLD, CAD s/p CABG January 2024, hiatal hernia, former smoker (30 pack years), admitted for persistent abdominal pain and weakness x 3 weeks. 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 Patient had esophageal stent placed.      Diet order: Diet, Pureed:   Supplement Feeding Modality:  Oral  Ensure Plus High Protein Cans or Servings Per Day:  1       Frequency:  Three Times a day (05-04-25 @ 03:59) [Active]      Patient note with anemia, hyponatremia (resolved), hypochloremia (resolved), elevated ALP.     Anthropometrics:  Height (cm): 180 (05-01-25 @ 11:47)  Weight (kg): 89 (05-01-25 @ 11:47)  BMI (kg/m2): 27.5 (05-01-25 @ 11:47)        Daily   % Weight Change    MEDICATIONS  (STANDING):  atorvastatin 80 milliGRAM(s) Oral at bedtime  chlorhexidine 2% Cloths 1 Application(s) Topical daily  clopidogrel Tablet 75 milliGRAM(s) Oral daily  enoxaparin Injectable 40 milliGRAM(s) SubCutaneous every 24 hours  lactobacillus acidophilus 1 Tablet(s) Oral three times a day with meals  lidocaine   4% Patch 1 Patch Transdermal every 24 hours  megestrol 20 milliGRAM(s) Oral daily  metoprolol tartrate 12.5 milliGRAM(s) Oral two times a day  multivitamin  Chewable 1 Tablet(s) Oral daily  OLANZapine 2.5 milliGRAM(s) Oral daily  pantoprazole    Tablet 40 milliGRAM(s) Oral two times a day  sucralfate 1 Gram(s) Oral four times a day    MEDICATIONS  (PRN):  acetaminophen     Tablet .. 650 milliGRAM(s) Oral every 6 hours PRN Mild Pain (1 - 3), Moderate Pain (4 - 6)  ondansetron Injectable 8 milliGRAM(s) IV Push every 8 hours PRN Nausea and/or Vomiting  oxycodone    5 mG/acetaminophen 325 mG 1 Tablet(s) Oral every 4 hours PRN Severe Pain (7 - 10)  simethicone 80 milliGRAM(s) Chew every 6 hours PRN Gas    Pertinent Labs: 05-06 @ 07:02: Na 137, BUN 14, Cr 0.8, [H], K+ 4.1, Phos --, Mg 2.3, Alk Phos --, ALT/SGPT --, AST/SGOT --, HbA1c --    Finger Sticks:    Physical Findings:  - Appearance: alert  - GI function: No s/s of dysfunction per patient. Last BM was on 5/2/25 per patient.   - Tubes: none  - Oral/Mouth cavity: tolerating current diet texture  - Edema: none  - Skin: ecchymosis present; no pressure injuries     Nutrition Requirements: with consideration for age, weight, PCM, cancer  Weight Used: 90.7kg per previous RD note     Estimated Energy Needs    Continue [x]  Adjust []  2084-2605kcal/day using MSJ x 1.2-1.5   Estimated Protein Needs    Continue [x]  Adjust []   93-108g/day using 1.2-1.4g/kg of IBW 77.6kg  Estimated Fluid Needs        Continue [x]  Adjust []  1mL/kcal/day     Nutrient Intake: Patient reprots consuming <25% of meals recently due to poor appetite.     [x] Previous Nutrition Diagnosis: Severe PCM            [x] Ongoing          [] Resolved    [] No active nutrition diagnosis identified at this time     Nutrition Education: Provided nutrition education to patient and patient's wife at bedside for patient's chronic diseases and methods to maximize kcal, protein, and overall nutrition intake in the context of reducing risk of continued weight loss. Provided patient with printed material for patient's future reference. Patient and wife verbalized understanding. Patient's declined outpatient nutrition counseling when offered.      Goal/Expected Outcome: Patient to meet 75% or more of estimated nutrient needs via PO intake within 4 days     Interventions:  -Continue current diet order       Monitor:  -PO intake  -Diet/texture tolerance  -Weight  -Nutrition related labs  -Nutrition focused physical findings    High Nutrition Risk Follow Up    Yosi Harding RD via Teams
Patient had episode of vomiting after endoscopy.  GI team made aware as they requested to be made aware of vomiting as they don't want the stent to get dislodged.  GI attending told me to start ondansetron 8 mg IV push q8 hrs standing and pantoprazole 40 mg IV push.  GI attending said NPO except small meds which are fine with a sip of water.  I d/c'd famotidine since starting PPI BID.
Pre-op Risk Assessment requested for planned GI procedure.  Patient was seen and examined by me in the office on April 7, 2025 for pre-op evaluation.      He has a revised cardiac risk index of 1 (CVA with no residual deficits).  He has a past medical history of CAD S/P CABG, Post-op AFIB, S/P Left Atrial Closure (Atricure) and Hypertension.  Office note faxed to [facility] yesterday (in chart).  He should be able to proceed with the planned GI procedure/s without further cardiac workup.  Review of office notes show no recurrence of Atrial Fibrillation(developed post CABG which could happen in 40% of cases).  Off plavix for planned GI procedures.  Restart antiplatelet therapy with Aspirin or Plavix after completion of GI procedure.    Patient is at a moderate risk for a perioperative cardiac event.  Please see office note in chart.    Jeremy Wilcox MD  [phone] Office  On TEAMS
Registered Dietitian Follow-Up - c/s new esophageal CA, noted not eating much,.     Patient Profile Reviewed                           Yes [x]   No []     Nutrition History Previously Obtained        Yes [x]  No []       Pertinent Medical Interventions: Pt presented for evaluation of persistent abd pain & weakness x3 weeks. Abdominal Pain 2/2 colitis/erosive gastritis seen on coloscopy. Adenocarcinoma at GE Junction noted. New GE junction mass-esophageal adenocarcinoma noted.    PMH includes HTN, hiatal hernia, HLD, CAD s/p CABG in Jan 2024, paroxysmal A. fib on plavix, former smoker (30 pack years), recently diagnosed with colitis and treated during his admission 4/1-4/5.     Diet order: Full liquid diet with Ensure Plus High Protein three times daily (350 kcal, 20 g protein per serving). Poor appetite reported; started on appetite stimulant. Per provider, pt  not eating much, has no appetite, marinol started yesterday. Per provider, pt only ate some broth, random liquids sips and a little bit of ice cream yesterday. Today 4/30 provider had pt try ensure, noted pt liked it and change diet to add ensure 3 cans. Provider instructed pt to keep sipping throughout the day as he gets cramps and nausea when having too much at once-which will unfortunately be difficult to control with medications given that the mass is at the GE junction.    Anthropometrics:  Height (cm): 180.3 (04-25-25 @ 09:52)  Weight (kg): 89.8 (04-25-25 @ 09:52)  BMI (kg/m2): 27.6 (04-25-25 @ 09:52)  IBW: 78.2 kg.    4/30 bedscale wt 89.3 kg    MEDICATIONS  (STANDING):  atorvastatin 80 milliGRAM(s) Oral at bedtime  chlorhexidine 2% Cloths 1 Application(s) Topical daily  dronabinol 2.5 milliGRAM(s) Oral daily  enoxaparin Injectable 40 milliGRAM(s) SubCutaneous every 24 hours  famotidine    Tablet 20 milliGRAM(s) Oral daily  lactated ringers. 1000 milliLiter(s) (100 mL/Hr) IV Continuous <Continuous>  lactobacillus acidophilus 1 Tablet(s) Oral three times a day with meals  lidocaine   4% Patch 1 Patch Transdermal every 24 hours  metoprolol tartrate 12.5 milliGRAM(s) Oral two times a day  multivitamin  Chewable 1 Tablet(s) Oral daily  OLANZapine 2.5 milliGRAM(s) Oral daily  pantoprazole    Tablet 40 milliGRAM(s) Oral before breakfast  sucralfate 1 Gram(s) Oral four times a day    MEDICATIONS  (PRN):  acetaminophen     Tablet .. 650 milliGRAM(s) Oral every 6 hours PRN Mild Pain (1 - 3), Moderate Pain (4 - 6)  morphine  - Injectable 2 milliGRAM(s) IV Push every 6 hours PRN Severe Pain (7 - 10)  ondansetron Injectable 4 milliGRAM(s) IV Push every 8 hours PRN Nausea and/or Vomiting  oxycodone    5 mG/acetaminophen 325 mG 1 Tablet(s) Oral every 4 hours PRN Severe Pain (7 - 10)  simethicone 80 milliGRAM(s) Chew every 6 hours PRN Gas    Pertinent Labs: 4/29 - Na-135, K-4.3, Cl-96, BUN-17, creat-0.8, gluc-92, Ca-9.5, eGFR-101    4/28: Mg-2.3    Physical Findings:  - Appearance: alert & oriented  - GI function: pt reportedly had diarrhea overnight (4/30), took miralax yesterday per report. c/o nausea when eating most foods.  - Tubes: no feeding tubes  - Oral/Mouth cavity: discomfort when swallowing reported  - Skin: ecchymosis noted; no pressure injuries noted  - Edema: no edema noted     Nutrition Requirements:  Weight Used: 90.7 kg per previous RD assessment     Estimated Energy Needs    Continue [x]  Adjust []  2084-2605kcal/day (using MSJ x 1.2-1.5) with consideration for age, weight status, malnutrition        Estimated Protein Needs    Continue [x]  Adjust []  93-108g/day (using 1.2-1.4g/kg of IBW 77.6kg) with considerations for same reasons as above        Estimated Fluid Needs        Continue [x]  Adjust []  1mL/kcal/day     Nutrient Intake: Poor po intake persists in setting of poor appetite & difficulty tolerating po; consuming <25% of meals at this time. Encouraged intake of po supplements after meals to optimize energy intake.    [x] Previous Nutrition Diagnosis: Severe protein-calorie malnutrition in the context of chronic illness            [x] Ongoing          [] Resolved    Nutrition Education: Reviewed diet order and po supplement order. Encouraged intake of po supplements after meals to optimize energy intake. Discussed strategies to optimize energy intake & avoid unintentional wt loss. Discussed food preferences.     Pt to demonstrate improved po tolerance and increase appetite, with >25% po intake of meals & po supplements observed over next 4 days.    Pt at high nutrition risk.    Monitor: Skin, labs, BM, wt, nutrition focused physical findings, body composition, GI, po intake, diet order.    Recommendation:   (1) Continue encouraging intake of Ensure Plus High Protein after meals to optimize energy intake. Encourage pt to have small, frequent meals.  Typically a half-sized meal every 2 to 3 hours is advised, with goal of 6-8 small meals/day. Encourage intake of high calorie and high protein foods (Ensure Plus High Protein is one example, other examples include ice cream, adding fats to food to increase energy density).  (2) Consult SLP services to evaluate pt's chewing/swallow function and whether speech therapy can help with symptom management to improve po tolerance. Diet order texture/consistency per SLP.  (3) Adjust bowel regimen if loose BMs persist.
Wife will self-schedule gastro. Pt already has arranged care for vascular. Pt needs heme-onc appt 5/7 - JL/ sent to Hem/Onc Chat 5/7- AC.   Appt scheduled 05/14/2025 03:00 PM w/ Dr. Sanchez @ [address] [place] [address] ( gastro, vascular, & oncology referral) CT 5/8.

## 2025-05-12 ENCOUNTER — INPATIENT (INPATIENT)
Facility: HOSPITAL | Age: 60
LOS: 12 days | Discharge: HOSPICE | DRG: 948 | End: 2025-05-25
Attending: HOSPITALIST | Admitting: INTERNAL MEDICINE
Payer: COMMERCIAL

## 2025-05-12 VITALS
TEMPERATURE: 98 F | HEIGHT: 71 IN | OXYGEN SATURATION: 100 % | HEART RATE: 104 BPM | SYSTOLIC BLOOD PRESSURE: 118 MMHG | DIASTOLIC BLOOD PRESSURE: 76 MMHG | RESPIRATION RATE: 20 BRPM

## 2025-05-12 DIAGNOSIS — R29.810 FACIAL WEAKNESS: ICD-10-CM

## 2025-05-12 DIAGNOSIS — K63.5 POLYP OF COLON: ICD-10-CM

## 2025-05-12 DIAGNOSIS — A41.9 SEPSIS, UNSPECIFIED ORGANISM: ICD-10-CM

## 2025-05-12 DIAGNOSIS — C16.0 MALIGNANT NEOPLASM OF CARDIA: ICD-10-CM

## 2025-05-12 DIAGNOSIS — K44.9 DIAPHRAGMATIC HERNIA WITHOUT OBSTRUCTION OR GANGRENE: ICD-10-CM

## 2025-05-12 DIAGNOSIS — E86.0 DEHYDRATION: ICD-10-CM

## 2025-05-12 DIAGNOSIS — K64.4 RESIDUAL HEMORRHOIDAL SKIN TAGS: ICD-10-CM

## 2025-05-12 DIAGNOSIS — Z95.1 PRESENCE OF AORTOCORONARY BYPASS GRAFT: ICD-10-CM

## 2025-05-12 DIAGNOSIS — D50.9 IRON DEFICIENCY ANEMIA, UNSPECIFIED: ICD-10-CM

## 2025-05-12 DIAGNOSIS — K52.9 NONINFECTIVE GASTROENTERITIS AND COLITIS, UNSPECIFIED: ICD-10-CM

## 2025-05-12 DIAGNOSIS — M51.360 OTHER INTERVERTEBRAL DISC DEGENERATION, LUMBAR REGION WITH DISCOGENIC BACK PAIN ONLY: ICD-10-CM

## 2025-05-12 DIAGNOSIS — I25.10 ATHEROSCLEROTIC HEART DISEASE OF NATIVE CORONARY ARTERY WITHOUT ANGINA PECTORIS: ICD-10-CM

## 2025-05-12 DIAGNOSIS — Z80.1 FAMILY HISTORY OF MALIGNANT NEOPLASM OF TRACHEA, BRONCHUS AND LUNG: ICD-10-CM

## 2025-05-12 DIAGNOSIS — D84.81 IMMUNODEFICIENCY DUE TO CONDITIONS CLASSIFIED ELSEWHERE: ICD-10-CM

## 2025-05-12 DIAGNOSIS — D63.0 ANEMIA IN NEOPLASTIC DISEASE: ICD-10-CM

## 2025-05-12 DIAGNOSIS — Z95.1 PRESENCE OF AORTOCORONARY BYPASS GRAFT: Chronic | ICD-10-CM

## 2025-05-12 DIAGNOSIS — G89.3 NEOPLASM RELATED PAIN (ACUTE) (CHRONIC): ICD-10-CM

## 2025-05-12 DIAGNOSIS — C79.52 SECONDARY MALIGNANT NEOPLASM OF BONE MARROW: ICD-10-CM

## 2025-05-12 DIAGNOSIS — K59.00 CONSTIPATION, UNSPECIFIED: ICD-10-CM

## 2025-05-12 DIAGNOSIS — I10 ESSENTIAL (PRIMARY) HYPERTENSION: ICD-10-CM

## 2025-05-12 DIAGNOSIS — K92.1 MELENA: ICD-10-CM

## 2025-05-12 DIAGNOSIS — C79.51 SECONDARY MALIGNANT NEOPLASM OF BONE: ICD-10-CM

## 2025-05-12 DIAGNOSIS — I72.3 ANEURYSM OF ILIAC ARTERY: ICD-10-CM

## 2025-05-12 DIAGNOSIS — Z79.02 LONG TERM (CURRENT) USE OF ANTITHROMBOTICS/ANTIPLATELETS: ICD-10-CM

## 2025-05-12 DIAGNOSIS — E78.5 HYPERLIPIDEMIA, UNSPECIFIED: ICD-10-CM

## 2025-05-12 DIAGNOSIS — R13.10 DYSPHAGIA, UNSPECIFIED: ICD-10-CM

## 2025-05-12 DIAGNOSIS — Z88.0 ALLERGY STATUS TO PENICILLIN: ICD-10-CM

## 2025-05-12 DIAGNOSIS — F05 DELIRIUM DUE TO KNOWN PHYSIOLOGICAL CONDITION: ICD-10-CM

## 2025-05-12 DIAGNOSIS — E43 UNSPECIFIED SEVERE PROTEIN-CALORIE MALNUTRITION: ICD-10-CM

## 2025-05-12 DIAGNOSIS — K29.00 ACUTE GASTRITIS WITHOUT BLEEDING: ICD-10-CM

## 2025-05-12 DIAGNOSIS — D62 ACUTE POSTHEMORRHAGIC ANEMIA: ICD-10-CM

## 2025-05-12 DIAGNOSIS — I48.0 PAROXYSMAL ATRIAL FIBRILLATION: ICD-10-CM

## 2025-05-12 DIAGNOSIS — Z79.899 OTHER LONG TERM (CURRENT) DRUG THERAPY: ICD-10-CM

## 2025-05-12 DIAGNOSIS — F43.29 ADJUSTMENT DISORDER WITH OTHER SYMPTOMS: ICD-10-CM

## 2025-05-12 DIAGNOSIS — Z79.818 LONG TERM (CURRENT) USE OF OTHER AGENTS AFFECTING ESTROGEN RECEPTORS AND ESTROGEN LEVELS: ICD-10-CM

## 2025-05-12 DIAGNOSIS — R53.1 WEAKNESS: ICD-10-CM

## 2025-05-12 DIAGNOSIS — R74.8 ABNORMAL LEVELS OF OTHER SERUM ENZYMES: ICD-10-CM

## 2025-05-12 DIAGNOSIS — D61.82 MYELOPHTHISIS: ICD-10-CM

## 2025-05-12 DIAGNOSIS — J18.9 PNEUMONIA, UNSPECIFIED ORGANISM: ICD-10-CM

## 2025-05-12 DIAGNOSIS — R44.3 HALLUCINATIONS, UNSPECIFIED: ICD-10-CM

## 2025-05-12 DIAGNOSIS — Z51.5 ENCOUNTER FOR PALLIATIVE CARE: ICD-10-CM

## 2025-05-12 DIAGNOSIS — Z87.891 PERSONAL HISTORY OF NICOTINE DEPENDENCE: ICD-10-CM

## 2025-05-12 DIAGNOSIS — I95.1 ORTHOSTATIC HYPOTENSION: ICD-10-CM

## 2025-05-12 DIAGNOSIS — J90 PLEURAL EFFUSION, NOT ELSEWHERE CLASSIFIED: ICD-10-CM

## 2025-05-12 DIAGNOSIS — R45.851 SUICIDAL IDEATIONS: ICD-10-CM

## 2025-05-12 DIAGNOSIS — Z11.52 ENCOUNTER FOR SCREENING FOR COVID-19: ICD-10-CM

## 2025-05-12 DIAGNOSIS — Z96.89 PRESENCE OF OTHER SPECIFIED FUNCTIONAL IMPLANTS: ICD-10-CM

## 2025-05-12 DIAGNOSIS — Z66 DO NOT RESUSCITATE: ICD-10-CM

## 2025-05-12 DIAGNOSIS — D65 DISSEMINATED INTRAVASCULAR COAGULATION [DEFIBRINATION SYNDROME]: ICD-10-CM

## 2025-05-12 LAB
ACANTHOCYTES BLD QL SMEAR: SIGNIFICANT CHANGE UP
ALBUMIN SERPL ELPH-MCNC: 3.4 G/DL — LOW (ref 3.5–5.2)
ALP SERPL-CCNC: 273 U/L — HIGH (ref 30–115)
ALT FLD-CCNC: 16 U/L — SIGNIFICANT CHANGE UP (ref 0–41)
ANION GAP SERPL CALC-SCNC: 13 MMOL/L — SIGNIFICANT CHANGE UP (ref 7–14)
ANISOCYTOSIS BLD QL: SIGNIFICANT CHANGE UP
APPEARANCE UR: CLEAR — SIGNIFICANT CHANGE UP
APTT BLD: 51.8 SEC — HIGH (ref 27–39.2)
AST SERPL-CCNC: 30 U/L — SIGNIFICANT CHANGE UP (ref 0–41)
BACTERIA # UR AUTO: NEGATIVE /HPF — SIGNIFICANT CHANGE UP
BASOPHILS # BLD AUTO: 0.21 K/UL — HIGH (ref 0–0.2)
BASOPHILS NFR BLD AUTO: 2.6 % — HIGH (ref 0–1)
BILIRUB SERPL-MCNC: 0.9 MG/DL — SIGNIFICANT CHANGE UP (ref 0.2–1.2)
BILIRUB UR-MCNC: NEGATIVE — SIGNIFICANT CHANGE UP
BLD GP AB SCN SERPL QL: SIGNIFICANT CHANGE UP
BUN SERPL-MCNC: 19 MG/DL — SIGNIFICANT CHANGE UP (ref 10–20)
CALCIUM SERPL-MCNC: 8.8 MG/DL — SIGNIFICANT CHANGE UP (ref 8.4–10.5)
CAST: 7 /LPF — HIGH (ref 0–4)
CHLORIDE SERPL-SCNC: 102 MMOL/L — SIGNIFICANT CHANGE UP (ref 98–110)
CO2 SERPL-SCNC: 20 MMOL/L — SIGNIFICANT CHANGE UP (ref 17–32)
COLOR SPEC: YELLOW — SIGNIFICANT CHANGE UP
CREAT SERPL-MCNC: 0.7 MG/DL — SIGNIFICANT CHANGE UP (ref 0.7–1.5)
DACRYOCYTES BLD QL SMEAR: SLIGHT — SIGNIFICANT CHANGE UP
DIFF PNL FLD: NEGATIVE — SIGNIFICANT CHANGE UP
EGFR: 105 ML/MIN/1.73M2 — SIGNIFICANT CHANGE UP
EGFR: 105 ML/MIN/1.73M2 — SIGNIFICANT CHANGE UP
ELLIPTOCYTES BLD QL SMEAR: SIGNIFICANT CHANGE UP
EOSINOPHIL # BLD AUTO: 0.07 K/UL — SIGNIFICANT CHANGE UP (ref 0–0.7)
EOSINOPHIL NFR BLD AUTO: 0.9 % — SIGNIFICANT CHANGE UP (ref 0–8)
GIANT PLATELETS BLD QL SMEAR: PRESENT — SIGNIFICANT CHANGE UP
GLUCOSE SERPL-MCNC: 106 MG/DL — HIGH (ref 70–99)
GLUCOSE UR QL: NEGATIVE MG/DL — SIGNIFICANT CHANGE UP
HCT VFR BLD CALC: 23.5 % — LOW (ref 42–52)
HGB BLD-MCNC: 7.6 G/DL — LOW (ref 14–18)
INR BLD: 6.2 RATIO — CRITICAL HIGH (ref 0.65–1.3)
KETONES UR QL: 40 MG/DL
LEUKOCYTE ESTERASE UR-ACNC: NEGATIVE — SIGNIFICANT CHANGE UP
LYMPHOCYTES # BLD AUTO: 1.1 K/UL — LOW (ref 1.2–3.4)
LYMPHOCYTES # BLD AUTO: 13.9 % — LOW (ref 20.5–51.1)
MANUAL SMEAR VERIFICATION: SIGNIFICANT CHANGE UP
MCHC RBC-ENTMCNC: 25.5 PG — LOW (ref 27–31)
MCHC RBC-ENTMCNC: 32.3 G/DL — SIGNIFICANT CHANGE UP (ref 32–37)
MCV RBC AUTO: 78.9 FL — LOW (ref 80–94)
METAMYELOCYTES # FLD: 5.2 % — HIGH (ref 0–0)
METAMYELOCYTES NFR BLD: 5.2 % — HIGH (ref 0–0)
MICROCYTES BLD QL: SIGNIFICANT CHANGE UP
MONOCYTES # BLD AUTO: 0.69 K/UL — HIGH (ref 0.1–0.6)
MONOCYTES NFR BLD AUTO: 8.7 % — SIGNIFICANT CHANGE UP (ref 1.7–9.3)
MYELOCYTES NFR BLD: 5.2 % — HIGH (ref 0–0)
NEUTROPHILS # BLD AUTO: 5.04 K/UL — SIGNIFICANT CHANGE UP (ref 1.4–6.5)
NEUTROPHILS NFR BLD AUTO: 63.5 % — SIGNIFICANT CHANGE UP (ref 42.2–75.2)
NITRITE UR-MCNC: NEGATIVE — SIGNIFICANT CHANGE UP
NRBC # BLD: 2 /100 WBCS — HIGH (ref 0–0)
NRBC BLD AUTO-RTO: SIGNIFICANT CHANGE UP /100 WBCS (ref 0–0)
NRBC BLD-RTO: 2 /100 WBCS — HIGH (ref 0–0)
PH UR: 5 — SIGNIFICANT CHANGE UP (ref 5–8)
PLAT MORPH BLD: NORMAL — SIGNIFICANT CHANGE UP
PLATELET # BLD AUTO: 92 K/UL — LOW (ref 130–400)
PMV BLD: 9.4 FL — SIGNIFICANT CHANGE UP (ref 7.4–10.4)
POIKILOCYTOSIS BLD QL AUTO: SIGNIFICANT CHANGE UP
POLYCHROMASIA BLD QL SMEAR: SLIGHT — SIGNIFICANT CHANGE UP
POTASSIUM SERPL-MCNC: 4.1 MMOL/L — SIGNIFICANT CHANGE UP (ref 3.5–5)
POTASSIUM SERPL-SCNC: 4.1 MMOL/L — SIGNIFICANT CHANGE UP (ref 3.5–5)
PROT SERPL-MCNC: 6.1 G/DL — SIGNIFICANT CHANGE UP (ref 6–8)
PROT UR-MCNC: 30 MG/DL
PROTHROM AB SERPL-ACNC: >40 SEC — HIGH (ref 9.95–12.87)
RBC # BLD: 2.98 M/UL — LOW (ref 4.7–6.1)
RBC # FLD: 16.4 % — HIGH (ref 11.5–14.5)
RBC BLD AUTO: ABNORMAL
RBC CASTS # UR COMP ASSIST: 3 /HPF — SIGNIFICANT CHANGE UP (ref 0–4)
SCHISTOCYTES BLD QL AUTO: SLIGHT — SIGNIFICANT CHANGE UP
SODIUM SERPL-SCNC: 135 MMOL/L — SIGNIFICANT CHANGE UP (ref 135–146)
SP GR SPEC: >1.03 — HIGH (ref 1–1.03)
SQUAMOUS # UR AUTO: 2 /HPF — SIGNIFICANT CHANGE UP (ref 0–5)
UROBILINOGEN FLD QL: 1 MG/DL — SIGNIFICANT CHANGE UP (ref 0.2–1)
WBC # BLD: 7.94 K/UL — SIGNIFICANT CHANGE UP (ref 4.8–10.8)
WBC # FLD AUTO: 7.94 K/UL — SIGNIFICANT CHANGE UP (ref 4.8–10.8)
WBC UR QL: 1 /HPF — SIGNIFICANT CHANGE UP (ref 0–5)

## 2025-05-12 PROCEDURE — 85025 COMPLETE CBC W/AUTO DIFF WBC: CPT

## 2025-05-12 PROCEDURE — 86850 RBC ANTIBODY SCREEN: CPT

## 2025-05-12 PROCEDURE — 72158 MRI LUMBAR SPINE W/O & W/DYE: CPT

## 2025-05-12 PROCEDURE — 84157 ASSAY OF PROTEIN OTHER: CPT

## 2025-05-12 PROCEDURE — 86880 COOMBS TEST DIRECT: CPT

## 2025-05-12 PROCEDURE — 88108 CYTOPATH CONCENTRATE TECH: CPT

## 2025-05-12 PROCEDURE — 88342 IMHCHEM/IMCYTCHM 1ST ANTB: CPT

## 2025-05-12 PROCEDURE — 86901 BLOOD TYPING SEROLOGIC RH(D): CPT

## 2025-05-12 PROCEDURE — 87040 BLOOD CULTURE FOR BACTERIA: CPT

## 2025-05-12 PROCEDURE — 85379 FIBRIN DEGRADATION QUANT: CPT

## 2025-05-12 PROCEDURE — 88311 DECALCIFY TISSUE: CPT

## 2025-05-12 PROCEDURE — 88305 TISSUE EXAM BY PATHOLOGIST: CPT

## 2025-05-12 PROCEDURE — 88237 TISSUE CULTURE BONE MARROW: CPT

## 2025-05-12 PROCEDURE — 0241U: CPT

## 2025-05-12 PROCEDURE — 62328 DX LMBR SPI PNXR W/FLUOR/CT: CPT

## 2025-05-12 PROCEDURE — 86923 COMPATIBILITY TEST ELECTRIC: CPT

## 2025-05-12 PROCEDURE — 84153 ASSAY OF PSA TOTAL: CPT

## 2025-05-12 PROCEDURE — 36430 TRANSFUSION BLD/BLD COMPNT: CPT

## 2025-05-12 PROCEDURE — 86041 ACETYLCHOLN RCPTR BNDNG ANTB: CPT

## 2025-05-12 PROCEDURE — 88264 CHROMOSOME ANALYSIS 20-25: CPT

## 2025-05-12 PROCEDURE — 89051 BODY FLUID CELL COUNT: CPT

## 2025-05-12 PROCEDURE — 83625 ASSAY OF LDH ENZYMES: CPT

## 2025-05-12 PROCEDURE — 70553 MRI BRAIN STEM W/O & W/DYE: CPT

## 2025-05-12 PROCEDURE — 82550 ASSAY OF CK (CPK): CPT

## 2025-05-12 PROCEDURE — 74018 RADEX ABDOMEN 1 VIEW: CPT

## 2025-05-12 PROCEDURE — 76705 ECHO EXAM OF ABDOMEN: CPT

## 2025-05-12 PROCEDURE — 85027 COMPLETE CBC AUTOMATED: CPT

## 2025-05-12 PROCEDURE — 97530 THERAPEUTIC ACTIVITIES: CPT | Mod: GO

## 2025-05-12 PROCEDURE — 86366 MUSCLE-SPECIFIC KINASE ANTB: CPT

## 2025-05-12 PROCEDURE — A9579: CPT

## 2025-05-12 PROCEDURE — 85384 FIBRINOGEN ACTIVITY: CPT

## 2025-05-12 PROCEDURE — 88341 IMHCHEM/IMCYTCHM EA ADD ANTB: CPT

## 2025-05-12 PROCEDURE — 88185 FLOWCYTOMETRY/TC ADD-ON: CPT

## 2025-05-12 PROCEDURE — 83735 ASSAY OF MAGNESIUM: CPT

## 2025-05-12 PROCEDURE — A9503: CPT

## 2025-05-12 PROCEDURE — 84443 ASSAY THYROID STIM HORMONE: CPT

## 2025-05-12 PROCEDURE — 85730 THROMBOPLASTIN TIME PARTIAL: CPT

## 2025-05-12 PROCEDURE — 78803 RP LOCLZJ TUM SPECT 1 AREA: CPT

## 2025-05-12 PROCEDURE — 82947 ASSAY GLUCOSE BLOOD QUANT: CPT

## 2025-05-12 PROCEDURE — 70450 CT HEAD/BRAIN W/O DYE: CPT

## 2025-05-12 PROCEDURE — P9059: CPT

## 2025-05-12 PROCEDURE — 83605 ASSAY OF LACTIC ACID: CPT

## 2025-05-12 PROCEDURE — 72157 MRI CHEST SPINE W/O & W/DYE: CPT

## 2025-05-12 PROCEDURE — 97166 OT EVAL MOD COMPLEX 45 MIN: CPT | Mod: GO

## 2025-05-12 PROCEDURE — 85045 AUTOMATED RETICULOCYTE COUNT: CPT

## 2025-05-12 PROCEDURE — 84100 ASSAY OF PHOSPHORUS: CPT

## 2025-05-12 PROCEDURE — 86900 BLOOD TYPING SEROLOGIC ABO: CPT

## 2025-05-12 PROCEDURE — 71045 X-RAY EXAM CHEST 1 VIEW: CPT

## 2025-05-12 PROCEDURE — 80076 HEPATIC FUNCTION PANEL: CPT

## 2025-05-12 PROCEDURE — 83615 LACTATE (LD) (LDH) ENZYME: CPT

## 2025-05-12 PROCEDURE — C1830: CPT

## 2025-05-12 PROCEDURE — 80053 COMPREHEN METABOLIC PANEL: CPT

## 2025-05-12 PROCEDURE — 87205 SMEAR GRAM STAIN: CPT

## 2025-05-12 PROCEDURE — 99285 EMERGENCY DEPT VISIT HI MDM: CPT

## 2025-05-12 PROCEDURE — 38222 DX BONE MARROW BX & ASPIR: CPT | Mod: RT

## 2025-05-12 PROCEDURE — 77012 CT SCAN FOR NEEDLE BIOPSY: CPT

## 2025-05-12 PROCEDURE — P9016: CPT

## 2025-05-12 PROCEDURE — 86596 VOLTAGE-GTD CA CHNL ANTB EA: CPT

## 2025-05-12 PROCEDURE — 82945 GLUCOSE OTHER FLUID: CPT

## 2025-05-12 PROCEDURE — 97535 SELF CARE MNGMENT TRAINING: CPT | Mod: GO

## 2025-05-12 PROCEDURE — 82962 GLUCOSE BLOOD TEST: CPT

## 2025-05-12 PROCEDURE — 82085 ASSAY OF ALDOLASE: CPT

## 2025-05-12 PROCEDURE — 85362 FIBRIN DEGRADATION PRODUCTS: CPT

## 2025-05-12 PROCEDURE — 97162 PT EVAL MOD COMPLEX 30 MIN: CPT | Mod: GP

## 2025-05-12 PROCEDURE — 81001 URINALYSIS AUTO W/SCOPE: CPT

## 2025-05-12 PROCEDURE — 85610 PROTHROMBIN TIME: CPT

## 2025-05-12 PROCEDURE — 93970 EXTREMITY STUDY: CPT

## 2025-05-12 PROCEDURE — 93005 ELECTROCARDIOGRAM TRACING: CPT

## 2025-05-12 PROCEDURE — 74177 CT ABD & PELVIS W/CONTRAST: CPT

## 2025-05-12 PROCEDURE — 86255 FLUORESCENT ANTIBODY SCREEN: CPT

## 2025-05-12 PROCEDURE — L8699: CPT

## 2025-05-12 PROCEDURE — 92610 EVALUATE SWALLOWING FUNCTION: CPT | Mod: GN

## 2025-05-12 PROCEDURE — 97110 THERAPEUTIC EXERCISES: CPT | Mod: GP

## 2025-05-12 PROCEDURE — 72156 MRI NECK SPINE W/O & W/DYE: CPT

## 2025-05-12 PROCEDURE — 99152 MOD SED SAME PHYS/QHP 5/>YRS: CPT

## 2025-05-12 PROCEDURE — 88313 SPECIAL STAINS GROUP 2: CPT

## 2025-05-12 PROCEDURE — 99223 1ST HOSP IP/OBS HIGH 75: CPT

## 2025-05-12 PROCEDURE — 86235 NUCLEAR ANTIGEN ANTIBODY: CPT

## 2025-05-12 PROCEDURE — 84154 ASSAY OF PSA FREE: CPT

## 2025-05-12 PROCEDURE — 83010 ASSAY OF HAPTOGLOBIN QUANT: CPT

## 2025-05-12 PROCEDURE — 78306 BONE IMAGING WHOLE BODY: CPT

## 2025-05-12 PROCEDURE — 36415 COLL VENOUS BLD VENIPUNCTURE: CPT

## 2025-05-12 PROCEDURE — 83516 IMMUNOASSAY NONANTIBODY: CPT

## 2025-05-12 PROCEDURE — 88285 CHROMOSOME COUNT ADDITIONAL: CPT

## 2025-05-12 PROCEDURE — 88184 FLOWCYTOMETRY/ TC 1 MARKER: CPT

## 2025-05-12 PROCEDURE — 84145 PROCALCITONIN (PCT): CPT

## 2025-05-12 RX ORDER — SODIUM CHLORIDE 9 G/1000ML
1000 INJECTION, SOLUTION INTRAVENOUS ONCE
Refills: 0 | Status: COMPLETED | OUTPATIENT
Start: 2025-05-12 | End: 2025-05-12

## 2025-05-12 RX ORDER — HYDROMORPHONE/SOD CHLOR,ISO/PF 2 MG/10 ML
1 SYRINGE (ML) INJECTION ONCE
Refills: 0 | Status: DISCONTINUED | OUTPATIENT
Start: 2025-05-12 | End: 2025-05-12

## 2025-05-12 RX ADMIN — Medication 4 MILLIGRAM(S): at 17:05

## 2025-05-12 RX ADMIN — SODIUM CHLORIDE 1000 MILLILITER(S): 9 INJECTION, SOLUTION INTRAVENOUS at 17:16

## 2025-05-12 RX ADMIN — Medication 40 MILLIGRAM(S): at 23:59

## 2025-05-12 RX ADMIN — Medication 1 MILLIGRAM(S): at 22:30

## 2025-05-12 RX ADMIN — Medication 1 MILLIGRAM(S): at 21:56

## 2025-05-12 RX ADMIN — Medication 4 MILLIGRAM(S): at 18:36

## 2025-05-12 NOTE — H&P ADULT - ATTENDING COMMENTS
Assessment    Generalized weakness, likely multifactorial with malignancy, dehydration, possible symptomatic anemia  Acute on chronic anemia  Esophageal adenocarcinoma s/p stent placement  CAD s/p CABG  Paroxysmal afib not on AC  HTN    Plan    - receiving one unit prbc, received bolus of LR, monitor clinical status afterward, PT  - protonix 40 IV bid, patient states he had multiple episodes of "black diarrhea" at home and felt weak, will keep NPO after midnight and advanced GI follow up considering he had a recent esophageal stent, trend cbc, IV vitamin K and one FFP, repeat INR after  - elevated vitamin INR likely from poor PO intake, having ketones in urine  - unable to make oncology appointment supposed to follow with team here, f/u oncology    Pending: trend CBC, repeat INR, GI, heme onc    # DVT PPX: SCDs    GENERAL: NAD, lying in bed comfortably  HEAD:  Atraumatic, normocephalic  NERVOUS SYSTEM:  A&Ox3, moving all extremities, no focal deficits   EYES: EOMI, PERRL  NECK: Supple, trachea midline, no JVD  HEART: Regular rate and rhythm  LUNGS: Clear to auscultation bilaterally, no crackles, wheezing, or rhonchi  ABDOMEN: Soft, nontender, nondistended, +BS  EXTREMITIES: 2+ peripheral pulses bilaterally. No clubbing, cyanosis, or edema    75 minutes spent on review of labs, imaging studies, old records, obtaining history, personally examining patient, counselling and communicating with patient/ family, entering orders for medications/tests/etc, discussions with other health care providers, documentation in electronic health records, independent interpretation of labs, imaging/procedure results and care coordination.

## 2025-05-12 NOTE — ED PROVIDER NOTE - CONSIDERATION OF ADMISSION OBSERVATION
Patient with failure to thrive outpatient 2/2 esophageal adenocarcinoma, not tolerating PO. Will require admission. Consideration of Admission/Observation

## 2025-05-12 NOTE — PATIENT PROFILE ADULT - FALL HARM RISK - HARM RISK INTERVENTIONS
Assistance with ambulation/Assistance OOB with selected safe patient handling equipment/Communicate Risk of Fall with Harm to all staff/Discuss with provider need for PT consult/Monitor gait and stability/Reinforce activity limits and safety measures with patient and family/Tailored Fall Risk Interventions/Visual Cue: Yellow wristband and red socks/Bed in lowest position, wheels locked, appropriate side rails in place/Call bell, personal items and telephone in reach/Instruct patient to call for assistance before getting out of bed or chair/Non-slip footwear when patient is out of bed/[place] to call system/Physically safe environment - no spills, clutter or unnecessary equipment/Purposeful Proactive Rounding/Room/bathroom lighting operational, light cord in reach

## 2025-05-12 NOTE — H&P ADULT - NSHPPHYSICALEXAM_GEN_ALL_CORE
GENERAL: NAD, lying in bed comfortably  EYES/ENT: conjunctiva and sclera clear, Moist mucous membranes  NECK: Supple, No JVD  CHEST/LUNG: Clear to auscultation bilaterally; No rales, rhonchi, wheezing, or rubs. Unlabored respirations  HEART: Regular rate and rhythm; No murmurs, rubs, or gallops  ABDOMEN: Soft, nontender, nondistended.   EXTREMITIES:  3/5 LE strength. Bone pain at left hip.  NERVOUS SYSTEM:  A&Ox3, following commands

## 2025-05-12 NOTE — H&P ADULT - HISTORY OF PRESENT ILLNESS
59y male with PMHx of HTN, hiatal hernia, HLD, CAD s/p CABG in Jan 2024, paroxysmal A. fib not on AC, former smoker (30 pack years, quit 1yr ago), recently diagnosed with colitis and esophageal adenocarcinoma s/p stent placement who presents for weakness. Reports he was discharged recently, and has been progressively deteriorating. States he has been unable to tolerate p.o. and has been unable to walk over the past few days. Wife at bedside reports that he has not been able to follow-up with oncology due to the weakness.  No fevers, chills, chest pain, shortness of breath, palpitations, headache, vision changes, nausea, vomiting, abdominal pain, bloody stools, hematuria, falls.  He is supposed to have PET scan tomorrow and follow with Dr. Sanchez in 2 days. 59y male with PMHx of HTN, hiatal hernia, HLD, CAD s/p CABG in Jan 2024, paroxysmal A. fib not on AC, former smoker (30 pack years, quit 1yr ago), recently diagnosed with colitis and esophageal adenocarcinoma s/p stent placement who presents for weakness. Reports he was discharged recently, and has been progressively deteriorating. States he has been unable to tolerate p.o. and has been unable to walk over the past few days. Wife at bedside reports that he has not been able to follow-up with oncology due to the weakness.  No fevers, chills, chest pain, shortness of breath, palpitations, headache, vision changes, nausea, vomiting, abdominal pain, bloody stools, hematuria, falls.  He is supposed to have PET scan tomorrow and follow with Dr. Sanchez in 2 days.    T(F): 98.5 (05-12-25 @ 21:05), Max: 98.6 (05-12-25 @ 19:48)  HR: 97 (05-12-25 @ 21:05) (88 - 104)  BP: 111/58 (05-12-25 @ 21:05) (111/58 - 132/75)  RR: 18 (05-12-25 @ 21:05) (18 - 20)  SpO2: 99% (05-12-25 @ 21:05) (98% - 100%) on RA    Labs: Hg 7.6 (baseline ~10), Plt 92       Imaging:   59y male with PMHx of HTN, hiatal hernia, HLD, CAD s/p CABG in Jan 2024, paroxysmal A. fib not on AC, former smoker (30 pack years, quit 1yr ago), recently diagnosed with colitis and esophageal adenocarcinoma s/p stent placement who presents for weakness. The patient was recently [facility] on 5/6 , and has been progressively deteriorating. States he has been unable to tolerate p.o. and has been unable to walk over the past few days. Wife at bedside reports that he has not been able to follow-up with oncology due to the weakness.  On ROS the patient also states he has been having black tarry stools that started 1 day prior to his EGD. He had 3 black BMs today. He denies any mucosal bleeding or blood in the urine.  He denies any fevers, chills, chest pain, shortness of breath, palpitations, headache, vision changes, nausea, vomiting, abdominal pain, hematuria, falls.  He is supposed to have PET scan tomorrow and follow with Dr. Sanchez in 2 days.    T(F): 98.5 (05-12-25 @ 21:05), Max: 98.6 (05-12-25 @ 19:48)  HR: 97 (05-12-25 @ 21:05) (88 - 104)  BP: 111/58 (05-12-25 @ 21:05) (111/58 - 132/75)  RR: 18 (05-12-25 @ 21:05) (18 - 20)  SpO2: 99% (05-12-25 @ 21:05) (98% - 100%) on RA    Labs: Hg 7.6 (baseline ~10), MCV 78, slight schistocytes on smear,  Plt 92, PT/INR >40/6.2, PTT 51.8, ALK phos 273.      Imaging:    CT Chest w/ IV Cont  (4/26 - prior admission)  IMPRESSION:  1.  No definite evidence of thoracic metastatic disease.  2.  Small bilateral pleural effusions.  3.  Marked circumferential thickening of the distal esophagus/hiatal   hernia could be related to provided history of suspected esophageal   cancer versus esophagitis.

## 2025-05-12 NOTE — ED PROVIDER NOTE - DIFFERENTIAL DIAGNOSIS
Differential Diagnosis Generalized weakness, failure to thrive, hx esophageal adenocarcinoma. R/o infection vs electrolyte abnormality vs dehydration vs other underlying metabolic derangement.

## 2025-05-12 NOTE — ED PROVIDER NOTE - OBJECTIVE STATEMENT
59y male with PMHx of HTN, hiatal hernia, HLD, CAD s/p CABG in Jan 2024, paroxysmal A. fib not on AC, former smoker (30 pack years, quit 1yr ago), recently diagnosed with colitis and esophageal adenocarcinoma s/p stent placement who presents for weakness. Reports he was discharged recently, and has been progressively deteriorating. States he has been unable to tolerate p.o. and has been unable to walk over the past few days. Wife at bedside reports that he has not been able to follow-up with oncology due to the weakness.  No fevers, chills, chest pain, shortness of breath, palpitations, headache, vision changes, nausea, vomiting, abdominal pain, bloody stools, hematuria, falls.  He is supposed to have PET scan tomorrow and follow with Dr. Sanchez in 2 days.

## 2025-05-12 NOTE — PATIENT PROFILE ADULT - DO YOU FEEL LIKE HURTING YOURSELF OR OTHERS?
Subjective   Sánchez Spaulding is a 47 y.o. male.   Chief Complaint   Patient presents with    Diabetes       History of Present Illness   Presents to the office today for what the schedule says is checkup on A1c and general health.  Sánchez is 47 years old.  Active problem list as below.  I last saw him back in November 2022.  A1c on November 28 was 7.5.  That was down quite a bit from 10.9 in January 2022.  Was lost to follow-up at that time.  He has continued to take his medications and we have continued to get refill request.  He has seen Dr. Smith multiple times since I have last seen him.  According to recent notes Jose is now scheduled for surgery on July 31 and is going to have 1/5 metatarsal resection and Achilles tendon lengthening.  Per Dr. Smith notes-his recent MRI is consistent with osteomyelitis.      Blood sugar level/ranges-on multiple medications as below for his diabetes.  In very good ranges.  Almost, too low at times.  Had a few episodes where he got a little lightheaded and nauseous.    HTN-on amlodipine 10 mg/day, lisinopril 30 mg/day.  Blood pressure is good at 132/81.          Patient Active Problem List    Diagnosis Date Noted    Chronic ulcer of left foot with fat layer exposed 07/12/2023     Note Last Updated: 7/12/2023     Added automatically from request for surgery [id number]      Acute osteomyelitis of left foot 07/12/2023     Note Last Updated: 7/12/2023     Added automatically from request for surgery [id number]      Acquired posterior equinus, left 07/12/2023     Note Last Updated: 7/12/2023     Added automatically from request for surgery [id number]      Morbid obesity 05/31/2022    Microalbuminuria due to type 2 diabetes mellitus 01/23/2022    White coat syndrome with diagnosis of hypertension 01/11/2022    Charcot foot due to diabetes mellitus 01/11/2022     Note Last Updated: 1/11/2022     right      Type 2 diabetes mellitus with diabetic polyneuropathy, with long-term current use of  insulin      Note Last Updated: 1/26/2022     DX in mid - 30's      Severe nonproliferative diabetic retinopathy associated with type 2 diabetes mellitus 02/15/2021    Benign hypertension 11/15/2016    Diabetic polyneuropathy 06/01/2016    Hyperlipidemia 04/27/2016           Past Surgical History:   Procedure Laterality Date    EYE SURGERY  2021    FOOT SURGERY      2016  foot surgery X5 2017    LUNG BIOPSY  1996    TOENAIL EXCISION  2014    TRANS METATARSAL AMPUTATION Left 12/28/2018     Current Outpatient Medications on File Prior to Visit   Medication Sig    atorvastatin (LIPITOR) 40 MG tablet Take 1 tablet by mouth once daily    Farxiga 10 MG tablet Take 1 tablet by mouth once daily (Patient taking differently: Take 10 mg by mouth Daily.)    Janumet  MG per tablet Take 1 tablet by mouth twice daily (Patient taking differently: Take 1 tablet by mouth 2 (Two) Times a Day With Meals.)    Lantus SoloStar 100 UNIT/ML injection pen INJECT 70 UNITS SUBCUTANEOUSLY IN THE EVENING    lisinopril (PRINIVIL,ZESTRIL) 30 MG tablet Take 1 tablet by mouth Daily.    nateglinide (Starlix) 120 MG tablet Take 1 tablet by mouth 3 (Three) Times a Day Before Meals.    ReliOn Pen Needles 32G X 4 MM misc Use to inject insulin once per day    [DISCONTINUED] amLODIPine (NORVASC) 10 MG tablet Take 1 tablet by mouth once daily     No current facility-administered medications on file prior to visit.     Allergies   Allergen Reactions    Trulicity [Dulaglutide] GI Intolerance    Gluconic Acid Rash     Social History     Socioeconomic History    Marital status:      Spouse name: Kenny    Number of children: 0   Tobacco Use    Smoking status: Never     Passive exposure: Never    Smokeless tobacco: Never   Vaping Use    Vaping Use: Never used   Substance and Sexual Activity    Alcohol use: Yes     Alcohol/week: 2.0 standard drinks     Types: 2 Drinks containing 0.5 oz of alcohol per week     Comment: Maybe 2 every two months    Drug  "use: Never    Sexual activity: Yes     Partners: Female     Birth control/protection: Tubal ligation     Family History   Problem Relation Age of Onset    Kidney nephrosis Father     Cancer Mother     Cancer Maternal Grandfather     Diabetes Maternal Aunt        Review of Systems    Objective   /81 (BP Location: Right arm, Patient Position: Sitting, Cuff Size: Adult)   Pulse 90   Temp 97 °F (36.1 °C) (Infrared)   Ht 182.9 cm (72.01\")   Wt (!) 140 kg (309 lb)   SpO2 95%   BMI 41.90 kg/m²   Physical Exam  Constitutional:       Appearance: He is well-developed and overweight. He is not toxic-appearing.      Comments: Wearing a face mask     HENT:      Head: Normocephalic and atraumatic.   Eyes:      Conjunctiva/sclera: Conjunctivae normal.   Cardiovascular:      Rate and Rhythm: Normal rate.   Pulmonary:      Effort: Pulmonary effort is normal. No respiratory distress.   Musculoskeletal:         General: Normal range of motion.      Cervical back: Normal range of motion.      Right lower leg: No edema.      Left lower leg: No edema.      Comments: He has protective diabetic foot wear on.   Skin:     General: Skin is warm and dry.      Findings: No rash.   Neurological:      Mental Status: He is alert and oriented to person, place, and time.      Gait: Gait normal.   Psychiatric:         Mood and Affect: Mood normal.         Behavior: Behavior normal.         Hospital Outpatient Visit on 07/19/2023   Component Date Value Ref Range Status    QT Interval 07/19/2023 401  ms Final   Lab on 07/19/2023   Component Date Value Ref Range Status    WBC 07/19/2023 11.13 (H)  3.40 - 10.80 10*3/mm3 Final    RBC 07/19/2023 4.45  4.14 - 5.80 10*6/mm3 Final    Hemoglobin 07/19/2023 13.3  13.0 - 17.7 g/dL Final    Hematocrit 07/19/2023 40.2  37.5 - 51.0 % Final    MCV 07/19/2023 90.3  79.0 - 97.0 fL Final    MCH 07/19/2023 29.9  26.6 - 33.0 pg Final    MCHC 07/19/2023 33.1  31.5 - 35.7 g/dL Final    RDW 07/19/2023 13.3  12.3 " - 15.4 % Final    RDW-SD 07/19/2023 44.3  37.0 - 54.0 fl Final    MPV 07/19/2023 8.5  6.0 - 12.0 fL Final    Platelets 07/19/2023 286  140 - 450 10*3/mm3 Final    Glucose 07/19/2023 157 (H)  65 - 99 mg/dL Final    BUN 07/19/2023 18  6 - 20 mg/dL Final    Creatinine 07/19/2023 0.91  0.76 - 1.27 mg/dL Final    Sodium 07/19/2023 138  136 - 145 mmol/L Final    Potassium 07/19/2023 4.7  3.5 - 5.2 mmol/L Final    Chloride 07/19/2023 100  98 - 107 mmol/L Final    CO2 07/19/2023 26.0  22.0 - 29.0 mmol/L Final    Calcium 07/19/2023 10.2  8.6 - 10.5 mg/dL Final    BUN/Creatinine Ratio 07/19/2023 19.8  7.0 - 25.0 Final    Anion Gap 07/19/2023 12.0  5.0 - 15.0 mmol/L Final    eGFR 07/19/2023 103.3  >60.0 mL/min/1.73 Final   Office Visit on 05/31/2023   Component Date Value Ref Range Status    Wound Culture 05/31/2023 Heavy growth (4+) Pasteurella multocida (A)   Final    Pasteurella species are susceptible to penicillin, cephalosporins (except oral agents), tetracycline, quinolones, trimethoprim-sulfamethoxazole.      Wound Culture 05/31/2023 Light growth (2+) Staphylococcus aureus, MRSA (A)   Final    Methicillin resistant Staphylococcus aureus, Patient may be an isolation risk.    If Daptomycin is requested, please call the lab within 7 days for an E-test.    Wound Culture 05/31/2023 Light growth (2+) Normal Skin Tawny   Final    Gram Stain 05/31/2023 Rare (1+) Gram positive cocci in pairs   Final    Gram Stain 05/31/2023 Rare (1+) WBCs per low power field   Final   Office Visit on 04/13/2023   Component Date Value Ref Range Status    Wound Culture 04/13/2023 Light growth (2+) Staphylococcus aureus (A)   Final    ID and susceptibility performed at [facility].  See scanned report(s).      Wound Culture 04/13/2023 Scant growth (1+) Normal Skin Tawny   Final    Gram Stain 04/13/2023 Rare (1+) WBCs seen   Final    Gram Stain 04/13/2023 Rare (1+) Gram positive cocci in pairs   Final    Gram Stain 04/13/2023 Rare (1+) Gram  negative bacilli   Final         Assessment & Plan   Diagnoses and all orders for this visit:    1. Benign hypertension (Primary)  -     amLODIPine (NORVASC) 10 MG tablet; Take 1 tablet by mouth Daily.  Dispense: 90 tablet; Refill: 3    2. Type 2 diabetes mellitus with diabetic polyneuropathy, with long-term current use of insulin  -     Hemoglobin A1c  -     MicroAlbumin, Urine, Random - Urine, Clean Catch    3. Microalbuminuria due to type 2 diabetes mellitus    4. White coat syndrome with diagnosis of hypertension    5. Diabetic polyneuropathy associated with type 2 diabetes mellitus    6. Chronic ulcer of left foot with fat layer exposed    7. Other hyperlipidemia  -     Lipid Panel    Overall, Jose is doing well.  Blood pressure control is good today.  Continue amlodipine and lisinopril at current doses.  We will get an A1c to follow his diabetes.  [facility] and we will make any adjustments to his treatments if needed.  For now, continue all current diabetes medicines at current doses.  Follow-up with Dr. Smith regarding his surgery for the left fifth metatarsal resection as planned in a few days.  Anticipate follow-up here again in 3 months for reevaluation of his diabetes.      Call with any problems or concerns before next visit       Return in about 3 months (around 10/26/2023).      Much of this report is an electronic transcription of spoken language to printed text using Dragon dictation software.  As such, the subtleties and finesse of spoken language may permit erroneous, or at times, nonsensical words or phrases to be inadvertently transcribed; thus changes may be made at a later date to rectify these errors.     Pricilla Larose MD7/26/202313:37 EDT  This note has been electronically signed   no

## 2025-05-12 NOTE — H&P ADULT - ASSESSMENT
58 yo M with a PMH of HTN, hiatal hernia, HLD, CAD s/p CABG in Jan 2024, paroxysmal A. fib on plavix, former smoker (30 pack years), recently diagnosed with colitis and treated during his admission 4/1-4/5 presenting to the ED for evaluation of persistent abdominal pain and weakness x 3 weeks. Patient states he also has lower back pain and endorses chills, states he had a fever       Last admisison:    #Abdominal Pain 2/2 colitis/erosive gastritis seen on colo  #Adenocarcinoma at GE Junction  - CT abd/pelv w/ IVC- Redemonstrated prominent submucosal fat/bowel wall thickening involving descending colon, possibly related to prior episodes of colitis. Otherwise, no new acute findings in the abdomen or pelvis.  - GGT: 30, amylase 62. lipase 67  - CT chest IV con to rule out metastasis: 1.  No definite evidence of thoracic metastatic disease. 2.  Small bilateral pleural effusions.3.  Marked circumferential thickening of the distal esophagus/hiatal   hernia could be related to provided history of suspected esophageal   cancer versus esophagitis.  - blood cx negative, urine cx negative   - per cardio: may proceed with GI procedures without further cardiac workup. Pt is at moderate risk for a perioperative cardiac event. Ok to hold plavix.    - restarterd plavix 4/29  - EGD Impressions: A friable circumferential mass seen from the GE junction at 38cm to 28 cm of mid esophagus. Multiple biopsies were obtained. NEX powder was sprayed all over the mass to achieve adequate hemostasis. Another 5mm lesion was noted at 20cm from incisors in the proximal esophagus. Erosions in the stomach compatible with erosive gastritis. (Biopsy). A pulsating ulcerated lesion was noted in the fundus. Biopsy was not obtained. Normal mucosa in the whole examined duodenum. (Biopsy).  - Colonoscopy Impressions: Multiple semi-pedunculated polyps were seen in the left colon. A 2cm polyp was seen in the descending colon, biopsy was obtained. Polyps were not removed in the setting of poor prep. External hemorrhoids.Solid stool noted in the whole colon. Cecum was not reached  -path: adenocarcinoma mucin, signet cells  - H/O: consult  PET scan, Surg Onc Dr Yost for possible resection  - f/u outpatient with Dr. Palacios  - Surg/Onc consult: possible esophagus stent  - GI: Esophageal Stent/ EUS 5/1/25  - plan for repeat colonoscopy OP  - diet advanced to puree (max)   - PPI BID for one month per GI  - weakness, unable to walk/stand  - orthostats negative  - encourage PO intake  - PT/OT consult for dispo planning and increasing strength. suspect debilitation 2/2 illness  - DC planning pending increased strength/PT/OT  - will need walker on DC and home PT/OT    #chronic lower back pain   - xray lumbar spine:  There are five nonrib-bearing lumbar vertebral bodies. The pedicles are visualized. The vertebral body heights and alignment are maintained. There is mild disc space narrowing and degenerative changes throughout the lumbar spine. Mild degenerative change of the bilateral sacroiliac joints.  - pain control     #chronic anemia  - In the ED, Hb 11.6, baseline ~12  - MCV 79.2    #3.8 cm wide R iliac artery aneurysm- stable  - vascular o/p- surgery was scheduled with Dr. Tavera     #HTN  #CAD s/p CABG in Jan 2024  #Paroxysmal A.fib  - c/w home meds  - 5/3- restarted plavix today. OK by GI    #MISC  #DVT ppx: lovenox 40mg subQ QD  #GI ppx: 40mg PO BID   #Activity: AAT  #Code: full  #Dispo: dc planning, need PT eval.     #PENDING  - DC planning pending PT  - encourage PO intake (puree)  - will need walker on DC and home PT/OT 59y male with PMHx of HTN, hiatal hernia, HLD, CAD s/p CABG in Jan 2024, paroxysmal A. fib not on AC, former smoker (30 pack years, quit 1yr ago), recently diagnosed with colitis and esophageal adenocarcinoma s/p stent placement who presents for weakness. The patient was recently discharged from [facility] on 5/6 , and has been progressively deteriorating.       #Reported Melena possibly due to Upper GI Bleed likely Secondary to  Malignancy   #Thrombocytopenia, elevated INR/PTT r/o DIC  #Recently diagnosed Esophageal adenocarcinoma   #Acute Drop in Hemoglobin  - Hemodynamically stable & no active bleeding  - Hb Trend: 7.6 this admisison. Trending down since prior admisison.  - Coags: PT/INR and PTT elevated. Platelets 92, trending down.  - Iron levels noted.  - Resumed plavix on 5/3 last admisison.  - EGD 4/25: A friable circumferential mass seen from the GE junction at 38cm to 28 cm of mid esophagus. Multiple biopsies were obtained. Oozing of blood was noted from the whole mass.  - Keep NPO  - Trend CBC BID and transfuse as needed (target Hb  >7). Maintain active Type and screen.   - 2 Large IV bores, Keep active type & screen.  - Start pantoprazole 40mg IV BID  - Monitor BM for melena or hematochezia.  - Avoid any NSAIDs  - If any unstable bleed, please check CT angio AP IC STAT and call GI/IR stat  - GI c/s  - Heme onc c/s. Patient supposed to have PET scan OP tomorrow, Surg Onc Dr Yost for possible resection  - F/u DIC panel    #Weakness likely multifactorial 2/2 malignancy, anemia  #Decreased PO intake 2/2 esophageal adenocarcinoma  - Weakness, unable to walk/stand  - States the esophageal stent has not helped with PO intake.  - Started IV D5/LR 75cc/hr  - Seen by PT/OT last admisison; recommended home PT    #chronic lower back pain   - xray lumbar spine:  There are five nonrib-bearing lumbar vertebral bodies. The pedicles are visualized. The vertebral body heights and alignment are maintained. There is mild disc space narrowing and degenerative changes throughout the lumbar spine. Mild degenerative change of the bilateral sacroiliac joints.  - pain control: Dilaudid 0.5mg Q4, Oxycodone 5mg Q4 PRN    #3.8 cm wide R iliac artery aneurysm- stable  - vascular o/p- surgery was scheduled with Dr. Tavera     #HTN  #CAD s/p CABG in Jan 2024  #Dawson Millan  - c/w home meds  - Hold plavix    -------------------------------------------------------------------------------------------------------------------------------------  #DVT PPX: SCD  #GI PPX: PPI BID  #Diet: NPO for now  #Code Status: Full  #Activity Order: IAT 59y male with PMHx of HTN, hiatal hernia, HLD, CAD s/p CABG in Jan 2024, paroxysmal A. fib not on AC, former smoker (30 pack years, quit 1yr ago), recently diagnosed with colitis and esophageal adenocarcinoma s/p stent placement who presents for weakness. The patient was recently discharged from [facility] on 5/6 , and has been progressively deteriorating.       #Reported Melena possibly due to Upper GI Bleed likely Secondary to  Malignancy   #Thrombocytopenia, elevated INR/PTT r/o DIC  #Recently diagnosed Esophageal adenocarcinoma   #Acute Drop in Hemoglobin  - Hemodynamically stable & no active bleeding  - Hb Trend: 7.6 this admisison. Trending down since prior admisison.  - Coags: PT/INR and PTT elevated. Platelets 92, trending down.  - Iron levels noted.  - Resumed plavix on 5/3 last admisison.  - EGD 4/25: A friable circumferential mass seen from the GE junction at 38cm to 28 cm of mid esophagus. Multiple biopsies were obtained. Oozing of blood was noted from the whole mass.  - Keep NPO  - Trend CBC BID and transfuse as needed (target Hb  >7). Maintain active Type and screen.   - 2 Large IV bores, Keep active type & screen.  - Start pantoprazole 40mg IV BID  - Monitor BM for melena or hematochezia.  - Avoid any NSAIDs  - If any unstable bleed, please check CT angio AP IC STAT and call GI/IR stat  - GI c/s  - Heme onc c/s. Patient supposed to have PET scan OP tomorrow, Surg Onc Dr Yost for possible resection  - F/u DIC panel  - Given one unit FFP, IV vitamin K, one unit PRBC    #Weakness likely multifactorial 2/2 malignancy, anemia  #Decreased PO intake 2/2 esophageal adenocarcinoma  - Weakness, unable to walk/stand  - States the esophageal stent has not helped with PO intake.  - Started IV D5/LR 75cc/hr  - Seen by PT/OT last admisison; recommended home PT    #chronic lower back pain   - xray lumbar spine:  There are five nonrib-bearing lumbar vertebral bodies. The pedicles are visualized. The vertebral body heights and alignment are maintained. There is mild disc space narrowing and degenerative changes throughout the lumbar spine. Mild degenerative change of the bilateral sacroiliac joints.  - pain control: Dilaudid 0.5mg Q4, Oxycodone 5mg Q4 PRN    #3.8 cm wide R iliac artery aneurysm- stable  - vascular o/p- surgery was scheduled with Dr. Tavera     #HTN  #CAD s/p CABG in Jan 2024  #Dawson Millan  - c/w home meds  - Hold plavix    -------------------------------------------------------------------------------------------------------------------------------------  #DVT PPX: SCD  #GI PPX: PPI BID  #Diet: NPO for now  #Code Status: Full  #Activity Order: IAT

## 2025-05-12 NOTE — ED PROVIDER NOTE - PHYSICAL EXAMINATION
VITAL SIGNS: I have reviewed nursing notes and confirm.  CONSTITUTIONAL: ill-appearing, non-toxic, NAD  SKIN: Warm dry, normal skin turgor, no acute rash, no bruising  HEAD: NCAT  EYES: EOMI, PERRLA, no scleral icterus, normal conjunctiva  ENT: Dry mucous membranes, OR clear. Normal pharynx  NECK: Supple; non tender. Full ROM. No cervical LAD  CARD: RRR, no murmurs, rubs or gallops  RESP: clear to ausculation b/l.  No rales, rhonchi, or wheezing. No increased WOB.  ABD: soft, + BS, non-tender, non-distended, no rebound or guarding. No CVA tenderness  EXT: Full ROM, no bony tenderness, pulses intact in bilateral UE and LE, no pedal edema, no calf tenderness  NEURO: normal motor. normal sensory.   PSYCH: Cooperative, appropriate. AAOx3.

## 2025-05-12 NOTE — ED PROVIDER NOTE - CLINICAL SUMMARY MEDICAL DECISION MAKING FREE TEXT BOX
Please follow-up with Jovanni Randle/Dr. Delaney for implantation site check.    Return to emergency department if you develop any oozing or bleeding.  For your right groin, you may continue applying Band-Aids, or use bit of gauze to cover up the puncture site.   Patient presented with worsening poor PO intake, failure to thrive, hx esophageal adenocarcinoma. Otherwise afebrile, HD stable, abd non-tender but ill appearing. Grossly neurovascularly intact. Obtained labs which were grossly unremarkable including no significant leukocytosis, anemia, signs of dehydration/ANOOP, transaminitis or significant electrolyte abnormalities. UA negative for infection. Given failure to thrive with poor PO intake, patient started on IVF and will admit for further management. Patient and family agreeable with plan. HD stable at time of admission.

## 2025-05-13 LAB
ALBUMIN SERPL ELPH-MCNC: 3.1 G/DL — LOW (ref 3.5–5.2)
ALP SERPL-CCNC: 231 U/L — HIGH (ref 30–115)
ALT FLD-CCNC: 13 U/L — SIGNIFICANT CHANGE UP (ref 0–41)
ANION GAP SERPL CALC-SCNC: 14 MMOL/L — SIGNIFICANT CHANGE UP (ref 7–14)
APTT BLD: 38.3 SEC — SIGNIFICANT CHANGE UP (ref 27–39.2)
AST SERPL-CCNC: 26 U/L — SIGNIFICANT CHANGE UP (ref 0–41)
BASOPHILS # BLD AUTO: 0.03 K/UL — SIGNIFICANT CHANGE UP (ref 0–0.2)
BASOPHILS NFR BLD AUTO: 0.4 % — SIGNIFICANT CHANGE UP (ref 0–1)
BILIRUB SERPL-MCNC: 0.8 MG/DL — SIGNIFICANT CHANGE UP (ref 0.2–1.2)
BUN SERPL-MCNC: 16 MG/DL — SIGNIFICANT CHANGE UP (ref 10–20)
CALCIUM SERPL-MCNC: 8.6 MG/DL — SIGNIFICANT CHANGE UP (ref 8.4–10.5)
CHLORIDE SERPL-SCNC: 103 MMOL/L — SIGNIFICANT CHANGE UP (ref 98–110)
CO2 SERPL-SCNC: 19 MMOL/L — SIGNIFICANT CHANGE UP (ref 17–32)
CREAT SERPL-MCNC: 0.7 MG/DL — SIGNIFICANT CHANGE UP (ref 0.7–1.5)
D DIMER BLD IA.RAPID-MCNC: 2486 NG/ML DDU — HIGH
EGFR: 105 ML/MIN/1.73M2 — SIGNIFICANT CHANGE UP
EGFR: 105 ML/MIN/1.73M2 — SIGNIFICANT CHANGE UP
EOSINOPHIL # BLD AUTO: 0.15 K/UL — SIGNIFICANT CHANGE UP (ref 0–0.7)
EOSINOPHIL NFR BLD AUTO: 2.2 % — SIGNIFICANT CHANGE UP (ref 0–8)
FIBRINOGEN PPP-MCNC: 353 MG/DL — SIGNIFICANT CHANGE UP (ref 200–435)
GLUCOSE SERPL-MCNC: 99 MG/DL — SIGNIFICANT CHANGE UP (ref 70–99)
HCT VFR BLD CALC: 21.7 % — LOW (ref 42–52)
HCT VFR BLD CALC: 23.7 % — LOW (ref 42–52)
HGB BLD-MCNC: 7.1 G/DL — LOW (ref 14–18)
HGB BLD-MCNC: 7.9 G/DL — LOW (ref 14–18)
IMM GRANULOCYTES NFR BLD AUTO: 11.5 % — HIGH (ref 0.1–0.3)
INR BLD: 2.2 RATIO — HIGH (ref 0.65–1.3)
LYMPHOCYTES # BLD AUTO: 1.1 K/UL — LOW (ref 1.2–3.4)
LYMPHOCYTES # BLD AUTO: 16.4 % — LOW (ref 20.5–51.1)
MAGNESIUM SERPL-MCNC: 2.2 MG/DL — SIGNIFICANT CHANGE UP (ref 1.8–2.4)
MCHC RBC-ENTMCNC: 25.7 PG — LOW (ref 27–31)
MCHC RBC-ENTMCNC: 26.2 PG — LOW (ref 27–31)
MCHC RBC-ENTMCNC: 32.7 G/DL — SIGNIFICANT CHANGE UP (ref 32–37)
MCHC RBC-ENTMCNC: 33.3 G/DL — SIGNIFICANT CHANGE UP (ref 32–37)
MCV RBC AUTO: 78.6 FL — LOW (ref 80–94)
MCV RBC AUTO: 78.7 FL — LOW (ref 80–94)
MONOCYTES # BLD AUTO: 0.88 K/UL — HIGH (ref 0.1–0.6)
MONOCYTES NFR BLD AUTO: 13.1 % — HIGH (ref 1.7–9.3)
NEUTROPHILS # BLD AUTO: 3.78 K/UL — SIGNIFICANT CHANGE UP (ref 1.4–6.5)
NEUTROPHILS NFR BLD AUTO: 56.4 % — SIGNIFICANT CHANGE UP (ref 42.2–75.2)
NRBC BLD AUTO-RTO: 3 /100 WBCS — HIGH (ref 0–0)
NRBC BLD AUTO-RTO: 3 /100 WBCS — HIGH (ref 0–0)
PLATELET # BLD AUTO: 54 K/UL — LOW (ref 130–400)
PLATELET # BLD AUTO: 70 K/UL — LOW (ref 130–400)
PMV BLD: 10.2 FL — SIGNIFICANT CHANGE UP (ref 7.4–10.4)
PMV BLD: 10.4 FL — SIGNIFICANT CHANGE UP (ref 7.4–10.4)
POTASSIUM SERPL-MCNC: 3.7 MMOL/L — SIGNIFICANT CHANGE UP (ref 3.5–5)
POTASSIUM SERPL-SCNC: 3.7 MMOL/L — SIGNIFICANT CHANGE UP (ref 3.5–5)
PROT SERPL-MCNC: 5.6 G/DL — LOW (ref 6–8)
PROTHROM AB SERPL-ACNC: 26.4 SEC — HIGH (ref 9.95–12.87)
RBC # BLD: 2.76 M/UL — LOW (ref 4.7–6.1)
RBC # BLD: 2.76 M/UL — LOW (ref 4.7–6.1)
RBC # BLD: 3.01 M/UL — LOW (ref 4.7–6.1)
RBC # FLD: 16.3 % — HIGH (ref 11.5–14.5)
RBC # FLD: 16.6 % — HIGH (ref 11.5–14.5)
RETICS #: 72.6 K/UL — SIGNIFICANT CHANGE UP (ref 25–125)
RETICS/RBC NFR: 2.6 % — HIGH (ref 0.5–1.5)
SODIUM SERPL-SCNC: 136 MMOL/L — SIGNIFICANT CHANGE UP (ref 135–146)
WBC # BLD: 6.12 K/UL — SIGNIFICANT CHANGE UP (ref 4.8–10.8)
WBC # BLD: 6.71 K/UL — SIGNIFICANT CHANGE UP (ref 4.8–10.8)
WBC # FLD AUTO: 6.12 K/UL — SIGNIFICANT CHANGE UP (ref 4.8–10.8)
WBC # FLD AUTO: 6.71 K/UL — SIGNIFICANT CHANGE UP (ref 4.8–10.8)

## 2025-05-13 PROCEDURE — 76705 ECHO EXAM OF ABDOMEN: CPT | Mod: 26

## 2025-05-13 PROCEDURE — 70450 CT HEAD/BRAIN W/O DYE: CPT | Mod: 26

## 2025-05-13 PROCEDURE — 99223 1ST HOSP IP/OBS HIGH 75: CPT

## 2025-05-13 PROCEDURE — 99233 SBSQ HOSP IP/OBS HIGH 50: CPT

## 2025-05-13 RX ORDER — HYDROMORPHONE/SOD CHLOR,ISO/PF 2 MG/10 ML
0.5 SYRINGE (ML) INJECTION EVERY 4 HOURS
Refills: 0 | Status: DISCONTINUED | OUTPATIENT
Start: 2025-05-13 | End: 2025-05-15

## 2025-05-13 RX ORDER — LIDOCAINE HYDROCHLORIDE 20 MG/ML
1 JELLY TOPICAL DAILY
Refills: 0 | Status: DISCONTINUED | OUTPATIENT
Start: 2025-05-13 | End: 2025-05-23

## 2025-05-13 RX ORDER — SODIUM CHLORIDE 9 G/1000ML
1000 INJECTION, SOLUTION INTRAVENOUS
Refills: 0 | Status: DISCONTINUED | OUTPATIENT
Start: 2025-05-13 | End: 2025-05-16

## 2025-05-13 RX ORDER — MEGESTROL ACETATE 40 MG
20 TABLET ORAL DAILY
Refills: 0 | Status: DISCONTINUED | OUTPATIENT
Start: 2025-05-13 | End: 2025-05-16

## 2025-05-13 RX ORDER — ATORVASTATIN CALCIUM 80 MG/1
80 TABLET, FILM COATED ORAL AT BEDTIME
Refills: 0 | Status: DISCONTINUED | OUTPATIENT
Start: 2025-05-13 | End: 2025-05-23

## 2025-05-13 RX ORDER — METOPROLOL SUCCINATE 50 MG/1
12.5 TABLET, EXTENDED RELEASE ORAL
Refills: 0 | Status: DISCONTINUED | OUTPATIENT
Start: 2025-05-13 | End: 2025-05-25

## 2025-05-13 RX ORDER — OLANZAPINE 10 MG/1
2.5 TABLET ORAL DAILY
Refills: 0 | Status: DISCONTINUED | OUTPATIENT
Start: 2025-05-13 | End: 2025-05-22

## 2025-05-13 RX ADMIN — Medication 0.5 MILLIGRAM(S): at 02:40

## 2025-05-13 RX ADMIN — METOPROLOL SUCCINATE 12.5 MILLIGRAM(S): 50 TABLET, EXTENDED RELEASE ORAL at 17:36

## 2025-05-13 RX ADMIN — Medication 0.5 MILLIGRAM(S): at 02:05

## 2025-05-13 RX ADMIN — Medication 0.5 MILLIGRAM(S): at 22:38

## 2025-05-13 RX ADMIN — SODIUM CHLORIDE 75 MILLILITER(S): 9 INJECTION, SOLUTION INTRAVENOUS at 06:34

## 2025-05-13 RX ADMIN — Medication 0.5 MILLIGRAM(S): at 21:38

## 2025-05-13 RX ADMIN — Medication 101 MILLIGRAM(S): at 05:48

## 2025-05-13 RX ADMIN — OLANZAPINE 2.5 MILLIGRAM(S): 10 TABLET ORAL at 12:38

## 2025-05-13 RX ADMIN — METOPROLOL SUCCINATE 12.5 MILLIGRAM(S): 50 TABLET, EXTENDED RELEASE ORAL at 05:48

## 2025-05-13 RX ADMIN — ATORVASTATIN CALCIUM 80 MILLIGRAM(S): 80 TABLET, FILM COATED ORAL at 21:38

## 2025-05-13 RX ADMIN — Medication 40 MILLIGRAM(S): at 17:37

## 2025-05-13 RX ADMIN — Medication 40 MILLIGRAM(S): at 05:49

## 2025-05-13 NOTE — PROGRESS NOTE ADULT - ASSESSMENT
59y male with PMHx of HTN, hiatal hernia, HLD, CAD s/p CABG in Jan 2024, paroxysmal A. fib not on AC, former smoker (30 pack years, quit 1yr ago), recently diagnosed with colitis and esophageal adenocarcinoma s/p stent placement who presents for weakness. The patient was recently discharged from [facility] on 5/6 , and has been progressively deteriorating. Found to have hgb 7.6 lower than last admission reported melena, thrombocytopenia w/ elevated INR/PTT.     #Reported Melena possibly due to Upper GI Bleed likely Secondary to  Malignancy   #Thrombocytopenia, elevated INR/PTT r/o DIC  #Recently diagnosed Esophageal adenocarcinoma   #Acute Drop in Hemoglobin  - EGD 4/25: A friable circumferential mass seen from the GE junction at 38cm to 28 cm of mid esophagus. Multiple biopsies were obtained. Oozing of blood was noted from the whole mass.  - CBC BID  - c/w pantoprazole 40mg IV BID  - If any unstable bleed, please check CT angio AP IC STAT and call GI/IR stat  - f/u GI c/s  - f/u Heme onc c/s. Patient supposed to have PET scan OP tomorrow, Surg Onc Dr Yost for possible resection  - F/u DIC panel    #Weakness likely multifactorial 2/2 malignancy, anemia  #Decreased PO intake 2/2 esophageal adenocarcinoma  - Weakness, unable to walk/stand  - States the esophageal stent has not helped with PO intake.  - Started IV D5/LR 75cc/hr  - Seen by PT/OT last admisison; recommended home PT    #chronic lower back pain   - xray lumbar spine: mild disc space narrowing and degenerative changes throughout the lumbar spine. Mild degenerative change of the bilateral sacroiliac joints.  - c/w Dilaudid 0.5mg Q4, Oxycodone 5mg Q4 PRN    #3.8 cm wide R iliac artery aneurysm- stable  - vascular o/p- surgery was scheduled with Dr. Tavera     #HTN  #CAD s/p CABG in Jan 2024  #Paroxysmal A.fib  - c/w home meds  - Hold plavix    #DVT PPX: SCD  #GI PPX: PPI BID  #Diet: NPO for now  #Code Status: Full  #Activity Order: IAT   59y male with PMHx of HTN, hiatal hernia, HLD, CAD s/p CABG in Jan 2024, paroxysmal A. fib not on AC, former smoker (30 pack years, quit 1yr ago), recently diagnosed with colitis and esophageal adenocarcinoma s/p stent placement who presents for weakness. The patient was recently discharged from [facility] on 5/6 , and has been progressively deteriorating. Found to have hgb 7.6 lower than last admission reported melena, thrombocytopenia w/ elevated INR/PTT.     #Reported Melena possibly due to Upper GI Bleed likely Secondary to  Malignancy   #Thrombocytopenia, elevated INR/PTT r/o DIC  #Recently diagnosed Esophageal adenocarcinoma   #Acute Drop in Hemoglobin  - EGD 4/25: A friable circumferential mass seen from the GE junction at 38cm to 28 cm of mid esophagus. Multiple biopsies were obtained. Oozing of blood was noted from the whole mass.  - CBC BID  - c/w pantoprazole 40mg IV BID  - If any unstable bleed, please check CT angio AP IC STAT and call GI/IR stat  - GI: plan for EGD today, possible peg and stent removal   - f/u Heme onc c/s. Patient supposed to have PET scan OP tomorrow, Surg Onc Dr Yost for possible resection  - F/u DIC panel    #Weakness likely multifactorial 2/2 malignancy, anemia  #Decreased PO intake 2/2 esophageal adenocarcinoma  - Weakness, unable to walk/stand  - States the esophageal stent has not helped with PO intake.  - Started IV D5/LR 75cc/hr  - Seen by PT/OT last admisison; recommended home PT    #chronic lower back pain   - xray lumbar spine: mild disc space narrowing and degenerative changes throughout the lumbar spine. Mild degenerative change of the bilateral sacroiliac joints.  - c/w Dilaudid 0.5mg Q4, Oxycodone 5mg Q4 PRN    #3.8 cm wide R iliac artery aneurysm- stable  - vascular o/p- surgery was scheduled with Dr. Tavera     #HTN  #CAD s/p CABG in Jan 2024  #Paroxysmal A.fib  - c/w home meds  - Hold plavix    #DVT PPX: SCD  #GI PPX: PPI BID  #Diet: NPO for now  #Code Status: Full  #Activity Order: IAT   59y male with PMHx of HTN, hiatal hernia, HLD, CAD s/p CABG in Jan 2024, paroxysmal A. fib not on AC, former smoker (30 pack years, quit 1yr ago), recently diagnosed with colitis and esophageal adenocarcinoma s/p stent placement who presents for weakness. The patient was recently discharged from [facility] on 5/6 , and has been progressively deteriorating. Found to have hgb 7.6 lower than last admission reported melena, thrombocytopenia w/ elevated INR/PTT.     #Reported Melena possibly due to Upper GI Bleed likely Secondary to  Malignancy   #Thrombocytopenia, elevated INR/PTT r/o DIC  #Recently diagnosed Esophageal adenocarcinoma   #Acute Drop in Hemoglobin  - EGD 4/25: A friable circumferential mass seen from the GE junction at 38cm to 28 cm of mid esophagus. Multiple biopsies were obtained. Oozing of blood was noted from the whole mass.  - CBC BID  - c/w pantoprazole 40mg IV BID  - If any unstable bleed, please check CT angio AP IC STAT and call GI/IR stat  - GI: plan for EGD today, possible peg and stent removal   - f/u Heme onc c/s. Patient supposed to have PET scan OP tomorrow, Surg Onc Dr Yost for possible resection  - F/u DIC panel    #Weakness likely multifactorial 2/2 malignancy, anemia  #Decreased PO intake 2/2 esophageal adenocarcinoma  - Weakness, unable to walk/stand  - States the esophageal stent has not helped with PO intake.  - Started IV D5/LR 75cc/hr  - Seen by PT/OT last admisison; recommended home PT    #L sided fascial drope  - Doesnot know if chronic.   - no focal defecits except b/l hip weakness   - F/U [facility] abnd MRI Brain w/wo, c/s neuro if any changes     #chronic lower back pain   - xray lumbar spine: mild disc space narrowing and degenerative changes throughout the lumbar spine. Mild degenerative change of the bilateral sacroiliac joints.  - c/w Dilaudid 0.5mg Q4, Oxycodone 5mg Q4 PRN    #3.8 cm wide R iliac artery aneurysm- stable  - vascular o/p- surgery was scheduled with Dr. Tavera     #HTN  #CAD s/p CABG in Jan 2024  #Paroxysmal A.fib  - c/w home meds  - Hold plavix    #DVT PPX: SCD  #GI PPX: PPI BID  #Diet: NPO for now  #Code Status: Full  #Activity Order: IAT

## 2025-05-13 NOTE — CONSULT NOTE ADULT - ASSESSMENT
Patient is a 60 y/o male with a PMHx of HTN, hiatal hernia, HLD, CAD s/p CABG in Jan 2024, paroxysmal A. fib not on AC, former smoker (30 pack years, quit 1yr ago), s/p EUS/ EGD of Esophageal mass followed by stent placement who now presents from home for multiple episodes of melena.  Patient notes yesterday he was going to an appointment and had a large black bowel movement.  Patient had additional 3-4 bowel movements this way.  Patient notes some weakness and at one point felt he could not support his own body weight.  Patient currently comfortable.  Patient given blood product a total of 1 unit along with plasma.  Patient also given IV vitamin K.  Patient without additional stools.  Patient also notes that he wants the esophageal stent out as he feels uncomfortable with it and no longer wishes to have it. He is not on any directed treatment for esophageal cancer at this time. Patient has relatively stable blood counts currently.  Patient has improvement in the INR as was supratherapeutic at over 6 on admission.  Maintain n.p.o. and plan EGD with evaluation, hemostasis, and removal of stent.  Discussed with patient that if stent removes especially if no treatment ongoing how will he tolerate feeds.  Will discuss with him also possible PEG tube if he is amendable to this.    Esophageal Cancer / Prior Stent placement   - Hemodynamics currently stable  - INR >6 now 2- Given 1 PRBC, Plasma x1, and IV Vitamin K  - Plan EGD today- He wants stent removed- Will discuss PEG with him as he could either obstruct or develop a food impaction - also when eventually undergoing treatment he may not be able to tolerate oral intake   - Maintain NPO, has Active TnS  - Heme Onc note noted and appreciated   - Will follow

## 2025-05-13 NOTE — CONSULT NOTE ADULT - NS ATTEND AMEND GEN_ALL_CORE FT
Agree with above.  Patient with large esophageal mass extending to the cardia, status post 18 x 120 mm FC-SEMS placement on 5/1/25, admitted for melena and concern of upper GI bleed.    Hemoglobin was 7.6 on admission and 7.1 today.  He is hemodynamically stable.    He was planned for EGD and hemostasis, and possible PEG tube placement today however, patient developed left facial droop and is being worked up for a possible stroke.    Will hold off on endoscopic intervention for now.  Will likely source of bleed is the known large large mass and endoscopic options are limited to Hemospray.  Monitor hemoglobin and transfuse as needed to keep above 7.  IV PPI twice daily.  We will follow closely.       The note was created using a dictation tool and may contain errors or misinterpretations of spoken words. Please verify all information for accuracy.    Time-based billing (NON-critical care).   75 minutes spent on total encounter which excludes teaching time and/or separately reported services; more than 50% of the visit was spent counseling and / or coordinating care by the attending physician.  The necessity of the time spent during the encounter on this date of service was due to: Coordination of care.

## 2025-05-13 NOTE — CONSULT NOTE ADULT - ASSESSMENT
IMPRESSION: Rehab of deconditioning / melena / HTN, hiatal hernia, HLD, CAD s/p CABG in Jan 2024, paroxysmal A. fib, recently diagnosed with colitis and esophageal adenocarcinoma s/p stent placement     PRECAUTIONS: [   ] Cardiac  [   ] Respiratory  [   ] Seizures [   ] Contact Isolation  [   ] Droplet Isolation  [   ] Other    Weight Bearing Status:     RECOMMENDATION:    Out of Bed to Chair     DVT/Decubiti Prophylaxis    REHAB PLAN:     [ x   ] Bedside P/T 3-5 times a week   [  x  ]   Bedside O/T  2-3 times a week             [    ] Speech Therapy               [    ]  No Rehab Therapy Indicated   Conditioning/ROM                                    ADL  Bed Mobility                                               Conditioning/ROM  Transfers                                                     Bed Mobility  Sitting /Standing Balance                         Transfers                                        Gait Training                                               Sitting/Standing Balance  Stair Training [   ]Applicable                    Home equipment Eval                                                                        Splinting  [   ] Only      GOALS:   ADL   [    ]   Independent                    Transfers  [  x  ] Independent                          Ambulation  [ x   ] Independent     [  x   ] With device                            [    ]  CG                                                         [    ]  CG                                                                  [    ] CG                            [    ] Min A                                                   [    ] Min A                                                              [    ] Min  A          DISCHARGE PLAN:   [    ]  Good candidate for Intensive Rehabilitation/Hospital based                                             Will tolerate 3hrs Intensive Rehab Daily                                       [  x   ]  Short Term Rehab in Skilled Nursing Facility                                       [     ]  Home with Outpatient or VN services                                         [     ]  Possible Candidate for Intensive Hospital based Rehab

## 2025-05-13 NOTE — CONSULT NOTE ADULT - SUBJECTIVE AND OBJECTIVE BOX
Hematology Consult Note    HPI:  59y male with PMHx of HTN, hiatal hernia, HLD, CAD s/p CABG in Jan 2024, paroxysmal A. fib not on AC, former smoker (30 pack years, quit 1yr ago), recently diagnosed with colitis and esophageal adenocarcinoma s/p stent placement who presents for weakness. The patient was recently discharged from [facility] on 5/6 , and has been progressively deteriorating. States he has been unable to tolerate p.o. and has been unable to walk over the past few days. Wife at bedside reports that he has not been able to follow-up with oncology due to the weakness.  On ROS the patient also states he has been having black tarry stools that started 1 day prior to his EGD. He had 3 black BMs today. He denies any mucosal bleeding or blood in the urine.  He denies any fevers, chills, chest pain, shortness of breath, palpitations, headache, vision changes, nausea, vomiting, abdominal pain, hematuria, falls.  He is supposed to have PET scan tomorrow and follow with Dr. Sanchez in 2 days.    T(F): 98.5 (05-12-25 @ 21:05), Max: 98.6 (05-12-25 @ 19:48)  HR: 97 (05-12-25 @ 21:05) (88 - 104)  BP: 111/58 (05-12-25 @ 21:05) (111/58 - 132/75)  RR: 18 (05-12-25 @ 21:05) (18 - 20)  SpO2: 99% (05-12-25 @ 21:05) (98% - 100%) on RA    Labs: Hg 7.6 (baseline ~10), MCV 78, slight schistocytes on smear,  Plt 92, PT/INR >40/6.2, PTT 51.8, ALK phos 273.      Imaging:    CT Chest w/ IV Cont  (4/26 - prior admission)  IMPRESSION:  1.  No definite evidence of thoracic metastatic disease.  2.  Small bilateral pleural effusions.  3.  Marked circumferential thickening of the distal esophagus/hiatal   hernia could be related to provided history of suspected esophageal   cancer versus esophagitis. (12 May 2025 21:41)      Allergies    penicillins (Unknown)    Intolerances        MEDICATIONS  (STANDING):  atorvastatin 80 milliGRAM(s) Oral at bedtime  dextrose 5% + lactated ringers. 1000 milliLiter(s) (75 mL/Hr) IV Continuous <Continuous>  lidocaine   4% Patch 1 Patch Transdermal daily  megestrol 20 milliGRAM(s) Oral daily  metoprolol tartrate 12.5 milliGRAM(s) Oral two times a day  OLANZapine 2.5 milliGRAM(s) Oral daily  pantoprazole  Injectable 40 milliGRAM(s) IV Push every 12 hours    MEDICATIONS  (PRN):  HYDROmorphone  Injectable 0.5 milliGRAM(s) IV Push every 4 hours PRN Severe Pain (7 - 10)  oxycodone    5 mG/acetaminophen 325 mG 1 Tablet(s) Oral every 4 hours PRN Severe Pain (7 - 10)      PAST MEDICAL & SURGICAL HISTORY:  HTN (hypertension)      Hiatal hernia      HLD (hyperlipidemia)      CAD (coronary artery disease)      Paroxysmal atrial fibrillation      History of colitis      S/P CABG (coronary artery bypass graft)          FAMILY HISTORY:  FHx: lung cancer (Father)        SOCIAL HISTORY: No EtOH, no tobacco    REVIEW OF SYSTEMS:    CONSTITUTIONAL: No weakness, fevers or chills  EYES/ENT: No visual changes;  No vertigo or throat pain   NECK: No pain or stiffness  RESPIRATORY: No cough, wheezing, hemoptysis; No shortness of breath  CARDIOVASCULAR: No chest pain or palpitations  GASTROINTESTINAL: No abdominal or epigastric pain. No nausea, vomiting, or hematemesis; No diarrhea or constipation. No melena or hematochezia.  GENITOURINARY: No dysuria, frequency or hematuria  NEUROLOGICAL: No numbness or weakness  SKIN: No itching, burning, rashes, or lesions   All other review of systems is negative unless indicated above.    Height (cm): 180.3 (05-12 @ 16:10)    T(F): 98.5 (05-13-25 @ 05:40), Max: 98.6 (05-12-25 @ 19:48)  HR: 90 (05-13-25 @ 05:40)  BP: 134/71 (05-13-25 @ 05:40)  RR: 18 (05-13-25 @ 05:40)  SpO2: 99% (05-13-25 @ 05:40)  Wt(kg): --    GENERAL: NAD, well-developed  HEAD:  Atraumatic, Normocephalic  EYES: EOMI, PERRLA, conjunctiva and sclera clear  NECK: Supple, No JVD  CHEST/LUNG: Clear to auscultation bilaterally; No wheeze  HEART: Regular rate and rhythm; No murmurs, rubs, or gallops  ABDOMEN: Soft, Nontender, Nondistended; Bowel sounds present  EXTREMITIES:  2+ Peripheral Pulses, No clubbing, cyanosis, or edema  NEUROLOGY: non-focal  SKIN: No rashes or lesions                          7.1    6.71  )-----------( 70       ( 13 May 2025 06:49 )             21.7       05-13    136  |  103  |  16  ----------------------------<  99  3.7   |  19  |  0.7    Ca    8.6      13 May 2025 06:49  Mg     2.2     05-13    TPro  5.6[L]  /  Alb  3.1[L]  /  TBili  0.8  /  DBili  x   /  AST  26  /  ALT  13  /  AlkPhos  231[H]  05-13      Magnesium: 2.2 mg/dL (05-13 @ 06:49)      Imaging/Procedure   EGD: Friable circumferential mass seen from the GE junction at 38cm to 28 cm of mid esophagus.  Another 5mm lesion was noted at 20cm from incisors in the proximal esophagus.  	Erosions in the stomach compatible with erosive gastritis. (Biopsy).  	A pulsating ulcerated lesion was noted in the fundus. Biopsy was not obtained.  	Normal mucosa in the whole examined duodenum. (Biopsy).    Colonoscopy Impressions:  	Multiple semi-pedunculated polyps were seen in the left colon. A 2cm polyp was seen in the descending colon, biopsy was obtained. Polyps were not removed in the setting of poor prep.  	External hemorrhoids.  	Solid stool noted in the whole colon. Cecum was not reached    CT chest   No definite evidence of thoracic metastatic disease.  2.  Small bilateral pleural effusions.  3.  Marked circumferential thickening of the distal esophagus/hiatal   hernia could be related to provided history of suspected esophageal   cancer versus esophagitis.    Ct abdomen   Redemonstrated prominent submucosal fat/bowel wall thickening involving   descending colon, possibly related to prior episodes of colitis.     Hematology Consult Note    HPI:  59y male with PMHx of HTN, hiatal hernia, HLD, CAD s/p CABG in Jan 2024, paroxysmal A. fib not on AC, former smoker (30 pack years, quit 1yr ago), recently diagnosed with colitis and esophageal adenocarcinoma s/p stent placement who presents for weakness. The patient was recently discharged from [facility] on 5/6 , and has been progressively deteriorating. States he has been unable to tolerate p.o. and has been unable to walk over the past few days. Wife at bedside reports that he has not been able to follow-up with oncology due to the weakness.  On ROS the patient also states he has been having black tarry stools that started 1 day prior to his EGD. He had 3 black BMs today. He denies any mucosal bleeding or blood in the urine.  He denies any fevers, chills, chest pain, shortness of breath, palpitations, headache, vision changes, nausea, vomiting, abdominal pain, hematuria, falls.  He is supposed to have PET scan tomorrow and follow with Dr. Sanchez in 2 days.    T(F): 98.5 (05-12-25 @ 21:05), Max: 98.6 (05-12-25 @ 19:48)  HR: 97 (05-12-25 @ 21:05) (88 - 104)  BP: 111/58 (05-12-25 @ 21:05) (111/58 - 132/75)  RR: 18 (05-12-25 @ 21:05) (18 - 20)  SpO2: 99% (05-12-25 @ 21:05) (98% - 100%) on RA    Labs: Hg 7.6 (baseline ~10), MCV 78, slight schistocytes on smear,  Plt 92, PT/INR >40/6.2, PTT 51.8, ALK phos 273.      Imaging:    CT Chest w/ IV Cont  (4/26 - prior admission)  IMPRESSION:  1.  No definite evidence of thoracic metastatic disease.  2.  Small bilateral pleural effusions.  3.  Marked circumferential thickening of the distal esophagus/hiatal   hernia could be related to provided history of suspected esophageal   cancer versus esophagitis. (12 May 2025 21:41)      Allergies    penicillins (Unknown)    Intolerances        MEDICATIONS  (STANDING):  atorvastatin 80 milliGRAM(s) Oral at bedtime  dextrose 5% + lactated ringers. 1000 milliLiter(s) (75 mL/Hr) IV Continuous <Continuous>  lidocaine   4% Patch 1 Patch Transdermal daily  megestrol 20 milliGRAM(s) Oral daily  metoprolol tartrate 12.5 milliGRAM(s) Oral two times a day  OLANZapine 2.5 milliGRAM(s) Oral daily  pantoprazole  Injectable 40 milliGRAM(s) IV Push every 12 hours    MEDICATIONS  (PRN):  HYDROmorphone  Injectable 0.5 milliGRAM(s) IV Push every 4 hours PRN Severe Pain (7 - 10)  oxycodone    5 mG/acetaminophen 325 mG 1 Tablet(s) Oral every 4 hours PRN Severe Pain (7 - 10)      PAST MEDICAL & SURGICAL HISTORY:  HTN (hypertension)      Hiatal hernia      HLD (hyperlipidemia)      CAD (coronary artery disease)      Paroxysmal atrial fibrillation      History of colitis      S/P CABG (coronary artery bypass graft)          FAMILY HISTORY:  FHx: lung cancer (Father)          REVIEW OF SYSTEMS:  poor po intake  very weak       Height (cm): 180.3 (05-12 @ 16:10)    T(F): 98.5 (05-13-25 @ 05:40), Max: 98.6 (05-12-25 @ 19:48)  HR: 90 (05-13-25 @ 05:40)  BP: 134/71 (05-13-25 @ 05:40)  RR: 18 (05-13-25 @ 05:40)  SpO2: 99% (05-13-25 @ 05:40)  Wt(kg): --    GENERAL: NAD, well-developed  HEAD:  Atraumatic, Normocephalic  EYES: EOMI, PERRLA, conjunctiva pale and sclera clear  NECK: Supple, No JVD  CHEST/LUNG: Clear to auscultation bilaterally; No wheeze  HEART: Regular rate and rhythm; No murmurs, rubs, or gallops  ABDOMEN: mild tenderness in epigastric area, RUQ   EXTREMITIES:  LLE> RLE trace edema   NEUROLOGY: non-focal  SKIN: No rashes or lesions                          7.1    6.71  )-----------( 70       ( 13 May 2025 06:49 )             21.7       05-13    136  |  103  |  16  ----------------------------<  99  3.7   |  19  |  0.7    Ca    8.6      13 May 2025 06:49  Mg     2.2     05-13    TPro  5.6[L]  /  Alb  3.1[L]  /  TBili  0.8  /  DBili  x   /  AST  26  /  ALT  13  /  AlkPhos  231[H]  05-13      Magnesium: 2.2 mg/dL (05-13 @ 06:49)      Imaging/Procedure   EGD: Friable circumferential mass seen from the GE junction at 38cm to 28 cm of mid esophagus.  Another 5mm lesion was noted at 20cm from incisors in the proximal esophagus.  	Erosions in the stomach compatible with erosive gastritis. (Biopsy).  	A pulsating ulcerated lesion was noted in the fundus. Biopsy was not obtained.  	Normal mucosa in the whole examined duodenum. (Biopsy).    Colonoscopy Impressions:  	Multiple semi-pedunculated polyps were seen in the left colon. A 2cm polyp was seen in the descending colon, biopsy was obtained. Polyps were not removed in the setting of poor prep.  	External hemorrhoids.  	Solid stool noted in the whole colon. Cecum was not reached    CT chest   No definite evidence of thoracic metastatic disease.  2.  Small bilateral pleural effusions.  3.  Marked circumferential thickening of the distal esophagus/hiatal   hernia could be related to provided history of suspected esophageal   cancer versus esophagitis.    Ct abdomen   Redemonstrated prominent submucosal fat/bowel wall thickening involving   descending colon, possibly related to prior episodes of colitis.

## 2025-05-13 NOTE — CONSULT NOTE ADULT - SUBJECTIVE AND OBJECTIVE BOX
Patient is a 60 y/o male with a PMHx of HTN, hiatal hernia, HLD, CAD s/p CABG in Jan 2024, paroxysmal A. fib not on AC, former smoker (30 pack years, quit 1yr ago), s/p EUS/ EGD of Esophageal mass followed by stent placement who now presents from home for multiple episodes of melena.  Patient notes yesterday he was going to an appointment and had a large black bowel movement.  Patient had additional 3-4 bowel movements this way.  Patient notes some weakness and at one point felt he could not support his own body weight.  Patient currently comfortable.  Patient given blood product a total of 1 unit along with plasma.  Patient also given IV vitamin K.  Patient without additional stools.  Patient also notes that he wants the esophageal stent out as he feels uncomfortable with it and no longer wishes to have it. He is not on any directed treatment for esophageal cancer at this time.      PAST MEDICAL & SURGICAL HISTORY:  HTN (hypertension)  Hiatal hernia  HLD (hyperlipidemia)  CAD (coronary artery disease)  Paroxysmal atrial fibrillation  History of colitis  S/P CABG (coronary artery bypass graft)        MEDICATIONS  (STANDING):  atorvastatin 80 milliGRAM(s) Oral at bedtime  dextrose 5% + lactated ringers. 1000 milliLiter(s) (75 mL/Hr) IV Continuous <Continuous>  lidocaine   4% Patch 1 Patch Transdermal daily  megestrol 20 milliGRAM(s) Oral daily  metoprolol tartrate 12.5 milliGRAM(s) Oral two times a day  OLANZapine 2.5 milliGRAM(s) Oral daily  pantoprazole  Injectable 40 milliGRAM(s) IV Push every 12 hours    MEDICATIONS  (PRN):  HYDROmorphone  Injectable 0.5 milliGRAM(s) IV Push every 4 hours PRN Severe Pain (7 - 10)  oxycodone    5 mG/acetaminophen 325 mG 1 Tablet(s) Oral every 4 hours PRN Severe Pain (7 - 10)      Allergies  penicillins (Unknown)      Review of Systems  General:  Denies Fatigue, Denies Fever, Denies Weakness ,Denies Weight Loss   HEENT: Denies Trouble Swallowing ,Denies  Sore Throat , Denies Change in hearing/vision/speech ,Denies Dizziness    Cardio: Denies  Chest Pain , Palpitations    Respiratory: Denies worsening of SOB, Denies Cough  Abdomen: See detailed HPI  Neuro: Denies Headache Denies Dizziness, Denies Paresthesias  MSK: Denies pain in Bones/Joints/Muscles   Psych: Patient denies depression, denies suicidal or homicidal ideations  Integ: Patient Denies rash, or new skin lesions     Vital Signs Last 24 Hrs  T(C): 36.9 (13 May 2025 05:40), Max: 37 (12 May 2025 19:48)  T(F): 98.5 (13 May 2025 05:40), Max: 98.6 (12 May 2025 19:48)  HR: 90 (13 May 2025 05:40) (85 - 104)  BP: 134/71 (13 May 2025 05:40) (111/58 - 134/71)  BP(mean): 87 (13 May 2025 04:37) (87 - 87)  RR: 18 (13 May 2025 05:40) (18 - 20)  SpO2: 99% (13 May 2025 05:40) (98% - 100%)    Parameters below as of 13 May 2025 05:40  Patient On (Oxygen Delivery Method): room air      PHYSICAL EXAM:  GENERAL: NAD, well-appearing  CHEST/LUNG: Clear to auscultation bilaterally  HEART: Regular rate and rhythm  ABDOMEN: Soft, Nontender, Nondistended  EXTREMITIES:  No clubbing, cyanosis, or edema        Labs:                        7.1    6.71  )-----------( 70       ( 13 May 2025 06:49 )             21.7       Auto Immature Granulocyte %: 11.5 % (05-13-25 @ 06:49)    05-13    136  |  103  |  16  ----------------------------<  99  3.7   |  19  |  0.7      Magnesium: 2.2 mg/dL (05-13-25 @ 06:49)      LFTs:             5.6  | 0.8  | 26       ------------------[231     ( 13 May 2025 06:49 )  3.1  | x    | 13              Coags:     26.40  ----< 2.20    ( 13 May 2025 06:49 )     38.3        Urinalysis Basic - ( 13 May 2025 06:49 )  Color: x / Appearance: x / SG: x / pH: x  Gluc: 99 mg/dL / Ketone: x  / Bili: x / Urobili: x   Blood: x / Protein: x / Nitrite: x   Leuk Esterase: x / RBC: x / WBC x   Sq Epi: x / Non Sq Epi: x / Bacteria: x

## 2025-05-13 NOTE — PROGRESS NOTE ADULT - ASSESSMENT
59y male with PMHx of HTN, hiatal hernia, HLD, CAD s/p CABG in Jan 2024, paroxysmal A. fib not on AC, former smoker (30 pack years, quit 1yr ago), recently diagnosed with colitis and esophageal adenocarcinoma s/p stent placement who presents for weakness. The patient was recently discharged from [facility] on 5/6 , and has been progressively deteriorating. Found to have hgb 7.6 lower than last admission reported melena, thrombocytopenia w/ elevated INR/PTT.     #Reported Melena likely due to Upper GI Bleed likely Secondary to  Malignancy   #Thrombocytopenia, elevated INR/PTT  #Recently diagnosed Esophageal adenocarcinoma   #Acute blood loss anemia  - EGD 4/25: A friable circumferential mass seen from the GE junction at 38cm to 28 cm of mid esophagus. Multiple biopsies were obtained. Oozing of blood was noted from the whole mass.  - CBC BID  - c/w pantoprazole 40mg IV BID  - If any unstable bleed, please check CT angio AP IC STAT and call GI/IR stat  - GI: plan for EGD today, possible peg and stent removal   - f/u Heme onc c/s. Patient supposed to have PET scan OP, Surg Onc Dr Yost for possible resection  - keep Hgb>7  -keep active T&S    #Weakness likely multifactorial 2/2 malignancy, anemia  #Decreased PO intake 2/2 esophageal adenocarcinoma  - Weakness, unable to walk/stand  - States the esophageal stent has not helped with PO intake.  - Started IV D5/LR 75cc/hr  - PT/OT    #L sided fascial droop  - Pt and spouse do not know if chronic.   - no focal defecits except b/l distal weakness   - F/U [facility] abnd MRI Brain w/wo, c/s neuro if any changes     #chronic lower back pain   - xray lumbar spine: mild disc space narrowing and degenerative changes throughout the lumbar spine. Mild degenerative change of the bilateral sacroiliac joints.  - c/w Dilaudid 0.5mg Q4, Oxycodone 5mg Q4 PRN    #3.8 cm wide R iliac artery aneurysm- stable  - vascular o/p- surgery was scheduled with Dr. Tavera     #HTN  #CAD s/p CABG in Jan 2024  #Paroxysmal A.fib  - c/w home meds  - Hold plavix    #DVT PPX: SCD  #GI PPX: PPI BID  #Diet: NPO for now  #Code Status: Full  #Activity Order: IAT    High risk pt. Px is guarded    #Progress Note Handoff  Pending: Clinical improvement and stability__x___PT____x_EGD, MRI brain  Pt/Family discussion: Pt/family informed and agree with the current plan  Disposition: Home__vs    Nursing Home    To be determined____x____    My note supersedes the residents note should a discrepancy arise.    Chart and notes personally reviewed.  Care Discussed with Consultants/Other Providers/ Housestaff [ x] YES [ ] NO   Radiology, labs, old records personally reviewed.    discussed w/ housestaff, nursing, case management, spouse, GI    Attestation Statements:    Attestation Statements:  Risk Statement (NON-critical care).     On this date of service, level of risk to patient is considered: High.     Due to: pt with illness causing risk to life or bodily fxn    Time-based billing (NON-critical care).     50 minutes spent on total encounter. The necessity of the time spent during the encounter on this date of service was due to:     time spent on review of labs, imaging studies, old records, obtaining history, personally examining patient, counselling and communicating with patient/ family, entering orders for medications/tests/etc, discussions with other health care providers, documentation in electronic health records, independent interpretation of labs, imaging/procedure results and care coordination.

## 2025-05-13 NOTE — CONSULT NOTE ADULT - SUBJECTIVE AND OBJECTIVE BOX
HPI:  59y male with PMHx of HTN, hiatal hernia, HLD, CAD s/p CABG in Jan 2024, paroxysmal A. fib not on AC, former smoker (30 pack years, quit 1yr ago), recently diagnosed with colitis and esophageal adenocarcinoma s/p stent placement who presents for weakness. The patient was recently discharged from [facility] on 5/6 , and has been progressively deteriorating. States he has been unable to tolerate p.o. and has been unable to walk over the past few days. Wife at bedside reports that he has not been able to follow-up with oncology due to the weakness.  On ROS the patient also states he has been having black tarry stools that started 1 day prior to his EGD. He had 3 black BMs today. He denies any mucosal bleeding or blood in the urine.  He denies any fevers, chills, chest pain, shortness of breath, palpitations, headache, vision changes, nausea, vomiting, abdominal pain, hematuria, falls.  He is supposed to have PET scan tomorrow and follow with Dr. Sanchez in 2 days.      Imaging:    CT Chest w/ IV Cont  (4/26 - prior admission)  IMPRESSION:  1.  No definite evidence of thoracic metastatic disease.  2.  Small bilateral pleural effusions.  3.  Marked circumferential thickening of the distal esophagus/hiatal   hernia could be related to provided history of suspected esophageal   cancer versus esophagitis.     < from: CT Head No Cont (05.13.25 @ 15:51) >  IMPRESSION:    No acute intracranial pathology.    < end of copied text >      #New Dx Esophageal adenocarcinoma, CPS 6 , HER2  2+/Equivocal FISH Negative/Not Amplified, pMMR   Reports Abdominal pain, diarrhea, lost 28lbs in the last month  former smoker (30 pack years, quit 1yr ago)  EGD showed friable circumferential mass mid esophagus and another 5 mm lesion in proximal esophagus , path    Cardia nodule, Signet ring cell carcinoma,  Esophageal nodule at 22 cm, biopsy: - Signet ring cell carcinoma.   Was planned for outpatient PET scan and appointment with Dr Sanchez     #Decreased PO intake  #Weakness  esophageal stent placed during prior admission. Pt reports did not help.       Esophageal Cancer / Prior Stent placement   - Hemodynamics currently stable  - INR >6 now 2- Given 1 PRBC, Plasma x1, and IV Vitamin K  - Plan EGD today- He wants stent removed- Will discuss PEG with him as he could either obstruct or develop a food impaction - also when eventually undergoing treatment he may not be able to tolerate oral intake       PAST MEDICAL & SURGICAL HISTORY:  HTN (hypertension)      Hiatal hernia      HLD (hyperlipidemia)      CAD (coronary artery disease)      Paroxysmal atrial fibrillation      History of colitis      S/P CABG (coronary artery bypass graft)          Hospital Course:    TODAY'S SUBJECTIVE & REVIEW OF SYMPTOMS:     Constitutional WNL   Cardio WNL   Resp WNL   GI WNL  Heme WNL  Endo WNL  Skin WNL  MSK Weakness   Neuro WNL  Cognitive WNL  Psych WNL      MEDICATIONS  (STANDING):  atorvastatin 80 milliGRAM(s) Oral at bedtime  dextrose 5% + lactated ringers. 1000 milliLiter(s) (75 mL/Hr) IV Continuous <Continuous>  lidocaine   4% Patch 1 Patch Transdermal daily  megestrol 20 milliGRAM(s) Oral daily  metoprolol tartrate 12.5 milliGRAM(s) Oral two times a day  OLANZapine 2.5 milliGRAM(s) Oral daily  pantoprazole  Injectable 40 milliGRAM(s) IV Push every 12 hours    MEDICATIONS  (PRN):  HYDROmorphone  Injectable 0.5 milliGRAM(s) IV Push every 4 hours PRN Severe Pain (7 - 10)  oxycodone    5 mG/acetaminophen 325 mG 1 Tablet(s) Oral every 4 hours PRN Severe Pain (7 - 10)      FAMILY HISTORY:  FHx: lung cancer (Father)        Allergies    penicillins (Unknown)    Intolerances        SOCIAL HISTORY:    [  ] Etoh  [  ] Smoking  [  ] Substance abuse     Home Environment:  [   ] Home Alone  [ x  ] Lives with Family  [   ] Home Health Aid    Dwelling:  [   ] Apartment  [  x ] Private House  [   ] Adult Home  [   ] Skilled Nursing Facility      [   ] Short Term  [   ] Long Term  [  x ] Stairs       Elevator [   ]    FUNCTIONAL STATUS PTA: (Check all that apply)  Ambulation: [    ]Independent    [ x  ] Dependent     [   ] Non-Ambulatory  Assistive Device: [   ] SA Cane  [   ]  Q Cane  [   ] Walker  [   ]  Wheelchair  ADL : [   ] Independent  [ x   ]  Dependent       Vital Signs Last 24 Hrs  T(C): 36.9 (13 May 2025 14:24), Max: 37.3 (13 May 2025 10:45)  T(F): 98.5 (13 May 2025 14:24), Max: 99.1 (13 May 2025 10:45)  HR: 69 (13 May 2025 14:24) (69 - 97)  BP: 129/67 (13 May 2025 14:24) (111/58 - 134/73)  BP(mean): 87 (13 May 2025 04:37) (87 - 87)  RR: 18 (13 May 2025 14:24) (18 - 18)  SpO2: 100% (13 May 2025 14:24) (98% - 100%)    Parameters below as of 13 May 2025 14:24  Patient On (Oxygen Delivery Method): room air          PHYSICAL EXAM: Awake & Alert  GENERAL: NAD  HEAD:  Normocephalic  CHEST/LUNG: Clear   HEART: S1S2+  ABDOMEN: Soft, Nontender  EXTREMITIES:  no calf tenderness    NERVOUS SYSTEM:  Cranial Nerves 2-12 intact [   ] Abnormal  [   ]  ROM: WFL all extremities [   ]  Abnormal [x   ]limited ludwig LE   Motor Strength: WFL all extremities  [   ]  Abnormal [ x  ]moves UE > LE   Sensation: intact to light touch [  x ] Abnormal [   ]    FUNCTIONAL STATUS:  Bed Mobility: Independent [   ]  Supervision [   ]  Needs Assistance [  x ]  N/A [   ]  Transfers: Independent [   ]  Supervision [   ]  Needs Assistance [   ]  N/A [   ]   Ambulation: Independent [   ]  Supervision [   ]  Needs Assistance [   ]  N/A [   ]  ADL: Independent [   ] Requires Assistance [   ] N/A [   ]      LABS:                        7.1    6.71  )-----------( 70       ( 13 May 2025 06:49 )             21.7     05-13    136  |  103  |  16  ----------------------------<  99  3.7   |  19  |  0.7    Ca    8.6      13 May 2025 06:49  Mg     2.2     05-13    TPro  5.6[L]  /  Alb  3.1[L]  /  TBili  0.8  /  DBili  x   /  AST  26  /  ALT  13  /  AlkPhos  231[H]  05-13    PT/INR - ( 13 May 2025 06:49 )   PT: 26.40 sec;   INR: 2.20 ratio         PTT - ( 13 May 2025 06:49 )  PTT:38.3 sec  Urinalysis Basic - ( 13 May 2025 06:49 )    Color: x / Appearance: x / SG: x / pH: x  Gluc: 99 mg/dL / Ketone: x  / Bili: x / Urobili: x   Blood: x / Protein: x / Nitrite: x   Leuk Esterase: x / RBC: x / WBC x   Sq Epi: x / Non Sq Epi: x / Bacteria: x        RADIOLOGY & ADDITIONAL STUDIES:

## 2025-05-13 NOTE — PROVIDER CONTACT NOTE (OTHER) - SITUATION
endo canceled pt's procedure due to being told by ( unknown person) that pt had a change in mental status. RN was not notified of this, nor did RN notice a change in pt upon assessments throughout day

## 2025-05-13 NOTE — CONSULT NOTE ADULT - ASSESSMENT
59M  hiatal hernia, CAD s/p CABG in Jan 2024, paroxysmal A. 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   Was planned for outpatient PET scan and appointment with Dr Sanchez     #Decreased PO intake  #Weakness  esophageal stent placed during prior admission. Pt reports did not help.     #Acute on chronic anemia   Microcytic anemia   #Thrombocytopenia     #Coagulopathy   PT >40, INR 6.20, PTT 51   D dimer 2486, Fibrinogen 2486   Differentials: DIC/ Vit K deficiency     #3.8 cm wide R iliac artery aneurysm  - F/u vascular o/p      Recommendations:       59M  hiatal hernia, CAD s/p CABG in Jan 2024, paroxysmal A. 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   Was planned for outpatient PET scan and appointment with Dr Sanchez     #Decreased PO intake  #Weakness  esophageal stent placed during prior admission. Pt reports did not help.     #Acute on chronic anemia   Microcytic anemia   #Thrombocytopenia     #Coagulopathy   PT >40, INR 6.20, PTT 51   D dimer 2486, Fibrinogen 2486   Differentials: DIC/ Vit K deficiency     #3.8 cm wide R iliac artery aneurysm  - F/u vascular o/p      Recommendations:         59M  hiatal hernia, CAD s/p CABG in Jan 2024, paroxysmal A. 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   Was planned for outpatient PET scan and appointment with Dr Sanchez     #Decreased PO intake  #Weakness  esophageal stent placed during prior admission. Pt reports did not help.     #Acute on chronic anemia   Microcytic anemia   #Thrombocytopenia     #Coagulopathy   PT >40, INR 6.20, PTT 51   D dimer 2486, Fibrinogen 2486   Differentials: DIC/ Vit K deficiency   received 1 dose vit k 5mg PO, 2unit PRBC, 1 unit plasma     #3.8 cm wide R iliac artery aneurysm  - F/u vascular o/p      Recommendations:  F/up GI. Pt mentions he wants stent removed. ?PEG placement   Needs PET scan for staging   LE duplex   Appetite stimulant : ?mirtazapine                59M  hiatal hernia, CAD s/p CABG in Jan 2024, paroxysmal A. 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   Was planned for outpatient PET scan and appointment with Dr Sanchez     #Decreased PO intake  #Weakness  esophageal stent placed during prior admission. Pt reports did not help.     #Acute on chronic anemia   Microcytic anemia   #Thrombocytopenia     #Coagulopathy Differentials: DIC/ Vit K deficiency   PT >40, INR 6.20, PTT 51   D dimer 2486, Fibrinogen 2486   received 1 dose vit k 5mg PO, 2unit PRBC, 1 unit plasma     #3.8 cm wide R iliac artery aneurysm  - F/u vascular o/p      Recommendations:  F/up GI. Pt mentions he wants stent removed. ?PEG placement   Needs PET scan for staging   Obtain LE duplex   Can add Appetite stimulant   Will ask path to check for CLDN1 status   Consult PT, nutrition   Outpatient follow up with Dr Sanchez to initiate treatment                  59M  hiatal hernia, CAD s/p CABG in Jan 2024, paroxysmal A. 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   Was planned for outpatient PET scan and appointment with Dr Sanchez     #Decreased PO intake  #Weakness  esophageal stent placed during prior admission. Pt reports did not help.     #Acute on chronic anemia   Microcytic anemia   #Thrombocytopenia     #Coagulopathy Differentials: DIC/ Vit K deficiency   PT >40, INR 6.20, PTT 51   D dimer 2486, Fibrinogen 2486   received 1 dose vit k 5mg PO, 2unit PRBC, 1 unit plasma     #3.8 cm wide R iliac artery aneurysm  - F/u vascular o/p      Recommendations:  F/up GI. Pt mentions he wants stent removed. ?PEG placement   Needs PET scan for staging   Obtain LE duplex   Can add Appetite stimulant   Will ask path to check for CLDN1 status in case he has metastatic dz  Consult PT, nutrition   Outpatient follow up with Dr Sanchez to initiate treatment

## 2025-05-13 NOTE — PROGRESS NOTE ADULT - SUBJECTIVE AND OBJECTIVE BOX
JONAS CELESTIN 59y Male  MRN#: [id number]   Hospital Day: 1d    HPI:  59y male with PMHx of HTN, hiatal hernia, HLD, CAD s/p CABG in Jan 2024, paroxysmal A. fib not on AC, former smoker (30 pack years, quit 1yr ago), recently diagnosed with colitis and esophageal adenocarcinoma s/p stent placement who presents for weakness. The patient was recently discharged from [facility] on 5/6 , and has been progressively deteriorating. States he has been unable to tolerate p.o. and has been unable to walk over the past few days. Wife at bedside reports that he has not been able to follow-up with oncology due to the weakness.  On ROS the patient also states he has been having black tarry stools that started 1 day prior to his EGD. He had 3 black BMs today. He denies any mucosal bleeding or blood in the urine.  He denies any fevers, chills, chest pain, shortness of breath, palpitations, headache, vision changes, nausea, vomiting, abdominal pain, hematuria, falls.  He is supposed to have PET scan tomorrow and follow with Dr. Sanchez in 2 days.    T(F): 98.5 (05-12-25 @ 21:05), Max: 98.6 (05-12-25 @ 19:48)  HR: 97 (05-12-25 @ 21:05) (88 - 104)  BP: 111/58 (05-12-25 @ 21:05) (111/58 - 132/75)  RR: 18 (05-12-25 @ 21:05) (18 - 20)  SpO2: 99% (05-12-25 @ 21:05) (98% - 100%) on RA    Labs: Hg 7.6 (baseline ~10), MCV 78, slight schistocytes on smear,  Plt 92, PT/INR >40/6.2, PTT 51.8, ALK phos 273.      Imaging:    CT Chest w/ IV Cont  (4/26 - prior admission)  IMPRESSION:  1.  No definite evidence of thoracic metastatic disease.  2.  Small bilateral pleural effusions.  3.  Marked circumferential thickening of the distal esophagus/hiatal   hernia could be related to provided history of suspected esophageal   cancer versus esophagitis. (12 May 2025 21:41)      SUBJECTIVE      OBJECTIVE  PAST MEDICAL & SURGICAL HISTORY  HTN (hypertension)    Hiatal hernia    HLD (hyperlipidemia)    CAD (coronary artery disease)    Paroxysmal atrial fibrillation    History of colitis    S/P CABG (coronary artery bypass graft)      ALLERGIES:  penicillins (Unknown)    MEDICATIONS:  STANDING MEDICATIONS  atorvastatin 80 milliGRAM(s) Oral at bedtime  dextrose 5% + lactated ringers. 1000 milliLiter(s) IV Continuous <Continuous>  lidocaine   4% Patch 1 Patch Transdermal daily  megestrol 20 milliGRAM(s) Oral daily  metoprolol tartrate 12.5 milliGRAM(s) Oral two times a day  OLANZapine 2.5 milliGRAM(s) Oral daily  pantoprazole  Injectable 40 milliGRAM(s) IV Push every 12 hours    PRN MEDICATIONS  HYDROmorphone  Injectable 0.5 milliGRAM(s) IV Push every 4 hours PRN  oxycodone    5 mG/acetaminophen 325 mG 1 Tablet(s) Oral every 4 hours PRN      VITAL SIGNS: Last 24 Hours  T(C): 36.9 (13 May 2025 05:40), Max: 37 (12 May 2025 19:48)  T(F): 98.5 (13 May 2025 05:40), Max: 98.6 (12 May 2025 19:48)  HR: 90 (13 May 2025 05:40) (85 - 104)  BP: 134/71 (13 May 2025 05:40) (111/58 - 134/71)  BP(mean): 87 (13 May 2025 04:37) (87 - 87)  RR: 18 (13 May 2025 05:40) (18 - 20)  SpO2: 99% (13 May 2025 05:40) (98% - 100%)    LABS:                        7.6    7.94  )-----------( 92       ( 12 May 2025 16:48 )             23.5     05-12    135  |  102  |  19  ----------------------------<  106[H]  4.1   |  20  |  0.7    Ca    8.8      12 May 2025 16:48    TPro  6.1  /  Alb  3.4[L]  /  TBili  0.9  /  DBili  x   /  AST  30  /  ALT  16  /  AlkPhos  273[H]  05-12    PT/INR - ( 12 May 2025 18:30 )   PT: >40.00 sec;   INR: 6.20 ratio         PTT - ( 12 May 2025 18:30 )  PTT:51.8 sec  Urinalysis Basic - ( 12 May 2025 17:55 )    Color: Yellow / Appearance: Clear / SG: >1.030 / pH: x  Gluc: x / Ketone: x  / Bili: Negative / Urobili: 1.0 mg/dL   Blood: x / Protein: 30 mg/dL / Nitrite: Negative   Leuk Esterase: Negative / RBC: 3 /HPF / WBC 1 /HPF   Sq Epi: x / Non Sq Epi: 2 /HPF / Bacteria: Negative /HPF            Urinalysis with Rflx Culture (collected 12 May 2025 17:55)              PHYSICAL EXAM:  GENERAL: NAD, well-developed.  HEAD:  Atraumatic, Normocephalic.  EYES: conjunctiva and sclera clear  CHEST/LUNG: GBAE. No wheezing or crackles   HEART: regular rate and rhythm; S1/S2.  ABDOMEN: Soft, Nontender, Nondistended  EXTREMITIES: No edema.   PSYCH: AAOx3.  NEUROLOGY: non-focal; moves all extremities     JONAS CELESTIN 59y Male  MRN#: [id number]   Hospital Day: 1d    HPI:  59y male with PMHx of HTN, hiatal hernia, HLD, CAD s/p CABG in Jan 2024, paroxysmal A. fib not on AC, former smoker (30 pack years, quit 1yr ago), recently diagnosed with colitis and esophageal adenocarcinoma s/p stent placement who presents for weakness. The patient was recently discharged from [facility] on 5/6 , and has been progressively deteriorating. States he has been unable to tolerate p.o. and has been unable to walk over the past few days. Wife at bedside reports that he has not been able to follow-up with oncology due to the weakness.  On ROS the patient also states he has been having black tarry stools that started 1 day prior to his EGD. He had 3 black BMs today. He denies any mucosal bleeding or blood in the urine.  He denies any fevers, chills, chest pain, shortness of breath, palpitations, headache, vision changes, nausea, vomiting, abdominal pain, hematuria, falls.  He is supposed to have PET scan tomorrow and follow with Dr. Sanchez in 2 days.    T(F): 98.5 (05-12-25 @ 21:05), Max: 98.6 (05-12-25 @ 19:48)  HR: 97 (05-12-25 @ 21:05) (88 - 104)  BP: 111/58 (05-12-25 @ 21:05) (111/58 - 132/75)  RR: 18 (05-12-25 @ 21:05) (18 - 20)  SpO2: 99% (05-12-25 @ 21:05) (98% - 100%) on RA    Labs: Hg 7.6 (baseline ~10), MCV 78, slight schistocytes on smear,  Plt 92, PT/INR >40/6.2, PTT 51.8, ALK phos 273.      Imaging:    CT Chest w/ IV Cont  (4/26 - prior admission)  IMPRESSION:  1.  No definite evidence of thoracic metastatic disease.  2.  Small bilateral pleural effusions.  3.  Marked circumferential thickening of the distal esophagus/hiatal   hernia could be related to provided history of suspected esophageal   cancer versus esophagitis. (12 May 2025 21:41)      SUBJECTIVE  Pt seen and evaluated at bedside. No acute overnight events.     OBJECTIVE  PAST MEDICAL & SURGICAL HISTORY  HTN (hypertension)    Hiatal hernia    HLD (hyperlipidemia)    CAD (coronary artery disease)    Paroxysmal atrial fibrillation    History of colitis    S/P CABG (coronary artery bypass graft)      ALLERGIES:  penicillins (Unknown)    MEDICATIONS:  STANDING MEDICATIONS  atorvastatin 80 milliGRAM(s) Oral at bedtime  dextrose 5% + lactated ringers. 1000 milliLiter(s) IV Continuous <Continuous>  lidocaine   4% Patch 1 Patch Transdermal daily  megestrol 20 milliGRAM(s) Oral daily  metoprolol tartrate 12.5 milliGRAM(s) Oral two times a day  OLANZapine 2.5 milliGRAM(s) Oral daily  pantoprazole  Injectable 40 milliGRAM(s) IV Push every 12 hours    PRN MEDICATIONS  HYDROmorphone  Injectable 0.5 milliGRAM(s) IV Push every 4 hours PRN  oxycodone    5 mG/acetaminophen 325 mG 1 Tablet(s) Oral every 4 hours PRN      VITAL SIGNS: Last 24 Hours  T(C): 36.9 (13 May 2025 05:40), Max: 37 (12 May 2025 19:48)  T(F): 98.5 (13 May 2025 05:40), Max: 98.6 (12 May 2025 19:48)  HR: 90 (13 May 2025 05:40) (85 - 104)  BP: 134/71 (13 May 2025 05:40) (111/58 - 134/71)  BP(mean): 87 (13 May 2025 04:37) (87 - 87)  RR: 18 (13 May 2025 05:40) (18 - 20)  SpO2: 99% (13 May 2025 05:40) (98% - 100%)    LABS:                        7.6    7.94  )-----------( 92       ( 12 May 2025 16:48 )             23.5     05-12    135  |  102  |  19  ----------------------------<  106[H]  4.1   |  20  |  0.7    Ca    8.8      12 May 2025 16:48    TPro  6.1  /  Alb  3.4[L]  /  TBili  0.9  /  DBili  x   /  AST  30  /  ALT  16  /  AlkPhos  273[H]  05-12    PT/INR - ( 12 May 2025 18:30 )   PT: >40.00 sec;   INR: 6.20 ratio         PTT - ( 12 May 2025 18:30 )  PTT:51.8 sec  Urinalysis Basic - ( 12 May 2025 17:55 )    Color: Yellow / Appearance: Clear / SG: >1.030 / pH: x  Gluc: x / Ketone: x  / Bili: Negative / Urobili: 1.0 mg/dL   Blood: x / Protein: 30 mg/dL / Nitrite: Negative   Leuk Esterase: Negative / RBC: 3 /HPF / WBC 1 /HPF   Sq Epi: x / Non Sq Epi: 2 /HPF / Bacteria: Negative /HPF            Urinalysis with Rflx Culture (collected 12 May 2025 17:55)              PHYSICAL EXAM:  GENERAL: NAD, well-developed.  HEAD:  Atraumatic, Normocephalic.  EYES: conjunctiva and sclera clear  CHEST/LUNG: GBAE. No wheezing or crackles   HEART: regular rate and rhythm; S1/S2.  ABDOMEN: Soft, Nontender, Nondistended  EXTREMITIES: No edema.   PSYCH: AAOx3.  NEUROLOGY: non-focal; moves all extremities     JONAS CELESTIN 59y Male  MRN#: [id number]   Hospital Day: 1d    HPI:  59y male with PMHx of HTN, hiatal hernia, HLD, CAD s/p CABG in Jan 2024, paroxysmal A. fib not on AC, former smoker (30 pack years, quit 1yr ago), recently diagnosed with colitis and esophageal adenocarcinoma s/p stent placement who presents for weakness. The patient was recently discharged from [facility] on 5/6 , and has been progressively deteriorating. States he has been unable to tolerate p.o. and has been unable to walk over the past few days. Wife at bedside reports that he has not been able to follow-up with oncology due to the weakness.  On ROS the patient also states he has been having black tarry stools that started 1 day prior to his EGD. He had 3 black BMs today. He denies any mucosal bleeding or blood in the urine.  He denies any fevers, chills, chest pain, shortness of breath, palpitations, headache, vision changes, nausea, vomiting, abdominal pain, hematuria, falls.  He is supposed to have PET scan tomorrow and follow with Dr. Sanchez in 2 days.    T(F): 98.5 (05-12-25 @ 21:05), Max: 98.6 (05-12-25 @ 19:48)  HR: 97 (05-12-25 @ 21:05) (88 - 104)  BP: 111/58 (05-12-25 @ 21:05) (111/58 - 132/75)  RR: 18 (05-12-25 @ 21:05) (18 - 20)  SpO2: 99% (05-12-25 @ 21:05) (98% - 100%) on RA    Labs: Hg 7.6 (baseline ~10), MCV 78, slight schistocytes on smear,  Plt 92, PT/INR >40/6.2, PTT 51.8, ALK phos 273.      Imaging:    CT Chest w/ IV Cont  (4/26 - prior admission)  IMPRESSION:  1.  No definite evidence of thoracic metastatic disease.  2.  Small bilateral pleural effusions.  3.  Marked circumferential thickening of the distal esophagus/hiatal   hernia could be related to provided history of suspected esophageal   cancer versus esophagitis. (12 May 2025 21:41)      SUBJECTIVE  Pt seen and evaluated at bedside. No acute overnight events.     OBJECTIVE  PAST MEDICAL & SURGICAL HISTORY  HTN (hypertension)    Hiatal hernia    HLD (hyperlipidemia)    CAD (coronary artery disease)    Paroxysmal atrial fibrillation    History of colitis    S/P CABG (coronary artery bypass graft)      ALLERGIES:  penicillins (Unknown)    MEDICATIONS:  STANDING MEDICATIONS  atorvastatin 80 milliGRAM(s) Oral at bedtime  dextrose 5% + lactated ringers. 1000 milliLiter(s) IV Continuous <Continuous>  lidocaine   4% Patch 1 Patch Transdermal daily  megestrol 20 milliGRAM(s) Oral daily  metoprolol tartrate 12.5 milliGRAM(s) Oral two times a day  OLANZapine 2.5 milliGRAM(s) Oral daily  pantoprazole  Injectable 40 milliGRAM(s) IV Push every 12 hours    PRN MEDICATIONS  HYDROmorphone  Injectable 0.5 milliGRAM(s) IV Push every 4 hours PRN  oxycodone    5 mG/acetaminophen 325 mG 1 Tablet(s) Oral every 4 hours PRN      VITAL SIGNS: Last 24 Hours  T(C): 36.9 (13 May 2025 05:40), Max: 37 (12 May 2025 19:48)  T(F): 98.5 (13 May 2025 05:40), Max: 98.6 (12 May 2025 19:48)  HR: 90 (13 May 2025 05:40) (85 - 104)  BP: 134/71 (13 May 2025 05:40) (111/58 - 134/71)  BP(mean): 87 (13 May 2025 04:37) (87 - 87)  RR: 18 (13 May 2025 05:40) (18 - 20)  SpO2: 99% (13 May 2025 05:40) (98% - 100%)    LABS:                        7.6    7.94  )-----------( 92       ( 12 May 2025 16:48 )             23.5     05-12    135  |  102  |  19  ----------------------------<  106[H]  4.1   |  20  |  0.7    Ca    8.8      12 May 2025 16:48    TPro  6.1  /  Alb  3.4[L]  /  TBili  0.9  /  DBili  x   /  AST  30  /  ALT  16  /  AlkPhos  273[H]  05-12    PT/INR - ( 12 May 2025 18:30 )   PT: >40.00 sec;   INR: 6.20 ratio         PTT - ( 12 May 2025 18:30 )  PTT:51.8 sec  Urinalysis Basic - ( 12 May 2025 17:55 )    Color: Yellow / Appearance: Clear / SG: >1.030 / pH: x  Gluc: x / Ketone: x  / Bili: Negative / Urobili: 1.0 mg/dL   Blood: x / Protein: 30 mg/dL / Nitrite: Negative   Leuk Esterase: Negative / RBC: 3 /HPF / WBC 1 /HPF   Sq Epi: x / Non Sq Epi: 2 /HPF / Bacteria: Negative /HPF            Urinalysis with Rflx Culture (collected 12 May 2025 17:55)              PHYSICAL EXAM:  GENERAL: NAD, well-developed.  HEAD:  Atraumatic, Normocephalic.  EYES: conjunctiva and sclera clear  CHEST/LUNG: GBAE. No wheezing or crackles   HEART: regular rate and rhythm; S1/S2.  ABDOMEN: Soft, Nontender, Nondistended  EXTREMITIES: No edema.   PSYCH: AAOx3.  NEUROLOGY: L sided mild fascial drope, otherwise normal excpt b/l hip weakness

## 2025-05-13 NOTE — PROGRESS NOTE ADULT - SUBJECTIVE AND OBJECTIVE BOX
Patient is a 59y old  Male who presents with a chief complaint of Progressive weakness (13 May 2025 17:51)    INTERVAL HPI/OVERNIGHT EVENTS: Patient was examined and seen at bedside. This morning pt is resting comfortably in bed and reports multiple episodes of melena and generalized weakness. +Discomfort from the stent and wants it removed. No other complaints. S/p 2u PRBCs yesterday. +SPENCE. WIfe at bedside.  ROS: Denies CP, SOB, AP, new weakness  All other systems reviewed and are within normal limits.  InitialHPI:  59y male with PMHx of HTN, hiatal hernia, HLD, CAD s/p CABG in Jan 2024, paroxysmal A. fib not on AC, former smoker (30 pack years, quit 1yr ago), recently diagnosed with colitis and esophageal adenocarcinoma s/p stent placement who presents for weakness. The patient was recently discharged from [facility] on 5/6 , and has been progressively deteriorating. States he has been unable to tolerate p.o. and has been unable to walk over the past few days. Wife at bedside reports that he has not been able to follow-up with oncology due to the weakness.  On ROS the patient also states he has been having black tarry stools that started 1 day prior to his EGD. He had 3 black BMs today. He denies any mucosal bleeding or blood in the urine.  He denies any fevers, chills, chest pain, shortness of breath, palpitations, headache, vision changes, nausea, vomiting, abdominal pain, hematuria, falls.  He is supposed to have PET scan tomorrow and follow with Dr. Sanchez in 2 days.    T(F): 98.5 (05-12-25 @ 21:05), Max: 98.6 (05-12-25 @ 19:48)  HR: 97 (05-12-25 @ 21:05) (88 - 104)  BP: 111/58 (05-12-25 @ 21:05) (111/58 - 132/75)  RR: 18 (05-12-25 @ 21:05) (18 - 20)  SpO2: 99% (05-12-25 @ 21:05) (98% - 100%) on RA    Labs: Hg 7.6 (baseline ~10), MCV 78, slight schistocytes on smear,  Plt 92, PT/INR >40/6.2, PTT 51.8, ALK phos 273.      Imaging:    CT Chest w/ IV Cont  (4/26 - prior admission)  IMPRESSION:  1.  No definite evidence of thoracic metastatic disease.  2.  Small bilateral pleural effusions.  3.  Marked circumferential thickening of the distal esophagus/hiatal   hernia could be related to provided history of suspected esophageal   cancer versus esophagitis. (12 May 2025 21:41)    PAST MEDICAL & SURGICAL HISTORY:  HTN (hypertension)      Hiatal hernia      HLD (hyperlipidemia)      CAD (coronary artery disease)      Paroxysmal atrial fibrillation      History of colitis      S/P CABG (coronary artery bypass graft)      General: NAD, AAO3, Lt facial droop  HEENT:  EOMI, no LAD  CV: S1 S2  Resp: decreased breath sounds at bases  GI: NT/ND/S +BS  MS: no clubbing/cyanosis/edema, + pulses b/l  Neuro: LE 2/5 distally, 4/5 prox. UE 5/5    MEDICATIONS  (STANDING):  atorvastatin 80 milliGRAM(s) Oral at bedtime  dextrose 5% + lactated ringers. 1000 milliLiter(s) (75 mL/Hr) IV Continuous <Continuous>  lidocaine   4% Patch 1 Patch Transdermal daily  megestrol 20 milliGRAM(s) Oral daily  metoprolol tartrate 12.5 milliGRAM(s) Oral two times a day  OLANZapine 2.5 milliGRAM(s) Oral daily  pantoprazole  Injectable 40 milliGRAM(s) IV Push every 12 hours    MEDICATIONS  (PRN):  HYDROmorphone  Injectable 0.5 milliGRAM(s) IV Push every 4 hours PRN Severe Pain (7 - 10)  oxycodone    5 mG/acetaminophen 325 mG 1 Tablet(s) Oral every 4 hours PRN Severe Pain (7 - 10)    Vital Signs Last 24 Hrs  T(C): 37.3 (13 May 2025 20:25), Max: 37.3 (13 May 2025 10:45)  T(F): 99.1 (13 May 2025 20:25), Max: 99.1 (13 May 2025 10:45)  HR: 75 (13 May 2025 20:25) (69 - 94)  BP: 122/68 (13 May 2025 20:25) (115/64 - 134/73)  BP(mean): 86 (13 May 2025 20:25) (86 - 87)  RR: 18 (13 May 2025 20:25) (18 - 18)  SpO2: 97% (13 May 2025 20:25) (97% - 100%)    Parameters below as of 13 May 2025 20:25  Patient On (Oxygen Delivery Method): room air      CAPILLARY BLOOD GLUCOSE                              7.1    6.71  )-----------( 70       ( 13 May 2025 06:49 )             21.7     05-13    136  |  103  |  16  ----------------------------<  99  3.7   |  19  |  0.7    Ca    8.6      13 May 2025 06:49  Mg     2.2     05-13    TPro  5.6[L]  /  Alb  3.1[L]  /  TBili  0.8  /  DBili  x   /  AST  26  /  ALT  13  /  AlkPhos  231[H]  05-13    LIVER FUNCTIONS - ( 13 May 2025 06:49 )  Alb: 3.1 g/dL / Pro: 5.6 g/dL / ALK PHOS: 231 U/L / ALT: 13 U/L / AST: 26 U/L / GGT: x               PT/INR - ( 13 May 2025 06:49 )   PT: 26.40 sec;   INR: 2.20 ratio         PTT - ( 13 May 2025 06:49 )  PTT:38.3 sec  Urinalysis Basic - ( 13 May 2025 06:49 )    Color: x / Appearance: x / SG: x / pH: x  Gluc: 99 mg/dL / Ketone: x  / Bili: x / Urobili: x   Blood: x / Protein: x / Nitrite: x   Leuk Esterase: x / RBC: x / WBC x   Sq Epi: x / Non Sq Epi: x / Bacteria: x              Urinalysis with Rflx Culture (collected 12 May 2025 17:55)      Chart, Consultant(s) Notes Reviewed:  [x ] YES  [ ] NO  Care Discussed with Consultants/Other Providers/ Housestaff [ x] YES  [ ] NO  Radiology, labs, old available records personally reviewed.

## 2025-05-14 ENCOUNTER — APPOINTMENT (OUTPATIENT)
Dept: RADIATION ONCOLOGY | Facility: HOSPITAL | Age: 60
End: 2025-05-14

## 2025-05-14 ENCOUNTER — TRANSCRIPTION ENCOUNTER (OUTPATIENT)
Age: 60
End: 2025-05-14

## 2025-05-14 ENCOUNTER — APPOINTMENT (OUTPATIENT)
Age: 60
End: 2025-05-14
Payer: COMMERCIAL

## 2025-05-14 LAB
ALBUMIN SERPL ELPH-MCNC: 3 G/DL — LOW (ref 3.5–5.2)
ALP SERPL-CCNC: 239 U/L — HIGH (ref 30–115)
ALT FLD-CCNC: 13 U/L — SIGNIFICANT CHANGE UP (ref 0–41)
ANION GAP SERPL CALC-SCNC: 11 MMOL/L — SIGNIFICANT CHANGE UP (ref 7–14)
APTT BLD: 30.6 SEC — SIGNIFICANT CHANGE UP (ref 27–39.2)
AST SERPL-CCNC: 31 U/L — SIGNIFICANT CHANGE UP (ref 0–41)
BASOPHILS # BLD AUTO: 0.05 K/UL — SIGNIFICANT CHANGE UP (ref 0–0.2)
BASOPHILS NFR BLD AUTO: 0.8 % — SIGNIFICANT CHANGE UP (ref 0–1)
BILIRUB SERPL-MCNC: 1.2 MG/DL — SIGNIFICANT CHANGE UP (ref 0.2–1.2)
BUN SERPL-MCNC: 12 MG/DL — SIGNIFICANT CHANGE UP (ref 10–20)
CALCIUM SERPL-MCNC: 8.9 MG/DL — SIGNIFICANT CHANGE UP (ref 8.4–10.5)
CHLORIDE SERPL-SCNC: 104 MMOL/L — SIGNIFICANT CHANGE UP (ref 98–110)
CO2 SERPL-SCNC: 21 MMOL/L — SIGNIFICANT CHANGE UP (ref 17–32)
CREAT SERPL-MCNC: 0.5 MG/DL — LOW (ref 0.7–1.5)
D DIMER BLD IA.RAPID-MCNC: 5118 NG/ML DDU — HIGH
EGFR: 116 ML/MIN/1.73M2 — SIGNIFICANT CHANGE UP
EGFR: 116 ML/MIN/1.73M2 — SIGNIFICANT CHANGE UP
EOSINOPHIL # BLD AUTO: 0.14 K/UL — SIGNIFICANT CHANGE UP (ref 0–0.7)
EOSINOPHIL NFR BLD AUTO: 2.2 % — SIGNIFICANT CHANGE UP (ref 0–8)
FIBRINOGEN PPP-MCNC: 452 MG/DL — HIGH (ref 200–435)
GLUCOSE SERPL-MCNC: 108 MG/DL — HIGH (ref 70–99)
HCT VFR BLD CALC: 23.5 % — LOW (ref 42–52)
HCT VFR BLD CALC: 24.4 % — LOW (ref 42–52)
HGB BLD-MCNC: 7.8 G/DL — LOW (ref 14–18)
HGB BLD-MCNC: 8 G/DL — LOW (ref 14–18)
IMM GRANULOCYTES NFR BLD AUTO: 11.8 % — HIGH (ref 0.1–0.3)
INR BLD: 1.12 RATIO — SIGNIFICANT CHANGE UP (ref 0.65–1.3)
LYMPHOCYTES # BLD AUTO: 1.1 K/UL — LOW (ref 1.2–3.4)
LYMPHOCYTES # BLD AUTO: 17 % — LOW (ref 20.5–51.1)
MAGNESIUM SERPL-MCNC: 2 MG/DL — SIGNIFICANT CHANGE UP (ref 1.8–2.4)
MCHC RBC-ENTMCNC: 26 PG — LOW (ref 27–31)
MCHC RBC-ENTMCNC: 26.2 PG — LOW (ref 27–31)
MCHC RBC-ENTMCNC: 32.8 G/DL — SIGNIFICANT CHANGE UP (ref 32–37)
MCHC RBC-ENTMCNC: 33.2 G/DL — SIGNIFICANT CHANGE UP (ref 32–37)
MCV RBC AUTO: 78.3 FL — LOW (ref 80–94)
MCV RBC AUTO: 80 FL — SIGNIFICANT CHANGE UP (ref 80–94)
MONOCYTES # BLD AUTO: 0.92 K/UL — HIGH (ref 0.1–0.6)
MONOCYTES NFR BLD AUTO: 14.2 % — HIGH (ref 1.7–9.3)
NEUTROPHILS # BLD AUTO: 3.49 K/UL — SIGNIFICANT CHANGE UP (ref 1.4–6.5)
NEUTROPHILS NFR BLD AUTO: 54 % — SIGNIFICANT CHANGE UP (ref 42.2–75.2)
NRBC BLD AUTO-RTO: 3 /100 WBCS — HIGH (ref 0–0)
NRBC BLD AUTO-RTO: 3 /100 WBCS — HIGH (ref 0–0)
PLATELET # BLD AUTO: 51 K/UL — LOW (ref 130–400)
PLATELET # BLD AUTO: 58 K/UL — LOW (ref 130–400)
PMV BLD: 10.2 FL — SIGNIFICANT CHANGE UP (ref 7.4–10.4)
PMV BLD: 10.6 FL — HIGH (ref 7.4–10.4)
POTASSIUM SERPL-MCNC: 4.1 MMOL/L — SIGNIFICANT CHANGE UP (ref 3.5–5)
POTASSIUM SERPL-SCNC: 4.1 MMOL/L — SIGNIFICANT CHANGE UP (ref 3.5–5)
PROT SERPL-MCNC: 5.7 G/DL — LOW (ref 6–8)
PROTHROM AB SERPL-ACNC: 13.2 SEC — HIGH (ref 9.95–12.87)
RBC # BLD: 3 M/UL — LOW (ref 4.7–6.1)
RBC # BLD: 3.05 M/UL — LOW (ref 4.7–6.1)
RBC # FLD: 16.5 % — HIGH (ref 11.5–14.5)
RBC # FLD: 16.7 % — HIGH (ref 11.5–14.5)
SODIUM SERPL-SCNC: 136 MMOL/L — SIGNIFICANT CHANGE UP (ref 135–146)
WBC # BLD: 6.18 K/UL — SIGNIFICANT CHANGE UP (ref 4.8–10.8)
WBC # BLD: 6.46 K/UL — SIGNIFICANT CHANGE UP (ref 4.8–10.8)
WBC # FLD AUTO: 6.18 K/UL — SIGNIFICANT CHANGE UP (ref 4.8–10.8)
WBC # FLD AUTO: 6.46 K/UL — SIGNIFICANT CHANGE UP (ref 4.8–10.8)

## 2025-05-14 PROCEDURE — 70553 MRI BRAIN STEM W/O & W/DYE: CPT | Mod: 26

## 2025-05-14 PROCEDURE — 43247 EGD REMOVE FOREIGN BODY: CPT | Mod: XU

## 2025-05-14 PROCEDURE — 99232 SBSQ HOSP IP/OBS MODERATE 35: CPT

## 2025-05-14 PROCEDURE — 99233 SBSQ HOSP IP/OBS HIGH 50: CPT

## 2025-05-14 PROCEDURE — 43246 EGD PLACE GASTROSTOMY TUBE: CPT

## 2025-05-14 RX ADMIN — Medication 0.5 MILLIGRAM(S): at 02:24

## 2025-05-14 RX ADMIN — Medication 40 MILLIGRAM(S): at 18:13

## 2025-05-14 RX ADMIN — Medication 0.5 MILLIGRAM(S): at 02:01

## 2025-05-14 RX ADMIN — METOPROLOL SUCCINATE 12.5 MILLIGRAM(S): 50 TABLET, EXTENDED RELEASE ORAL at 18:12

## 2025-05-14 RX ADMIN — METOPROLOL SUCCINATE 12.5 MILLIGRAM(S): 50 TABLET, EXTENDED RELEASE ORAL at 06:03

## 2025-05-14 RX ADMIN — Medication 40 MILLIGRAM(S): at 06:03

## 2025-05-14 RX ADMIN — Medication 0.5 MILLIGRAM(S): at 06:41

## 2025-05-14 RX ADMIN — ATORVASTATIN CALCIUM 80 MILLIGRAM(S): 80 TABLET, FILM COATED ORAL at 21:39

## 2025-05-14 RX ADMIN — OLANZAPINE 2.5 MILLIGRAM(S): 10 TABLET ORAL at 13:28

## 2025-05-14 RX ADMIN — Medication 0.5 MILLIGRAM(S): at 06:04

## 2025-05-14 RX ADMIN — SODIUM CHLORIDE 75 MILLILITER(S): 9 INJECTION, SOLUTION INTRAVENOUS at 11:20

## 2025-05-14 NOTE — DIETITIAN INITIAL EVALUATION ADULT - OTHER CALCULATIONS
Energy: 2345-2847kcal/day (using MSJ x 1.4-1.7) with consideration for age, weight status, severe PCM, cancer  Protein: 110-127g/day (using 1.3-1.5g/kg) with considerations for same reasons as above  Fluid: 1mL/kcal/day

## 2025-05-14 NOTE — PROGRESS NOTE ADULT - ATTENDING COMMENTS
Medicine Attending Attestation  Patient was seen and examined with medicine team.  Nursing records reviewed. I agree with the resident/PA/NP's note including past medical history, home medications, social history, allergies, surgical history, family history, and review of system. I have reviewed relevant vitals, laboratory values, imaging studies, and microbiology.   - Above resident's note was edited by me  - Patient reports having black tarry stool weeks, and now with worsening weakness and SOB. Pt now s/p 1 unit pRBCs and planning EGD with PEG today.  - Pt with h/o CABG on Plavix. Will consult cardiology about whether Plavix can continue in light of bleeding  - Pt was planned to start outpatient radiation today; will get rad-onc if they want to start it inpatient instead  - Pt also reports LBP. Will get PSA level. Palliative consulted for chronic pain management  - Swallow consult for possible pleasure feed once PEG is placed.  - rest of A/P as per detailed housestaff note above except above modifications    Total time spent to complete patient's bedside assessment, reviewed medical chart, discussed medical plan of care with team was 45 minutes with >50% of time spent face to face with patient, discussion with patient/family and/or coordination of care.

## 2025-05-14 NOTE — DIETITIAN INITIAL EVALUATION ADULT - NS FNS DIET ORDER
Diet, NPO:   Except Medications (05-13-25 @ 02:26) [Active]  Diet, NPO after Midnight:      NPO Start Date: 12-May-2025,   NPO Start Time: 23:59  Except Medications (05-12-25 @ 23:10) [Active]

## 2025-05-14 NOTE — PHYSICAL THERAPY INITIAL EVALUATION ADULT - LEVEL OF INDEPENDENCE: SIT/STAND, REHAB EVAL
Pt was dizzy sitting at EOB which increased after sitting for 2 minutes. Pt returned to bed /88, Jennifer CARL aware of pt's c/o/unable to perform

## 2025-05-14 NOTE — PROGRESS NOTE ADULT - SUBJECTIVE AND OBJECTIVE BOX
JONAS CELESTIN 59y Male  MRN#: [id number]   Hospital Day: 2d    HPI:  59y male with PMHx of HTN, hiatal hernia, HLD, CAD s/p CABG in Jan 2024, paroxysmal A. fib not on AC, former smoker (30 pack years, quit 1yr ago), recently diagnosed with colitis and esophageal adenocarcinoma s/p stent placement who presents for weakness. The patient was recently discharged from [facility] on 5/6 , and has been progressively deteriorating. States he has been unable to tolerate p.o. and has been unable to walk over the past few days. Wife at bedside reports that he has not been able to follow-up with oncology due to the weakness.  On ROS the patient also states he has been having black tarry stools that started 1 day prior to his EGD. He had 3 black BMs today. He denies any mucosal bleeding or blood in the urine.  He denies any fevers, chills, chest pain, shortness of breath, palpitations, headache, vision changes, nausea, vomiting, abdominal pain, hematuria, falls.  He is supposed to have PET scan tomorrow and follow with Dr. Sanchez in 2 days.    T(F): 98.5 (05-12-25 @ 21:05), Max: 98.6 (05-12-25 @ 19:48)  HR: 97 (05-12-25 @ 21:05) (88 - 104)  BP: 111/58 (05-12-25 @ 21:05) (111/58 - 132/75)  RR: 18 (05-12-25 @ 21:05) (18 - 20)  SpO2: 99% (05-12-25 @ 21:05) (98% - 100%) on RA    Labs: Hg 7.6 (baseline ~10), MCV 78, slight schistocytes on smear,  Plt 92, PT/INR >40/6.2, PTT 51.8, ALK phos 273.      Imaging:    CT Chest w/ IV Cont  (4/26 - prior admission)  IMPRESSION:  1.  No definite evidence of thoracic metastatic disease.  2.  Small bilateral pleural effusions.  3.  Marked circumferential thickening of the distal esophagus/hiatal   hernia could be related to provided history of suspected esophageal   cancer versus esophagitis. (12 May 2025 21:41)      SUBJECTIVE      OBJECTIVE  PAST MEDICAL & SURGICAL HISTORY  HTN (hypertension)    Hiatal hernia    HLD (hyperlipidemia)    CAD (coronary artery disease)    Paroxysmal atrial fibrillation    History of colitis    S/P CABG (coronary artery bypass graft)      ALLERGIES:  penicillins (Unknown)    MEDICATIONS:  STANDING MEDICATIONS  atorvastatin 80 milliGRAM(s) Oral at bedtime  dextrose 5% + lactated ringers. 1000 milliLiter(s) IV Continuous <Continuous>  lidocaine   4% Patch 1 Patch Transdermal daily  megestrol 20 milliGRAM(s) Oral daily  metoprolol tartrate 12.5 milliGRAM(s) Oral two times a day  OLANZapine 2.5 milliGRAM(s) Oral daily  pantoprazole  Injectable 40 milliGRAM(s) IV Push every 12 hours    PRN MEDICATIONS  HYDROmorphone  Injectable 0.5 milliGRAM(s) IV Push every 4 hours PRN  oxycodone    5 mG/acetaminophen 325 mG 1 Tablet(s) Oral every 4 hours PRN      VITAL SIGNS: Last 24 Hours  T(C): 36.8 (14 May 2025 04:47), Max: 37.3 (13 May 2025 10:45)  T(F): 98.3 (14 May 2025 04:47), Max: 99.1 (13 May 2025 10:45)  HR: 87 (14 May 2025 04:47) (69 - 88)  BP: 120/72 (14 May 2025 04:47) (120/72 - 134/73)  BP(mean): 88 (14 May 2025 04:47) (86 - 88)  RR: 18 (14 May 2025 04:47) (18 - 18)  SpO2: 96% (14 May 2025 04:47) (96% - 100%)    LABS:                        7.9    6.12  )-----------( 54       ( 13 May 2025 21:35 )             23.7     05-13    136  |  103  |  16  ----------------------------<  99  3.7   |  19  |  0.7    Ca    8.6      13 May 2025 06:49  Mg     2.2     05-13    TPro  5.6[L]  /  Alb  3.1[L]  /  TBili  0.8  /  DBili  x   /  AST  26  /  ALT  13  /  AlkPhos  231[H]  05-13    PT/INR - ( 13 May 2025 06:49 )   PT: 26.40 sec;   INR: 2.20 ratio         PTT - ( 13 May 2025 06:49 )  PTT:38.3 sec  Urinalysis Basic - ( 13 May 2025 06:49 )    Color: x / Appearance: x / SG: x / pH: x  Gluc: 99 mg/dL / Ketone: x  / Bili: x / Urobili: x   Blood: x / Protein: x / Nitrite: x   Leuk Esterase: x / RBC: x / WBC x   Sq Epi: x / Non Sq Epi: x / Bacteria: x            Urinalysis with Rflx Culture (collected 12 May 2025 17:55)              PHYSICAL EXAM:  GENERAL: NAD, well-developed.  HEAD:  Atraumatic, Normocephalic.  EYES: conjunctiva and sclera clear  CHEST/LUNG: GBAE. No wheezing or crackles   HEART: regular rate and rhythm; S1/S2.  ABDOMEN: Soft, Nontender, Nondistended  EXTREMITIES: No edema.   PSYCH: AAOx3.  NEUROLOGY: non-focal; moves all extremities     JONAS CELESTIN 59y Male  MRN#: [id number]   Hospital Day: 2d    HPI:  59y male with PMHx of HTN, hiatal hernia, HLD, CAD s/p CABG in Jan 2024, paroxysmal A. fib not on AC, former smoker (30 pack years, quit 1yr ago), recently diagnosed with colitis and esophageal adenocarcinoma s/p stent placement who presents for weakness. The patient was recently discharged from [facility] on 5/6 , and has been progressively deteriorating. States he has been unable to tolerate p.o. and has been unable to walk over the past few days. Wife at bedside reports that he has not been able to follow-up with oncology due to the weakness.  On ROS the patient also states he has been having black tarry stools that started 1 day prior to his EGD. He had 3 black BMs today. He denies any mucosal bleeding or blood in the urine.  He denies any fevers, chills, chest pain, shortness of breath, palpitations, headache, vision changes, nausea, vomiting, abdominal pain, hematuria, falls.  He is supposed to have PET scan tomorrow and follow with Dr. Sanchez in 2 days.    T(F): 98.5 (05-12-25 @ 21:05), Max: 98.6 (05-12-25 @ 19:48)  HR: 97 (05-12-25 @ 21:05) (88 - 104)  BP: 111/58 (05-12-25 @ 21:05) (111/58 - 132/75)  RR: 18 (05-12-25 @ 21:05) (18 - 20)  SpO2: 99% (05-12-25 @ 21:05) (98% - 100%) on RA    Labs: Hg 7.6 (baseline ~10), MCV 78, slight schistocytes on smear,  Plt 92, PT/INR >40/6.2, PTT 51.8, ALK phos 273.      Imaging:    CT Chest w/ IV Cont  (4/26 - prior admission)  IMPRESSION:  1.  No definite evidence of thoracic metastatic disease.  2.  Small bilateral pleural effusions.  3.  Marked circumferential thickening of the distal esophagus/hiatal   hernia could be related to provided history of suspected esophageal   cancer versus esophagitis. (12 May 2025 21:41)      SUBJECTIVE  Pt seen and evaluated at bedside. No acute overnight events.     OBJECTIVE  PAST MEDICAL & SURGICAL HISTORY  HTN (hypertension)    Hiatal hernia    HLD (hyperlipidemia)    CAD (coronary artery disease)    Paroxysmal atrial fibrillation    History of colitis    S/P CABG (coronary artery bypass graft)      ALLERGIES:  penicillins (Unknown)    MEDICATIONS:  STANDING MEDICATIONS  atorvastatin 80 milliGRAM(s) Oral at bedtime  dextrose 5% + lactated ringers. 1000 milliLiter(s) IV Continuous <Continuous>  lidocaine   4% Patch 1 Patch Transdermal daily  megestrol 20 milliGRAM(s) Oral daily  metoprolol tartrate 12.5 milliGRAM(s) Oral two times a day  OLANZapine 2.5 milliGRAM(s) Oral daily  pantoprazole  Injectable 40 milliGRAM(s) IV Push every 12 hours    PRN MEDICATIONS  HYDROmorphone  Injectable 0.5 milliGRAM(s) IV Push every 4 hours PRN  oxycodone    5 mG/acetaminophen 325 mG 1 Tablet(s) Oral every 4 hours PRN      VITAL SIGNS: Last 24 Hours  T(C): 36.8 (14 May 2025 04:47), Max: 37.3 (13 May 2025 10:45)  T(F): 98.3 (14 May 2025 04:47), Max: 99.1 (13 May 2025 10:45)  HR: 87 (14 May 2025 04:47) (69 - 88)  BP: 120/72 (14 May 2025 04:47) (120/72 - 134/73)  BP(mean): 88 (14 May 2025 04:47) (86 - 88)  RR: 18 (14 May 2025 04:47) (18 - 18)  SpO2: 96% (14 May 2025 04:47) (96% - 100%)    LABS:                        7.9    6.12  )-----------( 54       ( 13 May 2025 21:35 )             23.7     05-13    136  |  103  |  16  ----------------------------<  99  3.7   |  19  |  0.7    Ca    8.6      13 May 2025 06:49  Mg     2.2     05-13    TPro  5.6[L]  /  Alb  3.1[L]  /  TBili  0.8  /  DBili  x   /  AST  26  /  ALT  13  /  AlkPhos  231[H]  05-13    PT/INR - ( 13 May 2025 06:49 )   PT: 26.40 sec;   INR: 2.20 ratio         PTT - ( 13 May 2025 06:49 )  PTT:38.3 sec  Urinalysis Basic - ( 13 May 2025 06:49 )    Color: x / Appearance: x / SG: x / pH: x  Gluc: 99 mg/dL / Ketone: x  / Bili: x / Urobili: x   Blood: x / Protein: x / Nitrite: x   Leuk Esterase: x / RBC: x / WBC x   Sq Epi: x / Non Sq Epi: x / Bacteria: x            Urinalysis with Rflx Culture (collected 12 May 2025 17:55)              PHYSICAL EXAM:  GENERAL: NAD, well-developed.  HEAD:  Atraumatic, Normocephalic.  EYES: conjunctiva and sclera clear  CHEST/LUNG: GBAE. No wheezing or crackles   HEART: regular rate and rhythm; S1/S2.  ABDOMEN: Soft, Nontender, Nondistended  EXTREMITIES: No edema.   PSYCH: AAOx3.  NEUROLOGY: non-focal; moves all extremities

## 2025-05-14 NOTE — DIETITIAN INITIAL EVALUATION ADULT - NUTRITIONGOAL OUTCOME1
Patient to receive and tolerate no more than 50% of needs within 24hrs (patient is at risk for refeeding syndrome)  Patient to receive and tolerate >85%-<105% of estimated nutrient needs within 3-5 days

## 2025-05-14 NOTE — DIETITIAN INITIAL EVALUATION ADULT - ORAL INTAKE PTA/DIET HISTORY
Patient reports adequate appetite at home usually eating 3 meals/day, although has had poor PO appetite for 6 weeks due to persistent abdominal pain prior to previous admission. During previous admission, patient had persistent poor PO intake, which continued at home after discharge on 5/6/25. Denies difficulty chewing/swallowing; reports discomfort from stent even when not swallowing. NKFA or Congregation/cultural food restrictions.    Reports UBW is 100kg, with unintentional weight loss within the past 2 months due to decreased appetite and PO tolerance. Current weight documented is 84.5kg, reflective of 15% weight loss within 2 months.     Patient was NPO at time of RD visit. Denies s/s of N/V/D/Constipation. Last BM was on 5/12/25 per flowsheet.

## 2025-05-14 NOTE — PROGRESS NOTE ADULT - SUBJECTIVE AND OBJECTIVE BOX
JONAS CELESTIN 59y Male  MRN#: [id number]   Hospital Day: 2d    Oncology following for esophageal cancer    ROS:  C/o intense back pain   c/o weakness   unable to turn on side without assistance       OBJECTIVE  PAST MEDICAL & SURGICAL HISTORY  HTN (hypertension)    Hiatal hernia    HLD (hyperlipidemia)    CAD (coronary artery disease)    Paroxysmal atrial fibrillation    History of colitis    S/P CABG (coronary artery bypass graft)      ALLERGIES:  penicillins (Unknown)    MEDICATIONS:  STANDING MEDICATIONS  atorvastatin 80 milliGRAM(s) Oral at bedtime  dextrose 5% + lactated ringers. 1000 milliLiter(s) IV Continuous <Continuous>  lidocaine   4% Patch 1 Patch Transdermal daily  megestrol 20 milliGRAM(s) Oral daily  metoprolol tartrate 12.5 milliGRAM(s) Oral two times a day  OLANZapine 2.5 milliGRAM(s) Oral daily  pantoprazole  Injectable 40 milliGRAM(s) IV Push every 12 hours    PRN MEDICATIONS  HYDROmorphone  Injectable 0.5 milliGRAM(s) IV Push every 4 hours PRN  oxycodone    5 mG/acetaminophen 325 mG 1 Tablet(s) Oral every 4 hours PRN      VITAL SIGNS: Last 24 Hours  T(C): 36.8 (14 May 2025 04:47), Max: 37.3 (13 May 2025 10:45)  T(F): 98.3 (14 May 2025 04:47), Max: 99.1 (13 May 2025 10:45)  HR: 87 (14 May 2025 04:47) (69 - 88)  BP: 120/72 (14 May 2025 04:47) (120/72 - 134/73)  BP(mean): 88 (14 May 2025 04:47) (86 - 88)  RR: 18 (14 May 2025 04:47) (18 - 18)  SpO2: 96% (14 May 2025 04:47) (96% - 100%)    LABS:                        8.0    6.46  )-----------( 58       ( 14 May 2025 06:05 )             24.4     05-14    136  |  104  |  12  ----------------------------<  108[H]  4.1   |  21  |  0.5[L]    Ca    8.9      14 May 2025 06:05  Mg     2.0     05-14    TPro  5.7[L]  /  Alb  3.0[L]  /  TBili  1.2  /  DBili  x   /  AST  31  /  ALT  13  /  AlkPhos  239[H]  05-14    PT/INR - ( 13 May 2025 06:49 )   PT: 26.40 sec;   INR: 2.20 ratio         PTT - ( 13 May 2025 06:49 )  PTT:38.3 sec  Urinalysis Basic - ( 14 May 2025 06:05 )    Color: x / Appearance: x / SG: x / pH: x  Gluc: 108 mg/dL / Ketone: x  / Bili: x / Urobili: x   Blood: x / Protein: x / Nitrite: x   Leuk Esterase: x / RBC: x / WBC x   Sq Epi: x / Non Sq Epi: x / Bacteria: x            Urinalysis with Rflx Culture (collected 12 May 2025 17:55)      PHYSICAL EXAM:    GENERAL: NAD, mild distress due to back pain   HEAD:  Atraumatic, Normocephalic  EYES: EOMI, PERRLA, conjunctiva pale and sclera clear  NECK: Supple, No JVD  CHEST/LUNG: Clear to auscultation bilaterally; No wheeze  HEART: Regular rate and rhythm; No murmurs, rubs, or gallops  ABDOMEN: mild tenderness in epigastric area, RUQ   EXTREMITIES:  LLE> RLE trace edema   NEUROLOGY: non-focal  SKIN: No rashes or lesions    Imaging/Procedure   EGD: Friable circumferential mass seen from the GE junction at 38cm to 28 cm of mid esophagus.  Another 5mm lesion was noted at 20cm from incisors in the proximal esophagus.  	Erosions in the stomach compatible with erosive gastritis. (Biopsy).  	A pulsating ulcerated lesion was noted in the fundus. Biopsy was not obtained.  	Normal mucosa in the whole examined duodenum. (Biopsy).    Colonoscopy Impressions:  	Multiple semi-pedunculated polyps were seen in the left colon. A 2cm polyp was seen in the descending colon, biopsy was obtained. Polyps were not removed in the setting of poor prep.  	External hemorrhoids.  	Solid stool noted in the whole colon. Cecum was not reached    CT chest   No definite evidence of thoracic metastatic disease.  2.  Small bilateral pleural effusions.  3.  Marked circumferential thickening of the distal esophagus/hiatal   hernia could be related to provided history of suspected esophageal   cancer versus esophagitis.    Ct abdomen   Redemonstrated prominent submucosal fat/bowel wall thickening involving   descending colon, possibly related to prior episodes of colitis.        ASSESSMENT & PLAN:    59M  hiatal hernia, CAD s/p CABG in Jan 2024, paroxysmal A. 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   Was planned for outpatient PET scan and appointment with Dr Sanchez     #Decreased PO intake  #Weakness  esophageal stent placed during prior admission. Pt reports did not help.     #Acute on chronic anemia   #Thrombocytopenia - worsening   #Coagulopathy Differentials: DIC/ Vit K deficiency   PT >40, INR 6.20, PTT 51   D dimer 2486, Fibrinogen 2486   received 1 dose vit k 5mg PO, 2unit PRBC, 1 unit plasma     #3.8 cm wide R iliac artery aneurysm  - F/u vascular o/p    #?facial droop  CT head neg   Planned for MRI     Recommendations:  F/up GI. Pt mentions he wants stent removed. ?PEG placement   Needs PET scan for staging   F/up  LE duplex   Can add Appetite stimulant   Will ask path to check for CLDN1 status in case he has metastatic dz  Consult  nutrition   Outpatient follow up with Dr Sanchez to initiate treatment

## 2025-05-14 NOTE — CONSULT NOTE ADULT - ASSESSMENT
Patient is 59 year old gentlemen who was recently diagnosed with esophagus cancer, s/p Bx on 4/25 adenocarcinoma, signet ring cell features. Patient presents to the hospital with c/o weakness and back pain.      Rad/onc c/s to evaluate for radiation therapy.    Patient initial was scheduled today with Dr. Jackson, rad/onc, on 5/14/2025 for consultation for esophagus cancer.   Pt was transferred to Endo and not in the room, 3E6A  Patient/spouse visited in Endo prior to procedure with GI.   Patient reports, he wants his esophageal stent out 2/2 to pain.  Introductions made with pt/spouse, contact information provided.  PET CT pending, as out pt.  Patient to f/u with rad/onc as out pt.  Dr. Sanchez med/onc,  Will D/W Dr. Jackson

## 2025-05-14 NOTE — CONSULT NOTE ADULT - NS ATTEND AMEND GEN_ALL_CORE FT
Recent events noted.  Esophageal cancer.  Complete staging with PET/CT pending.  He can follow up as outpatient once PET/CT is done.  He will also see Dr. Sanchez (med/onc) as outpatient.  Please call with questions [phone]

## 2025-05-14 NOTE — DIETITIAN INITIAL EVALUATION ADULT - ADD RECOMMEND
Interventions:  -If initiating PEG tube feed regimen, recommend TwoCal at start rate of 100mL q6hrs, increasing rate by 20mL q6hrs as tolerated until goal of 300mL q6hrs which provides 1200mL total volume, 2400kcal, 101g protein, 840mL free water (meets 102% of lower end of range for kcal needs and 92% of lower end of range for protein needs)  -Provide supplementation of 200mg Thiamine daily for 7 days and 1 multivitamin daily for 10 days; patient is at risk for refeeding syndrome  -Provide supplementation of K, Mg, and Phos PRN if levels drop below reference range  -Consider SLP evaluation of PO diet tolerance for pleasure feeds    Monitor:  -Ability for PO intake  -Diet/texture and/or Tube feed tolerance  -Weight  -Nutrition related labs  -Nutrition focused physical findings    High Nutrition Risk Follow Up

## 2025-05-14 NOTE — CONSULT NOTE ADULT - SUBJECTIVE AND OBJECTIVE BOX
HPI:  59y male with PMHx of HTN, hiatal hernia, HLD, CAD s/p CABG in Jan 2024, paroxysmal A. fib not on AC, former smoker (30 pack years, quit 1yr ago), recently diagnosed with colitis and esophageal adenocarcinoma s/p stent placement who presents for weakness. The patient was recently discharged from [facility] on 5/6 , and has been progressively deteriorating. States he has been unable to tolerate p.o. and has been unable to walk over the past few days. Wife at bedside reports that he has not been able to follow-up with oncology due to the weakness.  On ROS the patient also states he has been having black tarry stools that started 1 day prior to his EGD. He had 3 black BMs today. He denies any mucosal bleeding or blood in the urine.  He denies any fevers, chills, chest pain, shortness of breath, palpitations, headache, vision changes, nausea, vomiting, abdominal pain, hematuria, falls.  He is supposed to have PET scan tomorrow and follow with Dr. Sanchez in 2 days.    Patient admitted 4/18 with abdominal pain, poor PO intake 2/2 dyphagia initially to solids now also to liquids, and weight loss. CT chest showed thickening of the distal esophagus concerning for stricture ? malignancy. Since being here in the hospital the patient has had workup by GI, went for EGD which showed esophageal mass 28-38cm from GE junction as well as proximal esophageal mass and ulcerated pulsatile mass in gastric fundus. Biopsy came back positive for adenocarcinoma with signet cell features. Patient has otherwise been stable, on CLD and ensure supplementation.     PAST MEDICAL & SURGICAL HISTORY:  HTN (hypertension)  Hiatal hernia  HLD (hyperlipidemia)  CAD (coronary artery disease)  Paroxysmal atrial fibrillation  History of colitis  S/P CABG (coronary artery bypass graft)      Allergies  penicillins (Unknown)  Intolerances    Home Medications:  clopidogrel 75 mg oral tablet: 1 tab(s) orally once a day (13 May 2025 01:36)  Lipitor 80 mg oral tablet: 1 tab(s) orally once a day (13 May 2025 01:36)  Metoprolol Tartrate 25 mg oral tablet: 0.5 tab(s) orally 2 times a day (13 May 2025 01:36)  Multiple Vitamins oral tablet, chewable: 1 tab(s) orally once a day (13 May 2025 01:36)  Pepcid 20 mg oral tablet: 1 tab(s) orally once a day (13 May 2025 01:36)    MEDICATIONS  (STANDING):  atorvastatin 80 milliGRAM(s) Oral at bedtime  dextrose 5% + lactated ringers. 1000 milliLiter(s) (75 mL/Hr) IV Continuous <Continuous>  lidocaine   4% Patch 1 Patch Transdermal daily  megestrol 20 milliGRAM(s) Oral daily  metoprolol tartrate 12.5 milliGRAM(s) Oral two times a day  OLANZapine 2.5 milliGRAM(s) Oral daily  pantoprazole  Injectable 40 milliGRAM(s) IV Push every 12 hours    MEDICATIONS  (PRN):  HYDROmorphone  Injectable 0.5 milliGRAM(s) IV Push every 4 hours PRN Severe Pain (7 - 10)  oxycodone    5 mG/acetaminophen 325 mG 1 Tablet(s) Oral every 4 hours PRN Severe Pain (7 - 10)    < from: CT Abdomen and Pelvis w/ IV Cont (04.01.25 @ 21:57) >  IMPRESSION:    Nonobstructive gas pattern. No pneumoperitoneum.    Submucosal fat within the descending colon is noted which may be related   to previous episodes of colitis. (60165). Similar changes within the   ascending colon also present    3.8 cm right common iliac artery aneurysm    4.5 cm hiatal hernia    < end of copied text >    < from: CT Chest w/ IV Cont (04.26.25 @ 11:19) >  IMPRESSION:  1.  No definite evidence of thoracic metastatic disease.  2.  Small bilateral pleural effusions.  3.  Marked circumferential thickening of the distal esophagus/hiatal   hernia could be related to provided history of suspected esophageal   cancer versus esophagitis.      < end of copied text >    < from: MR Head w/wo IV Cont (05.14.25 @ 09:48) >  IMPRESSION:    1.  No acute infarct or intracranial hemorrhage.    2.  Mild smooth diffuse dural thickening over the cerebral convexities.   This finding is nonspecific and can reflect intracranial hypotension or   recent spinal procedures. In the setting of known neoplasm cannot exclude   dural metastatic disease.    3.  Heterogeneous marrow signal of the calvarium, nonspecific.    --- End of Report ---    < end of copied text >    s/p 4/25/2025 EGD for dysphagia pathology shows- Signet ring cell carcinoma.    HPI:  59y male with PMHx of HTN, hiatal hernia, HLD, CAD s/p CABG in Jan 2024, paroxysmal A. fib not on AC, former smoker (30 pack years, quit 1yr ago), recently diagnosed with colitis and esophageal adenocarcinoma s/p stent placement who presents for weakness. The patient was recently discharged from [facility] on 5/6 , and has been progressively deteriorating. States he has been unable to tolerate p.o. and has been unable to walk over the past few days. Wife at bedside reports that he has not been able to follow-up with oncology due to the weakness.  On ROS the patient also states he has been having black tarry stools that started 1 day prior to his EGD. He had 3 black BMs today. He denies any mucosal bleeding or blood in the urine.  He denies any fevers, chills, chest pain, shortness of breath, palpitations, headache, vision changes, nausea, vomiting, abdominal pain, hematuria, falls.  He is supposed to have PET scan tomorrow and follow with Dr. Sanchez in 2 days.    Initially Patient was admitted on 4/18 with abdominal pain, poor PO intake 2/2 dysphagia initially to solids now also to liquids, and weight loss. CT chest showed thickening of the distal esophagus concerning for stricture ? malignancy. Since being here in the hospital the patient has had workup by GI, went for EGD which showed esophageal mass 28-38cm from GE junction as well as proximal esophageal mass and ulcerated pulsatile mass in gastric fundus. Biopsy came back positive for adenocarcinoma with signet cell features. Patient has otherwise been stable, on CLD and ensure supplementation.     PAST MEDICAL & SURGICAL HISTORY:  HTN (hypertension)  Hiatal hernia  HLD (hyperlipidemia)  CAD (coronary artery disease)  Paroxysmal atrial fibrillation  History of colitis  S/P CABG (coronary artery bypass graft)      Allergies  penicillins (Unknown)  Intolerances    Home Medications:  clopidogrel 75 mg oral tablet: 1 tab(s) orally once a day (13 May 2025 01:36)  Lipitor 80 mg oral tablet: 1 tab(s) orally once a day (13 May 2025 01:36)  Metoprolol Tartrate 25 mg oral tablet: 0.5 tab(s) orally 2 times a day (13 May 2025 01:36)  Multiple Vitamins oral tablet, chewable: 1 tab(s) orally once a day (13 May 2025 01:36)  Pepcid 20 mg oral tablet: 1 tab(s) orally once a day (13 May 2025 01:36)    MEDICATIONS  (STANDING):  atorvastatin 80 milliGRAM(s) Oral at bedtime  dextrose 5% + lactated ringers. 1000 milliLiter(s) (75 mL/Hr) IV Continuous <Continuous>  lidocaine   4% Patch 1 Patch Transdermal daily  megestrol 20 milliGRAM(s) Oral daily  metoprolol tartrate 12.5 milliGRAM(s) Oral two times a day  OLANZapine 2.5 milliGRAM(s) Oral daily  pantoprazole  Injectable 40 milliGRAM(s) IV Push every 12 hours    MEDICATIONS  (PRN):  HYDROmorphone  Injectable 0.5 milliGRAM(s) IV Push every 4 hours PRN Severe Pain (7 - 10)  oxycodone    5 mG/acetaminophen 325 mG 1 Tablet(s) Oral every 4 hours PRN Severe Pain (7 - 10)    < from: CT Abdomen and Pelvis w/ IV Cont (04.01.25 @ 21:57) >  IMPRESSION:    Nonobstructive gas pattern. No pneumoperitoneum.    Submucosal fat within the descending colon is noted which may be related   to previous episodes of colitis. (601-92). Similar changes within the   ascending colon also present    3.8 cm right common iliac artery aneurysm    4.5 cm hiatal hernia    < end of copied text >    < from: CT Chest w/ IV Cont (04.26.25 @ 11:19) >  IMPRESSION:  1.  No definite evidence of thoracic metastatic disease.  2.  Small bilateral pleural effusions.  3.  Marked circumferential thickening of the distal esophagus/hiatal   hernia could be related to provided history of suspected esophageal   cancer versus esophagitis.      < end of copied text >    < from: MR Head w/wo IV Cont (05.14.25 @ 09:48) >  IMPRESSION:    1.  No acute infarct or intracranial hemorrhage.    2.  Mild smooth diffuse dural thickening over the cerebral convexities.   This finding is nonspecific and can reflect intracranial hypotension or   recent spinal procedures. In the setting of known neoplasm cannot exclude   dural metastatic disease.    3.  Heterogeneous marrow signal of the calvarium, nonspecific.    --- End of Report ---    < end of copied text >    s/p EGD 4/25  A friable circumferential mass seen from the GE junction at 38cm to 28 cm of mid esophagus. Multiple biopsies were obtained. NEX powder was sprayed all over the mass to achieve adequate hemostasis. Another 5mm lesion was noted at 20cm from incisors in the proximal esophagus.  Erosions in the stomach compatible with erosive gastritis. (Biopsy).  A pulsating ulcerated lesion was noted in the fundus. Biopsy was not obtained.  Normal mucosa in the whole examined duodenum. (Biopsy).  s/p Colonoscopy 4/25:   Multiple semi-pedunculated polyps were seen in the left colon. A 2cm polyp was seen in the descending colon, biopsy was obtained. Polyps were not removed in the setting of poor prep.  External hemorrhoids.  Solid stool noted in the whole colon. Cecum was not reached    s/p 4/25/2025 EGD for dysphagia pathology shows- Signet ring cell carcinoma.      HPI:  59y male with PMHx of HTN, hiatal hernia, HLD, CAD s/p CABG in Jan 2024, paroxysmal A. fib not on AC, former smoker (30 pack years, quit 1yr ago), recently diagnosed with colitis and esophageal adenocarcinoma s/p stent placement who presents for weakness. The patient was recently discharged from [facility] on 5/6 , and has been progressively deteriorating. States he has been unable to tolerate p.o. and has been unable to walk over the past few days. Wife at bedside reports that he has not been able to follow-up with oncology due to the weakness.  On ROS the patient also states he has been having black tarry stools that started 1 day prior to his EGD. He had 3 black BMs today. He denies any mucosal bleeding or blood in the urine.  He denies any fevers, chills, chest pain, shortness of breath, palpitations, headache, vision changes, nausea, vomiting, abdominal pain, hematuria, falls.  He is supposed to have PET scan tomorrow and follow with Dr. Sanchez in 2 days.    Initially Patient was admitted on 4/18 with abdominal pain, poor PO intake 2/2 dysphagia initially to solids now also to liquids, and weight loss. CT chest showed thickening of the distal esophagus concerning for stricture ? malignancy. Since being here in the hospital the patient has had workup by GI, went for EGD which showed esophageal mass 28-38cm from GE junction as well as proximal esophageal mass and ulcerated pulsatile mass in gastric fundus. Biopsy came back positive for adenocarcinoma with signet cell features. Patient has otherwise been stable, on CLD and ensure supplementation.     PAST MEDICAL & SURGICAL HISTORY:  HTN (hypertension)  Hiatal hernia  HLD (hyperlipidemia)  CAD (coronary artery disease)  Paroxysmal atrial fibrillation  History of colitis  S/P CABG (coronary artery bypass graft)      Allergies  penicillins (Unknown)  Intolerances    Home Medications:  clopidogrel 75 mg oral tablet: 1 tab(s) orally once a day (13 May 2025 01:36)  Lipitor 80 mg oral tablet: 1 tab(s) orally once a day (13 May 2025 01:36)  Metoprolol Tartrate 25 mg oral tablet: 0.5 tab(s) orally 2 times a day (13 May 2025 01:36)  Multiple Vitamins oral tablet, chewable: 1 tab(s) orally once a day (13 May 2025 01:36)  Pepcid 20 mg oral tablet: 1 tab(s) orally once a day (13 May 2025 01:36)    MEDICATIONS  (STANDING):  atorvastatin 80 milliGRAM(s) Oral at bedtime  dextrose 5% + lactated ringers. 1000 milliLiter(s) (75 mL/Hr) IV Continuous <Continuous>  lidocaine   4% Patch 1 Patch Transdermal daily  megestrol 20 milliGRAM(s) Oral daily  metoprolol tartrate 12.5 milliGRAM(s) Oral two times a day  OLANZapine 2.5 milliGRAM(s) Oral daily  pantoprazole  Injectable 40 milliGRAM(s) IV Push every 12 hours    MEDICATIONS  (PRN):  HYDROmorphone  Injectable 0.5 milliGRAM(s) IV Push every 4 hours PRN Severe Pain (7 - 10)  oxycodone    5 mG/acetaminophen 325 mG 1 Tablet(s) Oral every 4 hours PRN Severe Pain (7 - 10)    < from: CT Abdomen and Pelvis w/ IV Cont (04.01.25 @ 21:57) >  IMPRESSION:    Nonobstructive gas pattern. No pneumoperitoneum.    Submucosal fat within the descending colon is noted which may be related   to previous episodes of colitis. (601-90). Similar changes within the   ascending colon also present    3.8 cm right common iliac artery aneurysm    4.5 cm hiatal hernia    < end of copied text >    < from: CT Chest w/ IV Cont (04.26.25 @ 11:19) >  IMPRESSION:  1.  No definite evidence of thoracic metastatic disease.  2.  Small bilateral pleural effusions.  3.  Marked circumferential thickening of the distal esophagus/hiatal   hernia could be related to provided history of suspected esophageal   cancer versus esophagitis.      < end of copied text >    < from: MR Head w/wo IV Cont (05.14.25 @ 09:48) >  IMPRESSION:    1.  No acute infarct or intracranial hemorrhage.    2.  Mild smooth diffuse dural thickening over the cerebral convexities.   This finding is nonspecific and can reflect intracranial hypotension or   recent spinal procedures. In the setting of known neoplasm cannot exclude   dural metastatic disease.    3.  Heterogeneous marrow signal of the calvarium, nonspecific.    --- End of Report ---    < end of copied text >    s/p EGD 4/25  A friable circumferential mass seen from the GE junction at 38cm to 28 cm of mid esophagus. Multiple biopsies were obtained. NEX powder was sprayed all over the mass to achieve adequate hemostasis. Another 5mm lesion was noted at 20cm from incisors in the proximal esophagus.  Erosions in the stomach compatible with erosive gastritis. (Biopsy).  A pulsating ulcerated lesion was noted in the fundus. Biopsy was not obtained.  Normal mucosa in the whole examined duodenum. (Biopsy).  s/p Colonoscopy 4/25:   Multiple semi-pedunculated polyps were seen in the left colon. A 2cm polyp was seen in the descending colon, biopsy was obtained. Polyps were not removed in the setting of poor prep.  External hemorrhoids.  Solid stool noted in the whole colon. Cecum was not reached    s/p 4/25/2025 EGD Esophageal mass biopsy   pathology shows- adenocarcinoma with Signet ring cell features      Date Reported: 5/8/2025   Date Collected: 5/1/2025   Date Received: 5/5/2025   Source: Cardia nodule biopsy and esophageal nodule- Signet ring cell carcinoma.

## 2025-05-14 NOTE — PHYSICAL THERAPY INITIAL EVALUATION ADULT - PERTINENT HX OF CURRENT PROBLEM, REHAB EVAL
Pt is a History of Present Illness:   59y male with PMHx of HTN, hiatal hernia, HLD, CAD s/p CABG in Jan 2024, paroxysmal A. fib not on AC, former smoker (30 pack years, quit 1yr ago), recently diagnosed with colitis and esophageal adenocarcinoma s/p stent placement who presents for weakness. The patient was recently discharged from [facility] on 5/6 , and has been progressively deteriorating. States he has been unable to tolerate p.o. and has been unable to walk over the past few days. Wife at bedside reports that he has not been able to follow-up with oncology due to the weakness.  On ROS the patient also states he has been having black tarry stools that started 1 day prior to his EGD. He had 3 black BMs today. He denies any mucosal bleeding or blood in the urine.  He denies any fevers, chills, chest pain, shortness of breath, palpitations, headache, vision changes, nausea, vomiting, abdominal pain, hematuria, falls.  He is supposed to have PET scan tomorrow and follow with Dr. Sanchez in 2 days.

## 2025-05-14 NOTE — DIETITIAN INITIAL EVALUATION ADULT - PERTINENT LABORATORY DATA
05-14    136  |  104  |  12  ----------------------------<  108[H]  4.1   |  21  |  0.5[L]    Ca    8.9      14 May 2025 06:05  Mg     2.0     05-14    TPro  5.7[L]  /  Alb  3.0[L]  /  TBili  1.2  /  DBili  x   /  AST  31  /  ALT  13  /  AlkPhos  239[H]  05-14

## 2025-05-14 NOTE — PHYSICAL THERAPY INITIAL EVALUATION ADULT - ADDITIONAL COMMENTS
Pt lives with spouse in pvt home, has steps to enter. Pt was ambulatory with RW but was having progressive weakness. Pt required assistance for ambulation and ADLs. + shower chair, + elevated toilet seat.

## 2025-05-14 NOTE — PROGRESS NOTE ADULT - ASSESSMENT
59y male with PMHx of HTN, hiatal hernia, HLD, CAD s/p CABG in Jan 2024, paroxysmal A. fib not on AC, former smoker (30 pack years, quit 1yr ago), recently diagnosed with colitis and esophageal adenocarcinoma s/p stent placement who presents for weakness. The patient was recently discharged from [facility] on 5/6 , and has been progressively deteriorating. Found to have hgb 7.6 lower than last admission reported melena, thrombocytopenia w/ elevated INR/PTT.     #Reported Melena possibly due to Upper GI Bleed likely Secondary to  Malignancy   #Thrombocytopenia, elevated INR/PTT r/o DIC  #Recently diagnosed Esophageal adenocarcinoma   #Acute Drop in Hemoglobin  - EGD 4/25: A friable circumferential mass seen from the GE junction at 38cm to 28 cm of mid esophagus. Multiple biopsies were obtained. Oozing of blood was noted from the whole mass.  - CBC BID  - c/w pantoprazole 40mg IV BID  - If any unstable bleed, please check CT angio AP IC STAT and call GI/IR stat  - GI: was planned for EGD 5/12 but given L sided drope, deferred, possible peg and stent removal   - HEME/ONC: appetite stimulant, PEG placement?    #Weakness likely multifactorial 2/2 malignancy, anemia  #Decreased PO intake 2/2 esophageal adenocarcinoma  - Weakness, unable to walk/stand  - States the esophageal stent has not helped with PO intake.  - Started IV D5/LR 75cc/hr  - Seen by PT/OT last admisison; recommended home PT    #L sided fascial drope  - Doesnot know if chronic.   - no focal defecits except b/l hip weakness   - F/U CTH abnd MRI Brain w/wo, c/s neuro if any changes     #chronic lower back pain   - xray lumbar spine: mild disc space narrowing and degenerative changes throughout the lumbar spine. Mild degenerative change of the bilateral sacroiliac joints.  - c/w Dilaudid 0.5mg Q4, Oxycodone 5mg Q4 PRN    #3.8 cm wide R iliac artery aneurysm- stable  - vascular o/p- surgery was scheduled with Dr. Tavera     #HTN  #CAD s/p CABG in Jan 2024  #Paroxysmal A.fib  - c/w home meds  - Hold plavix    #DVT PPX: SCD  #GI PPX: PPI BID  #Diet: NPO for now  #Code Status: Full  #Activity Order: IAT   59y male with PMHx of HTN, hiatal hernia, HLD, CAD s/p CABG in Jan 2024, paroxysmal A. fib not on AC, former smoker (30 pack years, quit 1yr ago), recently diagnosed with colitis and esophageal adenocarcinoma s/p stent placement who presents for weakness. The patient was recently discharged from [facility] on 5/6 , and has been progressively deteriorating. Found to have hgb 7.6 lower than last admission reported melena, thrombocytopenia w/ elevated INR/PTT.     #Reported Melena possibly due to Upper GI Bleed likely Secondary to  Malignancy   #Thrombocytopenia, elevated INR/PTT r/o DIC  #Recently diagnosed Esophageal adenocarcinoma   #Acute Drop in Hemoglobin  - EGD 4/25: A friable circumferential mass seen from the GE junction at 38cm to 28 cm of mid esophagus. Multiple biopsies were obtained. Oozing of blood was noted from the whole mass.  - CBC BID  - c/w pantoprazole 40mg IV BID  - If any unstable bleed, please check CT angio AP IC STAT and call GI/IR stat  - GI: was planned for EGD 5/12 but given L sided drope, deferred, possible peg and stent removal   - HEME/ONC: appetite stimulant, PEG placement?  - CTH -ve, MRI -ve for stroke concerning for possible mets     #Weakness likely multifactorial 2/2 malignancy, anemia  #Decreased PO intake 2/2 esophageal adenocarcinoma  - Weakness, unable to walk/stand  - States the esophageal stent has not helped with PO intake.  - Started IV D5/LR 75cc/hr  - Seen by PT/OT last admisison; recommended home PT    #L sided fascial drope  - Doesnot know if chronic.   - no focal defecits except b/l hip weakness   - F/U CTH abnd MRI Brain w/wo, c/s neuro if any changes     #chronic lower back pain   - xray lumbar spine: mild disc space narrowing and degenerative changes throughout the lumbar spine. Mild degenerative change of the bilateral sacroiliac joints.  - c/w Dilaudid 0.5mg Q4, Oxycodone 5mg Q4 PRN    #3.8 cm wide R iliac artery aneurysm- stable  - vascular o/p- surgery was scheduled with Dr. Tavera     #HTN  #CAD s/p CABG in Jan 2024  #Paroxysmal A.fib  - c/w home meds  - Hold plavix    #DVT PPX: SCD  #GI PPX: PPI BID  #Diet: NPO for now  #Code Status: Full  #Activity Order: IAT   59y male with PMHx of HTN, hiatal hernia, HLD, CAD s/p CABG in Jan 2024, paroxysmal A. fib not on AC, former smoker (30 pack years, quit 1yr ago), recently diagnosed with colitis and esophageal adenocarcinoma s/p stent placement who presents for weakness. The patient was recently discharged from [facility] on 5/6 , and has been progressively deteriorating. Found to have hgb 7.6 lower than last admission reported melena, thrombocytopenia w/ elevated INR/PTT.     #Reported Melena possibly due to Upper GI Bleed likely Secondary to  Malignancy   #Thrombocytopenia, elevated INR/PTT r/o DIC  #Recently diagnosed Esophageal adenocarcinoma   #Acute Drop in Hemoglobin  - EGD 4/25: A friable circumferential mass seen from the GE junction at 38cm to 28 cm of mid esophagus. Multiple biopsies were obtained. Oozing of blood was noted from the whole mass.  - CBC BID  - c/w pantoprazole 40mg IV BID  - If any unstable bleed, please check CT angio AP IC STAT and call GI/IR stat  - GI: was planned for EGD 5/12 but given L sided drope, deferred, possible peg and stent removal   - HEME/ONC: appetite stimulant, PEG placement?  - CTH -ve, MRI -ve for stroke Mild smooth diffuse dural thickening over the cerebral convexities, cant r/o mets   - f/u pain management for back pain   - f/u  rad/onc c/s   - f/u GI for EGD    #Weakness likely multifactorial 2/2 malignancy, anemia  #Decreased PO intake 2/2 esophageal adenocarcinoma  - Weakness, unable to walk/stand  - States the esophageal stent has not helped with PO intake.  - Started IV D5/LR 75cc/hr  - Seen by PT/OT last admisison; recommended home PT    #L sided fascial drope  - Doesnot know if chronic.   - no focal defecits except b/l hip weakness   - F/U CTH abnd MRI Brain w/wo, c/s neuro if any changes     #chronic lower back pain   - xray lumbar spine: mild disc space narrowing and degenerative changes throughout the lumbar spine. Mild degenerative change of the bilateral sacroiliac joints.  - c/w Dilaudid 0.5mg Q4, Oxycodone 5mg Q4 PRN    #3.8 cm wide R iliac artery aneurysm- stable  - vascular o/p- surgery was scheduled with Dr. Tavera     #HTN  #CAD s/p CABG in Jan 2024  #Paroxysmal A.fib  - c/w home meds  - Hold plavix    #DVT PPX: SCD  #GI PPX: PPI BID  #Diet: NPO for now  #Code Status: Full  #Activity Order: IAT  DISPO: pending GI

## 2025-05-14 NOTE — DIETITIAN INITIAL EVALUATION ADULT - PERSON TAUGHT/METHOD
Discussed diet order and potential diet progression./verbal instruction/patient instructed/spouse instructed

## 2025-05-14 NOTE — DIETITIAN INITIAL EVALUATION ADULT - PERTINENT MEDS FT
MEDICATIONS  (STANDING):  atorvastatin 80 milliGRAM(s) Oral at bedtime  dextrose 5% + lactated ringers. 1000 milliLiter(s) (75 mL/Hr) IV Continuous <Continuous>  lidocaine   4% Patch 1 Patch Transdermal daily  megestrol 20 milliGRAM(s) Oral daily  metoprolol tartrate 12.5 milliGRAM(s) Oral two times a day  OLANZapine 2.5 milliGRAM(s) Oral daily  pantoprazole  Injectable 40 milliGRAM(s) IV Push every 12 hours    MEDICATIONS  (PRN):  HYDROmorphone  Injectable 0.5 milliGRAM(s) IV Push every 4 hours PRN Severe Pain (7 - 10)  oxycodone    5 mG/acetaminophen 325 mG 1 Tablet(s) Oral every 4 hours PRN Severe Pain (7 - 10)

## 2025-05-14 NOTE — DIETITIAN INITIAL EVALUATION ADULT - OTHER INFO
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 Patient had esophageal stent placed during previous admission. Patient presents for weakness, unable to tolerate PO intake and unable to walk for the past few days. Patient had 3 black BM during current admission. Found to have Hgb of 7.6, which was lower than last admission.     Patient noted with anemia, hypochloremia, elevated ALP.

## 2025-05-15 DIAGNOSIS — R63.0 ANOREXIA: ICD-10-CM

## 2025-05-15 DIAGNOSIS — G89.3 NEOPLASM RELATED PAIN (ACUTE) (CHRONIC): ICD-10-CM

## 2025-05-15 DIAGNOSIS — Z71.89 OTHER SPECIFIED COUNSELING: ICD-10-CM

## 2025-05-15 DIAGNOSIS — Z51.5 ENCOUNTER FOR PALLIATIVE CARE: ICD-10-CM

## 2025-05-15 DIAGNOSIS — C15.9 MALIGNANT NEOPLASM OF ESOPHAGUS, UNSPECIFIED: ICD-10-CM

## 2025-05-15 DIAGNOSIS — R29.898 OTHER SYMPTOMS AND SIGNS INVOLVING THE MUSCULOSKELETAL SYSTEM: ICD-10-CM

## 2025-05-15 LAB
ALBUMIN SERPL ELPH-MCNC: 3.1 G/DL — LOW (ref 3.5–5.2)
ALBUMIN SERPL ELPH-MCNC: 3.2 G/DL — LOW (ref 3.5–5.2)
ALP SERPL-CCNC: 329 U/L — HIGH (ref 30–115)
ALP SERPL-CCNC: 337 U/L — HIGH (ref 30–115)
ALT FLD-CCNC: 14 U/L — SIGNIFICANT CHANGE UP (ref 0–41)
ALT FLD-CCNC: 16 U/L — SIGNIFICANT CHANGE UP (ref 0–41)
ANION GAP SERPL CALC-SCNC: 11 MMOL/L — SIGNIFICANT CHANGE UP (ref 7–14)
APTT BLD: 32.3 SEC — SIGNIFICANT CHANGE UP (ref 27–39.2)
AST SERPL-CCNC: 37 U/L — SIGNIFICANT CHANGE UP (ref 0–41)
AST SERPL-CCNC: 38 U/L — SIGNIFICANT CHANGE UP (ref 0–41)
BASOPHILS # BLD AUTO: 0.04 K/UL — SIGNIFICANT CHANGE UP (ref 0–0.2)
BASOPHILS NFR BLD AUTO: 0.7 % — SIGNIFICANT CHANGE UP (ref 0–1)
BILIRUB DIRECT SERPL-MCNC: 0.7 MG/DL — HIGH (ref 0–0.3)
BILIRUB INDIRECT FLD-MCNC: 0.9 MG/DL — SIGNIFICANT CHANGE UP (ref 0.2–1.2)
BILIRUB SERPL-MCNC: 1.3 MG/DL — HIGH (ref 0.2–1.2)
BILIRUB SERPL-MCNC: 1.6 MG/DL — HIGH (ref 0.2–1.2)
BUN SERPL-MCNC: 14 MG/DL — SIGNIFICANT CHANGE UP (ref 10–20)
CALCIUM SERPL-MCNC: 8.8 MG/DL — SIGNIFICANT CHANGE UP (ref 8.4–10.5)
CHLORIDE SERPL-SCNC: 103 MMOL/L — SIGNIFICANT CHANGE UP (ref 98–110)
CO2 SERPL-SCNC: 21 MMOL/L — SIGNIFICANT CHANGE UP (ref 17–32)
CREAT SERPL-MCNC: 0.7 MG/DL — SIGNIFICANT CHANGE UP (ref 0.7–1.5)
D DIMER BLD IA.RAPID-MCNC: 8818 NG/ML DDU — HIGH
DIR ANTIGLOB POLYSPECIFIC INTERPRETATION: SIGNIFICANT CHANGE UP
EGFR: 105 ML/MIN/1.73M2 — SIGNIFICANT CHANGE UP
EGFR: 105 ML/MIN/1.73M2 — SIGNIFICANT CHANGE UP
EOSINOPHIL # BLD AUTO: 0.06 K/UL — SIGNIFICANT CHANGE UP (ref 0–0.7)
EOSINOPHIL NFR BLD AUTO: 1 % — SIGNIFICANT CHANGE UP (ref 0–8)
FIBRINOGEN PPP-MCNC: 383 MG/DL — SIGNIFICANT CHANGE UP (ref 200–435)
FSP PPP-MCNC: >=5 <20 UG/ML
GLUCOSE SERPL-MCNC: 116 MG/DL — HIGH (ref 70–99)
HCT VFR BLD CALC: 24 % — LOW (ref 42–52)
HGB BLD-MCNC: 7.9 G/DL — LOW (ref 14–18)
IMM GRANULOCYTES NFR BLD AUTO: 10.9 % — HIGH (ref 0.1–0.3)
INR BLD: 1.24 RATIO — SIGNIFICANT CHANGE UP (ref 0.65–1.3)
LYMPHOCYTES # BLD AUTO: 1.07 K/UL — LOW (ref 1.2–3.4)
LYMPHOCYTES # BLD AUTO: 17.4 % — LOW (ref 20.5–51.1)
MAGNESIUM SERPL-MCNC: 2.1 MG/DL — SIGNIFICANT CHANGE UP (ref 1.8–2.4)
MCHC RBC-ENTMCNC: 26 PG — LOW (ref 27–31)
MCHC RBC-ENTMCNC: 32.9 G/DL — SIGNIFICANT CHANGE UP (ref 32–37)
MCV RBC AUTO: 78.9 FL — LOW (ref 80–94)
MONOCYTES # BLD AUTO: 0.66 K/UL — HIGH (ref 0.1–0.6)
MONOCYTES NFR BLD AUTO: 10.7 % — HIGH (ref 1.7–9.3)
NEUTROPHILS # BLD AUTO: 3.65 K/UL — SIGNIFICANT CHANGE UP (ref 1.4–6.5)
NEUTROPHILS NFR BLD AUTO: 59.3 % — SIGNIFICANT CHANGE UP (ref 42.2–75.2)
NRBC BLD AUTO-RTO: 2 /100 WBCS — HIGH (ref 0–0)
PLATELET # BLD AUTO: 50 K/UL — LOW (ref 130–400)
PMV BLD: 9.4 FL — SIGNIFICANT CHANGE UP (ref 7.4–10.4)
POTASSIUM SERPL-MCNC: 4.1 MMOL/L — SIGNIFICANT CHANGE UP (ref 3.5–5)
POTASSIUM SERPL-SCNC: 4.1 MMOL/L — SIGNIFICANT CHANGE UP (ref 3.5–5)
PROT SERPL-MCNC: 5.3 G/DL — LOW (ref 6–8)
PROT SERPL-MCNC: 5.4 G/DL — LOW (ref 6–8)
PROTHROM AB SERPL-ACNC: 14.7 SEC — HIGH (ref 9.95–12.87)
PSA FREE FLD-MCNC: 0.13 NG/ML — SIGNIFICANT CHANGE UP
PSA FREE FLD-MCNC: 25 % — SIGNIFICANT CHANGE UP
PSA SERPL-MCNC: 0.53 NG/ML — SIGNIFICANT CHANGE UP (ref 0–4)
RBC # BLD: 3.04 M/UL — LOW (ref 4.7–6.1)
RBC # FLD: 16.5 % — HIGH (ref 11.5–14.5)
SODIUM SERPL-SCNC: 135 MMOL/L — SIGNIFICANT CHANGE UP (ref 135–146)
WBC # BLD: 6.15 K/UL — SIGNIFICANT CHANGE UP (ref 4.8–10.8)
WBC # FLD AUTO: 6.15 K/UL — SIGNIFICANT CHANGE UP (ref 4.8–10.8)

## 2025-05-15 PROCEDURE — 99223 1ST HOSP IP/OBS HIGH 75: CPT

## 2025-05-15 PROCEDURE — 99233 SBSQ HOSP IP/OBS HIGH 50: CPT

## 2025-05-15 PROCEDURE — 99497 ADVNCD CARE PLAN 30 MIN: CPT | Mod: 25

## 2025-05-15 RX ORDER — POLYETHYLENE GLYCOL 3350 17 G/17G
17 POWDER, FOR SOLUTION ORAL DAILY
Refills: 0 | Status: DISCONTINUED | OUTPATIENT
Start: 2025-05-15 | End: 2025-05-25

## 2025-05-15 RX ORDER — HEPARIN SODIUM 1000 [USP'U]/ML
5000 INJECTION INTRAVENOUS; SUBCUTANEOUS EVERY 12 HOURS
Refills: 0 | Status: DISCONTINUED | OUTPATIENT
Start: 2025-05-15 | End: 2025-05-17

## 2025-05-15 RX ORDER — OXYCODONE HYDROCHLORIDE 30 MG/1
10 TABLET ORAL EVERY 4 HOURS
Refills: 0 | Status: DISCONTINUED | OUTPATIENT
Start: 2025-05-15 | End: 2025-05-16

## 2025-05-15 RX ORDER — HYDROMORPHONE/SOD CHLOR,ISO/PF 2 MG/10 ML
1 SYRINGE (ML) INJECTION
Refills: 0 | Status: DISCONTINUED | OUTPATIENT
Start: 2025-05-15 | End: 2025-05-16

## 2025-05-15 RX ADMIN — OXYCODONE HYDROCHLORIDE 10 MILLIGRAM(S): 30 TABLET ORAL at 21:45

## 2025-05-15 RX ADMIN — OLANZAPINE 2.5 MILLIGRAM(S): 10 TABLET ORAL at 11:59

## 2025-05-15 RX ADMIN — SODIUM CHLORIDE 75 MILLILITER(S): 9 INJECTION, SOLUTION INTRAVENOUS at 14:35

## 2025-05-15 RX ADMIN — HEPARIN SODIUM 5000 UNIT(S): 1000 INJECTION INTRAVENOUS; SUBCUTANEOUS at 17:19

## 2025-05-15 RX ADMIN — ATORVASTATIN CALCIUM 80 MILLIGRAM(S): 80 TABLET, FILM COATED ORAL at 21:14

## 2025-05-15 RX ADMIN — METOPROLOL SUCCINATE 12.5 MILLIGRAM(S): 50 TABLET, EXTENDED RELEASE ORAL at 17:19

## 2025-05-15 RX ADMIN — Medication 20 MILLIGRAM(S): at 12:00

## 2025-05-15 RX ADMIN — SODIUM CHLORIDE 75 MILLILITER(S): 9 INJECTION, SOLUTION INTRAVENOUS at 21:17

## 2025-05-15 RX ADMIN — Medication 40 MILLIGRAM(S): at 17:19

## 2025-05-15 RX ADMIN — Medication 40 MILLIGRAM(S): at 05:21

## 2025-05-15 RX ADMIN — OXYCODONE HYDROCHLORIDE 10 MILLIGRAM(S): 30 TABLET ORAL at 21:15

## 2025-05-15 RX ADMIN — METOPROLOL SUCCINATE 12.5 MILLIGRAM(S): 50 TABLET, EXTENDED RELEASE ORAL at 05:21

## 2025-05-15 NOTE — OCCUPATIONAL THERAPY INITIAL EVALUATION ADULT - PHYSICAL ASSIST/NONPHYSICAL ASSIST:DRESS UPPER BODY, OT EVAL
set-up required/verbal cues/1 person assist
Assistance OOB with selected safe patient handling equipment/Assistance with ambulation/Communicate Fall Risk and Risk Factors to all staff, patient, and family/Reinforce activity limits and safety measures with patient and family/Visual Cue: Yellow wristband/Bed in lowest position, wheels locked, appropriate side rails in place/Call bell, personal items and telephone in reach/Instruct patient to call for assistance before getting out of bed or chair/Non-slip footwear when patient is out of bed/[place] to call system/Physically safe environment - no spills, clutter or unnecessary equipment/Purposeful Proactive Rounding/Room/bathroom lighting operational, light cord in reach

## 2025-05-15 NOTE — PROGRESS NOTE PEDS - SUBJECTIVE AND OBJECTIVE BOX
Gastroenterology progress note:     Patient is a 59y old  Male who presents with a chief complaint of Progressive weakness (15 May 2025 06:49)       Admitted on: 05-12-25    We are following the patient for: esophogeal cancer       Interval History:    No acute events overnight.       PAST MEDICAL & SURGICAL HISTORY:  HTN (hypertension)      Hiatal hernia      HLD (hyperlipidemia)      CAD (coronary artery disease)      Paroxysmal atrial fibrillation      History of colitis      S/P CABG (coronary artery bypass graft)          MEDICATIONS  (STANDING):  atorvastatin 80 milliGRAM(s) Oral at bedtime  dextrose 5% + lactated ringers. 1000 milliLiter(s) (75 mL/Hr) IV Continuous <Continuous>  lidocaine   4% Patch 1 Patch Transdermal daily  megestrol 20 milliGRAM(s) Oral daily  metoprolol tartrate 12.5 milliGRAM(s) Oral two times a day  OLANZapine 2.5 milliGRAM(s) Oral daily  pantoprazole  Injectable 40 milliGRAM(s) IV Push every 12 hours    MEDICATIONS  (PRN):  HYDROmorphone  Injectable 0.5 milliGRAM(s) IV Push every 4 hours PRN Severe Pain (7 - 10)  oxycodone    5 mG/acetaminophen 325 mG 1 Tablet(s) Oral every 4 hours PRN Severe Pain (7 - 10)      Allergies  penicillins (Unknown)      Review of Systems:   Cardiovascular:  No Chest Pain, No Palpitations  Respiratory:  No Cough, No Dyspnea  Gastrointestinal:  As described in HPI  Skin:  No Skin Lesions, No Jaundice  Neuro:  No Syncope, No Dizziness    Physical Examination:  T(C): 37.1 (05-15-25 @ 04:42), Max: 37.8 (05-14-25 @ 20:38)  HR: 95 (05-15-25 @ 04:42) (84 - 95)  BP: 117/77 (05-15-25 @ 04:42) (114/69 - 140/67)  RR: 18 (05-15-25 @ 04:42) (18 - 19)  SpO2: 97% (05-15-25 @ 04:42) (97% - 99%)  Weight (kg): 84.5 (05-14-25 @ 14:45)    05-14-25 @ 07:01  -  05-15-25 @ 07:00  --------------------------------------------------------  IN: 0 mL / OUT: 700 mL / NET: -700 mL    05-15-25 @ 07:01  -  05-15-25 @ 10:27  --------------------------------------------------------  IN: 0 mL / OUT: 800 mL / NET: -800 mL        GENERAL: AAOx3, no acute distress.  HEAD:  Atraumatic, Normocephalic  EYES: conjunctiva and sclera clear  NECK: Supple, no JVD or thyromegaly  CHEST/LUNG: Clear to auscultation bilaterally; No wheeze, rhonchi, or rales  HEART: Regular rate and rhythm; normal S1, S2, No murmurs.  ABDOMEN: Soft, nontender, nondistended; peg in place, looks clean   NEUROLOGY: No asterixis or tremor.   SKIN: Intact, no jaundice     Data:                        7.9    6.15  )-----------( 50       ( 15 May 2025 05:50 )             24.0     Hgb trend:  7.9  05-15-25 @ 05:50  7.8  05-14-25 @ 20:00  8.0  05-14-25 @ 06:05  7.9  05-13-25 @ 21:35  7.1  05-13-25 @ 06:49  7.6  05-12-25 @ 16:48        05-15    135  |  103  |  14  ----------------------------<  116[H]  4.1   |  21  |  0.7    Ca    8.8      15 May 2025 05:50  Mg     2.1     05-15    TPro  5.4[L]  /  Alb  3.1[L]  /  TBili  1.3[H]  /  DBili  x   /  AST  37  /  ALT  14  /  AlkPhos  337[H]  05-15    Liver panel trend:  TBili 1.3   /   AST 37   /   ALT 14   /   AlkP 337   /   Tptn 5.4   /   Alb 3.1    /   DBili --      05-15  TBili 1.2   /   AST 31   /   ALT 13   /   AlkP 239   /   Tptn 5.7   /   Alb 3.0    /   DBili --      05-14  TBili 0.8   /   AST 26   /   ALT 13   /   AlkP 231   /   Tptn 5.6   /   Alb 3.1    /   DBili --      05-13  TBili 0.9   /   AST 30   /   ALT 16   /   AlkP 273   /   Tptn 6.1   /   Alb 3.4    /   DBili --      05-12      PT/INR - ( 15 May 2025 05:50 )   PT: 14.70 sec;   INR: 1.24 ratio         PTT - ( 15 May 2025 05:50 )  PTT:32.3 sec    Urinalysis with Rflx Culture (collected 12 May 2025 17:55)        Gastroenterology progress note:     Patient is a 59y old  Male who presents with a chief complaint of Progressive weakness (15 May 2025 06:49)       Admitted on: 05-12-25    We are following the patient for: esophogeal cancer       Interval History:    No acute events overnight.       PAST MEDICAL & SURGICAL HISTORY:  HTN (hypertension)  Hiatal hernia  HLD (hyperlipidemia)  CAD (coronary artery disease)  Paroxysmal atrial fibrillation  History of colitis  S/P CABG (coronary artery bypass graft)      MEDICATIONS  (STANDING):  atorvastatin 80 milliGRAM(s) Oral at bedtime  dextrose 5% + lactated ringers. 1000 milliLiter(s) (75 mL/Hr) IV Continuous <Continuous>  lidocaine   4% Patch 1 Patch Transdermal daily  megestrol 20 milliGRAM(s) Oral daily  metoprolol tartrate 12.5 milliGRAM(s) Oral two times a day  OLANZapine 2.5 milliGRAM(s) Oral daily  pantoprazole  Injectable 40 milliGRAM(s) IV Push every 12 hours    MEDICATIONS  (PRN):  HYDROmorphone  Injectable 0.5 milliGRAM(s) IV Push every 4 hours PRN Severe Pain (7 - 10)  oxycodone    5 mG/acetaminophen 325 mG 1 Tablet(s) Oral every 4 hours PRN Severe Pain (7 - 10)      Allergies  penicillins (Unknown)      Review of Systems:   Cardiovascular:  No Chest Pain, No Palpitations  Respiratory:  No Cough, No Dyspnea  Gastrointestinal:  As described in HPI  Skin:  No Skin Lesions, No Jaundice  Neuro:  No Syncope, No Dizziness    Physical Examination:  T(C): 37.1 (05-15-25 @ 04:42), Max: 37.8 (05-14-25 @ 20:38)  HR: 95 (05-15-25 @ 04:42) (84 - 95)  BP: 117/77 (05-15-25 @ 04:42) (114/69 - 140/67)  RR: 18 (05-15-25 @ 04:42) (18 - 19)  SpO2: 97% (05-15-25 @ 04:42) (97% - 99%)  Weight (kg): 84.5 (05-14-25 @ 14:45)    05-14-25 @ 07:01  -  05-15-25 @ 07:00  --------------------------------------------------------  IN: 0 mL / OUT: 700 mL / NET: -700 mL    05-15-25 @ 07:01  -  05-15-25 @ 10:27  --------------------------------------------------------  IN: 0 mL / OUT: 800 mL / NET: -800 mL        GENERAL: AAOx3, no acute distress.  HEAD:  Atraumatic, Normocephalic  EYES: conjunctiva and sclera clear  NECK: Supple, no JVD or thyromegaly  CHEST/LUNG: Clear to auscultation bilaterally; No wheeze, rhonchi, or rales  HEART: Regular rate and rhythm; normal S1, S2, No murmurs.  ABDOMEN: Soft, nontender, nondistended; peg in place, looks clean   NEUROLOGY: No asterixis or tremor.   SKIN: Intact, no jaundice     Data:                        7.9    6.15  )-----------( 50       ( 15 May 2025 05:50 )             24.0     Hgb trend:  7.9  05-15-25 @ 05:50  7.8  05-14-25 @ 20:00  8.0  05-14-25 @ 06:05  7.9  05-13-25 @ 21:35  7.1  05-13-25 @ 06:49  7.6  05-12-25 @ 16:48        05-15    135  |  103  |  14  ----------------------------<  116[H]  4.1   |  21  |  0.7    Ca    8.8      15 May 2025 05:50  Mg     2.1     05-15    TPro  5.4[L]  /  Alb  3.1[L]  /  TBili  1.3[H]  /  DBili  x   /  AST  37  /  ALT  14  /  AlkPhos  337[H]  05-15    Liver panel trend:  TBili 1.3   /   AST 37   /   ALT 14   /   AlkP 337   /   Tptn 5.4   /   Alb 3.1    /   DBili --      05-15  TBili 1.2   /   AST 31   /   ALT 13   /   AlkP 239   /   Tptn 5.7   /   Alb 3.0    /   DBili --      05-14  TBili 0.8   /   AST 26   /   ALT 13   /   AlkP 231   /   Tptn 5.6   /   Alb 3.1    /   DBili --      05-13  TBili 0.9   /   AST 30   /   ALT 16   /   AlkP 273   /   Tptn 6.1   /   Alb 3.4    /   DBili --      05-12      PT/INR - ( 15 May 2025 05:50 )   PT: 14.70 sec;   INR: 1.24 ratio         PTT - ( 15 May 2025 05:50 )  PTT:32.3 sec    Urinalysis with Rflx Culture (collected 12 May 2025 17:55)

## 2025-05-15 NOTE — CONSULT NOTE ADULT - PROBLEM SELECTOR RECOMMENDATION 5
patient would want his wife (legal surrogate) to be decision make should he not have decisional capacity  He would not want to undergo treatments or procedures if there is expectation that he would not have meaningful recovery and would face a life of being severely debilitated or bedridden  - we did not discuss in detail resuscitation at this time. Would be more appropriate as he has more information regarding staging, med-onc prognosis, and expectations of potential treatments.

## 2025-05-15 NOTE — OCCUPATIONAL THERAPY INITIAL EVALUATION ADULT - PATIENT PROFILE REVIEW, REHAB EVAL
Pt's chart reviewed prior to OT eval./yes
Pt's chart reviewed prior to OT eval. Eval time: 13:00-13:40/yes

## 2025-05-15 NOTE — OCCUPATIONAL THERAPY INITIAL EVALUATION ADULT - LIVES WITH, PROFILE
Pt lives with family in a  with ~5 steps to enter and no steps inside the house. +bath tub with shower chair/spouse

## 2025-05-15 NOTE — OCCUPATIONAL THERAPY INITIAL EVALUATION ADULT - RANGE OF MOTION EXAMINATION, UPPER EXTREMITY
except left shoulder AROM ~1/2 range and AAROM WFL; right shoulder AROM ~3/4 range and AAROM WFL/bilateral UE Active ROM was WFL  (within functional limits)

## 2025-05-15 NOTE — OCCUPATIONAL THERAPY INITIAL EVALUATION ADULT - TRANSFER TRAINING, PT EVAL
Pt will perform sit<>stand transfer using appropriate AD with modA by dc. Pt will perform bed<>chair transfer using appropriate AD with modA by dc.

## 2025-05-15 NOTE — CONSULT NOTE ADULT - SUBJECTIVE AND OBJECTIVE BOX
HPI:   59M w/ Esophageal Cancer (dx Apr2025) s/p stent, CAD s/p [id number] (Jan2024), Afib not on AC, HLD is admitted for weakness, acute blood loss anemia/melena requiring pRBC transfusion. Patient completed endoscopy 5/14 for removal of esophageal stent (due to discomfort) and for PEG placement. Palliative consulted to assist w/ cancer-related pain management.    - 24 hr requirement opioid requirement (8am-8am): 0; patient did require hydromorphone 0.5mg IV x2 in the early morning of 5/14  - patient reports that he was experiencing various pains: chest/upper abdominal pain which he associated with esophageal stent, which has since improved after removal; upper abdominal pain associated w/ cancer which at this time is more of a discomfort but can become sharp and strong, and lower back pain- chronic in nature but exacerbated over the last 2 months and more so week because of inability to get out of bed from generalized weakness. pain can be 10/10 severity. Although he did not use pain meds within last 24hr he indicated that he did have severe pain last night but had a difficult time having RN team respond and assess his pain. at time of exam his pain is not active however he has not moved from lying position.  - he reports that with hydromorphone 1mg IV and morphine 4mg IV he had better pain relief and with hydromorphone 0.5mg IV prn pain would go from 10 to maybe 5 but it would only last 2 hours. percocet 5/235 dosing reportedly does not help with the pain much and has an effect of approx 1 hours for him  - no reported sob, constipation, n/v, diarrhea or pain with urination.    PERTINENT PM/SXH:   Esophageal cancer  HTN (hypertension)  Hiatal hernia  HLD (hyperlipidemia)  CAD (coronary artery disease)  Paroxysmal atrial fibrillation  History of colitis  S/P CABG (coronary artery bypass graft) (jan2024)    FAMILY HISTORY:  FHx: lung cancer (Father)    SOCIAL HISTORY:   Significant other/partner[x ]  Children[ ]  Baptist/Spirituality:  Substance hx:  [ ]   Tobacco hx:  [x ] former smoker quit 1 year prior (40 year use Alcohol hx: [ ]   Living Situation: [x ]Home  [ ]Long term care  [ ]Rehab [ ]Other  Home Services: [ ] HHA [ ] Visting RN [ ] Hospice  Occupation:  Home Opioid hx:  [ ] Y [ ] N [ ] I-Stop Reference No:    ADVANCE DIRECTIVES:    [x ] Full Code [ ] DNR  MOLST  [ ]  Living Will  [ ]   DECISION MAKER(s):  [ ] Health Care Proxy(s)  [ x] Surrogate(s)  [ ] Guardian           Name(s): Phone Number(s):  Wife: Shannan Reddy    BASELINE (I)ADL(s) (prior to admission):  [place]: [ ]Total  [ ] Moderate [x ]Dependent    Allergies  penicillins (Unknown)    MEDICATIONS  (STANDING):  atorvastatin 80 milliGRAM(s) Oral at bedtime  dextrose 5% + lactated ringers. 1000 milliLiter(s) (75 mL/Hr) IV Continuous <Continuous>  heparin   Injectable 5000 Unit(s) SubCutaneous every 12 hours  lidocaine   4% Patch 1 Patch Transdermal daily  megestrol 20 milliGRAM(s) Oral daily  metoprolol tartrate 12.5 milliGRAM(s) Oral two times a day  OLANZapine 2.5 milliGRAM(s) Oral daily  pantoprazole  Injectable 40 milliGRAM(s) IV Push every 12 hours    MEDICATIONS  (PRN):  HYDROmorphone  Injectable 1 milliGRAM(s) IV Push every 3 hours PRN Severe Pain (7 - 10)  oxyCODONE    IR 10 milliGRAM(s) Oral every 4 hours PRN Moderatre to Severe Pain    PRESENT SYMPTOMS: [ ]Unable to obtain due to poor mentation   Source if other than patient:  [ ]Family   [ ]Team   [ X]All review of systems negative including pain and dyspnea unless noted below    All components of pain assessment below addressed. Blank spaces indicate that the patient did/could not complete the assessment.  Pain: [x ]yes [ ]no  QOL impact -   Location - upper abdomen and lower back                   Aggravating factors - positioning in bed and laying for prolonged time  Quality - lower back pain is pressure/sharp, abdominal pain has been sharp  Radiation - note reported  Timing- constant but with varying severity, worse at night  Severity (0-10 scale): back pain when severe gets 8-10/10 severity, and abdominal pain can become severe as well  Minimal acceptable level (0-10 scale): difficult to characterize    Dyspnea:                           [x ]None[ ]Mild [ ]Moderate [ ]Severe   Anxiety:                             [ ]None[ ]Mild [ x]Moderate [ ]Severe   Fatigue:                             [ ]None[ ]Mild [ ]Moderate [ x]Severe   Nausea:                             [ x]None[ ]Mild [ ]Moderate [ ]Severe   Loss of appetite:              [ ]None[ ]Mild [ ]Moderate [x ]Severe   Constipation:                    [x ]None[ ]Mild [ ]Moderate [ ]Severe    Other Symptoms:      PHYSICAL EXAM:  Vital Signs Last 24 Hrs  T(C): 36.9 (15 May 2025 13:22), Max: 37.8 (14 May 2025 20:38)  T(F): 98.4 (15 May 2025 13:22), Max: 100.1 (14 May 2025 20:38)  HR: 96 (15 May 2025 13:22) (84 - 96)  BP: 100/49 (15 May 2025 13:22) (100/49 - 140/67)  BP(mean): 90 (15 May 2025 04:42) (85 - 90)  RR: 20 (15 May 2025 13:22) (18 - 20)  SpO2: 97% (15 May 2025 13:22) (97% - 99%)    Parameters below as of 15 May 2025 13:22  Patient On (Oxygen Delivery Method): room air     I&O's Summary    14 May 2025 07:01  -  15 May 2025 07:00  --------------------------------------------------------  IN: 0 mL / OUT: 700 mL / NET: -700 mL    15 May 2025 07:01  -  15 May 2025 14:31  --------------------------------------------------------  IN: 0 mL / OUT: 800 mL / NET: -800 mL    GENERAL:  [X ] No acute distress [ ]Lethargic  [ ]Unarousable  [x ]Verbal  [ ]Non-Verbal [ ]Cachexia    BEHAVIORAL/PSYCH:  [ ]Alert and Oriented x  [ ] Anxiety [ ] Delirium [ ] Agitation [X ] Calm   EYES: [ x] No scleral icterus [ ] Scleral icterus [ ] Closed  ENMT:  [ ]Dry mouth  [ ]No external oral lesions [ X] No external ear or nose lesions  CARDIOVASCULAR:  [ ]Regular [ ]Irregular [ ]Tachy [x ]Not Tachy  [ ]Rodney [ ] Edema [ ] No edema  PULMONARY:  [ ]Tachypnea  [ ]Audible excessive secretions [X ] No labored breathing [ ] labored breathing  GASTROINTESTINAL: [ ]Soft  [ ]Distended  [ X]Not distended [ ]Non tender [x ]Tender  MUSCULOSKELETAL: [ ]No clubbing [ ] clubbing  [ X] No cyanosis [ ] cyanosis  NEUROLOGIC: [ ]No focal deficits  [x ]Follows commands  [ ]Does not follow commands  [ ]Cognitive impairment  [ ]Dysphagia  [ ]Dysarthria  [ ]Paresis   SKIN: [ ] Jaundiced [X ] Non-jaundiced [ ]Rash [ ]No Rash [ ] Warm [ ] Dry  MISC/LINES: [ ] ET tube [ ] Trach [ ]NGT/OGT [x ]PEG [ ]Epps    LABS: reviewed by me, notable for:                         7.9    6.15  )-----------( 50       ( 15 May 2025 05:50 )             24.0   05-15    135  |  103  |  14  ----------------------------<  116[H]  4.1   |  21  |  0.7    Ca    8.8      15 May 2025 05:50  Mg     2.1     05-15    TPro  5.3[L]  /  Alb  3.2[L]  /  TBili  1.6[H]  /  DBili  0.7[H]  /  AST  38  /  ALT  16  /  AlkPhos  329[H]  05-15  PT/INR - ( 15 May 2025 05:50 )   PT: 14.70 sec;   INR: 1.24 ratio    PTT - ( 15 May 2025 05:50 )  PTT:32.3 sec    Urinalysis Basic - ( 15 May 2025 05:50 )  Color: x / Appearance: x / SG: x / pH: x  Gluc: 116 mg/dL / Ketone: x  / Bili: x / Urobili: x   Blood: x / Protein: x / Nitrite: x   Leuk Esterase: x / RBC: x / WBC x   Sq Epi: x / Non Sq Epi: x / Bacteria: x      RADIOLOGY & ADDITIONAL STUDIES: CT head reviewed and no significant midline shift appreciated    PROTEIN CALORIE MALNUTRITION PRESENT: [ ]mild [ ]moderate [ ]severe [ ]underweight [ ]morbid obesity  https://www.andeal.org/vault/5010/web/files/ONC/Table_Clinical%20Characteristics%20to%20Document%20Malnutrition-White%20JV%20et%20al%202012.pdf    Height (cm): 180.3 (05-14-25 @ 14:45), 180 (05-01-25 @ 11:47), 180.3 (04-05-25 @ 13:20)  Weight (kg): 84.5 (05-14-25 @ 14:45), 89 (05-01-25 @ 11:47), 90.3 (04-03-25 @ 21:22)  BMI (kg/m2): 26 (05-14-25 @ 14:45), 27.5 (05-01-25 @ 11:47), 27.8 (04-05-25 @ 13:20)    [ ]PPSV2 < or = to 30% [ ]significant weight loss  [ ]poor nutritional intake  [ ]anasarca      [ ]Artificial Nutrition          Palliative Care Spiritual/Emotional Screening Tool Question  Severity (0-4):                    OR                    [X ] Unable to determine/NA  Score of 2 or greater indicates recommendation of Chaplaincy referral  Chaplaincy Referral: [ ] Yes [ ] Refused [ ] Following     Caregiver [place]:  [ ] Yes [ ] No    OR    [x ] Unable to determine. Will assess at later time if appropriate.  Social Work Referral [ ]  Patient and Family Centered Care Referral [ ]    Anticipatory Grief Present: [ ] Yes [ ] No    OR     [ x] Unable to determine. Will assess at later time if appropriate.  Social Work Referral [ ]  Patient and Family Centered Care Referral [ ]    REFERRALS:   [ ]Chaplaincy  [ ]Hospice  [ ]Child Life  [ ]Social Work  [ ]Case management [ ]Holistic Therapy     Palliative care education provided to patient and/or family    Goals of Care Document:    HPI:   59M w/ Esophageal Cancer (dx Apr2025) s/p stent, CAD s/p [id number] (Jan2024), Afib not on AC, HLD is admitted for weakness, acute blood loss anemia/melena requiring pRBC transfusion. Patient completed endoscopy 5/14 for removal of esophageal stent (due to discomfort) and for PEG placement. Palliative consulted to assist w/ cancer-related pain management.    - 24 hr requirement opioid requirement (8am-8am): 0; patient did require hydromorphone 0.5mg IV x2 in the early morning of 5/14  - patient reports that he was experiencing various pains: chest/upper abdominal pain which he associated with esophageal stent, which has since improved after removal; upper abdominal pain associated w/ cancer which at this time is more of a discomfort but can become sharp and strong, and lower back pain- chronic in nature but exacerbated over the last 2 months and more so week because of inability to get out of bed from generalized weakness. pain can be 10/10 severity. Although he did not use pain meds within last 24hr he indicated that he did have severe pain last night but had a difficult time having RN team respond and assess his pain. at time of exam his pain is not active however he has not moved from lying position.  - he reports that with hydromorphone 1mg IV and morphine 4mg IV he had better pain relief and with hydromorphone 0.5mg IV prn pain would go from 10 to maybe 5 but it would only last 2 hours. percocet 5/235 dosing reportedly does not help with the pain much and has an effect of approx 1 hours for him  - no reported sob, constipation, n/v, diarrhea or pain with urination.    PERTINENT PM/SXH:   Esophageal cancer  HTN (hypertension)  Hiatal hernia  HLD (hyperlipidemia)  CAD (coronary artery disease)  Paroxysmal atrial fibrillation  History of colitis  S/P CABG (coronary artery bypass graft) (jan2024)    FAMILY HISTORY:  FHx: lung cancer (Father)    SOCIAL HISTORY:   Significant other/partner[x ]  Children[ ]  Sikhism/Spirituality:  Substance hx:  [ ]   Tobacco hx:  [x ] former smoker quit 1 year prior (40 year use Alcohol hx: [ ]   Living Situation: [x ]Home  [ ]Long term care  [ ]Rehab [ ]Other  Home Services: [ ] HHA [ ] Visting RN [ ] Hospice  Occupation:  Home Opioid hx:  [ ] Y [ ] N [ x] I-Stop Reference No:    Reference #: [id number]    Practitioner Count: 1  Pharmacy Count: 1  Current Opioid Prescriptions: 0  Current Benzodiazepine Prescriptions: 0  Current Stimulant Prescriptions: 0      Patient Demographic Information (PDI)       PDI	First Name	Last Name	Birth Date	Gender	Street Address	[facility]	Zip Code  TAYLOR Reddy	1965	Male	[address] [facility]	[id number]    Prescription Information      PDI Filter:    PDI	Current Rx	Drug Type	Rx Written	Rx Dispensed	Drug	Quantity	Days Supply	Prescriber Name	Prescriber JONNY #	Payment Method	Dispenser  A	N	O	05/06/2025	05/06/2025	oxycodone-acetaminophen 5-325 mg tablet	24	4	Jeremy Silva	[id number]	Insurance	[facility] #[id number]    ADVANCE DIRECTIVES:    [x ] Full Code [ ] DNR  MOLST  [ ]  Living Will  [ ]   DECISION MAKER(s):  [ ] Health Care Proxy(s)  [ x] Surrogate(s)  [ ] Guardian           Name(s): Phone Number(s):  Wife: Shannan Reddy    BASELINE (I)ADL(s) (prior to admission):  [place]: [ ]Total  [ ] Moderate [x ]Dependent    Allergies  penicillins (Unknown)    MEDICATIONS  (STANDING):  atorvastatin 80 milliGRAM(s) Oral at bedtime  dextrose 5% + lactated ringers. 1000 milliLiter(s) (75 mL/Hr) IV Continuous <Continuous>  heparin   Injectable 5000 Unit(s) SubCutaneous every 12 hours  lidocaine   4% Patch 1 Patch Transdermal daily  megestrol 20 milliGRAM(s) Oral daily  metoprolol tartrate 12.5 milliGRAM(s) Oral two times a day  OLANZapine 2.5 milliGRAM(s) Oral daily  pantoprazole  Injectable 40 milliGRAM(s) IV Push every 12 hours    MEDICATIONS  (PRN):  HYDROmorphone  Injectable 1 milliGRAM(s) IV Push every 3 hours PRN Severe Pain (7 - 10)  oxyCODONE    IR 10 milliGRAM(s) Oral every 4 hours PRN Moderatre to Severe Pain    PRESENT SYMPTOMS: [ ]Unable to obtain due to poor mentation   Source if other than patient:  [ ]Family   [ ]Team   [ X]All review of systems negative including pain and dyspnea unless noted below    All components of pain assessment below addressed. Blank spaces indicate that the patient did/could not complete the assessment.  Pain: [x ]yes [ ]no  QOL impact -   Location - upper abdomen and lower back                   Aggravating factors - positioning in bed and laying for prolonged time  Quality - lower back pain is pressure/sharp, abdominal pain has been sharp  Radiation - note reported  Timing- constant but with varying severity, worse at night  Severity (0-10 scale): back pain when severe gets 8-10/10 severity, and abdominal pain can become severe as well  Minimal acceptable level (0-10 scale): difficult to characterize    Dyspnea:                           [x ]None[ ]Mild [ ]Moderate [ ]Severe   Anxiety:                             [ ]None[ ]Mild [ x]Moderate [ ]Severe   Fatigue:                             [ ]None[ ]Mild [ ]Moderate [ x]Severe   Nausea:                             [ x]None[ ]Mild [ ]Moderate [ ]Severe   Loss of appetite:              [ ]None[ ]Mild [ ]Moderate [x ]Severe   Constipation:                    [x ]None[ ]Mild [ ]Moderate [ ]Severe    Other Symptoms:      PHYSICAL EXAM:  Vital Signs Last 24 Hrs  T(C): 36.9 (15 May 2025 13:22), Max: 37.8 (14 May 2025 20:38)  T(F): 98.4 (15 May 2025 13:22), Max: 100.1 (14 May 2025 20:38)  HR: 96 (15 May 2025 13:22) (84 - 96)  BP: 100/49 (15 May 2025 13:22) (100/49 - 140/67)  BP(mean): 90 (15 May 2025 04:42) (85 - 90)  RR: 20 (15 May 2025 13:22) (18 - 20)  SpO2: 97% (15 May 2025 13:22) (97% - 99%)    Parameters below as of 15 May 2025 13:22  Patient On (Oxygen Delivery Method): room air     I&O's Summary    14 May 2025 07:01  -  15 May 2025 07:00  --------------------------------------------------------  IN: 0 mL / OUT: 700 mL / NET: -700 mL    15 May 2025 07:01  -  15 May 2025 14:31  --------------------------------------------------------  IN: 0 mL / OUT: 800 mL / NET: -800 mL    GENERAL:  [X ] No acute distress [ ]Lethargic  [ ]Unarousable  [x ]Verbal  [ ]Non-Verbal [ ]Cachexia    BEHAVIORAL/PSYCH:  [ ]Alert and Oriented x  [ ] Anxiety [ ] Delirium [ ] Agitation [X ] Calm   EYES: [ x] No scleral icterus [ ] Scleral icterus [ ] Closed  ENMT:  [ ]Dry mouth  [ ]No external oral lesions [ X] No external ear or nose lesions  CARDIOVASCULAR:  [ ]Regular [ ]Irregular [ ]Tachy [x ]Not Tachy  [ ]Rodney [ ] Edema [ ] No edema  PULMONARY:  [ ]Tachypnea  [ ]Audible excessive secretions [X ] No labored breathing [ ] labored breathing  GASTROINTESTINAL: [ ]Soft  [ ]Distended  [ X]Not distended [ ]Non tender [x ]Tender  MUSCULOSKELETAL: [ ]No clubbing [ ] clubbing  [ X] No cyanosis [ ] cyanosis  NEUROLOGIC: [ ]No focal deficits  [x ]Follows commands  [ ]Does not follow commands  [ ]Cognitive impairment  [ ]Dysphagia  [ ]Dysarthria  [ ]Paresis   SKIN: [ ] Jaundiced [X ] Non-jaundiced [ ]Rash [ ]No Rash [ ] Warm [ ] Dry  MISC/LINES: [ ] ET tube [ ] Trach [ ]NGT/OGT [x ]PEG [ ]Epps    LABS: reviewed by me, notable for:  BMP grossly WNL, CBC showing anemia, LFTs with mildly elevated in dbili and tbili                        7.9    6.15  )-----------( 50       ( 15 May 2025 05:50 )             24.0   05-15    135  |  103  |  14  ----------------------------<  116[H]  4.1   |  21  |  0.7    Ca    8.8      15 May 2025 05:50  Mg     2.1     05-15    TPro  5.3[L]  /  Alb  3.2[L]  /  TBili  1.6[H]  /  DBili  0.7[H]  /  AST  38  /  ALT  16  /  AlkPhos  329[H]  05-15  PT/INR - ( 15 May 2025 05:50 )   PT: 14.70 sec;   INR: 1.24 ratio    PTT - ( 15 May 2025 05:50 )  PTT:32.3 sec    Urinalysis Basic - ( 15 May 2025 05:50 )  Color: x / Appearance: x / SG: x / pH: x  Gluc: 116 mg/dL / Ketone: x  / Bili: x / Urobili: x   Blood: x / Protein: x / Nitrite: x   Leuk Esterase: x / RBC: x / WBC x   Sq Epi: x / Non Sq Epi: x / Bacteria: x      RADIOLOGY & ADDITIONAL STUDIES: CT head reviewed and no significant midline shift appreciated    PROTEIN CALORIE MALNUTRITION PRESENT: [ ]mild [ ]moderate [ ]severe [ ]underweight [ ]morbid obesity  https://www.andeal.org/vault/2440/web/files/ONC/Table_Clinical%20Characteristics%20to%20Document%20Malnutrition-White%20JV%20et%20al%202012.pdf    Height (cm): 180.3 (05-14-25 @ 14:45), 180 (05-01-25 @ 11:47), 180.3 (04-05-25 @ 13:20)  Weight (kg): 84.5 (05-14-25 @ 14:45), 89 (05-01-25 @ 11:47), 90.3 (04-03-25 @ 21:22)  BMI (kg/m2): 26 (05-14-25 @ 14:45), 27.5 (05-01-25 @ 11:47), 27.8 (04-05-25 @ 13:20)    [ ]PPSV2 < or = to 30% [ ]significant weight loss  [ ]poor nutritional intake  [ ]anasarca      [ ]Artificial Nutrition          Palliative Care Spiritual/Emotional Screening Tool Question  Severity (0-4):                    OR                    [X ] Unable to determine/NA  Score of 2 or greater indicates recommendation of Chaplaincy referral  Chaplaincy Referral: [ ] Yes [ ] Refused [ ] Following     Caregiver [place]:  [ ] Yes [ ] No    OR    [x ] Unable to determine. Will assess at later time if appropriate.  Social Work Referral [ ]  Patient and Family Centered Care Referral [ ]    Anticipatory Grief Present: [ ] Yes [ ] No    OR     [ x] Unable to determine. Will assess at later time if appropriate.  Social Work Referral [ ]  Patient and Family Centered Care Referral [ ]    REFERRALS:   [ ]Chaplaincy  [ ]Hospice  [ ]Child Life  [ ]Social Work  [ ]Case management [ ]Holistic Therapy     Palliative care education provided to patient and/or family    Goals of Care Document:

## 2025-05-15 NOTE — CONSULT NOTE ADULT - SUBJECTIVE AND OBJECTIVE BOX
Patient is a 59y old  Male who presents with a chief complaint of Progressive weakness (15 May 2025 17:27)      REASON FOR CONSULT     HPI:  59y male with PMHx of HTN, hiatal hernia, HLD, CAD s/p CABG in Jan 2024, paroxysmal A. fib not on AC, former smoker (30 pack years, quit 1yr ago), recently diagnosed with colitis and esophageal adenocarcinoma s/p stent placement who presents for weakness. The patient was recently discharged from [facility] on 5/6 , and has been progressively deteriorating. States he has been unable to tolerate p.o. and has been unable to walk over the past few days. Wife at bedside reports that he has not been able to follow-up with oncology due to the weakness.  On ROS the patient also states he has been having black tarry stools that started 1 day prior to his EGD. He had 3 black BMs today. He denies any mucosal bleeding or blood in the urine.  He denies any fevers, chills, chest pain, shortness of breath, palpitations, headache, vision changes, nausea, vomiting, abdominal pain, hematuria, falls.  He is supposed to have PET scan tomorrow and follow with Dr. Sanchez in 2 days.    T(F): 98.5 (05-12-25 @ 21:05), Max: 98.6 (05-12-25 @ 19:48)  HR: 97 (05-12-25 @ 21:05) (88 - 104)  BP: 111/58 (05-12-25 @ 21:05) (111/58 - 132/75)  RR: 18 (05-12-25 @ 21:05) (18 - 20)  SpO2: 99% (05-12-25 @ 21:05) (98% - 100%) on RA    Labs: Hg 7.6 (baseline ~10), MCV 78, slight schistocytes on smear,  Plt 92, PT/INR >40/6.2, PTT 51.8, ALK phos 273.      Imaging:    CT Chest w/ IV Cont  (4/26 - prior admission)  IMPRESSION:  1.  No definite evidence of thoracic metastatic disease.  2.  Small bilateral pleural effusions.  3.  Marked circumferential thickening of the distal esophagus/hiatal   hernia could be related to provided history of suspected esophageal   cancer versus esophagitis. (12 May 2025 21:41)      PAST MEDICAL & SURGICAL HISTORY:  HTN (hypertension)      Hiatal hernia      HLD (hyperlipidemia)      CAD (coronary artery disease)      Paroxysmal atrial fibrillation      History of colitis      S/P CABG (coronary artery bypass graft)              SOCIAL HISTORY:     FAMILY HISTORY:  FHx: lung cancer (Father)      penicillins (Unknown)      MEDICATIONS  (STANDING):  atorvastatin 80 milliGRAM(s) Oral at bedtime  dextrose 5% + lactated ringers. 1000 milliLiter(s) (75 mL/Hr) IV Continuous <Continuous>  heparin   Injectable 5000 Unit(s) SubCutaneous every 12 hours  lidocaine   4% Patch 1 Patch Transdermal daily  megestrol 20 milliGRAM(s) Oral daily  metoprolol tartrate 12.5 milliGRAM(s) Oral two times a day  OLANZapine 2.5 milliGRAM(s) Oral daily  pantoprazole  Injectable 40 milliGRAM(s) IV Push every 12 hours  polyethylene glycol 3350 17 Gram(s) Oral daily    MEDICATIONS  (PRN):  HYDROmorphone  Injectable 1 milliGRAM(s) IV Push every 3 hours PRN Severe Pain (7 - 10)  oxyCODONE    IR 10 milliGRAM(s) Oral every 4 hours PRN Moderatre to Severe Pain      Vital Signs Last 24 Hrs  T(C): 37.4 (15 May 2025 20:00), Max: 37.4 (15 May 2025 20:00)  T(F): 99.3 (15 May 2025 20:00), Max: 99.3 (15 May 2025 20:00)  HR: 93 (15 May 2025 20:00) (93 - 101)  BP: 117/69 (15 May 2025 20:00) (100/49 - 117/77)  BP(mean): 85 (15 May 2025 20:00) (85 - 90)  RR: 18 (15 May 2025 20:00) (18 - 20)  SpO2: 98% (15 May 2025 20:00) (97% - 98%)    Parameters below as of 15 May 2025 20:00  Patient On (Oxygen Delivery Method): room air     I&O's Detail    14 May 2025 07:01  -  15 May 2025 07:00  --------------------------------------------------------  IN:  Total IN: 0 mL    OUT:    Voided (mL): 700 mL  Total OUT: 700 mL    Total NET: -700 mL      15 May 2025 07:01  -  15 May 2025 23:59  --------------------------------------------------------  IN:    Enteral Tube Flush: 25 mL    TwoCal HN: 100 mL  Total IN: 125 mL    OUT:    Voided (mL): 800 mL  Total OUT: 800 mL    Total NET: -675 mL          LABS:                        7.9    6.15  )-----------( 50       ( 15 May 2025 05:50 )             24.0     05-15    135  |  103  |  14  ----------------------------<  116[H]  4.1   |  21  |  0.7    Ca    8.8      15 May 2025 05:50  Mg     2.1     05-15    TPro  5.3[L]  /  Alb  3.2[L]  /  TBili  1.6[H]  /  DBili  0.7[H]  /  AST  38  /  ALT  16  /  AlkPhos  329[H]  05-15        PT/INR - ( 15 May 2025 05:50 )   PT: 14.70 sec;   INR: 1.24 ratio         PTT - ( 15 May 2025 05:50 )  PTT:32.3 sec  I&O's Summary    14 May 2025 07:01  -  15 May 2025 07:00  --------------------------------------------------------  IN: 0 mL / OUT: 700 mL / NET: -700 mL    15 May 2025 07:01  -  15 May 2025 23:59  --------------------------------------------------------  IN: 125 mL / OUT: 800 mL / NET: -875 mL       Patient was seen and examined by me on 3E.  EMR reviewed.    Mr. Demian Reddy is a 59-year-old  male with a past medical history of CAD s/P CABG, S/P Left Atrial Appendage Exclusion (50 mm Atricure Clip), Hypertension, Post-CABG Atrial Fibrillation, Hypertension, Hyperlipidemia, and Hiatal Hernia.    He has been recently diagnosed with esophageal adenocarcinoma S/P Stent.  He has had poor po intake.  He was readmitted     Patient is a 59y old  Male who presents with a chief complaint of Progressive weakness (15 May 2025 17:27)      REASON FOR CONSULT     HPI:  59y male with PMHx of HTN, hiatal hernia, HLD, CAD s/p CABG in Jan 2024, paroxysmal A. fib not on AC, former smoker (30 pack years, quit 1yr ago), recently diagnosed with colitis and esophageal adenocarcinoma s/p stent placement who presents for weakness. The patient was recently discharged from [facility] on 5/6 , and has been progressively deteriorating. States he has been unable to tolerate p.o. and has been unable to walk over the past few days. Wife at bedside reports that he has not been able to follow-up with oncology due to the weakness.  On ROS the patient also states he has been having black tarry stools that started 1 day prior to his EGD. He had 3 black BMs today. He denies any mucosal bleeding or blood in the urine.  He denies any fevers, chills, chest pain, shortness of breath, palpitations, headache, vision changes, nausea, vomiting, abdominal pain, hematuria, falls.  He is supposed to have PET scan tomorrow and follow with Dr. Sanchez in 2 days.    T(F): 98.5 (05-12-25 @ 21:05), Max: 98.6 (05-12-25 @ 19:48)  HR: 97 (05-12-25 @ 21:05) (88 - 104)  BP: 111/58 (05-12-25 @ 21:05) (111/58 - 132/75)  RR: 18 (05-12-25 @ 21:05) (18 - 20)  SpO2: 99% (05-12-25 @ 21:05) (98% - 100%) on RA    Labs: Hg 7.6 (baseline ~10), MCV 78, slight schistocytes on smear,  Plt 92, PT/INR >40/6.2, PTT 51.8, ALK phos 273.      Imaging:    CT Chest w/ IV Cont  (4/26 - prior admission)  IMPRESSION:  1.  No definite evidence of thoracic metastatic disease.  2.  Small bilateral pleural effusions.  3.  Marked circumferential thickening of the distal esophagus/hiatal   hernia could be related to provided history of suspected esophageal   cancer versus esophagitis. (12 May 2025 21:41)      PAST MEDICAL & SURGICAL HISTORY:  HTN (hypertension)      Hiatal hernia      HLD (hyperlipidemia)      CAD (coronary artery disease)      Paroxysmal atrial fibrillation      History of colitis      S/P CABG (coronary artery bypass graft)              SOCIAL HISTORY:     FAMILY HISTORY:  FHx: lung cancer (Father)      penicillins (Unknown)      MEDICATIONS  (STANDING):  atorvastatin 80 milliGRAM(s) Oral at bedtime  dextrose 5% + lactated ringers. 1000 milliLiter(s) (75 mL/Hr) IV Continuous <Continuous>  heparin   Injectable 5000 Unit(s) SubCutaneous every 12 hours  lidocaine   4% Patch 1 Patch Transdermal daily  megestrol 20 milliGRAM(s) Oral daily  metoprolol tartrate 12.5 milliGRAM(s) Oral two times a day  OLANZapine 2.5 milliGRAM(s) Oral daily  pantoprazole  Injectable 40 milliGRAM(s) IV Push every 12 hours  polyethylene glycol 3350 17 Gram(s) Oral daily    MEDICATIONS  (PRN):  HYDROmorphone  Injectable 1 milliGRAM(s) IV Push every 3 hours PRN Severe Pain (7 - 10)  oxyCODONE    IR 10 milliGRAM(s) Oral every 4 hours PRN Moderatre to Severe Pain      Vital Signs Last 24 Hrs  T(C): 37.4 (15 May 2025 20:00), Max: 37.4 (15 May 2025 20:00)  T(F): 99.3 (15 May 2025 20:00), Max: 99.3 (15 May 2025 20:00)  HR: 93 (15 May 2025 20:00) (93 - 101)  BP: 117/69 (15 May 2025 20:00) (100/49 - 117/77)  BP(mean): 85 (15 May 2025 20:00) (85 - 90)  RR: 18 (15 May 2025 20:00) (18 - 20)  SpO2: 98% (15 May 2025 20:00) (97% - 98%)    Parameters below as of 15 May 2025 20:00  Patient On (Oxygen Delivery Method): room air     I&O's Detail    14 May 2025 07:01  -  15 May 2025 07:00  --------------------------------------------------------  IN:  Total IN: 0 mL    OUT:    Voided (mL): 700 mL  Total OUT: 700 mL    Total NET: -700 mL      15 May 2025 07:01  -  15 May 2025 23:59  --------------------------------------------------------  IN:    Enteral Tube Flush: 25 mL    TwoCal HN: 100 mL  Total IN: 125 mL    OUT:    Voided (mL): 800 mL  Total OUT: 800 mL    Total NET: -675 mL          LABS:                        7.9    6.15  )-----------( 50       ( 15 May 2025 05:50 )             24.0     05-15    135  |  103  |  14  ----------------------------<  116[H]  4.1   |  21  |  0.7    Ca    8.8      15 May 2025 05:50  Mg     2.1     05-15    TPro  5.3[L]  /  Alb  3.2[L]  /  TBili  1.6[H]  /  DBili  0.7[H]  /  AST  38  /  ALT  16  /  AlkPhos  329[H]  05-15        PT/INR - ( 15 May 2025 05:50 )   PT: 14.70 sec;   INR: 1.24 ratio         PTT - ( 15 May 2025 05:50 )  PTT:32.3 sec  I&O's Summary    14 May 2025 07:01  -  15 May 2025 07:00  --------------------------------------------------------  IN: 0 mL / OUT: 700 mL / NET: -700 mL    15 May 2025 07:01  -  15 May 2025 23:59  --------------------------------------------------------  IN: 125 mL / OUT: 800 mL / NET: -675 mL       Patient was seen and examined by me on 3E.  EMR reviewed.    Mr. Demian Reddy is a 59-year-old  male with a past medical history of CAD s/P CABG, S/P Left Atrial Appendage Exclusion (50 mm Atricure Clip), Hypertension, Post-CABG Atrial Fibrillation, Hypertension, Hyperlipidemia, and Hiatal Hernia.    He has been recently diagnosed with esophageal adenocarcinoma S/P Stent.  He has had poor po intake.  He was discharged on My 8, 2025.  He was readmitted to [facility] on May 12, 2025.   On 3E, he is comfortable in bed.  He appears depressed.  He denies any chest pain and shortness of breath.    Physical Exam:  Not in gross distress  Alert, oriented x 3  No JVD; regular rhythm; nl S1S2  Bilateral breath sounds  Abdomen soft  No edema  Moving all extremities purposefully    ROS: as stated in HPI  Labs: noted    < from: CT Chest w/ IV Cont (04.26.25 @ 11:19) >  PROCEDURE:  CT of the Chest was performed.  Sagittal and coronal reformats were performed.    FINDINGS:    LUNGS/PLEURA/AIRWAYS: Small bilateral pleural effusions. No suspicious   masses. Stable 3 mm right middle lobe pulmonary nodule (series 5 image   120). Stable 1 mm micronodular subpleural density right middle lobe   (image 152)  VESSELS: Atherosclerosis. No acute central pulmonary embolism.  HEART: Heart size is normal. Coronary calcifications. Post CABG.  MEDIASTINUM AND DILCIA: No lymphadenopathy. Distal esophagus/hiatal hernia   marked circumferential thickening could be related to provided history of   esophageal cancer versus esophagitis.  CHEST WALL AND LOWER NECK: Within normal limits.  VISUALIZED UPPER ABDOMEN: Right renal cyst  BONES: Sternotomy. Degenerative changes. Patchy areas of sclerosis in the   ribs is nonspecific.    IMPRESSION:  1.  No definite evidence of thoracic metastatic disease.  2.  Small bilateral pleural effusions.  3.  Marked circumferential thickening of the distal esophagus/hiatal   hernia could be related to provided history of suspected esophageal   cancer versus esophagitis.        --- End of Report ---      DAVID MANN MD; Attending Radiologist  This document has been electronically signed. Apr 27 2025  1:49AM    < end of copied text >      < from: CT Abdomen and Pelvis w/ IV Cont (04.01.25 @ 21:57) >  COMPARISON: No previous abdominal CT scans.  CT chest December 18, 2024      FINDINGS:  LOWER THORAX: Sternotomy. Aortic and coronary artery calcifications. 4.5   cm hiatal hernia, previously 3.7 cm  HEPATOBILIARY: Unremarkable  GALLBLADDER: Unremarkable  SPLEEN: Unremarkable  PANCREAS: Unremarkable  ADRENAL GLANDS:unremarkable  KIDNEYS: Symmetric renal enhancement. No hydronephrosis. 1.8 cm cyst   superior pole right kidney (3-100)..      ABDOMINOPELVIC NODES: No lymphadenopathy.  PELVIC ORGANS: Normal sized prostate gland with central calcifications.   Submucosal fat within the descending colon is noted which may be related   to previous episodes of colitis. (601-65). Similar changes within the   ascending colon also present.. Unremarkable urinary bladder  PERITONEUM /MESENTERY/BOWEL: Nonobstructive bowel gas pattern.  ABDOMINAL WALL: Unremarkable.  BONES/SOFT TISSUES: No suspicious osseous lesions.  VASCULAR ANATOMY: Approximate 3.8 cm wide right iliac artery aneurysm,   3-2 36. Normal caliber abdominal aorta with extensive calcifications.   Patent common femoral arteries measuring 1.6 cm in diameter.      IMPRESSION:    Nonobstructive gas pattern. No pneumoperitoneum.    Submucosal fat within the descending colon is noted which may be related   to previous episodes of colitis. (60165). Similar changes within the   ascending colon also present    3.8 cm right common iliac artery aneurysm    4.5 cm hiatal hernia    --- End of Report ---    _____________________________________________  House Staff Note      LITO NEIL MD; Attending Radiologist  This document has been electronically signed. Apr 1 2025 10:29PM    < end of copied text >    Patient is a 59y old  Male who presents with a chief complaint of Progressive weakness (15 May 2025 17:27)      REASON FOR CONSULT     HPI:  59y male with PMHx of HTN, hiatal hernia, HLD, CAD s/p CABG in Jan 2024, paroxysmal A. fib not on AC, former smoker (30 pack years, quit 1yr ago), recently diagnosed with colitis and esophageal adenocarcinoma s/p stent placement who presents for weakness. The patient was recently discharged from [facility] on 5/6 , and has been progressively deteriorating. States he has been unable to tolerate p.o. and has been unable to walk over the past few days. Wife at bedside reports that he has not been able to follow-up with oncology due to the weakness.  On ROS the patient also states he has been having black tarry stools that started 1 day prior to his EGD. He had 3 black BMs today. He denies any mucosal bleeding or blood in the urine.  He denies any fevers, chills, chest pain, shortness of breath, palpitations, headache, vision changes, nausea, vomiting, abdominal pain, hematuria, falls.  He is supposed to have PET scan tomorrow and follow with Dr. Sanchez in 2 days.    T(F): 98.5 (05-12-25 @ 21:05), Max: 98.6 (05-12-25 @ 19:48)  HR: 97 (05-12-25 @ 21:05) (88 - 104)  BP: 111/58 (05-12-25 @ 21:05) (111/58 - 132/75)  RR: 18 (05-12-25 @ 21:05) (18 - 20)  SpO2: 99% (05-12-25 @ 21:05) (98% - 100%) on RA    Labs: Hg 7.6 (baseline ~10), MCV 78, slight schistocytes on smear,  Plt 92, PT/INR >40/6.2, PTT 51.8, ALK phos 273.      Imaging:    CT Chest w/ IV Cont  (4/26 - prior admission)  IMPRESSION:  1.  No definite evidence of thoracic metastatic disease.  2.  Small bilateral pleural effusions.  3.  Marked circumferential thickening of the distal esophagus/hiatal   hernia could be related to provided history of suspected esophageal   cancer versus esophagitis. (12 May 2025 21:41)      PAST MEDICAL & SURGICAL HISTORY:  HTN (hypertension)  Hiatal hernia  HLD (hyperlipidemia)  CAD (coronary artery disease)  Paroxysmal atrial fibrillation  History of colitis  S/P CABG (coronary artery bypass graft)     SOCIAL HISTORY:     FAMILY HISTORY:  FHx: lung cancer (Father)      penicillins (Unknown)      MEDICATIONS  (STANDING):  atorvastatin 80 milliGRAM(s) Oral at bedtime  dextrose 5% + lactated ringers. 1000 milliLiter(s) (75 mL/Hr) IV Continuous <Continuous>  heparin   Injectable 5000 Unit(s) SubCutaneous every 12 hours  lidocaine   4% Patch 1 Patch Transdermal daily  megestrol 20 milliGRAM(s) Oral daily  metoprolol tartrate 12.5 milliGRAM(s) Oral two times a day  OLANZapine 2.5 milliGRAM(s) Oral daily  pantoprazole  Injectable 40 milliGRAM(s) IV Push every 12 hours  polyethylene glycol 3350 17 Gram(s) Oral daily    MEDICATIONS  (PRN):  HYDROmorphone  Injectable 1 milliGRAM(s) IV Push every 3 hours PRN Severe Pain (7 - 10)  oxyCODONE    IR 10 milliGRAM(s) Oral every 4 hours PRN Moderatre to Severe Pain      Vital Signs Last 24 Hrs  T(C): 37.4 (15 May 2025 20:00), Max: 37.4 (15 May 2025 20:00)  T(F): 99.3 (15 May 2025 20:00), Max: 99.3 (15 May 2025 20:00)  HR: 93 (15 May 2025 20:00) (93 - 101)  BP: 117/69 (15 May 2025 20:00) (100/49 - 117/77)  BP(mean): 85 (15 May 2025 20:00) (85 - 90)  RR: 18 (15 May 2025 20:00) (18 - 20)  SpO2: 98% (15 May 2025 20:00) (97% - 98%)    Parameters below as of 15 May 2025 20:00  Patient On (Oxygen Delivery Method): room air     I&O's Detail    14 May 2025 07:01  -  15 May 2025 07:00  --------------------------------------------------------  IN:  Total IN: 0 mL    OUT:    Voided (mL): 700 mL  Total OUT: 700 mL    Total NET: -700 mL      15 May 2025 07:01  -  15 May 2025 23:59  --------------------------------------------------------  IN:    Enteral Tube Flush: 25 mL    TwoCal HN: 100 mL  Total IN: 125 mL    OUT:    Voided (mL): 800 mL  Total OUT: 800 mL    Total NET: -675 mL    LABS:                        7.9    6.15  )-----------( 50       ( 15 May 2025 05:50 )             24.0     05-15    135  |  103  |  14  ----------------------------<  116[H]  4.1   |  21  |  0.7    Ca    8.8      15 May 2025 05:50  Mg     2.1     05-15    TPro  5.3[L]  /  Alb  3.2[L]  /  TBili  1.6[H]  /  DBili  0.7[H]  /  AST  38  /  ALT  16  /  AlkPhos  329[H]  05-15        PT/INR - ( 15 May 2025 05:50 )   PT: 14.70 sec;   INR: 1.24 ratio         PTT - ( 15 May 2025 05:50 )  PTT:32.3 sec  I&O's Summary    14 May 2025 07:01  -  15 May 2025 07:00  --------------------------------------------------------  IN: 0 mL / OUT: 700 mL / NET: -700 mL    15 May 2025 07:01  -  15 May 2025 23:59  --------------------------------------------------------  IN: 125 mL / OUT: 800 mL / NET: -675 mL

## 2025-05-15 NOTE — DISCHARGE NOTE PROVIDER - ATTENDING DISCHARGE PHYSICAL EXAMINATION:
PHYSICAL EXAM:  GENERAL: NAD, well-developed  HEAD:  Atraumatic, Normocephalic  EYES: EOMI, PERRLA, conjunctiva and sclera clear  NECK: Supple, No JVD  CHEST/LUNG: Clear to auscultation bilaterally; No wheeze  HEART: Regular rate and rhythm; No murmurs, rubs, or gallops  ABDOMEN: Soft, Nontender, Nondistended + PEG  EXTREMITIES:  2+ Peripheral Pulses, No clubbing, cyanosis, or edema  PSYCH: AAOx3  NEUROLOGY: non-focal  SKIN: No rashes or lesions

## 2025-05-15 NOTE — DISCHARGE NOTE PROVIDER - NSDCFUSCHEDAPPT_GEN_ALL_CORE_FT
Palmer Lassiter  [facility] Physician [facility]  CTSURG [address]  Scheduled Appointment: 05/20/2025    Kari White  [facility] Physician [facility]  GASTRO Doc Off [address]  Scheduled Appointment: 05/20/2025    Anirudh Morin  [facility] PreAdmits  Scheduled Appointment: 05/28/2025    Taty Lieberman  [facility] Physician [facility]  GASTRO  [address]  Scheduled Appointment: 05/28/2025    Peter Tavera  [facility] Physician [facility]  VASCULAR [place] [address]  Scheduled Appointment: 06/30/2025     Anirudh Morin  [facility] PreAdmits  Scheduled Appointment: 05/28/2025    Taty Lieberman  [facility]  GASTRO  [address]  Scheduled Appointment: 05/28/2025    Peter Tavera  [facility]  VASCULAR [place] [address]  Scheduled Appointment: 06/30/2025

## 2025-05-15 NOTE — DISCHARGE NOTE PROVIDER - CARE PROVIDER_API CALL
Kleiner, Morton Jay  Nephrology  [address] [place] [id number]  Phone: [phone]  Fax: [phone]  Established Patient  Follow Up Time:

## 2025-05-15 NOTE — CONSULT NOTE ADULT - NS ATTEST RISK PROBLEM GEN_ALL_CORE FT
1.   [ ] 1 or more chronic illnesses with severe exacerbation, progression, or side effects of treatment  [ ] 1 acute or chronic illnesses or injuries that may pose a threat to life or bodily function    2.  [x ] Personally review and interpretation of  image or testing   [ x] Discussion of management or test interpretation with external physician/other qualified health care professional\appropriate source (not separately reported)    3. [ ] Drug therapy requiring intensive monitoring for toxicity   [ ] Decision regarding elective major surgery, treatment, or procedure with identified patient or procedure risk factors   [ ] Decision regarding emergency major surgery, treatment, or procedure   [ ] Decision regarding hospitalization or escalation of hospital-level of care  [ ] Decision not to resuscitate, not to intubate, or to de-escalate care because of poor prognosis   [ ] Decision to proceed or not with artificial nutrition   [ x] Parenteral controlled substance

## 2025-05-15 NOTE — PROGRESS NOTE ADULT - SUBJECTIVE AND OBJECTIVE BOX
JONAS CELESTIN 59y Male  MRN#: [id number]   Hospital Day: 3d    Oncology following for esophaegal cancer    REVIEW OF SYMPTOMS:  feels better after stent removal   back pain has improved     OBJECTIVE  PAST MEDICAL & SURGICAL HISTORY  HTN (hypertension)    Hiatal hernia    HLD (hyperlipidemia)    CAD (coronary artery disease)    Paroxysmal atrial fibrillation    History of colitis    S/P CABG (coronary artery bypass graft)      ALLERGIES:  penicillins (Unknown)    MEDICATIONS:  STANDING MEDICATIONS  atorvastatin 80 milliGRAM(s) Oral at bedtime  dextrose 5% + lactated ringers. 1000 milliLiter(s) IV Continuous <Continuous>  lidocaine   4% Patch 1 Patch Transdermal daily  megestrol 20 milliGRAM(s) Oral daily  metoprolol tartrate 12.5 milliGRAM(s) Oral two times a day  OLANZapine 2.5 milliGRAM(s) Oral daily  pantoprazole  Injectable 40 milliGRAM(s) IV Push every 12 hours    PRN MEDICATIONS  HYDROmorphone  Injectable 0.5 milliGRAM(s) IV Push every 4 hours PRN  oxycodone    5 mG/acetaminophen 325 mG 1 Tablet(s) Oral every 4 hours PRN      VITAL SIGNS: Last 24 Hours  T(C): 37.1 (15 May 2025 04:42), Max: 37.8 (14 May 2025 20:38)  T(F): 98.8 (15 May 2025 04:42), Max: 100.1 (14 May 2025 20:38)  HR: 95 (15 May 2025 04:42) (84 - 95)  BP: 117/77 (15 May 2025 04:42) (114/69 - 140/67)  BP(mean): 90 (15 May 2025 04:42) (85 - 90)  RR: 18 (15 May 2025 04:42) (18 - 19)  SpO2: 97% (15 May 2025 04:42) (97% - 99%)    LABS:                        7.9    6.15  )-----------( 50       ( 15 May 2025 05:50 )             24.0     05-15    135  |  103  |  14  ----------------------------<  116[H]  4.1   |  21  |  0.7    Ca    8.8      15 May 2025 05:50  Mg     2.1     05-15    TPro  5.4[L]  /  Alb  3.1[L]  /  TBili  1.3[H]  /  DBili  x   /  AST  37  /  ALT  14  /  AlkPhos  337[H]  05-15    PT/INR - ( 15 May 2025 05:50 )   PT: 14.70 sec;   INR: 1.24 ratio         PTT - ( 15 May 2025 05:50 )  PTT:32.3 sec  Urinalysis Basic - ( 15 May 2025 05:50 )    Color: x / Appearance: x / SG: x / pH: x  Gluc: 116 mg/dL / Ketone: x  / Bili: x / Urobili: x   Blood: x / Protein: x / Nitrite: x   Leuk Esterase: x / RBC: x / WBC x   Sq Epi: x / Non Sq Epi: x / Bacteria: x            Urinalysis with Rflx Culture (collected 12 May 2025 17:55)      GENERAL: NAD, comfortable   HEAD:  Atraumatic, Normocephalic  EYES: EOMI, PERRLA, conjunctiva pale and sclera clear  NECK: Supple, No JVD  CHEST/LUNG: Clear to auscultation bilaterally; No wheeze  HEART: Regular rate and rhythm; No murmurs, rubs, or gallops  ABDOMEN: mild tenderness in epigastric area, RUQ   EXTREMITIES:  LLE> RLE trace edema   NEUROLOGY: non-focal  SKIN: No rashes or lesions    Imaging/Procedure   EGD: Friable circumferential mass seen from the GE junction at 38cm to 28 cm of mid esophagus.  Another 5mm lesion was noted at 20cm from incisors in the proximal esophagus.  	Erosions in the stomach compatible with erosive gastritis. (Biopsy).  	A pulsating ulcerated lesion was noted in the fundus. Biopsy was not obtained.  	Normal mucosa in the whole examined duodenum. (Biopsy).    Colonoscopy Impressions:  	Multiple semi-pedunculated polyps were seen in the left colon. A 2cm polyp was seen in the descending colon, biopsy was obtained. Polyps were not removed in the setting of poor prep.  	External hemorrhoids.  	Solid stool noted in the whole colon. Cecum was not reached    CT chest   No definite evidence of thoracic metastatic disease.  2.  Small bilateral pleural effusions.  3.  Marked circumferential thickening of the distal esophagus/hiatal   hernia could be related to provided history of suspected esophageal   cancer versus esophagitis.    Ct abdomen   Redemonstrated prominent submucosal fat/bowel wall thickening involving   descending colon, possibly related to prior episodes of colitis.    MRI Brain   Mild smooth diffuse dural thickening over the cerebral convexities.   This finding is nonspecific and can reflect intracranial hypotension or   recent spinal procedures. In the setting of known neoplasm cannot exclude   dural metastatic disease.   JONAS CELESTIN 59y Male  MRN#: [id number]   Hospital Day: 3d    Oncology following for esophageal cancer    REVIEW OF SYMPTOMS:  feels better after stent removal   back pain has improved     OBJECTIVE  PAST MEDICAL & SURGICAL HISTORY  HTN (hypertension)    Hiatal hernia    HLD (hyperlipidemia)    CAD (coronary artery disease)    Paroxysmal atrial fibrillation    History of colitis    S/P CABG (coronary artery bypass graft)      ALLERGIES:  penicillins (Unknown)    MEDICATIONS:  STANDING MEDICATIONS  atorvastatin 80 milliGRAM(s) Oral at bedtime  dextrose 5% + lactated ringers. 1000 milliLiter(s) IV Continuous <Continuous>  lidocaine   4% Patch 1 Patch Transdermal daily  megestrol 20 milliGRAM(s) Oral daily  metoprolol tartrate 12.5 milliGRAM(s) Oral two times a day  OLANZapine 2.5 milliGRAM(s) Oral daily  pantoprazole  Injectable 40 milliGRAM(s) IV Push every 12 hours    PRN MEDICATIONS  HYDROmorphone  Injectable 0.5 milliGRAM(s) IV Push every 4 hours PRN  oxycodone    5 mG/acetaminophen 325 mG 1 Tablet(s) Oral every 4 hours PRN      VITAL SIGNS: Last 24 Hours  T(C): 37.1 (15 May 2025 04:42), Max: 37.8 (14 May 2025 20:38)  T(F): 98.8 (15 May 2025 04:42), Max: 100.1 (14 May 2025 20:38)  HR: 95 (15 May 2025 04:42) (84 - 95)  BP: 117/77 (15 May 2025 04:42) (114/69 - 140/67)  BP(mean): 90 (15 May 2025 04:42) (85 - 90)  RR: 18 (15 May 2025 04:42) (18 - 19)  SpO2: 97% (15 May 2025 04:42) (97% - 99%)    LABS:                        7.9    6.15  )-----------( 50       ( 15 May 2025 05:50 )             24.0     05-15    135  |  103  |  14  ----------------------------<  116[H]  4.1   |  21  |  0.7    Ca    8.8      15 May 2025 05:50  Mg     2.1     05-15    TPro  5.4[L]  /  Alb  3.1[L]  /  TBili  1.3[H]  /  DBili  x   /  AST  37  /  ALT  14  /  AlkPhos  337[H]  05-15    PT/INR - ( 15 May 2025 05:50 )   PT: 14.70 sec;   INR: 1.24 ratio         PTT - ( 15 May 2025 05:50 )  PTT:32.3 sec  Urinalysis Basic - ( 15 May 2025 05:50 )    Color: x / Appearance: x / SG: x / pH: x  Gluc: 116 mg/dL / Ketone: x  / Bili: x / Urobili: x   Blood: x / Protein: x / Nitrite: x   Leuk Esterase: x / RBC: x / WBC x   Sq Epi: x / Non Sq Epi: x / Bacteria: x            Urinalysis with Rflx Culture (collected 12 May 2025 17:55)      GENERAL: NAD, comfortable   HEAD:  Atraumatic, Normocephalic  EYES: EOMI, PERRLA, conjunctiva pale and sclera clear  NECK: Supple, No JVD  CHEST/LUNG: Clear to auscultation bilaterally; No wheeze  HEART: Regular rate and rhythm; No murmurs, rubs, or gallops  ABDOMEN: mild tenderness in epigastric area, RUQ   EXTREMITIES:  LLE> RLE trace edema   NEUROLOGY: non-focal  SKIN: No rashes or lesions    Imaging/Procedure   EGD: Friable circumferential mass seen from the GE junction at 38cm to 28 cm of mid esophagus.  Another 5mm lesion was noted at 20cm from incisors in the proximal esophagus.  	Erosions in the stomach compatible with erosive gastritis. (Biopsy).  	A pulsating ulcerated lesion was noted in the fundus. Biopsy was not obtained.  	Normal mucosa in the whole examined duodenum. (Biopsy).    Colonoscopy Impressions:  	Multiple semi-pedunculated polyps were seen in the left colon. A 2cm polyp was seen in the descending colon, biopsy was obtained. Polyps were not removed in the setting of poor prep.  	External hemorrhoids.  	Solid stool noted in the whole colon. Cecum was not reached    CT chest   No definite evidence of thoracic metastatic disease.  2.  Small bilateral pleural effusions.  3.  Marked circumferential thickening of the distal esophagus/hiatal   hernia could be related to provided history of suspected esophageal   cancer versus esophagitis.    Ct abdomen   Redemonstrated prominent submucosal fat/bowel wall thickening involving   descending colon, possibly related to prior episodes of colitis.    MRI Brain   Mild smooth diffuse dural thickening over the cerebral convexities.   This finding is nonspecific and can reflect intracranial hypotension or   recent spinal procedures. In the setting of known neoplasm cannot exclude   dural metastatic disease.

## 2025-05-15 NOTE — OCCUPATIONAL THERAPY INITIAL EVALUATION ADULT - VISUAL ASSESSMENT: SCANNING
Patient reports she is still nervous to get in the shower on her own and requesting shower this date. Through [facility], OT able to understand that pt is nervous due to not trusting suction cup grab bar with pt considering installing permanent grab bars. OT educated pt on recommended placement.    Plan for next visit: B ROM/strengthening, home safety    MEDICAL NECESSITY FOR ONGOING SKILLED THERAPY: Patient requires skilled occupational therapy for remediation of deficits to improve activities of daily living, home safety, falls prevention, monitor vital signs, including pulse oximetry, hand/ upper extremity function/range of motion/strength, therapeutic exercise, safety in home, and improve endurance and fatigue management with self care, and functional mobility with durable medical equipment as necessary
WFL

## 2025-05-15 NOTE — PROGRESS NOTE PEDS - ASSESSMENT
60 y/o male with a PMHx of HTN, hiatal hernia, HLD, CAD s/p CABG in Jan 2024, paroxysmal A. fib not on AC, former smoker (30 pack years, quit 1yr ago), s/p EUS/ EGD of Esophageal mass followed by stent placement who now presents from home for multiple episodes of melena.  Patient notes yesterday he was going to an appointment and had a large black bowel movement.  Patient had additional 3-4 bowel movements this way.  Patient notes some weakness and at one point felt he could not support his own body weight.  Patient currently comfortable.  Patient given blood product a total of 1 unit along with plasma.  Patient also given IV vitamin K.  Patient without additional stools.  Patient also notes that he wants the esophageal stent out as he feels uncomfortable with it and no longer wishes to have it. He is not on any directed treatment for esophageal cancer at this time. Patient has relatively stable blood counts currently.  Patient has improvement in the INR as was supratherapeutic at over 6 on admission.  Maintain n.p.o. and plan EGD with evaluation, hemostasis, and removal of stent.  Discussed with patient that if stent removes especially if no treatment ongoing how will he tolerate feeds.  Will discuss with him also possible PEG tube if he is amendable to this.    Esophageal Cancer / Prior Stent placement   Dysphagia   - Hemodynamics currently stable  - s/p EGD with removal of esophogeal stent and placement , PEG placement   - can use peg for feeding and medications  - set up of feeds and patient education per primary team  - follow up with medical oncology and radio onc as outpatient    Recall [facility] as needed

## 2025-05-15 NOTE — CONSULT NOTE ADULT - ATTENDING COMMENTS
59M w/ newly diagnosed esophageal adenocarcinoma, pMMR, at least T2 disease. Pt is quite frail. Unsure if he will tolerate FLOT, likely will benefit chemoRT. Outpatient f/u to start tx.    Thrombocytopenia and coagulopathy possibly due to acute illness, vit K def and DIC from malignancy. Had no receive heparin during this admission. Will review smear.
59M with PMHx of HTN, hiatal hernia, HLD, CAD s/p CABG, paroxysmal A. fib not on AC, former smoker recently diagnosed with signet cell esophageal adenocarcinoma with extensive weight loss over the last few months currently p/w proximal > distal leg > arm weakness possible myopathic/neuromuscular process r/o structural etiology neuraxis.  Recommendations as above.
59M w/ Esophageal Cancer (dx Apr2025) s/p stent, CAD s/p [id number] (Jan2024), Afib not on AC, HLD is admitted for weakness, acute blood loss anemia/melena requiring pRBC transfusion. Patient completed endoscopy 5/14 for removal of esophageal stent (due to discomfort) and for PEG placement. Palliative consulted to assist w/ cancer-related pain management.    Patient seen at bedside with Dr. Davis. Patient with minimal PRN use in 24 hours, but notes significant pain and difficulty getting PRNs at times.  Spoke with him at bedside. Will change PO PRN opioid to oxycodone 10mg PRN as patient with minimal improvement with percocet 5-325.  Will add dilaudid 1mg IV PRN for breakthrough pain with anticipation to transition solely to PO opioids in next 24 hours.    Discussed with Dr. French  Will follow

## 2025-05-15 NOTE — OCCUPATIONAL THERAPY INITIAL EVALUATION ADULT - GENERAL OBSERVATIONS, REHAB EVAL
Pt encountered seated at b/s recliner with spouse at bedside +IV locked +peg tube with abdominal binder. Pt agreed to OT/PT Sylvie session at bedside, reported right hamstring and abdominal discomfort with movement(didn't quantify the pain). Pt left in chair mode in bed in NAD, SIDDHARTHA Parmar aware.

## 2025-05-15 NOTE — CONSULT NOTE ADULT - ASSESSMENT
59M w/ Esophageal Cancer (dx Apr2025) s/p stent, CAD s/p [id number] (Jan2024), Afib not on AC, HLD is admitted for weakness, acute blood loss anemia/melena requiring pRBC transfusion. Patient completed endoscopy 5/14 for removal of esophageal stent (due to discomfort) and for PEG placement. Palliative consulted to assist w/ cancer-related pain management.    - Opioid regimen adjusted based upon reported intermittent pain that the patient has been experiencing. Will stop percocet 5/325 tabs and transition to oxycodone IR 10mg q4hr PRN moderate-to-severe pain. If the patient cannot tolerate oxycodone then will have Hydromorphone 1mg IV q3hr PRN severe pain available for tonight. I expect we can bridge to oral regimen only tomorrow and will make dose titration as appropriate.  - Patient is now s/p PEG however is ordered for po at this time as well. Starting miralax daily as bowel regimen while ordered for opioids- can be held for diarrhea or BM>2 daily  - Agree with neurology that further imaging of the spine should be completed to better understand etiology of back pain and weakness of lower extremities  - Anorexia is associated w/ his cancer. He is already being treated w/ olanzapine 2.5mg d and megesterol 20mg d which can be continued for now and titrated as appropriate as an outpatient.  - patient evaluated w/ Palliative Attending Dr. Kavya Davis MD  Palliative Medicine Fellow  [facility]   [phone]

## 2025-05-15 NOTE — PROGRESS NOTE PEDS - NUTRITIONAL ASSESSMENT
This patient has been assessed with a concern for Malnutrition and has been determined to have a diagnosis/diagnoses of Severe protein-calorie malnutrition.    This patient is being managed with:   Diet NPO-  Except Medications  Tube Feeding Modality: Gastrostomy  Jevity 1.2 Jeff (JEVITY1.2RTH)  Total Volume for 24 Hours (mL): 1350  Bolus  Total # of Feeds: 3  Tube Feed Frequency: Every 8 hours   Tube Feed Start Time: 18:00  Bolus Feed Rate (mL per Hour): 75   Bolus Feed Duration (in Hours): 4  Free Water Flush  Bolus   Total Volume per Flush (mL): 250   Total Volume of Bolus (mL):  450   Frequency: Every 4 Hours  Entered: May 14 2025  5:31PM    Diet NPO after Midnight-     NPO Start Date: 12-May-2025   NPO Start Time: 23:59  Except Medications  Entered: May 12 2025 11:09PM

## 2025-05-15 NOTE — CONSULT NOTE ADULT - ASSESSMENT
INCOMPLETE 59M with PMHx of HTN, hiatal hernia, HLD, CAD s/p CABG, paroxysmal A. fib not on AC, former smoker (30 pack years, quit 1yr ago), recently diagnosed with colitis and esophageal adenocarcinoma who presents with b/l weakness. PE demonstrates  distal strength> proximal strength in all extremities and +straight leg test. MR head shows mild smooth diffuse dural thickening over the cerebral convexities. Differential includes paraneoplastic syndrome, metastatic disease, as well as spinal cord injury.    Recommendations:   -CTL full spine 59M with PMHx of HTN, hiatal hernia, HLD, CAD s/p CABG, paroxysmal A. fib not on AC, former smoker (30 pack years, quit 1yr ago), recently diagnosed with colitis and esophageal adenocarcinoma who presents with b/l weakness. PE demonstrates  distal strength> proximal strength in all extremities and +straight leg test. MR head shows mild smooth diffuse dural thickening over the cerebral convexities. Differential includes paraneoplastic syndrome, metastatic disease, as well as spinal cord injury.    Recommendations:   -MRI CTL with and without contrast  -CK level  -myomarker  -N-aldolase  59M with PMHx of HTN, hiatal hernia, HLD, CAD s/p CABG, paroxysmal A. fib not on AC, former smoker (30 pack years, quit 1yr ago), recently diagnosed with colitis and esophageal adenocarcinoma who presents with b/l weakness. PE demonstrates  distal strength> proximal strength in all extremities and +straight leg test. MR head shows mild smooth diffuse dural thickening over the cerebral convexities. Differential includes paraneoplastic syndrome, metastatic disease, as well as spinal cord injury.    Recommendations:   -MRI C/T/LS with and without contrast  -check myomarker 3 panel, CK, aldolase, TSH, AChR abs, MuSK abs, Voltage-gated Ca channel abs  -paraneoplastic antibodies  -consider LP for CSF pending MR neuraxis  -discussed with hospitalist

## 2025-05-15 NOTE — CONSULT NOTE ADULT - SUBJECTIVE AND OBJECTIVE BOX
Neurology Consult    Patient is a 59y old  Male who presents with a chief complaint of Progressive weakness (15 May 2025 10:35)      HPI:  59M with PMHx of HTN, hiatal hernia, HLD, CAD s/p CABG in Jan 2024, paroxysmal A. fib not on AC, former smoker (30 pack years, quit 1yr ago), recently diagnosed with colitis and esophageal adenocarcinoma s/p stent placement who presents with b/l weakness. The patient was recently discharged from [facility] on 5/6 , and has been progressively deteriorating. States he has been unable to tolerate p.o. and has been unable to walk over the past few days. Patient has not received an oncological treatment yet. He c/o generalized fatigue with weakness predominatel    On ROS the patient also states he has been having black tarry stools that started 1 day prior to his EGD. He had 3 black BMs today. He denies any mucosal bleeding or blood in the urine.  He denies any fevers, chills, chest pain, shortness of breath, palpitations, headache, vision changes, nausea, vomiting, abdominal pain, hematuria, falls.  He is supposed to have PET scan tomorrow and follow with Dr. Sanchez in 2 days.          PAST MEDICAL & SURGICAL HISTORY:  HTN (hypertension)      Hiatal hernia      HLD (hyperlipidemia)      CAD (coronary artery disease)      Paroxysmal atrial fibrillation      History of colitis      S/P CABG (coronary artery bypass graft)          FAMILY HISTORY:  FHx: lung cancer (Father)        Social History: (-) x 3    Allergies    penicillins (Unknown)    Intolerances        MEDICATIONS  (STANDING):  atorvastatin 80 milliGRAM(s) Oral at bedtime  dextrose 5% + lactated ringers. 1000 milliLiter(s) (75 mL/Hr) IV Continuous <Continuous>  lidocaine   4% Patch 1 Patch Transdermal daily  megestrol 20 milliGRAM(s) Oral daily  metoprolol tartrate 12.5 milliGRAM(s) Oral two times a day  OLANZapine 2.5 milliGRAM(s) Oral daily  pantoprazole  Injectable 40 milliGRAM(s) IV Push every 12 hours    MEDICATIONS  (PRN):  HYDROmorphone  Injectable 0.5 milliGRAM(s) IV Push every 4 hours PRN Severe Pain (7 - 10)  oxycodone    5 mG/acetaminophen 325 mG 1 Tablet(s) Oral every 4 hours PRN Severe Pain (7 - 10)      Review of systems:    Constitutional: as per HPI  Eyes: No eye pain or discharge  ENMT:  No difficulty hearing; No sinus or throat pain  Neck: No pain or stiffness  Respiratory: No cough, wheezing, chills or hemoptysis  Cardiovascular: No chest pain, palpitations, shortness of breath, dyspnea on exertion  Gastrointestinal: No abdominal pain, nausea, vomiting or hematemesis; No diarrhea or constipation.   Genitourinary: No dysuria, frequency, hematuria or incontinence  Neurological: As per HPI  Skin: No rashes or lesions   Endocrine: No heat or cold intolerance; No hair loss  Musculoskeletal: No joint pain or swelling  Psychiatric: No depression, anxiety, mood swings  Heme/Lymph: No easy bruising or bleeding gums    Vital Signs Last 24 Hrs  T(C): 37.1 (15 May 2025 04:42), Max: 37.8 (14 May 2025 20:38)  T(F): 98.8 (15 May 2025 04:42), Max: 100.1 (14 May 2025 20:38)  HR: 95 (15 May 2025 04:42) (84 - 95)  BP: 117/77 (15 May 2025 04:42) (114/69 - 140/67)  BP(mean): 90 (15 May 2025 04:42) (85 - 90)  RR: 18 (15 May 2025 04:42) (18 - 19)  SpO2: 97% (15 May 2025 04:42) (97% - 99%)    Parameters below as of 15 May 2025 04:42  Patient On (Oxygen Delivery Method): room air        Examination:  General:  Appearance is consistent with chronologic age.  No abnormal facies.  Gross skin survey within normal limits.    Cognitive/Language:  The patient is oriented to person, place, time and date.  Recent and remote memory intact.  Fund of knowledge is intact and normal.  Language with normal repetition, comprehension and naming.  Nondysarthric.    Eyes: intact VA, VFF.  EOMI w/o nystagmus, skew or reported double vision.  PERRL.  No ptosis/weakness of eyelid closure.    Face:  Facial sensation normal V1 - 3, no facial asymmetry.    Ears/Nose/Throat:  Hearing grossly intact b/l.  Palate elevates midline.  Tongue and uvula midline.   Motor examination:   Normal tone, bulk and range of motion.  No tenderness, twitching, tremors or involuntary movements.  Formal Muscle Strength Testing: (MRC grade R/L) 5/5 UE; 5/5 LE.  No observable drift.  Reflexes:   2+ b/l pectoralis, biceps, triceps, brachioradialis, patella and Achilles.  Plantar response downgoing b/l.  Jaw jerk, Zita, clonus absent.  Sensory examination:   Intact to light touch and pinprick, pain, temperature and proprioception and vibration in all extremities.  Cerebellum:   FTN/HKS intact with normal TAYLOR in all limbs.  No dysmetria or dysdiadokinesia.  Gait narrow based and normal.    Respiratory:  no audible wheezing or inspiratory stridor.  no use of accessory muscles.   Cardiac: pulse palpable, no audible bruits  Abdomen: supple, no guarding, no TTP    Labs:   CBC Full  -  ( 15 May 2025 05:50 )  WBC Count : 6.15 K/uL  RBC Count : 3.04 M/uL  Hemoglobin : 7.9 g/dL  Hematocrit : 24.0 %  Platelet Count - Automated : 50 K/uL  Mean Cell Volume : 78.9 fL  Mean Cell Hemoglobin : 26.0 pg  Mean Cell Hemoglobin Concentration : 32.9 g/dL  Auto Neutrophil # : x  Auto Lymphocyte # : x  Auto Monocyte # : x  Auto Eosinophil # : x  Auto Basophil # : x  Auto Neutrophil % : x  Auto Lymphocyte % : x  Auto Monocyte % : x  Auto Eosinophil % : x  Auto Basophil % : x    05-15    135  |  103  |  14  ----------------------------<  116[H]  4.1   |  21  |  0.7    Ca    8.8      15 May 2025 05:50  Mg     2.1     05-15    TPro  5.4[L]  /  Alb  3.1[L]  /  TBili  1.3[H]  /  DBili  x   /  AST  37  /  ALT  14  /  AlkPhos  337[H]  05-15    LIVER FUNCTIONS - ( 15 May 2025 05:50 )  Alb: 3.1 g/dL / Pro: 5.4 g/dL / ALK PHOS: 337 U/L / ALT: 14 U/L / AST: 37 U/L / GGT: x           PT/INR - ( 15 May 2025 05:50 )   PT: 14.70 sec;   INR: 1.24 ratio         PTT - ( 15 May 2025 05:50 )  PTT:32.3 sec  Urinalysis Basic - ( 15 May 2025 05:50 )    Color: x / Appearance: x / SG: x / pH: x  Gluc: 116 mg/dL / Ketone: x  / Bili: x / Urobili: x   Blood: x / Protein: x / Nitrite: x   Leuk Esterase: x / RBC: x / WBC x   Sq Epi: x / Non Sq Epi: x / Bacteria: x          Neuroimaging:  NCHCT:   < from: CT Head No Cont (05.13.25 @ 15:51) >  INTERPRETATION:  CLINICAL INDICATION: Stroke, left facial droop.    TECHNIQUE: CT of the head was performed without the administration of   intravenous contrast.    COMPARISON: CT head dated 1/15/2024.    FINDINGS:    There is no evidence of acute territorial infarct or intracranial   hemorrhage. There is no space-occupying lesion or midline shift.    There is no evidence of hydrocephalus. Thereare no extra-axial fluid   collections.    The visualized intraorbital contents are normal. Mild mucosal thickening   bilateral sphenoid sinuses. The mastoid air cells are aerated. The   visualized soft tissues and osseous structures appear normal.      IMPRESSION:    No acute intracranial pathology.    < end of copied text >    MR head with and w/o contrast  < from: MR Head w/wo IV Cont (05.14.25 @ 09:48) >  INTERPRETATION:  Clinical history: Left facial droop. Rule out stroke.   History of esophageal adenocarcinoma.    TECHNIQUE: MRI brain with and without contrast. Multiplanar   multisequential MRI of the brain was performed before and following the   intravenous administration of 9 cc Gadavist (1 cc discarded) on the 3   Lori magnet.    Correlation with CT head dated 5/13/2025 and 6/6/2017.    FINDINGS:    The ventricles and cortical sulci are normal in size and configuration.    There are 2-3 punctate foci of T2/FLAIR signal hyperintensity within the   cerebral gray-white matter likely in the basis of minimal chronic   microvascular changes.    There is no evidence of acute intracranial hemorrhage, extra-axial fluid   collection or midline shift.    There is no diffusion abnormality to suggest acute/subacute infarct.   There is normal flow void present within the major vascular structures.    There is mild smooth diffuse dural thickening/enhancement over the   cerebral convexities. No abnormal leptomeningeal or parenchymal   enhancement is demonstrated. A left frontal superficial developmental   venous anomaly is incidentallynoted.    There is heterogeneous marrow signal of the calvarium.    There is a 9 mm enhancing lesion within the right frontal bone on series   9 image 131. This corresponds to a focal lucency on CT which demonstrates   CT stability dating back to 6/6/2017 and is therefore felt to be benign.    The pituitary is normal in size. The cerebellar tonsils are normal in   position.    Small left mastoid fluid is noted. There is mild mucosal thickening   within the sphenoid sinuses. The globes and orbits are unremarkable.    IMPRESSION:    1.  No acute infarct or intracranial hemorrhage.    2.  Mild smooth diffuse dural thickening over the cerebral convexities.   This finding is nonspecific and can reflect intracranial hypotension or   recent spinal procedures. In the setting of known neoplasm cannot exclude   dural metastatic disease.    3.  Heterogeneous marrow signal of the calvarium, nonspecific.    --- End of Report ---    < end of copied text >       Neurology Consult    HPI:  59M with PMHx of HTN, hiatal hernia, HLD, CAD s/p CABG, paroxysmal A. fib not on AC, former smoker (30 pack years, quit 1yr ago), recently diagnosed with colitis and esophageal adenocarcinoma who presents with b/l weakness. The patient was recently discharged from [facility] on 5/6 , and has been progressively deteriorating. He came to the ED because he was having difficulty walking and it became unmanageable at home. Patient has not received any oncological treatment yet. He c/o generalized fatigue with weakness predominately in the upper legs. Patient also states that during his last hospitalization he began experiencing back pain from the hospital bed. The pain is localized to the midback and does not radiate anywhere. He denies any associated leg pain. He denies any neck pain. He denies any loss of sensation in upper or lower extremities. He denies any bladder incontinence or perineal anesthesia. He has some bowel incontinence with black stool which he attributes to colitis. He reports decreased oral intake recently which he feels is contributing to the weakness and fatigue. He denies any burning sensation in the hands and feet or any pins and needles sensation in the extremities. Denies any h/o back surgery, falls, or LPs. Denies any vision changes or memory difficulties.       PAST MEDICAL & SURGICAL HISTORY:  HTN (hypertension)      Hiatal hernia      HLD (hyperlipidemia)      CAD (coronary artery disease)      Paroxysmal atrial fibrillation      History of colitis      S/P CABG (coronary artery bypass graft)          FAMILY HISTORY:  FHx: lung cancer (Father)      Allergies    penicillins (Unknown)        MEDICATIONS  (STANDING):  atorvastatin 80 milliGRAM(s) Oral at bedtime  dextrose 5% + lactated ringers. 1000 milliLiter(s) (75 mL/Hr) IV Continuous <Continuous>  lidocaine   4% Patch 1 Patch Transdermal daily  megestrol 20 milliGRAM(s) Oral daily  metoprolol tartrate 12.5 milliGRAM(s) Oral two times a day  OLANZapine 2.5 milliGRAM(s) Oral daily  pantoprazole  Injectable 40 milliGRAM(s) IV Push every 12 hours    MEDICATIONS  (PRN):  HYDROmorphone  Injectable 0.5 milliGRAM(s) IV Push every 4 hours PRN Severe Pain (7 - 10)  oxycodone    5 mG/acetaminophen 325 mG 1 Tablet(s) Oral every 4 hours PRN Severe Pain (7 - 10)      Review of systems: as per HPI    Vital Signs Last 24 Hrs  T(C): 37.1 (15 May 2025 04:42), Max: 37.8 (14 May 2025 20:38)  T(F): 98.8 (15 May 2025 04:42), Max: 100.1 (14 May 2025 20:38)  HR: 95 (15 May 2025 04:42) (84 - 95)  BP: 117/77 (15 May 2025 04:42) (114/69 - 140/67)  BP(mean): 90 (15 May 2025 04:42) (85 - 90)  RR: 18 (15 May 2025 04:42) (18 - 19)  SpO2: 97% (15 May 2025 04:42) (97% - 99%)    Parameters below as of 15 May 2025 04:42  Patient On (Oxygen Delivery Method): room air        Examination:  General:  Appearance is consistent with chronologic age.  No abnormal facies.    Cognitive/Language:  The patient is oriented to person, place, (somewhat disoriented to date and year but knows month).  Recent and remote memory intact.  Fund of knowledge is intact and normal. Nondysarthric.    Eyes: intact EOMI w/o nystagmus, skew or reported double vision.  PERRL.  No ptosis/weakness of eyelid closure.    Face:  Facial sensation normal V1 - 3, no facial asymmetry.    Ears/Nose/Throat:  Hearing grossly intact b/l. Tongue midline.   Motor examination:   Normal tone, and range of motion.  Decreased bulk. No tenderness, twitching, tremors or involuntary movements.  Formal Muscle Strength Testing: (MRC grade R/L) shoulder abduction 4/5, elbow flexion 4+/5, finger extension/ flexion 5/5, hip flexion 3/5, hip abduction/ adduction 3+/5, knee flexion 3+/5, ankle flexion/extension 5/5.  UE; 5/5 LE.  + straight leg test.  Reflexes:   1+ b/l pectoralis, biceps, triceps, brachioradialis, patella and Achilles.  Plantar response downgoing b/l. Zita, clonus absent.  Sensory examination:   Intact to light touch and pinprick, pain, proprioception and vibration in all extremities.    Labs:   CBC Full  -  ( 15 May 2025 05:50 )  WBC Count : 6.15 K/uL  RBC Count : 3.04 M/uL  Hemoglobin : 7.9 g/dL  Hematocrit : 24.0 %  Platelet Count - Automated : 50 K/uL  Mean Cell Volume : 78.9 fL  Mean Cell Hemoglobin : 26.0 pg  Mean Cell Hemoglobin Concentration : 32.9 g/dL  Auto Neutrophil # : x  Auto Lymphocyte # : x  Auto Monocyte # : x  Auto Eosinophil # : x  Auto Basophil # : x  Auto Neutrophil % : x  Auto Lymphocyte % : x  Auto Monocyte % : x  Auto Eosinophil % : x  Auto Basophil % : x    05-15    135  |  103  |  14  ----------------------------<  116[H]  4.1   |  21  |  0.7    Ca    8.8      15 May 2025 05:50  Mg     2.1     05-15    TPro  5.4[L]  /  Alb  3.1[L]  /  TBili  1.3[H]  /  DBili  x   /  AST  37  /  ALT  14  /  AlkPhos  337[H]  05-15    LIVER FUNCTIONS - ( 15 May 2025 05:50 )  Alb: 3.1 g/dL / Pro: 5.4 g/dL / ALK PHOS: 337 U/L / ALT: 14 U/L / AST: 37 U/L / GGT: x           PT/INR - ( 15 May 2025 05:50 )   PT: 14.70 sec;   INR: 1.24 ratio         PTT - ( 15 May 2025 05:50 )  PTT:32.3 sec  Urinalysis Basic - ( 15 May 2025 05:50 )    Color: x / Appearance: x / SG: x / pH: x  Gluc: 116 mg/dL / Ketone: x  / Bili: x / Urobili: x   Blood: x / Protein: x / Nitrite: x   Leuk Esterase: x / RBC: x / WBC x   Sq Epi: x / Non Sq Epi: x / Bacteria: x          Neuroimaging:  NCHCT:   < from: CT Head No Cont (05.13.25 @ 15:51) >  INTERPRETATION:  CLINICAL INDICATION: Stroke, left facial droop.    TECHNIQUE: CT of the head was performed without the administration of   intravenous contrast.    COMPARISON: CT head dated 1/15/2024.    FINDINGS:    There is no evidence of acute territorial infarct or intracranial   hemorrhage. There is no space-occupying lesion or midline shift.    There is no evidence of hydrocephalus. Thereare no extra-axial fluid   collections.    The visualized intraorbital contents are normal. Mild mucosal thickening   bilateral sphenoid sinuses. The mastoid air cells are aerated. The   visualized soft tissues and osseous structures appear normal.      IMPRESSION:    No acute intracranial pathology.    < end of copied text >    MR head with and w/o contrast  < from: MR Head w/wo IV Cont (05.14.25 @ 09:48) >  INTERPRETATION:  Clinical history: Left facial droop. Rule out stroke.   History of esophageal adenocarcinoma.    TECHNIQUE: MRI brain with and without contrast. Multiplanar   multisequential MRI of the brain was performed before and following the   intravenous administration of 9 cc Gadavist (1 cc discarded) on the 3   Lori magnet.    Correlation with CT head dated 5/13/2025 and 6/6/2017.    FINDINGS:    The ventricles and cortical sulci are normal in size and configuration.    There are 2-3 punctate foci of T2/FLAIR signal hyperintensity within the   cerebral gray-white matter likely in the basis of minimal chronic   microvascular changes.    There is no evidence of acute intracranial hemorrhage, extra-axial fluid   collection or midline shift.    There is no diffusion abnormality to suggest acute/subacute infarct.   There is normal flow void present within the major vascular structures.    There is mild smooth diffuse dural thickening/enhancement over the   cerebral convexities. No abnormal leptomeningeal or parenchymal   enhancement is demonstrated. A left frontal superficial developmental   venous anomaly is incidentallynoted.    There is heterogeneous marrow signal of the calvarium.    There is a 9 mm enhancing lesion within the right frontal bone on series   9 image 131. This corresponds to a focal lucency on CT which demonstrates   CT stability dating back to 6/6/2017 and is therefore felt to be benign.    The pituitary is normal in size. The cerebellar tonsils are normal in   position.    Small left mastoid fluid is noted. There is mild mucosal thickening   within the sphenoid sinuses. The globes and orbits are unremarkable.    IMPRESSION:    1.  No acute infarct or intracranial hemorrhage.    2.  Mild smooth diffuse dural thickening over the cerebral convexities.   This finding is nonspecific and can reflect intracranial hypotension or   recent spinal procedures. In the setting of known neoplasm cannot exclude   dural metastatic disease.    3.  Heterogeneous marrow signal of the calvarium, nonspecific.    --- End of Report ---    < end of copied text >       Neurology Consult    HPI:  59M with PMHx of HTN, hiatal hernia, HLD, CAD s/p CABG, paroxysmal A. fib not on AC, former smoker (30 pack years, quit 1yr ago), recently diagnosed with colitis and esophageal adenocarcinoma who presents with b/l weakness. The patient was recently discharged from [facility] on 5/6 , and has been progressively deteriorating. He came to the ED because he was having difficulty walking and it became unmanageable at home. Patient has not received any oncological treatment yet. He c/o generalized fatigue with weakness predominately in the upper legs. Patient also states that during his last hospitalization he began experiencing back pain from the hospital bed. The pain is localized to the midback and does not radiate anywhere. He denies any associated leg pain. He denies any neck pain. He denies any loss of sensation in upper or lower extremities. He denies any bladder incontinence or perineal anesthesia. He has some bowel incontinence with black stool which he attributes to colitis. He reports decreased oral intake recently which he feels is contributing to the weakness and fatigue. He denies any burning sensation in the hands and feet or any pins and needles sensation in the extremities. Denies any h/o back surgery, falls, or LPs. Denies any vision changes or memory difficulties.       PAST MEDICAL & SURGICAL HISTORY:  HTN (hypertension)      Hiatal hernia      HLD (hyperlipidemia)      CAD (coronary artery disease)      Paroxysmal atrial fibrillation      History of colitis      S/P CABG (coronary artery bypass graft)          FAMILY HISTORY:  FHx: lung cancer (Father)      Allergies    penicillins (Unknown)        MEDICATIONS  (STANDING):  atorvastatin 80 milliGRAM(s) Oral at bedtime  dextrose 5% + lactated ringers. 1000 milliLiter(s) (75 mL/Hr) IV Continuous <Continuous>  lidocaine   4% Patch 1 Patch Transdermal daily  megestrol 20 milliGRAM(s) Oral daily  metoprolol tartrate 12.5 milliGRAM(s) Oral two times a day  OLANZapine 2.5 milliGRAM(s) Oral daily  pantoprazole  Injectable 40 milliGRAM(s) IV Push every 12 hours    MEDICATIONS  (PRN):  HYDROmorphone  Injectable 0.5 milliGRAM(s) IV Push every 4 hours PRN Severe Pain (7 - 10)  oxycodone    5 mG/acetaminophen 325 mG 1 Tablet(s) Oral every 4 hours PRN Severe Pain (7 - 10)      Review of systems: as per HPI    Vital Signs Last 24 Hrs  T(C): 37.1 (15 May 2025 04:42), Max: 37.8 (14 May 2025 20:38)  T(F): 98.8 (15 May 2025 04:42), Max: 100.1 (14 May 2025 20:38)  HR: 95 (15 May 2025 04:42) (84 - 95)  BP: 117/77 (15 May 2025 04:42) (114/69 - 140/67)  BP(mean): 90 (15 May 2025 04:42) (85 - 90)  RR: 18 (15 May 2025 04:42) (18 - 19)  SpO2: 97% (15 May 2025 04:42) (97% - 99%)    Parameters below as of 15 May 2025 04:42  Patient On (Oxygen Delivery Method): room air        Examination:  General:  Appearance is consistent with chronologic age.  No abnormal facies.    Cognitive/Language:  The patient is oriented to person, place, and knows the date is may 15th.  Recent and remote memory intact.  Fund of knowledge is intact and normal. Nondysarthric.    Eyes: intact EOMI w/o nystagmus, skew or reported double vision.  PERRL.  No ptosis/weakness of eyelid closure.    Face:  Facial sensation normal V1 - 3, downward tilt of the left aspect of patient's mouth noted, however no facial weakness elicited on formal testing.  Ears/Nose/Throat:  Hearing grossly intact b/l. Tongue midline.   Motor examination:   Normal tone, and range of motion.  Decreased bulk. No tenderness, twitching, tremors or involuntary movements.  Formal Muscle Strength Testing: (MRC grade R/L) shoulder abduction 4/5, elbow flexion 4+/5, finger extension/ flexion 5/5, hip flexion 3/5, hip abduction/ adduction 3+/5, knee flexion 3+/5, ankle flexion/extension 5/5.  UE; 5/5 LE.  b/l + straight leg test.  Reflexes:   1+ b/l pectoralis, biceps, triceps, and brachioradialis, 0 b/l patella and Achilles.  Plantar response downgoing b/l. Zita, clonus absent.  Sensory examination:   Intact to light touch and pinprick, pain, proprioception and vibration in all extremities.    Labs:   CBC Full  -  ( 15 May 2025 05:50 )  WBC Count : 6.15 K/uL  RBC Count : 3.04 M/uL  Hemoglobin : 7.9 g/dL  Hematocrit : 24.0 %  Platelet Count - Automated : 50 K/uL  Mean Cell Volume : 78.9 fL  Mean Cell Hemoglobin : 26.0 pg  Mean Cell Hemoglobin Concentration : 32.9 g/dL  Auto Neutrophil # : x  Auto Lymphocyte # : x  Auto Monocyte # : x  Auto Eosinophil # : x  Auto Basophil # : x  Auto Neutrophil % : x  Auto Lymphocyte % : x  Auto Monocyte % : x  Auto Eosinophil % : x  Auto Basophil % : x    05-15    135  |  103  |  14  ----------------------------<  116[H]  4.1   |  21  |  0.7    Ca    8.8      15 May 2025 05:50  Mg     2.1     05-15    TPro  5.4[L]  /  Alb  3.1[L]  /  TBili  1.3[H]  /  DBili  x   /  AST  37  /  ALT  14  /  AlkPhos  337[H]  05-15    LIVER FUNCTIONS - ( 15 May 2025 05:50 )  Alb: 3.1 g/dL / Pro: 5.4 g/dL / ALK PHOS: 337 U/L / ALT: 14 U/L / AST: 37 U/L / GGT: x           PT/INR - ( 15 May 2025 05:50 )   PT: 14.70 sec;   INR: 1.24 ratio         PTT - ( 15 May 2025 05:50 )  PTT:32.3 sec  Urinalysis Basic - ( 15 May 2025 05:50 )    Color: x / Appearance: x / SG: x / pH: x  Gluc: 116 mg/dL / Ketone: x  / Bili: x / Urobili: x   Blood: x / Protein: x / Nitrite: x   Leuk Esterase: x / RBC: x / WBC x   Sq Epi: x / Non Sq Epi: x / Bacteria: x          Neuroimaging:  NCHCT:   < from: CT Head No Cont (05.13.25 @ 15:51) >  INTERPRETATION:  CLINICAL INDICATION: Stroke, left facial droop.    TECHNIQUE: CT of the head was performed without the administration of   intravenous contrast.    COMPARISON: CT head dated 1/15/2024.    FINDINGS:    There is no evidence of acute territorial infarct or intracranial   hemorrhage. There is no space-occupying lesion or midline shift.    There is no evidence of hydrocephalus. Thereare no extra-axial fluid   collections.    The visualized intraorbital contents are normal. Mild mucosal thickening   bilateral sphenoid sinuses. The mastoid air cells are aerated. The   visualized soft tissues and osseous structures appear normal.      IMPRESSION:    No acute intracranial pathology.    < end of copied text >    MR head with and w/o contrast  < from: MR Head w/wo IV Cont (05.14.25 @ 09:48) >  INTERPRETATION:  Clinical history: Left facial droop. Rule out stroke.   History of esophageal adenocarcinoma.    TECHNIQUE: MRI brain with and without contrast. Multiplanar   multisequential MRI of the brain was performed before and following the   intravenous administration of 9 cc Gadavist (1 cc discarded) on the 3   Lori magnet.    Correlation with CT head dated 5/13/2025 and 6/6/2017.    FINDINGS:    The ventricles and cortical sulci are normal in size and configuration.    There are 2-3 punctate foci of T2/FLAIR signal hyperintensity within the   cerebral gray-white matter likely in the basis of minimal chronic   microvascular changes.    There is no evidence of acute intracranial hemorrhage, extra-axial fluid   collection or midline shift.    There is no diffusion abnormality to suggest acute/subacute infarct.   There is normal flow void present within the major vascular structures.    There is mild smooth diffuse dural thickening/enhancement over the   cerebral convexities. No abnormal leptomeningeal or parenchymal   enhancement is demonstrated. A left frontal superficial developmental   venous anomaly is incidentallynoted.    There is heterogeneous marrow signal of the calvarium.    There is a 9 mm enhancing lesion within the right frontal bone on series   9 image 131. This corresponds to a focal lucency on CT which demonstrates   CT stability dating back to 6/6/2017 and is therefore felt to be benign.    The pituitary is normal in size. The cerebellar tonsils are normal in   position.    Small left mastoid fluid is noted. There is mild mucosal thickening   within the sphenoid sinuses. The globes and orbits are unremarkable.    IMPRESSION:    1.  No acute infarct or intracranial hemorrhage.    2.  Mild smooth diffuse dural thickening over the cerebral convexities.   This finding is nonspecific and can reflect intracranial hypotension or   recent spinal procedures. In the setting of known neoplasm cannot exclude   dural metastatic disease.    3.  Heterogeneous marrow signal of the calvarium, nonspecific.    --- End of Report ---    < end of copied text >

## 2025-05-15 NOTE — SWALLOW BEDSIDE ASSESSMENT ADULT - SLP PERTINENT HISTORY OF CURRENT PROBLEM
58 y/o male with a PMHx of HTN, hiatal hernia, HLD, CAD s/p CABG in Jan 2024, paroxysmal A. fib not on AC, former smoker (30 pack years, quit 1yr ago), s/p EUS/ EGD of Esophageal mass followed by stent placement who now presents from home for multiple episodes of melena.  Patient notes yesterday he was going to an appointment and had a large black bowel movement.  Patient also notes that he wants the esophageal stent out as he feels uncomfortable with it and no longer wishes to have it. He is not on any directed treatment for esophageal cancer at this time. Patient has relatively stable blood counts currently.  Patient has improvement in the INR as was supratherapeutic at over 6 on admission.

## 2025-05-15 NOTE — DISCHARGE NOTE PROVIDER - NSDCCPCAREPLAN_GEN_ALL_CORE_FT
PRINCIPAL DISCHARGE DIAGNOSIS  Diagnosis: Esophageal cancer  Assessment and Plan of Treatment: During your recent hospital stay, we addressed issues related to your esophageal cancer diagnosis. Initially, a stent was placed in your esophagus, which you later chose to remove due to discomfort. You experienced a decrease in blood levels and signs of internal bleeding, believed to be caused by the cancer, requiring several blood transfusions. You also reported weakness in your legs, and scans indicated potential cancer spread to your bones. Further tests confirmed the cancer has spread to your bone marrow, classifying it as stage IV. Our team advised against chemotherapy due to the potential for worsening your symptoms and your current health status. Instead, they recommended hospice care, which focuses on providing comfort and managing any pain you have.      SECONDARY DISCHARGE DIAGNOSES  Diagnosis: Anemia  Assessment and Plan of Treatment:

## 2025-05-15 NOTE — CONSULT NOTE ADULT - ASSESSMENT
1] Coronary Artery Disease      S/P CABG  - Stable  - Not on ECASA in view of GI bleed requiring transfusion of PRBC's    2] Post-CABG Atrial Fib      S/P Left Atrial Appendage Exclusion (50 mm Atricure Clip)  - ECG's on file reviewed.  No Atrial Fib  - Patient developed atrial fib post CABG.  No recurrence.  Patient was briefly on Warfarin and Amiodarone to maintain sinus rhythm post CABG.  Amiodarone subsequently stopped.    - No need for OAC .  Left atrial appendage closed during cardiac surgery (using 50 mm Atricure Clip)    3] Hyperlipidemia  - On statin therapy    4] Adenocarcinoma of Esophagus  - Gi input appreciated  - Hem/Oncology input appreciated.    DVT prophylaxis  Will follow as needed    Jeremy Wilcox MD  On TEAMS  [phone] Office

## 2025-05-15 NOTE — PROGRESS NOTE PEDS - ATTENDING COMMENTS
A 59-year-old male with a history of hypertension, hiatal hernia, hyperlipidemia, coronary artery disease post-CABG, paroxysmal atrial fibrillation (not on anticoagulation), and a former smoking history presents with multiple episodes of melena after previously undergoing EUS/EGD for an esophageal mass and subsequent stent placement. The patient experienced considerable weakness and black bowel movements associated with melena, prompting administration of one unit of blood, plasma, and IV vitamin K. Currently, he reports discomfort from the esophageal stent and wishes to have it removed, despite not undergoing any directed treatment for esophageal cancer. His blood counts are stable, and his INR has improved from a supratherapeutic level above 6 on admission. An EGD is planned for stent removal and hemostasis evaluation, with discussions underway about feeding tolerance post-stent removal and potential PEG tube placement if acceptable to the patient. Follow-up with medical oncology and radiation oncology will be arranged as an outpatient, and [facility] may be recalled as needed. The patient's current hemodynamics are stable, and educational support from the primary team regarding feeding and medication administration via PEG has been initiated.

## 2025-05-15 NOTE — SWALLOW BEDSIDE ASSESSMENT ADULT - SWALLOW EVAL: DIAGNOSIS
no overts of pharyngeal dysphagia for full liquid consistencies. +belching consistent with esophageal dysphagia and medical dx

## 2025-05-15 NOTE — PROGRESS NOTE ADULT - ASSESSMENT
59y male with PMHx of HTN, hiatal hernia, HLD, CAD s/p CABG in Jan 2024, paroxysmal A. fib not on AC, former smoker (30 pack years, quit 1yr ago), recently diagnosed with colitis and esophageal adenocarcinoma s/p stent placement who presents for weakness. The patient was recently discharged from [facility] on 5/6 , and has been progressively deteriorating. Found to have hgb 7.6 lower than last admission reported melena, thrombocytopenia w/ elevated INR/PTT.     #Reported Melena likely due to Upper GI Bleed likely Secondary to  Malignancy   #Thrombocytopenia, elevated INR/PTT  #Recently diagnosed Esophageal adenocarcinoma   #Acute blood loss anemia  - EGD 4/25: A friable circumferential mass seen from the GE junction at 38cm to 28 cm of mid esophagus. Multiple biopsies were obtained. Oozing of blood was noted from the whole mass.  - c/w pantoprazole 40mg IV BID  - s/p EGD w/ peg and stent removal   - . Patient supposed to have PET scan OP, Surg Onc Dr Yost for possible resection  - keep Hgb>7  -keep active T&S    #Weakness likely multifactorial 2/2 malignancy, anemia  #Decreased PO intake 2/2 esophageal adenocarcinoma  - Weakness, unable to walk/stand  - States the esophageal stent has not helped with PO intake.  - Started IV D5/LR 75cc/hr  - PT/OT    #?L sided fascial droop  #Diffuse dural thickening  - Pt and spouse do not know if chronic.   - no focal defecits except b/l distal weakness   < from: MR Head w/wo IV Cont (05.14.25 @ 09:48) >  1.  No acute infarct or intracranial hemorrhage.    2.  Mild smooth diffuse dural thickening over the cerebral convexities.   This finding is nonspecific and can reflect intracranial hypotension or   recent spinal procedures. In the setting of known neoplasm cannot exclude   dural metastatic disease.    3.  Heterogeneous marrow signal of the calvarium, nonspecific.    < end of copied text >  d/w neuro: obtain MRI C/T/L spine w/ and w/out, CK, paraneoplastic panel. Might need LP    #chronic lower back pain   - xray lumbar spine: mild disc space narrowing and degenerative changes throughout the lumbar spine. Mild degenerative change of the bilateral sacroiliac joints.  - c/w Dilaudid 0.5mg Q4, Oxycodone 5mg Q4 PRN    #3.8 cm wide R iliac artery aneurysm- stable  - vascular o/p- surgery was scheduled with Dr. Tavera     #HTN  #CAD s/p CABG in Jan 2024  #Paroxysmal A.fib  - c/w home meds  - Hold plavix    #DVT PPX: SCD. Added Heparin SQ  #GI PPX: PPI BID  #Diet: PO, plus supplement w/ TF  #Code Status: Full  #Activity Order: IAT    High risk pt. Px is guarded    #Progress Note Handoff  Pending: Clinical improvement and stability__x___PT____MRIs  Pt/Family discussion: Pt/family informed and agree with the current plan  Disposition: Home__vs    Nursing Home    To be determined____x____    My note supersedes the residents note should a discrepancy arise.    Chart and notes personally reviewed.  Care Discussed with Consultants/Other Providers/ Housestaff [ x] YES [ ] NO   Radiology, labs, old records personally reviewed.    discussed w/ housestaff, nursing, case management, spouse, Neuro, palliative    Attestation Statements:    Attestation Statements:  Risk Statement (NON-critical care).     On this date of service, level of risk to patient is considered: High.     Due to: pt with illness causing risk to life or bodily fxn    Time-based billing (NON-critical care).     50 minutes spent on total encounter. The necessity of the time spent during the encounter on this date of service was due to:     time spent on review of labs, imaging studies, old records, obtaining history, personally examining patient, counselling and communicating with patient/ family, entering orders for medications/tests/etc, discussions with other health care providers, documentation in electronic health records, independent interpretation of labs, imaging/procedure results and care coordination.

## 2025-05-15 NOTE — PROGRESS NOTE ADULT - ASSESSMENT
59M  hiatal hernia, CAD s/p CABG in Jan 2024, paroxysmal A. 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   Was planned for outpatient PET scan and appointment with Dr Sanchez     #Decreased PO intake  #Weakness  esophageal stent placed during prior admission. Pt reports did not help. S/p stent removal 5.14.25   PEG placed on 5.14.25     #Acute on chronic anemia   #Thrombocytopenia -  #Coagulopathy Differentials: DIC/ Vit K deficiency   PT >40, INR 6.20, PTT 51   D dimer 2486, Fibrinogen 2486   received 1 dose vit k 5mg PO, 2unit PRBC, 1 unit plasma     #3.8 cm wide R iliac artery aneurysm  - F/u vascular o/p    #?facial droop  CT head neg   MRI as above     Recommendations:  Needs PET scan for staging   Obtain Hemolysis labs: LDH RC Haptoglobin , LFTS   Daily DIC labs   F/up  LE duplex   F/up nutrition.   Will ask path to check for CLDN1 status in case he has metastatic dz  F/up PT    Outpatient follow up with Dr Sanchez to initiate treatment     PRELIM              59M  hiatal hernia, CAD s/p CABG in Jan 2024, paroxysmal A. 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   Was planned for outpatient PET scan and appointment with Dr Sanchez     #Decreased PO intake  #Weakness  esophageal stent placed during prior admission. Pt reports did not help. S/p stent removal 5.14.25   PEG placed on 5.14.25     #Acute on chronic anemia   #Thrombocytopenia -  #Coagulopathy Differentials: DIC/ Vit K deficiency   PT >40, INR 6.20, PTT 51   D dimer 2486, Fibrinogen 2486   received 1 dose vit k 5mg PO, 2unit PRBC, 1 unit plasma     #3.8 cm wide R iliac artery aneurysm  - F/u vascular o/p    #?facial droop  CT head neg   MRI as above     Recommendations:  Needs PET scan for staging outpatient   Obtain Hemolysis labs: LDH RC Haptoglobin , LFTS   Daily DIC labs   Can dc appetite stimulants since he has PEG tube now   F/up  LE duplex   F/up nutrition.   Will ask path to check for CLDN1 status in case he has metastatic dz  F/up PT    Outpatient follow up with Dr Sanchez to initiate treatment     PRELIM              59M  hiatal hernia, CAD s/p CABG in Jan 2024, paroxysmal A. 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   Was planned for outpatient PET scan and appointment with Dr Sanchez     #Decreased PO intake  #Weakness  esophageal stent placed during prior admission. Pt reports did not help. S/p stent removal 5.14.25   PEG placed on 5.14.25     #Acute on chronic anemia   #Thrombocytopenia -  #Coagulopathy Differentials: DIC/ Vit K deficiency   PT >40, INR 6.20, PTT 51   D dimer 2486, Fibrinogen 2486   received 1 dose vit k 5mg PO, 2unit PRBC, 1 unit plasma     #3.8 cm wide R iliac artery aneurysm  - F/u vascular o/p    #?facial droop  CT head neg   MRI as above     Recommendations:  Needs PET scan for staging outpatient   Obtain Hemolysis labs: LDH RC Haptoglobin , LFTS   Daily DIC labs   Can dc appetite stimulants since he has PEG tube now   F/up  LE duplex   F/up nutrition.   Will ask path to check for CLDN1 status in case he has metastatic dz  F/up PT    Outpatient follow up with Dr Sanchez to initiate treatment

## 2025-05-15 NOTE — OCCUPATIONAL THERAPY INITIAL EVALUATION ADULT - PERTINENT HX OF CURRENT PROBLEM, REHAB EVAL
Pt is a 59y male with PMHx of HTN, hiatal hernia, HLD, CAD s/p CABG in Jan 2024, paroxysmal A. fib not on AC, former smoker (30 pack years, quit 1yr ago), recently diagnosed with colitis and esophageal adenocarcinoma s/p stent placement who presents for weakness. The patient was recently discharged from [facility] on 5/6 , and has been progressively deteriorating. States he has been unable to tolerate p.o. and has been unable to walk over the past few days. Wife at bedside reports that he has not been able to follow-up with oncology due to the weakness.  On ROS the patient also states he has been having black tarry stools that started 1 day prior to his EGD. He had 3 black BMs today. He denies any mucosal bleeding or blood in the urine.  He denies any fevers, chills, chest pain, shortness of breath, palpitations, headache, vision changes, nausea, vomiting, abdominal pain, hematuria, falls.  He is supposed to have PET scan tomorrow and follow with Dr. Sanchez in 2 days.

## 2025-05-15 NOTE — CONSULT NOTE ADULT - CONVERSATION DETAILS
- Introduced the role of Palliative Medicine for symptom management, advance care planning, and emotional support  - Diagnosis and prognosis discussed.   - GOC discussed and patient desires to continue with disease-modifying therapy.  - Most important to the patient is trying to get stronger so that he can walk again  - He has not previously completed HCP but would want his wife as medical decision maker should he develop limitation in medical decision making capacity  - The patient thinks about dying frequently which will often lead him to think of how his father  from lung cancer. His greatest concern is becoming further debilitated and having prolonged suffering.  - He would be accepting of all treatments at this time if they are reasonable. He would never want to continue treatments or undergo invasive treatments if he was expected to be left in a severely debilitated state or vegetative state.  - we did not go into further detail about advanced directives however I introduced to the patient and his wife that it is important that they further talk about what procedures he would be willing to undergo and not be willing to undergo. Thus far he has been acceptable of GI interventions and would consider surgery if required however he is hopeful that is of last resort  - He is hopeful he can overcome his cancer.

## 2025-05-15 NOTE — DISCHARGE NOTE PROVIDER - NSDCMRMEDTOKEN_GEN_ALL_CORE_FT
clopidogrel 75 mg oral tablet: 1 tab(s) orally once a day  lactobacillus acidophilus oral capsule: 1 tab(s) orally 3 times a day  lidocaine 4% topical film: Apply topically to affected area once a day MDD: 1 applicator  Lipitor 80 mg oral tablet: 1 tab(s) orally once a day  megestrol 20 mg oral tablet: 1 tab(s) orally once a day  Metoprolol Tartrate 25 mg oral tablet: 0.5 tab(s) orally 2 times a day  Multiple Vitamins oral tablet, chewable: 1 tab(s) orally once a day  OLANZapine 2.5 mg oral tablet: 1 tab(s) orally once a day  oxycodone-acetaminophen 5 mg-325 mg oral tablet: 1 tab(s) orally every 4 hours as needed for Severe Pain (7 - 10) MDD: 6 tab  pantoprazole 40 mg oral delayed release tablet: 1 tab(s) orally 2 times a day  Pepcid 20 mg oral tablet: 1 tab(s) orally once a day  simethicone 80 mg oral tablet, chewable: 1 tab(s) orally every 6 hours as needed for Gas  sucralfate 1 g/10 mL oral suspension: 10 milliliter(s) orally 4 times a day   haloperidol 2 mg oral tablet: 1 tab(s) orally every 6 hours As needed agitation  lactobacillus acidophilus oral capsule: 1 tab(s) orally 3 times a day  lidocaine 4% topical film: Apply topically to affected area once a day MDD: 1 applicator  megestrol 20 mg oral tablet: 1 tab(s) orally once a day  melatonin 5 mg oral tablet: 1 tab(s) orally once a day (at bedtime)  Metoprolol Tartrate 25 mg oral tablet: 0.5 tab(s) orally 2 times a day  Multiple Vitamins oral tablet, chewable: 1 tab(s) orally once a day  oxycodone-acetaminophen 5 mg-325 mg oral tablet: 1 tab(s) orally every 4 hours as needed for Severe Pain (7 - 10) MDD: 6 tab  pantoprazole 40 mg oral delayed release tablet: 1 tab(s) orally 2 times a day  Pepcid 20 mg oral tablet: 1 tab(s) orally once a day  polyethylene glycol 3350 oral powder for reconstitution: 17 gram(s) orally once a day  senna leaf extract oral tablet: 2 tab(s) orally 2 times a day   dexAMETHasone 4 mg oral tablet: 1 tab(s) orally once a day  fentaNYL 75 mcg/hr transdermal film, extended release: 1 patch transdermal every 72 hours  melatonin 5 mg oral tablet: 1 tab(s) orally once a day (at bedtime)  Metoprolol Tartrate 25 mg oral tablet: 0.5 tab(s) orally 2 times a day  oxyCODONE 20 mg oral tablet: 1 tab(s) orally every 3 hours as needed for moderate/severe pain (4-10) MDD: 8 tabs  pantoprazole 40 mg oral delayed release tablet: 1 tab(s) orally 2 times a day  polyethylene glycol 3350 oral powder for reconstitution: 17 gram(s) orally 2 times a day  senna leaf extract oral tablet: 2 tab(s) orally once a day (at bedtime)

## 2025-05-15 NOTE — SWALLOW BEDSIDE ASSESSMENT ADULT - COMMENTS
Esophageal Cancer / Prior Stent placement   Dysphagia   - Hemodynamics currently stable  - s/p EGD with removal of esophogeal stent and placement , PEG placement (5/13)  - can use peg for feeding and medications  - set up of feeds and patient education per primary team  - follow up with medical oncology and radio onc as outpatient

## 2025-05-15 NOTE — DISCHARGE NOTE PROVIDER - INSTRUCTIONS
Tube feeds as above and PO for pleasure feeds Tube feeds via feeding tube and oral intake for pleasure

## 2025-05-15 NOTE — DISCHARGE NOTE PROVIDER - HOSPITAL COURSE
59y male with PMHx of HTN, hiatal hernia, HLD, CAD s/p CABG in Jan 2024, paroxysmal A. fib not on AC, former smoker (30 pack years, quit 1yr ago), recently diagnosed with colitis and esophageal adenocarcinoma s/p stent placement who presents for weakness. The patient was recently discharged from [facility] on 5/6 , and has been progressively deteriorating. Found to have hgb 7.6 lower than last admission reported melena, thrombocytopenia w/ elevated INR/PTT.     #Reported Melena possibly due to Upper GI Bleed likely Secondary to  Malignancy   #Thrombocytopenia, elevated INR/PTT r/o DIC  #Recently diagnosed Esophageal adenocarcinoma   #Acute Drop in Hemoglobin  - EGD 4/25: A friable circumferential mass seen from the GE junction at 38cm to 28 cm of mid esophagus. Multiple biopsies were obtained. Oozing of blood was noted from the whole mass.  - CBC BID  - c/w pantoprazole 40mg IV BID  - If any unstable bleed, please check CT angio AP IC STAT and call GI/IR stat  -  rad/onc: Will need op PET SCAN  - S/P EGD/PEG 5/14 w/ removal of metallic stent, no source of bleed   - f/u heme/onc, daily DIC panel   - f/u palliative for Pain management     #L sided fascial drope  # b/l LE weakness   - Doesnot know if chronic.   - no focal defecits except b/l hip weakness   -  CTH -ve, MRI -ve for stroke Mild smooth diffuse dural thickening over the cerebral convexities, cant r/o mets   -c f/u neuro c/s for b/l weakness     #chronic lower back pain   - xray lumbar spine: mild disc space narrowing and degenerative changes throughout the lumbar spine. Mild degenerative change of the bilateral sacroiliac joints.  - c/w Dilaudid 0.5mg Q4, Oxycodone 5mg Q4 PRN    #3.8 cm wide R iliac artery aneurysm- stable  - vascular o/p- surgery was scheduled with Dr. Tavera     #HTN  #CAD s/p CABG in Jan 2024  #Paroxysmal A.fib  - c/w home meds  - Hold plavix   59y male with PMHx of HTN, hiatal hernia, HLD, CAD s/p CABG in Jan 2024, paroxysmal A. fib not on AC, former smoker (30 pack years, quit 1yr ago), recently diagnosed with colitis and esophageal adenocarcinoma s/p stent placement who presents for weakness. The patient was recently discharged from [facility] on 5/6 , and has been progressively deteriorating. Found to have hgb 7.6 lower than last admission reported melena, thrombocytopenia w/ elevated INR/PTT. POt is s/p EGD with stent removal and and Peg placement.      #Reported Melena possibly due to Upper GI Bleed likely Secondary to  Malignancy   #Thrombocytopenia, elevated INR/PTT r/o DIC  #Recently diagnosed Esophageal adenocarcinoma   #Acute Drop in Hemoglobin  - EGD 4/25: A friable circumferential mass seen from the GE junction at 38cm to 28 cm of mid esophagus. Multiple biopsies were obtained. Oozing of blood was noted from the whole mass.  - CBC BID  - c/w pantoprazole 40mg IV BID  - If any unstable bleed, please check CT angio AP IC STAT and call GI/IR stat  -  rad/onc: Will need op PET SCAN  - S/P EGD/PEG 5/14 w/ removal of metallic stent, no source of bleed   - f/u heme/onc, daily DIC panel   - Pallaiting for pain: c/w dilaudid and oxy     #L sided fascial drope  # b/l LE weakness   -  no focal defecits except b/l hip weakness   -  CTH -ve, MRI -ve for stroke Mild smooth diffuse dural thickening over the cerebral convexities, cant r/o mets   - f/u neuro c/s for b/l weakness     #chronic lower back pain   - xray lumbar spine: mild disc space narrowing and degenerative changes throughout the lumbar spine. Mild degenerative change of the bilateral sacroiliac joints.  - c/w Dilaudid 0.5mg Q4, Oxycodone 5mg Q4 PRN    #3.8 cm wide R iliac artery aneurysm- stable  - vascular o/p- surgery was scheduled with Dr. Tavera     #HTN  #CAD s/p CABG in Jan 2024  #Paroxysmal A.fib  - c/w home meds  - Hold plavix   59y male with PMHx of HTN, hiatal hernia, HLD, CAD s/p CABG in Jan 2024, paroxysmal A. fib not on AC, former smoker (30 pack years, quit 1yr ago), recently diagnosed with colitis and esophageal adenocarcinoma s/p stent placement who presents for weakness. The patient was recently discharged from [facility] on 5/6 , and has been progressively deteriorating. Found to have hgb 7.6 lower than last admission reported melena, thrombocytopenia w/ elevated INR/PTT. POt is s/p EGD with stent removal and and Peg placement.    Hospital Course:  Patient with recently diagnosed esophageal adenocarcinoma  s/p stent placement. Esophageal stent removed on patient request due to discomfort. Pt had hb drop and melena likely due to upper GI bleed from malignancy requiring multiple blood transfusion. Patient complained of lower extremity weakness. For LE weakness workup, MRI head and cervical/thoracic/lumbar spine was done which showed mild smooth diffuse dural thickening over the cerebral convexities along with diffusely heterogeneous signal and enhancement of cervical/thoracic/lumbar, sacrum and ilial bone suspicious for metastatic disease. Heme/onc recommended bone scan which did not correlate with MRI findings. For further workup a lumbar puncture and BM biopsy were done. BM biopsy  consistent with marrow involvement. Given bone marrow involvement, pt is stage IV. Per heme/onc it would be difficult to tolerate chemotherapy due to his cytopenias, poor performance status, poor PO intake and chemotherapy might make his symptoms worse. Per heme/onc patient is hospice appropriate. Hospice referral sent. Palliative following for pain management.    #Reported Melena likely due to Upper GI Bleed likely Secondary to  Malignancy   #Thrombocytopenia, elevated INR/PTT  #Recently diagnosed Esophageal adenocarcinoma   #Acute blood loss anemia  - EGD 4/25: A friable circumferential mass seen from the GE junction at 38cm to 28 cm of mid esophagus. Multiple biopsies were obtained. Oozing of blood was noted from the whole mass.  - c/w pantoprazole 40mg IV BID  - s/p EGD w/ peg and stent removal   - Patient supposed to have PET scan OP, Surg Onc Dr Yost   - keep Hgb>7.5  -keep active T&S  -5/17 transfuse another unit PRBCs (did not get blood yesterday due to fevers)  -5/18 H/h still low, will transfuse another unit  - 5/21 H/H stable - Hb 8.0 in AM  - Bone marrow biopsy   - BM biopsy  consistent with marrow involvement  - Given bone marrow involvement, pt is stage IV  - Per heme/onc it would be difficult to tolerate chemotherapy due to his cytopenias, poor performance status, poor PO intake and chemotherapy might make his symptoms worse  - Per heme/onc patient is hospice appropriate  - dc daily labs  - Hospice referral    #Weakness   #Diffuse metastatic dz of C/T/L spine on MRI  #Acute on chronic lower back pain   - MRI cervical/thoracic/lumbar spine - 2.  Diffusely heterogeneous signal and enhancement of the cervical/thoracic/lumbar   vertebral bodies, sacrum and ilial bones suspicious for metastatic disease. Background of diffusely T1 hypointense infiltrative marrow signal.  - MR head - Mild smooth diffuse dural thickening over the cerebral convexities.   - Med Onc recommends Bone Scan and BM Bx by IR  - Neuro recommends LP  -Bone scan - No focal increased uptake of bone tracer was identified to suggest   metastatic bone disease, particularly within the thoracic spine,   correlating with the abnormal MRI findings.  -LP done - f/u cell ct, glc, prot, cytology, flow cytometry, paraneoplastic panel sent  -CSF with elevated protein, borderline low glucose, 1 cell. Concern for possible paraneoplastic syndrome vs myopathy; also evidence of inflammatory process in CNS - given recent cancer diagnosis and MRI findings, concern for dural metastatic disease  - Pain uncontrolled, increase dilaudid to 2mg q3h PRN, c/w fentanyl 50mcg patch q72h  - Palliative following for pain control    #Acute agitation  #Hx of depression  - Overnight pt with emotional breakdown, thinking about hurting himself but says would not act on his thoughts and has no plan of hurting himself  - Reports hx of depression   - Could be adjustment disorder given recent cancer diagnosis vs hospital acquired delirium   - Currently wife present at bedside, after wife leaves put him on constant observation. RN notified  - dc olanzapine per psych, start haldol 2mg q6h PRN for agitation  - f/u Psych consult    #Decreased PO intake 2/2 esophageal adenocarcinoma  - s/p PEG    #Fever  pancultured  -CXR w/ ?LLL opacity ?aspiration  -Completed 7 day course, dc cefepime, contributing to delirium  aspiration precautions  -CT A/p - There is thickening of the subcutaneous fat and musculature at the entry   of the G-tube. G-tube within the stomach. No drainable collection. There is a hiatal hernia with significant thickening of the wall of the distal esophagus and herniated stomach. Active inflammatory process may be present.  -3.9 cm right common iliac artery aneurysm.      #?L sided fascial droop  #Diffuse dural thickening  - Pt and spouse do not know if chronic.   - no focal defecits except b/l distal weakness   < from: MR Head w/wo IV Cont (05.14.25 @ 09:48) >  1.  No acute infarct or intracranial hemorrhage.    2.  Mild smooth diffuse dural thickening over the cerebral convexities.   This finding is nonspecific and can reflect intracranial hypotension or   recent spinal procedures. In the setting of known neoplasm cannot exclude   dural metastatic disease.    3.  Heterogeneous marrow signal of the calvarium, nonspecific.    < end of copied text >    #3.8 cm wide R iliac artery aneurysm- stable  - vascular o/p- surgery was scheduled with Dr. Tavera     #HTN  #CAD s/p CABG in Jan 2024  #Paroxysmal A.fib  - c/w home meds  - Hold plavix per cardio     59y male with PMHx of HTN, hiatal hernia, HLD, CAD s/p CABG in Jan 2024, paroxysmal A. fib not on AC, former smoker (30 pack years, quit 1yr ago), recently diagnosed with colitis and esophageal adenocarcinoma s/p stent placement who presents for weakness. The patient was recently discharged from [facility] on 5/6 , and has been progressively deteriorating. Found to have hgb 7.6 lower than last admission reported melena, thrombocytopenia w/ elevated INR/PTT. POt is s/p EGD with stent removal and and Peg placement.    Hospital Course:  Patient with recently diagnosed esophageal adenocarcinoma  s/p stent placement. Esophageal stent removed on patient request due to discomfort. Pt had hb drop and melena likely due to upper GI bleed from malignancy requiring multiple blood transfusion. Patient complained of lower extremity weakness. For LE weakness workup, MRI head and cervical/thoracic/lumbar spine was done which showed mild smooth diffuse dural thickening over the cerebral convexities along with diffusely heterogeneous signal and enhancement of cervical/thoracic/lumbar, sacrum and ilial bone suspicious for metastatic disease. Heme/onc recommended bone scan which did not correlate with MRI findings. For further workup a lumbar puncture and BM biopsy were done. BM biopsy  consistent with marrow involvement. Given bone marrow involvement, pt is stage IV. Per heme/onc it would be difficult to tolerate chemotherapy due to his cytopenias, poor performance status, poor PO intake and chemotherapy might make his symptoms worse. Per heme/onc patient is hospice appropriate. Hospice referral sent. Palliative following for pain management.      #Reported Melena likely due to Upper GI Bleed likely Secondary to  Malignancy   #Thrombocytopenia, elevated INR/PTT  #Recently diagnosed Esophageal adenocarcinoma   #Acute blood loss anemia  - EGD 4/25: A friable circumferential mass seen from the GE junction at 38cm to 28 cm of mid esophagus. Multiple biopsies were obtained. Oozing of blood was noted from the whole mass.  - c/w pantoprazole 40mg IV BID  - s/p EGD w/ peg and stent removal   - Patient supposed to have PET scan OP, Surg Onc Dr Yost   - keep Hgb>7.5  -5/17 transfuse another unit PRBCs (did not get blood yesterday due to fevers)  -5/18 H/h still low, will transfuse another unit  - 5/21 H/H stable - Hb 8.0 in AM  - BM biopsy  consistent with marrow involvement  - Given bone marrow involvement, pt is stage IV  - Per heme/onc it would be difficult to tolerate chemotherapy due to his cytopenias, poor performance status, poor PO intake and chemotherapy might make his symptoms worse  - Per heme/onc patient is hospice appropriate  - Hospice referral, patient agreed to CMO   - Hospice admission on Sunday. Spouse needs education on feed administration. Feeds need to be delivered to the house. Pain meds need to be changed to PO and TD.    #Weakness   #Diffuse metastatic dz of C/T/L spine on MRI  #Acute on chronic lower back pain   - MRI cervical/thoracic/lumbar spine - 2.  Diffusely heterogeneous signal and enhancement of the cervical/thoracic/lumbar   vertebral bodies, sacrum and ilial bones suspicious for metastatic disease. Background of diffusely T1 hypointense infiltrative marrow signal.  - MR head - Mild smooth diffuse dural thickening over the cerebral convexities.   - Med Onc recommends Bone Scan and BM Bx by IR  - Neuro recommends LP  -Bone scan - No focal increased uptake of bone tracer was identified to suggest   metastatic bone disease, particularly within the thoracic spine,   correlating with the abnormal MRI findings.  -LP done - f/u cell ct, glc, prot, cytology, flow cytometry, paraneoplastic panel sent  -CSF with elevated protein, borderline low glucose, 1 cell. Concern for possible paraneoplastic syndrome vs myopathy; also evidence of inflammatory process in CNS - given recent cancer diagnosis and MRI findings, concern for dural metastatic disease  - Pain uncontrolled, increase dilaudid to 2mg q3h PRN, c/w fentanyl 50mcg patch q72h  - start  dexamethasone 4mg QD for spine pain from mets  - Palliative following for pain control  - Pain meds need to be changed to PO and TD for hospice admission    #Acute agitation  #Hx of depression  - Overnight pt with emotional breakdown, thinking about hurting himself but says would not act on his thoughts and has no plan of hurting himself  - Reports hx of depression   - Could be adjustment disorder given recent cancer diagnosis vs hospital acquired delirium   - Currently wife present at bedside, after wife leaves put him on constant observation. RN notified  - dc olanzapine per psych, start haldol 2mg q6h PRN for agitation  - Psych consult - no indication for standing psychotropic medications  at this time, melatonin 5 –10 mg P.O bedtime, OP psychotherapy     #Decreased PO intake 2/2 esophageal adenocarcinoma  - s/p PEG    #Fever  pancultured  -CXR w/ ?LLL opacity ?aspiration  -Completed 7 day course, dc cefepime, contributing to delirium  -CT A/p - There is thickening of the subcutaneous fat and musculature at the entry   of the G-tube. G-tube within the stomach. No drainable collection. There is a hiatal hernia with significant thickening of the wall of the distal esophagus and herniated stomach. Active inflammatory process may be present.  -3.9 cm right common iliac artery aneurysm.      #?L sided fascial droop  #Diffuse dural thickening  - Pt and spouse do not know if chronic.   - no focal defecits except b/l distal weakness   < from: MR Head w/wo IV Cont (05.14.25 @ 09:48) >  1.  No acute infarct or intracranial hemorrhage.  2.  Mild smooth diffuse dural thickening over the cerebral convexities.   This finding is nonspecific and can reflect intracranial hypotension or   recent spinal procedures. In the setting of known neoplasm cannot exclude   dural metastatic disease.  3.  Heterogeneous marrow signal of the calvarium, nonspecific.      #3.8 cm wide R iliac artery aneurysm- stable  - vascular o/p- surgery was scheduled with Dr. Tavera     #HTN  #DLD  #CAD s/p CABG in Jan 2024  #Paroxysmal A.fib  - c/w home meds  - Hold plavix per cardio  - Hold statin as pt CMO

## 2025-05-15 NOTE — CONSULT NOTE ADULT - PROBLEM SELECTOR RECOMMENDATION 6
Complex medical decision making / symptom management in the setting of malignancy    Will continue to follow for ongoing GOC discussion / titration of palliative regimen. Emotional support provided, questions answered.  Active Psychosocial Referrals:  [x]Social Work/Case management []PT/OT []Chaplaincy []Hospice []Ethics  Coping: [] well [x] with difficulty [] poor coping [] unable to assess  Support system: [] strong [x] adequate [] inadequate

## 2025-05-15 NOTE — OCCUPATIONAL THERAPY INITIAL EVALUATION ADULT - FINE MOTOR COORDINATION, LEFT HAND THUMB/FINGER OPPOSITION SKILLS, OT EVAL
Surgeon (Optional): Amanda Little Surgeon Performing The Repair (Optional): Gurea Henley Biopsy Photograph Reviewed: Yes Accession #: [id number] Size Of Lesion In Cm: 1 X Size Of Lesion In Cm (Optional): 0 Size Of Margin In Cm: 0.4 Was An Eye Clamp Used?: No Eye Clamp Note Details: An eye clamp was used during the procedure. Excision Method: Fusiform Saucerization Depth: dermis and superficial adipose tissue Repair Type: Complex Suturegard Retention Suture: 2-0 Nylon Retention Suture Bite Size: 3 mm Length To Time In Minutes Device Was In Place: 10 Intermediate / Complex Repair - Final Wound Length In Cm: 5 Width Of Defect Perpendicular To Closure In Cm (Required): 1.2 Undermining Type: Entire Wound Debridement Text: The wound edges were debrided prior to proceeding with the closure to facilitate wound healing. Helical Rim Text: The closure involved the helical rim. Vermilion Border Text: The closure involved the vermilion border. Nostril Rim Text: The closure involved the nostril rim. Retention Suture Text: Retention sutures were placed to support the closure and prevent dehiscence. Suture Removal: 14 days Lab: [id number] Graft Donor Site Bandage (Optional-Leave Blank If You Don't Want In Note): Steri-strips and a pressure bandage were applied to the donor site. Epidermal Closure Graft Donor Site (Optional): simple interrupted Billing Type: [employer] Excision Depth: adipose tissue Scalpel Size: 15 blade Anesthesia Type: 1% lidocaine with epinephrine Additional Anesthesia Volume In Cc: 6 Hemostasis: Electrocautery Estimated Blood Loss (Cc): minimal Detail Level: Detailed Deep Sutures: 5-0 Vicryl Epidermal Sutures: 4-0 Ethilon Wound Care: Petrolatum Dressing: dry sterile dressing Suturegard Intro: Intraoperative tissue expansion was performed, utilizing the SUTUREGARD device, in order to reduce wound tension. Suturegard Body: The suture ends were repeatedly re-tightened and re-clamped to achieve the desired tissue expansion. Hemigard Intro: Due to skin fragility and wound tension, it was decided to use HEMIGARD adhesive retention suture devices to permit a linear closure. The skin was cleaned and dried for a 6cm distance away from the wound. Excessive hair, if present, was removed to allow for adhesion. Hemigard Postcare Instructions: The HEMIGARD strips are to remain completely dry for at least 5-7 days. Positioning (Leave Blank If You Do Not Want): The patient was placed in a comfortable position exposing the surgical site. Pre-Excision Curettage Text (Leave Blank If You Do Not Want): Prior to drawing the surgical margin the visible lesion was removed with electrodesiccation and curettage to clearly define the lesion size. Complex Repair Preamble Text (Leave Blank If You Do Not Want): Extensive wide undermining was performed. Intermediate Repair Preamble Text (Leave Blank If You Do Not Want): Undermining was performed with blunt dissection. Curvilinear Excision Additional Text (Leave Blank If You Do Not Want): The margin was drawn around the clinically apparent lesion. A curvilinear shape was then drawn on the skin incorporating the lesion and margins. Incisions were then made along these lines to the appropriate tissue plane and the lesion was extirpated. Fusiform Excision Additional Text (Leave Blank If You Do Not Want): The margin was drawn around the clinically apparent lesion. A fusiform shape was then drawn on the skin incorporating the lesion and margins. Incisions were then made along these lines to the appropriate tissue plane and the lesion was extirpated. Elliptical Excision Additional Text (Leave Blank If You Do Not Want): The margin was drawn around the clinically apparent lesion. An elliptical shape was then drawn on the skin incorporating the lesion and margins. Incisions were then made along these lines to the appropriate tissue plane and the lesion was extirpated. Saucerization Excision Additional Text (Leave Blank If You Do Not Want): The margin was drawn around the clinically apparent lesion. Incisions were then made along these lines, in a tangential fashion, to the appropriate tissue plane and the lesion was extirpated. Slit Excision Additional Text (Leave Blank If You Do Not Want): A linear line was drawn on the skin overlying the lesion. An incision was made slowly until the lesion was visualized. Once visualized, the lesion was removed with blunt dissection. Excisional Biopsy Additional Text (Leave Blank If You Do Not Want): The margin was drawn around the clinically apparent lesion. An elliptical shape was then drawn on the skin incorporating the lesion and margins.  Incisions were then made along these lines to the appropriate tissue plane and the lesion was extirpated. Perilesional Excision Additional Text (Leave Blank If You Do Not Want): The margin was drawn around the clinically apparent lesion. Incisions were then made along these lines to the appropriate tissue plane and the lesion was extirpated. Repair Performed By Another Provider Text (Leave Blank If You Do Not Want): After the tissue was excised the defect was repaired by another provider. No Repair - Repaired With Adjacent Surgical Defect Text (Leave Blank If You Do Not Want): After the excision the defect was repaired concurrently with another surgical defect which was in close approximation. Adjacent Tissue Transfer Text: The defect edges were debeveled with a #15 scalpel blade. Given the location of the defect and the proximity to free margins an adjacent tissue transfer was deemed most appropriate. Using a sterile surgical marker, an appropriate flap was drawn incorporating the defect and placing the expected incisions within the relaxed skin tension lines where possible. The area thus outlined was incised deep to adipose tissue with a #15 scalpel blade. The skin margins were undermined to an appropriate distance in all directions utilizing iris scissors. Advancement Flap (Single) Text: The defect edges were debeveled with a #15 scalpel blade. Given the location of the defect and the proximity to free margins a single advancement flap was deemed most appropriate. Using a sterile surgical marker, an appropriate advancement flap was drawn incorporating the defect and placing the expected incisions within the relaxed skin tension lines where possible. The area thus outlined was incised deep to adipose tissue with a #15 scalpel blade. The skin margins were undermined to an appropriate distance in all directions utilizing iris scissors. Advancement Flap (Double) Text: The defect edges were debeveled with a #15 scalpel blade. Given the location of the defect and the proximity to free margins a double advancement flap was deemed most appropriate. Using a sterile surgical marker, the appropriate advancement flaps were drawn incorporating the defect and placing the expected incisions within the relaxed skin tension lines where possible. The area thus outlined was incised deep to adipose tissue with a #15 scalpel blade. The skin margins were undermined to an appropriate distance in all directions utilizing iris scissors. Burow's Advancement Flap Text: The defect edges were debeveled with a #15 scalpel blade. Given the location of the defect and the proximity to free margins a Burow's advancement flap was deemed most appropriate. Using a sterile surgical marker, the appropriate advancement flap was drawn incorporating the defect and placing the expected incisions within the relaxed skin tension lines where possible. The area thus outlined was incised deep to adipose tissue with a #15 scalpel blade. The skin margins were undermined to an appropriate distance in all directions utilizing iris scissors. Chonodrocutaneous Helical Advancement Flap Text: The defect edges were debeveled with a #15 scalpel blade. Given the location of the defect and the proximity to free margins a chondrocutaneous helical advancement flap was deemed most appropriate. Using a sterile surgical marker, the appropriate advancement flap was drawn incorporating the defect and placing the expected incisions within the relaxed skin tension lines where possible. The area thus outlined was incised deep to adipose tissue with a #15 scalpel blade. The skin margins were undermined to an appropriate distance in all directions utilizing iris scissors. Crescentic Advancement Flap Text: The defect edges were debeveled with a #15 scalpel blade. Given the location of the defect and the proximity to free margins a crescentic advancement flap was deemed most appropriate. Using a sterile surgical marker, the appropriate advancement flap was drawn incorporating the defect and placing the expected incisions within the relaxed skin tension lines where possible. The area thus outlined was incised deep to adipose tissue with a #15 scalpel blade. The skin margins were undermined to an appropriate distance in all directions utilizing iris scissors. A-T Advancement Flap Text: The defect edges were debeveled with a #15 scalpel blade. Given the location of the defect, shape of the defect and the proximity to free margins an A-T advancement flap was deemed most appropriate. Using a sterile surgical marker, an appropriate advancement flap was drawn incorporating the defect and placing the expected incisions within the relaxed skin tension lines where possible. The area thus outlined was incised deep to adipose tissue with a #15 scalpel blade. The skin margins were undermined to an appropriate distance in all directions utilizing iris scissors. O-T Advancement Flap Text: The defect edges were debeveled with a #15 scalpel blade. Given the location of the defect, shape of the defect and the proximity to free margins an O-T advancement flap was deemed most appropriate. Using a sterile surgical marker, an appropriate advancement flap was drawn incorporating the defect and placing the expected incisions within the relaxed skin tension lines where possible. The area thus outlined was incised deep to adipose tissue with a #15 scalpel blade. The skin margins were undermined to an appropriate distance in all directions utilizing iris scissors. O-L Flap Text: The defect edges were debeveled with a #15 scalpel blade. Given the location of the defect, shape of the defect and the proximity to free margins an O-L flap was deemed most appropriate. Using a sterile surgical marker, an appropriate advancement flap was drawn incorporating the defect and placing the expected incisions within the relaxed skin tension lines where possible. The area thus outlined was incised deep to adipose tissue with a #15 scalpel blade. The skin margins were undermined to an appropriate distance in all directions utilizing iris scissors. O-Z Flap Text: The defect edges were debeveled with a #15 scalpel blade. Given the location of the defect, shape of the defect and the proximity to free margins an O-Z flap was deemed most appropriate. Using a sterile surgical marker, an appropriate transposition flap was drawn incorporating the defect and placing the expected incisions within the relaxed skin tension lines where possible. The area thus outlined was incised deep to adipose tissue with a #15 scalpel blade. The skin margins were undermined to an appropriate distance in all directions utilizing iris scissors. Double O-Z Flap Text: The defect edges were debeveled with a #15 scalpel blade. Given the location of the defect, shape of the defect and the proximity to free margins a Double O-Z flap was deemed most appropriate. Using a sterile surgical marker, an appropriate transposition flap was drawn incorporating the defect and placing the expected incisions within the relaxed skin tension lines where possible. The area thus outlined was incised deep to adipose tissue with a #15 scalpel blade. The skin margins were undermined to an appropriate distance in all directions utilizing iris scissors. V-Y Flap Text: The defect edges were debeveled with a #15 scalpel blade. Given the location of the defect, shape of the defect and the proximity to free margins a V-Y flap was deemed most appropriate. Using a sterile surgical marker, an appropriate advancement flap was drawn incorporating the defect and placing the expected incisions within the relaxed skin tension lines where possible. The area thus outlined was incised deep to adipose tissue with a #15 scalpel blade. The skin margins were undermined to an appropriate distance in all directions utilizing iris scissors. Advancement-Rotation Flap Text: The defect edges were debeveled with a #15 scalpel blade. Given the location of the defect, shape of the defect and the proximity to free margins an advancement-rotation flap was deemed most appropriate. Using a sterile surgical marker, an appropriate flap was drawn incorporating the defect and placing the expected incisions within the relaxed skin tension lines where possible. The area thus outlined was incised deep to adipose tissue with a #15 scalpel blade. The skin margins were undermined to an appropriate distance in all directions utilizing iris scissors. Mercedes Flap Text: The defect edges were debeveled with a #15 scalpel blade. Given the location of the defect, shape of the defect and the proximity to free margins a Mercedes flap was deemed most appropriate. Using a sterile surgical marker, an appropriate advancement flap was drawn incorporating the defect and placing the expected incisions within the relaxed skin tension lines where possible. The area thus outlined was incised deep to adipose tissue with a #15 scalpel blade. The skin margins were undermined to an appropriate distance in all directions utilizing iris scissors. normal performance Modified Advancement Flap Text: The defect edges were debeveled with a #15 scalpel blade. Given the location of the defect, shape of the defect and the proximity to free margins a modified advancement flap was deemed most appropriate. Using a sterile surgical marker, an appropriate advancement flap was drawn incorporating the defect and placing the expected incisions within the relaxed skin tension lines where possible. The area thus outlined was incised deep to adipose tissue with a #15 scalpel blade. The skin margins were undermined to an appropriate distance in all directions utilizing iris scissors. Mucosal Advancement Flap Text: Given the location of the defect, shape of the defect and the proximity to free margins a mucosal advancement flap was deemed most appropriate. Incisions were made with a 15 blade scalpel in the appropriate fashion along the cutaneous vermilion border and the mucosal lip. The remaining actinically damaged mucosal tissue was excised. The mucosal advancement flap was then elevated to the gingival sulcus with care taken to preserve the neurovascular structures and advanced into the primary defect. Care was taken to ensure that precise realignment of the vermilion border was achieved. Peng Advancement Flap Text: The defect edges were debeveled with a #15 scalpel blade. Given the location of the defect, shape of the defect and the proximity to free margins a Peng advancement flap was deemed most appropriate. Using a sterile surgical marker, an appropriate advancement flap was drawn incorporating the defect and placing the expected incisions within the relaxed skin tension lines where possible. The area thus outlined was incised deep to adipose tissue with a #15 scalpel blade. The skin margins were undermined to an appropriate distance in all directions utilizing iris scissors. Hatchet Flap Text: The defect edges were debeveled with a #15 scalpel blade. Given the location of the defect, shape of the defect and the proximity to free margins a hatchet flap was deemed most appropriate. Using a sterile surgical marker, an appropriate hatchet flap was drawn incorporating the defect and placing the expected incisions within the relaxed skin tension lines where possible. The area thus outlined was incised deep to adipose tissue with a #15 scalpel blade. The skin margins were undermined to an appropriate distance in all directions utilizing iris scissors. Rotation Flap Text: The defect edges were debeveled with a #15 scalpel blade. Given the location of the defect, shape of the defect and the proximity to free margins a rotation flap was deemed most appropriate. Using a sterile surgical marker, an appropriate rotation flap was drawn incorporating the defect and placing the expected incisions within the relaxed skin tension lines where possible. The area thus outlined was incised deep to adipose tissue with a #15 scalpel blade. The skin margins were undermined to an appropriate distance in all directions utilizing iris scissors. Spiral Flap Text: The defect edges were debeveled with a #15 scalpel blade. Given the location of the defect, shape of the defect and the proximity to free margins a spiral flap was deemed most appropriate. Using a sterile surgical marker, an appropriate rotation flap was drawn incorporating the defect and placing the expected incisions within the relaxed skin tension lines where possible. The area thus outlined was incised deep to adipose tissue with a #15 scalpel blade. The skin margins were undermined to an appropriate distance in all directions utilizing iris scissors. Staged Advancement Flap Text: The defect edges were debeveled with a #15 scalpel blade. Given the location of the defect, shape of the defect and the proximity to free margins a staged advancement flap was deemed most appropriate. Using a sterile surgical marker, an appropriate advancement flap was drawn incorporating the defect and placing the expected incisions within the relaxed skin tension lines where possible. The area thus outlined was incised deep to adipose tissue with a #15 scalpel blade. The skin margins were undermined to an appropriate distance in all directions utilizing iris scissors. Star Wedge Flap Text: The defect edges were debeveled with a #15 scalpel blade. Given the location of the defect, shape of the defect and the proximity to free margins a star wedge flap was deemed most appropriate. Using a sterile surgical marker, an appropriate rotation flap was drawn incorporating the defect and placing the expected incisions within the relaxed skin tension lines where possible. The area thus outlined was incised deep to adipose tissue with a #15 scalpel blade. The skin margins were undermined to an appropriate distance in all directions utilizing iris scissors. Transposition Flap Text: The defect edges were debeveled with a #15 scalpel blade. Given the location of the defect and the proximity to free margins a transposition flap was deemed most appropriate. Using a sterile surgical marker, an appropriate transposition flap was drawn incorporating the defect. The area thus outlined was incised deep to adipose tissue with a #15 scalpel blade. The skin margins were undermined to an appropriate distance in all directions utilizing iris scissors. Muscle Hinge Flap Text: The defect edges were debeveled with a #15 scalpel blade. Given the size, depth and location of the defect and the proximity to free margins a muscle hinge flap was deemed most appropriate. Using a sterile surgical marker, an appropriate hinge flap was drawn incorporating the defect. The area thus outlined was incised with a #15 scalpel blade. The skin margins were undermined to an appropriate distance in all directions utilizing iris scissors. Mustarde Flap Text: The defect edges were debeveled with a #15 scalpel blade. Given the size, depth and location of the defect and the proximity to free margins a Mustarde flap was deemed most appropriate. Using a sterile surgical marker, an appropriate flap was drawn incorporating the defect. The area thus outlined was incised with a #15 scalpel blade. The skin margins were undermined to an appropriate distance in all directions utilizing iris scissors. Nasal Turnover Hinge Flap Text: The defect edges were debeveled with a #15 scalpel blade. Given the size, depth, location of the defect and the defect being full thickness a nasal turnover hinge flap was deemed most appropriate. Using a sterile surgical marker, an appropriate hinge flap was drawn incorporating the defect. The area thus outlined was incised with a #15 scalpel blade. The flap was designed to recreate the nasal mucosal lining and the alar rim. The skin margins were undermined to an appropriate distance in all directions utilizing iris scissors. Nasalis-Muscle-Based Myocutaneous Island Pedicle Flap Text: Using a #15 blade, an incision was made around the donor flap to the level of the nasalis muscle. Wide lateral undermining was then performed in both the subcutaneous plane above the nasalis muscle, and in a submuscular plane just above periosteum. This allowed the formation of a free nasalis muscle axial pedicle (based on the angular artery) which was still attached to the actual cutaneous flap, increasing its mobility and vascular viability. Hemostasis was obtained with pinpoint electrocoagulation. The flap was mobilized into position and the pivotal anchor points positioned and stabilized with buried interrupted sutures. Subcutaneous and dermal tissues were closed in a multilayered fashion with sutures. Tissue redundancies were excised, and the epidermal edges were apposed without significant tension and sutured with sutures. Orbicularis Oris Muscle Flap Text: The defect edges were debeveled with a #15 scalpel blade. Given that the defect affected the competency of the oral sphincter an orbicularis oris muscle flap was deemed most appropriate to restore this competency and normal muscle function. Using a sterile surgical marker, an appropriate flap was drawn incorporating the defect. The area thus outlined was incised with a #15 scalpel blade. Melolabial Transposition Flap Text: The defect edges were debeveled with a #15 scalpel blade. Given the location of the defect and the proximity to free margins a melolabial flap was deemed most appropriate. Using a sterile surgical marker, an appropriate melolabial transposition flap was drawn incorporating the defect. The area thus outlined was incised deep to adipose tissue with a #15 scalpel blade. The skin margins were undermined to an appropriate distance in all directions utilizing iris scissors. Rhombic Flap Text: The defect edges were debeveled with a #15 scalpel blade. Given the location of the defect and the proximity to free margins a rhombic flap was deemed most appropriate. Using a sterile surgical marker, an appropriate rhombic flap was drawn incorporating the defect. The area thus outlined was incised deep to adipose tissue with a #15 scalpel blade. The skin margins were undermined to an appropriate distance in all directions utilizing iris scissors. Rhomboid Transposition Flap Text: The defect edges were debeveled with a #15 scalpel blade. Given the location of the defect and the proximity to free margins a rhomboid transposition flap was deemed most appropriate. Using a sterile surgical marker, an appropriate rhomboid flap was drawn incorporating the defect. The area thus outlined was incised deep to adipose tissue with a #15 scalpel blade. The skin margins were undermined to an appropriate distance in all directions utilizing iris scissors. Bi-Rhombic Flap Text: The defect edges were debeveled with a #15 scalpel blade. Given the location of the defect and the proximity to free margins a bi-rhombic flap was deemed most appropriate. Using a sterile surgical marker, an appropriate rhombic flap was drawn incorporating the defect. The area thus outlined was incised deep to adipose tissue with a #15 scalpel blade. The skin margins were undermined to an appropriate distance in all directions utilizing iris scissors. Helical Rim Advancement Flap Text: The defect edges were debeveled with a #15 blade scalpel. Given the location of the defect and the proximity to free margins (helical rim) a double helical rim advancement flap was deemed most appropriate. Using a sterile surgical marker, the appropriate advancement flaps were drawn incorporating the defect and placing the expected incisions between the helical rim and antihelix where possible. The area thus outlined was incised through and through with a #15 scalpel blade. With a skin hook and iris scissors, the flaps were gently and sharply undermined and freed up. Bilateral Helical Rim Advancement Flap Text: The defect edges were debeveled with a #15 blade scalpel. Given the location of the defect and the proximity to free margins (helical rim) a bilateral helical rim advancement flap was deemed most appropriate. Using a sterile surgical marker, the appropriate advancement flaps were drawn incorporating the defect and placing the expected incisions between the helical rim and antihelix where possible. The area thus outlined was incised through and through with a #15 scalpel blade. With a skin hook and iris scissors, the flaps were gently and sharply undermined and freed up. Ear Star Wedge Flap Text: The defect edges were debeveled with a #15 blade scalpel. Given the location of the defect and the proximity to free margins (helical rim) an ear star wedge flap was deemed most appropriate. Using a sterile surgical marker, the appropriate flap was drawn incorporating the defect and placing the expected incisions between the helical rim and antihelix where possible. The area thus outlined was incised through and through with a #15 scalpel blade. Banner Transposition Flap Text: The defect edges were debeveled with a #15 scalpel blade. Given the location of the defect and the proximity to free margins a Banner transposition flap was deemed most appropriate. Using a sterile surgical marker, an appropriate flap drawn around the defect. The area thus outlined was incised deep to adipose tissue with a #15 scalpel blade. The skin margins were undermined to an appropriate distance in all directions utilizing iris scissors. Bilobed Flap Text: The defect edges were debeveled with a #15 scalpel blade. Given the location of the defect and the proximity to free margins a bilobe flap was deemed most appropriate. Using a sterile surgical marker, an appropriate bilobe flap drawn around the defect. The area thus outlined was incised deep to adipose tissue with a #15 scalpel blade. The skin margins were undermined to an appropriate distance in all directions utilizing iris scissors. Bilobed Transposition Flap Text: The defect edges were debeveled with a #15 scalpel blade. Given the location of the defect and the proximity to free margins a bilobed transposition flap was deemed most appropriate. Using a sterile surgical marker, an appropriate bilobe flap drawn around the defect. The area thus outlined was incised deep to adipose tissue with a #15 scalpel blade. The skin margins were undermined to an appropriate distance in all directions utilizing iris scissors. Trilobed Flap Text: The defect edges were debeveled with a #15 scalpel blade. Given the location of the defect and the proximity to free margins a trilobed flap was deemed most appropriate. Using a sterile surgical marker, an appropriate trilobed flap drawn around the defect. The area thus outlined was incised deep to adipose tissue with a #15 scalpel blade. The skin margins were undermined to an appropriate distance in all directions utilizing iris scissors. Dorsal Nasal Flap Text: The defect edges were debeveled with a #15 scalpel blade. Given the location of the defect and the proximity to free margins a dorsal nasal flap was deemed most appropriate. Using a sterile surgical marker, an appropriate dorsal nasal flap was drawn around the defect. The area thus outlined was incised deep to adipose tissue with a #15 scalpel blade. The skin margins were undermined to an appropriate distance in all directions utilizing iris scissors. Island Pedicle Flap Text: The defect edges were debeveled with a #15 scalpel blade. Given the location of the defect, shape of the defect and the proximity to free margins an island pedicle advancement flap was deemed most appropriate. Using a sterile surgical marker, an appropriate advancement flap was drawn incorporating the defect, outlining the appropriate donor tissue and placing the expected incisions within the relaxed skin tension lines where possible. The area thus outlined was incised deep to adipose tissue with a #15 scalpel blade. The skin margins were undermined to an appropriate distance in all directions around the primary defect and laterally outward around the island pedicle utilizing iris scissors. There was minimal undermining beneath the pedicle flap. Island Pedicle Flap With Canthal Suspension Text: The defect edges were debeveled with a #15 scalpel blade. Given the location of the defect, shape of the defect and the proximity to free margins an island pedicle advancement flap was deemed most appropriate. Using a sterile surgical marker, an appropriate advancement flap was drawn incorporating the defect, outlining the appropriate donor tissue and placing the expected incisions within the relaxed skin tension lines where possible. The area thus outlined was incised deep to adipose tissue with a #15 scalpel blade. The skin margins were undermined to an appropriate distance in all directions around the primary defect and laterally outward around the island pedicle utilizing iris scissors. There was minimal undermining beneath the pedicle flap. A suspension suture was placed in the canthal tendon to prevent tension and prevent ectropion. Alar Island Pedicle Flap Text: The defect edges were debeveled with a #15 scalpel blade. Given the location of the defect, shape of the defect and the proximity to the alar rim an island pedicle advancement flap was deemed most appropriate. Using a sterile surgical marker, an appropriate advancement flap was drawn incorporating the defect, outlining the appropriate donor tissue and placing the expected incisions within the nasal ala running parallel to the alar rim. The area thus outlined was incised with a #15 scalpel blade. The skin margins were undermined minimally to an appropriate distance in all directions around the primary defect and laterally outward around the island pedicle utilizing iris scissors. There was minimal undermining beneath the pedicle flap. Double Island Pedicle Flap Text: The defect edges were debeveled with a #15 scalpel blade. Given the location of the defect, shape of the defect and the proximity to free margins a double island pedicle advancement flap was deemed most appropriate. Using a sterile surgical marker, an appropriate advancement flap was drawn incorporating the defect, outlining the appropriate donor tissue and placing the expected incisions within the relaxed skin tension lines where possible. The area thus outlined was incised deep to adipose tissue with a #15 scalpel blade. The skin margins were undermined to an appropriate distance in all directions around the primary defect and laterally outward around the island pedicle utilizing iris scissors. There was minimal undermining beneath the pedicle flap. Island Pedicle Flap-Requiring Vessel Identification Text: The defect edges were debeveled with a #15 scalpel blade. Given the location of the defect, shape of the defect and the proximity to free margins an island pedicle advancement flap was deemed most appropriate. Using a sterile surgical marker, an appropriate advancement flap was drawn, based on the axial vessel mentioned above, incorporating the defect, outlining the appropriate donor tissue and placing the expected incisions within the relaxed skin tension lines where possible. The area thus outlined was incised deep to adipose tissue with a #15 scalpel blade. The skin margins were undermined to an appropriate distance in all directions around the primary defect and laterally outward around the island pedicle utilizing iris scissors. There was minimal undermining beneath the pedicle flap. Keystone Flap Text: The defect edges were debeveled with a #15 scalpel blade. Given the location of the defect, shape of the defect a keystone flap was deemed most appropriate. Using a sterile surgical marker, an appropriate keystone flap was drawn incorporating the defect, outlining the appropriate donor tissue and placing the expected incisions within the relaxed skin tension lines where possible. The area thus outlined was incised deep to adipose tissue with a #15 scalpel blade. The skin margins were undermined to an appropriate distance in all directions around the primary defect and laterally outward around the flap utilizing iris scissors. O-T Plasty Text: The defect edges were debeveled with a #15 scalpel blade. Given the location of the defect, shape of the defect and the proximity to free margins an O-T plasty was deemed most appropriate. Using a sterile surgical marker, an appropriate O-T plasty was drawn incorporating the defect and placing the expected incisions within the relaxed skin tension lines where possible. The area thus outlined was incised deep to adipose tissue with a #15 scalpel blade. The skin margins were undermined to an appropriate distance in all directions utilizing iris scissors. O-Z Plasty Text: The defect edges were debeveled with a #15 scalpel blade. Given the location of the defect, shape of the defect and the proximity to free margins an O-Z plasty (double transposition flap) was deemed most appropriate. Using a sterile surgical marker, the appropriate transposition flaps were drawn incorporating the defect and placing the expected incisions within the relaxed skin tension lines where possible. The area thus outlined was incised deep to adipose tissue with a #15 scalpel blade. The skin margins were undermined to an appropriate distance in all directions utilizing iris scissors. Hemostasis was achieved with electrocautery. The flaps were then transposed into place, one clockwise and the other counterclockwise, and anchored with interrupted buried subcutaneous sutures. Double O-Z Plasty Text: The defect edges were debeveled with a #15 scalpel blade. Given the location of the defect, shape of the defect and the proximity to free margins a Double O-Z plasty (double transposition flap) was deemed most appropriate. Using a sterile surgical marker, the appropriate transposition flaps were drawn incorporating the defect and placing the expected incisions within the relaxed skin tension lines where possible. The area thus outlined was incised deep to adipose tissue with a #15 scalpel blade. The skin margins were undermined to an appropriate distance in all directions utilizing iris scissors. Hemostasis was achieved with electrocautery. The flaps were then transposed into place, one clockwise and the other counterclockwise, and anchored with interrupted buried subcutaneous sutures. V-Y Plasty Text: The defect edges were debeveled with a #15 scalpel blade. Given the location of the defect, shape of the defect and the proximity to free margins an V-Y advancement flap was deemed most appropriate. Using a sterile surgical marker, an appropriate advancement flap was drawn incorporating the defect and placing the expected incisions within the relaxed skin tension lines where possible. The area thus outlined was incised deep to adipose tissue with a #15 scalpel blade. The skin margins were undermined to an appropriate distance in all directions utilizing iris scissors. H Plasty Text: Given the location of the defect, shape of the defect and the proximity to free margins a H-plasty was deemed most appropriate for repair. Using a sterile surgical marker, the appropriate advancement arms of the H-plasty were drawn incorporating the defect and placing the expected incisions within the relaxed skin tension lines where possible. The area thus outlined was incised deep to adipose tissue with a #15 scalpel blade. The skin margins were undermined to an appropriate distance in all directions utilizing iris scissors. The opposing advancement arms were then advanced into place in opposite direction and anchored with interrupted buried subcutaneous sutures. W Plasty Text: The lesion was extirpated to the level of the fat with a #15 scalpel blade. Given the location of the defect, shape of the defect and the proximity to free margins a W-plasty was deemed most appropriate for repair. Using a sterile surgical marker, the appropriate transposition arms of the W-plasty were drawn incorporating the defect and placing the expected incisions within the relaxed skin tension lines where possible. The area thus outlined was incised deep to adipose tissue with a #15 scalpel blade. The skin margins were undermined to an appropriate distance in all directions utilizing iris scissors. The opposing transposition arms were then transposed into place in opposite direction and anchored with interrupted buried subcutaneous sutures. Z Plasty Text: The lesion was extirpated to the level of the fat with a #15 scalpel blade. Given the location of the defect, shape of the defect and the proximity to free margins a Z-plasty was deemed most appropriate for repair. Using a sterile surgical marker, the appropriate transposition arms of the Z-plasty were drawn incorporating the defect and placing the expected incisions within the relaxed skin tension lines where possible. The area thus outlined was incised deep to adipose tissue with a #15 scalpel blade. The skin margins were undermined to an appropriate distance in all directions utilizing iris scissors. The opposing transposition arms were then transposed into place in opposite direction and anchored with interrupted buried subcutaneous sutures. Zygomaticofacial Flap Text: Given the location of the defect, shape of the defect and the proximity to free margins a zygomaticofacial flap was deemed most appropriate for repair. Using a sterile surgical marker, the appropriate flap was drawn incorporating the defect and placing the expected incisions within the relaxed skin tension lines where possible. The area thus outlined was incised deep to adipose tissue with a #15 scalpel blade with preservation of a vascular pedicle. The skin margins were undermined to an appropriate distance in all directions utilizing iris scissors. The flap was then placed into the defect and anchored with interrupted buried subcutaneous sutures. Cheek Interpolation Flap Text: A decision was made to reconstruct the defect utilizing an interpolation axial flap and a staged reconstruction. A telfa template was made of the defect. This telfa template was then used to outline the Cheek Interpolation flap. The donor area for the pedicle flap was then injected with anesthesia. The flap was excised through the skin and subcutaneous tissue down to the layer of the underlying musculature. The interpolation flap was carefully excised within this deep plane to maintain its blood supply. The edges of the donor site were undermined. The donor site was closed in a primary fashion. The pedicle was then rotated into position and sutured. Once the tube was sutured into place, adequate blood supply was confirmed with blanching and refill. The pedicle was then wrapped with xeroform gauze and dressed appropriately with a telfa and gauze bandage to ensure continued blood supply and protect the attached pedicle. Cheek-To-Nose Interpolation Flap Text: A decision was made to reconstruct the defect utilizing an interpolation axial flap and a staged reconstruction. A telfa template was made of the defect. This telfa template was then used to outline the Cheek-To-Nose Interpolation flap. The donor area for the pedicle flap was then injected with anesthesia. The flap was excised through the skin and subcutaneous tissue down to the layer of the underlying musculature. The interpolation flap was carefully excised within this deep plane to maintain its blood supply. The edges of the donor site were undermined. The donor site was closed in a primary fashion. The pedicle was then rotated into position and sutured. Once the tube was sutured into place, adequate blood supply was confirmed with blanching and refill. The pedicle was then wrapped with xeroform gauze and dressed appropriately with a telfa and gauze bandage to ensure continued blood supply and protect the attached pedicle. Interpolation Flap Text: A decision was made to reconstruct the defect utilizing an interpolation axial flap and a staged reconstruction. A telfa template was made of the defect. This telfa template was then used to outline the interpolation flap. The donor area for the pedicle flap was then injected with anesthesia. The flap was excised through the skin and subcutaneous tissue down to the layer of the underlying musculature. The interpolation flap was carefully excised within this deep plane to maintain its blood supply. The edges of the donor site were undermined. The donor site was closed in a primary fashion. The pedicle was then rotated into position and sutured. Once the tube was sutured into place, adequate blood supply was confirmed with blanching and refill. The pedicle was then wrapped with xeroform gauze and dressed appropriately with a telfa and gauze bandage to ensure continued blood supply and protect the attached pedicle. Melolabial Interpolation Flap Text: A decision was made to reconstruct the defect utilizing an interpolation axial flap and a staged reconstruction. A telfa template was made of the defect. This telfa template was then used to outline the melolabial interpolation flap. The donor area for the pedicle flap was then injected with anesthesia. The flap was excised through the skin and subcutaneous tissue down to the layer of the underlying musculature. The pedicle flap was carefully excised within this deep plane to maintain its blood supply. The edges of the donor site were undermined. The donor site was closed in a primary fashion. The pedicle was then rotated into position and sutured. Once the tube was sutured into place, adequate blood supply was confirmed with blanching and refill. The pedicle was then wrapped with xeroform gauze and dressed appropriately with a telfa and gauze bandage to ensure continued blood supply and protect the attached pedicle. Mastoid Interpolation Flap Text: A decision was made to reconstruct the defect utilizing an interpolation axial flap and a staged reconstruction. A telfa template was made of the defect. This telfa template was then used to outline the mastoid interpolation flap. The donor area for the pedicle flap was then injected with anesthesia. The flap was excised through the skin and subcutaneous tissue down to the layer of the underlying musculature. The pedicle flap was carefully excised within this deep plane to maintain its blood supply. The edges of the donor site were undermined. The donor site was closed in a primary fashion. The pedicle was then rotated into position and sutured. Once the tube was sutured into place, adequate blood supply was confirmed with blanching and refill. The pedicle was then wrapped with xeroform gauze and dressed appropriately with a telfa and gauze bandage to ensure continued blood supply and protect the attached pedicle. Posterior Auricular Interpolation Flap Text: A decision was made to reconstruct the defect utilizing an interpolation axial flap and a staged reconstruction. A telfa template was made of the defect. This telfa template was then used to outline the posterior auricular interpolation flap. The donor area for the pedicle flap was then injected with anesthesia. The flap was excised through the skin and subcutaneous tissue down to the layer of the underlying musculature. The pedicle flap was carefully excised within this deep plane to maintain its blood supply. The edges of the donor site were undermined. The donor site was closed in a primary fashion. The pedicle was then rotated into position and sutured. Once the tube was sutured into place, adequate blood supply was confirmed with blanching and refill. The pedicle was then wrapped with xeroform gauze and dressed appropriately with a telfa and gauze bandage to ensure continued blood supply and protect the attached pedicle. Paramedian Forehead Flap Text: A decision was made to reconstruct the defect utilizing an interpolation axial flap and a staged reconstruction. A telfa template was made of the defect. This telfa template was then used to outline the paramedian forehead pedicle flap. The donor area for the pedicle flap was then injected with anesthesia. The flap was excised through the skin and subcutaneous tissue down to the layer of the underlying musculature. The pedicle flap was carefully excised within this deep plane to maintain its blood supply. The edges of the donor site were undermined. The donor site was closed in a primary fashion. The pedicle was then rotated into position and sutured. Once the tube was sutured into place, adequate blood supply was confirmed with blanching and refill. The pedicle was then wrapped with xeroform gauze and dressed appropriately with a telfa and gauze bandage to ensure continued blood supply and protect the attached pedicle. Abbe Flap (Upper To Lower Lip) Text: The defect of the lower lip was assessed and measured. Given the location and size of the defect, an Abbe flap was deemed most appropriate. Using a sterile surgical marker, an appropriate Abbe flap was measured and drawn on the upper lip. Local anesthesia was then infiltrated. A scalpel was then used to incise the upper lip through and through the skin, vermilion, muscle and mucosa, leaving the flap pedicled on the opposite side. The flap was then rotated and transferred to the lower lip defect. The flap was then sutured into place with a three layer technique, closing the orbicularis oris muscle layer with subcutaneous buried sutures, followed by a mucosal layer and an epidermal layer. Abbe Flap (Lower To Upper Lip) Text: The defect of the upper lip was assessed and measured. Given the location and size of the defect, an Abbe flap was deemed most appropriate. Using a sterile surgical marker, an appropriate Abbe flap was measured and drawn on the lower lip. Local anesthesia was then infiltrated. A scalpel was then used to incise the upper lip through and through the skin, vermilion, muscle and mucosa, leaving the flap pedicled on the opposite side. The flap was then rotated and transferred to the lower lip defect. The flap was then sutured into place with a three layer technique, closing the orbicularis oris muscle layer with subcutaneous buried sutures, followed by a mucosal layer and an epidermal layer. Estlander Flap (Upper To Lower Lip) Text: The defect of the lower lip was assessed and measured. Given the location and size of the defect, an Estlander flap was deemed most appropriate. Using a sterile surgical marker, an appropriate Estlander flap was measured and drawn on the upper lip. Local anesthesia was then infiltrated. A scalpel was then used to incise the lateral aspect of the flap, through skin, muscle and mucosa, leaving the flap pedicled medially. The flap was then rotated and positioned to fill the lower lip defect. The flap was then sutured into place with a three layer technique, closing the orbicularis oris muscle layer with subcutaneous buried sutures, followed by a mucosal layer and an epidermal layer. Estlander Flap (Lower To Upper Lip) Text: The defect of the lower lip was assessed and measured.  Given the location and size of the defect, an Estlander flap was deemed most appropriate.  Using a sterile surgical marker, an appropriate Estlander flap was measured and drawn on the upper lip. Local anesthesia was then infiltrated. A scalpel was then used to incise the lateral aspect of the flap, through skin, muscle and mucosa, leaving the flap pedicled medially.  The flap was then rotated and positioned to fill the lower lip defect.  The flap was then sutured into place with a three layer technique, closing the orbicularis oris muscle layer with subcutaneous buried sutures, followed by a mucosal layer and an epidermal layer. Lip Wedge Excision Repair Text: Given the location of the defect and the proximity to free margins a full thickness wedge repair was deemed most appropriate. Using a sterile surgical marker, the appropriate repair was drawn incorporating the defect and placing the expected incisions perpendicular to the vermilion border. The vermilion border was also meticulously outlined to ensure appropriate reapproximation during the repair. The area thus outlined was incised through and through with a #15 scalpel blade. The muscularis and dermis were reaproximated with deep sutures following hemostasis. Care was taken to realign the vermilion border before proceeding with the superficial closure. Once the vermilion was realigned the superfical and mucosal closure was finished. Ftsg Text: The defect edges were debeveled with a #15 scalpel blade. Given the location of the defect, shape of the defect and the proximity to free margins a full thickness skin graft was deemed most appropriate. Using a sterile surgical marker, the primary defect shape was transferred to the donor site. The area thus outlined was incised deep to adipose tissue with a #15 scalpel blade. The harvested graft was then trimmed of adipose tissue until only dermis and epidermis was left. The skin margins of the secondary defect were undermined to an appropriate distance in all directions utilizing iris scissors. The secondary defect was closed with interrupted buried subcutaneous sutures. The skin edges were then re-apposed with running  sutures. The skin graft was then placed in the primary defect and oriented appropriately. Split-Thickness Skin Graft Text: The defect edges were debeveled with a #15 scalpel blade. Given the location of the defect, shape of the defect and the proximity to free margins a split thickness skin graft was deemed most appropriate. Using a sterile surgical marker, the primary defect shape was transferred to the donor site. The split thickness graft was then harvested. The skin graft was then placed in the primary defect and oriented appropriately. Burow's Graft Text: The defect edges were debeveled with a #15 scalpel blade. Given the location of the defect, shape of the defect, the proximity to free margins and the presence of a standing cone deformity a Burow's skin graft was deemed most appropriate. The standing cone was removed and this tissue was then trimmed to the shape of the primary defect. The adipose tissue was also removed until only dermis and epidermis were left. The skin margins of the secondary defect were undermined to an appropriate distance in all directions utilizing iris scissors. The secondary defect was closed with interrupted buried subcutaneous sutures. The skin edges were then re-apposed with running  sutures. The skin graft was then placed in the primary defect and oriented appropriately. Cartilage Graft Text: The defect edges were debeveled with a #15 scalpel blade. Given the location of the defect, shape of the defect, the fact the defect involved a full thickness cartilage defect a cartilage graft was deemed most appropriate. An appropriate donor site was identified, cleansed, and anesthetized. The cartilage graft was then harvested and transferred to the recipient site, oriented appropriately and then sutured into place. The secondary defect was then repaired using a primary closure. Composite Graft Text: The defect edges were debeveled with a #15 scalpel blade. Given the location of the defect, shape of the defect, the proximity to free margins and the fact the defect was full thickness a composite graft was deemed most appropriate. The defect was outline and then transferred to the donor site. A full thickness graft was then excised from the donor site. The graft was then placed in the primary defect, oriented appropriately and then sutured into place. The secondary defect was then repaired using a primary closure. Epidermal Autograft Text: The defect edges were debeveled with a #15 scalpel blade. Given the location of the defect, shape of the defect and the proximity to free margins an epidermal autograft was deemed most appropriate. Using a sterile surgical marker, the primary defect shape was transferred to the donor site. The epidermal graft was then harvested. The skin graft was then placed in the primary defect and oriented appropriately. Dermal Autograft Text: The defect edges were debeveled with a #15 scalpel blade. Given the location of the defect, shape of the defect and the proximity to free margins a dermal autograft was deemed most appropriate. Using a sterile surgical marker, the primary defect shape was transferred to the donor site. The area thus outlined was incised deep to adipose tissue with a #15 scalpel blade. The harvested graft was then trimmed of adipose and epidermal tissue until only dermis was left. The skin graft was then placed in the primary defect and oriented appropriately. Skin Substitute Text: The defect edges were debeveled with a #15 scalpel blade. Given the location of the defect, shape of the defect and the proximity to free margins a skin substitute graft was deemed most appropriate. The graft material was trimmed to fit the size of the defect. The graft was then placed in the primary defect and oriented appropriately. Tissue Cultured Epidermal Autograft Text: The defect edges were debeveled with a #15 scalpel blade. Given the location of the defect, shape of the defect and the proximity to free margins a tissue cultured epidermal autograft was deemed most appropriate. The graft was then trimmed to fit the size of the defect. The graft was then placed in the primary defect and oriented appropriately. Xenograft Text: The defect edges were debeveled with a #15 scalpel blade. Given the location of the defect, shape of the defect and the proximity to free margins a xenograft was deemed most appropriate. The graft was then trimmed to fit the size of the defect. The graft was then placed in the primary defect and oriented appropriately. Purse String (Intermediate) Text: Given the location of the defect and the characteristics of the surrounding skin a purse string intermediate closure was deemed most appropriate. Undermining was performed circumferentially around the surgical defect. A purse string suture was then placed and tightened. Purse String (Simple) Text: Given the location of the defect and the characteristics of the surrounding skin a purse string simple closure was deemed most appropriate. Undermining was performed circumferentially around the surgical defect. A purse string suture was then placed and tightened. Partial Purse String (Intermediate) Text: Given the location of the defect and the characteristics of the surrounding skin an intermediate purse string closure was deemed most appropriate. Undermining was performed circumferentially around the surgical defect. A purse string suture was then placed and tightened. Wound tension of the circular defect prevented complete closure of the wound. Partial Purse String (Simple) Text: Given the location of the defect and the characteristics of the surrounding skin a simple purse string closure was deemed most appropriate. Undermining was performed circumferentially around the surgical defect. A purse string suture was then placed and tightened. Wound tension of the circular defect prevented complete closure of the wound. Complex Repair And Single Advancement Flap Text: The defect edges were debeveled with a #15 scalpel blade. The primary defect was closed partially with a complex linear closure. Given the location of the remaining defect, shape of the defect and the proximity to free margins a single advancement flap was deemed most appropriate for complete closure of the defect. Using a sterile surgical marker, an appropriate advancement flap was drawn incorporating the defect and placing the expected incisions within the relaxed skin tension lines where possible. The area thus outlined was incised deep to adipose tissue with a #15 scalpel blade. The skin margins were undermined to an appropriate distance in all directions utilizing iris scissors. Complex Repair And Double Advancement Flap Text: The defect edges were debeveled with a #15 scalpel blade. The primary defect was closed partially with a complex linear closure. Given the location of the remaining defect, shape of the defect and the proximity to free margins a double advancement flap was deemed most appropriate for complete closure of the defect. Using a sterile surgical marker, an appropriate advancement flap was drawn incorporating the defect and placing the expected incisions within the relaxed skin tension lines where possible. The area thus outlined was incised deep to adipose tissue with a #15 scalpel blade. The skin margins were undermined to an appropriate distance in all directions utilizing iris scissors. Complex Repair And Modified Advancement Flap Text: The defect edges were debeveled with a #15 scalpel blade. The primary defect was closed partially with a complex linear closure. Given the location of the remaining defect, shape of the defect and the proximity to free margins a modified advancement flap was deemed most appropriate for complete closure of the defect. Using a sterile surgical marker, an appropriate advancement flap was drawn incorporating the defect and placing the expected incisions within the relaxed skin tension lines where possible. The area thus outlined was incised deep to adipose tissue with a #15 scalpel blade. The skin margins were undermined to an appropriate distance in all directions utilizing iris scissors. Complex Repair And A-T Advancement Flap Text: The defect edges were debeveled with a #15 scalpel blade. The primary defect was closed partially with a complex linear closure. Given the location of the remaining defect, shape of the defect and the proximity to free margins an A-T advancement flap was deemed most appropriate for complete closure of the defect. Using a sterile surgical marker, an appropriate advancement flap was drawn incorporating the defect and placing the expected incisions within the relaxed skin tension lines where possible. The area thus outlined was incised deep to adipose tissue with a #15 scalpel blade. The skin margins were undermined to an appropriate distance in all directions utilizing iris scissors. Complex Repair And O-T Advancement Flap Text: The defect edges were debeveled with a #15 scalpel blade. The primary defect was closed partially with a complex linear closure. Given the location of the remaining defect, shape of the defect and the proximity to free margins an O-T advancement flap was deemed most appropriate for complete closure of the defect. Using a sterile surgical marker, an appropriate advancement flap was drawn incorporating the defect and placing the expected incisions within the relaxed skin tension lines where possible. The area thus outlined was incised deep to adipose tissue with a #15 scalpel blade. The skin margins were undermined to an appropriate distance in all directions utilizing iris scissors. Complex Repair And O-L Flap Text: The defect edges were debeveled with a #15 scalpel blade. The primary defect was closed partially with a complex linear closure. Given the location of the remaining defect, shape of the defect and the proximity to free margins an O-L flap was deemed most appropriate for complete closure of the defect. Using a sterile surgical marker, an appropriate flap was drawn incorporating the defect and placing the expected incisions within the relaxed skin tension lines where possible. The area thus outlined was incised deep to adipose tissue with a #15 scalpel blade. The skin margins were undermined to an appropriate distance in all directions utilizing iris scissors. Complex Repair And Bilobe Flap Text: The defect edges were debeveled with a #15 scalpel blade. The primary defect was closed partially with a complex linear closure. Given the location of the remaining defect, shape of the defect and the proximity to free margins a bilobe flap was deemed most appropriate for complete closure of the defect. Using a sterile surgical marker, an appropriate advancement flap was drawn incorporating the defect and placing the expected incisions within the relaxed skin tension lines where possible. The area thus outlined was incised deep to adipose tissue with a #15 scalpel blade. The skin margins were undermined to an appropriate distance in all directions utilizing iris scissors. Complex Repair And Melolabial Flap Text: The defect edges were debeveled with a #15 scalpel blade. The primary defect was closed partially with a complex linear closure. Given the location of the remaining defect, shape of the defect and the proximity to free margins a melolabial flap was deemed most appropriate for complete closure of the defect. Using a sterile surgical marker, an appropriate advancement flap was drawn incorporating the defect and placing the expected incisions within the relaxed skin tension lines where possible. The area thus outlined was incised deep to adipose tissue with a #15 scalpel blade. The skin margins were undermined to an appropriate distance in all directions utilizing iris scissors. Complex Repair And Rotation Flap Text: The defect edges were debeveled with a #15 scalpel blade. The primary defect was closed partially with a complex linear closure. Given the location of the remaining defect, shape of the defect and the proximity to free margins a rotation flap was deemed most appropriate for complete closure of the defect. Using a sterile surgical marker, an appropriate advancement flap was drawn incorporating the defect and placing the expected incisions within the relaxed skin tension lines where possible. The area thus outlined was incised deep to adipose tissue with a #15 scalpel blade. The skin margins were undermined to an appropriate distance in all directions utilizing iris scissors. Complex Repair And Rhombic Flap Text: The defect edges were debeveled with a #15 scalpel blade. The primary defect was closed partially with a complex linear closure. Given the location of the remaining defect, shape of the defect and the proximity to free margins a rhombic flap was deemed most appropriate for complete closure of the defect. Using a sterile surgical marker, an appropriate advancement flap was drawn incorporating the defect and placing the expected incisions within the relaxed skin tension lines where possible. The area thus outlined was incised deep to adipose tissue with a #15 scalpel blade. The skin margins were undermined to an appropriate distance in all directions utilizing iris scissors. Complex Repair And Transposition Flap Text: The defect edges were debeveled with a #15 scalpel blade. The primary defect was closed partially with a complex linear closure. Given the location of the remaining defect, shape of the defect and the proximity to free margins a transposition flap was deemed most appropriate for complete closure of the defect. Using a sterile surgical marker, an appropriate advancement flap was drawn incorporating the defect and placing the expected incisions within the relaxed skin tension lines where possible. The area thus outlined was incised deep to adipose tissue with a #15 scalpel blade. The skin margins were undermined to an appropriate distance in all directions utilizing iris scissors. Complex Repair And V-Y Plasty Text: The defect edges were debeveled with a #15 scalpel blade. The primary defect was closed partially with a complex linear closure. Given the location of the remaining defect, shape of the defect and the proximity to free margins a V-Y plasty was deemed most appropriate for complete closure of the defect. Using a sterile surgical marker, an appropriate advancement flap was drawn incorporating the defect and placing the expected incisions within the relaxed skin tension lines where possible. The area thus outlined was incised deep to adipose tissue with a #15 scalpel blade. The skin margins were undermined to an appropriate distance in all directions utilizing iris scissors. Complex Repair And M Plasty Text: The defect edges were debeveled with a #15 scalpel blade. The primary defect was closed partially with a complex linear closure. Given the location of the remaining defect, shape of the defect and the proximity to free margins an M plasty was deemed most appropriate for complete closure of the defect. Using a sterile surgical marker, an appropriate advancement flap was drawn incorporating the defect and placing the expected incisions within the relaxed skin tension lines where possible. The area thus outlined was incised deep to adipose tissue with a #15 scalpel blade. The skin margins were undermined to an appropriate distance in all directions utilizing iris scissors. Complex Repair And Double M Plasty Text: The defect edges were debeveled with a #15 scalpel blade. The primary defect was closed partially with a complex linear closure. Given the location of the remaining defect, shape of the defect and the proximity to free margins a double M plasty was deemed most appropriate for complete closure of the defect. Using a sterile surgical marker, an appropriate advancement flap was drawn incorporating the defect and placing the expected incisions within the relaxed skin tension lines where possible. The area thus outlined was incised deep to adipose tissue with a #15 scalpel blade. The skin margins were undermined to an appropriate distance in all directions utilizing iris scissors. Complex Repair And W Plasty Text: The defect edges were debeveled with a #15 scalpel blade. The primary defect was closed partially with a complex linear closure. Given the location of the remaining defect, shape of the defect and the proximity to free margins a W plasty was deemed most appropriate for complete closure of the defect. Using a sterile surgical marker, an appropriate advancement flap was drawn incorporating the defect and placing the expected incisions within the relaxed skin tension lines where possible. The area thus outlined was incised deep to adipose tissue with a #15 scalpel blade. The skin margins were undermined to an appropriate distance in all directions utilizing iris scissors. Complex Repair And Z Plasty Text: The defect edges were debeveled with a #15 scalpel blade. The primary defect was closed partially with a complex linear closure. Given the location of the remaining defect, shape of the defect and the proximity to free margins a Z plasty was deemed most appropriate for complete closure of the defect. Using a sterile surgical marker, an appropriate advancement flap was drawn incorporating the defect and placing the expected incisions within the relaxed skin tension lines where possible. The area thus outlined was incised deep to adipose tissue with a #15 scalpel blade. The skin margins were undermined to an appropriate distance in all directions utilizing iris scissors. Complex Repair And Dorsal Nasal Flap Text: The defect edges were debeveled with a #15 scalpel blade. The primary defect was closed partially with a complex linear closure. Given the location of the remaining defect, shape of the defect and the proximity to free margins a dorsal nasal flap was deemed most appropriate for complete closure of the defect. Using a sterile surgical marker, an appropriate flap was drawn incorporating the defect and placing the expected incisions within the relaxed skin tension lines where possible. The area thus outlined was incised deep to adipose tissue with a #15 scalpel blade. The skin margins were undermined to an appropriate distance in all directions utilizing iris scissors. Complex Repair And Ftsg Text: The defect edges were debeveled with a #15 scalpel blade. The primary defect was closed partially with a complex linear closure. Given the location of the defect, shape of the defect and the proximity to free margins a full thickness skin graft was deemed most appropriate to repair the remaining defect. The graft was trimmed to fit the size of the remaining defect. The graft was then placed in the primary defect, oriented appropriately, and sutured into place. Complex Repair And Burow's Graft Text: The defect edges were debeveled with a #15 scalpel blade. The primary defect was closed partially with a complex linear closure. Given the location of the defect, shape of the defect, the proximity to free margins and the presence of a standing cone deformity a Burow's graft was deemed most appropriate to repair the remaining defect. The graft was trimmed to fit the size of the remaining defect. The graft was then placed in the primary defect, oriented appropriately, and sutured into place. Complex Repair And Split-Thickness Skin Graft Text: The defect edges were debeveled with a #15 scalpel blade. The primary defect was closed partially with a complex linear closure. Given the location of the defect, shape of the defect and the proximity to free margins a split thickness skin graft was deemed most appropriate to repair the remaining defect. The graft was trimmed to fit the size of the remaining defect. The graft was then placed in the primary defect, oriented appropriately, and sutured into place. Complex Repair And Epidermal Autograft Text: The defect edges were debeveled with a #15 scalpel blade. The primary defect was closed partially with a complex linear closure. Given the location of the defect, shape of the defect and the proximity to free margins an epidermal autograft was deemed most appropriate to repair the remaining defect. The graft was trimmed to fit the size of the remaining defect. The graft was then placed in the primary defect, oriented appropriately, and sutured into place. Complex Repair And Dermal Autograft Text: The defect edges were debeveled with a #15 scalpel blade. The primary defect was closed partially with a complex linear closure. Given the location of the defect, shape of the defect and the proximity to free margins an dermal autograft was deemed most appropriate to repair the remaining defect. The graft was trimmed to fit the size of the remaining defect. The graft was then placed in the primary defect, oriented appropriately, and sutured into place. Complex Repair And Tissue Cultured Epidermal Autograft Text: The defect edges were debeveled with a #15 scalpel blade. The primary defect was closed partially with a complex linear closure. Given the location of the defect, shape of the defect and the proximity to free margins an tissue cultured epidermal autograft was deemed most appropriate to repair the remaining defect. The graft was trimmed to fit the size of the remaining defect. The graft was then placed in the primary defect, oriented appropriately, and sutured into place. Complex Repair And Xenograft Text: The defect edges were debeveled with a #15 scalpel blade. The primary defect was closed partially with a complex linear closure. Given the location of the defect, shape of the defect and the proximity to free margins a xenograft was deemed most appropriate to repair the remaining defect. The graft was trimmed to fit the size of the remaining defect. The graft was then placed in the primary defect, oriented appropriately, and sutured into place. Complex Repair And Skin Substitute Graft Text: The defect edges were debeveled with a #15 scalpel blade. The primary defect was closed partially with a complex linear closure. Given the location of the remaining defect, shape of the defect and the proximity to free margins a skin substitute graft was deemed most appropriate to repair the remaining defect. The graft was trimmed to fit the size of the remaining defect. The graft was then placed in the primary defect, oriented appropriately, and sutured into place. Path Notes (To The Dermatopathologist): Closure size: 5.0cm \\nSCCIS \\nPlease check margins. Consent was obtained from the patient. The risks and benefits to therapy were discussed in detail. Specifically, the risks of infection, scarring, bleeding, prolonged wound healing, incomplete removal, allergy to anesthesia, nerve injury and recurrence were addressed. Prior to the procedure, the treatment site was clearly identified and confirmed by the patient. All components of Universal Protocol/PAUSE Rule completed. Post-Care Instructions: I reviewed with the patient in detail post-care instructions. Patient is not to engage in any heavy lifting, exercise, or swimming for the next 14 days. Should the patient develop any fevers, chills, bleeding, severe pain patient will contact the office immediately. Home Suture Removal Text: Patient was provided a home suture removal kit and will remove their sutures at home. If they have any questions or difficulties they will call the office. Where Do You Want The Question To Include Opioid Counseling Located?: Case Summary Tab Information: Selecting Yes will display possible errors in your note based on the variables you have selected. This validation is only offered as a suggestion for you. PLEASE NOTE THAT THE VALIDATION TEXT WILL BE REMOVED WHEN YOU FINALIZE YOUR NOTE. IF YOU WANT TO FAX A PRELIMINARY NOTE YOU WILL NEED TO TOGGLE THIS TO 'NO' IF YOU DO NOT WANT IT IN YOUR FAXED NOTE.

## 2025-05-15 NOTE — OCCUPATIONAL THERAPY INITIAL EVALUATION ADULT - ADL RETRAINING, OT EVAL
Pt will perform UB dressing task with Nelson by dc. Pt will perform UB dressing task with modA by dc.

## 2025-05-15 NOTE — SWALLOW BEDSIDE ASSESSMENT ADULT - SWALLOW EVAL: RECOMMENDED DIET
PEG as primary; pleasure feeds by mouth - full liquid diet - refer to GI if patient can be cleared for solids foods by mouth for pleasure  (low risk of pharyngeal dysphagia)

## 2025-05-15 NOTE — PROGRESS NOTE ADULT - ASSESSMENT
59y male with PMHx of HTN, hiatal hernia, HLD, CAD s/p CABG in Jan 2024, paroxysmal A. fib not on AC, former smoker (30 pack years, quit 1yr ago), recently diagnosed with colitis and esophageal adenocarcinoma s/p stent placement who presents for weakness. The patient was recently discharged from [facility] on 5/6 , and has been progressively deteriorating. Found to have hgb 7.6 lower than last admission reported melena, thrombocytopenia w/ elevated INR/PTT.     #Reported Melena possibly due to Upper GI Bleed likely Secondary to  Malignancy   #Thrombocytopenia, elevated INR/PTT r/o DIC  #Recently diagnosed Esophageal adenocarcinoma   #Acute Drop in Hemoglobin  - EGD 4/25: A friable circumferential mass seen from the GE junction at 38cm to 28 cm of mid esophagus. Multiple biopsies were obtained. Oozing of blood was noted from the whole mass.  - CBC BID  - c/w pantoprazole 40mg IV BID  - If any unstable bleed, please check CT angio AP IC STAT and call GI/IR stat  - CTH -ve, MRI -ve for stroke Mild smooth diffuse dural thickening over the cerebral convexities, cant r/o mets   - f/u pain management for back pain   - f/u  rad/onc c/s   - S/P EGD/PEG 5/14 w/ removal of metalic stent, no source of bleed   - f/u heme/onc, daily DIC panel     #Weakness likely multifactorial 2/2 malignancy, anemia  #Decreased PO intake 2/2 esophageal adenocarcinoma  - Weakness, unable to walk/stand  - States the esophageal stent has not helped with PO intake.  - Started IV D5/LR 75cc/hr  - Seen by PT/OT last admisison; recommended home PT    #L sided fascial drope  - Doesnot know if chronic.   - no focal defecits except b/l hip weakness   - F/U CTH abnd MRI Brain w/wo, c/s neuro if any changes     #chronic lower back pain   - xray lumbar spine: mild disc space narrowing and degenerative changes throughout the lumbar spine. Mild degenerative change of the bilateral sacroiliac joints.  - c/w Dilaudid 0.5mg Q4, Oxycodone 5mg Q4 PRN    #3.8 cm wide R iliac artery aneurysm- stable  - vascular o/p- surgery was scheduled with Dr. Tavera     #HTN  #CAD s/p CABG in Jan 2024  #Paroxysmal A.fib  - c/w home meds  - Hold plavix    #DVT PPX: SCD  #GI PPX: PPI BID  #Diet: NPO fw/ tube feeds   #Code Status: Full  #Activity Order: IAT  DISPO: pending GI 59y male with PMHx of HTN, hiatal hernia, HLD, CAD s/p CABG in Jan 2024, paroxysmal A. fib not on AC, former smoker (30 pack years, quit 1yr ago), recently diagnosed with colitis and esophageal adenocarcinoma s/p stent placement who presents for weakness. The patient was recently discharged from [facility] on 5/6 , and has been progressively deteriorating. Found to have hgb 7.6 lower than last admission reported melena, thrombocytopenia w/ elevated INR/PTT.     #Reported Melena possibly due to Upper GI Bleed likely Secondary to  Malignancy   #Thrombocytopenia, elevated INR/PTT r/o DIC  #Recently diagnosed Esophageal adenocarcinoma   #Acute Drop in Hemoglobin  - EGD 4/25: A friable circumferential mass seen from the GE junction at 38cm to 28 cm of mid esophagus. Multiple biopsies were obtained. Oozing of blood was noted from the whole mass.  - CBC BID  - c/w pantoprazole 40mg IV BID  - If any unstable bleed, please check CT angio AP IC STAT and call GI/IR stat  -  rad/onc: Will need op PET SCAN  - S/P EGD/PEG 5/14 w/ removal of metallic stent, no source of bleed   - f/u heme/onc, daily DIC panel   - f/u palliative for Pain management     #L sided fascial drope  # b/l LE weakness   - Doesnot know if chronic.   - no focal defecits except b/l hip weakness   -  CTH -ve, MRI -ve for stroke Mild smooth diffuse dural thickening over the cerebral convexities, cant r/o mets   -c f/u neuro c/s for b/l weakness     #chronic lower back pain   - xray lumbar spine: mild disc space narrowing and degenerative changes throughout the lumbar spine. Mild degenerative change of the bilateral sacroiliac joints.  - c/w Dilaudid 0.5mg Q4, Oxycodone 5mg Q4 PRN    #3.8 cm wide R iliac artery aneurysm- stable  - vascular o/p- surgery was scheduled with Dr. Tavera     #HTN  #CAD s/p CABG in Jan 2024  #Paroxysmal A.fib  - c/w home meds  - Hold plavix    #DVT PPX: SCD  #GI PPX: PPI BID  #Diet: Pureed fw/ tube feeds   #Code Status: Full  #Activity Order: IAT  DISPO: pending Neuro eval, possible rehab

## 2025-05-15 NOTE — PROGRESS NOTE ADULT - SUBJECTIVE AND OBJECTIVE BOX
Patient is a 59y old  Male who presents with a chief complaint of Progressive weakness (13 May 2025 17:51)    INTERVAL HPI/OVERNIGHT EVENTS: Patient was examined and seen at bedside. This morning pt is resting comfortably in bed and reports feeling better. No further bleeding. No new complaints. Still LE weakness. WIfe at bedside.  ROS: Denies CP, SOB, AP, new weakness  All other systems reviewed and are within normal limits.  InitialHPI:  59y male with PMHx of HTN, hiatal hernia, HLD, CAD s/p CABG in Jan 2024, paroxysmal A. fib not on AC, former smoker (30 pack years, quit 1yr ago), recently diagnosed with colitis and esophageal adenocarcinoma s/p stent placement who presents for weakness. The patient was recently discharged from [facility] on 5/6 , and has been progressively deteriorating. States he has been unable to tolerate p.o. and has been unable to walk over the past few days. Wife at bedside reports that he has not been able to follow-up with oncology due to the weakness.  On ROS the patient also states he has been having black tarry stools that started 1 day prior to his EGD. He had 3 black BMs today. He denies any mucosal bleeding or blood in the urine.  He denies any fevers, chills, chest pain, shortness of breath, palpitations, headache, vision changes, nausea, vomiting, abdominal pain, hematuria, falls.  He is supposed to have PET scan tomorrow and follow with Dr. Sanchez in 2 days.    T(F): 98.5 (05-12-25 @ 21:05), Max: 98.6 (05-12-25 @ 19:48)  HR: 97 (05-12-25 @ 21:05) (88 - 104)  BP: 111/58 (05-12-25 @ 21:05) (111/58 - 132/75)  RR: 18 (05-12-25 @ 21:05) (18 - 20)  SpO2: 99% (05-12-25 @ 21:05) (98% - 100%) on RA    Labs: Hg 7.6 (baseline ~10), MCV 78, slight schistocytes on smear,  Plt 92, PT/INR >40/6.2, PTT 51.8, ALK phos 273.      Imaging:    CT Chest w/ IV Cont  (4/26 - prior admission)  IMPRESSION:  1.  No definite evidence of thoracic metastatic disease.  2.  Small bilateral pleural effusions.  3.  Marked circumferential thickening of the distal esophagus/hiatal   hernia could be related to provided history of suspected esophageal   cancer versus esophagitis. (12 May 2025 21:41)    PAST MEDICAL & SURGICAL HISTORY:  HTN (hypertension)      Hiatal hernia      HLD (hyperlipidemia)      CAD (coronary artery disease)      Paroxysmal atrial fibrillation      History of colitis      S/P CABG (coronary artery bypass graft)      General: NAD, AAO3, ?Lt facial droop  HEENT:  EOMI, no LAD  CV: S1 S2  Resp: decreased breath sounds at bases  GI: NT/ND/S +BS; +PEG  MS: no clubbing/cyanosis/edema, + pulses b/l  Neuro: LE 2/5 distally, 4/5 prox. UE 5-/5          Home Medications:  clopidogrel 75 mg oral tablet: 1 tab(s) orally once a day (13 May 2025 01:36)  Lipitor 80 mg oral tablet: 1 tab(s) orally once a day (13 May 2025 01:36)  Metoprolol Tartrate 25 mg oral tablet: 0.5 tab(s) orally 2 times a day (13 May 2025 01:36)  Multiple Vitamins oral tablet, chewable: 1 tab(s) orally once a day (13 May 2025 01:36)  Pepcid 20 mg oral tablet: 1 tab(s) orally once a day (13 May 2025 01:36)    MEDICATIONS  (STANDING):  atorvastatin 80 milliGRAM(s) Oral at bedtime  dextrose 5% + lactated ringers. 1000 milliLiter(s) (75 mL/Hr) IV Continuous <Continuous>  heparin   Injectable 5000 Unit(s) SubCutaneous every 12 hours  lidocaine   4% Patch 1 Patch Transdermal daily  megestrol 20 milliGRAM(s) Oral daily  metoprolol tartrate 12.5 milliGRAM(s) Oral two times a day  OLANZapine 2.5 milliGRAM(s) Oral daily  pantoprazole  Injectable 40 milliGRAM(s) IV Push every 12 hours  polyethylene glycol 3350 17 Gram(s) Oral daily    MEDICATIONS  (PRN):  HYDROmorphone  Injectable 1 milliGRAM(s) IV Push every 3 hours PRN Severe Pain (7 - 10)  oxyCODONE    IR 10 milliGRAM(s) Oral every 4 hours PRN Moderatre to Severe Pain    Vital Signs Last 24 Hrs  T(C): 36.9 (15 May 2025 13:22), Max: 37.8 (14 May 2025 20:38)  T(F): 98.4 (15 May 2025 13:22), Max: 100.1 (14 May 2025 20:38)  HR: 96 (15 May 2025 13:22) (89 - 96)  BP: 100/49 (15 May 2025 13:22) (100/49 - 118/68)  BP(mean): 90 (15 May 2025 04:42) (85 - 90)  RR: 20 (15 May 2025 13:22) (18 - 20)  SpO2: 97% (15 May 2025 13:22) (97% - 98%)    Parameters below as of 15 May 2025 13:22  Patient On (Oxygen Delivery Method): room air      CAPILLARY BLOOD GLUCOSE        LABS:                        7.9    6.15  )-----------( 50       ( 15 May 2025 05:50 )             24.0     05-15    135  |  103  |  14  ----------------------------<  116[H]  4.1   |  21  |  0.7    Ca    8.8      15 May 2025 05:50  Mg     2.1     05-15    TPro  5.3[L]  /  Alb  3.2[L]  /  TBili  1.6[H]  /  DBili  0.7[H]  /  AST  38  /  ALT  16  /  AlkPhos  329[H]  05-15    LIVER FUNCTIONS - ( 15 May 2025 12:27 )  Alb: 3.2 g/dL / Pro: 5.3 g/dL / ALK PHOS: 329 U/L / ALT: 16 U/L / AST: 38 U/L / GGT: x               PT/INR - ( 15 May 2025 05:50 )   PT: 14.70 sec;   INR: 1.24 ratio         PTT - ( 15 May 2025 05:50 )  PTT:32.3 sec  Urinalysis Basic - ( 15 May 2025 05:50 )    Color: x / Appearance: x / SG: x / pH: x  Gluc: 116 mg/dL / Ketone: x  / Bili: x / Urobili: x   Blood: x / Protein: x / Nitrite: x   Leuk Esterase: x / RBC: x / WBC x   Sq Epi: x / Non Sq Epi: x / Bacteria: x              Urinalysis with Rflx Culture (collected 12 May 2025 17:55)      Consultant Notes Reviewed:  [x ] YES  [ ] NO  Care Discussed with Consultants/Other Providers/ Housestaff [ x] YES  [ ] NO  Radiology, labs, EKGs, new studies personally reviewed.

## 2025-05-15 NOTE — PROGRESS NOTE ADULT - ATTENDING COMMENTS
59M w/ newly diagnosed esophageal adenocarcinoma, pMMR, at least T2 disease. Outpatient treatment for his esophageal cancer.    Thrombocytopenia and coagulopathy possibly due to acute illness, vit K def and chronic DIC from malignancy. Had not receive heparin prior to drop in platlelets. Reviewed smear with fellow and occasional schistocytes, no platelets clumping and minimal polychromasia. Hold DVT ppx if platelets < 30k. 59M w/ newly diagnosed esophageal adenocarcinoma, pMMR, at least T2 disease. Outpatient treatment for his esophageal cancer. Will eventually need chemoport placement to start chemo.    Thrombocytopenia and coagulopathy possibly due to acute illness, vit K def and chronic DIC from malignancy. Had not receive heparin prior to drop in platlelets. Reviewed smear with fellow and occasional schistocytes, no platelets clumping and minimal polychromasia. Hold DVT ppx if platelets < 30k.

## 2025-05-15 NOTE — PROGRESS NOTE ADULT - SUBJECTIVE AND OBJECTIVE BOX
JONAS CELESTIN 59y Male  MRN#: [id number]   Hospital Day: 3d    HPI:  59y male with PMHx of HTN, hiatal hernia, HLD, CAD s/p CABG in Jan 2024, paroxysmal A. fib not on AC, former smoker (30 pack years, quit 1yr ago), recently diagnosed with colitis and esophageal adenocarcinoma s/p stent placement who presents for weakness. The patient was recently discharged from [facility] on 5/6 , and has been progressively deteriorating. States he has been unable to tolerate p.o. and has been unable to walk over the past few days. Wife at bedside reports that he has not been able to follow-up with oncology due to the weakness.  On ROS the patient also states he has been having black tarry stools that started 1 day prior to his EGD. He had 3 black BMs today. He denies any mucosal bleeding or blood in the urine.  He denies any fevers, chills, chest pain, shortness of breath, palpitations, headache, vision changes, nausea, vomiting, abdominal pain, hematuria, falls.  He is supposed to have PET scan tomorrow and follow with Dr. Sanchez in 2 days.    T(F): 98.5 (05-12-25 @ 21:05), Max: 98.6 (05-12-25 @ 19:48)  HR: 97 (05-12-25 @ 21:05) (88 - 104)  BP: 111/58 (05-12-25 @ 21:05) (111/58 - 132/75)  RR: 18 (05-12-25 @ 21:05) (18 - 20)  SpO2: 99% (05-12-25 @ 21:05) (98% - 100%) on RA    Labs: Hg 7.6 (baseline ~10), MCV 78, slight schistocytes on smear,  Plt 92, PT/INR >40/6.2, PTT 51.8, ALK phos 273.      Imaging:    CT Chest w/ IV Cont  (4/26 - prior admission)  IMPRESSION:  1.  No definite evidence of thoracic metastatic disease.  2.  Small bilateral pleural effusions.  3.  Marked circumferential thickening of the distal esophagus/hiatal   hernia could be related to provided history of suspected esophageal   cancer versus esophagitis. (12 May 2025 21:41)      SUBJECTIVE      OBJECTIVE  PAST MEDICAL & SURGICAL HISTORY  HTN (hypertension)    Hiatal hernia    HLD (hyperlipidemia)    CAD (coronary artery disease)    Paroxysmal atrial fibrillation    History of colitis    S/P CABG (coronary artery bypass graft)      ALLERGIES:  penicillins (Unknown)    MEDICATIONS:  STANDING MEDICATIONS  atorvastatin 80 milliGRAM(s) Oral at bedtime  dextrose 5% + lactated ringers. 1000 milliLiter(s) IV Continuous <Continuous>  lidocaine   4% Patch 1 Patch Transdermal daily  megestrol 20 milliGRAM(s) Oral daily  metoprolol tartrate 12.5 milliGRAM(s) Oral two times a day  OLANZapine 2.5 milliGRAM(s) Oral daily  pantoprazole  Injectable 40 milliGRAM(s) IV Push every 12 hours    PRN MEDICATIONS  HYDROmorphone  Injectable 0.5 milliGRAM(s) IV Push every 4 hours PRN  oxycodone    5 mG/acetaminophen 325 mG 1 Tablet(s) Oral every 4 hours PRN      VITAL SIGNS: Last 24 Hours  T(C): 37.1 (15 May 2025 04:42), Max: 37.8 (14 May 2025 20:38)  T(F): 98.8 (15 May 2025 04:42), Max: 100.1 (14 May 2025 20:38)  HR: 95 (15 May 2025 04:42) (84 - 95)  BP: 117/77 (15 May 2025 04:42) (114/69 - 140/67)  BP(mean): 90 (15 May 2025 04:42) (85 - 90)  RR: 18 (15 May 2025 04:42) (18 - 19)  SpO2: 97% (15 May 2025 04:42) (97% - 99%)    LABS:                        7.8    6.18  )-----------( 51       ( 14 May 2025 20:00 )             23.5     05-14    136  |  104  |  12  ----------------------------<  108[H]  4.1   |  21  |  0.5[L]    Ca    8.9      14 May 2025 06:05  Mg     2.0     05-14    TPro  5.7[L]  /  Alb  3.0[L]  /  TBili  1.2  /  DBili  x   /  AST  31  /  ALT  13  /  AlkPhos  239[H]  05-14    PT/INR - ( 15 May 2025 05:50 )   PT: 14.70 sec;   INR: 1.24 ratio         PTT - ( 15 May 2025 05:50 )  PTT:32.3 sec  Urinalysis Basic - ( 14 May 2025 06:05 )    Color: x / Appearance: x / SG: x / pH: x  Gluc: 108 mg/dL / Ketone: x  / Bili: x / Urobili: x   Blood: x / Protein: x / Nitrite: x   Leuk Esterase: x / RBC: x / WBC x   Sq Epi: x / Non Sq Epi: x / Bacteria: x            Urinalysis with Rflx Culture (collected 12 May 2025 17:55)              PHYSICAL EXAM:  GENERAL: NAD, well-developed.  HEAD:  Atraumatic, Normocephalic.  EYES: conjunctiva and sclera clear  CHEST/LUNG: GBAE. No wheezing or crackles   HEART: regular rate and rhythm; S1/S2.  ABDOMEN: Soft, Nontender, Nondistended  EXTREMITIES: No edema.   PSYCH: AAOx3.  NEUROLOGY: non-focal; moves all extremities     JONAS CELESTIN 59y Male  MRN#: [id number]   Hospital Day: 3d    HPI:  59y male with PMHx of HTN, hiatal hernia, HLD, CAD s/p CABG in Jan 2024, paroxysmal A. fib not on AC, former smoker (30 pack years, quit 1yr ago), recently diagnosed with colitis and esophageal adenocarcinoma s/p stent placement who presents for weakness. The patient was recently discharged from [facility] on 5/6 , and has been progressively deteriorating. States he has been unable to tolerate p.o. and has been unable to walk over the past few days. Wife at bedside reports that he has not been able to follow-up with oncology due to the weakness.  On ROS the patient also states he has been having black tarry stools that started 1 day prior to his EGD. He had 3 black BMs today. He denies any mucosal bleeding or blood in the urine.  He denies any fevers, chills, chest pain, shortness of breath, palpitations, headache, vision changes, nausea, vomiting, abdominal pain, hematuria, falls.  He is supposed to have PET scan tomorrow and follow with Dr. Sanchez in 2 days.    T(F): 98.5 (05-12-25 @ 21:05), Max: 98.6 (05-12-25 @ 19:48)  HR: 97 (05-12-25 @ 21:05) (88 - 104)  BP: 111/58 (05-12-25 @ 21:05) (111/58 - 132/75)  RR: 18 (05-12-25 @ 21:05) (18 - 20)  SpO2: 99% (05-12-25 @ 21:05) (98% - 100%) on RA    Labs: Hg 7.6 (baseline ~10), MCV 78, slight schistocytes on smear,  Plt 92, PT/INR >40/6.2, PTT 51.8, ALK phos 273.      Imaging:    CT Chest w/ IV Cont  (4/26 - prior admission)  IMPRESSION:  1.  No definite evidence of thoracic metastatic disease.  2.  Small bilateral pleural effusions.  3.  Marked circumferential thickening of the distal esophagus/hiatal   hernia could be related to provided history of suspected esophageal   cancer versus esophagitis. (12 May 2025 21:41)      SUBJECTIVE  Pt seen and evaluated at bedside. No acute overnight events.     OBJECTIVE  PAST MEDICAL & SURGICAL HISTORY  HTN (hypertension)    Hiatal hernia    HLD (hyperlipidemia)    CAD (coronary artery disease)    Paroxysmal atrial fibrillation    History of colitis    S/P CABG (coronary artery bypass graft)      ALLERGIES:  penicillins (Unknown)    MEDICATIONS:  STANDING MEDICATIONS  atorvastatin 80 milliGRAM(s) Oral at bedtime  dextrose 5% + lactated ringers. 1000 milliLiter(s) IV Continuous <Continuous>  lidocaine   4% Patch 1 Patch Transdermal daily  megestrol 20 milliGRAM(s) Oral daily  metoprolol tartrate 12.5 milliGRAM(s) Oral two times a day  OLANZapine 2.5 milliGRAM(s) Oral daily  pantoprazole  Injectable 40 milliGRAM(s) IV Push every 12 hours    PRN MEDICATIONS  HYDROmorphone  Injectable 0.5 milliGRAM(s) IV Push every 4 hours PRN  oxycodone    5 mG/acetaminophen 325 mG 1 Tablet(s) Oral every 4 hours PRN      VITAL SIGNS: Last 24 Hours  T(C): 37.1 (15 May 2025 04:42), Max: 37.8 (14 May 2025 20:38)  T(F): 98.8 (15 May 2025 04:42), Max: 100.1 (14 May 2025 20:38)  HR: 95 (15 May 2025 04:42) (84 - 95)  BP: 117/77 (15 May 2025 04:42) (114/69 - 140/67)  BP(mean): 90 (15 May 2025 04:42) (85 - 90)  RR: 18 (15 May 2025 04:42) (18 - 19)  SpO2: 97% (15 May 2025 04:42) (97% - 99%)    LABS:                        7.8    6.18  )-----------( 51       ( 14 May 2025 20:00 )             23.5     05-14    136  |  104  |  12  ----------------------------<  108[H]  4.1   |  21  |  0.5[L]    Ca    8.9      14 May 2025 06:05  Mg     2.0     05-14    TPro  5.7[L]  /  Alb  3.0[L]  /  TBili  1.2  /  DBili  x   /  AST  31  /  ALT  13  /  AlkPhos  239[H]  05-14    PT/INR - ( 15 May 2025 05:50 )   PT: 14.70 sec;   INR: 1.24 ratio         PTT - ( 15 May 2025 05:50 )  PTT:32.3 sec  Urinalysis Basic - ( 14 May 2025 06:05 )    Color: x / Appearance: x / SG: x / pH: x  Gluc: 108 mg/dL / Ketone: x  / Bili: x / Urobili: x   Blood: x / Protein: x / Nitrite: x   Leuk Esterase: x / RBC: x / WBC x   Sq Epi: x / Non Sq Epi: x / Bacteria: x            Urinalysis with Rflx Culture (collected 12 May 2025 17:55)              PHYSICAL EXAM:  GENERAL: NAD,   HEAD:  Atraumatic, Normocephalic.  EYES: conjunctiva and sclera clear  CHEST/LUNG: GBAE. No wheezing or crackles   HEART: regular rate and rhythm; S1/S2.  ABDOMEN: Soft, Nontender, Nondistended PEG in place   EXTREMITIES: No edema.   PSYCH: AAOx3.  NEUROLOGY: non-focal; moves all extremities

## 2025-05-16 LAB
ALBUMIN SERPL ELPH-MCNC: 2.9 G/DL — LOW (ref 3.5–5.2)
ALP SERPL-CCNC: 398 U/L — HIGH (ref 30–115)
ALT FLD-CCNC: 26 U/L — SIGNIFICANT CHANGE UP (ref 0–41)
ANION GAP SERPL CALC-SCNC: 15 MMOL/L — HIGH (ref 7–14)
APTT BLD: 35.3 SEC — SIGNIFICANT CHANGE UP (ref 27–39.2)
AST SERPL-CCNC: 50 U/L — HIGH (ref 0–41)
BASOPHILS # BLD AUTO: 0.02 K/UL — SIGNIFICANT CHANGE UP (ref 0–0.2)
BASOPHILS NFR BLD AUTO: 0.4 % — SIGNIFICANT CHANGE UP (ref 0–1)
BILIRUB SERPL-MCNC: 1.2 MG/DL — SIGNIFICANT CHANGE UP (ref 0.2–1.2)
BUN SERPL-MCNC: 14 MG/DL — SIGNIFICANT CHANGE UP (ref 10–20)
CALCIUM SERPL-MCNC: 8.5 MG/DL — SIGNIFICANT CHANGE UP (ref 8.4–10.5)
CHLORIDE SERPL-SCNC: 101 MMOL/L — SIGNIFICANT CHANGE UP (ref 98–110)
CK SERPL-CCNC: 41 U/L — SIGNIFICANT CHANGE UP (ref 0–225)
CO2 SERPL-SCNC: 19 MMOL/L — SIGNIFICANT CHANGE UP (ref 17–32)
CREAT SERPL-MCNC: 0.7 MG/DL — SIGNIFICANT CHANGE UP (ref 0.7–1.5)
D DIMER BLD IA.RAPID-MCNC: HIGH NG/ML DDU
EGFR: 105 ML/MIN/1.73M2 — SIGNIFICANT CHANGE UP
EGFR: 105 ML/MIN/1.73M2 — SIGNIFICANT CHANGE UP
EOSINOPHIL # BLD AUTO: 0.09 K/UL — SIGNIFICANT CHANGE UP (ref 0–0.7)
EOSINOPHIL NFR BLD AUTO: 2 % — SIGNIFICANT CHANGE UP (ref 0–8)
FIBRINOGEN PPP-MCNC: 377 MG/DL — SIGNIFICANT CHANGE UP (ref 200–435)
FLUAV AG NPH QL: SIGNIFICANT CHANGE UP
FLUBV AG NPH QL: SIGNIFICANT CHANGE UP
GLUCOSE BLDC GLUCOMTR-MCNC: 127 MG/DL — HIGH (ref 70–99)
GLUCOSE SERPL-MCNC: 131 MG/DL — HIGH (ref 70–99)
HAPTOGLOB SERPL-MCNC: 100 MG/DL — SIGNIFICANT CHANGE UP (ref 34–200)
HCT VFR BLD CALC: 22.1 % — LOW (ref 42–52)
HGB BLD-MCNC: 7.2 G/DL — LOW (ref 14–18)
IMM GRANULOCYTES NFR BLD AUTO: 9.4 % — HIGH (ref 0.1–0.3)
INR BLD: 1.25 RATIO — SIGNIFICANT CHANGE UP (ref 0.65–1.3)
LACTATE SERPL-SCNC: 1.3 MMOL/L — SIGNIFICANT CHANGE UP (ref 0.7–2)
LYMPHOCYTES # BLD AUTO: 0.71 K/UL — LOW (ref 1.2–3.4)
LYMPHOCYTES # BLD AUTO: 16 % — LOW (ref 20.5–51.1)
MAGNESIUM SERPL-MCNC: 2.3 MG/DL — SIGNIFICANT CHANGE UP (ref 1.8–2.4)
MCHC RBC-ENTMCNC: 26.1 PG — LOW (ref 27–31)
MCHC RBC-ENTMCNC: 32.6 G/DL — SIGNIFICANT CHANGE UP (ref 32–37)
MCV RBC AUTO: 80.1 FL — SIGNIFICANT CHANGE UP (ref 80–94)
MONOCYTES # BLD AUTO: 0.51 K/UL — SIGNIFICANT CHANGE UP (ref 0.1–0.6)
MONOCYTES NFR BLD AUTO: 11.5 % — HIGH (ref 1.7–9.3)
NEUTROPHILS # BLD AUTO: 2.7 K/UL — SIGNIFICANT CHANGE UP (ref 1.4–6.5)
NEUTROPHILS NFR BLD AUTO: 60.7 % — SIGNIFICANT CHANGE UP (ref 42.2–75.2)
NRBC BLD AUTO-RTO: 2 /100 WBCS — HIGH (ref 0–0)
PLATELET # BLD AUTO: 45 K/UL — LOW (ref 130–400)
PMV BLD: 9.8 FL — SIGNIFICANT CHANGE UP (ref 7.4–10.4)
POTASSIUM SERPL-MCNC: 3.8 MMOL/L — SIGNIFICANT CHANGE UP (ref 3.5–5)
POTASSIUM SERPL-SCNC: 3.8 MMOL/L — SIGNIFICANT CHANGE UP (ref 3.5–5)
PROT SERPL-MCNC: 5.3 G/DL — LOW (ref 6–8)
PROTHROM AB SERPL-ACNC: 14.8 SEC — HIGH (ref 9.95–12.87)
RBC # BLD: 2.76 M/UL — LOW (ref 4.7–6.1)
RBC # FLD: 17.1 % — HIGH (ref 11.5–14.5)
RSV RNA NPH QL NAA+NON-PROBE: SIGNIFICANT CHANGE UP
SARS-COV-2 RNA SPEC QL NAA+PROBE: SIGNIFICANT CHANGE UP
SODIUM SERPL-SCNC: 135 MMOL/L — SIGNIFICANT CHANGE UP (ref 135–146)
SOURCE RESPIRATORY: SIGNIFICANT CHANGE UP
TSH SERPL-MCNC: 1.46 UIU/ML — SIGNIFICANT CHANGE UP (ref 0.27–4.2)
WBC # BLD: 4.45 K/UL — LOW (ref 4.8–10.8)
WBC # FLD AUTO: 4.45 K/UL — LOW (ref 4.8–10.8)

## 2025-05-16 PROCEDURE — 72158 MRI LUMBAR SPINE W/O & W/DYE: CPT | Mod: 26

## 2025-05-16 PROCEDURE — 99233 SBSQ HOSP IP/OBS HIGH 50: CPT

## 2025-05-16 PROCEDURE — 99497 ADVNCD CARE PLAN 30 MIN: CPT | Mod: 25

## 2025-05-16 PROCEDURE — 71045 X-RAY EXAM CHEST 1 VIEW: CPT | Mod: 26

## 2025-05-16 PROCEDURE — 93970 EXTREMITY STUDY: CPT | Mod: 26

## 2025-05-16 PROCEDURE — 93010 ELECTROCARDIOGRAM REPORT: CPT

## 2025-05-16 PROCEDURE — 72157 MRI CHEST SPINE W/O & W/DYE: CPT | Mod: 26

## 2025-05-16 PROCEDURE — 72156 MRI NECK SPINE W/O & W/DYE: CPT | Mod: 26

## 2025-05-16 RX ORDER — CEFEPIME 2 G/20ML
2000 INJECTION, POWDER, FOR SOLUTION INTRAVENOUS ONCE
Refills: 0 | Status: COMPLETED | OUTPATIENT
Start: 2025-05-16 | End: 2025-05-16

## 2025-05-16 RX ORDER — PIPERACILLIN-TAZO-DEXTROSE,ISO 3.375G/5
3.38 IV SOLUTION, PIGGYBACK PREMIX FROZEN(ML) INTRAVENOUS EVERY 8 HOURS
Refills: 0 | Status: DISCONTINUED | OUTPATIENT
Start: 2025-05-16 | End: 2025-05-16

## 2025-05-16 RX ORDER — ACETAMINOPHEN 500 MG/5ML
650 LIQUID (ML) ORAL EVERY 6 HOURS
Refills: 0 | Status: DISCONTINUED | OUTPATIENT
Start: 2025-05-16 | End: 2025-05-25

## 2025-05-16 RX ORDER — PIPERACILLIN-TAZO-DEXTROSE,ISO 3.375G/5
3.38 IV SOLUTION, PIGGYBACK PREMIX FROZEN(ML) INTRAVENOUS EVERY 6 HOURS
Refills: 0 | Status: DISCONTINUED | OUTPATIENT
Start: 2025-05-16 | End: 2025-05-16

## 2025-05-16 RX ORDER — LORAZEPAM 4 MG/ML
2 VIAL (ML) INJECTION ONCE
Refills: 0 | Status: DISCONTINUED | OUTPATIENT
Start: 2025-05-16 | End: 2025-05-16

## 2025-05-16 RX ORDER — HYDROMORPHONE/SOD CHLOR,ISO/PF 2 MG/10 ML
1 SYRINGE (ML) INJECTION
Refills: 0 | Status: DISCONTINUED | OUTPATIENT
Start: 2025-05-16 | End: 2025-05-17

## 2025-05-16 RX ORDER — CEFEPIME 2 G/20ML
INJECTION, POWDER, FOR SOLUTION INTRAVENOUS
Refills: 0 | Status: DISCONTINUED | OUTPATIENT
Start: 2025-05-16 | End: 2025-05-21

## 2025-05-16 RX ORDER — CEFEPIME 2 G/20ML
2000 INJECTION, POWDER, FOR SOLUTION INTRAVENOUS EVERY 12 HOURS
Refills: 0 | Status: DISCONTINUED | OUTPATIENT
Start: 2025-05-17 | End: 2025-05-21

## 2025-05-16 RX ADMIN — OXYCODONE HYDROCHLORIDE 10 MILLIGRAM(S): 30 TABLET ORAL at 13:55

## 2025-05-16 RX ADMIN — CEFEPIME 100 MILLIGRAM(S): 2 INJECTION, POWDER, FOR SOLUTION INTRAVENOUS at 22:23

## 2025-05-16 RX ADMIN — Medication 40 MILLIGRAM(S): at 05:59

## 2025-05-16 RX ADMIN — OLANZAPINE 2.5 MILLIGRAM(S): 10 TABLET ORAL at 13:05

## 2025-05-16 RX ADMIN — HEPARIN SODIUM 5000 UNIT(S): 1000 INJECTION INTRAVENOUS; SUBCUTANEOUS at 05:59

## 2025-05-16 RX ADMIN — SODIUM CHLORIDE 75 MILLILITER(S): 9 INJECTION, SOLUTION INTRAVENOUS at 06:18

## 2025-05-16 RX ADMIN — OXYCODONE HYDROCHLORIDE 10 MILLIGRAM(S): 30 TABLET ORAL at 06:18

## 2025-05-16 RX ADMIN — Medication 650 MILLIGRAM(S): at 16:02

## 2025-05-16 RX ADMIN — Medication 25 GRAM(S): at 18:37

## 2025-05-16 RX ADMIN — ATORVASTATIN CALCIUM 80 MILLIGRAM(S): 80 TABLET, FILM COATED ORAL at 22:23

## 2025-05-16 RX ADMIN — POLYETHYLENE GLYCOL 3350 17 GRAM(S): 17 POWDER, FOR SOLUTION ORAL at 13:05

## 2025-05-16 RX ADMIN — OXYCODONE HYDROCHLORIDE 10 MILLIGRAM(S): 30 TABLET ORAL at 15:24

## 2025-05-16 RX ADMIN — Medication 40 MILLIGRAM(S): at 17:22

## 2025-05-16 RX ADMIN — OXYCODONE HYDROCHLORIDE 10 MILLIGRAM(S): 30 TABLET ORAL at 06:30

## 2025-05-16 RX ADMIN — Medication 1 APPLICATION(S): at 13:05

## 2025-05-16 RX ADMIN — Medication 650 MILLIGRAM(S): at 14:23

## 2025-05-16 RX ADMIN — HEPARIN SODIUM 5000 UNIT(S): 1000 INJECTION INTRAVENOUS; SUBCUTANEOUS at 17:22

## 2025-05-16 RX ADMIN — Medication 2 MILLIGRAM(S): at 09:54

## 2025-05-16 RX ADMIN — METOPROLOL SUCCINATE 12.5 MILLIGRAM(S): 50 TABLET, EXTENDED RELEASE ORAL at 05:58

## 2025-05-16 NOTE — PROGRESS NOTE ADULT - SUBJECTIVE AND OBJECTIVE BOX
HPI:  59M w/ Esophageal Cancer (dx Apr2025) s/p stent, CAD s/p [id number] (Jan2024), Afib not on AC, HLD is admitted for weakness, acute blood loss anemia/melena requiring pRBC transfusion. Patient completed endoscopy 5/14 for removal of esophageal stent (due to discomfort) and for PEG placement. Palliative consulted to assist w/ cancer-related pain management.    Interval History  - 24hr Opioid Use (8am-8am): Oxycodone IR 10mg x2  - patient this morning was not available for interview/exam as he went for mr spine imaging.   - subsequently we have evaluated him this afternoon and he is still sedated from ativan provided for the exam  - Imaging results are indicative of diffuse heterogeneous signal along C/T/L vertebral bodies as well as sacrum and illiac bones consistent with metastatic disease which is newly diagnosed.  - Dr. French informs that he discussed with the patient's wife imaging findings and are awaiting for the patient to awaken to provide update.    ADVANCE DIRECTIVES:    [x ] Full Code [ ] DNR  MOLST  [ ]  Living Will  [ ]   DECISION MAKER(s):  [ ] Health Care Proxy(s)  [ x] Surrogate(s)  [ ] Guardian           Name(s): Phone Number(s):  Wife: Shannan Reddy    BASELINE (I)ADL(s) (prior to admission):  [place]: [ ]Total  [ ] Moderate [x ]Dependent    Allergies  penicillins (Unknown)    MEDICATIONS  (STANDING):  atorvastatin 80 milliGRAM(s) Oral at bedtime  chlorhexidine 2% Cloths 1 Application(s) Topical <User Schedule>  heparin   Injectable 5000 Unit(s) SubCutaneous every 12 hours  lidocaine   4% Patch 1 Patch Transdermal daily  metoprolol tartrate 12.5 milliGRAM(s) Oral two times a day  OLANZapine 2.5 milliGRAM(s) Oral daily  pantoprazole  Injectable 40 milliGRAM(s) IV Push every 12 hours  polyethylene glycol 3350 17 Gram(s) Oral daily    MEDICATIONS  (PRN):  acetaminophen     Tablet .. 650 milliGRAM(s) Oral every 6 hours PRN Temp greater or equal to 38C (100.4F), Mild Pain (1 - 3)  HYDROmorphone  Injectable 1 milliGRAM(s) IV Push every 3 hours PRN Severe Pain (7 - 10)    PRESENT SYMPTOMS: [x ]Unable to obtain due to poor mentation   Source if other than patient:  [ ]Family   [ ]Team   [ X]All other review of systems negative including pain and dyspnea unless noted below    All components of pain assessment below addressed. Blank spaces indicate that the patient did/could not complete the assessment.  Pain: [ ]yes [ ]no  QOL impact -   Location -                    Aggravating factors -  Quality -  Radiation -  Timing-  Severity (0-10 scale):  Minimal acceptable level (0-10 scale):     PAINAD:  0  RDOS: 0    Other Symptoms:    PHYSICAL EXAM:  Vital Signs Last 24 Hrs  T(C): 38.2 (16 May 2025 13:31), Max: 38.2 (16 May 2025 13:31)  T(F): 100.7 (16 May 2025 13:31), Max: 100.7 (16 May 2025 13:31)  HR: 114 (16 May 2025 13:31) (93 - 114)  BP: 119/70 (16 May 2025 13:31) (115/73 - 119/70)  BP(mean): 87 (16 May 2025 04:48) (85 - 87)  RR: 18 (16 May 2025 13:31) (18 - 18)  SpO2: 95% (16 May 2025 13:31) (95% - 98%)    Parameters below as of 16 May 2025 13:31  Patient On (Oxygen Delivery Method): room air     I&O's Summary    15 May 2025 07:01  -  16 May 2025 07:00  --------------------------------------------------------  IN: 125 mL / OUT: 800 mL / NET: -675 mL    GENERAL:  [x ] No acute distress [x ]Lethargic- still sedated after receiving ativan for imaging test  [ ]Unarousable  [ ]Verbal  [ ]Non-Verbal [ ]Cachexia    BEHAVIORAL/PSYCH:  [ ]Alert and Oriented x  [ ] Anxiety [ ] Delirium [ ] Agitation [ ] Calm   EYES: [ ] No scleral icterus [ ] Scleral icterus [ ] Closed  ENMT:  [ ]Dry mouth  [ ]No external oral lesions [ ] No external ear or nose lesions  CARDIOVASCULAR:  [ ]Regular [ ]Irregular [ ]Tachy [x ]Not Tachy  [ ]Rodney [ ] Edema [ ] No edema  PULMONARY:  [ ]Tachypnea  [ ]Audible excessive secretions [x ] No labored breathing [ ] labored breathing  GASTROINTESTINAL: [ ]Soft  [ ]Distended  [ ]Not distended [ ]Non tender [ ]Tender  MUSCULOSKELETAL: [ ]No clubbing [ ] clubbing  [ ] No cyanosis [ ] cyanosis  NEUROLOGIC: [ ]No focal deficits  [ ]Follows commands  [ ]Does not follow commands  [ ]Cognitive impairment  [ ]Dysphagia  [ ]Dysarthria  [ ]Paresis   SKIN: [ ] Jaundiced [ ] Non-jaundiced [ ]Rash [ ]No Rash [ ] Warm [ ] Dry  MISC/LINES: [ ] ET tube [ ] Trach [ ]NGT/OGT [ ]PEG [ ]Epps    CRITICAL CARE:  [ ] Shock Present  [ ]Septic [ ]Cardiogenic [ ]Neurologic [ ]Hypovolemic  [ ]  Vasopressors [ ]  Inotropes   [ ]Respiratory failure present [ ]Mechanical ventilation [ ]Non-invasive ventilatory support [ ]High flow  [ ]Acute  [ ]Chronic [ ]Hypoxic  [ ]Hypercarbic [ ]Other  [ ]Other organ failure     LABS: reviewed by me                        7.2    4.45  )-----------( 45       ( 16 May 2025 04:30 )             22.1   05-16    135  |  101  |  14  ----------------------------<  131[H]  3.8   |  19  |  0.7    Ca    8.5      16 May 2025 04:30  Mg     2.3     05-16    TPro  5.3[L]  /  Alb  2.9[L]  /  TBili  1.2  /  DBili  x   /  AST  50[H]  /  ALT  26  /  AlkPhos  398[H]  05-16  PT/INR - ( 16 May 2025 04:30 )   PT: 14.80 sec;   INR: 1.25 ratio    PTT - ( 16 May 2025 04:30 )  PTT:35.3 sec    Urinalysis Basic - ( 16 May 2025 04:30 )  Color: x / Appearance: x / SG: x / pH: x  Gluc: 131 mg/dL / Ketone: x  / Bili: x / Urobili: x   Blood: x / Protein: x / Nitrite: x   Leuk Esterase: x / RBC: x / WBC x   Sq Epi: x / Non Sq Epi: x / Bacteria: x      RADIOLOGY & ADDITIONAL STUDIES: reviewed by me    EKG: reviewed by me      PROTEIN CALORIE MALNUTRITION PRESENT: [ ]mild [ ]moderate [ ]severe [ ]underweight [ ]morbid obesity  https://www.andeal.org/vault/2440/web/files/ONC/Table_Clinical%20Characteristics%20to%20Document%20Malnutrition-White%20JV%20et%20al%188899.pdf    Height (cm): 180.3 (05-14-25 @ 14:45), 180 (05-01-25 @ 11:47), 180.3 (04-05-25 @ 13:20)  Weight (kg): 84.5 (05-14-25 @ 14:45), 89 (05-01-25 @ 11:47), 90.3 (04-03-25 @ 21:22)  BMI (kg/m2): 26 (05-14-25 @ 14:45), 27.5 (05-01-25 @ 11:47), 27.8 (04-05-25 @ 13:20)    [ ]PPSV2 < or = to 30% [ ]significant weight loss  [ ]poor nutritional intake  [ ]anasarca      [ ]Artificial Nutrition      REFERRALS:   [ ]Chaplaincy  [ ]Hospice  [ ]Child Life  [ ]Social Work  [ ]Case management [ ]Holistic Therapy     Patient discussed with primary medical team MD  Palliative care education provided to patient and/or family    Goals of Care Document:    HPI:  59M w/ Esophageal Cancer (dx Apr2025) s/p stent, CAD s/p [id number] (Jan2024), Afib not on AC, HLD is admitted for weakness, acute blood loss anemia/melena requiring pRBC transfusion. Patient completed endoscopy 5/14 for removal of esophageal stent (due to discomfort) and for PEG placement. Palliative consulted to assist w/ cancer-related pain management.    Interval History  - 24hr Opioid Use (8am-8am): Oxycodone IR 10mg x2  - patient this morning was not available for interview/exam as he went for mr spine imaging.   - subsequently we have evaluated him this afternoon and he is still sedated from ativan provided for the exam  - Imaging results are indicative of diffuse heterogeneous signal along C/T/L vertebral bodies as well as sacrum and illiac bones consistent with metastatic disease which is newly diagnosed.  - Dr. French informs that he discussed with the patient's wife imaging findings and are awaiting for the patient to awaken to provide update.    ADVANCE DIRECTIVES:    [x ] Full Code [ ] DNR  MOLST  [ ]  Living Will  [ ]   DECISION MAKER(s):  [ ] Health Care Proxy(s)  [ x] Surrogate(s)  [ ] Guardian           Name(s): Phone Number(s):  Wife: Shannan Reddy    BASELINE (I)ADL(s) (prior to admission):  [place]: [ ]Total  [ ] Moderate [x ]Dependent    Allergies  penicillins (Unknown)    MEDICATIONS  (STANDING):  atorvastatin 80 milliGRAM(s) Oral at bedtime  chlorhexidine 2% Cloths 1 Application(s) Topical <User Schedule>  heparin   Injectable 5000 Unit(s) SubCutaneous every 12 hours  lidocaine   4% Patch 1 Patch Transdermal daily  metoprolol tartrate 12.5 milliGRAM(s) Oral two times a day  OLANZapine 2.5 milliGRAM(s) Oral daily  pantoprazole  Injectable 40 milliGRAM(s) IV Push every 12 hours  polyethylene glycol 3350 17 Gram(s) Oral daily    MEDICATIONS  (PRN):  acetaminophen     Tablet .. 650 milliGRAM(s) Oral every 6 hours PRN Temp greater or equal to 38C (100.4F), Mild Pain (1 - 3)  HYDROmorphone  Injectable 1 milliGRAM(s) IV Push every 3 hours PRN Severe Pain (7 - 10)    PRESENT SYMPTOMS: [x ]Unable to obtain due to poor mentation   Source if other than patient:  [ ]Family   [ ]Team   [ X]All other review of systems negative including pain and dyspnea unless noted below    All components of pain assessment below addressed. Blank spaces indicate that the patient did/could not complete the assessment.  Pain: [ ]yes [ ]no  QOL impact -   Location -                    Aggravating factors -  Quality -  Radiation -  Timing-  Severity (0-10 scale):  Minimal acceptable level (0-10 scale):     PAINAD:  0  RDOS: 0    Other Symptoms:    PHYSICAL EXAM:  Vital Signs Last 24 Hrs  T(C): 38.2 (16 May 2025 13:31), Max: 38.2 (16 May 2025 13:31)  T(F): 100.7 (16 May 2025 13:31), Max: 100.7 (16 May 2025 13:31)  HR: 114 (16 May 2025 13:31) (93 - 114)  BP: 119/70 (16 May 2025 13:31) (115/73 - 119/70)  BP(mean): 87 (16 May 2025 04:48) (85 - 87)  RR: 18 (16 May 2025 13:31) (18 - 18)  SpO2: 95% (16 May 2025 13:31) (95% - 98%)    Parameters below as of 16 May 2025 13:31  Patient On (Oxygen Delivery Method): room air     I&O's Summary    15 May 2025 07:01  -  16 May 2025 07:00  --------------------------------------------------------  IN: 125 mL / OUT: 800 mL / NET: -675 mL    GENERAL:  [x ] No acute distress [x ]Lethargic- still sedated after receiving ativan for imaging test  [ ]Unarousable  [ ]Verbal  [ ]Non-Verbal [ ]Cachexia    BEHAVIORAL/PSYCH:  [ ]Alert and Oriented x  [ ] Anxiety [ ] Delirium [ ] Agitation [ ] Calm   EYES: [ ] No scleral icterus [ ] Scleral icterus [ ] Closed  ENMT:  [ ]Dry mouth  [ ]No external oral lesions [ ] No external ear or nose lesions  CARDIOVASCULAR:  [ ]Regular [ ]Irregular [ ]Tachy [x ]Not Tachy  [ ]Rodney [ ] Edema [ ] No edema  PULMONARY:  [ ]Tachypnea  [ ]Audible excessive secretions [x ] No labored breathing [ ] labored breathing  GASTROINTESTINAL: [ ]Soft  [ ]Distended  [ ]Not distended [ ]Non tender [ ]Tender  MUSCULOSKELETAL: [ ]No clubbing [ ] clubbing  [ ] No cyanosis [ ] cyanosis  NEUROLOGIC: [ ]No focal deficits  [ ]Follows commands  [ ]Does not follow commands  [ ]Cognitive impairment  [ ]Dysphagia  [ ]Dysarthria  [ ]Paresis   SKIN: [ ] Jaundiced [ ] Non-jaundiced [ ]Rash [ ]No Rash [ ] Warm [ ] Dry  MISC/LINES: [ ] ET tube [ ] Trach [ ]NGT/OGT [ ]PEG [ ]Epps    CRITICAL CARE:  [ ] Shock Present  [ ]Septic [ ]Cardiogenic [ ]Neurologic [ ]Hypovolemic  [ ]  Vasopressors [ ]  Inotropes   [ ]Respiratory failure present [ ]Mechanical ventilation [ ]Non-invasive ventilatory support [ ]High flow  [ ]Acute  [ ]Chronic [ ]Hypoxic  [ ]Hypercarbic [ ]Other  [ ]Other organ failure     LABS: reviewed by me - ANDREY grossly WNL, CBC shows anemia and thrombocytopenia, LFTs show mildly elevated AST and elevated AlkPhos                        7.2    4.45  )-----------( 45       ( 16 May 2025 04:30 )             22.1   05-16    135  |  101  |  14  ----------------------------<  131[H]  3.8   |  19  |  0.7    Ca    8.5      16 May 2025 04:30  Mg     2.3     05-16    TPro  5.3[L]  /  Alb  2.9[L]  /  TBili  1.2  /  DBili  x   /  AST  50[H]  /  ALT  26  /  AlkPhos  398[H]  05-16  PT/INR - ( 16 May 2025 04:30 )   PT: 14.80 sec;   INR: 1.25 ratio    PTT - ( 16 May 2025 04:30 )  PTT:35.3 sec    Urinalysis Basic - ( 16 May 2025 04:30 )  Color: x / Appearance: x / SG: x / pH: x  Gluc: 131 mg/dL / Ketone: x  / Bili: x / Urobili: x   Blood: x / Protein: x / Nitrite: x   Leuk Esterase: x / RBC: x / WBC x   Sq Epi: x / Non Sq Epi: x / Bacteria: x      RADIOLOGY & ADDITIONAL STUDIES: reviewed by me    < from: [facility] Duplex Lower Ext Vein Scan, Bilat (05.16.25 @ 11:36) >  IMPRESSION:  No evidence of deep venous thrombosis in either lower extremity.    < end of copied text >    MRI - shows enahncement of lumbar vertebral bodies, sacrum and iliac bones suspicious for metastatic disease    EKG: reviewed by me      PROTEIN CALORIE MALNUTRITION PRESENT: [ ]mild [ ]moderate [ ]severe [ ]underweight [ ]morbid obesity  https://www.andeal.org/vault/2440/web/files/ONC/Table_Clinical%20Characteristics%20to%20Document%20Malnutrition-White%20JV%20et%20al%881956.pdf    Height (cm): 180.3 (05-14-25 @ 14:45), 180 (05-01-25 @ 11:47), 180.3 (04-05-25 @ 13:20)  Weight (kg): 84.5 (05-14-25 @ 14:45), 89 (05-01-25 @ 11:47), 90.3 (04-03-25 @ 21:22)  BMI (kg/m2): 26 (05-14-25 @ 14:45), 27.5 (05-01-25 @ 11:47), 27.8 (04-05-25 @ 13:20)    [ ]PPSV2 < or = to 30% [ ]significant weight loss  [ ]poor nutritional intake  [ ]anasarca      [ ]Artificial Nutrition      REFERRALS:   [ ]Chaplaincy  [ ]Hospice  [ ]Child Life  [ ]Social Work  [ ]Case management [ ]Holistic Therapy     Patient discussed with primary medical team MD  Palliative care education provided to patient and/or family    Goals of Care Document:

## 2025-05-16 NOTE — PROGRESS NOTE ADULT - ASSESSMENT
59y male with PMHx of HTN, hiatal hernia, HLD, CAD s/p CABG in Jan 2024, paroxysmal A. fib not on AC, former smoker (30 pack years, quit 1yr ago), recently diagnosed with colitis and esophageal adenocarcinoma s/p stent placement who presents for weakness. The patient was recently discharged from [facility] on 5/6 , and has been progressively deteriorating. Found to have hgb 7.6 lower than last admission reported melena, thrombocytopenia w/ elevated INR/PTT. s/p egd w/ PEG tube.     #Reported Melena possibly due to Upper GI Bleed likely Secondary to  Malignancy   #Thrombocytopenia, elevated INR/PTT r/o DIC  #Recently diagnosed Esophageal adenocarcinoma   #Acute Drop in Hemoglobin  - EGD 4/25: A friable circumferential mass seen from the GE junction at 38cm to 28 cm of mid esophagus. Multiple biopsies were obtained. Oozing of blood was noted from the whole mass.  - CBC BID  - c/w pantoprazole 40mg IV BID  - If any unstable bleed, please check CT angio AP IC STAT and call GI/IR stat  -  rad/onc: Will need op PET SCAN  - S/P EGD/PEG 5/14 w/ removal of metallic stent, no source of bleed   - f/u heme/onc, daily DIC panel   - f/u palliative for Pain management     #L sided fascial drope  # b/l LE weakness   - Doesnot know if chronic.   - no focal defecits except b/l hip weakness   -  CTH -ve, MRI -ve for stroke Mild smooth diffuse dural thickening over the cerebral convexities, cant r/o mets   - NEURO: paraneoplastic levles, MRI C/T/L spine w/wo, might need spinal tap.     #chronic lower back pain   - xray lumbar spine: mild disc space narrowing and degenerative changes throughout the lumbar spine. Mild degenerative change of the bilateral sacroiliac joints.  - c/w Dilaudid 0.5mg Q4, Oxycodone 5mg Q4 PRN    #3.8 cm wide R iliac artery aneurysm- stable  - vascular o/p- surgery was scheduled with Dr. Tavera     #HTN  #CAD s/p CABG in Jan 2024  #Paroxysmal A.fib  - c/w home meds  - Hold plavix    #DVT PPX: SCD  #GI PPX: PPI BID  #Diet: Pureed fw/ tube feeds   #Code Status: Full  #Activity Order: IAT  DISPO: pending Neuro eval, possible rehab  59y male with PMHx of HTN, hiatal hernia, HLD, CAD s/p CABG in Jan 2024, paroxysmal A. fib not on AC, former smoker (30 pack years, quit 1yr ago), recently diagnosed with colitis and esophageal adenocarcinoma s/p stent placement who presents for weakness. The patient was recently discharged from [facility] on 5/6 , and has been progressively deteriorating. Found to have hgb 7.6 lower than last admission reported melena, thrombocytopenia w/ elevated INR/PTT. s/p egd w/ PEG tube.     #Reported Melena possibly due to Upper GI Bleed likely Secondary to  Malignancy   #Thrombocytopenia, elevated INR/PTT r/o DIC  #Recently diagnosed Esophageal adenocarcinoma   #Acute Drop in Hemoglobin  - EGD 4/25: A friable circumferential mass seen from the GE junction at 38cm to 28 cm of mid esophagus. Multiple biopsies were obtained. Oozing of blood was noted from the whole mass.  - c/w pantoprazole 40mg IV BID  - rad/onc: Will need op PET SCAN  - S/P EGD/PEG 5/14 w/ removal of metallic stent, no source of bleed   - f/u heme/onc, daily DIC panel   - Dimer climbing up, could be 2/2 malignancy, procedure, bleeding, DIC, f/u duplex b/l LE    #L sided fascial drope  # b/l LE weakness   - no focal deficits except b/l hip weakness   - CTH -ve, MRI -ve for stroke Mild smooth diffuse dural thickening over the cerebral convexities, cant r/o mets   - NEURO: paraneoplastic levles, MRI C/T/L spine w/wo, might need spinal tap.     #chronic lower back pain   - xray lumbar spine: mild disc space narrowing and degenerative changes throughout the lumbar spine. Mild degenerative change of the bilateral sacroiliac joints.  - c/w Dilaudid 0.5mg Q4, Oxycodone 5mg Q4 PRN    #3.8 cm wide R iliac artery aneurysm- stable  - vascular o/p- surgery was scheduled with Dr. Tavera     #HTN  #CAD s/p CABG in Jan 2024  #Paroxysmal A.fib  - c/w home meds  - Hold plavix    #DVT PPX: SCD  #GI PPX: PPI BID  #Diet: Pureed fw/ tube feeds   #Code Status: Full  #Activity Order: IAT  DISPO: pending Neuro eval, possible rehab  59y male with PMHx of HTN, hiatal hernia, HLD, CAD s/p CABG in Jan 2024, paroxysmal A. fib not on AC, former smoker (30 pack years, quit 1yr ago), recently diagnosed with colitis and esophageal adenocarcinoma s/p stent placement who presents for weakness. The patient was recently discharged from [facility] on 5/6 , and has been progressively deteriorating. Found to have hgb 7.6 lower than last admission reported melena, thrombocytopenia w/ elevated INR/PTT. s/p egd w/ PEG tube.     #Reported Melena possibly due to Upper GI Bleed likely Secondary to  Malignancy   #Thrombocytopenia, elevated INR/PTT r/o DIC  #Recently diagnosed Esophageal adenocarcinoma   #Acute Drop in Hemoglobin  - EGD 4/25: A friable circumferential mass seen from the GE junction at 38cm to 28 cm of mid esophagus. Multiple biopsies were obtained. Oozing of blood was noted from the whole mass.  - c/w pantoprazole 40mg IV BID  - rad/onc: Will need op PET SCAN  - S/P EGD/PEG 5/14 w/ removal of metallic stent, no source of bleed   - f/u heme/onc, daily DIC panel   - Dimer climbing up, could be 2/2 malignancy, procedure, bleeding, DIC, f/u duplex b/l LE    #L sided fascial drope  # b/l LE weakness   -  b/l hip weakness   - CTH -ve, MRI -ve for stroke Mild smooth diffuse dural thickening over the cerebral convexities, cant r/o mets   - NEURO: paraneoplastic levles, MRI C/T/L spine w/wo, might need spinal tap.   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 Mild degenerative change of the bilateral sacroiliac joints.  - c/w Dilaudid 0.5mg Q4, Oxycodone 5mg Q4 PRN    #3.8 cm wide R iliac artery aneurysm- stable  - vascular o/p- surgery was scheduled with Dr. Tavera     #HTN  #CAD s/p CABG in Jan 2024  #Paroxysmal A.fib  - c/w home meds  - Hold plavix    #DVT PPX: SCD  #GI PPX: PPI BID  #Diet: Pureed fw/ tube feeds   #Code Status: Full  #Activity Order: IAT  DISPO: pending Neuro eval, possible rehab, rad onc for 59y male with PMHx of HTN, hiatal hernia, HLD, CAD s/p CABG in Jan 2024, paroxysmal A. fib not on AC, former smoker (30 pack years, quit 1yr ago), recently diagnosed with colitis and esophageal adenocarcinoma s/p stent placement who presents for weakness. The patient was recently discharged from [facility] on 5/6 , and has been progressively deteriorating. Found to have hgb 7.6 lower than last admission reported melena, thrombocytopenia w/ elevated INR/PTT. s/p egd w/ PEG tube.     #Reported Melena possibly due to Upper GI Bleed likely Secondary to  Malignancy   #Thrombocytopenia, elevated INR/PTT r/o DIC  #Recently diagnosed Esophageal adenocarcinoma   #Acute Drop in Hemoglobin  - EGD 4/25: A friable circumferential mass seen from the GE junction at 38cm to 28 cm of mid esophagus. Multiple biopsies were obtained. Oozing of blood was noted from the whole mass.  - c/w pantoprazole 40mg IV BID  - rad/onc: Will need op PET SCAN  - S/P EGD/PEG 5/14 w/ removal of metallic stent, no source of bleed   - f/u heme/onc, daily DIC panel   - Dimer climbing up, could be 2/2 malignancy, procedure, bleeding, DIC, f/u duplex b/l LE    #L sided fascial drope  # b/l LE weakness   -  b/l hip weakness   - CTH -ve, MRI -ve for stroke Mild smooth diffuse dural thickening over the cerebral convexities, cant r/o mets   - NEURO: paraneoplastic levles, MRI C/T/L spine w/wo, might need spinal tap.   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  - f/u RAD/ONC again for possible intervention    #chronic lower back pain   - xray lumbar spine: mild disc space narrowing and degenerative changes throughout the lumbar spine. Mild degenerative change of the bilateral sacroiliac joints.  - c/w Dilaudid 0.5mg Q4, Oxycodone 5mg Q4 PRN    #3.8 cm wide R iliac artery aneurysm- stable  - vascular o/p- surgery was scheduled with Dr. Tavera     #HTN  #CAD s/p CABG in Jan 2024  #Paroxysmal A.fib  - c/w home meds  - Hold plavix    #DVT PPX: SCD  #GI PPX: PPI BID  #Diet: Pureed fw/ tube feeds   #Code Status: Full  #Activity Order: IAT  DISPO: pending Neuro eval, possible rehab, rad onc for 59y male with PMHx of HTN, hiatal hernia, HLD, CAD s/p CABG in Jan 2024, paroxysmal A. fib not on AC, former smoker (30 pack years, quit 1yr ago), recently diagnosed with colitis and esophageal adenocarcinoma s/p stent placement who presents for weakness. The patient was recently discharged from [facility] on 5/6 , and has been progressively deteriorating. Found to have hgb 7.6 lower than last admission reported melena, thrombocytopenia w/ elevated INR/PTT. s/p egd w/ PEG tube.     #New onset fever  - T 100.7 5/16  - f/u bcx  - f/u UA  - f/u RVP  - if febrile again can sart zosyn given recent procedure     #Reported Melena possibly due to Upper GI Bleed likely Secondary to  Malignancy   #Thrombocytopenia, elevated INR/PTT r/o DIC  #Recently diagnosed Esophageal adenocarcinoma   #Acute Drop in Hemoglobin  - EGD 4/25: A friable circumferential mass seen from the GE junction at 38cm to 28 cm of mid esophagus. Multiple biopsies were obtained. Oozing of blood was noted from the whole mass.  - c/w pantoprazole 40mg IV BID  - rad/onc: Will need op PET SCAN  - S/P EGD/PEG 5/14 w/ removal of metallic stent, no source of bleed   - f/u heme/onc, daily DIC panel   - Dimer climbing up, could be 2/2 malignancy, procedure, bleeding, DIC, f/u duplex b/l LE    #L sided fascial drope  # b/l LE weakness   -  b/l hip weakness   - CTH -ve, MRI -ve for stroke Mild smooth diffuse dural thickening over the cerebral convexities, cant r/o mets   - NEURO: paraneoplastic levles, MRI C/T/L spine w/wo, might need spinal tap.   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  - f/u RAD/ONC again for possible intervention    #chronic lower back pain   - xray lumbar spine: mild disc space narrowing and degenerative changes throughout the lumbar spine. Mild degenerative change of the bilateral sacroiliac joints.  - c/w Dilaudid 0.5mg Q4, Oxycodone 5mg Q4 PRN    #3.8 cm wide R iliac artery aneurysm- stable  - vascular o/p- surgery was scheduled with Dr. Tavera     #HTN  #CAD s/p CABG in Jan 2024  #Paroxysmal A.fib  - c/w home meds  - Hold plavix    #DVT PPX: SCD  #GI PPX: PPI BID  #Diet: Pureed fw/ tube feeds   #Code Status: Full  #Activity Order: IAT  DISPO: pending Neuro eval, possible rehab, heme/onc poissible chemo, fever w/u can start abx if febrile again zosyn preferred

## 2025-05-16 NOTE — PROGRESS NOTE ADULT - ATTENDING COMMENTS
MRI with findings concerns for diffuse metastatic disease  Given new MRI findings and possible radiation needed, will transition solely to IV PRN dilaudid over the weekend as above  Will follow for pain, symptom managmenet, and GOC as appropriate    Discussed with DR. French

## 2025-05-16 NOTE — PROGRESS NOTE ADULT - ATTENDING COMMENTS
59M with esophageal adenocarcinoma, pMMR. He had been planned for outpatient PET/CT but was unable to go due to multiple hospitalizations.    Now, MR spines show possible metastatic disease, which explains his pancytopenia from myelophthisis.    The patient was quite lethargic today, and he also spiked a fever. I recommend an infectious workup. I spoke with his wife, who is by the bedside, and I explained that the MR C/T/LS spine findings are suspicious for metastatic disease, which will upstage his cancer to stage IV. I explained that treatment will involve chemotherapy to control the cancer. Understandably, his wife became very tearful and was unable to discuss further. I told her that we will return tomorrow to discuss further.    We can obtain a bone scan for now. A bone marrow biopsy can be obtained if the patient and family agree to rule out myelophthisis.

## 2025-05-16 NOTE — PROGRESS NOTE ADULT - SUBJECTIVE AND OBJECTIVE BOX
JONAS CELESTIN 59y Male  MRN#: [id number]   Hospital Day: 4d    HPI:  59y male with PMHx of HTN, hiatal hernia, HLD, CAD s/p CABG in Jan 2024, paroxysmal A. fib not on AC, former smoker (30 pack years, quit 1yr ago), recently diagnosed with colitis and esophageal adenocarcinoma s/p stent placement who presents for weakness. The patient was recently discharged from [facility] on 5/6 , and has been progressively deteriorating. States he has been unable to tolerate p.o. and has been unable to walk over the past few days. Wife at bedside reports that he has not been able to follow-up with oncology due to the weakness.  On ROS the patient also states he has been having black tarry stools that started 1 day prior to his EGD. He had 3 black BMs today. He denies any mucosal bleeding or blood in the urine.  He denies any fevers, chills, chest pain, shortness of breath, palpitations, headache, vision changes, nausea, vomiting, abdominal pain, hematuria, falls.  He is supposed to have PET scan tomorrow and follow with Dr. Sanchez in 2 days.    T(F): 98.5 (05-12-25 @ 21:05), Max: 98.6 (05-12-25 @ 19:48)  HR: 97 (05-12-25 @ 21:05) (88 - 104)  BP: 111/58 (05-12-25 @ 21:05) (111/58 - 132/75)  RR: 18 (05-12-25 @ 21:05) (18 - 20)  SpO2: 99% (05-12-25 @ 21:05) (98% - 100%) on RA    Labs: Hg 7.6 (baseline ~10), MCV 78, slight schistocytes on smear,  Plt 92, PT/INR >40/6.2, PTT 51.8, ALK phos 273.      Imaging:    CT Chest w/ IV Cont  (4/26 - prior admission)  IMPRESSION:  1.  No definite evidence of thoracic metastatic disease.  2.  Small bilateral pleural effusions.  3.  Marked circumferential thickening of the distal esophagus/hiatal   hernia could be related to provided history of suspected esophageal   cancer versus esophagitis. (12 May 2025 21:41)      SUBJECTIVE      OBJECTIVE  PAST MEDICAL & SURGICAL HISTORY  HTN (hypertension)    Hiatal hernia    HLD (hyperlipidemia)    CAD (coronary artery disease)    Paroxysmal atrial fibrillation    History of colitis    S/P CABG (coronary artery bypass graft)      ALLERGIES:  penicillins (Unknown)    MEDICATIONS:  STANDING MEDICATIONS  atorvastatin 80 milliGRAM(s) Oral at bedtime  dextrose 5% + lactated ringers. 1000 milliLiter(s) IV Continuous <Continuous>  heparin   Injectable 5000 Unit(s) SubCutaneous every 12 hours  lidocaine   4% Patch 1 Patch Transdermal daily  megestrol 20 milliGRAM(s) Oral daily  metoprolol tartrate 12.5 milliGRAM(s) Oral two times a day  OLANZapine 2.5 milliGRAM(s) Oral daily  pantoprazole  Injectable 40 milliGRAM(s) IV Push every 12 hours  polyethylene glycol 3350 17 Gram(s) Oral daily    PRN MEDICATIONS  HYDROmorphone  Injectable 1 milliGRAM(s) IV Push every 3 hours PRN  oxyCODONE    IR 10 milliGRAM(s) Oral every 4 hours PRN      VITAL SIGNS: Last 24 Hours  T(C): 37.1 (16 May 2025 04:48), Max: 37.4 (15 May 2025 20:00)  T(F): 98.7 (16 May 2025 04:48), Max: 99.3 (15 May 2025 20:00)  HR: 99 (16 May 2025 04:48) (93 - 101)  BP: 115/73 (16 May 2025 04:48) (100/49 - 117/69)  BP(mean): 87 (16 May 2025 04:48) (85 - 87)  RR: 18 (16 May 2025 04:48) (18 - 20)  SpO2: 98% (16 May 2025 04:48) (97% - 98%)    LABS:                        7.9    6.15  )-----------( 50       ( 15 May 2025 05:50 )             24.0     05-15    135  |  103  |  14  ----------------------------<  116[H]  4.1   |  21  |  0.7    Ca    8.8      15 May 2025 05:50  Mg     2.1     05-15    TPro  5.3[L]  /  Alb  3.2[L]  /  TBili  1.6[H]  /  DBili  0.7[H]  /  AST  38  /  ALT  16  /  AlkPhos  329[H]  05-15    PT/INR - ( 15 May 2025 05:50 )   PT: 14.70 sec;   INR: 1.24 ratio         PTT - ( 15 May 2025 05:50 )  PTT:32.3 sec  Urinalysis Basic - ( 15 May 2025 05:50 )    Color: x / Appearance: x / SG: x / pH: x  Gluc: 116 mg/dL / Ketone: x  / Bili: x / Urobili: x   Blood: x / Protein: x / Nitrite: x   Leuk Esterase: x / RBC: x / WBC x   Sq Epi: x / Non Sq Epi: x / Bacteria: x                    PHYSICAL EXAM:  GENERAL: NAD, well-developed.  HEAD:  Atraumatic, Normocephalic.  EYES: conjunctiva and sclera clear  CHEST/LUNG: GBAE. No wheezing or crackles   HEART: regular rate and rhythm; S1/S2.  ABDOMEN: Soft, Nontender, Nondistended  EXTREMITIES: No edema.   PSYCH: AAOx3.  NEUROLOGY: non-focal; moves all extremities     JONAS CELESTIN 59y Male  MRN#: [id number]   Hospital Day: 4d    HPI:  59y male with PMHx of HTN, hiatal hernia, HLD, CAD s/p CABG in Jan 2024, paroxysmal A. fib not on AC, former smoker (30 pack years, quit 1yr ago), recently diagnosed with colitis and esophageal adenocarcinoma s/p stent placement who presents for weakness. The patient was recently discharged from [facility] on 5/6 , and has been progressively deteriorating. States he has been unable to tolerate p.o. and has been unable to walk over the past few days. Wife at bedside reports that he has not been able to follow-up with oncology due to the weakness.  On ROS the patient also states he has been having black tarry stools that started 1 day prior to his EGD. He had 3 black BMs today. He denies any mucosal bleeding or blood in the urine.  He denies any fevers, chills, chest pain, shortness of breath, palpitations, headache, vision changes, nausea, vomiting, abdominal pain, hematuria, falls.  He is supposed to have PET scan tomorrow and follow with Dr. Sanchez in 2 days.    T(F): 98.5 (05-12-25 @ 21:05), Max: 98.6 (05-12-25 @ 19:48)  HR: 97 (05-12-25 @ 21:05) (88 - 104)  BP: 111/58 (05-12-25 @ 21:05) (111/58 - 132/75)  RR: 18 (05-12-25 @ 21:05) (18 - 20)  SpO2: 99% (05-12-25 @ 21:05) (98% - 100%) on RA    Labs: Hg 7.6 (baseline ~10), MCV 78, slight schistocytes on smear,  Plt 92, PT/INR >40/6.2, PTT 51.8, ALK phos 273.      Imaging:    CT Chest w/ IV Cont  (4/26 - prior admission)  IMPRESSION:  1.  No definite evidence of thoracic metastatic disease.  2.  Small bilateral pleural effusions.  3.  Marked circumferential thickening of the distal esophagus/hiatal   hernia could be related to provided history of suspected esophageal   cancer versus esophagitis. (12 May 2025 21:41)      SUBJECTIVE  Pt seen and evaluated at bedside. No acute overnight events.     OBJECTIVE  PAST MEDICAL & SURGICAL HISTORY  HTN (hypertension)    Hiatal hernia    HLD (hyperlipidemia)    CAD (coronary artery disease)    Paroxysmal atrial fibrillation    History of colitis    S/P CABG (coronary artery bypass graft)      ALLERGIES:  penicillins (Unknown)    MEDICATIONS:  STANDING MEDICATIONS  atorvastatin 80 milliGRAM(s) Oral at bedtime  dextrose 5% + lactated ringers. 1000 milliLiter(s) IV Continuous <Continuous>  heparin   Injectable 5000 Unit(s) SubCutaneous every 12 hours  lidocaine   4% Patch 1 Patch Transdermal daily  megestrol 20 milliGRAM(s) Oral daily  metoprolol tartrate 12.5 milliGRAM(s) Oral two times a day  OLANZapine 2.5 milliGRAM(s) Oral daily  pantoprazole  Injectable 40 milliGRAM(s) IV Push every 12 hours  polyethylene glycol 3350 17 Gram(s) Oral daily    PRN MEDICATIONS  HYDROmorphone  Injectable 1 milliGRAM(s) IV Push every 3 hours PRN  oxyCODONE    IR 10 milliGRAM(s) Oral every 4 hours PRN      VITAL SIGNS: Last 24 Hours  T(C): 37.1 (16 May 2025 04:48), Max: 37.4 (15 May 2025 20:00)  T(F): 98.7 (16 May 2025 04:48), Max: 99.3 (15 May 2025 20:00)  HR: 99 (16 May 2025 04:48) (93 - 101)  BP: 115/73 (16 May 2025 04:48) (100/49 - 117/69)  BP(mean): 87 (16 May 2025 04:48) (85 - 87)  RR: 18 (16 May 2025 04:48) (18 - 20)  SpO2: 98% (16 May 2025 04:48) (97% - 98%)    LABS:                        7.9    6.15  )-----------( 50       ( 15 May 2025 05:50 )             24.0     05-15    135  |  103  |  14  ----------------------------<  116[H]  4.1   |  21  |  0.7    Ca    8.8      15 May 2025 05:50  Mg     2.1     05-15    TPro  5.3[L]  /  Alb  3.2[L]  /  TBili  1.6[H]  /  DBili  0.7[H]  /  AST  38  /  ALT  16  /  AlkPhos  329[H]  05-15    PT/INR - ( 15 May 2025 05:50 )   PT: 14.70 sec;   INR: 1.24 ratio         PTT - ( 15 May 2025 05:50 )  PTT:32.3 sec  Urinalysis Basic - ( 15 May 2025 05:50 )    Color: x / Appearance: x / SG: x / pH: x  Gluc: 116 mg/dL / Ketone: x  / Bili: x / Urobili: x   Blood: x / Protein: x / Nitrite: x   Leuk Esterase: x / RBC: x / WBC x   Sq Epi: x / Non Sq Epi: x / Bacteria: x                    PHYSICAL EXAM:  GENERAL: NAD, well-developed.  HEAD:  Atraumatic, Normocephalic.  EYES: conjunctiva and sclera clear  CHEST/LUNG: GBAE. No wheezing or crackles   HEART: regular rate and rhythm; S1/S2.  ABDOMEN: Soft, Nontender, Nondistended PEG in place   EXTREMITIES: No edema.   PSYCH: AAOx3.  NEUROLOGY: non-focal; moves all extremities

## 2025-05-16 NOTE — PROGRESS NOTE ADULT - SUBJECTIVE AND OBJECTIVE BOX
Patient is a 59y old  Male who presents with a chief complaint of Progressive weakness (13 May 2025 17:51)    INTERVAL HPI/OVERNIGHT EVENTS: Patient was examined and seen at bedside. This afternoon pt is resting in bed and is somnolent after receiving Ativan for MRI due to claustrophobia. Reports some LBP. No further bleeding. WIfe at bedside.  ROS: Denies CP, SOB, AP, new weakness  All other systems reviewed and are within normal limits.  InitialHPI:  59y male with PMHx of HTN, hiatal hernia, HLD, CAD s/p CABG in Jan 2024, paroxysmal A. fib not on AC, former smoker (30 pack years, quit 1yr ago), recently diagnosed with colitis and esophageal adenocarcinoma s/p stent placement who presents for weakness. The patient was recently discharged from [facility] on 5/6 , and has been progressively deteriorating. States he has been unable to tolerate p.o. and has been unable to walk over the past few days. Wife at bedside reports that he has not been able to follow-up with oncology due to the weakness.  On ROS the patient also states he has been having black tarry stools that started 1 day prior to his EGD. He had 3 black BMs today. He denies any mucosal bleeding or blood in the urine.  He denies any fevers, chills, chest pain, shortness of breath, palpitations, headache, vision changes, nausea, vomiting, abdominal pain, hematuria, falls.  He is supposed to have PET scan tomorrow and follow with Dr. Sanchez in 2 days.    T(F): 98.5 (05-12-25 @ 21:05), Max: 98.6 (05-12-25 @ 19:48)  HR: 97 (05-12-25 @ 21:05) (88 - 104)  BP: 111/58 (05-12-25 @ 21:05) (111/58 - 132/75)  RR: 18 (05-12-25 @ 21:05) (18 - 20)  SpO2: 99% (05-12-25 @ 21:05) (98% - 100%) on RA    Labs: Hg 7.6 (baseline ~10), MCV 78, slight schistocytes on smear,  Plt 92, PT/INR >40/6.2, PTT 51.8, ALK phos 273.      Imaging:    CT Chest w/ IV Cont  (4/26 - prior admission)  IMPRESSION:  1.  No definite evidence of thoracic metastatic disease.  2.  Small bilateral pleural effusions.  3.  Marked circumferential thickening of the distal esophagus/hiatal   hernia could be related to provided history of suspected esophageal   cancer versus esophagitis. (12 May 2025 21:41)    PAST MEDICAL & SURGICAL HISTORY:  HTN (hypertension)      Hiatal hernia      HLD (hyperlipidemia)      CAD (coronary artery disease)      Paroxysmal atrial fibrillation      History of colitis      S/P CABG (coronary artery bypass graft)      General: somnolent, ?Lt facial droop  HEENT:  EOMI, no LAD  CV: S1 S2  Resp: decreased breath sounds at bases  GI: NT/ND/S +BS; +PEG  MS: no clubbing/cyanosis/edema, + pulses b/l  Neuro: LE 2/5 distally, 4/5 prox. UE 5-/5          Home Medications:  clopidogrel 75 mg oral tablet: 1 tab(s) orally once a day (13 May 2025 01:36)  Lipitor 80 mg oral tablet: 1 tab(s) orally once a day (13 May 2025 01:36)  Metoprolol Tartrate 25 mg oral tablet: 0.5 tab(s) orally 2 times a day (13 May 2025 01:36)  Multiple Vitamins oral tablet, chewable: 1 tab(s) orally once a day (13 May 2025 01:36)  Pepcid 20 mg oral tablet: 1 tab(s) orally once a day (13 May 2025 01:36)    MEDICATIONS  (STANDING):  atorvastatin 80 milliGRAM(s) Oral at bedtime  chlorhexidine 2% Cloths 1 Application(s) Topical <User Schedule>  heparin   Injectable 5000 Unit(s) SubCutaneous every 12 hours  lidocaine   4% Patch 1 Patch Transdermal daily  metoprolol tartrate 12.5 milliGRAM(s) Oral two times a day  OLANZapine 2.5 milliGRAM(s) Oral daily  pantoprazole  Injectable 40 milliGRAM(s) IV Push every 12 hours  polyethylene glycol 3350 17 Gram(s) Oral daily    MEDICATIONS  (PRN):  acetaminophen     Tablet .. 650 milliGRAM(s) Oral every 6 hours PRN Temp greater or equal to 38C (100.4F), Mild Pain (1 - 3)  HYDROmorphone  Injectable 1 milliGRAM(s) IV Push every 3 hours PRN Severe Pain (7 - 10)    Vital Signs Last 24 Hrs  T(C): 38.2 (16 May 2025 13:31), Max: 38.2 (16 May 2025 13:31)  T(F): 100.7 (16 May 2025 13:31), Max: 100.7 (16 May 2025 13:31)  HR: 114 (16 May 2025 13:31) (93 - 114)  BP: 119/70 (16 May 2025 13:31) (115/73 - 119/70)  BP(mean): 87 (16 May 2025 04:48) (85 - 87)  RR: 18 (16 May 2025 13:31) (18 - 18)  SpO2: 95% (16 May 2025 13:31) (95% - 98%)    Parameters below as of 16 May 2025 13:31  Patient On (Oxygen Delivery Method): room air      CAPILLARY BLOOD GLUCOSE        LABS:                        7.2    4.45  )-----------( 45       ( 16 May 2025 04:30 )             22.1     05-16    135  |  101  |  14  ----------------------------<  131[H]  3.8   |  19  |  0.7    Ca    8.5      16 May 2025 04:30  Mg     2.3     05-16    TPro  5.3[L]  /  Alb  2.9[L]  /  TBili  1.2  /  DBili  x   /  AST  50[H]  /  ALT  26  /  AlkPhos  398[H]  05-16    LIVER FUNCTIONS - ( 16 May 2025 04:30 )  Alb: 2.9 g/dL / Pro: 5.3 g/dL / ALK PHOS: 398 U/L / ALT: 26 U/L / AST: 50 U/L / GGT: x               PT/INR - ( 16 May 2025 04:30 )   PT: 14.80 sec;   INR: 1.25 ratio         PTT - ( 16 May 2025 04:30 )  PTT:35.3 sec  Urinalysis Basic - ( 16 May 2025 04:30 )    Color: x / Appearance: x / SG: x / pH: x  Gluc: 131 mg/dL / Ketone: x  / Bili: x / Urobili: x   Blood: x / Protein: x / Nitrite: x   Leuk Esterase: x / RBC: x / WBC x   Sq Epi: x / Non Sq Epi: x / Bacteria: x              Consultant Notes Reviewed:  [x ] YES  [ ] NO  Care Discussed with Consultants/Other Providers/ Housestaff [ x] YES  [ ] NO  Radiology, labs, EKGs, new studies personally reviewed.                                                           Patient is a 59y old  Male who presents with a chief complaint of Progressive weakness (13 May 2025 17:51)    INTERVAL HPI/OVERNIGHT EVENTS: Patient was examined and seen at bedside. This afternoon pt is resting in bed and is somnolent after receiving Ativan for MRI due to claustrophobia. Reports some LBP. No further bleeding. WIfe at bedside. Later in the day developed fever of 100.7.  ROS: Denies CP, SOB, AP, new weakness  All other systems reviewed and are within normal limits.  InitialHPI:  59y male with PMHx of HTN, hiatal hernia, HLD, CAD s/p CABG in Jan 2024, paroxysmal A. fib not on AC, former smoker (30 pack years, quit 1yr ago), recently diagnosed with colitis and esophageal adenocarcinoma s/p stent placement who presents for weakness. The patient was recently discharged from [facility] on 5/6 , and has been progressively deteriorating. States he has been unable to tolerate p.o. and has been unable to walk over the past few days. Wife at bedside reports that he has not been able to follow-up with oncology due to the weakness.  On ROS the patient also states he has been having black tarry stools that started 1 day prior to his EGD. He had 3 black BMs today. He denies any mucosal bleeding or blood in the urine.  He denies any fevers, chills, chest pain, shortness of breath, palpitations, headache, vision changes, nausea, vomiting, abdominal pain, hematuria, falls.  He is supposed to have PET scan tomorrow and follow with Dr. Sanchez in 2 days.    T(F): 98.5 (05-12-25 @ 21:05), Max: 98.6 (05-12-25 @ 19:48)  HR: 97 (05-12-25 @ 21:05) (88 - 104)  BP: 111/58 (05-12-25 @ 21:05) (111/58 - 132/75)  RR: 18 (05-12-25 @ 21:05) (18 - 20)  SpO2: 99% (05-12-25 @ 21:05) (98% - 100%) on RA    Labs: Hg 7.6 (baseline ~10), MCV 78, slight schistocytes on smear,  Plt 92, PT/INR >40/6.2, PTT 51.8, ALK phos 273.      Imaging:    CT Chest w/ IV Cont  (4/26 - prior admission)  IMPRESSION:  1.  No definite evidence of thoracic metastatic disease.  2.  Small bilateral pleural effusions.  3.  Marked circumferential thickening of the distal esophagus/hiatal   hernia could be related to provided history of suspected esophageal   cancer versus esophagitis. (12 May 2025 21:41)    PAST MEDICAL & SURGICAL HISTORY:  HTN (hypertension)      Hiatal hernia      HLD (hyperlipidemia)      CAD (coronary artery disease)      Paroxysmal atrial fibrillation      History of colitis      S/P CABG (coronary artery bypass graft)      General: somnolent, ?Lt facial droop  HEENT:  EOMI, no LAD  CV: S1 S2  Resp: decreased breath sounds at bases  GI: NT/ND/S +BS; +PEG  MS: no clubbing/cyanosis/edema, + pulses b/l  Neuro: LE 2/5 distally, 4/5 prox. UE 5-/5          Home Medications:  clopidogrel 75 mg oral tablet: 1 tab(s) orally once a day (13 May 2025 01:36)  Lipitor 80 mg oral tablet: 1 tab(s) orally once a day (13 May 2025 01:36)  Metoprolol Tartrate 25 mg oral tablet: 0.5 tab(s) orally 2 times a day (13 May 2025 01:36)  Multiple Vitamins oral tablet, chewable: 1 tab(s) orally once a day (13 May 2025 01:36)  Pepcid 20 mg oral tablet: 1 tab(s) orally once a day (13 May 2025 01:36)    MEDICATIONS  (STANDING):  atorvastatin 80 milliGRAM(s) Oral at bedtime  chlorhexidine 2% Cloths 1 Application(s) Topical <User Schedule>  heparin   Injectable 5000 Unit(s) SubCutaneous every 12 hours  lidocaine   4% Patch 1 Patch Transdermal daily  metoprolol tartrate 12.5 milliGRAM(s) Oral two times a day  OLANZapine 2.5 milliGRAM(s) Oral daily  pantoprazole  Injectable 40 milliGRAM(s) IV Push every 12 hours  polyethylene glycol 3350 17 Gram(s) Oral daily    MEDICATIONS  (PRN):  acetaminophen     Tablet .. 650 milliGRAM(s) Oral every 6 hours PRN Temp greater or equal to 38C (100.4F), Mild Pain (1 - 3)  HYDROmorphone  Injectable 1 milliGRAM(s) IV Push every 3 hours PRN Severe Pain (7 - 10)    Vital Signs Last 24 Hrs  T(C): 38.2 (16 May 2025 13:31), Max: 38.2 (16 May 2025 13:31)  T(F): 100.7 (16 May 2025 13:31), Max: 100.7 (16 May 2025 13:31)  HR: 114 (16 May 2025 13:31) (93 - 114)  BP: 119/70 (16 May 2025 13:31) (115/73 - 119/70)  BP(mean): 87 (16 May 2025 04:48) (85 - 87)  RR: 18 (16 May 2025 13:31) (18 - 18)  SpO2: 95% (16 May 2025 13:31) (95% - 98%)    Parameters below as of 16 May 2025 13:31  Patient On (Oxygen Delivery Method): room air      CAPILLARY BLOOD GLUCOSE        LABS:                        7.2    4.45  )-----------( 45       ( 16 May 2025 04:30 )             22.1     05-16    135  |  101  |  14  ----------------------------<  131[H]  3.8   |  19  |  0.7    Ca    8.5      16 May 2025 04:30  Mg     2.3     05-16    TPro  5.3[L]  /  Alb  2.9[L]  /  TBili  1.2  /  DBili  x   /  AST  50[H]  /  ALT  26  /  AlkPhos  398[H]  05-16    LIVER FUNCTIONS - ( 16 May 2025 04:30 )  Alb: 2.9 g/dL / Pro: 5.3 g/dL / ALK PHOS: 398 U/L / ALT: 26 U/L / AST: 50 U/L / GGT: x               PT/INR - ( 16 May 2025 04:30 )   PT: 14.80 sec;   INR: 1.25 ratio         PTT - ( 16 May 2025 04:30 )  PTT:35.3 sec  Urinalysis Basic - ( 16 May 2025 04:30 )    Color: x / Appearance: x / SG: x / pH: x  Gluc: 131 mg/dL / Ketone: x  / Bili: x / Urobili: x   Blood: x / Protein: x / Nitrite: x   Leuk Esterase: x / RBC: x / WBC x   Sq Epi: x / Non Sq Epi: x / Bacteria: x              Consultant Notes Reviewed:  [x ] YES  [ ] NO  Care Discussed with Consultants/Other Providers/ Housestaff [ x] YES  [ ] NO  Radiology, labs, EKGs, new studies personally reviewed.

## 2025-05-16 NOTE — PROGRESS NOTE ADULT - ASSESSMENT
59y male with PMHx of HTN, hiatal hernia, HLD, CAD s/p CABG in Jan 2024, paroxysmal A. fib not on AC, former smoker (30 pack years, quit 1yr ago), recently diagnosed with colitis and esophageal adenocarcinoma s/p stent placement who presents for weakness. The patient was recently discharged from [facility] on 5/6 , and has been progressively deteriorating. Found to have hgb 7.6 lower than last admission reported melena, thrombocytopenia w/ elevated INR/PTT.     #Reported Melena likely due to Upper GI Bleed likely Secondary to  Malignancy   #Thrombocytopenia, elevated INR/PTT  #Recently diagnosed Esophageal adenocarcinoma   #Acute blood loss anemia  - EGD 4/25: A friable circumferential mass seen from the GE junction at 38cm to 28 cm of mid esophagus. Multiple biopsies were obtained. Oozing of blood was noted from the whole mass.  - c/w pantoprazole 40mg IV BID  - s/p EGD w/ peg and stent removal   - . Patient supposed to have PET scan OP, Surg Onc Dr Yost for possible resection  - keep Hgb>7.5  -keep active T&S  -5/16 transfuse another PRBCs    #Weakness   #Diffuse metastatic dz of C/T/L spine on MRI  #Acute on chronic lower back pain   - c/w Dilaudid 1mg Q4, Oxycodone 5mg Q4 PRN  Rad Onc and Med Onc C/s for options ?RT vs chemo    #Decreased PO intake 2/2 esophageal adenocarcinoma  - Weakness, unable to walk/stand  - States the esophageal stent has not helped with PO intake.  - Started IV D5/LR 75cc/hr  - PT/OT    #?L sided fascial droop  #Diffuse dural thickening  - Pt and spouse do not know if chronic.   - no focal defecits except b/l distal weakness   < from: MR Head w/wo IV Cont (05.14.25 @ 09:48) >  1.  No acute infarct or intracranial hemorrhage.    2.  Mild smooth diffuse dural thickening over the cerebral convexities.   This finding is nonspecific and can reflect intracranial hypotension or   recent spinal procedures. In the setting of known neoplasm cannot exclude   dural metastatic disease.    3.  Heterogeneous marrow signal of the calvarium, nonspecific.    < end of copied text >    #3.8 cm wide R iliac artery aneurysm- stable  - vascular o/p- surgery was scheduled with Dr. Tavera     #HTN  #CAD s/p CABG in Jan 2024  #Paroxysmal A.fib  - c/w home meds  - Hold plavix    #DVT PPX: SCD. Added Heparin SQ  #GI PPX: PPI BID  #Diet: PO, plus supplement w/ TF  #Code Status: Full  #Activity Order: IAT    High risk pt. Px is guarded    #Progress Note Handoff  Pending: Clinical improvement and stability__x___PT__Rad Onc, Med Onc  Pt/Family discussion: Pt/family informed and agree with the current plan  Disposition: Home__vs    Nursing Home    To be determined____x____    My note supersedes the residents note should a discrepancy arise.    Chart and notes personally reviewed.  Care Discussed with Consultants/Other Providers/ Housestaff [ x] YES [ ] NO   Radiology, labs, old records personally reviewed.    discussed w/ housestaff, nursing, case management, spouse, palliative    Attestation Statements:    Attestation Statements:  Risk Statement (NON-critical care).     On this date of service, level of risk to patient is considered: High.     Due to: pt with illness causing risk to life or bodily fxn    Time-based billing (NON-critical care).     50 minutes spent on total encounter. The necessity of the time spent during the encounter on this date of service was due to:     time spent on review of labs, imaging studies, old records, obtaining history, personally examining patient, counselling and communicating with patient/ family, entering orders for medications/tests/etc, discussions with other health care providers, documentation in electronic health records, independent interpretation of labs, imaging/procedure results and care coordination.     59y male with PMHx of HTN, hiatal hernia, HLD, CAD s/p CABG in Jan 2024, paroxysmal A. fib not on AC, former smoker (30 pack years, quit 1yr ago), recently diagnosed with colitis and esophageal adenocarcinoma s/p stent placement who presents for weakness. The patient was recently discharged from [facility] on 5/6 , and has been progressively deteriorating. Found to have hgb 7.6 lower than last admission reported melena, thrombocytopenia w/ elevated INR/PTT.     #Reported Melena likely due to Upper GI Bleed likely Secondary to  Malignancy   #Thrombocytopenia, elevated INR/PTT  #Recently diagnosed Esophageal adenocarcinoma   #Acute blood loss anemia  - EGD 4/25: A friable circumferential mass seen from the GE junction at 38cm to 28 cm of mid esophagus. Multiple biopsies were obtained. Oozing of blood was noted from the whole mass.  - c/w pantoprazole 40mg IV BID  - s/p EGD w/ peg and stent removal   - . Patient supposed to have PET scan OP, Surg Onc Dr Yost   - keep Hgb>7.5  -keep active T&S  -5/16 transfuse another unit PRBCs    #Weakness   #Diffuse metastatic dz of C/T/L spine on MRI  #Acute on chronic lower back pain   - c/w Dilaudid 1mg Q4, Oxycodone 5mg Q4 PRN  Rad Onc and Med Onc C/s for options ?RT vs chemo    #Decreased PO intake 2/2 esophageal adenocarcinoma  - s/p PEG    #Fever  panculture, CXR, LA, procal, RVP  if persistent fevers, start Zosyn    #?L sided fascial droop  #Diffuse dural thickening  - Pt and spouse do not know if chronic.   - no focal defecits except b/l distal weakness   < from: MR Head w/wo IV Cont (05.14.25 @ 09:48) >  1.  No acute infarct or intracranial hemorrhage.    2.  Mild smooth diffuse dural thickening over the cerebral convexities.   This finding is nonspecific and can reflect intracranial hypotension or   recent spinal procedures. In the setting of known neoplasm cannot exclude   dural metastatic disease.    3.  Heterogeneous marrow signal of the calvarium, nonspecific.    < end of copied text >    #3.8 cm wide R iliac artery aneurysm- stable  - vascular o/p- surgery was scheduled with Dr. Tavera     #HTN  #CAD s/p CABG in Jan 2024  #Paroxysmal A.fib  - c/w home meds  - Hold plavix    #DVT PPX: SCD. Added Heparin SQ  #GI PPX: PPI BID  #Diet: PO, plus supplement w/ TF  #Code Status: Full  #Activity Order: IAT    High risk pt. Px is guarded    #Progress Note Handoff  Pending: Clinical improvement and stability__x___PT__Rad Onc, Med Onc  Pt/Family discussion: Pt/family informed and agree with the current plan  Disposition: Home__vs    Nursing Home    To be determined____x____    My note supersedes the residents note should a discrepancy arise.    Chart and notes personally reviewed.  Care Discussed with Consultants/Other Providers/ Housestaff [ x] YES [ ] NO   Radiology, labs, old records personally reviewed.    discussed w/ housestaff, nursing, case management, spouse, palliative    Attestation Statements:    Attestation Statements:  Risk Statement (NON-critical care).     On this date of service, level of risk to patient is considered: High.     Due to: pt with illness causing risk to life or bodily fxn    Time-based billing (NON-critical care).     50 minutes spent on total encounter. The necessity of the time spent during the encounter on this date of service was due to:     time spent on review of labs, imaging studies, old records, obtaining history, personally examining patient, counselling and communicating with patient/ family, entering orders for medications/tests/etc, discussions with other health care providers, documentation in electronic health records, independent interpretation of labs, imaging/procedure results and care coordination.

## 2025-05-16 NOTE — PROGRESS NOTE ADULT - ASSESSMENT
59M w/ Esophageal Cancer (dx Apr2025) s/p stent, CAD s/p [id number] (Jan2024), Afib not on AC, HLD is admitted for weakness, acute blood loss anemia/melena requiring pRBC transfusion. Patient completed endoscopy 5/14 for removal of esophageal stent (due to discomfort) and for PEG placement. Palliative consulted to assist w/ cancer-related pain management. Significantly, MR C/T/L spine has identified diffusely metastatic disease in C/T/L spine and sacrum/iliac bones.    - We discussion with Medicine attending Dr. French, we will transition from oxycodone IR 10mg q4hr prn severe pain to hydromorphone 1mg IV q3hr PRN severe pain as there is concern after moving around for MR imaging the patient may experience worsened back pain and there evidence of diffuse spinal metastases.  - We plan to follow up on Monday to further reassess the patient's symptoms.  - Anorexia is associated w/ his cancer. He is already being treated w/ olanzapine 2.5mg d and megesterol 20mg d which can be continued for now and titrated as appropriate as an outpatient.  - patient evaluated w/ Palliative Attending Dr. Kavya Davis MD  Palliative Medicine Fellow  [facility]   [phone]

## 2025-05-16 NOTE — PROGRESS NOTE ADULT - SUBJECTIVE AND OBJECTIVE BOX
JONAS CELESTIN 59y Male  MRN#: [id number]   Hospital Day: 4d    Oncology following for esophaegal ca     REVIEW OF SYMPTOMS:  weakness  fatigue     OBJECTIVE  PAST MEDICAL & SURGICAL HISTORY  HTN (hypertension)    Hiatal hernia    HLD (hyperlipidemia)    CAD (coronary artery disease)    Paroxysmal atrial fibrillation    History of colitis    S/P CABG (coronary artery bypass graft)      ALLERGIES:  penicillins (Unknown)    MEDICATIONS:  STANDING MEDICATIONS  atorvastatin 80 milliGRAM(s) Oral at bedtime  chlorhexidine 2% Cloths 1 Application(s) Topical <User Schedule>  heparin   Injectable 5000 Unit(s) SubCutaneous every 12 hours  lidocaine   4% Patch 1 Patch Transdermal daily  metoprolol tartrate 12.5 milliGRAM(s) Oral two times a day  OLANZapine 2.5 milliGRAM(s) Oral daily  pantoprazole  Injectable 40 milliGRAM(s) IV Push every 12 hours  polyethylene glycol 3350 17 Gram(s) Oral daily    PRN MEDICATIONS  acetaminophen     Tablet .. 650 milliGRAM(s) Oral every 6 hours PRN  oxyCODONE    IR 10 milliGRAM(s) Oral every 4 hours PRN      VITAL SIGNS: Last 24 Hours  T(C): 38.2 (16 May 2025 13:31), Max: 38.2 (16 May 2025 13:31)  T(F): 100.7 (16 May 2025 13:31), Max: 100.7 (16 May 2025 13:31)  HR: 114 (16 May 2025 13:31) (93 - 114)  BP: 119/70 (16 May 2025 13:31) (115/73 - 119/70)  BP(mean): 87 (16 May 2025 04:48) (85 - 87)  RR: 18 (16 May 2025 13:31) (18 - 18)  SpO2: 95% (16 May 2025 13:31) (95% - 98%)    LABS:                        7.2    4.45  )-----------( 45       ( 16 May 2025 04:30 )             22.1     05-16    135  |  101  |  14  ----------------------------<  131[H]  3.8   |  19  |  0.7    Ca    8.5      16 May 2025 04:30  Mg     2.3     05-16    TPro  5.3[L]  /  Alb  2.9[L]  /  TBili  1.2  /  DBili  x   /  AST  50[H]  /  ALT  26  /  AlkPhos  398[H]  05-16    PT/INR - ( 16 May 2025 04:30 )   PT: 14.80 sec;   INR: 1.25 ratio         PTT - ( 16 May 2025 04:30 )  PTT:35.3 sec  Urinalysis Basic - ( 16 May 2025 04:30 )    Color: x / Appearance: x / SG: x / pH: x  Gluc: 131 mg/dL / Ketone: x  / Bili: x / Urobili: x   Blood: x / Protein: x / Nitrite: x   Leuk Esterase: x / RBC: x / WBC x   Sq Epi: x / Non Sq Epi: x / Bacteria: x          ASSESSMENT & PLAN:  Patient is a     PAST MEDICAL & SURGICAL HISTORY:  HTN (hypertension)      Hiatal hernia      HLD (hyperlipidemia)      CAD (coronary artery disease)      Paroxysmal atrial fibrillation      History of colitis      S/P CABG (coronary artery bypass graft)          #Misc  - DVT Prophylaxis:  - GI Prophylaxis:  - Diet:  - Activity:  - IV Fluids:  - Code Status:    Dispo: JONAS CELESTIN 59y Male  MRN#: [id number]   Hospital Day: 4d    Oncology following for esophaegal ca     REVIEW OF SYMPTOMS:  weakness  fatigue   febrile this am     OBJECTIVE  PAST MEDICAL & SURGICAL HISTORY  HTN (hypertension)    Hiatal hernia    HLD (hyperlipidemia)    CAD (coronary artery disease)    Paroxysmal atrial fibrillation    History of colitis    S/P CABG (coronary artery bypass graft)      ALLERGIES:  penicillins (Unknown)    MEDICATIONS:  STANDING MEDICATIONS  atorvastatin 80 milliGRAM(s) Oral at bedtime  chlorhexidine 2% Cloths 1 Application(s) Topical <User Schedule>  heparin   Injectable 5000 Unit(s) SubCutaneous every 12 hours  lidocaine   4% Patch 1 Patch Transdermal daily  metoprolol tartrate 12.5 milliGRAM(s) Oral two times a day  OLANZapine 2.5 milliGRAM(s) Oral daily  pantoprazole  Injectable 40 milliGRAM(s) IV Push every 12 hours  polyethylene glycol 3350 17 Gram(s) Oral daily    PRN MEDICATIONS  acetaminophen     Tablet .. 650 milliGRAM(s) Oral every 6 hours PRN  oxyCODONE    IR 10 milliGRAM(s) Oral every 4 hours PRN      VITAL SIGNS: Last 24 Hours  T(C): 38.2 (16 May 2025 13:31), Max: 38.2 (16 May 2025 13:31)  T(F): 100.7 (16 May 2025 13:31), Max: 100.7 (16 May 2025 13:31)  HR: 114 (16 May 2025 13:31) (93 - 114)  BP: 119/70 (16 May 2025 13:31) (115/73 - 119/70)  BP(mean): 87 (16 May 2025 04:48) (85 - 87)  RR: 18 (16 May 2025 13:31) (18 - 18)  SpO2: 95% (16 May 2025 13:31) (95% - 98%)    LABS:                        7.2    4.45  )-----------( 45       ( 16 May 2025 04:30 )             22.1     05-16    135  |  101  |  14  ----------------------------<  131[H]  3.8   |  19  |  0.7    Ca    8.5      16 May 2025 04:30  Mg     2.3     05-16    TPro  5.3[L]  /  Alb  2.9[L]  /  TBili  1.2  /  DBili  x   /  AST  50[H]  /  ALT  26  /  AlkPhos  398[H]  05-16    PT/INR - ( 16 May 2025 04:30 )   PT: 14.80 sec;   INR: 1.25 ratio         PTT - ( 16 May 2025 04:30 )  PTT:35.3 sec  Urinalysis Basic - ( 16 May 2025 04:30 )    Color: x / Appearance: x / SG: x / pH: x  Gluc: 131 mg/dL / Ketone: x  / Bili: x / Urobili: x   Blood: x / Protein: x / Nitrite: x   Leuk Esterase: x / RBC: x / WBC x   Sq Epi: x / Non Sq Epi: x / Bacteria: x          ASSESSMENT & PLAN:  Patient is a     PAST MEDICAL & SURGICAL HISTORY:  HTN (hypertension)      Hiatal hernia      HLD (hyperlipidemia)      CAD (coronary artery disease)      Paroxysmal atrial fibrillation      History of colitis      S/P CABG (coronary artery bypass graft)          #Misc  - DVT Prophylaxis:  - GI Prophylaxis:  - Diet:  - Activity:  - IV Fluids:  - Code Status:    Dispo: JONAS CELESTIN 59y Male  MRN#: [id number]   Hospital Day: 4d    Oncology following for esophaegal ca     REVIEW OF SYMPTOMS:  weakness  fatigue   febrile this am     OBJECTIVE  PAST MEDICAL & SURGICAL HISTORY  HTN (hypertension)    Hiatal hernia    HLD (hyperlipidemia)    CAD (coronary artery disease)    Paroxysmal atrial fibrillation    History of colitis    S/P CABG (coronary artery bypass graft)      ALLERGIES:  penicillins (Unknown)    MEDICATIONS:  STANDING MEDICATIONS  atorvastatin 80 milliGRAM(s) Oral at bedtime  chlorhexidine 2% Cloths 1 Application(s) Topical <User Schedule>  heparin   Injectable 5000 Unit(s) SubCutaneous every 12 hours  lidocaine   4% Patch 1 Patch Transdermal daily  metoprolol tartrate 12.5 milliGRAM(s) Oral two times a day  OLANZapine 2.5 milliGRAM(s) Oral daily  pantoprazole  Injectable 40 milliGRAM(s) IV Push every 12 hours  polyethylene glycol 3350 17 Gram(s) Oral daily    PRN MEDICATIONS  acetaminophen     Tablet .. 650 milliGRAM(s) Oral every 6 hours PRN  oxyCODONE    IR 10 milliGRAM(s) Oral every 4 hours PRN      VITAL SIGNS: Last 24 Hours  T(C): 38.2 (16 May 2025 13:31), Max: 38.2 (16 May 2025 13:31)  T(F): 100.7 (16 May 2025 13:31), Max: 100.7 (16 May 2025 13:31)  HR: 114 (16 May 2025 13:31) (93 - 114)  BP: 119/70 (16 May 2025 13:31) (115/73 - 119/70)  BP(mean): 87 (16 May 2025 04:48) (85 - 87)  RR: 18 (16 May 2025 13:31) (18 - 18)  SpO2: 95% (16 May 2025 13:31) (95% - 98%)    LABS:                        7.2    4.45  )-----------( 45       ( 16 May 2025 04:30 )             22.1     05-16    135  |  101  |  14  ----------------------------<  131[H]  3.8   |  19  |  0.7    Ca    8.5      16 May 2025 04:30  Mg     2.3     05-16    TPro  5.3[L]  /  Alb  2.9[L]  /  TBili  1.2  /  DBili  x   /  AST  50[H]  /  ALT  26  /  AlkPhos  398[H]  05-16    PT/INR - ( 16 May 2025 04:30 )   PT: 14.80 sec;   INR: 1.25 ratio         PTT - ( 16 May 2025 04:30 )  PTT:35.3 sec  Urinalysis Basic - ( 16 May 2025 04:30 )    Color: x / Appearance: x / SG: x / pH: x  Gluc: 131 mg/dL / Ketone: x  / Bili: x / Urobili: x   Blood: x / Protein: x / Nitrite: x   Leuk Esterase: x / RBC: x / WBC x   Sq Epi: x / Non Sq Epi: x / Bacteria: x      GENERAL: NAD, comfortable   HEAD:  Atraumatic, Normocephalic  EYES: EOMI, PERRLA, conjunctiva pale and sclera clear  NECK: Supple, No JVD  CHEST/LUNG: Clear to auscultation bilaterally; No wheeze  HEART: Regular rate and rhythm; No murmurs, rubs, or gallops  ABDOMEN: mild tenderness in epigastric area, RUQ   EXTREMITIES:  LLE> RLE trace edema   NEUROLOGY: non-focal  SKIN: No rashes or lesions    Imaging/Procedure   EGD: Friable circumferential mass seen from the GE junction at 38cm to 28 cm of mid esophagus.  Another 5mm lesion was noted at 20cm from incisors in the proximal esophagus.  	Erosions in the stomach compatible with erosive gastritis. (Biopsy).  	A pulsating ulcerated lesion was noted in the fundus. Biopsy was not obtained.  	Normal mucosa in the whole examined duodenum. (Biopsy).    Colonoscopy Impressions:  	Multiple semi-pedunculated polyps were seen in the left colon. A 2cm polyp was seen in the descending colon, biopsy was obtained. Polyps were not removed in the setting of poor prep.  	External hemorrhoids.  	Solid stool noted in the whole colon. Cecum was not reached    CT chest   No definite evidence of thoracic metastatic disease.  2.  Small bilateral pleural effusions.  3.  Marked circumferential thickening of the distal esophagus/hiatal   hernia could be related to provided history of suspected esophageal   cancer versus esophagitis.    Ct abdomen   Redemonstrated prominent submucosal fat/bowel wall thickening involving   descending colon, possibly related to prior episodes of colitis.    MRI Brain   Mild smooth diffuse dural thickening over the cerebral convexities.   This finding is nonspecific and can reflect intracranial hypotension or   recent spinal procedures. In the setting of known neoplasm cannot exclude   dural metastatic disease.    MR C/T/L/S   Diffusely heterogeneous signal and enhancement of the cervical, thoracic, lumbar vertebral   bodies, sacrum and iliac bones.         ASSESSMENT & PLAN:    59M  hiatal hernia, CAD s/p CABG in Jan 2024, paroxysmal A. 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   Was planned for outpatient PET scan and appointment with Dr Sanchez     #Decreased PO intake  #Weakness  esophageal stent placed during prior admission. Pt reports did not help. S/p stent removal 5.14.25   PEG placed on 5.14.25     #Acute on chronic anemia   #Thrombocytopenia -  #Coagulopathy Differentials: DIC/ Vit K deficiency   PT >40, INR 6.20, PTT 51   D dimer 2486, Fibrinogen 2486   received 1 dose vit k 5mg PO, 2unit PRBC, 1 unit plasma     #3.8 cm wide R iliac artery aneurysm  - F/u vascular o/p    #?facial droop  CT head neg   MRI as above     Recommendations:  Needs PET scan for staging outpatient   Obtain Hemolysis labs: LDH RC Haptoglobin , LFTS   Daily DIC labs   Can dc appetite stimulants since he has PEG tube now   F/up  LE duplex   F/up nutrition.   Will ask path to check for CLDN1 status in case he has metastatic dz  F/up PT    Outpatient follow up with Dr Sanchez to initiate treatment      JONAS CELESTIN 59y Male  MRN#: [id number]   Hospital Day: 4d    Oncology following for esophaegal ca     REVIEW OF SYMPTOMS:  weakness  fatigue   febrile this am     OBJECTIVE  PAST MEDICAL & SURGICAL HISTORY  HTN (hypertension)    Hiatal hernia    HLD (hyperlipidemia)    CAD (coronary artery disease)    Paroxysmal atrial fibrillation    History of colitis    S/P CABG (coronary artery bypass graft)      ALLERGIES:  penicillins (Unknown)    MEDICATIONS:  STANDING MEDICATIONS  atorvastatin 80 milliGRAM(s) Oral at bedtime  chlorhexidine 2% Cloths 1 Application(s) Topical <User Schedule>  heparin   Injectable 5000 Unit(s) SubCutaneous every 12 hours  lidocaine   4% Patch 1 Patch Transdermal daily  metoprolol tartrate 12.5 milliGRAM(s) Oral two times a day  OLANZapine 2.5 milliGRAM(s) Oral daily  pantoprazole  Injectable 40 milliGRAM(s) IV Push every 12 hours  polyethylene glycol 3350 17 Gram(s) Oral daily    PRN MEDICATIONS  acetaminophen     Tablet .. 650 milliGRAM(s) Oral every 6 hours PRN  oxyCODONE    IR 10 milliGRAM(s) Oral every 4 hours PRN      VITAL SIGNS: Last 24 Hours  T(C): 38.2 (16 May 2025 13:31), Max: 38.2 (16 May 2025 13:31)  T(F): 100.7 (16 May 2025 13:31), Max: 100.7 (16 May 2025 13:31)  HR: 114 (16 May 2025 13:31) (93 - 114)  BP: 119/70 (16 May 2025 13:31) (115/73 - 119/70)  BP(mean): 87 (16 May 2025 04:48) (85 - 87)  RR: 18 (16 May 2025 13:31) (18 - 18)  SpO2: 95% (16 May 2025 13:31) (95% - 98%)    LABS:                        7.2    4.45  )-----------( 45       ( 16 May 2025 04:30 )             22.1     05-16    135  |  101  |  14  ----------------------------<  131[H]  3.8   |  19  |  0.7    Ca    8.5      16 May 2025 04:30  Mg     2.3     05-16    TPro  5.3[L]  /  Alb  2.9[L]  /  TBili  1.2  /  DBili  x   /  AST  50[H]  /  ALT  26  /  AlkPhos  398[H]  05-16    PT/INR - ( 16 May 2025 04:30 )   PT: 14.80 sec;   INR: 1.25 ratio         PTT - ( 16 May 2025 04:30 )  PTT:35.3 sec  Urinalysis Basic - ( 16 May 2025 04:30 )    Color: x / Appearance: x / SG: x / pH: x  Gluc: 131 mg/dL / Ketone: x  / Bili: x / Urobili: x   Blood: x / Protein: x / Nitrite: x   Leuk Esterase: x / RBC: x / WBC x   Sq Epi: x / Non Sq Epi: x / Bacteria: x      GENERAL: NAD, comfortable   HEAD:  Atraumatic, Normocephalic  EYES: EOMI, PERRLA, conjunctiva pale and sclera clear  NECK: Supple, No JVD  CHEST/LUNG: Clear to auscultation bilaterally; No wheeze  HEART: Regular rate and rhythm; No murmurs, rubs, or gallops  ABDOMEN: mild tenderness in epigastric area, RUQ   EXTREMITIES:  LLE> RLE trace edema   NEUROLOGY: non-focal  SKIN: No rashes or lesions    Imaging/Procedure   EGD: Friable circumferential mass seen from the GE junction at 38cm to 28 cm of mid esophagus.  Another 5mm lesion was noted at 20cm from incisors in the proximal esophagus.  	Erosions in the stomach compatible with erosive gastritis. (Biopsy).  	A pulsating ulcerated lesion was noted in the fundus. Biopsy was not obtained.  	Normal mucosa in the whole examined duodenum. (Biopsy).    Colonoscopy Impressions:  	Multiple semi-pedunculated polyps were seen in the left colon. A 2cm polyp was seen in the descending colon, biopsy was obtained. Polyps were not removed in the setting of poor prep.  	External hemorrhoids.  	Solid stool noted in the whole colon. Cecum was not reached    CT chest   No definite evidence of thoracic metastatic disease.  2.  Small bilateral pleural effusions.  3.  Marked circumferential thickening of the distal esophagus/hiatal   hernia could be related to provided history of suspected esophageal   cancer versus esophagitis.    Ct abdomen   Redemonstrated prominent submucosal fat/bowel wall thickening involving   descending colon, possibly related to prior episodes of colitis.    MRI Brain   Mild smooth diffuse dural thickening over the cerebral convexities.   This finding is nonspecific and can reflect intracranial hypotension or   recent spinal procedures. In the setting of known neoplasm cannot exclude   dural metastatic disease.    MR C/T/L/S   Diffusely heterogeneous signal and enhancement of the cervical, thoracic, lumbar vertebral   bodies, sacrum and iliac bones.         ASSESSMENT & PLAN:    59M  hiatal hernia, CAD s/p CABG in Jan 2024, paroxysmal A. 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   Was planned for outpatient PET scan and appointment with Dr Sanchez   MRI as above. concern for metastatic bone disease     #Decreased PO intake  #Weakness  esophageal stent placed during prior admission. Pt reports did not help. S/p stent removal 5.14.25   PEG placed on 5.14.25     #Pancytopenia   #Acute on chronic anemia 2/2  GI bleed vs myelophthisic process  #Thrombocytopenia - DIC vs myelophthisic process  #Coagulopathy Differentials: DIC/ Vit K deficiency       #3.8 cm wide R iliac artery aneurysm  - F/u vascular o/p    #Fever  spiked a temp of 101.2         Recommendations:  MRI shows findings concerning for metastatic disease. Results of MR brain and CTLS discussed with wife bedside. She wanted some time to process it. We reassured her that we will come see Mr Celestin and her tomorrow to discuss the next steps.   Concern for pancytopenia 2/2 myelophthisic process. Please order bone scan. May need BM evaluation to evaluate for BM infiltration  by cancer.    Work up for infectious cause for fever-F/up panculture, CXR, LA, procal, RVP   Will ask path to check for CLDN1 status for metastatic dz  Oncology will follow   C/w tube feeds

## 2025-05-17 LAB
ALBUMIN SERPL ELPH-MCNC: 2.8 G/DL — LOW (ref 3.5–5.2)
ALP SERPL-CCNC: 368 U/L — HIGH (ref 30–115)
ALT FLD-CCNC: 24 U/L — SIGNIFICANT CHANGE UP (ref 0–41)
ANION GAP SERPL CALC-SCNC: 13 MMOL/L — SIGNIFICANT CHANGE UP (ref 7–14)
APTT BLD: 50.1 SEC — HIGH (ref 27–39.2)
AST SERPL-CCNC: 47 U/L — HIGH (ref 0–41)
BASOPHILS # BLD AUTO: 0.03 K/UL — SIGNIFICANT CHANGE UP (ref 0–0.2)
BASOPHILS NFR BLD AUTO: 0.5 % — SIGNIFICANT CHANGE UP (ref 0–1)
BILIRUB SERPL-MCNC: 1.7 MG/DL — HIGH (ref 0.2–1.2)
BUN SERPL-MCNC: 17 MG/DL — SIGNIFICANT CHANGE UP (ref 10–20)
CALCIUM SERPL-MCNC: 7.7 MG/DL — LOW (ref 8.4–10.5)
CHLORIDE SERPL-SCNC: 100 MMOL/L — SIGNIFICANT CHANGE UP (ref 98–110)
CO2 SERPL-SCNC: 21 MMOL/L — SIGNIFICANT CHANGE UP (ref 17–32)
CREAT SERPL-MCNC: 0.7 MG/DL — SIGNIFICANT CHANGE UP (ref 0.7–1.5)
D DIMER BLD IA.RAPID-MCNC: HIGH NG/ML DDU
EGFR: 105 ML/MIN/1.73M2 — SIGNIFICANT CHANGE UP
EGFR: 105 ML/MIN/1.73M2 — SIGNIFICANT CHANGE UP
EOSINOPHIL # BLD AUTO: 0.16 K/UL — SIGNIFICANT CHANGE UP (ref 0–0.7)
EOSINOPHIL NFR BLD AUTO: 2.9 % — SIGNIFICANT CHANGE UP (ref 0–8)
FIBRINOGEN PPP-MCNC: 397 MG/DL — SIGNIFICANT CHANGE UP (ref 200–435)
GLUCOSE SERPL-MCNC: 102 MG/DL — HIGH (ref 70–99)
HCT VFR BLD CALC: 21 % — LOW (ref 42–52)
HCT VFR BLD CALC: 22.2 % — LOW (ref 42–52)
HGB BLD-MCNC: 6.8 G/DL — CRITICAL LOW (ref 14–18)
HGB BLD-MCNC: 7.3 G/DL — LOW (ref 14–18)
IMM GRANULOCYTES NFR BLD AUTO: 8.7 % — HIGH (ref 0.1–0.3)
INR BLD: 1.39 RATIO — HIGH (ref 0.65–1.3)
LACTATE SERPL-SCNC: 1.4 MMOL/L — SIGNIFICANT CHANGE UP (ref 0.7–2)
LYMPHOCYTES # BLD AUTO: 0.89 K/UL — LOW (ref 1.2–3.4)
LYMPHOCYTES # BLD AUTO: 16.2 % — LOW (ref 20.5–51.1)
MAGNESIUM SERPL-MCNC: 2.2 MG/DL — SIGNIFICANT CHANGE UP (ref 1.8–2.4)
MCHC RBC-ENTMCNC: 26.1 PG — LOW (ref 27–31)
MCHC RBC-ENTMCNC: 26.5 PG — LOW (ref 27–31)
MCHC RBC-ENTMCNC: 32.4 G/DL — SIGNIFICANT CHANGE UP (ref 32–37)
MCHC RBC-ENTMCNC: 32.9 G/DL — SIGNIFICANT CHANGE UP (ref 32–37)
MCV RBC AUTO: 80.5 FL — SIGNIFICANT CHANGE UP (ref 80–94)
MCV RBC AUTO: 80.7 FL — SIGNIFICANT CHANGE UP (ref 80–94)
MONOCYTES # BLD AUTO: 0.72 K/UL — HIGH (ref 0.1–0.6)
MONOCYTES NFR BLD AUTO: 13.1 % — HIGH (ref 1.7–9.3)
NEUTROPHILS # BLD AUTO: 3.21 K/UL — SIGNIFICANT CHANGE UP (ref 1.4–6.5)
NEUTROPHILS NFR BLD AUTO: 58.6 % — SIGNIFICANT CHANGE UP (ref 42.2–75.2)
NRBC BLD AUTO-RTO: 2 /100 WBCS — HIGH (ref 0–0)
NRBC BLD AUTO-RTO: 4 /100 WBCS — HIGH (ref 0–0)
PLATELET # BLD AUTO: 40 K/UL — LOW (ref 130–400)
PLATELET # BLD AUTO: 44 K/UL — LOW (ref 130–400)
PMV BLD: 10.1 FL — SIGNIFICANT CHANGE UP (ref 7.4–10.4)
PMV BLD: 11 FL — HIGH (ref 7.4–10.4)
POTASSIUM SERPL-MCNC: 4 MMOL/L — SIGNIFICANT CHANGE UP (ref 3.5–5)
POTASSIUM SERPL-SCNC: 4 MMOL/L — SIGNIFICANT CHANGE UP (ref 3.5–5)
PROCALCITONIN SERPL-MCNC: 0.72 NG/ML — HIGH (ref 0.02–0.1)
PROT SERPL-MCNC: 5.3 G/DL — LOW (ref 6–8)
PROTHROM AB SERPL-ACNC: 16.5 SEC — HIGH (ref 9.95–12.87)
RBC # BLD: 2.61 M/UL — LOW (ref 4.7–6.1)
RBC # BLD: 2.75 M/UL — LOW (ref 4.7–6.1)
RBC # FLD: 17.2 % — HIGH (ref 11.5–14.5)
RBC # FLD: 17.2 % — HIGH (ref 11.5–14.5)
SODIUM SERPL-SCNC: 134 MMOL/L — LOW (ref 135–146)
WBC # BLD: 4.9 K/UL — SIGNIFICANT CHANGE UP (ref 4.8–10.8)
WBC # BLD: 5.49 K/UL — SIGNIFICANT CHANGE UP (ref 4.8–10.8)
WBC # FLD AUTO: 4.9 K/UL — SIGNIFICANT CHANGE UP (ref 4.8–10.8)
WBC # FLD AUTO: 5.49 K/UL — SIGNIFICANT CHANGE UP (ref 4.8–10.8)

## 2025-05-17 PROCEDURE — 74018 RADEX ABDOMEN 1 VIEW: CPT | Mod: 26

## 2025-05-17 PROCEDURE — 99233 SBSQ HOSP IP/OBS HIGH 50: CPT

## 2025-05-17 RX ORDER — IOHEXOL 350 MG/ML
30 INJECTION, SOLUTION INTRAVENOUS ONCE
Refills: 0 | Status: COMPLETED | OUTPATIENT
Start: 2025-05-17 | End: 2025-05-17

## 2025-05-17 RX ORDER — SODIUM CHLORIDE 9 G/1000ML
1000 INJECTION, SOLUTION INTRAVENOUS
Refills: 0 | Status: DISCONTINUED | OUTPATIENT
Start: 2025-05-17 | End: 2025-05-19

## 2025-05-17 RX ORDER — OXYCODONE HYDROCHLORIDE 30 MG/1
10 TABLET ORAL EVERY 4 HOURS
Refills: 0 | Status: DISCONTINUED | OUTPATIENT
Start: 2025-05-17 | End: 2025-05-19

## 2025-05-17 RX ORDER — OXYCODONE HYDROCHLORIDE 30 MG/1
10 TABLET ORAL ONCE
Refills: 0 | Status: DISCONTINUED | OUTPATIENT
Start: 2025-05-17 | End: 2025-05-17

## 2025-05-17 RX ADMIN — CEFEPIME 100 MILLIGRAM(S): 2 INJECTION, POWDER, FOR SOLUTION INTRAVENOUS at 05:09

## 2025-05-17 RX ADMIN — OLANZAPINE 2.5 MILLIGRAM(S): 10 TABLET ORAL at 11:15

## 2025-05-17 RX ADMIN — ATORVASTATIN CALCIUM 80 MILLIGRAM(S): 80 TABLET, FILM COATED ORAL at 21:31

## 2025-05-17 RX ADMIN — OXYCODONE HYDROCHLORIDE 10 MILLIGRAM(S): 30 TABLET ORAL at 23:54

## 2025-05-17 RX ADMIN — OXYCODONE HYDROCHLORIDE 10 MILLIGRAM(S): 30 TABLET ORAL at 06:26

## 2025-05-17 RX ADMIN — Medication 650 MILLIGRAM(S): at 06:27

## 2025-05-17 RX ADMIN — CEFEPIME 100 MILLIGRAM(S): 2 INJECTION, POWDER, FOR SOLUTION INTRAVENOUS at 18:34

## 2025-05-17 RX ADMIN — Medication 1 APPLICATION(S): at 05:10

## 2025-05-17 RX ADMIN — OXYCODONE HYDROCHLORIDE 10 MILLIGRAM(S): 30 TABLET ORAL at 02:06

## 2025-05-17 RX ADMIN — OXYCODONE HYDROCHLORIDE 10 MILLIGRAM(S): 30 TABLET ORAL at 13:20

## 2025-05-17 RX ADMIN — METOPROLOL SUCCINATE 12.5 MILLIGRAM(S): 50 TABLET, EXTENDED RELEASE ORAL at 18:34

## 2025-05-17 RX ADMIN — Medication 40 MILLIGRAM(S): at 18:47

## 2025-05-17 RX ADMIN — OXYCODONE HYDROCHLORIDE 10 MILLIGRAM(S): 30 TABLET ORAL at 19:14

## 2025-05-17 RX ADMIN — Medication 650 MILLIGRAM(S): at 19:14

## 2025-05-17 RX ADMIN — HEPARIN SODIUM 5000 UNIT(S): 1000 INJECTION INTRAVENOUS; SUBCUTANEOUS at 05:11

## 2025-05-17 RX ADMIN — OXYCODONE HYDROCHLORIDE 10 MILLIGRAM(S): 30 TABLET ORAL at 08:50

## 2025-05-17 RX ADMIN — METOPROLOL SUCCINATE 12.5 MILLIGRAM(S): 50 TABLET, EXTENDED RELEASE ORAL at 05:09

## 2025-05-17 RX ADMIN — Medication 650 MILLIGRAM(S): at 13:37

## 2025-05-17 RX ADMIN — OXYCODONE HYDROCHLORIDE 10 MILLIGRAM(S): 30 TABLET ORAL at 12:26

## 2025-05-17 RX ADMIN — Medication 40 MILLIGRAM(S): at 05:09

## 2025-05-17 RX ADMIN — OXYCODONE HYDROCHLORIDE 10 MILLIGRAM(S): 30 TABLET ORAL at 08:08

## 2025-05-17 RX ADMIN — Medication 650 MILLIGRAM(S): at 01:43

## 2025-05-17 RX ADMIN — SODIUM CHLORIDE 60 MILLILITER(S): 9 INJECTION, SOLUTION INTRAVENOUS at 14:16

## 2025-05-17 RX ADMIN — IOHEXOL 30 MILLILITER(S): 350 INJECTION, SOLUTION INTRAVENOUS at 23:32

## 2025-05-17 RX ADMIN — Medication 650 MILLIGRAM(S): at 14:40

## 2025-05-17 NOTE — PROGRESS NOTE ADULT - SUBJECTIVE AND OBJECTIVE BOX
THE PATIENT WAS SEEN AND EXAMINED  DURING MORNING ROUNDS.   HPI:  59y male with PMHx of HTN, hiatal hernia, HLD, CAD s/p CABG in Jan 2024, paroxysmal A. fib not on AC, former smoker (30 pack years, quit 1yr ago), recently diagnosed with colitis and esophageal adenocarcinoma s/p stent placement who presents for weakness. The patient was recently discharged from [facility] on 5/6 , and has been progressively deteriorating. States he has been unable to tolerate p.o. and has been unable to walk over the past few days. Wife at bedside reports that he has not been able to follow-up with oncology due to the weakness.  On ROS the patient also states he has been having black tarry stools that started 1 day prior to his EGD. He had 3 black BMs today. He denies any mucosal bleeding or blood in the urine.  He denies any fevers, chills, chest pain, shortness of breath, palpitations, headache, vision changes, nausea, vomiting, abdominal pain, hematuria, falls.  He is supposed to have PET scan tomorrow and follow with Dr. Sanchez in 2 days.    INTERVAL EVENTS: Neurology following for b/l weakness distal strength >proximal strength. MRI brain reporting dural diffuse thickening and MRI of cervical, thoracic, and lumbar spine reporting signal enhancement of vertebral bodies of cervical, thoracic, and lumbar spine suspicious for metastatic dz.     T(F): 98.5 (05-12-25 @ 21:05), Max: 98.6 (05-12-25 @ 19:48)  HR: 97 (05-12-25 @ 21:05) (88 - 104)  BP: 111/58 (05-12-25 @ 21:05) (111/58 - 132/75)  RR: 18 (05-12-25 @ 21:05) (18 - 20)  SpO2: 99% (05-12-25 @ 21:05) (98% - 100%) on RA    Labs: Hg 7.6 (baseline ~10), MCV 78, slight schistocytes on smear,  Plt 92, PT/INR >40/6.2, PTT 51.8, ALK phos 273.      Imaging:    CT Chest w/ IV Cont  (4/26 - prior admission)  IMPRESSION:  1.  No definite evidence of thoracic metastatic disease.  2.  Small bilateral pleural effusions.  3.  Marked circumferential thickening of the distal esophagus/hiatal   hernia could be related to provided history of suspected esophageal   cancer versus esophagitis. (12 May 2025 21:41)      SUBJECTIVE: No events overnight.  No new neurologic complaints.  ROS reported negative unless otherwise noted.    acetaminophen     Tablet .. 650 milliGRAM(s) Oral every 6 hours PRN  atorvastatin 80 milliGRAM(s) Oral at bedtime  cefepime   IVPB 2000 milliGRAM(s) IV Intermittent every 12 hours  cefepime   IVPB      chlorhexidine 2% Cloths 1 Application(s) Topical <User Schedule>  lactated ringers. 1000 milliLiter(s) IV Continuous <Continuous>  lidocaine   4% Patch 1 Patch Transdermal daily  metoprolol tartrate 12.5 milliGRAM(s) Oral two times a day  OLANZapine 2.5 milliGRAM(s) Oral daily  oxyCODONE    IR 10 milliGRAM(s) Oral every 4 hours PRN  pantoprazole  Injectable 40 milliGRAM(s) IV Push every 12 hours  polyethylene glycol 3350 17 Gram(s) Oral daily      PHYSICAL EXAM:   Vital Signs Last 24 Hrs  T(C): 36.7 (17 May 2025 04:00), Max: 38.6 (17 May 2025 01:41)  T(F): 98 (17 May 2025 04:00), Max: 101.5 (17 May 2025 01:41)  HR: 114 (17 May 2025 04:00) (114 - 129)  BP: 116/72 (17 May 2025 04:00) (104/60 - 119/70)  BP(mean): 87 (17 May 2025 04:00) (75 - 87)  RR: 18 (17 May 2025 04:00) (16 - 19)  SpO2: 95% (17 May 2025 04:00) (95% - 99%)    Parameters below as of 17 May 2025 04:00  Patient On (Oxygen Delivery Method): room air        General: No acute distress  HEENT: EOM intact, visual fields full  Abdomen: Soft, nontender, nondistended   Extremities: No edema    NEUROLOGICAL EXAM:  Mental status: Awake, alert, oriented x3, speech fluent, follows commands, no neglect, normal memory   Cranial Nerves: No facial asymmetry, no nystagmus, no dysarthria,  tongue midline  Motor exam: Normal tone, no drift, 5/5 RUE, 5/5 RLE, 5/5 LUE, 5/5 LLE, normal fine finger movements.  Sensation: Intact to light touch   Coordination/ Gait: No dysmetria, gait not tested    LABS:                        6.8    5.49  )-----------( 44       ( 17 May 2025 07:00 )             21.0    05-17    134[L]  |  100  |  17  ----------------------------<  102[H]  4.0   |  21  |  0.7    Ca    7.7[L]      17 May 2025 07:00  Mg     2.2     05-17    TPro  5.3[L]  /  Alb  2.8[L]  /  TBili  1.7[H]  /  DBili  x   /  AST  47[H]  /  ALT  24  /  AlkPhos  368[H]  05-17  PT/INR - ( 17 May 2025 07:00 )   PT: 16.50 sec;   INR: 1.39 ratio         PTT - ( 17 May 2025 07:00 )  PTT:50.1 sec      IMAGING: Reviewed by me.      THE PATIENT WAS SEEN AND EXAMINED  DURING MORNING ROUNDS.   HPI:  59y male with PMHx of HTN, hiatal hernia, HLD, CAD s/p CABG in Jan 2024, paroxysmal A. fib not on AC, former smoker (30 pack years, quit 1yr ago), recently diagnosed with colitis and esophageal adenocarcinoma s/p stent placement who presents for weakness. The patient was recently discharged from [facility] on 5/6 , and has been progressively deteriorating. States he has been unable to tolerate p.o. and has been unable to walk over the past few days. Wife at bedside reports that he has not been able to follow-up with oncology due to the weakness.  On ROS the patient also states he has been having black tarry stools that started 1 day prior to his EGD. He had 3 black BMs today. He denies any mucosal bleeding or blood in the urine.  He denies any fevers, chills, chest pain, shortness of breath, palpitations, headache, vision changes, nausea, vomiting, abdominal pain, hematuria, falls.  He is supposed to have PET scan tomorrow and follow with Dr. Sanchez in 2 days.    INTERVAL EVENTS: Neurology following for b/l weakness distal strength >proximal strength. MRI brain reporting dural diffuse thickening and MRI of cervical, thoracic, and lumbar spine reporting signal enhancement of vertebral bodies of cervical, thoracic, and lumbar spine suspicious for metastatic dz.     T(F): 98.5 (05-12-25 @ 21:05), Max: 98.6 (05-12-25 @ 19:48)  HR: 97 (05-12-25 @ 21:05) (88 - 104)  BP: 111/58 (05-12-25 @ 21:05) (111/58 - 132/75)  RR: 18 (05-12-25 @ 21:05) (18 - 20)  SpO2: 99% (05-12-25 @ 21:05) (98% - 100%) on RA    Labs: Hg 7.6 (baseline ~10), MCV 78, slight schistocytes on smear,  Plt 92, PT/INR >40/6.2, PTT 51.8, ALK phos 273.      Imaging:    CT Chest w/ IV Cont  (4/26 - prior admission)  IMPRESSION:  1.  No definite evidence of thoracic metastatic disease.  2.  Small bilateral pleural effusions.  3.  Marked circumferential thickening of the distal esophagus/hiatal   hernia could be related to provided history of suspected esophageal   cancer versus esophagitis. (12 May 2025 21:41)      SUBJECTIVE: No events overnight.  No new neurologic complaints.  ROS reported negative unless otherwise noted.    acetaminophen     Tablet .. 650 milliGRAM(s) Oral every 6 hours PRN  atorvastatin 80 milliGRAM(s) Oral at bedtime  cefepime   IVPB 2000 milliGRAM(s) IV Intermittent every 12 hours  cefepime   IVPB      chlorhexidine 2% Cloths 1 Application(s) Topical <User Schedule>  lactated ringers. 1000 milliLiter(s) IV Continuous <Continuous>  lidocaine   4% Patch 1 Patch Transdermal daily  metoprolol tartrate 12.5 milliGRAM(s) Oral two times a day  OLANZapine 2.5 milliGRAM(s) Oral daily  oxyCODONE    IR 10 milliGRAM(s) Oral every 4 hours PRN  pantoprazole  Injectable 40 milliGRAM(s) IV Push every 12 hours  polyethylene glycol 3350 17 Gram(s) Oral daily      PHYSICAL EXAM:   Vital Signs Last 24 Hrs  T(C): 36.7 (17 May 2025 04:00), Max: 38.6 (17 May 2025 01:41)  T(F): 98 (17 May 2025 04:00), Max: 101.5 (17 May 2025 01:41)  HR: 114 (17 May 2025 04:00) (114 - 129)  BP: 116/72 (17 May 2025 04:00) (104/60 - 119/70)  BP(mean): 87 (17 May 2025 04:00) (75 - 87)  RR: 18 (17 May 2025 04:00) (16 - 19)  SpO2: 95% (17 May 2025 04:00) (95% - 99%)    Parameters below as of 17 May 2025 04:00  Patient On (Oxygen Delivery Method): room air        General: No acute distress  HEENT: EOM intact, visual fields full  Abdomen: Soft, nontender, nondistended   Extremities: No edema    NEUROLOGICAL EXAM:  Mental status: Awake, alert, oriented x3, speech fluent, follows commands, no neglect, normal memory   Cranial Nerves: No facial asymmetry, no nystagmus, no dysarthria,  tongue midline  Motor exam: Decreased muscle strength proximally in RUE, distally 5/5 b/l. Lower extremities not antigravity, pt can move across plane of bed. b/l distal extremities of LE 5/5.   Sensation: Intact to light touch   Coordination/ Gait: No dysmetria, gait not tested    LABS:                        6.8    5.49  )-----------( 44       ( 17 May 2025 07:00 )             21.0    05-17    134[L]  |  100  |  17  ----------------------------<  102[H]  4.0   |  21  |  0.7    Ca    7.7[L]      17 May 2025 07:00  Mg     2.2     05-17    TPro  5.3[L]  /  Alb  2.8[L]  /  TBili  1.7[H]  /  DBili  x   /  AST  47[H]  /  ALT  24  /  AlkPhos  368[H]  05-17  PT/INR - ( 17 May 2025 07:00 )   PT: 16.50 sec;   INR: 1.39 ratio         PTT - ( 17 May 2025 07:00 )  PTT:50.1 sec      IMAGING: Reviewed by me.

## 2025-05-17 NOTE — PROGRESS NOTE ADULT - ASSESSMENT
59y male with PMHx of HTN, hiatal hernia, HLD, CAD s/p CABG in Jan 2024, paroxysmal A. fib not on AC, former smoker (30 pack years, quit 1yr ago), recently diagnosed with colitis and esophageal adenocarcinoma s/p stent placement who presents for weakness. The patient was recently discharged from [facility] on 5/6 , and has been progressively deteriorating. Found to have hgb 7.6 lower than last admission reported melena, thrombocytopenia w/ elevated INR/PTT. s/p egd w/ PEG tube.     #New onset fever  - RVP -ve  - T 100.7 5/16  - f/u bcx  - f/u UA  - Was started on cefepime 5/17 in am     #Reported Melena possibly due to Upper GI Bleed likely Secondary to  Malignancy   #Thrombocytopenia, elevated INR/PTT r/o DIC  #Recently diagnosed Esophageal adenocarcinoma   #Acute Drop in Hemoglobin  - EGD 4/25: A friable circumferential mass seen from the GE junction at 38cm to 28 cm of mid esophagus. Multiple biopsies were obtained. Oozing of blood was noted from the whole mass.  - c/w pantoprazole 40mg IV BID  - rad/onc: Will need op PET SCAN  - S/P EGD/PEG 5/14 w/ removal of metallic stent, no source of bleed   - f/u heme/onc, daily DIC panel   - Dimer climbing up, could be 2/2 malignancy, procedure, bleeding, DIC, f/u duplex b/l LE    #L sided fascial drope  # b/l LE weakness   -  b/l hip weakness   - CTH -ve, MRI -ve for stroke Mild smooth diffuse dural thickening over the cerebral convexities, cant r/o mets   - NEURO: paraneoplastic levles, MRI C/T/L spine w/wo, might need spinal tap.   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  - f/u RAD/ONC again for possible intervention    #chronic lower back pain   - xray lumbar spine: mild disc space narrowing and degenerative changes throughout the lumbar spine. Mild degenerative change of the bilateral sacroiliac joints.  - c/w Dilaudid 0.5mg Q4, Oxycodone 5mg Q4 PRN    #3.8 cm wide R iliac artery aneurysm- stable  - vascular o/p- surgery was scheduled with Dr. Tavera     #HTN  #CAD s/p CABG in Jan 2024  #Paroxysmal A.fib  - c/w home meds  - Hold plavix    #DVT PPX: SCD  #GI PPX: PPI BID  #Diet: Pureed fw/ tube feeds    #Code Status: Full  #Activity Order: IAT  DISPO: pending Neuro eval, possible rehab, heme/onc poissible chemo, fever w/u  59y male with PMHx of HTN, hiatal hernia, HLD, CAD s/p CABG in Jan 2024, paroxysmal A. fib not on AC, former smoker (30 pack years, quit 1yr ago), recently diagnosed with colitis and esophageal adenocarcinoma s/p stent placement who presents for weakness. The patient was recently discharged from [facility] on 5/6 , and has been progressively deteriorating. Found to have hgb 7.6 lower than last admission reported melena, thrombocytopenia w/ elevated INR/PTT. s/p egd w/ PEG tube.     #New onset fever  - RVP -ve  - T 100.7 5/16  - f/u bcx  - f/u UA  - Was started on cefepime 5/17 in am     #Reported Melena possibly due to Upper GI Bleed likely Secondary to  Malignancy   #Thrombocytopenia, elevated INR/PTT r/o DIC  #Recently diagnosed Esophageal adenocarcinoma   #Acute Drop in Hemoglobin  - EGD 4/25: A friable circumferential mass seen from the GE junction at 38cm to 28 cm of mid esophagus. Multiple biopsies were obtained. Oozing of blood was noted from the whole mass.  - c/w pantoprazole 40mg IV BID  - rad/onc: Will need op PET SCAN  - S/P EGD/PEG 5/14 w/ removal of metallic stent, no source of bleed   - HEME/ONC: Daily DIC panel, Bone scan   - Dimer climbing up, could be 2/2 malignancy, procedure, bleeding, DIC, f/u duplex b/l LE  - WES 5/17 -ve for melena or blood, will give 2 units RBC's   - F/u Bone scan     #L sided fascial drope  # b/l LE weakness   -  b/l hip weakness   - CTH -ve, MRI -ve for stroke Mild smooth diffuse dural thickening over the cerebral convexities, cant r/o mets   - NEURO: paraneoplastic levles, MRI C/T/L spine w/wo, might need spinal tap.   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  - RADONC: no inp intervention,     #chronic lower back pain   - xray lumbar spine: mild disc space narrowing and degenerative changes throughout the lumbar spine. Mild degenerative change of the bilateral sacroiliac joints.  - c/w Dilaudid 0.5mg Q4, Oxycodone 5mg Q4 PRN    #3.8 cm wide R iliac artery aneurysm- stable  - vascular o/p- surgery was scheduled with Dr. Tavera     #HTN  #CAD s/p CABG in Jan 2024  #Paroxysmal A.fib  - c/w home meds  - Hold plavix    #DVT PPX: SCD  #GI PPX: PPI BID  #Diet: Pureed fw/ tube feeds    #Code Status: Full  #Activity Order: IAT  DISPO: pending Neuro eval, possible rehab, heme/onc poissible chemo, fever w/u

## 2025-05-17 NOTE — PROGRESS NOTE ADULT - SUBJECTIVE AND OBJECTIVE BOX
Patient is a 59y old  Male who presents with a chief complaint of Progressive weakness (13 May 2025 17:51)    INTERVAL HPI/OVERNIGHT EVENTS: Patient was examined and seen at bedside. This afternoon pt is resting in bed and is looks better than yesterday. Reports possible aspiration event when drinking water. Back pain is stable. No further bleeding. WIfe at bedside. Had fevers yesterday, Not much cough, dysuria or diarrhea.  ROS: Denies CP, SOB, AP, new weakness  All other systems reviewed and are within normal limits.  InitialHPI:  59y male with PMHx of HTN, hiatal hernia, HLD, CAD s/p CABG in Jan 2024, paroxysmal A. fib not on AC, former smoker (30 pack years, quit 1yr ago), recently diagnosed with colitis and esophageal adenocarcinoma s/p stent placement who presents for weakness. The patient was recently discharged from [facility] on 5/6 , and has been progressively deteriorating. States he has been unable to tolerate p.o. and has been unable to walk over the past few days. Wife at bedside reports that he has not been able to follow-up with oncology due to the weakness.  On ROS the patient also states he has been having black tarry stools that started 1 day prior to his EGD. He had 3 black BMs today. He denies any mucosal bleeding or blood in the urine.  He denies any fevers, chills, chest pain, shortness of breath, palpitations, headache, vision changes, nausea, vomiting, abdominal pain, hematuria, falls.  He is supposed to have PET scan tomorrow and follow with Dr. Sanchez in 2 days.    T(F): 98.5 (05-12-25 @ 21:05), Max: 98.6 (05-12-25 @ 19:48)  HR: 97 (05-12-25 @ 21:05) (88 - 104)  BP: 111/58 (05-12-25 @ 21:05) (111/58 - 132/75)  RR: 18 (05-12-25 @ 21:05) (18 - 20)  SpO2: 99% (05-12-25 @ 21:05) (98% - 100%) on RA    Labs: Hg 7.6 (baseline ~10), MCV 78, slight schistocytes on smear,  Plt 92, PT/INR >40/6.2, PTT 51.8, ALK phos 273.      Imaging:    CT Chest w/ IV Cont  (4/26 - prior admission)  IMPRESSION:  1.  No definite evidence of thoracic metastatic disease.  2.  Small bilateral pleural effusions.  3.  Marked circumferential thickening of the distal esophagus/hiatal   hernia could be related to provided history of suspected esophageal   cancer versus esophagitis. (12 May 2025 21:41)    PAST MEDICAL & SURGICAL HISTORY:  HTN (hypertension)      Hiatal hernia      HLD (hyperlipidemia)      CAD (coronary artery disease)      Paroxysmal atrial fibrillation      History of colitis      S/P CABG (coronary artery bypass graft)      General: somnolent, ?Lt facial droop  HEENT:  EOMI, no LAD  CV: S1 S2  Resp: decreased breath sounds at bases  GI: mild tenderness around PEG site w/out erythema/ND/S +BS; +PEG  MS: no clubbing/cyanosis/edema, + pulses b/l  Neuro: LE 2/5 distally, 4/5 prox. UE 5-/5          Home Medications:  clopidogrel 75 mg oral tablet: 1 tab(s) orally once a day (13 May 2025 01:36)  Lipitor 80 mg oral tablet: 1 tab(s) orally once a day (13 May 2025 01:36)  Metoprolol Tartrate 25 mg oral tablet: 0.5 tab(s) orally 2 times a day (13 May 2025 01:36)  Multiple Vitamins oral tablet, chewable: 1 tab(s) orally once a day (13 May 2025 01:36)  Pepcid 20 mg oral tablet: 1 tab(s) orally once a day (13 May 2025 01:36)    MEDICATIONS  (STANDING):  atorvastatin 80 milliGRAM(s) Oral at bedtime  cefepime   IVPB 2000 milliGRAM(s) IV Intermittent every 12 hours  cefepime   IVPB      chlorhexidine 2% Cloths 1 Application(s) Topical <User Schedule>  lactated ringers. 1000 milliLiter(s) (60 mL/Hr) IV Continuous <Continuous>  lidocaine   4% Patch 1 Patch Transdermal daily  metoprolol tartrate 12.5 milliGRAM(s) Oral two times a day  OLANZapine 2.5 milliGRAM(s) Oral daily  pantoprazole  Injectable 40 milliGRAM(s) IV Push every 12 hours  polyethylene glycol 3350 17 Gram(s) Oral daily    MEDICATIONS  (PRN):  acetaminophen     Tablet .. 650 milliGRAM(s) Oral every 6 hours PRN Temp greater or equal to 38C (100.4F), Mild Pain (1 - 3)  oxyCODONE    IR 10 milliGRAM(s) Oral every 4 hours PRN Moderatre to Severe Pain    Vital Signs Last 24 Hrs  T(C): 36.7 (17 May 2025 04:00), Max: 38.6 (17 May 2025 01:41)  T(F): 98 (17 May 2025 04:00), Max: 101.5 (17 May 2025 01:41)  HR: 114 (17 May 2025 04:00) (114 - 129)  BP: 116/72 (17 May 2025 04:00) (104/60 - 116/72)  BP(mean): 87 (17 May 2025 04:00) (75 - 87)  RR: 18 (17 May 2025 04:00) (16 - 19)  SpO2: 95% (17 May 2025 04:00) (95% - 99%)    Parameters below as of 17 May 2025 04:00  Patient On (Oxygen Delivery Method): room air      CAPILLARY BLOOD GLUCOSE      POCT Blood Glucose.: 127 mg/dL (16 May 2025 21:03)    LABS:                        6.8    5.49  )-----------( 44       ( 17 May 2025 07:00 )             21.0     05-17    134[L]  |  100  |  17  ----------------------------<  102[H]  4.0   |  21  |  0.7    Ca    7.7[L]      17 May 2025 07:00  Mg     2.2     05-17    TPro  5.3[L]  /  Alb  2.8[L]  /  TBili  1.7[H]  /  DBili  x   /  AST  47[H]  /  ALT  24  /  AlkPhos  368[H]  05-17    LIVER FUNCTIONS - ( 17 May 2025 07:00 )  Alb: 2.8 g/dL / Pro: 5.3 g/dL / ALK PHOS: 368 U/L / ALT: 24 U/L / AST: 47 U/L / GGT: x               PT/INR - ( 17 May 2025 07:00 )   PT: 16.50 sec;   INR: 1.39 ratio         PTT - ( 17 May 2025 07:00 )  PTT:50.1 sec  Urinalysis Basic - ( 17 May 2025 07:00 )    Color: x / Appearance: x / SG: x / pH: x  Gluc: 102 mg/dL / Ketone: x  / Bili: x / Urobili: x   Blood: x / Protein: x / Nitrite: x   Leuk Esterase: x / RBC: x / WBC x   Sq Epi: x / Non Sq Epi: x / Bacteria: x              Consultant Notes Reviewed:  [x ] YES  [ ] NO  Care Discussed with Consultants/Other Providers/ Housestaff [ x] YES  [ ] NO  Radiology, labs, EKGs, new studies personally reviewed.

## 2025-05-17 NOTE — CONSULT NOTE ADULT - SUBJECTIVE AND OBJECTIVE BOX
INFECTIOUS DISEASE CONSULT NOTE    Patient is a 59y old  Male who presents with a chief complaint of Progressive weakness (17 May 2025 11:16)    HPI:  59y male with PMHx of HTN, hiatal hernia, HLD, CAD s/p CABG in Jan 2024, paroxysmal A. fib not on AC, former smoker (30 pack years, quit 1yr ago), recently diagnosed with colitis and esophageal adenocarcinoma s/p stent placement who presents for weakness. The patient was recently discharged from [facility] on 5/6 , and has been progressively deteriorating. States he has been unable to tolerate p.o. and has been unable to walk over the past few days. Wife at bedside reports that he has not been able to follow-up with oncology due to the weakness.  On ROS the patient also states he has been having black tarry stools that started 1 day prior to his EGD. He had 3 black BMs today. He denies any mucosal bleeding or blood in the urine.  He denies any fevers, chills, chest pain, shortness of breath, palpitations, headache, vision changes, nausea, vomiting, abdominal pain, hematuria, falls.  He is supposed to have PET scan tomorrow and follow with Dr. Sanchez in 2 days.    T(F): 98.5 (05-12-25 @ 21:05), Max: 98.6 (05-12-25 @ 19:48)  HR: 97 (05-12-25 @ 21:05) (88 - 104)  BP: 111/58 (05-12-25 @ 21:05) (111/58 - 132/75)  RR: 18 (05-12-25 @ 21:05) (18 - 20)  SpO2: 99% (05-12-25 @ 21:05) (98% - 100%) on RA    Labs: Hg 7.6 (baseline ~10), MCV 78, slight schistocytes on smear,  Plt 92, PT/INR >40/6.2, PTT 51.8, ALK phos 273.      Imaging:    CT Chest w/ IV Cont  (4/26 - prior admission)  IMPRESSION:  1.  No definite evidence of thoracic metastatic disease.  2.  Small bilateral pleural effusions.  3.  Marked circumferential thickening of the distal esophagus/hiatal   hernia could be related to provided history of suspected esophageal   cancer versus esophagitis. (12 May 2025 21:41)         Prior hospital charts reviewed [Yes]  Primary team notes reviewed [Yes]  Other consultant notes reviewed [Yes]    REVIEW OF SYSTEMS:      PAST MEDICAL & SURGICAL HISTORY:  HTN (hypertension)      Hiatal hernia      HLD (hyperlipidemia)      CAD (coronary artery disease)      Paroxysmal atrial fibrillation      History of colitis      S/P CABG (coronary artery bypass graft)          SOCIAL HISTORY:  - Born in _____, migrated [place] in 19XX  - Currently working as / Retired  - Lives with _____; no pets  - No recent travel  - Denies tobacco use  - Denies alcohol use  - Denies illicit drug use  - Currently sexually active, has one male/female sexual partner    FAMILY HISTORY:  FHx: lung cancer (Father)        Allergies:  penicillins (Unknown)      ANTIMICROBIALS:  cefepime   IVPB 2000 every 12 hours  cefepime   IVPB        ANTIMICROBIALS (past 90 days):  MEDICATIONS  (STANDING):  cefepime   IVPB   100 mL/Hr IV Intermittent (05-16-25 @ 22:23)    cefepime   IVPB   100 mL/Hr IV Intermittent (05-17-25 @ 05:09)    piperacillin/tazobactam IVPB..   25 mL/Hr IV Intermittent (05-16-25 @ 18:37)        OTHER MEDS:   MEDICATIONS  (STANDING):  acetaminophen     Tablet .. 650 every 6 hours PRN  atorvastatin 80 at bedtime  metoprolol tartrate 12.5 two times a day  OLANZapine 2.5 daily  oxyCODONE    IR 10 every 4 hours PRN  pantoprazole  Injectable 40 every 12 hours  polyethylene glycol 3350 17 daily      VITALS:  Vital Signs Last 24 Hrs  T(F): 98 (05-17-25 @ 04:00), Max: 101.5 (05-17-25 @ 01:41)    Vital Signs Last 24 Hrs  HR: 114 (05-17-25 @ 04:00) (114 - 129)  BP: 116/72 (05-17-25 @ 04:00) (104/60 - 119/70)  RR: 18 (05-17-25 @ 04:00)  SpO2: 95% (05-17-25 @ 04:00) (95% - 99%)  Wt(kg): --    EXAM:    Labs:                        6.8    5.49  )-----------( 44       ( 17 May 2025 07:00 )             21.0     05-17    134[L]  |  100  |  17  ----------------------------<  102[H]  4.0   |  21  |  0.7    Ca    7.7[L]      17 May 2025 07:00  Mg     2.2     05-17    TPro  5.3[L]  /  Alb  2.8[L]  /  TBili  1.7[H]  /  DBili  x   /  AST  47[H]  /  ALT  24  /  AlkPhos  368[H]  05-17      WBC Trend:  WBC Count: 5.49 (05-17-25 @ 07:00)  WBC Count: 4.45 (05-16-25 @ 04:30)  WBC Count: 6.15 (05-15-25 @ 05:50)  WBC Count: 6.18 (05-14-25 @ 20:00)          Creatine Trend:  Creatinine: 0.7 (05-17)  Creatinine: 0.7 (05-16)  Creatinine: 0.7 (05-15)  Creatinine: 0.5 (05-14)      Liver Biochemical Testing Trend:  Alanine Aminotransferase (ALT/SGPT): 24 (05-17)  Alanine Aminotransferase (ALT/SGPT): 26 (05-16)  Alanine Aminotransferase (ALT/SGPT): 16 (05-15)  Alanine Aminotransferase (ALT/SGPT): 14 (05-15)  Alanine Aminotransferase (ALT/SGPT): 13 (05-14)  Aspartate Aminotransferase (AST/SGOT): 47 (05-17-25 @ 07:00)  Aspartate Aminotransferase (AST/SGOT): 50 (05-16-25 @ 04:30)  Aspartate Aminotransferase (AST/SGOT): 38 (05-15-25 @ 12:27)  Aspartate Aminotransferase (AST/SGOT): 37 (05-15-25 @ 05:50)  Aspartate Aminotransferase (AST/SGOT): 31 (05-14-25 @ 06:05)  Bilirubin Total: 1.7 (05-17)  Bilirubin Total: 1.2 (05-16)  Bilirubin Direct: 0.7 (05-15)  Bilirubin Total: 1.6 (05-15)  Bilirubin Total: 1.3 (05-15)      Trend LDH          Urinalysis Basic - ( 17 May 2025 07:00 )    Color: x / Appearance: x / SG: x / pH: x  Gluc: 102 mg/dL / Ketone: x  / Bili: x / Urobili: x   Blood: x / Protein: x / Nitrite: x   Leuk Esterase: x / RBC: x / WBC x   Sq Epi: x / Non Sq Epi: x / Bacteria: x          MICROBIOLOGY:        Urinalysis with Rflx Culture (collected 12 May 2025 17:55)    Procalcitonin: 0.72 (05-16)    D-Dimer Assay, Quantitative: 09932 (05-17)  D-Dimer Assay, Quantitative: 75507 (05-16)  D-Dimer Assay, Quantitative: 8818 (05-15)  D-Dimer Assay, Quantitative: 5118 (05-14)  D-Dimer Assay, Quantitative: 2486 (05-13)          Lactate, Blood: 1.4 (05-17 @ 07:00)  Lactate, Blood: 1.3 (05-16 @ 16:00)          RADIOLOGY:  imaging below personally reviewed    < from: [facility] Duplex Lower Ext Vein Scan, Bilat (05.16.25 @ 11:36) >  IMPRESSION:  No evidence of deep venous thrombosis in either lower extremity.    < end of copied text >    < from: MR Lumbar Spine w/wo IV Cont (05.16.25 @ 11:06) >  IMPRESSION:    1.  Diffusely heterogeneous signal / enhancement of the lumbar vertebral   bodies, sacrum and iliac bones highly suspicious for metastatic disease.    2.  Background of diffusely T1 hypointense marrow infiltrative signal    3.  No evidence of distal cord compression or cord signal abnormality.    4.  Mild degenerative changes.      < end of copied text >      < from: MR Thoracic Spine w/wo IV Cont (05.16.25 @ 11:06) >  IMPRESSION:    1.  Motion limited study.    2.  Diffusely heterogeneous signal and enhancement of the thoracic   vertebral bodies suspicious for metastatic disease.    3.  Background of diffusely T1 hypointense infiltrative marrow signal.    4.  No evidence of cord compression or cord signal abnormality.    < end of copied text >        < from: MR Cervical Spine w/wo IV Cont (05.16.25 @ 11:06) >  IMPRESSION:    1.  Motion limited study.    2.  Diffusely heterogeneous signal and enhancement of the cervical   vertebral bodies suspicious for metastatic disease.    3.  Background of diffusely T1 hypointense infiltrative marrow signal.    1.  No obvious cord compression or cord signal abnormality.    < end of copied text >    < from: MR Head w/wo IV Cont (05.14.25 @ 09:48) >  IMPRESSION:    1.  No acute infarct or intracranial hemorrhage.    2.  Mild smooth diffuse dural thickening over the cerebral convexities.   This finding is nonspecific and can reflect intracranial hypotension or   recent spinal procedures. In the setting of known neoplasm cannot exclude   dural metastatic disease.    3.  Heterogeneous marrow signal of the calvarium, nonspecific.    < end of copied text >         INFECTIOUS DISEASE CONSULT NOTE    Patient is a 59y old  Male who presents with a chief complaint of Progressive weakness (17 May 2025 11:16)    HPI:  59y male with PMHx of HTN, hiatal hernia, HLD, CAD s/p CABG in Jan 2024, paroxysmal A. fib not on AC, former smoker (30 pack years, quit 1yr ago), recently diagnosed with colitis and esophageal adenocarcinoma s/p stent placement who presents for weakness. The patient was recently discharged from [facility] on 5/6 , and has been progressively deteriorating. States he has been unable to tolerate p.o. and has been unable to walk over the past few days. Wife at bedside reports that he has not been able to follow-up with oncology due to the weakness.  On ROS the patient also states he has been having black tarry stools that started 1 day prior to his EGD. He had 3 black BMs today. He denies any mucosal bleeding or blood in the urine.  He denies any fevers, chills, chest pain, shortness of breath, palpitations, headache, vision changes, nausea, vomiting, abdominal pain, hematuria, falls.  He is supposed to have PET scan tomorrow and follow with Dr. Sanchez in 2 days.    T(F): 98.5 (05-12-25 @ 21:05), Max: 98.6 (05-12-25 @ 19:48)  HR: 97 (05-12-25 @ 21:05) (88 - 104)  BP: 111/58 (05-12-25 @ 21:05) (111/58 - 132/75)  RR: 18 (05-12-25 @ 21:05) (18 - 20)  SpO2: 99% (05-12-25 @ 21:05) (98% - 100%) on RA    Labs: Hg 7.6 (baseline ~10), MCV 78, slight schistocytes on smear,  Plt 92, PT/INR >40/6.2, PTT 51.8, ALK phos 273.      Imaging:    CT Chest w/ IV Cont  (4/26 - prior admission)  IMPRESSION:  1.  No definite evidence of thoracic metastatic disease.  2.  Small bilateral pleural effusions.  3.  Marked circumferential thickening of the distal esophagus/hiatal   hernia could be related to provided history of suspected esophageal   cancer versus esophagitis. (12 May 2025 21:41)      Prior hospital charts reviewed [Yes]  Primary team notes reviewed [Yes]  Other consultant notes reviewed [Yes]    REVIEW OF SYSTEMS:  CONSTITUTIONAL: Fever overnight  HEAD: No lesion on scalp  EYES: No visual disturbance  ENT: No sore throat  RESPIRATORY: No cough, no shortness of breath  CARDIOVASCULAR: No chest pain or palpitations  GASTROINTESTINAL: Mild abdominal pain  GENITOURINARY: No dysuria  NEUROLOGICAL: No headache/dizziness  MUSCULOSKELETAL: No joint pain, erythema, or swelling; no back pain  SKIN: No itching, rashes  All other ROS negative except noted above      PAST MEDICAL & SURGICAL HISTORY:  HTN (hypertension)      Hiatal hernia      HLD (hyperlipidemia)      CAD (coronary artery disease)      Paroxysmal atrial fibrillation      History of colitis      S/P CABG (coronary artery bypass graft)          SOCIAL HISTORY:  - Born in _____, [place] in 19XX  - Currently working as / Retired  - Lives with _____; no pets  - No recent travel  - Denies tobacco use  - Denies alcohol use  - Denies illicit drug use  - Currently sexually active, has one male/female sexual partner    FAMILY HISTORY:  FHx: lung cancer (Father)        Allergies:  penicillins (Unknown)      ANTIMICROBIALS:  cefepime   IVPB 2000 every 12 hours  cefepime   IVPB        ANTIMICROBIALS (past 90 days):  MEDICATIONS  (STANDING):  cefepime   IVPB   100 mL/Hr IV Intermittent (05-16-25 @ 22:23)    cefepime   IVPB   100 mL/Hr IV Intermittent (05-17-25 @ 05:09)    piperacillin/tazobactam IVPB..   25 mL/Hr IV Intermittent (05-16-25 @ 18:37)        OTHER MEDS:   MEDICATIONS  (STANDING):  acetaminophen     Tablet .. 650 every 6 hours PRN  atorvastatin 80 at bedtime  metoprolol tartrate 12.5 two times a day  OLANZapine 2.5 daily  oxyCODONE    IR 10 every 4 hours PRN  pantoprazole  Injectable 40 every 12 hours  polyethylene glycol 3350 17 daily      VITALS:  Vital Signs Last 24 Hrs  T(F): 98 (05-17-25 @ 04:00), Max: 101.5 (05-17-25 @ 01:41)    Vital Signs Last 24 Hrs  HR: 114 (05-17-25 @ 04:00) (114 - 129)  BP: 116/72 (05-17-25 @ 04:00) (104/60 - 119/70)  RR: 18 (05-17-25 @ 04:00)  SpO2: 95% (05-17-25 @ 04:00) (95% - 99%)  Wt(kg): --    EXAM:  GENERAL: NAD, lying in bed  HEAD: No head lesions  EYES: Conjunctiva pink and cornea white  EAR, NOSE, MOUTH, THROAT: Normal external ears and nose, no discharges; moist mucous membranes  NECK: Supple, nontender to palpation; no JVD  RESPIRATORY: Clear to auscultation bilaterally  CARDIOVASCULAR: S1 S2  GASTROINTESTINAL: Soft, nontender, nondistended; normoactive bowel sounds; + PEG site is dirty  GENITOURINARY: No schwab catheter, no CVA tenderness  EXTREMITIES: No clubbing, cyanosis, or petal edema  NERVOUS SYSTEM: Alert and oriented to person, time, place and situation, speech clear. No focal deficits   MUSCULOSKELETAL: No joint erythema, swelling or pain  SKIN: No rashes or lesions, no superficial thrombophlebitis  PSYCH: Normal affect    Labs:                        6.8    5.49  )-----------( 44       ( 17 May 2025 07:00 )             21.0     05-17    134[L]  |  100  |  17  ----------------------------<  102[H]  4.0   |  21  |  0.7    Ca    7.7[L]      17 May 2025 07:00  Mg     2.2     05-17    TPro  5.3[L]  /  Alb  2.8[L]  /  TBili  1.7[H]  /  DBili  x   /  AST  47[H]  /  ALT  24  /  AlkPhos  368[H]  05-17      WBC Trend:  WBC Count: 5.49 (05-17-25 @ 07:00)  WBC Count: 4.45 (05-16-25 @ 04:30)  WBC Count: 6.15 (05-15-25 @ 05:50)  WBC Count: 6.18 (05-14-25 @ 20:00)          Creatine Trend:  Creatinine: 0.7 (05-17)  Creatinine: 0.7 (05-16)  Creatinine: 0.7 (05-15)  Creatinine: 0.5 (05-14)      Liver Biochemical Testing Trend:  Alanine Aminotransferase (ALT/SGPT): 24 (05-17)  Alanine Aminotransferase (ALT/SGPT): 26 (05-16)  Alanine Aminotransferase (ALT/SGPT): 16 (05-15)  Alanine Aminotransferase (ALT/SGPT): 14 (05-15)  Alanine Aminotransferase (ALT/SGPT): 13 (05-14)  Aspartate Aminotransferase (AST/SGOT): 47 (05-17-25 @ 07:00)  Aspartate Aminotransferase (AST/SGOT): 50 (05-16-25 @ 04:30)  Aspartate Aminotransferase (AST/SGOT): 38 (05-15-25 @ 12:27)  Aspartate Aminotransferase (AST/SGOT): 37 (05-15-25 @ 05:50)  Aspartate Aminotransferase (AST/SGOT): 31 (05-14-25 @ 06:05)  Bilirubin Total: 1.7 (05-17)  Bilirubin Total: 1.2 (05-16)  Bilirubin Direct: 0.7 (05-15)  Bilirubin Total: 1.6 (05-15)  Bilirubin Total: 1.3 (05-15)      Trend LDH          Urinalysis Basic - ( 17 May 2025 07:00 )    Color: x / Appearance: x / SG: x / pH: x  Gluc: 102 mg/dL / Ketone: x  / Bili: x / Urobili: x   Blood: x / Protein: x / Nitrite: x   Leuk Esterase: x / RBC: x / WBC x   Sq Epi: x / Non Sq Epi: x / Bacteria: x          MICROBIOLOGY:        Urinalysis with Rflx Culture (collected 12 May 2025 17:55)    Procalcitonin: 0.72 (05-16)    D-Dimer Assay, Quantitative: 30508 (05-17)  D-Dimer Assay, Quantitative: 07888 (05-16)  D-Dimer Assay, Quantitative: 8818 (05-15)  D-Dimer Assay, Quantitative: 5118 (05-14)  D-Dimer Assay, Quantitative: 2486 (05-13)          Lactate, Blood: 1.4 (05-17 @ 07:00)  Lactate, Blood: 1.3 (05-16 @ 16:00)          RADIOLOGY:  imaging below personally reviewed    < from: [facility] Duplex Lower Ext Vein Scan, Bilat (05.16.25 @ 11:36) >  IMPRESSION:  No evidence of deep venous thrombosis in either lower extremity.    < end of copied text >    < from: MR Lumbar Spine w/wo IV Cont (05.16.25 @ 11:06) >  IMPRESSION:    1.  Diffusely heterogeneous signal / enhancement of the lumbar vertebral   bodies, sacrum and iliac bones highly suspicious for metastatic disease.    2.  Background of diffusely T1 hypointense marrow infiltrative signal    3.  No evidence of distal cord compression or cord signal abnormality.    4.  Mild degenerative changes.      < end of copied text >      < from: MR Thoracic Spine w/wo IV Cont (05.16.25 @ 11:06) >  IMPRESSION:    1.  Motion limited study.    2.  Diffusely heterogeneous signal and enhancement of the thoracic   vertebral bodies suspicious for metastatic disease.    3.  Background of diffusely T1 hypointense infiltrative marrow signal.    4.  No evidence of cord compression or cord signal abnormality.    < end of copied text >        < from: MR Cervical Spine w/wo IV Cont (05.16.25 @ 11:06) >  IMPRESSION:    1.  Motion limited study.    2.  Diffusely heterogeneous signal and enhancement of the cervical   vertebral bodies suspicious for metastatic disease.    3.  Background of diffusely T1 hypointense infiltrative marrow signal.    1.  No obvious cord compression or cord signal abnormality.    < end of copied text >    < from: MR Head w/wo IV Cont (05.14.25 @ 09:48) >  IMPRESSION:    1.  No acute infarct or intracranial hemorrhage.    2.  Mild smooth diffuse dural thickening over the cerebral convexities.   This finding is nonspecific and can reflect intracranial hypotension or   recent spinal procedures. In the setting of known neoplasm cannot exclude   dural metastatic disease.    3.  Heterogeneous marrow signal of the calvarium, nonspecific.    < end of copied text >

## 2025-05-17 NOTE — PROGRESS NOTE ADULT - SUBJECTIVE AND OBJECTIVE BOX
JONAS CELESTIN 59y Male  MRN#: [id number]   Hospital Day: 5d    SUBJECTIVE  Patient is a 59y old Male who presents with a chief complaint of Progressive weakness (17 May 2025 06:44)  Currently admitted to medicine with the primary diagnosis of Weakness      INTERVAL HPI AND OVERNIGHT EVENTS:  Patient was examined and seen at bedside. This morning he is resting comfortably in bed and reports no issues or overnight events.    REVIEW OF SYMPTOMS:  CONSTITUTIONAL: No weakness, fevers or chills; No headaches  EYES: No visual changes, eye pain, or discharge  ENT: No vertigo; No ear pain or change in hearing; No sore throat or difficulty swallowing  NECK: No pain or stiffness  RESPIRATORY: No cough, wheezing, or hemoptysis; No shortness of breath  CARDIOVASCULAR: No chest pain or palpitations  GASTROINTESTINAL: No abdominal or epigastric pain; No nausea, vomiting, or hematemesis; No diarrhea or constipation; No melena or hematochezia  GENITOURINARY: No dysuria, frequency or hematuria  MUSCULOSKELETAL: No joint pain, no muscle pain, no weakness  NEUROLOGICAL: No numbness or weakness  SKIN: No itching or rashes    OBJECTIVE  PAST MEDICAL & SURGICAL HISTORY  HTN (hypertension)    Hiatal hernia    HLD (hyperlipidemia)    CAD (coronary artery disease)    Paroxysmal atrial fibrillation    History of colitis    S/P CABG (coronary artery bypass graft)      ALLERGIES:  penicillins (Unknown)    MEDICATIONS:  STANDING MEDICATIONS  atorvastatin 80 milliGRAM(s) Oral at bedtime  cefepime   IVPB 2000 milliGRAM(s) IV Intermittent every 12 hours  cefepime   IVPB      chlorhexidine 2% Cloths 1 Application(s) Topical <User Schedule>  lactated ringers. 1000 milliLiter(s) IV Continuous <Continuous>  lidocaine   4% Patch 1 Patch Transdermal daily  metoprolol tartrate 12.5 milliGRAM(s) Oral two times a day  OLANZapine 2.5 milliGRAM(s) Oral daily  pantoprazole  Injectable 40 milliGRAM(s) IV Push every 12 hours  polyethylene glycol 3350 17 Gram(s) Oral daily    PRN MEDICATIONS  acetaminophen     Tablet .. 650 milliGRAM(s) Oral every 6 hours PRN  oxyCODONE    IR 10 milliGRAM(s) Oral every 4 hours PRN      VITAL SIGNS: Last 24 Hours  T(C): 36.7 (17 May 2025 04:00), Max: 38.6 (17 May 2025 01:41)  T(F): 98 (17 May 2025 04:00), Max: 101.5 (17 May 2025 01:41)  HR: 114 (17 May 2025 04:00) (114 - 129)  BP: 116/72 (17 May 2025 04:00) (104/60 - 119/70)  BP(mean): 87 (17 May 2025 04:00) (75 - 87)  RR: 18 (17 May 2025 04:00) (16 - 19)  SpO2: 95% (17 May 2025 04:00) (95% - 99%)    LABS:                        6.8    5.49  )-----------( 44       ( 17 May 2025 07:00 )             21.0     05-17    134[L]  |  100  |  17  ----------------------------<  102[H]  4.0   |  21  |  0.7    Ca    7.7[L]      17 May 2025 07:00  Mg     2.2     05-17    TPro  5.3[L]  /  Alb  2.8[L]  /  TBili  1.7[H]  /  DBili  x   /  AST  47[H]  /  ALT  24  /  AlkPhos  368[H]  05-17    PT/INR - ( 17 May 2025 07:00 )   PT: 16.50 sec;   INR: 1.39 ratio         PTT - ( 17 May 2025 07:00 )  PTT:50.1 sec  Urinalysis Basic - ( 17 May 2025 07:00 )    Color: x / Appearance: x / SG: x / pH: x  Gluc: 102 mg/dL / Ketone: x  / Bili: x / Urobili: x   Blood: x / Protein: x / Nitrite: x   Leuk Esterase: x / RBC: x / WBC x   Sq Epi: x / Non Sq Epi: x / Bacteria: x        Lactate, Blood: 1.4 mmol/L (05-17-25 @ 07:00)  Lactate, Blood: 1.3 mmol/L (05-16-25 @ 16:00)          RADIOLOGY:      PHYSICAL EXAM:  CONSTITUTIONAL: No acute distress, AAOx3  ENT: moist mucous membranes  PULMONARY: Clear to auscultation bilaterally  CARDIOVASCULAR: Regular rate and rhythm  GASTROINTESTINAL: Soft, non-tender, non-distended  MUSCULOSKELETAL:  no edema  SKIN: No rashes or lesions    ASSESSMENT & PLAN  59M  hiatal hernia, CAD s/p CABG in Jan 2024, paroxysmal A. 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   Was planned for outpatient PET scan and appointment with Dr Sanchez   MRI as above. concern for metastatic bone disease     #Decreased PO intake  #Weakness  esophageal stent placed during prior admission. Pt reports did not help. S/p stent removal 5.14.25   PEG placed on 5.14.25     #Pancytopenia   #Acute on chronic anemia 2/2  GI bleed vs myelophthisic process  #Thrombocytopenia - DIC vs myelophthisic process  #Coagulopathy Differentials: DIC/ Vit K deficiency       #3.8 cm wide R iliac artery aneurysm  - F/u vascular o/p    #Fever  spiked a temp of 101.2     Recommendations:  MRI shows findings concerning for metastatic disease. Results of MR brain and CTLS discussed with wife bedside. She wanted some time to process it. We reassured her that we will come see Mr Celestin and her tomorrow to discuss the next steps.   Concern for pancytopenia 2/2 myelophthisic process. Please order bone scan.   Please place IR consult for inpatient bone marrow biopsy, concern for marrow infiltration  Please check MM panel: SPEP, serum immunofixation, free light chain ratio, and quantitative immunoglobulin panel    Work up for infectious cause for fever-F/up panculture, CXR, LA, procal, RVP   Will ask path to check for CLDN1 status for metastatic dz  Oncology will follow   C/w tube feeds

## 2025-05-17 NOTE — CONSULT NOTE ADULT - ASSESSMENT
ID is consulted for fever    IMPRESSION:  # Possible LLL pneumonia?  # Sepsis  Febrile since 5/15, Tmax 101.5  WBC Count: 5.49 (05-17-25 @ 07:00)  WBC Count: 4.45 (05-16-25 @ 04:30)  WBC Count: 6.15 (05-15-25 @ 05:50)  WBC Count: 6.18 (05-14-25 @ 20:00)  Procal 0.72  BCx pending  D-dimer 42919  COVID/Flu/RSV PCR negative       # Acute anemia  # Metastatic esophageal cancer    Antibiotics:  Zosyn 5/16  Cefepime 5/16 ->    RECOMMENDATIONS:  - IV Cefepime 2g q12hrs  - Follow up Bcx  - Consider ruling out PE with CTA chest, also obtain CT abdomen/pelvis given recent EGD  - Offloading and frequent position changes, aspiration precaution  - Trend WBC, fever curve, transaminases, creatinine daily  - Please inform ID of any patient clinical change or any new pertinent laboratory or radiographic data      * THIS IS AN INCOMPLETE NOTE. FINAL RECOMMENDATION IS PENDING *     59y male with PMHx of HTN, hiatal hernia, HLD, CAD s/p CABG in Jan 2024, paroxysmal A. fib not on AC, former smoker (30 pack years, quit 1yr ago), recently diagnosed with colitis and esophageal adenocarcinoma s/p stent placement who presents for weakness.     ID is consulted for fever    IMPRESSION:  # Possible LLL pneumonia?  # Sepsis  Febrile since 5/15, Tmax 101.5  WBC Count: 5.49 (05-17-25 @ 07:00)  WBC Count: 4.45 (05-16-25 @ 04:30)  WBC Count: 6.15 (05-15-25 @ 05:50)  WBC Count: 6.18 (05-14-25 @ 20:00)  Procal 0.72  BCx pending  D-dimer 44799  COVID/Flu/RSV PCR negative     VA Duplex Lower Ext Vein Scan, Bilat (05.16.25 @ 11:36)  IMPRESSION:  No evidence of deep venous thrombosis in either lower extremity.    # Acute anemia  # Metastatic esophageal cancer    Antibiotics:  Zosyn 5/16  Cefepime 5/16 ->    RECOMMENDATIONS:  - IV Cefepime 2g q12hrs  - Follow up Bcx  - Consider ruling out PE with CTA chest, also obtain CT abdomen/pelvis given recent EGD  - Offloading and frequent position changes, aspiration precaution  - Trend WBC, fever curve, transaminases, creatinine daily  - Please inform ID of any patient clinical change or any new pertinent laboratory or radiographic data      Michelle Bain D.O.  Attending Physician  Division of Infectious Diseases  [facility]  Please contact me via [employer] Teams

## 2025-05-17 NOTE — PROGRESS NOTE ADULT - SUBJECTIVE AND OBJECTIVE BOX
JONAS CELESTIN 59y Male  MRN#: [id number]   Hospital Day: 5d    HPI:  59y male with PMHx of HTN, hiatal hernia, HLD, CAD s/p CABG in Jan 2024, paroxysmal A. fib not on AC, former smoker (30 pack years, quit 1yr ago), recently diagnosed with colitis and esophageal adenocarcinoma s/p stent placement who presents for weakness. The patient was recently discharged from [facility] on 5/6 , and has been progressively deteriorating. States he has been unable to tolerate p.o. and has been unable to walk over the past few days. Wife at bedside reports that he has not been able to follow-up with oncology due to the weakness.  On ROS the patient also states he has been having black tarry stools that started 1 day prior to his EGD. He had 3 black BMs today. He denies any mucosal bleeding or blood in the urine.  He denies any fevers, chills, chest pain, shortness of breath, palpitations, headache, vision changes, nausea, vomiting, abdominal pain, hematuria, falls.  He is supposed to have PET scan tomorrow and follow with Dr. Sanchez in 2 days.    T(F): 98.5 (05-12-25 @ 21:05), Max: 98.6 (05-12-25 @ 19:48)  HR: 97 (05-12-25 @ 21:05) (88 - 104)  BP: 111/58 (05-12-25 @ 21:05) (111/58 - 132/75)  RR: 18 (05-12-25 @ 21:05) (18 - 20)  SpO2: 99% (05-12-25 @ 21:05) (98% - 100%) on RA    Labs: Hg 7.6 (baseline ~10), MCV 78, slight schistocytes on smear,  Plt 92, PT/INR >40/6.2, PTT 51.8, ALK phos 273.      Imaging:    CT Chest w/ IV Cont  (4/26 - prior admission)  IMPRESSION:  1.  No definite evidence of thoracic metastatic disease.  2.  Small bilateral pleural effusions.  3.  Marked circumferential thickening of the distal esophagus/hiatal   hernia could be related to provided history of suspected esophageal   cancer versus esophagitis. (12 May 2025 21:41)      SUBJECTIVE      OBJECTIVE  PAST MEDICAL & SURGICAL HISTORY  HTN (hypertension)    Hiatal hernia    HLD (hyperlipidemia)    CAD (coronary artery disease)    Paroxysmal atrial fibrillation    History of colitis    S/P CABG (coronary artery bypass graft)      ALLERGIES:  penicillins (Unknown)    MEDICATIONS:  STANDING MEDICATIONS  atorvastatin 80 milliGRAM(s) Oral at bedtime  cefepime   IVPB 2000 milliGRAM(s) IV Intermittent every 12 hours  cefepime   IVPB      chlorhexidine 2% Cloths 1 Application(s) Topical <User Schedule>  heparin   Injectable 5000 Unit(s) SubCutaneous every 12 hours  lidocaine   4% Patch 1 Patch Transdermal daily  metoprolol tartrate 12.5 milliGRAM(s) Oral two times a day  OLANZapine 2.5 milliGRAM(s) Oral daily  pantoprazole  Injectable 40 milliGRAM(s) IV Push every 12 hours  polyethylene glycol 3350 17 Gram(s) Oral daily    PRN MEDICATIONS  acetaminophen     Tablet .. 650 milliGRAM(s) Oral every 6 hours PRN  HYDROmorphone  Injectable 1 milliGRAM(s) IV Push every 3 hours PRN      VITAL SIGNS: Last 24 Hours  T(C): 36.7 (17 May 2025 04:00), Max: 38.6 (17 May 2025 01:41)  T(F): 98 (17 May 2025 04:00), Max: 101.5 (17 May 2025 01:41)  HR: 114 (17 May 2025 04:00) (114 - 129)  BP: 116/72 (17 May 2025 04:00) (104/60 - 119/70)  BP(mean): 87 (17 May 2025 04:00) (75 - 87)  RR: 18 (17 May 2025 04:00) (16 - 19)  SpO2: 95% (17 May 2025 04:00) (95% - 99%)    LABS:                        7.2    4.45  )-----------( 45       ( 16 May 2025 04:30 )             22.1     05-16    135  |  101  |  14  ----------------------------<  131[H]  3.8   |  19  |  0.7    Ca    8.5      16 May 2025 04:30  Mg     2.3     05-16    TPro  5.3[L]  /  Alb  2.9[L]  /  TBili  1.2  /  DBili  x   /  AST  50[H]  /  ALT  26  /  AlkPhos  398[H]  05-16    PT/INR - ( 16 May 2025 04:30 )   PT: 14.80 sec;   INR: 1.25 ratio         PTT - ( 16 May 2025 04:30 )  PTT:35.3 sec  Urinalysis Basic - ( 16 May 2025 04:30 )    Color: x / Appearance: x / SG: x / pH: x  Gluc: 131 mg/dL / Ketone: x  / Bili: x / Urobili: x   Blood: x / Protein: x / Nitrite: x   Leuk Esterase: x / RBC: x / WBC x   Sq Epi: x / Non Sq Epi: x / Bacteria: x        Lactate, Blood: 1.3 mmol/L (05-16-25 @ 16:00)              PHYSICAL EXAM:  GENERAL: NAD, well-developed.  HEAD:  Atraumatic, Normocephalic.  EYES: conjunctiva and sclera clear  CHEST/LUNG: GBAE. No wheezing or crackles   HEART: regular rate and rhythm; S1/S2.  ABDOMEN: Soft, Nontender, Nondistended  EXTREMITIES: No edema.   PSYCH: AAOx3.  NEUROLOGY: non-focal; moves all extremities     JONAS CELESTIN 59y Male  MRN#: [id number]   Hospital Day: 5d    HPI:  59y male with PMHx of HTN, hiatal hernia, HLD, CAD s/p CABG in Jan 2024, paroxysmal A. fib not on AC, former smoker (30 pack years, quit 1yr ago), recently diagnosed with colitis and esophageal adenocarcinoma s/p stent placement who presents for weakness. The patient was recently discharged from [facility] on 5/6 , and has been progressively deteriorating. States he has been unable to tolerate p.o. and has been unable to walk over the past few days. Wife at bedside reports that he has not been able to follow-up with oncology due to the weakness.  On ROS the patient also states he has been having black tarry stools that started 1 day prior to his EGD. He had 3 black BMs today. He denies any mucosal bleeding or blood in the urine.  He denies any fevers, chills, chest pain, shortness of breath, palpitations, headache, vision changes, nausea, vomiting, abdominal pain, hematuria, falls.  He is supposed to have PET scan tomorrow and follow with Dr. Sanchez in 2 days.    T(F): 98.5 (05-12-25 @ 21:05), Max: 98.6 (05-12-25 @ 19:48)  HR: 97 (05-12-25 @ 21:05) (88 - 104)  BP: 111/58 (05-12-25 @ 21:05) (111/58 - 132/75)  RR: 18 (05-12-25 @ 21:05) (18 - 20)  SpO2: 99% (05-12-25 @ 21:05) (98% - 100%) on RA    Labs: Hg 7.6 (baseline ~10), MCV 78, slight schistocytes on smear,  Plt 92, PT/INR >40/6.2, PTT 51.8, ALK phos 273.      Imaging:    CT Chest w/ IV Cont  (4/26 - prior admission)  IMPRESSION:  1.  No definite evidence of thoracic metastatic disease.  2.  Small bilateral pleural effusions.  3.  Marked circumferential thickening of the distal esophagus/hiatal   hernia could be related to provided history of suspected esophageal   cancer versus esophagitis. (12 May 2025 21:41)      SUBJECTIVE  Pt seen and evaluated at bedside. Febrile ovenight started on [id number] for possible pna     OBJECTIVE  PAST MEDICAL & SURGICAL HISTORY  HTN (hypertension)    Hiatal hernia    HLD (hyperlipidemia)    CAD (coronary artery disease)    Paroxysmal atrial fibrillation    History of colitis    S/P CABG (coronary artery bypass graft)      ALLERGIES:  penicillins (Unknown)    MEDICATIONS:  STANDING MEDICATIONS  atorvastatin 80 milliGRAM(s) Oral at bedtime  cefepime   IVPB 2000 milliGRAM(s) IV Intermittent every 12 hours  cefepime   IVPB      chlorhexidine 2% Cloths 1 Application(s) Topical <User Schedule>  heparin   Injectable 5000 Unit(s) SubCutaneous every 12 hours  lidocaine   4% Patch 1 Patch Transdermal daily  metoprolol tartrate 12.5 milliGRAM(s) Oral two times a day  OLANZapine 2.5 milliGRAM(s) Oral daily  pantoprazole  Injectable 40 milliGRAM(s) IV Push every 12 hours  polyethylene glycol 3350 17 Gram(s) Oral daily    PRN MEDICATIONS  acetaminophen     Tablet .. 650 milliGRAM(s) Oral every 6 hours PRN  HYDROmorphone  Injectable 1 milliGRAM(s) IV Push every 3 hours PRN      VITAL SIGNS: Last 24 Hours  T(C): 36.7 (17 May 2025 04:00), Max: 38.6 (17 May 2025 01:41)  T(F): 98 (17 May 2025 04:00), Max: 101.5 (17 May 2025 01:41)  HR: 114 (17 May 2025 04:00) (114 - 129)  BP: 116/72 (17 May 2025 04:00) (104/60 - 119/70)  BP(mean): 87 (17 May 2025 04:00) (75 - 87)  RR: 18 (17 May 2025 04:00) (16 - 19)  SpO2: 95% (17 May 2025 04:00) (95% - 99%)    LABS:                        7.2    4.45  )-----------( 45       ( 16 May 2025 04:30 )             22.1     05-16    135  |  101  |  14  ----------------------------<  131[H]  3.8   |  19  |  0.7    Ca    8.5      16 May 2025 04:30  Mg     2.3     05-16    TPro  5.3[L]  /  Alb  2.9[L]  /  TBili  1.2  /  DBili  x   /  AST  50[H]  /  ALT  26  /  AlkPhos  398[H]  05-16    PT/INR - ( 16 May 2025 04:30 )   PT: 14.80 sec;   INR: 1.25 ratio         PTT - ( 16 May 2025 04:30 )  PTT:35.3 sec  Urinalysis Basic - ( 16 May 2025 04:30 )    Color: x / Appearance: x / SG: x / pH: x  Gluc: 131 mg/dL / Ketone: x  / Bili: x / Urobili: x   Blood: x / Protein: x / Nitrite: x   Leuk Esterase: x / RBC: x / WBC x   Sq Epi: x / Non Sq Epi: x / Bacteria: x        Lactate, Blood: 1.3 mmol/L (05-16-25 @ 16:00)              PHYSICAL EXAM:  GENERAL: NAD, well-developed.  HEAD:  Atraumatic, Normocephalic.  EYES: conjunctiva and sclera clear  CHEST/LUNG: GBAE. No wheezing or crackles   HEART: regular rate and rhythm; S1/S2.  ABDOMEN: Soft, Nontender, Nondistended  EXTREMITIES: No edema.   PSYCH: AAOx3.  NEUROLOGY: non-focal; moves all extremities

## 2025-05-17 NOTE — CONSULT NOTE ADULT - TIME BILLING
I have personally seen and examined this patient.    I have reviewed all pertinent clinical information and reviewed all relevant imaging and diagnostic studies personally.   I discussed recommendations with the primary team.
above.  Total time spent includes but is not limited to personal review of patient chart, available results, collateral information (if necessary) and examination of patient at bedside and time spent formulating assessment and recommendations independent of teaching time.

## 2025-05-17 NOTE — PROGRESS NOTE ADULT - ATTENDING COMMENTS
59M with esophageal adenocarcinoma, pMMR. He had been planned for outpatient PET/CT but was unable to go due to multiple hospitalizations.    Now, MR spines show possible metastatic disease, which explains his pancytopenia from myelophthisis.    Today, we visited him again and his wife is by his side. The patient is more awake and able to hold a conversation. I explained that the MR C/T/LS spine findings are suspicious for metastatic disease, which will upstage his cancer to stage IV. I explained that treatment will involve chemotherapy +/- immunotherapy to control the cancer. To confirm that, we should obtain a bone scan and a bone marrow biopsy with IR (due to pt's habitus). In the meantime, obtain myeloma panel (SPEP, FLC and IF).    Due to pt's b/l LE weakness, agree with neurology to pursue LP to r/o leptomeningeal disease.     Infectious workup for his fever.

## 2025-05-17 NOTE — PROGRESS NOTE ADULT - ASSESSMENT
59M with PMHx of HTN, hiatal hernia, HLD, CAD s/p CABG, paroxysmal A. fib not on AC, former smoker (30 pack years, quit 1yr ago), recently diagnosed with colitis and esophageal adenocarcinoma who presents with b/l weakness. PE w distal strengtg >proximal strength. . MRI brain reporting dural diffuse thickening and MRI of cervical, thoracic, and lumbar spine reporting signal enhancement of vertebral bodies of cervical, thoracic, and lumbar spine suspicious for metastatic dz. At this point would persue LP.    RECS  - LP   59M with PMHx of HTN, hiatal hernia, HLD, CAD s/p CABG, paroxysmal A. fib not on AC, former smoker (30 pack years, quit 1yr ago), recently diagnosed with colitis and esophageal adenocarcinoma who presents with b/l weakness. PE w distal strengtg >proximal strength. . MRI brain reporting dural diffuse thickening and MRI of cervical, thoracic, and lumbar spine reporting signal enhancement of vertebral bodies of cervical, thoracic, and lumbar spine suspicious for metastatic dz.   Recommend LP evaluate for paraneoplastic vs carcinomatous disease.      RECS  - LP for CSF: cell ct, glc, prot, cytology, flow cytometry, paraneoplastic panel

## 2025-05-17 NOTE — PROGRESS NOTE ADULT - ASSESSMENT
59y male with PMHx of HTN, hiatal hernia, HLD, CAD s/p CABG in Jan 2024, paroxysmal A. fib not on AC, former smoker (30 pack years, quit 1yr ago), recently diagnosed with colitis and esophageal adenocarcinoma s/p stent placement who presents for weakness. The patient was recently discharged from [facility] on 5/6 , and has been progressively deteriorating. Found to have hgb 7.6 lower than last admission reported melena, thrombocytopenia w/ elevated INR/PTT.     #Reported Melena likely due to Upper GI Bleed likely Secondary to  Malignancy   #Thrombocytopenia, elevated INR/PTT  #Recently diagnosed Esophageal adenocarcinoma   #Acute blood loss anemia  - EGD 4/25: A friable circumferential mass seen from the GE junction at 38cm to 28 cm of mid esophagus. Multiple biopsies were obtained. Oozing of blood was noted from the whole mass.  - c/w pantoprazole 40mg IV BID  - s/p EGD w/ peg and stent removal   - . Patient supposed to have PET scan OP, Surg Onc Dr Yost   - keep Hgb>7.5  -keep active T&S  -5/17 transfuse another unit PRBCs (did not get blood yesterday due to fevers)    #Weakness   #Diffuse metastatic dz of C/T/L spine on MRI  #Acute on chronic lower back pain   - c/w Dilaudid 1mg Q4, Oxycodone 5mg Q4 PRN  Med Onc recommends Bone Scan and BM Bx by IR  Neuro recommends LP  will arrange for both BM Bx and LP to be done by IR early next week  d/w onc: might need inpt chemo    #Decreased PO intake 2/2 esophageal adenocarcinoma  - s/p PEG    #Fever  pancultured  CXR w/ ?LLL opacity ?aspiration  started on Cefepime 5/16  aspiration precautions  if fevers persist, will get CT A/p    #?L sided fascial droop  #Diffuse dural thickening  - Pt and spouse do not know if chronic.   - no focal defecits except b/l distal weakness   < from: MR Head w/wo IV Cont (05.14.25 @ 09:48) >  1.  No acute infarct or intracranial hemorrhage.    2.  Mild smooth diffuse dural thickening over the cerebral convexities.   This finding is nonspecific and can reflect intracranial hypotension or   recent spinal procedures. In the setting of known neoplasm cannot exclude   dural metastatic disease.    3.  Heterogeneous marrow signal of the calvarium, nonspecific.    < end of copied text >    #3.8 cm wide R iliac artery aneurysm- stable  - vascular o/p- surgery was scheduled with Dr. Tavera     #HTN  #CAD s/p CABG in Jan 2024  #Paroxysmal A.fib  - c/w home meds  - Hold plavix    #DVT PPX: SCD. Added Heparin SQ  #GI PPX: PPI BID  #Diet: PO, plus supplement w/ TF  #Code Status: Full  #Activity Order: IAT    High risk pt. Px is guarded    #Progress Note Handoff  Pending: Clinical improvement and stability__x___PT_ Bone Scan, IR for LP, BM Bx  Pt/Family discussion: Pt/family informed and agree with the current plan  Disposition: Home__vs    Nursing Home    To be determined____x____    My note supersedes the residents note should a discrepancy arise.    Chart and notes personally reviewed.  Care Discussed with Consultants/Other Providers/ Housestaff [ x] YES [ ] NO   Radiology, labs, old records personally reviewed.    discussed w/ housestaff, nursing, case management, spouse, onc    Attestation Statements:    Attestation Statements:  Risk Statement (NON-critical care).     On this date of service, level of risk to patient is considered: High.     Due to: pt with illness causing risk to life or bodily fxn    Time-based billing (NON-critical care).     50 minutes spent on total encounter. The necessity of the time spent during the encounter on this date of service was due to:     time spent on review of labs, imaging studies, old records, obtaining history, personally examining patient, counselling and communicating with patient/ family, entering orders for medications/tests/etc, discussions with other health care providers, documentation in electronic health records, independent interpretation of labs, imaging/procedure results and care coordination.

## 2025-05-18 LAB
ALBUMIN SERPL ELPH-MCNC: 2.8 G/DL — LOW (ref 3.5–5.2)
ALP SERPL-CCNC: 292 U/L — HIGH (ref 30–115)
ALT FLD-CCNC: 18 U/L — SIGNIFICANT CHANGE UP (ref 0–41)
ANION GAP SERPL CALC-SCNC: 14 MMOL/L — SIGNIFICANT CHANGE UP (ref 7–14)
APPEARANCE UR: CLEAR — SIGNIFICANT CHANGE UP
APTT BLD: 40.6 SEC — HIGH (ref 27–39.2)
AST SERPL-CCNC: 40 U/L — SIGNIFICANT CHANGE UP (ref 0–41)
BASOPHILS # BLD AUTO: 0.03 K/UL — SIGNIFICANT CHANGE UP (ref 0–0.2)
BASOPHILS NFR BLD AUTO: 0.7 % — SIGNIFICANT CHANGE UP (ref 0–1)
BILIRUB SERPL-MCNC: 1.3 MG/DL — HIGH (ref 0.2–1.2)
BILIRUB UR-MCNC: NEGATIVE — SIGNIFICANT CHANGE UP
BUN SERPL-MCNC: 15 MG/DL — SIGNIFICANT CHANGE UP (ref 10–20)
CALCIUM SERPL-MCNC: 8.3 MG/DL — LOW (ref 8.4–10.5)
CHLORIDE SERPL-SCNC: 98 MMOL/L — SIGNIFICANT CHANGE UP (ref 98–110)
CO2 SERPL-SCNC: 19 MMOL/L — SIGNIFICANT CHANGE UP (ref 17–32)
COLOR SPEC: YELLOW — SIGNIFICANT CHANGE UP
CREAT SERPL-MCNC: 0.6 MG/DL — LOW (ref 0.7–1.5)
D DIMER BLD IA.RAPID-MCNC: HIGH NG/ML DDU
DIFF PNL FLD: NEGATIVE — SIGNIFICANT CHANGE UP
EGFR: 110 ML/MIN/1.73M2 — SIGNIFICANT CHANGE UP
EGFR: 110 ML/MIN/1.73M2 — SIGNIFICANT CHANGE UP
EOSINOPHIL # BLD AUTO: 0.12 K/UL — SIGNIFICANT CHANGE UP (ref 0–0.7)
EOSINOPHIL NFR BLD AUTO: 2.7 % — SIGNIFICANT CHANGE UP (ref 0–8)
FIBRINOGEN PPP-MCNC: 353 MG/DL — SIGNIFICANT CHANGE UP (ref 200–435)
GLUCOSE SERPL-MCNC: 93 MG/DL — SIGNIFICANT CHANGE UP (ref 70–99)
GLUCOSE UR QL: NEGATIVE MG/DL — SIGNIFICANT CHANGE UP
HCT VFR BLD CALC: 20.8 % — LOW (ref 42–52)
HCT VFR BLD CALC: 24.3 % — LOW (ref 42–52)
HGB BLD-MCNC: 6.8 G/DL — CRITICAL LOW (ref 14–18)
HGB BLD-MCNC: 8.1 G/DL — LOW (ref 14–18)
IMM GRANULOCYTES NFR BLD AUTO: 11.3 % — HIGH (ref 0.1–0.3)
INR BLD: 1.41 RATIO — HIGH (ref 0.65–1.3)
KETONES UR QL: NEGATIVE MG/DL — SIGNIFICANT CHANGE UP
LEUKOCYTE ESTERASE UR-ACNC: NEGATIVE — SIGNIFICANT CHANGE UP
LYMPHOCYTES # BLD AUTO: 0.78 K/UL — LOW (ref 1.2–3.4)
LYMPHOCYTES # BLD AUTO: 17.3 % — LOW (ref 20.5–51.1)
MAGNESIUM SERPL-MCNC: 2.2 MG/DL — SIGNIFICANT CHANGE UP (ref 1.8–2.4)
MCHC RBC-ENTMCNC: 26.3 PG — LOW (ref 27–31)
MCHC RBC-ENTMCNC: 27.5 PG — SIGNIFICANT CHANGE UP (ref 27–31)
MCHC RBC-ENTMCNC: 32.7 G/DL — SIGNIFICANT CHANGE UP (ref 32–37)
MCHC RBC-ENTMCNC: 33.3 G/DL — SIGNIFICANT CHANGE UP (ref 32–37)
MCV RBC AUTO: 80.3 FL — SIGNIFICANT CHANGE UP (ref 80–94)
MCV RBC AUTO: 82.4 FL — SIGNIFICANT CHANGE UP (ref 80–94)
MONOCYTES # BLD AUTO: 0.59 K/UL — SIGNIFICANT CHANGE UP (ref 0.1–0.6)
MONOCYTES NFR BLD AUTO: 13.1 % — HIGH (ref 1.7–9.3)
NEUTROPHILS # BLD AUTO: 2.49 K/UL — SIGNIFICANT CHANGE UP (ref 1.4–6.5)
NEUTROPHILS NFR BLD AUTO: 54.9 % — SIGNIFICANT CHANGE UP (ref 42.2–75.2)
NITRITE UR-MCNC: NEGATIVE — SIGNIFICANT CHANGE UP
NRBC BLD AUTO-RTO: 3 /100 WBCS — HIGH (ref 0–0)
NRBC BLD AUTO-RTO: 4 /100 WBCS — HIGH (ref 0–0)
PH UR: 5.5 — SIGNIFICANT CHANGE UP (ref 5–8)
PLATELET # BLD AUTO: 43 K/UL — LOW (ref 130–400)
PLATELET # BLD AUTO: 47 K/UL — LOW (ref 130–400)
PMV BLD: 12.6 FL — HIGH (ref 7.4–10.4)
PMV BLD: SIGNIFICANT CHANGE UP (ref 7.4–10.4)
POTASSIUM SERPL-MCNC: 4.4 MMOL/L — SIGNIFICANT CHANGE UP (ref 3.5–5)
POTASSIUM SERPL-SCNC: 4.4 MMOL/L — SIGNIFICANT CHANGE UP (ref 3.5–5)
PROT SERPL-MCNC: 4.8 G/DL — LOW (ref 6–8)
PROT UR-MCNC: 30 MG/DL
PROTHROM AB SERPL-ACNC: 16.7 SEC — HIGH (ref 9.95–12.87)
RBC # BLD: 2.59 M/UL — LOW (ref 4.7–6.1)
RBC # BLD: 2.95 M/UL — LOW (ref 4.7–6.1)
RBC # FLD: 17.1 % — HIGH (ref 11.5–14.5)
RBC # FLD: 17.4 % — HIGH (ref 11.5–14.5)
SODIUM SERPL-SCNC: 131 MMOL/L — LOW (ref 135–146)
SP GR SPEC: 1.02 — SIGNIFICANT CHANGE UP (ref 1–1.03)
UROBILINOGEN FLD QL: 0.2 MG/DL — SIGNIFICANT CHANGE UP (ref 0.2–1)
WBC # BLD: 4.52 K/UL — LOW (ref 4.8–10.8)
WBC # BLD: 5.2 K/UL — SIGNIFICANT CHANGE UP (ref 4.8–10.8)
WBC # FLD AUTO: 4.52 K/UL — LOW (ref 4.8–10.8)
WBC # FLD AUTO: 5.2 K/UL — SIGNIFICANT CHANGE UP (ref 4.8–10.8)

## 2025-05-18 PROCEDURE — 74177 CT ABD & PELVIS W/CONTRAST: CPT | Mod: 26

## 2025-05-18 PROCEDURE — 99233 SBSQ HOSP IP/OBS HIGH 50: CPT

## 2025-05-18 RX ORDER — BISACODYL 5 MG
10 TABLET, DELAYED RELEASE (ENTERIC COATED) ORAL ONCE
Refills: 0 | Status: COMPLETED | OUTPATIENT
Start: 2025-05-18 | End: 2025-05-18

## 2025-05-18 RX ORDER — BISACODYL 5 MG
5 TABLET, DELAYED RELEASE (ENTERIC COATED) ORAL ONCE
Refills: 0 | Status: COMPLETED | OUTPATIENT
Start: 2025-05-18 | End: 2025-05-18

## 2025-05-18 RX ADMIN — Medication 650 MILLIGRAM(S): at 06:11

## 2025-05-18 RX ADMIN — OXYCODONE HYDROCHLORIDE 10 MILLIGRAM(S): 30 TABLET ORAL at 16:08

## 2025-05-18 RX ADMIN — Medication 40 MILLIGRAM(S): at 17:23

## 2025-05-18 RX ADMIN — Medication 10 MILLIGRAM(S): at 14:47

## 2025-05-18 RX ADMIN — METOPROLOL SUCCINATE 12.5 MILLIGRAM(S): 50 TABLET, EXTENDED RELEASE ORAL at 05:23

## 2025-05-18 RX ADMIN — Medication 1 APPLICATION(S): at 06:06

## 2025-05-18 RX ADMIN — Medication 5 MILLIGRAM(S): at 14:47

## 2025-05-18 RX ADMIN — OXYCODONE HYDROCHLORIDE 10 MILLIGRAM(S): 30 TABLET ORAL at 06:34

## 2025-05-18 RX ADMIN — CEFEPIME 100 MILLIGRAM(S): 2 INJECTION, POWDER, FOR SOLUTION INTRAVENOUS at 17:25

## 2025-05-18 RX ADMIN — OXYCODONE HYDROCHLORIDE 10 MILLIGRAM(S): 30 TABLET ORAL at 22:30

## 2025-05-18 RX ADMIN — Medication 40 MILLIGRAM(S): at 05:23

## 2025-05-18 RX ADMIN — POLYETHYLENE GLYCOL 3350 17 GRAM(S): 17 POWDER, FOR SOLUTION ORAL at 11:27

## 2025-05-18 RX ADMIN — Medication 650 MILLIGRAM(S): at 06:34

## 2025-05-18 RX ADMIN — OXYCODONE HYDROCHLORIDE 10 MILLIGRAM(S): 30 TABLET ORAL at 06:05

## 2025-05-18 RX ADMIN — OXYCODONE HYDROCHLORIDE 10 MILLIGRAM(S): 30 TABLET ORAL at 21:28

## 2025-05-18 RX ADMIN — METOPROLOL SUCCINATE 12.5 MILLIGRAM(S): 50 TABLET, EXTENDED RELEASE ORAL at 17:25

## 2025-05-18 RX ADMIN — OXYCODONE HYDROCHLORIDE 10 MILLIGRAM(S): 30 TABLET ORAL at 17:00

## 2025-05-18 RX ADMIN — CEFEPIME 100 MILLIGRAM(S): 2 INJECTION, POWDER, FOR SOLUTION INTRAVENOUS at 05:23

## 2025-05-18 RX ADMIN — ATORVASTATIN CALCIUM 80 MILLIGRAM(S): 80 TABLET, FILM COATED ORAL at 21:28

## 2025-05-18 NOTE — PROGRESS NOTE ADULT - ASSESSMENT
59y male with PMHx of HTN, hiatal hernia, HLD, CAD s/p CABG in Jan 2024, paroxysmal A. fib not on AC, former smoker (30 pack years, quit 1yr ago), recently diagnosed with colitis and esophageal adenocarcinoma s/p stent placement who presents for weakness. The patient was recently discharged from [facility] on 5/6 , and has been progressively deteriorating. Found to have hgb 7.6 lower than last admission reported melena, thrombocytopenia w/ elevated INR/PTT.     #Reported Melena likely due to Upper GI Bleed likely Secondary to  Malignancy   #Thrombocytopenia, elevated INR/PTT  #Recently diagnosed Esophageal adenocarcinoma   #Acute blood loss anemia  - EGD 4/25: A friable circumferential mass seen from the GE junction at 38cm to 28 cm of mid esophagus. Multiple biopsies were obtained. Oozing of blood was noted from the whole mass.  - c/w pantoprazole 40mg IV BID  - s/p EGD w/ peg and stent removal   - . Patient supposed to have PET scan OP, Surg Onc Dr Yost   - keep Hgb>7.5  -keep active T&S  -5/17 transfuse another unit PRBCs (did not get blood yesterday due to fevers)  -5/18 H/h still low, will transfuse another unit    #Weakness   #Diffuse metastatic dz of C/T/L spine on MRI  #Acute on chronic lower back pain   - c/w Dilaudid 1mg Q4, Oxycodone 5mg Q4 PRN  Med Onc recommends Bone Scan and BM Bx by IR  Neuro recommends LP  will arrange for both BM Bx and LP to be done by IR early next week  d/w onc: might need inpt chemo    #Decreased PO intake 2/2 esophageal adenocarcinoma  - s/p PEG    #Fever  pancultured  CXR w/ ?LLL opacity ?aspiration  started on Cefepime 5/16  aspiration precautions  f/u CT A/p    #?L sided fascial droop  #Diffuse dural thickening  - Pt and spouse do not know if chronic.   - no focal defecits except b/l distal weakness   < from: MR Head w/wo IV Cont (05.14.25 @ 09:48) >  1.  No acute infarct or intracranial hemorrhage.    2.  Mild smooth diffuse dural thickening over the cerebral convexities.   This finding is nonspecific and can reflect intracranial hypotension or   recent spinal procedures. In the setting of known neoplasm cannot exclude   dural metastatic disease.    3.  Heterogeneous marrow signal of the calvarium, nonspecific.    < end of copied text >    #3.8 cm wide R iliac artery aneurysm- stable  - vascular o/p- surgery was scheduled with Dr. Tavera     #HTN  #CAD s/p CABG in Jan 2024  #Paroxysmal A.fib  - c/w home meds  - Hold plavix    #DVT PPX: SCD. Added Heparin SQ  #GI PPX: PPI BID  #Diet: PO, plus supplement w/ TF  #Code Status: Full  #Activity Order: IAT    High risk pt. Px is guarded    #Progress Note Handoff  Pending: Clinical improvement and stability__x___PT_ Bone Scan, IR for LP, BM Bx  Pt/Family discussion: Pt/family informed and agree with the current plan  Disposition: Home__vs    Nursing Home    To be determined____x____    My note supersedes the residents note should a discrepancy arise.    Chart and notes personally reviewed.  Care Discussed with Consultants/Other Providers/ Housestaff [ x] YES [ ] NO   Radiology, labs, old records personally reviewed.    discussed w/ housestaff, nursing, case management, spouse, neuro    Attestation Statements:    Attestation Statements:  Risk Statement (NON-critical care).     On this date of service, level of risk to patient is considered: High.     Due to: pt with illness causing risk to life or bodily fxn    Time-based billing (NON-critical care).     50 minutes spent on total encounter. The necessity of the time spent during the encounter on this date of service was due to:     time spent on review of labs, imaging studies, old records, obtaining history, personally examining patient, counselling and communicating with patient/ family, entering orders for medications/tests/etc, discussions with other health care providers, documentation in electronic health records, independent interpretation of labs, imaging/procedure results and care coordination.

## 2025-05-18 NOTE — PROGRESS NOTE ADULT - SUBJECTIVE AND OBJECTIVE BOX
Patient is a 59y old  Male who presents with a chief complaint of Progressive weakness (13 May 2025 17:51)    INTERVAL HPI/OVERNIGHT EVENTS: Patient was examined and seen at bedside. This afternoon pt is resting in bed and reports no event. Back pain is stable. No further bleeding. WIfe at bedside. Fevers on and off, Received 1u PRBCs yesterday. Not much cough, dysuria or diarrhea.  ROS: Denies CP, SOB, AP, new weakness  All other systems reviewed and are within normal limits.  InitialHPI:  59y male with PMHx of HTN, hiatal hernia, HLD, CAD s/p CABG in Jan 2024, paroxysmal A. fib not on AC, former smoker (30 pack years, quit 1yr ago), recently diagnosed with colitis and esophageal adenocarcinoma s/p stent placement who presents for weakness. The patient was recently discharged from [facility] on 5/6 , and has been progressively deteriorating. States he has been unable to tolerate p.o. and has been unable to walk over the past few days. Wife at bedside reports that he has not been able to follow-up with oncology due to the weakness.  On ROS the patient also states he has been having black tarry stools that started 1 day prior to his EGD. He had 3 black BMs today. He denies any mucosal bleeding or blood in the urine.  He denies any fevers, chills, chest pain, shortness of breath, palpitations, headache, vision changes, nausea, vomiting, abdominal pain, hematuria, falls.  He is supposed to have PET scan tomorrow and follow with Dr. Sanchez in 2 days.    T(F): 98.5 (05-12-25 @ 21:05), Max: 98.6 (05-12-25 @ 19:48)  HR: 97 (05-12-25 @ 21:05) (88 - 104)  BP: 111/58 (05-12-25 @ 21:05) (111/58 - 132/75)  RR: 18 (05-12-25 @ 21:05) (18 - 20)  SpO2: 99% (05-12-25 @ 21:05) (98% - 100%) on RA    Labs: Hg 7.6 (baseline ~10), MCV 78, slight schistocytes on smear,  Plt 92, PT/INR >40/6.2, PTT 51.8, ALK phos 273.      Imaging:    CT Chest w/ IV Cont  (4/26 - prior admission)  IMPRESSION:  1.  No definite evidence of thoracic metastatic disease.  2.  Small bilateral pleural effusions.  3.  Marked circumferential thickening of the distal esophagus/hiatal   hernia could be related to provided history of suspected esophageal   cancer versus esophagitis. (12 May 2025 21:41)    PAST MEDICAL & SURGICAL HISTORY:  HTN (hypertension)      Hiatal hernia      HLD (hyperlipidemia)      CAD (coronary artery disease)      Paroxysmal atrial fibrillation      History of colitis      S/P CABG (coronary artery bypass graft)      General: NAD, AA0x3, ?Lt facial droop  HEENT:  EOMI, no LAD  CV: S1 S2  Resp: decreased breath sounds at bases  GI: mild tenderness around PEG site w/out erythema/ND/S +BS; +PEG  MS: no clubbing/cyanosis/edema, + pulses b/l  Neuro: LE 2/5 distally, 4/5 prox. UE 5-/5            Home Medications:  clopidogrel 75 mg oral tablet: 1 tab(s) orally once a day (13 May 2025 01:36)  Lipitor 80 mg oral tablet: 1 tab(s) orally once a day (13 May 2025 01:36)  Metoprolol Tartrate 25 mg oral tablet: 0.5 tab(s) orally 2 times a day (13 May 2025 01:36)  Multiple Vitamins oral tablet, chewable: 1 tab(s) orally once a day (13 May 2025 01:36)  Pepcid 20 mg oral tablet: 1 tab(s) orally once a day (13 May 2025 01:36)    MEDICATIONS  (STANDING):  atorvastatin 80 milliGRAM(s) Oral at bedtime  cefepime   IVPB 2000 milliGRAM(s) IV Intermittent every 12 hours  cefepime   IVPB      chlorhexidine 2% Cloths 1 Application(s) Topical <User Schedule>  lactated ringers. 1000 milliLiter(s) (60 mL/Hr) IV Continuous <Continuous>  lidocaine   4% Patch 1 Patch Transdermal daily  metoprolol tartrate 12.5 milliGRAM(s) Oral two times a day  OLANZapine 2.5 milliGRAM(s) Oral daily  pantoprazole  Injectable 40 milliGRAM(s) IV Push every 12 hours  polyethylene glycol 3350 17 Gram(s) Oral daily    MEDICATIONS  (PRN):  acetaminophen     Tablet .. 650 milliGRAM(s) Oral every 6 hours PRN Temp greater or equal to 38C (100.4F), Mild Pain (1 - 3)  oxyCODONE    IR 10 milliGRAM(s) Oral every 4 hours PRN Moderatre to Severe Pain    Vital Signs Last 24 Hrs  T(C): 37.2 (18 May 2025 08:00), Max: 38.2 (17 May 2025 13:57)  T(F): 99 (18 May 2025 08:00), Max: 100.8 (17 May 2025 13:57)  HR: 98 (18 May 2025 05:01) (72 - 107)  BP: 111/70 (18 May 2025 05:01) (95/60 - 119/74)  BP(mean): 89 (17 May 2025 18:30) (88 - 89)  RR: 18 (18 May 2025 05:01) (18 - 18)  SpO2: 98% (18 May 2025 05:01) (98% - 98%)    Parameters below as of 18 May 2025 05:01  Patient On (Oxygen Delivery Method): room air      CAPILLARY BLOOD GLUCOSE        LABS:                        6.8    4.52  )-----------( 43       ( 18 May 2025 06:50 )             20.8     05-18    131[L]  |  98  |  15  ----------------------------<  93  4.4   |  19  |  0.6[L]    Ca    8.3[L]      18 May 2025 06:50  Mg     2.2     05-18    TPro  4.8[L]  /  Alb  2.8[L]  /  TBili  1.3[H]  /  DBili  x   /  AST  40  /  ALT  18  /  AlkPhos  292[H]  05-18    LIVER FUNCTIONS - ( 18 May 2025 06:50 )  Alb: 2.8 g/dL / Pro: 4.8 g/dL / ALK PHOS: 292 U/L / ALT: 18 U/L / AST: 40 U/L / GGT: x               PT/INR - ( 18 May 2025 06:50 )   PT: 16.70 sec;   INR: 1.41 ratio         PTT - ( 18 May 2025 06:50 )  PTT:40.6 sec  Urinalysis Basic - ( 18 May 2025 06:50 )    Color: x / Appearance: x / SG: x / pH: x  Gluc: 93 mg/dL / Ketone: x  / Bili: x / Urobili: x   Blood: x / Protein: x / Nitrite: x   Leuk Esterase: x / RBC: x / WBC x   Sq Epi: x / Non Sq Epi: x / Bacteria: x              Culture - Blood (collected 16 May 2025 16:00)  Source: Blood Blood-Peripheral  Preliminary Report (17 May 2025 22:01):    No growth at 24 hours    Culture - Blood (collected 16 May 2025 16:00)  Source: Blood Blood-Peripheral  Preliminary Report (17 May 2025 22:01):    No growth at 24 hours      Consultant Notes Reviewed:  [x ] YES  [ ] NO  Care Discussed with Consultants/Other Providers/ Housestaff [ x] YES  [ ] NO  Radiology, labs, EKGs, new studies personally reviewed.

## 2025-05-19 ENCOUNTER — RESULT REVIEW (OUTPATIENT)
Age: 60
End: 2025-05-19

## 2025-05-19 LAB
ALBUMIN SERPL ELPH-MCNC: 2.5 G/DL — LOW (ref 3.5–5.2)
ALDOLASE SERPL-CCNC: 7.7 U/L — SIGNIFICANT CHANGE UP (ref 3.3–10.3)
ALP SERPL-CCNC: 308 U/L — HIGH (ref 30–115)
ALT FLD-CCNC: 18 U/L — SIGNIFICANT CHANGE UP (ref 0–41)
ANION GAP SERPL CALC-SCNC: 12 MMOL/L — SIGNIFICANT CHANGE UP (ref 7–14)
APPEARANCE CSF: CLEAR — SIGNIFICANT CHANGE UP
APPEARANCE SPUN FLD: COLORLESS — SIGNIFICANT CHANGE UP
APTT BLD: 41.2 SEC — HIGH (ref 27–39.2)
AST SERPL-CCNC: 43 U/L — HIGH (ref 0–41)
BASOPHILS # BLD AUTO: 0.04 K/UL — SIGNIFICANT CHANGE UP (ref 0–0.2)
BASOPHILS NFR BLD AUTO: 0.9 % — SIGNIFICANT CHANGE UP (ref 0–1)
BILIRUB SERPL-MCNC: 1 MG/DL — SIGNIFICANT CHANGE UP (ref 0.2–1.2)
BUN SERPL-MCNC: 17 MG/DL — SIGNIFICANT CHANGE UP (ref 10–20)
CALCIUM SERPL-MCNC: 8.7 MG/DL — SIGNIFICANT CHANGE UP (ref 8.4–10.5)
CHLORIDE SERPL-SCNC: 102 MMOL/L — SIGNIFICANT CHANGE UP (ref 98–110)
CO2 SERPL-SCNC: 20 MMOL/L — SIGNIFICANT CHANGE UP (ref 17–32)
COLOR CSF: SIGNIFICANT CHANGE UP
CREAT SERPL-MCNC: 0.6 MG/DL — LOW (ref 0.7–1.5)
EGFR: 110 ML/MIN/1.73M2 — SIGNIFICANT CHANGE UP
EGFR: 110 ML/MIN/1.73M2 — SIGNIFICANT CHANGE UP
EOSINOPHIL # BLD AUTO: 0.08 K/UL — SIGNIFICANT CHANGE UP (ref 0–0.7)
EOSINOPHIL NFR BLD AUTO: 1.7 % — SIGNIFICANT CHANGE UP (ref 0–8)
GLUCOSE CSF-MCNC: 42 MG/DL — LOW (ref 45–75)
GLUCOSE SERPL-MCNC: 118 MG/DL — HIGH (ref 70–99)
GLUCOSE SERPL-MCNC: 96 MG/DL — SIGNIFICANT CHANGE UP (ref 70–99)
HCT VFR BLD CALC: 24.6 % — LOW (ref 42–52)
HGB BLD-MCNC: 8.2 G/DL — LOW (ref 14–18)
LDH PNL SERPL: 446 IU/L — HIGH (ref 121–224)
LDH1 SERPL-CCNC: 16 % — LOW (ref 17–32)
LDH3 SERPL-CCNC: 21 % — SIGNIFICANT CHANGE UP (ref 17–27)
LDH3 SERPL-CCNC: 24 % — LOW (ref 25–40)
LDH4 SERPL-CCNC: 14 % — HIGH (ref 5–13)
LDH5 SERPL-CCNC: 25 % — HIGH (ref 4–20)
LYMPHOCYTES # BLD AUTO: 0.26 K/UL — LOW (ref 1.2–3.4)
LYMPHOCYTES # BLD AUTO: 5.3 % — LOW (ref 20.5–51.1)
MAGNESIUM SERPL-MCNC: 2.5 MG/DL — HIGH (ref 1.8–2.4)
MCHC RBC-ENTMCNC: 27.3 PG — SIGNIFICANT CHANGE UP (ref 27–31)
MCHC RBC-ENTMCNC: 33.3 G/DL — SIGNIFICANT CHANGE UP (ref 32–37)
MCV RBC AUTO: 82 FL — SIGNIFICANT CHANGE UP (ref 80–94)
MONOCYTES # BLD AUTO: 0.39 K/UL — SIGNIFICANT CHANGE UP (ref 0.1–0.6)
MONOCYTES NFR BLD AUTO: 7.9 % — SIGNIFICANT CHANGE UP (ref 1.7–9.3)
NEUTROPHILS # BLD AUTO: 3.04 K/UL — SIGNIFICANT CHANGE UP (ref 1.4–6.5)
NEUTROPHILS # CSF: SIGNIFICANT CHANGE UP % (ref 0–6)
NEUTROPHILS NFR BLD AUTO: 61.4 % — SIGNIFICANT CHANGE UP (ref 42.2–75.2)
NRBC BLD AUTO-RTO: SIGNIFICANT CHANGE UP /100 WBCS (ref 0–0)
NRBC NFR CSF: 1 /UL — SIGNIFICANT CHANGE UP (ref 0–5)
PLATELET # BLD AUTO: 44 K/UL — LOW (ref 130–400)
PMV BLD: 10.4 FL — SIGNIFICANT CHANGE UP (ref 7.4–10.4)
POTASSIUM SERPL-MCNC: 4.2 MMOL/L — SIGNIFICANT CHANGE UP (ref 3.5–5)
POTASSIUM SERPL-SCNC: 4.2 MMOL/L — SIGNIFICANT CHANGE UP (ref 3.5–5)
PROT CSF-MCNC: 118 MG/DL — HIGH (ref 15–45)
PROT SERPL-MCNC: 5.5 G/DL — LOW (ref 6–8)
RBC # BLD: 3 M/UL — LOW (ref 4.7–6.1)
RBC # CSF: 170 /UL — HIGH (ref 0–0)
RBC # FLD: 17.4 % — HIGH (ref 11.5–14.5)
SODIUM SERPL-SCNC: 134 MMOL/L — LOW (ref 135–146)
TUBE TYPE: SIGNIFICANT CHANGE UP
VGCC-P/Q BIND AB SER-SCNC: 0 PMOL/L — SIGNIFICANT CHANGE UP (ref 0–24.5)
WBC # BLD: 4.95 K/UL — SIGNIFICANT CHANGE UP (ref 4.8–10.8)
WBC # FLD AUTO: 4.95 K/UL — SIGNIFICANT CHANGE UP (ref 4.8–10.8)

## 2025-05-19 PROCEDURE — 62328 DX LMBR SPI PNXR W/FLUOR/CT: CPT

## 2025-05-19 PROCEDURE — 99233 SBSQ HOSP IP/OBS HIGH 50: CPT | Mod: 25

## 2025-05-19 PROCEDURE — 99497 ADVNCD CARE PLAN 30 MIN: CPT

## 2025-05-19 PROCEDURE — 78306 BONE IMAGING WHOLE BODY: CPT | Mod: 26

## 2025-05-19 PROCEDURE — 88108 CYTOPATH CONCENTRATE TECH: CPT | Mod: 26

## 2025-05-19 PROCEDURE — 99233 SBSQ HOSP IP/OBS HIGH 50: CPT

## 2025-05-19 PROCEDURE — 78803 RP LOCLZJ TUM SPECT 1 AREA: CPT | Mod: 26

## 2025-05-19 RX ORDER — HYDROMORPHONE/SOD CHLOR,ISO/PF 2 MG/10 ML
1 SYRINGE (ML) INJECTION
Refills: 0 | Status: DISCONTINUED | OUTPATIENT
Start: 2025-05-19 | End: 2025-05-23

## 2025-05-19 RX ORDER — SODIUM CHLORIDE 9 G/1000ML
1000 INJECTION, SOLUTION INTRAVENOUS
Refills: 0 | Status: DISCONTINUED | OUTPATIENT
Start: 2025-05-19 | End: 2025-05-20

## 2025-05-19 RX ORDER — OXYCODONE HYDROCHLORIDE 30 MG/1
20 TABLET ORAL EVERY 12 HOURS
Refills: 0 | Status: DISCONTINUED | OUTPATIENT
Start: 2025-05-19 | End: 2025-05-21

## 2025-05-19 RX ADMIN — Medication 40 MILLIGRAM(S): at 05:04

## 2025-05-19 RX ADMIN — OLANZAPINE 2.5 MILLIGRAM(S): 10 TABLET ORAL at 18:50

## 2025-05-19 RX ADMIN — OXYCODONE HYDROCHLORIDE 10 MILLIGRAM(S): 30 TABLET ORAL at 01:00

## 2025-05-19 RX ADMIN — Medication 1 MILLIGRAM(S): at 10:55

## 2025-05-19 RX ADMIN — Medication 1 MILLIGRAM(S): at 19:02

## 2025-05-19 RX ADMIN — Medication 1 MILLIGRAM(S): at 11:46

## 2025-05-19 RX ADMIN — Medication 1 MILLIGRAM(S): at 21:37

## 2025-05-19 RX ADMIN — METOPROLOL SUCCINATE 12.5 MILLIGRAM(S): 50 TABLET, EXTENDED RELEASE ORAL at 17:47

## 2025-05-19 RX ADMIN — OXYCODONE HYDROCHLORIDE 10 MILLIGRAM(S): 30 TABLET ORAL at 05:33

## 2025-05-19 RX ADMIN — Medication 1 APPLICATION(S): at 05:05

## 2025-05-19 RX ADMIN — SODIUM CHLORIDE 60 MILLILITER(S): 9 INJECTION, SOLUTION INTRAVENOUS at 09:42

## 2025-05-19 RX ADMIN — Medication 1 MILLIGRAM(S): at 17:47

## 2025-05-19 RX ADMIN — CEFEPIME 100 MILLIGRAM(S): 2 INJECTION, POWDER, FOR SOLUTION INTRAVENOUS at 05:04

## 2025-05-19 RX ADMIN — Medication 650 MILLIGRAM(S): at 23:58

## 2025-05-19 RX ADMIN — Medication 1 MILLIGRAM(S): at 21:45

## 2025-05-19 RX ADMIN — METOPROLOL SUCCINATE 12.5 MILLIGRAM(S): 50 TABLET, EXTENDED RELEASE ORAL at 05:04

## 2025-05-19 RX ADMIN — ATORVASTATIN CALCIUM 80 MILLIGRAM(S): 80 TABLET, FILM COATED ORAL at 21:41

## 2025-05-19 RX ADMIN — CEFEPIME 100 MILLIGRAM(S): 2 INJECTION, POWDER, FOR SOLUTION INTRAVENOUS at 17:47

## 2025-05-19 RX ADMIN — OXYCODONE HYDROCHLORIDE 20 MILLIGRAM(S): 30 TABLET ORAL at 20:30

## 2025-05-19 RX ADMIN — OXYCODONE HYDROCHLORIDE 10 MILLIGRAM(S): 30 TABLET ORAL at 01:40

## 2025-05-19 RX ADMIN — OXYCODONE HYDROCHLORIDE 10 MILLIGRAM(S): 30 TABLET ORAL at 05:03

## 2025-05-19 RX ADMIN — Medication 40 MILLIGRAM(S): at 17:46

## 2025-05-19 RX ADMIN — OXYCODONE HYDROCHLORIDE 20 MILLIGRAM(S): 30 TABLET ORAL at 21:00

## 2025-05-19 NOTE — CONSULT NOTE ADULT - CONSULT REQUESTED DATE/TIME
13-May-2025 10:00
13-May-2025 17:51
14-May-2025 13:03
15-May-2025 08:37
13-May-2025 09:07
14-May-2025 15:50
19-May-2025 11:25
17-May-2025 12:59
15-May-2025 14:31

## 2025-05-19 NOTE — PROGRESS NOTE ADULT - SUBJECTIVE AND OBJECTIVE BOX
HPI:  59M w/ Esophageal Cancer (dx Apr2025) s/p stent, CAD s/p [id number] (Jan2024), Afib not on AC, HLD is admitted for weakness, acute blood loss anemia/melena requiring pRBC transfusion. Patient completed endoscopy 5/14 for removal of esophageal stent (due to discomfort) and for PEG placement. Palliative consulted to assist w/ cancer-related pain management.    Interval History  - 24hr Opioid Use (8am-8am): Oxycodone IR 10mg x4  - patient has been experiencing intermittent severe pain in his mid back (no radiation, dull, 8-10/10 severity at times and other times between 0.5/10 severity. worse based upon positioning in bed. He feels it becomes worse at night    ADVANCE DIRECTIVES:    [x ] Full Code [ ] DNR  MOLST  [ ]  Living Will  [ ]   DECISION MAKER(s):  [ ] Health Care Proxy(s)  [ x] Surrogate(s)  [ ] Guardian           Name(s): Phone Number(s):  Wife: Shannan Reddy    BASELINE (I)ADL(s) (prior to admission):  [place]: [ ]Total  [ ] Moderate [x ]Dependent    Allergies  penicillins (Unknown)    MEDICATIONS  (STANDING):  atorvastatin 80 milliGRAM(s) Oral at bedtime  cefepime   IVPB 2000 milliGRAM(s) IV Intermittent every 12 hours  cefepime   IVPB      chlorhexidine 2% Cloths 1 Application(s) Topical <User Schedule>  lactated ringers. 1000 milliLiter(s) (60 mL/Hr) IV Continuous <Continuous>  lidocaine   4% Patch 1 Patch Transdermal daily  metoprolol tartrate 12.5 milliGRAM(s) Oral two times a day  OLANZapine 2.5 milliGRAM(s) Oral daily  oxyCODONE  ER Tablet 20 milliGRAM(s) Oral every 12 hours  pantoprazole  Injectable 40 milliGRAM(s) IV Push every 12 hours  polyethylene glycol 3350 17 Gram(s) Oral daily    MEDICATIONS  (PRN):  acetaminophen     Tablet .. 650 milliGRAM(s) Oral every 6 hours PRN Temp greater or equal to 38C (100.4F), Mild Pain (1 - 3)  HYDROmorphone  Injectable 1 milliGRAM(s) IV Push every 3 hours PRN Severe Pain (7 - 10)  oxyCODONE    IR 10 milliGRAM(s) Oral every 4 hours PRN Moderatre to Severe Pain    PRESENT SYMPTOMS: [ ]Unable to obtain due to poor mentation   Source if other than patient:  [ ]Family   [ ]Team   [ X]All other review of systems negative including pain and dyspnea unless noted below    All components of pain assessment below addressed. Blank spaces indicate that the patient did/could not complete the assessment.  Pain: [ ]yes [ ]no  QOL impact -   Location -                    Aggravating factors -  Quality -  Radiation -  Timing-  Severity (0-10 scale):  Minimal acceptable level (0-10 scale):     Dyspnea:                           [ ]None[ ]Mild [ ]Moderate [ ]Severe   Anxiety:                             [ ]None[ ]Mild [ ]Moderate [ ]Severe   Fatigue:                             [ ]None[ ]Mild [ ]Moderate [ ]Severe   Nausea:                             [ ]None[ ]Mild [ ]Moderate [ ]Severe   Loss of appetite:              [ ]None[ ]Mild [ ]Moderate [ ]Severe   Constipation:                    [ ]None[ ]Mild [ ]Moderate [ ]Severe    Other Symptoms:    PHYSICAL EXAM:  Vital Signs Last 24 Hrs  T(C): 36.6 (19 May 2025 13:29), Max: 37.4 (18 May 2025 14:05)  T(F): 97.9 (19 May 2025 13:29), Max: 99.3 (18 May 2025 14:05)  HR: 90 (19 May 2025 13:29) (90 - 101)  BP: 106/70 (19 May 2025 13:29) (106/69 - 121/71)  BP(mean): 82 (19 May 2025 13:29) (81 - 88)  RR: 18 (19 May 2025 13:29) (16 - 18)  SpO2: 96% (19 May 2025 13:29) (96% - 97%)    Parameters below as of 19 May 2025 13:29  Patient On (Oxygen Delivery Method): room air     I&O's Summary    19 May 2025 07:01  -  19 May 2025 13:42  --------------------------------------------------------  IN: 0 mL / OUT: 300 mL / NET: -300 mL    GENERAL:  [ ] No acute distress [ ]Lethargic  [ ]Unarousable  [ ]Verbal  [ ]Non-Verbal [ ]Cachexia    BEHAVIORAL/PSYCH:  [ ]Alert and Oriented x  [ ] Anxiety [ ] Delirium [ ] Agitation [ ] Calm   EYES: [ ] No scleral icterus [ ] Scleral icterus [ ] Closed  ENMT:  [ ]Dry mouth  [ ]No external oral lesions [ ] No external ear or nose lesions  CARDIOVASCULAR:  [ ]Regular [ ]Irregular [ ]Tachy [ ]Not Tachy  [ ]Rodney [ ] Edema [ ] No edema  PULMONARY:  [ ]Tachypnea  [ ]Audible excessive secretions [ ] No labored breathing [ ] labored breathing  GASTROINTESTINAL: [ ]Soft  [ ]Distended  [ ]Not distended [ ]Non tender [ ]Tender  MUSCULOSKELETAL: [ ]No clubbing [ ] clubbing  [ ] No cyanosis [ ] cyanosis  NEUROLOGIC: [ ]No focal deficits  [ ]Follows commands  [ ]Does not follow commands  [ ]Cognitive impairment  [ ]Dysphagia  [ ]Dysarthria  [ ]Paresis   SKIN: [ ] Jaundiced [ ] Non-jaundiced [ ]Rash [ ]No Rash [ ] Warm [ ] Dry  MISC/LINES: [ ] ET tube [ ] Trach [ ]NGT/OGT [ ]PEG [ ]Epps    CRITICAL CARE:  [ ] Shock Present  [ ]Septic [ ]Cardiogenic [ ]Neurologic [ ]Hypovolemic  [ ]  Vasopressors [ ]  Inotropes   [ ]Respiratory failure present [ ]Mechanical ventilation [ ]Non-invasive ventilatory support [ ]High flow  [ ]Acute  [ ]Chronic [ ]Hypoxic  [ ]Hypercarbic [ ]Other  [ ]Other organ failure     LABS: reviewed by me                        8.2    4.95  )-----------( 44       ( 19 May 2025 06:45 )             24.6   05-19    134[L]  |  102  |  17  ----------------------------<  118[H]  4.2   |  20  |  0.6[L]    Ca    8.7      19 May 2025 06:45  Mg     2.5     05-19    TPro  5.5[L]  /  Alb  2.5[L]  /  TBili  1.0  /  DBili  x   /  AST  43[H]  /  ALT  18  /  AlkPhos  308[H]  05-19  PT/INR - ( 18 May 2025 06:50 )   PT: 16.70 sec;   INR: 1.41 ratio    PTT - ( 19 May 2025 06:45 )  PTT:41.2 sec    Urinalysis Basic - ( 19 May 2025 06:45 )  Color: x / Appearance: x / SG: x / pH: x  Gluc: 118 mg/dL / Ketone: x  / Bili: x / Urobili: x   Blood: x / Protein: x / Nitrite: x   Leuk Esterase: x / RBC: x / WBC x   Sq Epi: x / Non Sq Epi: x / Bacteria: x    RADIOLOGY & ADDITIONAL STUDIES: reviewed by me    EKG: reviewed by me    PROTEIN CALORIE MALNUTRITION PRESENT: [ ]mild [ ]moderate [ ]severe [ ]underweight [ ]morbid obesity  https://www.andeal.org/vault/2440/web/files/ONC/Table_Clinical%20Characteristics%20to%20Document%20Malnutrition-White%20JV%20et%20al%202012.pdf    Height (cm): 180.3 (05-14-25 @ 14:45), 180 (05-01-25 @ 11:47), 180.3 (04-05-25 @ 13:20)  Weight (kg): 84.5 (05-14-25 @ 14:45), 89 (05-01-25 @ 11:47), 90.3 (04-03-25 @ 21:22)  BMI (kg/m2): 26 (05-14-25 @ 14:45), 27.5 (05-01-25 @ 11:47), 27.8 (04-05-25 @ 13:20)    [ ]PPSV2 < or = to 30% [ ]significant weight loss  [ ]poor nutritional intake  [ ]anasarca      [ ]Artificial Nutrition      REFERRALS:   [ ]Chaplaincy  [ ]Hospice  [ ]Child Life  [ ]Social Work  [ ]Case management [ ]Holistic Therapy     Patient discussed with primary medical team MD  Palliative care education provided to patient and/or family    Goals of Care Document:    HPI:  59M w/ Esophageal Cancer (dx Apr2025) s/p stent, CAD s/p [id number] (Jan2024), Afib not on AC, HLD is admitted for weakness, acute blood loss anemia/melena requiring pRBC transfusion. Patient completed endoscopy 5/14 for removal of esophageal stent (due to discomfort) and for PEG placement. Palliative consulted to assist w/ cancer-related pain management.    Interval History  - 24hr Opioid Use (8am-8am): Oxycodone IR 10mg x4  - patient has been experiencing intermittent severe pain in his mid back (no radiation, dull, 8-10/10 severity at times and other times between 0.5/10 severity. worse based upon positioning in bed. He feels it becomes worse at night and he has difficulty obtaining additional prns at that time when he calls for rn.  - no sob, n/v/d. no reported constipation (last bm 1 d prior). He reports having nightmare last night but cannot recall full experience. He feels he is losing his sense of days with everything going on.    ADVANCE DIRECTIVES:    [x ] Full Code [ ] DNR  MOLST  [ ]  Living Will  [ ]   DECISION MAKER(s):  [ ] Health Care Proxy(s)  [ x] Surrogate(s)  [ ] Guardian           Name(s): Phone Number(s):  Wife: Shannan Reddy    BASELINE (I)ADL(s) (prior to admission):  [place]: [ ]Total  [ ] Moderate [x ]Dependent    Allergies  penicillins (Unknown)    MEDICATIONS  (STANDING):  atorvastatin 80 milliGRAM(s) Oral at bedtime  cefepime   IVPB 2000 milliGRAM(s) IV Intermittent every 12 hours  cefepime   IVPB      chlorhexidine 2% Cloths 1 Application(s) Topical <User Schedule>  lactated ringers. 1000 milliLiter(s) (60 mL/Hr) IV Continuous <Continuous>  lidocaine   4% Patch 1 Patch Transdermal daily  metoprolol tartrate 12.5 milliGRAM(s) Oral two times a day  OLANZapine 2.5 milliGRAM(s) Oral daily  oxyCODONE  ER Tablet 20 milliGRAM(s) Oral every 12 hours  pantoprazole  Injectable 40 milliGRAM(s) IV Push every 12 hours  polyethylene glycol 3350 17 Gram(s) Oral daily    MEDICATIONS  (PRN):  acetaminophen     Tablet .. 650 milliGRAM(s) Oral every 6 hours PRN Temp greater or equal to 38C (100.4F), Mild Pain (1 - 3)  HYDROmorphone  Injectable 1 milliGRAM(s) IV Push every 3 hours PRN Severe Pain (7 - 10)  oxyCODONE    IR 10 milliGRAM(s) Oral every 4 hours PRN Moderatre to Severe Pain    PRESENT SYMPTOMS: [ ]Unable to obtain due to poor mentation   Source if other than patient:  [ ]Family   [ ]Team   [ X]All other review of systems negative including pain and dyspnea unless noted below    All components of pain assessment below addressed. Blank spaces indicate that the patient did/could not complete the assessment.  Pain: [x ]yes [ ]no  QOL impact - limits ability to get out of bed  Location - mid back                   Aggravating factors - moving in bed or laying on table for imaging  Quality - dull  Radiation - not reported  Timing- intermittent  Severity (0-10 scale): at times severe 8-10/10, other times almost gone  Minimal acceptable level (0-10 scale):     Dyspnea:                           [x ]None[ ]Mild [ ]Moderate [ ]Severe   Anxiety:                             [ ]None[ ]Mild [ ]Moderate [ ]Severe   Fatigue:                             [ ]None[ ]Mild [x ]Moderate [ ]Severe   Nausea:                             [x ]None[ ]Mild [ ]Moderate [ ]Severe   Loss of appetite:              [x ]None[ ]Mild [ ]Moderate [ ]Severe   Constipation:                    [x ]None[ ]Mild [ ]Moderate [ ]Severe    Other Symptoms:    PHYSICAL EXAM:  Vital Signs Last 24 Hrs  T(C): 36.6 (19 May 2025 13:29), Max: 37.4 (18 May 2025 14:05)  T(F): 97.9 (19 May 2025 13:29), Max: 99.3 (18 May 2025 14:05)  HR: 90 (19 May 2025 13:29) (90 - 101)  BP: 106/70 (19 May 2025 13:29) (106/69 - 121/71)  BP(mean): 82 (19 May 2025 13:29) (81 - 88)  RR: 18 (19 May 2025 13:29) (16 - 18)  SpO2: 96% (19 May 2025 13:29) (96% - 97%)    Parameters below as of 19 May 2025 13:29  Patient On (Oxygen Delivery Method): room air     I&O's Summary    19 May 2025 07:01  -  19 May 2025 13:42  --------------------------------------------------------  IN: 0 mL / OUT: 300 mL / NET: -300 mL    GENERAL:  [x ] No acute distress [ ]Lethargic  [ ]Unarousable  [x ]Verbal  [ ]Non-Verbal [ ]Cachexia    BEHAVIORAL/PSYCH:  [ ]Alert and Oriented x  [ ] Anxiety [ ] Delirium [ ] Agitation [x ] Calm   EYES: [x ] No scleral icterus [ ] Scleral icterus [ ] Closed  ENMT:  [ ]Dry mouth  [ ]No external oral lesions [ ] No external ear or nose lesions  CARDIOVASCULAR:  [ ]Regular [ ]Irregular [ ]Tachy [x ]Not Tachy  [ ]Rodney [ ] Edema [ ] No edema  PULMONARY:  [ ]Tachypnea  [ ]Audible excessive secretions [ ] No labored breathing [ ] labored breathing  GASTROINTESTINAL: [ ]Soft  [ ]Distended  [x ]Not distended [ ]Non tender [ ]Tender  MUSCULOSKELETAL: [ ]No clubbing [ ] clubbing  [ ] No cyanosis [ ] cyanosis  NEUROLOGIC: [ ]No focal deficits  [ x]Follows commands  [ ]Does not follow commands  [ ]Cognitive impairment  [ ]Dysphagia  [ ]Dysarthria  [ ]Paresis   SKIN: [ ] Jaundiced [ ] Non-jaundiced [ ]Rash [ ]No Rash [ ] Warm [ ] Dry  MISC/LINES: [ ] ET tube [ ] Trach [ ]NGT/OGT [x ]PEG [ ]Epps    CRITICAL CARE:  [ ] Shock Present  [ ]Septic [ ]Cardiogenic [ ]Neurologic [ ]Hypovolemic  [ ]  Vasopressors [ ]  Inotropes   [ ]Respiratory failure present [ ]Mechanical ventilation [ ]Non-invasive ventilatory support [ ]High flow  [ ]Acute  [ ]Chronic [ ]Hypoxic  [ ]Hypercarbic [ ]Other  [ ]Other organ failure     LABS: reviewed by me                        8.2    4.95  )-----------( 44       ( 19 May 2025 06:45 )             24.6   05-19    134[L]  |  102  |  17  ----------------------------<  118[H]  4.2   |  20  |  0.6[L]    Ca    8.7      19 May 2025 06:45  Mg     2.5     05-19    TPro  5.5[L]  /  Alb  2.5[L]  /  TBili  1.0  /  DBili  x   /  AST  43[H]  /  ALT  18  /  AlkPhos  308[H]  05-19  PT/INR - ( 18 May 2025 06:50 )   PT: 16.70 sec;   INR: 1.41 ratio    PTT - ( 19 May 2025 06:45 )  PTT:41.2 sec    Urinalysis Basic - ( 19 May 2025 06:45 )  Color: x / Appearance: x / SG: x / pH: x  Gluc: 118 mg/dL / Ketone: x  / Bili: x / Urobili: x   Blood: x / Protein: x / Nitrite: x   Leuk Esterase: x / RBC: x / WBC x   Sq Epi: x / Non Sq Epi: x / Bacteria: x    RADIOLOGY & ADDITIONAL STUDIES: reviewed by me    EKG: reviewed by me    PROTEIN CALORIE MALNUTRITION PRESENT: [ ]mild [ ]moderate [ ]severe [ ]underweight [ ]morbid obesity  https://www.andeal.org/vault/2440/web/files/ONC/Table_Clinical%20Characteristics%20to%20Document%20Malnutrition-White%20JV%20et%20al%202012.pdf    Height (cm): 180.3 (05-14-25 @ 14:45), 180 (05-01-25 @ 11:47), 180.3 (04-05-25 @ 13:20)  Weight (kg): 84.5 (05-14-25 @ 14:45), 89 (05-01-25 @ 11:47), 90.3 (04-03-25 @ 21:22)  BMI (kg/m2): 26 (05-14-25 @ 14:45), 27.5 (05-01-25 @ 11:47), 27.8 (04-05-25 @ 13:20)    [ ]PPSV2 < or = to 30% [ ]significant weight loss  [ ]poor nutritional intake  [ ]anasarca      [ ]Artificial Nutrition      REFERRALS:   [ ]Chaplaincy  [ ]Hospice  [ ]Child Life  [ ]Social Work  [ ]Case management [ ]Holistic Therapy     Patient discussed with primary medical team MD  Palliative care education provided to patient and/or family    Goals of Care Document:    HPI:  59M w/ Esophageal Cancer (dx Apr2025) s/p stent, CAD s/p [id number] (Jan2024), Afib not on AC, HLD is admitted for weakness, acute blood loss anemia/melena requiring pRBC transfusion. Patient completed endoscopy 5/14 for removal of esophageal stent (due to discomfort) and for PEG placement. Palliative consulted to assist w/ cancer-related pain management.    Interval History  - 24hr Opioid Use (8am-8am): Oxycodone IR 10mg x4  - patient has been experiencing intermittent severe pain in his mid back (no radiation, dull, 8-10/10 severity at times and other times between 0.5/10 severity. worse based upon positioning in bed. He feels it becomes worse at night and he has difficulty obtaining additional prns at that time when he calls for rn.  - no sob, n/v/d. no reported constipation (last bm 1 d prior). He reports having nightmare last night but cannot recall full experience. He feels he is losing his sense of days with everything going on.    ADVANCE DIRECTIVES:    [x ] Full Code [ ] DNR  MOLST  [ ]  Living Will  [ ]   DECISION MAKER(s):  [ ] Health Care Proxy(s)  [ x] Surrogate(s)  [ ] Guardian           Name(s): Phone Number(s):  Wife: Shannan Reddy    BASELINE (I)ADL(s) (prior to admission):  [place]: [ ]Total  [ ] Moderate [x ]Dependent    Allergies  penicillins (Unknown)    MEDICATIONS  (STANDING):  atorvastatin 80 milliGRAM(s) Oral at bedtime  cefepime   IVPB 2000 milliGRAM(s) IV Intermittent every 12 hours  cefepime   IVPB      chlorhexidine 2% Cloths 1 Application(s) Topical <User Schedule>  lactated ringers. 1000 milliLiter(s) (60 mL/Hr) IV Continuous <Continuous>  lidocaine   4% Patch 1 Patch Transdermal daily  metoprolol tartrate 12.5 milliGRAM(s) Oral two times a day  OLANZapine 2.5 milliGRAM(s) Oral daily  oxyCODONE  ER Tablet 20 milliGRAM(s) Oral every 12 hours  pantoprazole  Injectable 40 milliGRAM(s) IV Push every 12 hours  polyethylene glycol 3350 17 Gram(s) Oral daily    MEDICATIONS  (PRN):  acetaminophen     Tablet .. 650 milliGRAM(s) Oral every 6 hours PRN Temp greater or equal to 38C (100.4F), Mild Pain (1 - 3)  HYDROmorphone  Injectable 1 milliGRAM(s) IV Push every 3 hours PRN Severe Pain (7 - 10)  oxyCODONE    IR 10 milliGRAM(s) Oral every 4 hours PRN Moderatre to Severe Pain    PRESENT SYMPTOMS: [ ]Unable to obtain due to poor mentation   Source if other than patient:  [ ]Family   [ ]Team   [ X]All other review of systems negative including pain and dyspnea unless noted below    All components of pain assessment below addressed. Blank spaces indicate that the patient did/could not complete the assessment.  Pain: [x ]yes [ ]no  QOL impact - limits ability to get out of bed  Location - mid back                   Aggravating factors - moving in bed or laying on table for imaging  Quality - dull  Radiation - not reported  Timing- intermittent  Severity (0-10 scale): at times severe 8-10/10, other times almost gone  Minimal acceptable level (0-10 scale):     Dyspnea:                           [x ]None[ ]Mild [ ]Moderate [ ]Severe   Anxiety:                             [ ]None[ ]Mild [ ]Moderate [ ]Severe   Fatigue:                             [ ]None[ ]Mild [x ]Moderate [ ]Severe   Nausea:                             [x ]None[ ]Mild [ ]Moderate [ ]Severe   Loss of appetite:              [x ]None[ ]Mild [ ]Moderate [ ]Severe   Constipation:                    [x ]None[ ]Mild [ ]Moderate [ ]Severe    Other Symptoms:    PHYSICAL EXAM:  Vital Signs Last 24 Hrs  T(C): 36.6 (19 May 2025 13:29), Max: 37.4 (18 May 2025 14:05)  T(F): 97.9 (19 May 2025 13:29), Max: 99.3 (18 May 2025 14:05)  HR: 90 (19 May 2025 13:29) (90 - 101)  BP: 106/70 (19 May 2025 13:29) (106/69 - 121/71)  BP(mean): 82 (19 May 2025 13:29) (81 - 88)  RR: 18 (19 May 2025 13:29) (16 - 18)  SpO2: 96% (19 May 2025 13:29) (96% - 97%)    Parameters below as of 19 May 2025 13:29  Patient On (Oxygen Delivery Method): room air     I&O's Summary    19 May 2025 07:01  -  19 May 2025 13:42  --------------------------------------------------------  IN: 0 mL / OUT: 300 mL / NET: -300 mL    GENERAL:  [x ] No acute distress [ ]Lethargic  [ ]Unarousable  [x ]Verbal  [ ]Non-Verbal [ ]Cachexia    BEHAVIORAL/PSYCH:  [ ]Alert and Oriented x  [ ] Anxiety [ ] Delirium [ ] Agitation [x ] Calm   EYES: [x ] No scleral icterus [ ] Scleral icterus [ ] Closed  ENMT:  [ ]Dry mouth  [ ]No external oral lesions [ ] No external ear or nose lesions  CARDIOVASCULAR:  [ ]Regular [ ]Irregular [ ]Tachy [x ]Not Tachy  [ ]Rodney [ ] Edema [ ] No edema  PULMONARY:  [ ]Tachypnea  [ ]Audible excessive secretions [ ] No labored breathing [ ] labored breathing  GASTROINTESTINAL: [ ]Soft  [ ]Distended  [x ]Not distended [ ]Non tender [ ]Tender  MUSCULOSKELETAL: [ ]No clubbing [ ] clubbing  [ ] No cyanosis [ ] cyanosis  NEUROLOGIC: [ ]No focal deficits  [ x]Follows commands  [ ]Does not follow commands  [ ]Cognitive impairment  [ ]Dysphagia  [ ]Dysarthria  [ ]Paresis   SKIN: [ ] Jaundiced [ ] Non-jaundiced [ ]Rash [ ]No Rash [ ] Warm [ ] Dry  MISC/LINES: [ ] ET tube [ ] Trach [ ]NGT/OGT [x ]PEG [ ]Epps    CRITICAL CARE:  [ ] Shock Present  [ ]Septic [ ]Cardiogenic [ ]Neurologic [ ]Hypovolemic  [ ]  Vasopressors [ ]  Inotropes   [ ]Respiratory failure present [ ]Mechanical ventilation [ ]Non-invasive ventilatory support [ ]High flow  [ ]Acute  [ ]Chronic [ ]Hypoxic  [ ]Hypercarbic [ ]Other  [ ]Other organ failure     LABS: reviewed by me BMP grossly WNL, CBC shows anemia, LFTs shows low total protein and albumin                        8.2    4.95  )-----------( 44       ( 19 May 2025 06:45 )             24.6   05-19    134[L]  |  102  |  17  ----------------------------<  118[H]  4.2   |  20  |  0.6[L]    Ca    8.7      19 May 2025 06:45  Mg     2.5     05-19    TPro  5.5[L]  /  Alb  2.5[L]  /  TBili  1.0  /  DBili  x   /  AST  43[H]  /  ALT  18  /  AlkPhos  308[H]  05-19  PT/INR - ( 18 May 2025 06:50 )   PT: 16.70 sec;   INR: 1.41 ratio    PTT - ( 19 May 2025 06:45 )  PTT:41.2 sec    Urinalysis Basic - ( 19 May 2025 06:45 )  Color: x / Appearance: x / SG: x / pH: x  Gluc: 118 mg/dL / Ketone: x  / Bili: x / Urobili: x   Blood: x / Protein: x / Nitrite: x   Leuk Esterase: x / RBC: x / WBC x   Sq Epi: x / Non Sq Epi: x / Bacteria: x    RADIOLOGY & ADDITIONAL STUDIES: reviewed by me    EKG: reviewed by me    PROTEIN CALORIE MALNUTRITION PRESENT: [ ]mild [ ]moderate [ ]severe [ ]underweight [ ]morbid obesity  https://www.andeal.org/vault/2440/web/files/ONC/Table_Clinical%20Characteristics%20to%20Document%20Malnutrition-White%20JV%20et%20al%202012.pdf    Height (cm): 180.3 (05-14-25 @ 14:45), 180 (05-01-25 @ 11:47), 180.3 (04-05-25 @ 13:20)  Weight (kg): 84.5 (05-14-25 @ 14:45), 89 (05-01-25 @ 11:47), 90.3 (04-03-25 @ 21:22)  BMI (kg/m2): 26 (05-14-25 @ 14:45), 27.5 (05-01-25 @ 11:47), 27.8 (04-05-25 @ 13:20)    [ ]PPSV2 < or = to 30% [ ]significant weight loss  [ ]poor nutritional intake  [ ]anasarca      [ ]Artificial Nutrition      REFERRALS:   [ ]Chaplaincy  [ ]Hospice  [ ]Child Life  [ ]Social Work  [ ]Case management [ ]Holistic Therapy     Patient discussed with primary medical team MD  Palliative care education provided to patient and/or family    Goals of Care Document:

## 2025-05-19 NOTE — PROGRESS NOTE ADULT - ASSESSMENT
59M w/ Esophageal Cancer (dx Apr2025) s/p stent, CAD s/p [id number] (Jan2024), Afib not on AC, HLD is admitted for weakness, acute blood loss anemia/melena requiring pRBC transfusion. Patient completed endoscopy 5/14 for removal of esophageal stent (due to discomfort) and for PEG placement. Palliative consulted to assist w/ cancer-related pain management. Significantly, MR C/T/L spine has identified diffusely metastatic disease in C/T/L spine and sacrum/iliac bones.    - See [facility] notation 5/19. Patient was informed of metastases identified on MR spine imaging and the impact that has on his life and approach to treatment. He and his wife are overwhelmed and processing the information. Discussion of advance directives is deferred for now as it was not appropriate to introduce the decisions  just yet. First we plan on improving his pain regimen and will need to see where he is in terms of processing the terminal prognosis of metastatic cancer.  - For pain regimen, Plan to start Oxycodone ER 20mg q12hr (1st dose 6pm). For breakthrough pain, continue with Hydromorphone 1mg IV q3hr PRN Severe pain (will d/c oxycodone IR prn for now). Based upon, opioids requirements will determine appropriate titration.  - Anorexia is associated w/ his cancer. He is already being treated w/ olanzapine 2.5mg d which can be continued for now and titrated as appropriate as an outpatient.  - patient evaluated w/ Palliative Attending Dr. Kavya Davis MD  Palliative Medicine Fellow  [facility]   [phone]

## 2025-05-19 NOTE — PROGRESS NOTE ADULT - SUBJECTIVE AND OBJECTIVE BOX
JONAS CELESTIN  59y, Male    All available historical data reviewed    OVERNIGHT EVENTS:  none  no fevers  dysphagia  ROS:  General: Denies rigors, nightsweats  HEENT: Denies headache, rhinorrhea, sore throat, eye pain  CV: Denies CP, palpitations  PULM: Denies wheezing, hemoptysis  GI: Denies hematemesis, hematochezia, melena  : Denies discharge, hematuria  MSK: Denies arthralgias, myalgias  SKIN: Denies rash, lesions  NEURO: weakness  PSYCH: Denies depression, anxiety    VITALS:  T(F): 99, Max: 99.3 (05-18-25 @ 14:05)  HR: 101  BP: 106/69  RR: 18Vital Signs Last 24 Hrs  T(C): 37.2 (19 May 2025 04:00), Max: 37.4 (18 May 2025 14:05)  T(F): 99 (19 May 2025 04:00), Max: 99.3 (18 May 2025 14:05)  HR: 101 (19 May 2025 04:00) (91 - 101)  BP: 106/69 (19 May 2025 04:00) (106/69 - 121/71)  BP(mean): 82 (19 May 2025 04:00) (81 - 88)  RR: 18 (19 May 2025 04:00) (18 - 18)  SpO2: 96% (19 May 2025 04:00) (96% - 97%)    Parameters below as of 19 May 2025 04:00  Patient On (Oxygen Delivery Method): room air        TESTS & MEASUREMENTS:                        8.2    4.95  )-----------( 44       ( 19 May 2025 06:45 )             24.6     05-19    134[L]  |  102  |  17  ----------------------------<  118[H]  4.2   |  20  |  0.6[L]    Ca    8.7      19 May 2025 06:45  Mg     2.5     05-19    TPro  5.5[L]  /  Alb  2.5[L]  /  TBili  1.0  /  DBili  x   /  AST  43[H]  /  ALT  18  /  AlkPhos  308[H]  05-19    LIVER FUNCTIONS - ( 19 May 2025 06:45 )  Alb: 2.5 g/dL / Pro: 5.5 g/dL / ALK PHOS: 308 U/L / ALT: 18 U/L / AST: 43 U/L / GGT: x             Culture - Blood (collected 05-17-25 @ 11:52)  Source: Blood None  Preliminary Report (05-19-25 @ 03:02):    No growth at 24 hours    Culture - Blood (collected 05-16-25 @ 16:00)  Source: Blood Blood-Peripheral  Preliminary Report (05-18-25 @ 22:01):    No growth at 48 Hours    Culture - Blood (collected 05-16-25 @ 16:00)  Source: Blood Blood-Peripheral  Preliminary Report (05-18-25 @ 22:01):    No growth at 48 Hours    Urinalysis with Rflx Culture (collected 05-12-25 @ 17:55)      Urinalysis Basic - ( 19 May 2025 06:45 )    Color: x / Appearance: x / SG: x / pH: x  Gluc: 118 mg/dL / Ketone: x  / Bili: x / Urobili: x   Blood: x / Protein: x / Nitrite: x   Leuk Esterase: x / RBC: x / WBC x   Sq Epi: x / Non Sq Epi: x / Bacteria: x          Social History:  Tobacco Use: No  Alcohol Use: No  Drug Use: No    RADIOLOGY & ADDITIONAL TESTS:  Personal review of radiological diagnostics performed  Echo and EKG results noted when applicable.     MEDICATIONS:  acetaminophen     Tablet .. 650 milliGRAM(s) Oral every 6 hours PRN  atorvastatin 80 milliGRAM(s) Oral at bedtime  cefepime   IVPB 2000 milliGRAM(s) IV Intermittent every 12 hours  cefepime   IVPB      chlorhexidine 2% Cloths 1 Application(s) Topical <User Schedule>  HYDROmorphone  Injectable 1 milliGRAM(s) IV Push every 3 hours PRN  lactated ringers. 1000 milliLiter(s) IV Continuous <Continuous>  lidocaine   4% Patch 1 Patch Transdermal daily  metoprolol tartrate 12.5 milliGRAM(s) Oral two times a day  OLANZapine 2.5 milliGRAM(s) Oral daily  oxyCODONE    IR 10 milliGRAM(s) Oral every 4 hours PRN  pantoprazole  Injectable 40 milliGRAM(s) IV Push every 12 hours  polyethylene glycol 3350 17 Gram(s) Oral daily      ANTIBIOTICS:  cefepime   IVPB 2000 milliGRAM(s) IV Intermittent every 12 hours  cefepime   IVPB

## 2025-05-19 NOTE — PROGRESS NOTE ADULT - ASSESSMENT
· Assessment	  59y male with PMHx of HTN, hiatal hernia, HLD, CAD s/p CABG in Jan 2024, paroxysmal A. fib not on AC, former smoker (30 pack years, quit 1yr ago), recently diagnosed with colitis and esophageal adenocarcinoma s/p stent placement who presents for weakness.     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   Possible LLL pneumonia?    < from: CT Abdomen and Pelvis w/ Oral Cont and w/ IV Cont (05.18.25 @ 05:47) >  There is thickening of the subcutaneous fat and musculature at the entry of the G-tube. G-tube within the stomach. No drainable collection.  There is a hiatal hernia with significant thickening of the wall of the distal esophagus and herniated stomach. Active inflammatory process may   be present.  3.9 cm right common iliac artery aneurysm.  < end of copied text >    # Sepsis  Febrile since 5/15, Tmax 101.5  WBC Count: 5.49 (05-17-25 @ 07:00)  WBC Count: 4.45 (05-16-25 @ 04:30)  WBC Count: 6.15 (05-15-25 @ 05:50)  WBC Count: 6.18 (05-14-25 @ 20:00)  Procal 0.72  5/16 BCX NG  5/17 BCx NG  D-dimer 23497  COVID/Flu/RSV PCR negative     5/16 [facility] Duplex Lower Ext Vein Scan, Bilat  : NG    # Acute anemia  # Metastatic esophageal cancer    Antibiotics:  Zosyn 5/16  Cefepime 5/16 ->      -continue with IV Cefepime 2g q12hrs  -FU with Hem/Onc    Discussion of management/test results( independently interpretated by me ) /antibiotic regimen  with external/primary medical team. Dr Iglesias

## 2025-05-19 NOTE — PROGRESS NOTE ADULT - ATTENDING COMMENTS
Patient seen at bedside with wife  As above, discussed MRI findings and GOC  Plan for patient and family to discuss treatment options with oncology  Will start oxycodone ER 20mg BID today  continue dilaudid 1mg iV q3h PRN for pain  WIll follow    Discussed with Dr. Harris INMAN

## 2025-05-19 NOTE — CONSULT NOTE ADULT - CONSULT REASON
bone marrow biopsy
weakness
Ana
CAD
Evaluate for radiation therapy
b/l LE weakness, facial drop. MRI showing dural thickening, possible mets. Evaluate for need for LP. hx of esophageal cancer.
Fever
esophageal ca
59M w/ Esophageal Cancer; Cancer-associated pain

## 2025-05-19 NOTE — CONSULT NOTE ADULT - PROVIDER SPECIALTY LIST ADULT
Heme/Onc
Neurology
Physiatry
Rad Onc
Cardiology
Intervent Radiology
Infectious Disease
Gastroenterology
Palliative Care

## 2025-05-19 NOTE — PROGRESS NOTE ADULT - SUBJECTIVE AND OBJECTIVE BOX
Patient is a 59y old  Male who presents with a chief complaint of Progressive weakness (13 May 2025 17:51)    INTERVAL HPI/OVERNIGHT EVENTS: Patient was examined and seen at bedside. This morning pt is resting in bed and reports no event. Back pain is stable. No further bleeding. Wife at bedside. No further fevers. Not much cough, dysuria or diarrhea.  ROS: Denies CP, SOB, AP, new weakness  All other systems reviewed and are within normal limits.  InitialHPI:  59y male with PMHx of HTN, hiatal hernia, HLD, CAD s/p CABG in Jan 2024, paroxysmal A. fib not on AC, former smoker (30 pack years, quit 1yr ago), recently diagnosed with colitis and esophageal adenocarcinoma s/p stent placement who presents for weakness. The patient was recently discharged from [facility] on 5/6 , and has been progressively deteriorating. States he has been unable to tolerate p.o. and has been unable to walk over the past few days. Wife at bedside reports that he has not been able to follow-up with oncology due to the weakness.  On ROS the patient also states he has been having black tarry stools that started 1 day prior to his EGD. He had 3 black BMs today. He denies any mucosal bleeding or blood in the urine.  He denies any fevers, chills, chest pain, shortness of breath, palpitations, headache, vision changes, nausea, vomiting, abdominal pain, hematuria, falls.  He is supposed to have PET scan tomorrow and follow with Dr. Sanchez in 2 days.    T(F): 98.5 (05-12-25 @ 21:05), Max: 98.6 (05-12-25 @ 19:48)  HR: 97 (05-12-25 @ 21:05) (88 - 104)  BP: 111/58 (05-12-25 @ 21:05) (111/58 - 132/75)  RR: 18 (05-12-25 @ 21:05) (18 - 20)  SpO2: 99% (05-12-25 @ 21:05) (98% - 100%) on RA    Labs: Hg 7.6 (baseline ~10), MCV 78, slight schistocytes on smear,  Plt 92, PT/INR >40/6.2, PTT 51.8, ALK phos 273.      Imaging:    CT Chest w/ IV Cont  (4/26 - prior admission)  IMPRESSION:  1.  No definite evidence of thoracic metastatic disease.  2.  Small bilateral pleural effusions.  3.  Marked circumferential thickening of the distal esophagus/hiatal   hernia could be related to provided history of suspected esophageal   cancer versus esophagitis. (12 May 2025 21:41)    PAST MEDICAL & SURGICAL HISTORY:  HTN (hypertension)      Hiatal hernia      HLD (hyperlipidemia)      CAD (coronary artery disease)      Paroxysmal atrial fibrillation      History of colitis      S/P CABG (coronary artery bypass graft)      General: NAD, AA0x3, ?Lt facial droop  HEENT:  EOMI, no LAD  CV: S1 S2  Resp: decreased breath sounds at bases  GI: mild tenderness around PEG site w/out erythema/ND/S +BS; +PEG  MS: no clubbing/cyanosis/edema, + pulses b/l  Neuro: LE 2/5 distally, 4/5 prox. UE 5-/5          Home Medications:  clopidogrel 75 mg oral tablet: 1 tab(s) orally once a day (13 May 2025 01:36)  Lipitor 80 mg oral tablet: 1 tab(s) orally once a day (13 May 2025 01:36)  Metoprolol Tartrate 25 mg oral tablet: 0.5 tab(s) orally 2 times a day (13 May 2025 01:36)  Multiple Vitamins oral tablet, chewable: 1 tab(s) orally once a day (13 May 2025 01:36)  Pepcid 20 mg oral tablet: 1 tab(s) orally once a day (13 May 2025 01:36)    MEDICATIONS  (STANDING):  atorvastatin 80 milliGRAM(s) Oral at bedtime  cefepime   IVPB 2000 milliGRAM(s) IV Intermittent every 12 hours  cefepime   IVPB      chlorhexidine 2% Cloths 1 Application(s) Topical <User Schedule>  lactated ringers. 1000 milliLiter(s) (60 mL/Hr) IV Continuous <Continuous>  lidocaine   4% Patch 1 Patch Transdermal daily  metoprolol tartrate 12.5 milliGRAM(s) Oral two times a day  OLANZapine 2.5 milliGRAM(s) Oral daily  oxyCODONE  ER Tablet 20 milliGRAM(s) Oral every 12 hours  pantoprazole  Injectable 40 milliGRAM(s) IV Push every 12 hours  polyethylene glycol 3350 17 Gram(s) Oral daily    MEDICATIONS  (PRN):  acetaminophen     Tablet .. 650 milliGRAM(s) Oral every 6 hours PRN Temp greater or equal to 38C (100.4F), Mild Pain (1 - 3)  HYDROmorphone  Injectable 1 milliGRAM(s) IV Push every 3 hours PRN Severe Pain (7 - 10)    Vital Signs Last 24 Hrs  T(C): 36.6 (19 May 2025 13:29), Max: 37.2 (18 May 2025 19:46)  T(F): 97.9 (19 May 2025 13:29), Max: 99 (19 May 2025 04:00)  HR: 90 (19 May 2025 13:29) (90 - 101)  BP: 106/70 (19 May 2025 13:29) (106/69 - 121/71)  BP(mean): 82 (19 May 2025 13:29) (81 - 88)  RR: 18 (19 May 2025 13:29) (16 - 18)  SpO2: 96% (19 May 2025 13:29) (96% - 97%)    Parameters below as of 19 May 2025 13:29  Patient On (Oxygen Delivery Method): room air      CAPILLARY BLOOD GLUCOSE        LABS:                        8.2    4.95  )-----------( 44       ( 19 May 2025 06:45 )             24.6     05-19    134[L]  |  102  |  17  ----------------------------<  118[H]  4.2   |  20  |  0.6[L]    Ca    8.7      19 May 2025 06:45  Mg     2.5     05-19    TPro  5.5[L]  /  Alb  2.5[L]  /  TBili  1.0  /  DBili  x   /  AST  43[H]  /  ALT  18  /  AlkPhos  308[H]  05-19    LIVER FUNCTIONS - ( 19 May 2025 06:45 )  Alb: 2.5 g/dL / Pro: 5.5 g/dL / ALK PHOS: 308 U/L / ALT: 18 U/L / AST: 43 U/L / GGT: x               PT/INR - ( 18 May 2025 06:50 )   PT: 16.70 sec;   INR: 1.41 ratio         PTT - ( 19 May 2025 06:45 )  PTT:41.2 sec  Urinalysis Basic - ( 19 May 2025 06:45 )    Color: x / Appearance: x / SG: x / pH: x  Gluc: 118 mg/dL / Ketone: x  / Bili: x / Urobili: x   Blood: x / Protein: x / Nitrite: x   Leuk Esterase: x / RBC: x / WBC x   Sq Epi: x / Non Sq Epi: x / Bacteria: x              Culture - Blood (collected 17 May 2025 11:52)  Source: Blood None  Preliminary Report (19 May 2025 03:02):    No growth at 24 hours    Culture - Blood (collected 16 May 2025 16:00)  Source: Blood Blood-Peripheral  Preliminary Report (18 May 2025 22:01):    No growth at 48 Hours    Culture - Blood (collected 16 May 2025 16:00)  Source: Blood Blood-Peripheral  Preliminary Report (18 May 2025 22:01):    No growth at 48 Hours      Consultant Notes Reviewed:  [x ] YES  [ ] NO  Care Discussed with Consultants/Other Providers/ Housestaff [ x] YES  [ ] NO  Radiology, labs, EKGs, new studies personally reviewed.

## 2025-05-19 NOTE — CONSULT NOTE ADULT - CONSULT REQUESTED BY NAME
Medicine
primary team
Hospitalist Service
primary team
Dr. French
Saad Iglesias
Kosta Duron
medicine
Dr. Mynor French

## 2025-05-19 NOTE — PROGRESS NOTE ADULT - WHAT MATTERS MOST
Being with his wife, improving his ability to ambulate so that he spend time at home and outdoors, working on pain regimen, following up with med-onc to determine what treatments are available.

## 2025-05-19 NOTE — PROGRESS NOTE ADULT - SUBJECTIVE AND OBJECTIVE BOX
SUBJECTIVE/OVERNIGHT EVENTS  Today is hospital day 7d. This morning patient was seen and examined at bedside, resting comfortably in bed. No acute or major events overnight.      MEDICATIONS  STANDING MEDICATIONS  atorvastatin 80 milliGRAM(s) Oral at bedtime  cefepime   IVPB 2000 milliGRAM(s) IV Intermittent every 12 hours  cefepime   IVPB      chlorhexidine 2% Cloths 1 Application(s) Topical <User Schedule>  lactated ringers. 1000 milliLiter(s) IV Continuous <Continuous>  lidocaine   4% Patch 1 Patch Transdermal daily  metoprolol tartrate 12.5 milliGRAM(s) Oral two times a day  OLANZapine 2.5 milliGRAM(s) Oral daily  pantoprazole  Injectable 40 milliGRAM(s) IV Push every 12 hours  polyethylene glycol 3350 17 Gram(s) Oral daily    PRN MEDICATIONS  acetaminophen     Tablet .. 650 milliGRAM(s) Oral every 6 hours PRN  HYDROmorphone  Injectable 1 milliGRAM(s) IV Push every 3 hours PRN  oxyCODONE    IR 10 milliGRAM(s) Oral every 4 hours PRN    VITALS  T(F): 99 (05-19-25 @ 04:00), Max: 99.3 (05-18-25 @ 14:05)  HR: 101 (05-19-25 @ 04:00) (91 - 101)  BP: 109/65 (05-19-25 @ 10:54) (106/69 - 121/71)  RR: 16 (05-19-25 @ 10:54) (16 - 18)  SpO2: 96% (05-19-25 @ 04:00) (96% - 97%)    PHYSICAL EXAM  GENERAL: NAD, lying in bed comfortably  HEAD:  Atraumatic, normocephalic  EYES: EOMI, PERRLA, conjunctiva and sclera clear  ENT: Moist mucous membranes  NECK: Supple   HEART: Regular rate and rhythm, no murmurs  LUNGS: Unlabored respirations.  Clear to auscultation bilaterally, no wheezing  ABDOMEN: Soft, nondistended, +BS, +PEG, tenderness around PEG site  EXTREMITIES: No clubbing, cyanosis, or edema  NERVOUS SYSTEM:  A&Ox3, decreased b/l LE strength  SKIN: No rashes or lesions    LABS             8.2    4.95  )-----------( 44       ( 05-19-25 @ 06:45 )             24.6     134  |  102  |  17  -------------------------<  118   05-19-25 @ 06:45  4.2  |  20  |  0.6    Ca      8.7     05-19-25 @ 06:45  Mg     2.5     05-19-25 @ 06:45    TPro  5.5  /  Alb  2.5  /  TBili  1.0  /  DBili  x   /  AST  43  /  ALT  18  /  AlkPhos  308  /  GGT  x     05-19-25 @ 06:45    PT/INR - ( 05-18-25 @ 06:50 )   PT: 16.70 sec[H];   INR: 1.41 ratio[H]  PTT - ( 05-19-25 @ 06:45 )  PTT:41.2 sec  VITALS:   T(C): 37.2 (05-19-25 @ 04:00), Max: 37.4 (05-18-25 @ 14:05)  HR: 101 (05-19-25 @ 04:00) (91 - 101)  BP: 109/65 (05-19-25 @ 10:54) (106/69 - 121/71)  RR: 16 (05-19-25 @ 10:54) (16 - 18)  SpO2: 96% (05-19-25 @ 04:00) (96% - 97%)    GENERAL: NAD, lying in bed comfortably  HEAD:  Atraumatic, normocephalic  EYES: EOMI, PERRLA, conjunctiva and sclera clear  ENT: Moist mucous membranes  NECK: Supple, no JVD  HEART: Regular rate and rhythm, no murmurs, rubs, or gallops  LUNGS: Unlabored respirations.  Clear to auscultation bilaterally, no crackles, wheezing, or rhonchi  ABDOMEN: Soft, nontender, nondistended, +BS  EXTREMITIES: 2+ peripheral pulses bilaterally. No clubbing, cyanosis, or edema  NERVOUS SYSTEM:  A&Ox3, no focal deficits   SKIN: No rashes or lesions    Urinalysis Basic - ( 19 May 2025 06:45 )    Color: x / Appearance: x / SG: x / pH: x  Gluc: 118 mg/dL / Ketone: x  / Bili: x / Urobili: x   Blood: x / Protein: x / Nitrite: x   Leuk Esterase: x / RBC: x / WBC x   Sq Epi: x / Non Sq Epi: x / Bacteria: x          Culture - Blood (collected 17 May 2025 11:52)  Source: Blood None  Preliminary Report (19 May 2025 03:02):    No growth at 24 hours    Culture - Blood (collected 16 May 2025 16:00)  Source: Blood Blood-Peripheral  Preliminary Report (18 May 2025 22:01):    No growth at 48 Hours    Culture - Blood (collected 16 May 2025 16:00)  Source: Blood Blood-Peripheral  Preliminary Report (18 May 2025 22:01):    No growth at 48 Hours      IMAGING

## 2025-05-19 NOTE — CONSULT NOTE ADULT - SUBJECTIVE AND OBJECTIVE BOX
INTERVENTIONAL RADIOLOGY CONSULT:     Procedure Requested: bone marrow biopsy     HPI:  59y male with PMHx of HTN, hiatal hernia, HLD, CAD s/p CABG in Jan 2024, paroxysmal A. fib not on AC, former smoker (30 pack years, quit 1yr ago), recently diagnosed with colitis and esophageal adenocarcinoma s/p stent placement who presents for weakness. The patient was recently discharged from [facility] on 5/6 , and has been progressively deteriorating. States he has been unable to tolerate p.o. and has been unable to walk over the past few days. Wife at bedside reports that he has not been able to follow-up with oncology due to the weakness.  On ROS the patient also states he has been having black tarry stools that started 1 day prior to his EGD. He had 3 black BMs today. He denies any mucosal bleeding or blood in the urine.  He denies any fevers, chills, chest pain, shortness of breath, palpitations, headache, vision changes, nausea, vomiting, abdominal pain, hematuria, falls.  He is supposed to have PET scan tomorrow and follow with Dr. Sanchez in 2 days.    T(F): 98.5 (05-12-25 @ 21:05), Max: 98.6 (05-12-25 @ 19:48)  HR: 97 (05-12-25 @ 21:05) (88 - 104)  BP: 111/58 (05-12-25 @ 21:05) (111/58 - 132/75)  RR: 18 (05-12-25 @ 21:05) (18 - 20)  SpO2: 99% (05-12-25 @ 21:05) (98% - 100%) on RA    Labs: Hg 7.6 (baseline ~10), MCV 78, slight schistocytes on smear,  Plt 92, PT/INR >40/6.2, PTT 51.8, ALK phos 273.      Imaging:    CT Chest w/ IV Cont  (4/26 - prior admission)  IMPRESSION:  1.  No definite evidence of thoracic metastatic disease.  2.  Small bilateral pleural effusions.  3.  Marked circumferential thickening of the distal esophagus/hiatal   hernia could be related to provided history of suspected esophageal   cancer versus esophagitis. (12 May 2025 21:41)      PAST MEDICAL & SURGICAL HISTORY:  HTN (hypertension)      Hiatal hernia      HLD (hyperlipidemia)      CAD (coronary artery disease)      Paroxysmal atrial fibrillation      History of colitis      S/P CABG (coronary artery bypass graft)          MEDICATIONS  (STANDING):  atorvastatin 80 milliGRAM(s) Oral at bedtime  cefepime   IVPB 2000 milliGRAM(s) IV Intermittent every 12 hours  cefepime   IVPB      chlorhexidine 2% Cloths 1 Application(s) Topical <User Schedule>  lactated ringers. 1000 milliLiter(s) (60 mL/Hr) IV Continuous <Continuous>  lidocaine   4% Patch 1 Patch Transdermal daily  metoprolol tartrate 12.5 milliGRAM(s) Oral two times a day  OLANZapine 2.5 milliGRAM(s) Oral daily  pantoprazole  Injectable 40 milliGRAM(s) IV Push every 12 hours  polyethylene glycol 3350 17 Gram(s) Oral daily    MEDICATIONS  (PRN):  acetaminophen     Tablet .. 650 milliGRAM(s) Oral every 6 hours PRN Temp greater or equal to 38C (100.4F), Mild Pain (1 - 3)  HYDROmorphone  Injectable 1 milliGRAM(s) IV Push every 3 hours PRN Severe Pain (7 - 10)  oxyCODONE    IR 10 milliGRAM(s) Oral every 4 hours PRN Moderatre to Severe Pain      Allergies    penicillins (Unknown)    Intolerances          FAMILY HISTORY:  FHx: lung cancer (Father)        Physical Exam:   Vital Signs Last 24 Hrs  T(C): 37.2 (19 May 2025 04:00), Max: 37.4 (18 May 2025 14:05)  T(F): 99 (19 May 2025 04:00), Max: 99.3 (18 May 2025 14:05)  HR: 101 (19 May 2025 04:00) (91 - 101)  BP: 109/65 (19 May 2025 10:54) (106/69 - 121/71)  BP(mean): 82 (19 May 2025 04:00) (81 - 88)  RR: 16 (19 May 2025 10:54) (16 - 18)  SpO2: 96% (19 May 2025 04:00) (96% - 97%)    Parameters below as of 19 May 2025 04:00  Patient On (Oxygen Delivery Method): room air          Labs:                         8.2    4.95  )-----------( 44       ( 19 May 2025 06:45 )             24.6     05-19    134[L]  |  102  |  17  ----------------------------<  118[H]  4.2   |  20  |  0.6[L]    Ca    8.7      19 May 2025 06:45  Mg     2.5     05-19    TPro  5.5[L]  /  Alb  2.5[L]  /  TBili  1.0  /  DBili  x   /  AST  43[H]  /  ALT  18  /  AlkPhos  308[H]  05-19    PT/INR - ( 18 May 2025 06:50 )   PT: 16.70 sec;   INR: 1.41 ratio         PTT - ( 19 May 2025 06:45 )  PTT:41.2 sec    Pertinent labs:                      8.2    4.95  )-----------( 44       ( 19 May 2025 06:45 )             24.6       05-19    134[L]  |  102  |  17  ----------------------------<  118[H]  4.2   |  20  |  0.6[L]    Ca    8.7      19 May 2025 06:45  Mg     2.5     05-19    TPro  5.5[L]  /  Alb  2.5[L]  /  TBili  1.0  /  DBili  x   /  AST  43[H]  /  ALT  18  /  AlkPhos  308[H]  05-19      PT/INR - ( 18 May 2025 06:50 )   PT: 16.70 sec;   INR: 1.41 ratio         PTT - ( 19 May 2025 06:45 )  PTT:41.2 sec    Radiology & Additional Studies:     Radiology imaging reviewed.       ASSESSMENT AND PLAN:    59y male with PMHx of HTN, hiatal hernia, HLD, CAD s/p CABG in Jan 2024, paroxysmal A. fib not on AC, former smoker (30 pack years, quit 1yr ago), recently diagnosed with colitis and esophageal adenocarcinoma s/p stent placement who presents for weakness. The patient was recently discharged from [facility] on 5/6 , and has been progressively deteriorating. Found to have hgb 7.6 lower than last admission reported melena, thrombocytopenia w/ elevated INR/PTT.   Diffuse metastatic dz of C/T/L spine on MRI  Acute on chronic lower back pain   Med Onc recommends Bone Scan and BM Bx by IR  Neuro recommends LP    Neuro IR will perform LP today, 5/19/2025.   IR will perform bone marrow biopsy tomorrow, 5/20/2025.     -NPO after midnight  -draw CBC/CMP/Coags prior to procedure  -hold anticoagulation until after procedure    Please call Interventional Radiology with questions or concerns:   - M-F [id number]: [phone]   - All other hours: [phone]

## 2025-05-19 NOTE — PROGRESS NOTE ADULT - ASSESSMENT
59y male with PMHx of HTN, hiatal hernia, HLD, CAD s/p CABG in Jan 2024, paroxysmal A. fib not on AC, former smoker (30 pack years, quit 1yr ago), recently diagnosed with colitis and esophageal adenocarcinoma s/p stent placement who presents for weakness. The patient was recently discharged from [facility] on 5/6 , and has been progressively deteriorating. Found to have hgb 7.6 lower than last admission reported melena, thrombocytopenia w/ elevated INR/PTT.     #Reported Melena likely due to Upper GI Bleed likely Secondary to  Malignancy   #Thrombocytopenia, elevated INR/PTT  #Recently diagnosed Esophageal adenocarcinoma   #Acute blood loss anemia  - EGD 4/25: A friable circumferential mass seen from the GE junction at 38cm to 28 cm of mid esophagus. Multiple biopsies were obtained. Oozing of blood was noted from the whole mass.  - c/w pantoprazole 40mg IV BID  - s/p EGD w/ peg and stent removal   - . Patient supposed to have PET scan OP, Surg Onc Dr Yost   - keep Hgb>7.5  -keep active T&S  -5/17 transfuse another unit PRBCs (did not get blood yesterday due to fevers)  -5/18 H/h still low, will transfuse another unit  - 5/19 H/H stable - Hb 8.2 in AM    #Weakness   #Diffuse metastatic dz of C/T/L spine on MRI  #Acute on chronic lower back pain   - c/w Dilaudid 1mg Q4, Oxycodone 5mg Q4 PRN  Med Onc recommends Bone Scan and BM Bx by IR  Neuro recommends LP  will arrange for both BM Bx and LP to be done by IR   LP today, BM biopsy by IR tomorrow  d/w onc: might need inpt chemo    #Decreased PO intake 2/2 esophageal adenocarcinoma  - s/p PEG    #Fever  pancultured  CXR w/ ?LLL opacity ?aspiration  c/w Cefepime   aspiration precautions  CT A/p - There is thickening of the subcutaneous fat and musculature at the entry   of the G-tube. G-tube within the stomach. No drainable collection. There is a hiatal hernia with significant thickening of the wall of the distal esophagus and herniated stomach. Active inflammatory process may be present.  3.9 cm right common iliac artery aneurysm.      #?L sided fascial droop  #Diffuse dural thickening  - Pt and spouse do not know if chronic.   - no focal defecits except b/l distal weakness   < from: MR Head w/wo IV Cont (05.14.25 @ 09:48) >  1.  No acute infarct or intracranial hemorrhage.    2.  Mild smooth diffuse dural thickening over the cerebral convexities.   This finding is nonspecific and can reflect intracranial hypotension or   recent spinal procedures. In the setting of known neoplasm cannot exclude   dural metastatic disease.    3.  Heterogeneous marrow signal of the calvarium, nonspecific.    < end of copied text >    #3.8 cm wide R iliac artery aneurysm- stable  - vascular o/p- surgery was scheduled with Dr. Tavera     #HTN  #CAD s/p CABG in Jan 2024  #Paroxysmal A.fib  - c/w home meds  - Hold plavix    #DVT PPX: SCD. Added Heparin SQ  #GI PPX: PPI BID  #Diet: PO, plus supplement w/ TF  #Code Status: Full  #Activity Order: IAT

## 2025-05-19 NOTE — CONSULT NOTE ADULT - REASON FOR ADMISSION
Progressive weakness

## 2025-05-20 ENCOUNTER — APPOINTMENT (OUTPATIENT)
Dept: GASTROENTEROLOGY | Facility: CLINIC | Age: 60
End: 2025-05-20

## 2025-05-20 ENCOUNTER — APPOINTMENT (OUTPATIENT)
Dept: CARDIOTHORACIC SURGERY | Facility: CLINIC | Age: 60
End: 2025-05-20

## 2025-05-20 ENCOUNTER — RESULT REVIEW (OUTPATIENT)
Age: 60
End: 2025-05-20

## 2025-05-20 LAB
ACRM BINDING ANTIBODY: <0.07 NMOL/L — SIGNIFICANT CHANGE UP (ref 0–0.24)
ALBUMIN SERPL ELPH-MCNC: 2.8 G/DL — LOW (ref 3.5–5.2)
ALP SERPL-CCNC: 318 U/L — HIGH (ref 30–115)
ALT FLD-CCNC: 17 U/L — SIGNIFICANT CHANGE UP (ref 0–41)
ANION GAP SERPL CALC-SCNC: 13 MMOL/L — SIGNIFICANT CHANGE UP (ref 7–14)
APTT BLD: 37.9 SEC — SIGNIFICANT CHANGE UP (ref 27–39.2)
AST SERPL-CCNC: 39 U/L — SIGNIFICANT CHANGE UP (ref 0–41)
BASOPHILS # BLD AUTO: 0.06 K/UL — SIGNIFICANT CHANGE UP (ref 0–0.2)
BASOPHILS NFR BLD AUTO: 0.8 % — SIGNIFICANT CHANGE UP (ref 0–1)
BILIRUB SERPL-MCNC: 1.1 MG/DL — SIGNIFICANT CHANGE UP (ref 0.2–1.2)
BUN SERPL-MCNC: 15 MG/DL — SIGNIFICANT CHANGE UP (ref 10–20)
CALCIUM SERPL-MCNC: 8.8 MG/DL — SIGNIFICANT CHANGE UP (ref 8.4–10.5)
CHLORIDE SERPL-SCNC: 101 MMOL/L — SIGNIFICANT CHANGE UP (ref 98–110)
CO2 SERPL-SCNC: 23 MMOL/L — SIGNIFICANT CHANGE UP (ref 17–32)
CREAT SERPL-MCNC: 0.6 MG/DL — LOW (ref 0.7–1.5)
D DIMER BLD IA.RAPID-MCNC: HIGH NG/ML DDU
EGFR: 110 ML/MIN/1.73M2 — SIGNIFICANT CHANGE UP
EGFR: 110 ML/MIN/1.73M2 — SIGNIFICANT CHANGE UP
EOSINOPHIL # BLD AUTO: 0.17 K/UL — SIGNIFICANT CHANGE UP (ref 0–0.7)
EOSINOPHIL NFR BLD AUTO: 2.3 % — SIGNIFICANT CHANGE UP (ref 0–8)
FIBRINOGEN PPP-MCNC: 339 MG/DL — SIGNIFICANT CHANGE UP (ref 200–435)
GLUCOSE SERPL-MCNC: 93 MG/DL — SIGNIFICANT CHANGE UP (ref 70–99)
HCT VFR BLD CALC: 24.3 % — LOW (ref 42–52)
HGB BLD-MCNC: 7.9 G/DL — LOW (ref 14–18)
IMM GRANULOCYTES NFR BLD AUTO: 21.2 % — HIGH (ref 0.1–0.3)
INR BLD: 1.5 RATIO — HIGH (ref 0.65–1.3)
LDH CSF L TO P-CCNC: 34 U/L — SIGNIFICANT CHANGE UP
LDH FLD-CCNC: 34 U/L — SIGNIFICANT CHANGE UP
LYMPHOCYTES # BLD AUTO: 1.34 K/UL — SIGNIFICANT CHANGE UP (ref 1.2–3.4)
LYMPHOCYTES # BLD AUTO: 18.4 % — LOW (ref 20.5–51.1)
MAGNESIUM SERPL-MCNC: 2.4 MG/DL — SIGNIFICANT CHANGE UP (ref 1.8–2.4)
MCHC RBC-ENTMCNC: 27.3 PG — SIGNIFICANT CHANGE UP (ref 27–31)
MCHC RBC-ENTMCNC: 32.5 G/DL — SIGNIFICANT CHANGE UP (ref 32–37)
MCV RBC AUTO: 84.1 FL — SIGNIFICANT CHANGE UP (ref 80–94)
MONOCYTES # BLD AUTO: 0.56 K/UL — SIGNIFICANT CHANGE UP (ref 0.1–0.6)
MONOCYTES NFR BLD AUTO: 7.7 % — SIGNIFICANT CHANGE UP (ref 1.7–9.3)
NEUTROPHILS # BLD AUTO: 3.62 K/UL — SIGNIFICANT CHANGE UP (ref 1.4–6.5)
NEUTROPHILS NFR BLD AUTO: 49.6 % — SIGNIFICANT CHANGE UP (ref 42.2–75.2)
NRBC BLD AUTO-RTO: 3 /100 WBCS — HIGH (ref 0–0)
PHOSPHATE SERPL-MCNC: 4.6 MG/DL — SIGNIFICANT CHANGE UP (ref 2.1–4.9)
PLATELET # BLD AUTO: 48 K/UL — LOW (ref 130–400)
PMV BLD: 10.1 FL — SIGNIFICANT CHANGE UP (ref 7.4–10.4)
POTASSIUM SERPL-MCNC: 4.7 MMOL/L — SIGNIFICANT CHANGE UP (ref 3.5–5)
POTASSIUM SERPL-SCNC: 4.7 MMOL/L — SIGNIFICANT CHANGE UP (ref 3.5–5)
PROT SERPL-MCNC: 5.4 G/DL — LOW (ref 6–8)
PROTHROM AB SERPL-ACNC: 17.9 SEC — HIGH (ref 9.95–12.87)
RBC # BLD: 2.89 M/UL — LOW (ref 4.7–6.1)
RBC # FLD: 17.9 % — HIGH (ref 11.5–14.5)
SODIUM SERPL-SCNC: 137 MMOL/L — SIGNIFICANT CHANGE UP (ref 135–146)
WBC # BLD: 7.3 K/UL — SIGNIFICANT CHANGE UP (ref 4.8–10.8)
WBC # FLD AUTO: 7.3 K/UL — SIGNIFICANT CHANGE UP (ref 4.8–10.8)

## 2025-05-20 PROCEDURE — 88291 CYTO/MOLECULAR REPORT: CPT

## 2025-05-20 PROCEDURE — 77012 CT SCAN FOR NEEDLE BIOPSY: CPT | Mod: 26

## 2025-05-20 PROCEDURE — 99233 SBSQ HOSP IP/OBS HIGH 50: CPT

## 2025-05-20 PROCEDURE — 88342 IMHCHEM/IMCYTCHM 1ST ANTB: CPT | Mod: 26

## 2025-05-20 PROCEDURE — 88189 FLOWCYTOMETRY/READ 16 & >: CPT | Mod: 59

## 2025-05-20 PROCEDURE — 88313 SPECIAL STAINS GROUP 2: CPT | Mod: 26

## 2025-05-20 PROCEDURE — 88305 TISSUE EXAM BY PATHOLOGIST: CPT | Mod: 26

## 2025-05-20 PROCEDURE — 88341 IMHCHEM/IMCYTCHM EA ADD ANTB: CPT | Mod: 26

## 2025-05-20 PROCEDURE — 99152 MOD SED SAME PHYS/QHP 5/>YRS: CPT

## 2025-05-20 PROCEDURE — 88311 DECALCIFY TISSUE: CPT | Mod: 26

## 2025-05-20 PROCEDURE — 38222 DX BONE MARROW BX & ASPIR: CPT | Mod: RT

## 2025-05-20 PROCEDURE — 99232 SBSQ HOSP IP/OBS MODERATE 35: CPT

## 2025-05-20 RX ORDER — SODIUM CHLORIDE 9 G/1000ML
1000 INJECTION, SOLUTION INTRAVENOUS
Refills: 0 | Status: DISCONTINUED | OUTPATIENT
Start: 2025-05-20 | End: 2025-05-23

## 2025-05-20 RX ORDER — HEPARIN SODIUM 1000 [USP'U]/ML
5000 INJECTION INTRAVENOUS; SUBCUTANEOUS EVERY 12 HOURS
Refills: 0 | Status: DISCONTINUED | OUTPATIENT
Start: 2025-05-21 | End: 2025-05-23

## 2025-05-20 RX ADMIN — Medication 1 MILLIGRAM(S): at 08:25

## 2025-05-20 RX ADMIN — Medication 1 MILLIGRAM(S): at 21:46

## 2025-05-20 RX ADMIN — OXYCODONE HYDROCHLORIDE 20 MILLIGRAM(S): 30 TABLET ORAL at 06:30

## 2025-05-20 RX ADMIN — CEFEPIME 100 MILLIGRAM(S): 2 INJECTION, POWDER, FOR SOLUTION INTRAVENOUS at 06:15

## 2025-05-20 RX ADMIN — Medication 1 MILLIGRAM(S): at 18:29

## 2025-05-20 RX ADMIN — METOPROLOL SUCCINATE 12.5 MILLIGRAM(S): 50 TABLET, EXTENDED RELEASE ORAL at 06:16

## 2025-05-20 RX ADMIN — Medication 650 MILLIGRAM(S): at 21:14

## 2025-05-20 RX ADMIN — ATORVASTATIN CALCIUM 80 MILLIGRAM(S): 80 TABLET, FILM COATED ORAL at 21:14

## 2025-05-20 RX ADMIN — Medication 40 MILLIGRAM(S): at 17:23

## 2025-05-20 RX ADMIN — CEFEPIME 100 MILLIGRAM(S): 2 INJECTION, POWDER, FOR SOLUTION INTRAVENOUS at 17:21

## 2025-05-20 RX ADMIN — OXYCODONE HYDROCHLORIDE 20 MILLIGRAM(S): 30 TABLET ORAL at 06:16

## 2025-05-20 RX ADMIN — OXYCODONE HYDROCHLORIDE 20 MILLIGRAM(S): 30 TABLET ORAL at 17:24

## 2025-05-20 RX ADMIN — Medication 40 MILLIGRAM(S): at 06:15

## 2025-05-20 RX ADMIN — Medication 1 MILLIGRAM(S): at 03:08

## 2025-05-20 RX ADMIN — METOPROLOL SUCCINATE 12.5 MILLIGRAM(S): 50 TABLET, EXTENDED RELEASE ORAL at 17:24

## 2025-05-20 RX ADMIN — Medication 650 MILLIGRAM(S): at 00:30

## 2025-05-20 RX ADMIN — Medication 1 MILLIGRAM(S): at 13:29

## 2025-05-20 RX ADMIN — Medication 1 MILLIGRAM(S): at 21:12

## 2025-05-20 NOTE — PROGRESS NOTE ADULT - ASSESSMENT
59M  hiatal hernia, CAD s/p CABG in Jan 2024, paroxysmal A. 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 concern for metastatic bone disease   Reports Abdominal pain, diarrhea, lost 28lbs in the last month  former smoker (30 pack years, quit 1yr ago)  EGD showed friable circumferential mass mid esophagus and another 5 mm lesion in proximal esophagus , path    Cardia nodule, Signet ring cell carcinoma,  Esophageal nodule at 22 cm, biopsy: - Signet ring cell carcinoma.   Was planned for outpatient PET scan and appointment with Dr Sanchez   MRI and bone scan as above. concern for metastatic bone disease     #Decreased PO intake  #Weakness  #MRI concerning for metastatic disease   esophageal stent placed during prior admission. Pt reports did not help. S/p stent removal 5.14.25   PEG placed on 5.14.25     #Pancytopenia   #Acute on chronic anemia 2/2  GI bleed vs myelophthisic process  #Thrombocytopenia - DIC vs myelophthisic process  #Coagulopathy Differentials: DIC/ Vit K deficiency       #3.8 cm wide R iliac artery aneurysm  - F/u vascular o/p    #Fever  ID following   on cefepime     Recommendations:  MRI shows findings concerning for metastatic disease. Bone scan does not show focal uptake. S/p IR guided BM biopsy to evaluate for metastatic disease and marrow infilteration causing cytopenia   Work up for infectious cause and antibiotics per ID   Will ask path to check for CLDN1 status for metastatic dz  Oncology will follow once pathology from BM available to decide on chemotherapy   F/up palliative for pain management   F/up results from LP    C/w tube feeds

## 2025-05-20 NOTE — PROGRESS NOTE ADULT - SUBJECTIVE AND OBJECTIVE BOX
SUBJECTIVE/OVERNIGHT EVENTS  Today is hospital day 8d. This morning patient was seen and examined at bedside, resting comfortably in bed. No acute or major events overnight.      MEDICATIONS  STANDING MEDICATIONS  atorvastatin 80 milliGRAM(s) Oral at bedtime  cefepime   IVPB 2000 milliGRAM(s) IV Intermittent every 12 hours  cefepime   IVPB      chlorhexidine 2% Cloths 1 Application(s) Topical <User Schedule>  lactated ringers. 1000 milliLiter(s) IV Continuous <Continuous>  lidocaine   4% Patch 1 Patch Transdermal daily  metoprolol tartrate 12.5 milliGRAM(s) Oral two times a day  OLANZapine 2.5 milliGRAM(s) Oral daily  oxyCODONE  ER Tablet 20 milliGRAM(s) Oral every 12 hours  pantoprazole  Injectable 40 milliGRAM(s) IV Push every 12 hours  polyethylene glycol 3350 17 Gram(s) Oral daily    PRN MEDICATIONS  acetaminophen     Tablet .. 650 milliGRAM(s) Oral every 6 hours PRN  HYDROmorphone  Injectable 1 milliGRAM(s) IV Push every 3 hours PRN    VITALS  T(F): 97.3 (05-20-25 @ 10:17), Max: 98.3 (05-20-25 @ 09:38)  HR: 83 (05-20-25 @ 10:17) (82 - 90)  BP: 107/63 (05-20-25 @ 10:17) (106/66 - 115/69)  RR: 20 (05-20-25 @ 10:17) (18 - 20)  SpO2: 95% (05-20-25 @ 10:17) (95% - 98%)    PHYSICAL EXAM  GENERAL: NAD, lying in bed comfortably  HEAD:  Atraumatic, normocephalic  EYES: EOMI, PERRLA, conjunctiva and sclera clear  ENT: Moist mucous membranes  NECK: Supple   HEART: Regular rate and rhythm, no murmurs  LUNGS: Unlabored respirations.  Clear to auscultation bilaterally, no wheezing  ABDOMEN: Soft, nondistended, +BS, +PEG, tenderness around PEG site  EXTREMITIES: No clubbing, cyanosis, or edema  NERVOUS SYSTEM:  A&Ox3, decreased b/l LE strength  SKIN: No rashes or lesions    LABS             7.9    7.30  )-----------( 48       ( 05-20-25 @ 05:57 )             24.3     137  |  101  |  15  -------------------------<  93   05-20-25 @ 05:57  4.7  |  23  |  0.6    Ca      8.8     05-20-25 @ 05:57  Phos   4.6     05-20-25 @ 05:57  Mg     2.4     05-20-25 @ 05:57    TPro  5.4  /  Alb  2.8  /  TBili  1.1  /  DBili  x   /  AST  39  /  ALT  17  /  AlkPhos  318  /  GGT  x     05-20-25 @ 05:57    PT/INR - ( 05-20-25 @ 05:57 )   PT: 17.90 sec[H];   INR: 1.50 ratio[H]  PTT - ( 05-20-25 @ 05:57 )  PTT:37.9 sec      Urinalysis Basic - ( 20 May 2025 05:57 )    Color: x / Appearance: x / SG: x / pH: x  Gluc: 93 mg/dL / Ketone: x  / Bili: x / Urobili: x   Blood: x / Protein: x / Nitrite: x   Leuk Esterase: x / RBC: x / WBC x   Sq Epi: x / Non Sq Epi: x / Bacteria: x          Culture - Blood (collected 17 May 2025 11:52)  Source: Blood None  Preliminary Report (20 May 2025 03:01):    No growth at 48 Hours      IMAGING

## 2025-05-20 NOTE — PROGRESS NOTE ADULT - NEUROLOGICAL
normal/cranial nerves II-XII intact/sensation intact
normal/cranial nerves II-XII intact/sensation intact/responds to verbal commands

## 2025-05-20 NOTE — PROGRESS NOTE ADULT - SUBJECTIVE AND OBJECTIVE BOX
JONAS CELESTIN  59y, Male    All available historical data reviewed    OVERNIGHT EVENTS:  s/p LP  S/p BM Bx    ROS:  General: Denies rigors, nightsweats  HEENT: Denies headache, rhinorrhea, sore throat, eye pain  CV: Denies CP, palpitations  PULM: Denies wheezing, hemoptysis  GI: Denies hematemesis, hematochezia, melena  : Denies discharge, hematuria  MSK: Denies arthralgias, myalgias  SKIN: Denies rash, lesions  NEURO: weakness  PSYCH: Denies depression, anxiety    VITALS:  T(F): 98.2, Max: 98.4 (05-20-25 @ 12:26)  HR: 94  BP: 109/64  RR: 18Vital Signs Last 24 Hrs  T(C): 36.8 (20 May 2025 13:40), Max: 36.9 (20 May 2025 12:26)  T(F): 98.2 (20 May 2025 13:40), Max: 98.4 (20 May 2025 12:26)  HR: 94 (20 May 2025 13:40) (80 - 94)  BP: 109/64 (20 May 2025 13:40) (106/66 - 129/64)  BP(mean): 85 (20 May 2025 04:55) (83 - 85)  RR: 18 (20 May 2025 13:40) (18 - 20)  SpO2: 96% (20 May 2025 13:40) (95% - 98%)    Parameters below as of 20 May 2025 13:40  Patient On (Oxygen Delivery Method): room air        TESTS & MEASUREMENTS:                        7.9    7.30  )-----------( 48       ( 20 May 2025 05:57 )             24.3     05-20    137  |  101  |  15  ----------------------------<  93  4.7   |  23  |  0.6[L]    Ca    8.8      20 May 2025 05:57  Phos  4.6     05-20  Mg     2.4     05-20    TPro  5.4[L]  /  Alb  2.8[L]  /  TBili  1.1  /  DBili  x   /  AST  39  /  ALT  17  /  AlkPhos  318[H]  05-20    LIVER FUNCTIONS - ( 20 May 2025 05:57 )  Alb: 2.8 g/dL / Pro: 5.4 g/dL / ALK PHOS: 318 U/L / ALT: 17 U/L / AST: 39 U/L / GGT: x             Culture - Blood (collected 05-17-25 @ 11:52)  Source: Blood None  Preliminary Report (05-20-25 @ 03:01):    No growth at 48 Hours    Culture - Blood (collected 05-16-25 @ 16:00)  Source: Blood Blood-Peripheral  Preliminary Report (05-19-25 @ 22:01):    No growth at 72 Hours    Culture - Blood (collected 05-16-25 @ 16:00)  Source: Blood Blood-Peripheral  Preliminary Report (05-19-25 @ 22:01):    No growth at 72 Hours      Urinalysis Basic - ( 20 May 2025 05:57 )    Color: x / Appearance: x / SG: x / pH: x  Gluc: 93 mg/dL / Ketone: x  / Bili: x / Urobili: x   Blood: x / Protein: x / Nitrite: x   Leuk Esterase: x / RBC: x / WBC x   Sq Epi: x / Non Sq Epi: x / Bacteria: x          Social History:  Tobacco Use: No  Alcohol Use: No  Drug Use: No    RADIOLOGY & ADDITIONAL TESTS:  Personal review of radiological diagnostics performed  Echo and EKG results noted when applicable.     MEDICATIONS:  acetaminophen     Tablet .. 650 milliGRAM(s) Oral every 6 hours PRN  atorvastatin 80 milliGRAM(s) Oral at bedtime  cefepime   IVPB 2000 milliGRAM(s) IV Intermittent every 12 hours  cefepime   IVPB      chlorhexidine 2% Cloths 1 Application(s) Topical <User Schedule>  HYDROmorphone  Injectable 1 milliGRAM(s) IV Push every 3 hours PRN  lactated ringers. 1000 milliLiter(s) IV Continuous <Continuous>  lidocaine   4% Patch 1 Patch Transdermal daily  metoprolol tartrate 12.5 milliGRAM(s) Oral two times a day  OLANZapine 2.5 milliGRAM(s) Oral daily  oxyCODONE  ER Tablet 20 milliGRAM(s) Oral every 12 hours  pantoprazole  Injectable 40 milliGRAM(s) IV Push every 12 hours  polyethylene glycol 3350 17 Gram(s) Oral daily      ANTIBIOTICS:  cefepime   IVPB 2000 milliGRAM(s) IV Intermittent every 12 hours  cefepime   IVPB

## 2025-05-20 NOTE — PROGRESS NOTE ADULT - SUBJECTIVE AND OBJECTIVE BOX
HPI:  59M w/ Esophageal Cancer (dx Apr2025) s/p stent, CAD s/p [id number] (Jan2024), Afib not on AC, HLD is admitted for weakness, acute blood loss anemia/melena requiring pRBC transfusion. Patient completed endoscopy 5/14 for removal of esophageal stent (due to discomfort) and for PEG placement. Palliative consulted to assist w/ cancer-related pain management.    Interval History  -Patient seen at bedside after biopsy  -Noted increased pain after interventions the last couple of days, but that pain is better controlled now    ADVANCE DIRECTIVES:    [x ] Full Code [ ] DNR  MOLST  [ ]  Living Will  [ ]   DECISION MAKER(s):  [ ] Health Care Proxy(s)  [ x] Surrogate(s)  [ ] Guardian           Name(s): Phone Number(s):  Wife: Shannan Reddy    BASELINE (I)ADL(s) (prior to admission):  [place]: [ ]Total  [ ] Moderate [x ]Dependent    Allergies  penicillins (Unknown)    MEDICATIONS  (STANDING):  atorvastatin 80 milliGRAM(s) Oral at bedtime  cefepime   IVPB 2000 milliGRAM(s) IV Intermittent every 12 hours  cefepime   IVPB      chlorhexidine 2% Cloths 1 Application(s) Topical <User Schedule>  lactated ringers. 1000 milliLiter(s) (60 mL/Hr) IV Continuous <Continuous>  lidocaine   4% Patch 1 Patch Transdermal daily  metoprolol tartrate 12.5 milliGRAM(s) Oral two times a day  OLANZapine 2.5 milliGRAM(s) Oral daily  oxyCODONE  ER Tablet 20 milliGRAM(s) Oral every 12 hours  pantoprazole  Injectable 40 milliGRAM(s) IV Push every 12 hours  polyethylene glycol 3350 17 Gram(s) Oral daily    MEDICATIONS  (PRN):  acetaminophen     Tablet .. 650 milliGRAM(s) Oral every 6 hours PRN Temp greater or equal to 38C (100.4F), Mild Pain (1 - 3)  HYDROmorphone  Injectable 1 milliGRAM(s) IV Push every 3 hours PRN Severe Pain (7 - 10)      PRESENT SYMPTOMS: [ ]Unable to obtain due to poor mentation   Source if other than patient:  [ ]Family   [ ]Team   [ X]All other review of systems negative including pain and dyspnea unless noted below    All components of pain assessment below addressed. Blank spaces indicate that the patient did/could not complete the assessment.  Pain: [x ]yes [ ]no  QOL impact - limits ability to get out of bed  Location - mid back                   Aggravating factors - moving in bed or laying on table for imaging  Quality - dull  Radiation - not reported  Timing- intermittent  Severity (0-10 scale): at times severe, 1/10 at time of visit  Minimal acceptable level (0-10 scale):     Dyspnea:                           [x ]None[ ]Mild [ ]Moderate [ ]Severe   Anxiety:                             [ ]None[ ]Mild [ ]Moderate [ ]Severe   Fatigue:                             [ ]None[ ]Mild [x ]Moderate [ ]Severe   Nausea:                             [x ]None[ ]Mild [ ]Moderate [ ]Severe   Loss of appetite:              [x ]None[ ]Mild [ ]Moderate [ ]Severe   Constipation:                    [x ]None[ ]Mild [ ]Moderate [ ]Severe    Other Symptoms:    PHYSICAL EXAM:  Vital Signs Last 24 Hrs  T(C): 37.6 (20 May 2025 19:54), Max: 37.6 (20 May 2025 19:54)  T(F): 99.6 (20 May 2025 19:54), Max: 99.6 (20 May 2025 19:54)  HR: 101 (20 May 2025 19:54) (71 - 101)  BP: 108/68 (20 May 2025 19:54) (106/66 - 129/64)  BP(mean): 81 (20 May 2025 19:54) (79 - 85)  RR: 18 (20 May 2025 19:54) (18 - 20)  SpO2: 94% (20 May 2025 19:54) (94% - 98%)    Parameters below as of 20 May 2025 19:54  Patient On (Oxygen Delivery Method): room air      GENERAL:  [x ] No acute distress [ ]Lethargic  [ ]Unarousable  [x ]Verbal  [ ]Non-Verbal [ ]Cachexia    BEHAVIORAL/PSYCH:  [ ]Alert and Oriented x  [ ] Anxiety [ ] Delirium [ ] Agitation [x ] Calm   EYES: [x ] No scleral icterus [ ] Scleral icterus [ ] Closed  ENMT:  [ ]Dry mouth  [ ]No external oral lesions [ ] No external ear or nose lesions  CARDIOVASCULAR:  [ ]Regular [ ]Irregular [ ]Tachy [x ]Not Tachy  [ ]Rodney [ ] Edema [ ] No edema  PULMONARY:  [ ]Tachypnea  [ ]Audible excessive secretions [ ] No labored breathing [ ] labored breathing  GASTROINTESTINAL: [ ]Soft  [ ]Distended  [x ]Not distended [ ]Non tender [ ]Tender  MUSCULOSKELETAL: [ ]No clubbing [ ] clubbing  [ ] No cyanosis [ ] cyanosis  NEUROLOGIC: [ ]No focal deficits  [ x]Follows commands  [ ]Does not follow commands  [ ]Cognitive impairment  [ ]Dysphagia  [ ]Dysarthria  [ ]Paresis   SKIN: [ ] Jaundiced [ ] Non-jaundiced [ ]Rash [ ]No Rash [ ] Warm [ ] Dry  MISC/LINES: [ ] ET tube [ ] Trach [ ]NGT/OGT [x ]PEG [ ]Epps      LABS: reviewed by me BMP grossly WNL, CBC shows anemia and thrombocytopenia, LFTs shows low total protein                                    7.9    7.30  )-----------( 48       ( 20 May 2025 05:57 )             24.3       05-20    137  |  101  |  15  ----------------------------<  93  4.7   |  23  |  0.6[L]    Ca    8.8      20 May 2025 05:57  Phos  4.6     05-20  Mg     2.4     05-20    TPro  5.4[L]  /  Alb  2.8[L]  /  TBili  1.1  /  DBili  x   /  AST  39  /  ALT  17  /  AlkPhos  318[H]  05-20              Urinalysis Basic - ( 20 May 2025 05:57 )    Color: x / Appearance: x / SG: x / pH: x  Gluc: 93 mg/dL / Ketone: x  / Bili: x / Urobili: x   Blood: x / Protein: x / Nitrite: x   Leuk Esterase: x / RBC: x / WBC x   Sq Epi: x / Non Sq Epi: x / Bacteria: x        PT/INR - ( 20 May 2025 05:57 )   PT: 17.90 sec;   INR: 1.50 ratio         PTT - ( 20 May 2025 05:57 )  PTT:37.9 sec          CAPILLARY BLOOD GLUCOSE                  RADIOLOGY & ADDITIONAL STUDIES: reviewed by me     < from: NM Bone Imaging Total (05.19.25 @ 17:49) >  Impression:  1. No focal increased uptake of bone tracer was identified to suggest   metastatic bone disease, particularly within the thoracic spine,   correlating with the abnormal MRI findings.  2. Bone scan shows diffusely increased uptake throughout the skeleton,   including the skull, ribs, humeri, femurs, and tibiae. This pattern may   represent a hypermetabolic state or anemia-related bone marrow expansion.    < end of copied text >      EKG: reviewed by me    < from: 12 Lead ECG (05.16.25 @ 21:18) >  Ventricular Rate 111 BPM    Atrial Rate 111 BPM    P-R Interval 166 ms    QRS Duration 76 ms    Q-T Interval 334 ms    QTC Calculation(Bazett) 454 ms    P Axis 37 degrees    R Axis 22 degrees    T Axis 149 degrees    Diagnosis Line Sinus tachycardia  ST & T wave abnormality, consider anterolateral ischemia  Abnormal ECG    < end of copied text >      PROTEIN CALORIE MALNUTRITION PRESENT: [ ]mild [ ]moderate [ ]severe [ ]underweight [ ]morbid obesity  https://www.andeal.org/vault/2440/web/files/ONC/Table_Clinical%20Characteristics%20to%20Document%20Malnutrition-White%20JV%20et%20al%976929.pdf    Height (cm): 180.3 (05-14-25 @ 14:45), 180 (05-01-25 @ 11:47), 180.3 (04-05-25 @ 13:20)  Weight (kg): 84.5 (05-14-25 @ 14:45), 89 (05-01-25 @ 11:47), 90.3 (04-03-25 @ 21:22)  BMI (kg/m2): 26 (05-14-25 @ 14:45), 27.5 (05-01-25 @ 11:47), 27.8 (04-05-25 @ 13:20)    [ ]PPSV2 < or = to 30% [ ]significant weight loss  [ ]poor nutritional intake  [ ]anasarca      [ ]Artificial Nutrition      REFERRALS:   [ ]Chaplaincy  [ ]Hospice  [ ]Child Life  [ ]Social Work  [ ]Case management [ ]Holistic Therapy     Patient discussed with primary medical team MD  Palliative care education provided to patient and/or family

## 2025-05-20 NOTE — PROGRESS NOTE ADULT - ATTENDING COMMENTS
The patient was seen. Agree with above.  Followup BM biopsy results.  Will discuss management plan with the patient.  Continue supportive care, PEG feeding.

## 2025-05-20 NOTE — PROGRESS NOTE ADULT - SUBJECTIVE AND OBJECTIVE BOX
INTERVENTIONAL RADIOLOGY BRIEF-OPERATIVE NOTE    Procedure:  CT-directed bone marrow biopsy    Pre-Op Diagnosis:  Weakness    Post-Op Diagnosis:  Same    Attending:  Travis  Hem-Onc Fellow:  Silvestre    Anesthesia (type):  [ ] General Anesthesia  [X] Sedation-- Versed, 1 mg and Fentanyl, 50 mcg, iv  [ ] Spinal Anesthesia  [X] Local/Regional-- 1% Lidocaine, SQ posterior pelvis, 5 cc    Total Face-to-Face Sedation Time:  19 minutes    Contrast:  None    Estimated Blood Loss:  3 cc    Condition:   [ ] Critical  [ ] Serious  [ ] Fair   [X] Good    Findings/Follow up Plan of Care:  Posterior right iliac bone accessed with 10/12G, 6/10cm Click With Me Now bone biopsy system with CT-guidance:  ~18 cc of marrow aspirate and a 12G tissue core obtained.  patient tolerated well, without incident.    Specimens Removed:  As above    Implants:  None    Complications:  None immediate    Disposition:  Back to floor.      Please call Interventional Radiology [phone] with any questions, concerns, or issues.

## 2025-05-20 NOTE — PROGRESS NOTE ADULT - ASSESSMENT
59M w/ Esophageal Cancer (dx Apr2025) s/p stent, CAD s/p [id number] (Jan2024), Afib not on AC, HLD is admitted for weakness, acute blood loss anemia/melena requiring pRBC transfusion. Patient completed endoscopy 5/14 for removal of esophageal stent (due to discomfort) and for PEG placement. Palliative consulted to assist w/ cancer-related pain management. Significantly, MR C/T/L spine has identified diffusely metastatic disease in C/T/L spine and sacrum/iliac bones.    Plan  -Full code (not discussed today)  -Pain better controlled at time of exam after multiple interventions  -continue Oxycodone ER 20mg q12hr and Hydromorphone 1mg IV q3hr PRN Severe pain   -will consider increasing oxycodone ER based on PRN use in next 24 hours  -bowel regimen  -continue olanzapine  - will follow for pain and GOC as appropriate

## 2025-05-20 NOTE — PROGRESS NOTE ADULT - SUBJECTIVE AND OBJECTIVE BOX
JONAS CELESTIN 59y Male  MRN#: [id number]     Hospital Day: 8d      SUBJECTIVE  No acute events overnight, pt seen and examined this morning.                                          ----------------------------------------------------------  OBJECTIVE  PAST MEDICAL & SURGICAL HISTORY  HTN (hypertension)    Hiatal hernia    HLD (hyperlipidemia)    CAD (coronary artery disease)    Paroxysmal atrial fibrillation    History of colitis    S/P CABG (coronary artery bypass graft)                                              -----------------------------------------------------------  ALLERGIES:  penicillins (Unknown)                                            ------------------------------------------------------------    HOME MEDICATIONS  Home Medications:  clopidogrel 75 mg oral tablet: 1 tab(s) orally once a day (13 May 2025 01:36)  Lipitor 80 mg oral tablet: 1 tab(s) orally once a day (13 May 2025 01:36)  Metoprolol Tartrate 25 mg oral tablet: 0.5 tab(s) orally 2 times a day (13 May 2025 01:36)  Multiple Vitamins oral tablet, chewable: 1 tab(s) orally once a day (13 May 2025 01:36)  Pepcid 20 mg oral tablet: 1 tab(s) orally once a day (13 May 2025 01:36)                           MEDICATIONS:  STANDING MEDICATIONS  atorvastatin 80 milliGRAM(s) Oral at bedtime  cefepime   IVPB 2000 milliGRAM(s) IV Intermittent every 12 hours  cefepime   IVPB      chlorhexidine 2% Cloths 1 Application(s) Topical <User Schedule>  lactated ringers. 1000 milliLiter(s) IV Continuous <Continuous>  lidocaine   4% Patch 1 Patch Transdermal daily  metoprolol tartrate 12.5 milliGRAM(s) Oral two times a day  OLANZapine 2.5 milliGRAM(s) Oral daily  oxyCODONE  ER Tablet 20 milliGRAM(s) Oral every 12 hours  pantoprazole  Injectable 40 milliGRAM(s) IV Push every 12 hours  polyethylene glycol 3350 17 Gram(s) Oral daily    PRN MEDICATIONS  acetaminophen     Tablet .. 650 milliGRAM(s) Oral every 6 hours PRN  HYDROmorphone  Injectable 1 milliGRAM(s) IV Push every 3 hours PRN                                            ------------------------------------------------------------  VITAL SIGNS: Last 24 Hours  T(C): 36.3 (20 May 2025 10:17), Max: 36.8 (19 May 2025 20:31)  T(F): 97.3 (20 May 2025 10:17), Max: 98.3 (20 May 2025 09:38)  HR: 83 (20 May 2025 10:17) (82 - 90)  BP: 107/63 (20 May 2025 10:17) (106/66 - 115/69)  BP(mean): 85 (20 May 2025 04:55) (82 - 85)  RR: 20 (20 May 2025 10:17) (16 - 20)  SpO2: 95% (20 May 2025 10:17) (95% - 98%)      05-19-25 @ 07:01  -  05-20-25 @ 07:00  --------------------------------------------------------  IN: 0 mL / OUT: 300 mL / NET: -300 mL                                             --------------------------------------------------------------  LABS:                        7.9    7.30  )-----------( 48       ( 20 May 2025 05:57 )             24.3     05-20    137  |  101  |  15  ----------------------------<  93  4.7   |  23  |  0.6[L]    Ca    8.8      20 May 2025 05:57  Phos  4.6     05-20  Mg     2.4     05-20    TPro  5.4[L]  /  Alb  2.8[L]  /  TBili  1.1  /  DBili  x   /  AST  39  /  ALT  17  /  AlkPhos  318[H]  05-20    PT/INR - ( 20 May 2025 05:57 )   PT: 17.90 sec;   INR: 1.50 ratio         PTT - ( 20 May 2025 05:57 )  PTT:37.9 sec  Urinalysis Basic - ( 20 May 2025 05:57 )    Color: x / Appearance: x / SG: x / pH: x  Gluc: 93 mg/dL / Ketone: x  / Bili: x / Urobili: x   Blood: x / Protein: x / Nitrite: x   Leuk Esterase: x / RBC: x / WBC x   Sq Epi: x / Non Sq Epi: x / Bacteria: x              Culture - Blood (collected 17 May 2025 11:52)  Source: Blood None  Preliminary Report (20 May 2025 03:01):    No growth at 48 Hours                                                    -------------------------------------------------------------  RADIOLOGY:  CXR      CT  CT Abdomen and Pelvis w/ Oral Cont and w/ IV Cont:   ACC: [id number] EXAM:  CT ABDOMEN AND PELVIS OC IC   ORDERED BY: SOFIA STEWART     PROCEDURE DATE:  05/18/2025          INTERPRETATION:  CLINICAL INFORMATION: Fever post.    COMPARISON: CT abdomen pelvis from April 18, 2025    CONTRAST/COMPLICATIONS:  IV Contrast: Omnipaque 350  95 cc administered   5 cc discarded  Oral Contrast: NONE  .    PROCEDURE:  CT of the Abdomen and Pelvis was performed.  Sagittal and coronal reformats were performed.    FINDINGS:  LOWER CHEST: There is small bilateral pleural effusion with adjacent   compressive atelectasis, progression from the prior exam.    LIVER: Within normal limits.  BILE DUCTS: Normal caliber.  GALLBLADDER: Gallbladder is distended with no calcified gallstones   identified.  SPLEEN: Within normal limits.  PANCREAS: Within normal limits.  ADRENALS: Within normal limits.  KIDNEYS/URETERS: Both kidneys demonstrate symmetric enhancement with no   hydronephrosis. Right renal cyst.    BLADDER: Moderate distention the urinary bladder.  REPRODUCTIVE ORGANS: Calcifications are noted of the prostate gland.    BOWEL: Colonic diverticulosis with no radiographic evidence   diverticulitis. No bowel obstruction. Moderate fecal retention.   Subcutaneous fat is again noted within the submucosal submucosal fat   again noted within the descending colon. This may related to prior   episodes of colitis. No surrounding inflammatory changes.    There is significant thickening of the distal esophagus, somewhat more   progressed since the prior exam. G-tube is seen within the stomach. There   is thickening of the subcutaneous fat and muscles at the level of entry.   With no discrete drainable collection.  PERITONEUM/RETROPERITONEUM: Within normal limits.  VESSELS: Aorta is atherosclerotic but not frankly aneurysmal. There is,   however, 3.9 cm right iliac artery aneurysm.  LYMPH NODES: No lymphadenopathy.  ABDOMINAL WALL: Again demonstrated is a fat-containing umbilical hernia.  BONES: Multilevel degenerative changes.    IMPRESSION:  There is thickening of the subcutaneous fat and musculature at the entry   of the G-tube. G-tube within the stomach. No drainable collection.    There is a hiatal hernia with significant thickening of the wall of the   distal esophagus and herniated stomach. Active inflammatory process may   be present.    3.9 cm right common iliac artery aneurysm.        --- End of Report ---            DESTINY IGLESIAS MD; Attending Radiologist  This document has been electronically signed. May 19 2025  8:25AM (05-18-25 @ 05:47)                                            --------------------------------------------------------------    PHYSICAL EXAM:  GENERAL: lying in bed   CHEST/LUNG: decreased breath sounds Unlabored respirations  HEART: Regular rate and rhythm; No murmurs, rubs, or gallops  ABDOMEN: Soft, nontender, nondistended  EXTREMITIES:  No clubbing, cyanosis, or edema  NERVOUS SYSTEM:  A&Ox3

## 2025-05-20 NOTE — PROGRESS NOTE ADULT - ASSESSMENT
· Assessment	  59y male with PMHx of HTN, hiatal hernia, HLD, CAD s/p CABG in Jan 2024, paroxysmal A. fib not on AC, former smoker (30 pack years, quit 1yr ago), recently diagnosed with colitis and esophageal adenocarcinoma s/p stent placement who presents for weakness.     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   Possible LLL pneumonia?  5/17 BCx NG  5/16 COVID 19/ Influenza/ RSV NG.   5/16 BCX NG  5/19 CSF : no evidence of an infection  5/20 IVR : S/p BM Bx    < from: CT Abdomen and Pelvis w/ Oral Cont and w/ IV Cont (05.18.25 @ 05:47) >  There is thickening of the subcutaneous fat and musculature at the entry of the G-tube. G-tube within the stomach. No drainable collection.  There is a hiatal hernia with significant thickening of the wall of the distal esophagus and herniated stomach. Active inflammatory process may   be present.  3.9 cm right common iliac artery aneurysm.  < end of copied text >      5/16 VA Duplex Lower Ext Vein Scan, Bilat  : NG    # Acute anemia  # Metastatic esophageal cancer    Antibiotics:  Zosyn 5/16  Cefepime 5/16 ->      -continue with IV Cefepime 2g q12hrs  -FU with Hem/Onc  -FU with final CSF  -FU BM    Discussion of management/test results( independently interpretated by me ) /antibiotic regimen  with external/primary medical team. Dr Iglesias

## 2025-05-20 NOTE — PROGRESS NOTE ADULT - SUBJECTIVE AND OBJECTIVE BOX
JONAS CELESTIN 59y Male  MRN#: [id number]   Hospital Day: 8d    Oncology following for esophageal ca     REVIEW OF SYMPTOMS:  pain better with pain meds   in better spirits today   hungry and thirsty     OBJECTIVE  PAST MEDICAL & SURGICAL HISTORY  HTN (hypertension)    Hiatal hernia    HLD (hyperlipidemia)    CAD (coronary artery disease)    Paroxysmal atrial fibrillation    History of colitis    S/P CABG (coronary artery bypass graft)      ALLERGIES:  penicillins (Unknown)    MEDICATIONS:  STANDING MEDICATIONS  atorvastatin 80 milliGRAM(s) Oral at bedtime  cefepime   IVPB 2000 milliGRAM(s) IV Intermittent every 12 hours  cefepime   IVPB      chlorhexidine 2% Cloths 1 Application(s) Topical <User Schedule>  lactated ringers. 1000 milliLiter(s) IV Continuous <Continuous>  lidocaine   4% Patch 1 Patch Transdermal daily  metoprolol tartrate 12.5 milliGRAM(s) Oral two times a day  OLANZapine 2.5 milliGRAM(s) Oral daily  oxyCODONE  ER Tablet 20 milliGRAM(s) Oral every 12 hours  pantoprazole  Injectable 40 milliGRAM(s) IV Push every 12 hours  polyethylene glycol 3350 17 Gram(s) Oral daily    PRN MEDICATIONS  acetaminophen     Tablet .. 650 milliGRAM(s) Oral every 6 hours PRN  HYDROmorphone  Injectable 1 milliGRAM(s) IV Push every 3 hours PRN      VITAL SIGNS: Last 24 Hours  T(C): 36.8 (20 May 2025 13:40), Max: 36.9 (20 May 2025 12:26)  T(F): 98.2 (20 May 2025 13:40), Max: 98.4 (20 May 2025 12:26)  HR: 94 (20 May 2025 13:40) (80 - 94)  BP: 109/64 (20 May 2025 13:40) (106/66 - 129/64)  BP(mean): 85 (20 May 2025 04:55) (83 - 85)  RR: 18 (20 May 2025 13:40) (18 - 20)  SpO2: 96% (20 May 2025 13:40) (95% - 98%)    LABS:                        7.9    7.30  )-----------( 48       ( 20 May 2025 05:57 )             24.3     05-20    137  |  101  |  15  ----------------------------<  93  4.7   |  23  |  0.6[L]    Ca    8.8      20 May 2025 05:57  Phos  4.6     05-20  Mg     2.4     05-20    TPro  5.4[L]  /  Alb  2.8[L]  /  TBili  1.1  /  DBili  x   /  AST  39  /  ALT  17  /  AlkPhos  318[H]  05-20    PT/INR - ( 20 May 2025 05:57 )   PT: 17.90 sec;   INR: 1.50 ratio         PTT - ( 20 May 2025 05:57 )  PTT:37.9 sec  Urinalysis Basic - ( 20 May 2025 05:57 )    Color: x / Appearance: x / SG: x / pH: x  Gluc: 93 mg/dL / Ketone: x  / Bili: x / Urobili: x   Blood: x / Protein: x / Nitrite: x   Leuk Esterase: x / RBC: x / WBC x   Sq Epi: x / Non Sq Epi: x / Bacteria: x        Imaging/Procedure   EGD: Friable circumferential mass seen from the GE junction at 38cm to 28 cm of mid esophagus.  Another 5mm lesion was noted at 20cm from incisors in the proximal esophagus.  	Erosions in the stomach compatible with erosive gastritis. (Biopsy).  	A pulsating ulcerated lesion was noted in the fundus. Biopsy was not obtained.  	Normal mucosa in the whole examined duodenum. (Biopsy).    Colonoscopy Impressions:  	Multiple semi-pedunculated polyps were seen in the left colon. A 2cm polyp was seen in the descending colon, biopsy was obtained. Polyps were not removed in the setting of poor prep.  	External hemorrhoids.  	Solid stool noted in the whole colon. Cecum was not reached    CT chest   No definite evidence of thoracic metastatic disease.  2.  Small bilateral pleural effusions.  3.  Marked circumferential thickening of the distal esophagus/hiatal   hernia could be related to provided history of suspected esophageal   cancer versus esophagitis.    Ct abdomen   Redemonstrated prominent submucosal fat/bowel wall thickening involving   descending colon, possibly related to prior episodes of colitis.    MRI Brain   Mild smooth diffuse dural thickening over the cerebral convexities.   This finding is nonspecific and can reflect intracranial hypotension or   recent spinal procedures. In the setting of known neoplasm cannot exclude   dural metastatic disease.    BOne scan   No focal increased uptake of bone tracer was identified to suggest   metastatic bone disease, particularly within the thoracic spine,   correlating with the abnormal MRI findings.  2. Bone scan shows diffusely increased uptake throughout the skeleton,   including the skull, ribs, humeri, femurs, and tibiae. This pattern may   represent a hypermetabolic state or anemia-related bone marrow expansion.

## 2025-05-20 NOTE — PROGRESS NOTE ADULT - ASSESSMENT
59y male with PMHx of HTN, hiatal hernia, HLD, CAD s/p CABG in Jan 2024, paroxysmal A. fib not on AC, former smoker (30 pack years, quit 1yr ago), recently diagnosed with colitis and esophageal adenocarcinoma s/p stent placement who presents for weakness. The patient was recently discharged from [facility] on 5/6 , and has been progressively deteriorating. Found to have hgb 7.6 lower than last admission reported melena, thrombocytopenia w/ elevated INR/PTT.     #Reported Melena likely due to Upper GI Bleed likely Secondary to  Malignancy   #Thrombocytopenia, elevated INR/PTT  #Recently diagnosed Esophageal adenocarcinoma   #Acute blood loss anemia  - EGD 4/25: A friable circumferential mass seen from the GE junction at 38cm to 28 cm of mid esophagus. Multiple biopsies were obtained. Oozing of blood was noted from the whole mass.  - c/w pantoprazole 40mg IV BID  - s/p EGD w/ peg and stent removal   - . Patient supposed to have PET scan OP, Surg Onc Dr Yost   - keep Hgb>7.5  -keep active T&S  -5/17 transfuse another unit PRBCs (did not get blood yesterday due to fevers)  -5/18 H/h still low, will transfuse another unit  - 5/20 H/H stable - Hb 7.9 in AM  - Bone marrow biopsy today  - AC on hold    #Weakness   #Diffuse metastatic dz of C/T/L spine on MRI  #Acute on chronic lower back pain   - c/w Dilaudid 1mg Q4, Oxycodone 5mg Q4 PRN  Med Onc recommends Bone Scan and BM Bx by IR  Neuro recommends LP  will arrange for both BM Bx and LP to be done by IR   LP yesterday - cell ct, glc, prot, cytology, flow cytometry, paraneoplastic panel sent  d/w onc: might need inpt chemo    #Decreased PO intake 2/2 esophageal adenocarcinoma  - s/p PEG    #Fever  pancultured  CXR w/ ?LLL opacity ?aspiration  c/w Cefepime   aspiration precautions  CT A/p - There is thickening of the subcutaneous fat and musculature at the entry   of the G-tube. G-tube within the stomach. No drainable collection. There is a hiatal hernia with significant thickening of the wall of the distal esophagus and herniated stomach. Active inflammatory process may be present.  3.9 cm right common iliac artery aneurysm.      #?L sided fascial droop  #Diffuse dural thickening  - Pt and spouse do not know if chronic.   - no focal defecits except b/l distal weakness   < from: MR Head w/wo IV Cont (05.14.25 @ 09:48) >  1.  No acute infarct or intracranial hemorrhage.    2.  Mild smooth diffuse dural thickening over the cerebral convexities.   This finding is nonspecific and can reflect intracranial hypotension or   recent spinal procedures. In the setting of known neoplasm cannot exclude   dural metastatic disease.    3.  Heterogeneous marrow signal of the calvarium, nonspecific.    < end of copied text >    #3.8 cm wide R iliac artery aneurysm- stable  - vascular o/p- surgery was scheduled with Dr. Tavera     #HTN  #CAD s/p CABG in Jan 2024  #Paroxysmal A.fib  - c/w home meds  - Hold plavix    #DVT PPX: SCD. Added Heparin SQ  #GI PPX: PPI BID  #Diet: PO, plus supplement w/ TF  #Code Status: Full  #Activity Order: IAT    pending: bone marrow biopsy 59y male with PMHx of HTN, hiatal hernia, HLD, CAD s/p CABG in Jan 2024, paroxysmal A. fib not on AC, former smoker (30 pack years, quit 1yr ago), recently diagnosed with colitis and esophageal adenocarcinoma s/p stent placement who presents for weakness. The patient was recently discharged from [facility] on 5/6 , and has been progressively deteriorating. Found to have hgb 7.6 lower than last admission reported melena, thrombocytopenia w/ elevated INR/PTT.     #Reported Melena likely due to Upper GI Bleed likely Secondary to  Malignancy   #Thrombocytopenia, elevated INR/PTT  #Recently diagnosed Esophageal adenocarcinoma   #Acute blood loss anemia  - EGD 4/25: A friable circumferential mass seen from the GE junction at 38cm to 28 cm of mid esophagus. Multiple biopsies were obtained. Oozing of blood was noted from the whole mass.  - c/w pantoprazole 40mg IV BID  - s/p EGD w/ peg and stent removal   - . Patient supposed to have PET scan OP, Surg Onc Dr Yost   - keep Hgb>7.5  -keep active T&S  -5/17 transfuse another unit PRBCs (did not get blood yesterday due to fevers)  -5/18 H/h still low, will transfuse another unit  - 5/20 H/H stable - Hb 7.9 in AM  - Bone marrow biopsy today  - AC on hold    #Weakness   #Diffuse metastatic dz of C/T/L spine on MRI  #Acute on chronic lower back pain   - c/w Dilaudid 1mg Q4, Oxycodone 5mg Q4 PRN  - Med Onc recommends Bone Scan and BM Bx by IR  -Neuro recommends LP  -will arrange for both BM Bx and LP to be done by IR   -Bone scan - No focal increased uptake of bone tracer was identified to suggest   metastatic bone disease, particularly within the thoracic spine,   correlating with the abnormal MRI findings.  -LP yesterday - cell ct, glc, prot, cytology, flow cytometry, paraneoplastic panel sent  -d/w onc: might need inpt chemo    #Decreased PO intake 2/2 esophageal adenocarcinoma  - s/p PEG    #Fever  pancultured  CXR w/ ?LLL opacity ?aspiration  c/w Cefepime   aspiration precautions  CT A/p - There is thickening of the subcutaneous fat and musculature at the entry   of the G-tube. G-tube within the stomach. No drainable collection. There is a hiatal hernia with significant thickening of the wall of the distal esophagus and herniated stomach. Active inflammatory process may be present.  3.9 cm right common iliac artery aneurysm.      #?L sided fascial droop  #Diffuse dural thickening  - Pt and spouse do not know if chronic.   - no focal defecits except b/l distal weakness   < from: MR Head w/wo IV Cont (05.14.25 @ 09:48) >  1.  No acute infarct or intracranial hemorrhage.    2.  Mild smooth diffuse dural thickening over the cerebral convexities.   This finding is nonspecific and can reflect intracranial hypotension or   recent spinal procedures. In the setting of known neoplasm cannot exclude   dural metastatic disease.    3.  Heterogeneous marrow signal of the calvarium, nonspecific.    < end of copied text >    #3.8 cm wide R iliac artery aneurysm- stable  - vascular o/p- surgery was scheduled with Dr. Tavera     #HTN  #CAD s/p CABG in Jan 2024  #Paroxysmal A.fib  - c/w home meds  - Hold plavix    #DVT PPX: SCD. Added Heparin SQ  #GI PPX: PPI BID  #Diet: PO, plus supplement w/ TF  #Code Status: Full  #Activity Order: IAT    pending: bone marrow biopsy

## 2025-05-20 NOTE — PROGRESS NOTE ADULT - ASSESSMENT
59y male with PMHx of HTN, hiatal hernia, HLD, CAD s/p CABG in Jan 2024, paroxysmal A. fib not on AC, former smoker (30 pack years, quit 1yr ago), recently diagnosed with colitis and esophageal adenocarcinoma s/p stent placement who presents for weakness. The patient was recently discharged from [facility] on 5/6 , and has been progressively deteriorating. Found to have hgb 7.6 lower than last admission reported melena, thrombocytopenia w/ elevated INR/PTT.     #Reported Melena likely due to Upper GI Bleed Secondary to  Malignancy   #Thrombocytopenia, elevated INR/PTT  #Recently diagnosed Esophageal adenocarcinoma   #Acute blood loss anemia  - EGD 4/25: A friable circumferential mass seen from the GE junction at 38cm to 28 cm of mid esophagus. Multiple biopsies were obtained. Oozing of blood was noted from the whole mass.  - c/w pantoprazole 40mg IV BID  - s/p EGD w/ peg and stent removal   - . Patient supposed to have PET scan OP, Surg Onc Dr Yost   - keep Hgb>7.5  -keep active T&S  -5/17 transfuse another unit PRBCs (did not get blood yesterday due to fevers)  -5/18 H/h still low, s/p another another unit  -hgb stable 7.9 today     #Weakness   #Diffuse metastatic dz of C/T/L spine on MRI  #Acute on chronic lower back pain   - Palliative following for pain control, appreciated, c/w Dilaudid 1mg Q3 prn, Oxycontin 20mg q12 standing  Med Onc recommends Bone Scan (performed) and BM Bx by IR-today   Neuro recommends LP (confirm leptomeningeal spread), s/p LP with IR 5/19, f/u studies and neuro f/u   d/w onc: might need inpt chemo    #Decreased PO intake 2/2 esophageal adenocarcinoma  - s/p PEG    #Fever  pancultured-negative   CXR w/ ?LLL opacity ?aspiration  started on Cefepime 5/16, per ID continue   aspiration precautions  CT A/p w/out obvious source but nonspec changes    #?L sided fascial droop  #Diffuse dural thickening  - Pt and spouse do not know if chronic.   - no focal defecits except b/l distal weakness   < from: MR Head w/wo IV Cont (05.14.25 @ 09:48) >  1.  No acute infarct or intracranial hemorrhage.    2.  Mild smooth diffuse dural thickening over the cerebral convexities.   This finding is nonspecific and can reflect intracranial hypotension or   recent spinal procedures. In the setting of known neoplasm cannot exclude   dural metastatic disease.    3.  Heterogeneous marrow signal of the calvarium, nonspecific.    < end of copied text >    #3.8 cm wide R iliac artery aneurysm- stable  - vascular o/p- surgery was scheduled with Dr. Tavera     #HTN  #CAD s/p CABG in Jan 2024  #Paroxysmal A.fib  - c/w home meds  - Hold plavix    #DVT PPX: SCDs-should plan to start chemoppx tomorrow after BM biopsy today if hgb remains stable given high risk cancer pt with limited mobility   #GI PPX: PPI BID  #Diet: PO, plus supplement w/ TF  #Code Status: Full  #Activity Order: IAT    High risk pt. Px is guarded (spouse aware)    #Progress Note Handoff  Pending: Clinical improvement and stability__x___PT_ f/u Bone Scan results , BM Bx today, f/u LP studies, onc and neuro f/u   Pt/Family discussion: Pt/family informed and agree with the current plan  Disposition: Home__vs    Nursing Home    To be determined____x____

## 2025-05-21 ENCOUNTER — APPOINTMENT (OUTPATIENT)
Dept: VASCULAR SURGERY | Facility: HOSPITAL | Age: 60
End: 2025-05-21

## 2025-05-21 LAB
ALBUMIN SERPL ELPH-MCNC: 3.1 G/DL — LOW (ref 3.5–5.2)
ALP SERPL-CCNC: 301 U/L — HIGH (ref 30–115)
ALT FLD-CCNC: 18 U/L — SIGNIFICANT CHANGE UP (ref 0–41)
ANION GAP SERPL CALC-SCNC: 10 MMOL/L — SIGNIFICANT CHANGE UP (ref 7–14)
AST SERPL-CCNC: 35 U/L — SIGNIFICANT CHANGE UP (ref 0–41)
BASOPHILS # BLD AUTO: 0.08 K/UL — SIGNIFICANT CHANGE UP (ref 0–0.2)
BASOPHILS NFR BLD AUTO: 1.3 % — HIGH (ref 0–1)
BILIRUB SERPL-MCNC: 0.8 MG/DL — SIGNIFICANT CHANGE UP (ref 0.2–1.2)
BUN SERPL-MCNC: 17 MG/DL — SIGNIFICANT CHANGE UP (ref 10–20)
CALCIUM SERPL-MCNC: 8.6 MG/DL — SIGNIFICANT CHANGE UP (ref 8.4–10.5)
CHLORIDE SERPL-SCNC: 103 MMOL/L — SIGNIFICANT CHANGE UP (ref 98–110)
CO2 SERPL-SCNC: 24 MMOL/L — SIGNIFICANT CHANGE UP (ref 17–32)
CREAT SERPL-MCNC: 0.5 MG/DL — LOW (ref 0.7–1.5)
CULTURE RESULTS: SIGNIFICANT CHANGE UP
CULTURE RESULTS: SIGNIFICANT CHANGE UP
EGFR: 116 ML/MIN/1.73M2 — SIGNIFICANT CHANGE UP
EGFR: 116 ML/MIN/1.73M2 — SIGNIFICANT CHANGE UP
EOSINOPHIL # BLD AUTO: 0.15 K/UL — SIGNIFICANT CHANGE UP (ref 0–0.7)
EOSINOPHIL NFR BLD AUTO: 2.4 % — SIGNIFICANT CHANGE UP (ref 0–8)
FLOW CYTOMETRY FINAL REPORT: SIGNIFICANT CHANGE UP
GLUCOSE SERPL-MCNC: 109 MG/DL — HIGH (ref 70–99)
HCT VFR BLD CALC: 25 % — LOW (ref 42–52)
HEMATOPATHOLOGY REPORT: SIGNIFICANT CHANGE UP
HGB BLD-MCNC: 8 G/DL — LOW (ref 14–18)
IMM GRANULOCYTES NFR BLD AUTO: 25.4 % — HIGH (ref 0.1–0.3)
LYMPHOCYTES # BLD AUTO: 1.3 K/UL — SIGNIFICANT CHANGE UP (ref 1.2–3.4)
LYMPHOCYTES # BLD AUTO: 20.6 % — SIGNIFICANT CHANGE UP (ref 20.5–51.1)
MAGNESIUM SERPL-MCNC: 2.5 MG/DL — HIGH (ref 1.8–2.4)
MCHC RBC-ENTMCNC: 27.3 PG — SIGNIFICANT CHANGE UP (ref 27–31)
MCHC RBC-ENTMCNC: 32 G/DL — SIGNIFICANT CHANGE UP (ref 32–37)
MCV RBC AUTO: 85.3 FL — SIGNIFICANT CHANGE UP (ref 80–94)
MONOCYTES # BLD AUTO: 0.88 K/UL — HIGH (ref 0.1–0.6)
MONOCYTES NFR BLD AUTO: 13.9 % — HIGH (ref 1.7–9.3)
MUSK IGG SER IA-MCNC: SIGNIFICANT CHANGE UP
NEUTROPHILS # BLD AUTO: 2.3 K/UL — SIGNIFICANT CHANGE UP (ref 1.4–6.5)
NEUTROPHILS NFR BLD AUTO: 36.4 % — LOW (ref 42.2–75.2)
NRBC BLD AUTO-RTO: 6 /100 WBCS — HIGH (ref 0–0)
PLATELET # BLD AUTO: 54 K/UL — LOW (ref 130–400)
PMV BLD: 12.5 FL — HIGH (ref 7.4–10.4)
POTASSIUM SERPL-MCNC: 4.4 MMOL/L — SIGNIFICANT CHANGE UP (ref 3.5–5)
POTASSIUM SERPL-SCNC: 4.4 MMOL/L — SIGNIFICANT CHANGE UP (ref 3.5–5)
PROT SERPL-MCNC: 5.5 G/DL — LOW (ref 6–8)
RBC # BLD: 2.93 M/UL — LOW (ref 4.7–6.1)
RBC # FLD: 18.3 % — HIGH (ref 11.5–14.5)
SODIUM SERPL-SCNC: 137 MMOL/L — SIGNIFICANT CHANGE UP (ref 135–146)
SPECIMEN SOURCE: SIGNIFICANT CHANGE UP
SPECIMEN SOURCE: SIGNIFICANT CHANGE UP
WBC # BLD: 6.31 K/UL — SIGNIFICANT CHANGE UP (ref 4.8–10.8)
WBC # FLD AUTO: 6.31 K/UL — SIGNIFICANT CHANGE UP (ref 4.8–10.8)

## 2025-05-21 PROCEDURE — 99233 SBSQ HOSP IP/OBS HIGH 50: CPT

## 2025-05-21 RX ORDER — OXYCODONE HYDROCHLORIDE 30 MG/1
20 TABLET ORAL ONCE
Refills: 0 | Status: DISCONTINUED | OUTPATIENT
Start: 2025-05-21 | End: 2025-05-21

## 2025-05-21 RX ADMIN — OXYCODONE HYDROCHLORIDE 20 MILLIGRAM(S): 30 TABLET ORAL at 17:44

## 2025-05-21 RX ADMIN — HEPARIN SODIUM 5000 UNIT(S): 1000 INJECTION INTRAVENOUS; SUBCUTANEOUS at 06:36

## 2025-05-21 RX ADMIN — Medication 1 MILLIGRAM(S): at 12:26

## 2025-05-21 RX ADMIN — Medication 1 MILLIGRAM(S): at 02:48

## 2025-05-21 RX ADMIN — POLYETHYLENE GLYCOL 3350 17 GRAM(S): 17 POWDER, FOR SOLUTION ORAL at 12:26

## 2025-05-21 RX ADMIN — Medication 1 MILLIGRAM(S): at 13:40

## 2025-05-21 RX ADMIN — Medication 650 MILLIGRAM(S): at 02:48

## 2025-05-21 RX ADMIN — OXYCODONE HYDROCHLORIDE 20 MILLIGRAM(S): 30 TABLET ORAL at 02:48

## 2025-05-21 RX ADMIN — METOPROLOL SUCCINATE 12.5 MILLIGRAM(S): 50 TABLET, EXTENDED RELEASE ORAL at 17:46

## 2025-05-21 RX ADMIN — Medication 1 MILLIGRAM(S): at 06:37

## 2025-05-21 RX ADMIN — Medication 40 MILLIGRAM(S): at 05:35

## 2025-05-21 RX ADMIN — OXYCODONE HYDROCHLORIDE 20 MILLIGRAM(S): 30 TABLET ORAL at 06:37

## 2025-05-21 RX ADMIN — Medication 1 MILLIGRAM(S): at 16:22

## 2025-05-21 RX ADMIN — OXYCODONE HYDROCHLORIDE 20 MILLIGRAM(S): 30 TABLET ORAL at 05:34

## 2025-05-21 RX ADMIN — CEFEPIME 100 MILLIGRAM(S): 2 INJECTION, POWDER, FOR SOLUTION INTRAVENOUS at 05:34

## 2025-05-21 RX ADMIN — Medication 1 APPLICATION(S): at 05:38

## 2025-05-21 RX ADMIN — Medication 1 MILLIGRAM(S): at 09:17

## 2025-05-21 RX ADMIN — Medication 40 MILLIGRAM(S): at 17:44

## 2025-05-21 RX ADMIN — Medication 1 MILLIGRAM(S): at 01:49

## 2025-05-21 RX ADMIN — ATORVASTATIN CALCIUM 80 MILLIGRAM(S): 80 TABLET, FILM COATED ORAL at 21:30

## 2025-05-21 RX ADMIN — OLANZAPINE 2.5 MILLIGRAM(S): 10 TABLET ORAL at 12:26

## 2025-05-21 RX ADMIN — METOPROLOL SUCCINATE 12.5 MILLIGRAM(S): 50 TABLET, EXTENDED RELEASE ORAL at 05:38

## 2025-05-21 RX ADMIN — Medication 1 MILLIGRAM(S): at 05:38

## 2025-05-21 RX ADMIN — OXYCODONE HYDROCHLORIDE 20 MILLIGRAM(S): 30 TABLET ORAL at 18:38

## 2025-05-21 RX ADMIN — Medication 1 MILLIGRAM(S): at 21:31

## 2025-05-21 NOTE — PROGRESS NOTE ADULT - ATTENDING COMMENTS
Patient reporting nightmares and delusions at night, as well as ongoing pain  SIgnificant PRN use in 24 hours  Given need for increased pain control and delusions/nightmares, will transition to fentanyl patch  continue IV dilaudid for pain control PRN as above  Following exam today, patient seen by oncology. BM involvement seen preliminarily on biopsy - hospice discussed  Will discuss GOC further tomorrow

## 2025-05-21 NOTE — PROGRESS NOTE ADULT - SUBJECTIVE AND OBJECTIVE BOX
Patient is a 59y old  Male who presents with a chief complaint of Progressive weakness (13 May 2025 17:51)    INTERVAL HPI/OVERNIGHT EVENTS: Patient was examined and seen at bedside. This morning pt is resting in bed and reports a bad day yesterday due to pain after BM Bx and bone scan. Also overnight w/ seeing things that are not there (fire) as well as ?suicidal ideations. Wife at bedside. Currently reports pain in the epigastric region.  ROS: Denies CP, SOB, new weakness  All other systems reviewed and are within normal limits.  InitialHPI:  59y male with PMHx of HTN, hiatal hernia, HLD, CAD s/p CABG in Jan 2024, paroxysmal A. fib not on AC, former smoker (30 pack years, quit 1yr ago), recently diagnosed with colitis and esophageal adenocarcinoma s/p stent placement who presents for weakness. The patient was recently discharged from [facility] on 5/6 , and has been progressively deteriorating. States he has been unable to tolerate p.o. and has been unable to walk over the past few days. Wife at bedside reports that he has not been able to follow-up with oncology due to the weakness.  On ROS the patient also states he has been having black tarry stools that started 1 day prior to his EGD. He had 3 black BMs today. He denies any mucosal bleeding or blood in the urine.  He denies any fevers, chills, chest pain, shortness of breath, palpitations, headache, vision changes, nausea, vomiting, abdominal pain, hematuria, falls.  He is supposed to have PET scan tomorrow and follow with Dr. Sanchez in 2 days.    T(F): 98.5 (05-12-25 @ 21:05), Max: 98.6 (05-12-25 @ 19:48)  HR: 97 (05-12-25 @ 21:05) (88 - 104)  BP: 111/58 (05-12-25 @ 21:05) (111/58 - 132/75)  RR: 18 (05-12-25 @ 21:05) (18 - 20)  SpO2: 99% (05-12-25 @ 21:05) (98% - 100%) on RA    Labs: Hg 7.6 (baseline ~10), MCV 78, slight schistocytes on smear,  Plt 92, PT/INR >40/6.2, PTT 51.8, ALK phos 273.      Imaging:    CT Chest w/ IV Cont  (4/26 - prior admission)  IMPRESSION:  1.  No definite evidence of thoracic metastatic disease.  2.  Small bilateral pleural effusions.  3.  Marked circumferential thickening of the distal esophagus/hiatal   hernia could be related to provided history of suspected esophageal   cancer versus esophagitis. (12 May 2025 21:41)    PAST MEDICAL & SURGICAL HISTORY:  HTN (hypertension)      Hiatal hernia      HLD (hyperlipidemia)      CAD (coronary artery disease)      Paroxysmal atrial fibrillation      History of colitis      S/P CABG (coronary artery bypass graft)      General: NAD, AA0x3, ?Lt facial droop, appears very debilitated and weak  HEENT:  EOMI, no LAD  CV: S1 S2  Resp: decreased breath sounds at bases  GI: epig, lower sternal tenderness/ND/S +BS; +PEG  MS: no clubbing/cyanosis/edema, + pulses b/l  Neuro: LE 2/5 distally, 4/5 prox. UE 5-/5          Home Medications:  clopidogrel 75 mg oral tablet: 1 tab(s) orally once a day (13 May 2025 01:36)  Lipitor 80 mg oral tablet: 1 tab(s) orally once a day (13 May 2025 01:36)  Metoprolol Tartrate 25 mg oral tablet: 0.5 tab(s) orally 2 times a day (13 May 2025 01:36)  Multiple Vitamins oral tablet, chewable: 1 tab(s) orally once a day (13 May 2025 01:36)  Pepcid 20 mg oral tablet: 1 tab(s) orally once a day (13 May 2025 01:36)    MEDICATIONS  (STANDING):  atorvastatin 80 milliGRAM(s) Oral at bedtime  cefepime   IVPB 2000 milliGRAM(s) IV Intermittent every 12 hours  cefepime   IVPB      chlorhexidine 2% Cloths 1 Application(s) Topical <User Schedule>  fentaNYL   Patch  50 MICROgram(s)/Hr 1 Patch Transdermal every 72 hours  heparin   Injectable 5000 Unit(s) SubCutaneous every 12 hours  lactated ringers. 1000 milliLiter(s) (60 mL/Hr) IV Continuous <Continuous>  lidocaine   4% Patch 1 Patch Transdermal daily  metoprolol tartrate 12.5 milliGRAM(s) Oral two times a day  OLANZapine 2.5 milliGRAM(s) Oral daily  oxyCODONE  ER Tablet 20 milliGRAM(s) Oral once  pantoprazole  Injectable 40 milliGRAM(s) IV Push every 12 hours  polyethylene glycol 3350 17 Gram(s) Oral daily    MEDICATIONS  (PRN):  acetaminophen     Tablet .. 650 milliGRAM(s) Oral every 6 hours PRN Temp greater or equal to 38C (100.4F), Mild Pain (1 - 3)  HYDROmorphone  Injectable 1 milliGRAM(s) IV Push every 3 hours PRN Severe Pain (7 - 10)    Vital Signs Last 24 Hrs  T(C): 36.8 (21 May 2025 13:00), Max: 37.6 (20 May 2025 19:54)  T(F): 98.3 (21 May 2025 13:00), Max: 99.6 (20 May 2025 19:54)  HR: 88 (21 May 2025 13:00) (71 - 101)  BP: 117/70 (21 May 2025 13:00) (99/60 - 117/70)  BP(mean): 85 (21 May 2025 13:00) (73 - 85)  RR: 18 (21 May 2025 13:00) (18 - 18)  SpO2: 97% (21 May 2025 13:00) (94% - 98%)    Parameters below as of 21 May 2025 13:00  Patient On (Oxygen Delivery Method): room air      CAPILLARY BLOOD GLUCOSE        LABS:                        8.0    6.31  )-----------( 54       ( 21 May 2025 06:48 )             25.0     05-21    137  |  103  |  17  ----------------------------<  109[H]  4.4   |  24  |  0.5[L]    Ca    8.6      21 May 2025 06:48  Phos  4.6     05-20  Mg     2.5     05-21    TPro  5.5[L]  /  Alb  3.1[L]  /  TBili  0.8  /  DBili  x   /  AST  35  /  ALT  18  /  AlkPhos  301[H]  05-21    LIVER FUNCTIONS - ( 21 May 2025 06:48 )  Alb: 3.1 g/dL / Pro: 5.5 g/dL / ALK PHOS: 301 U/L / ALT: 18 U/L / AST: 35 U/L / GGT: x               PT/INR - ( 20 May 2025 05:57 )   PT: 17.90 sec;   INR: 1.50 ratio         PTT - ( 20 May 2025 05:57 )  PTT:37.9 sec  Urinalysis Basic - ( 21 May 2025 06:48 )    Color: x / Appearance: x / SG: x / pH: x  Gluc: 109 mg/dL / Ketone: x  / Bili: x / Urobili: x   Blood: x / Protein: x / Nitrite: x   Leuk Esterase: x / RBC: x / WBC x   Sq Epi: x / Non Sq Epi: x / Bacteria: x              Consultant Notes Reviewed:  [x ] YES  [ ] NO  Care Discussed with Consultants/Other Providers/ Housestaff [ x] YES  [ ] NO  Radiology, labs, EKGs, new studies personally reviewed.

## 2025-05-21 NOTE — PROGRESS NOTE ADULT - SUBJECTIVE AND OBJECTIVE BOX
SUBJECTIVE/OVERNIGHT EVENTS  Today is hospital day 9d. This morning patient was seen and examined at bedside, appears to be in pain    Overnight event  Patient was found by RN to be in severe emotional distress, sobbing and reporting hallucinations/doubles of himself. NIH wnl.   Patient examined at bedside, has tangential speech but not responding to external stimuli.   Patient noted that he had depression many years ago but stopped seeing a psychiatrist. He notes that he wants to hurt himself but does not have a plan to do so.   Will place psychiatry eval.      MEDICATIONS  STANDING MEDICATIONS  atorvastatin 80 milliGRAM(s) Oral at bedtime  cefepime   IVPB 2000 milliGRAM(s) IV Intermittent every 12 hours  cefepime   IVPB      chlorhexidine 2% Cloths 1 Application(s) Topical <User Schedule>  heparin   Injectable 5000 Unit(s) SubCutaneous every 12 hours  lactated ringers. 1000 milliLiter(s) IV Continuous <Continuous>  lidocaine   4% Patch 1 Patch Transdermal daily  metoprolol tartrate 12.5 milliGRAM(s) Oral two times a day  OLANZapine 2.5 milliGRAM(s) Oral daily  oxyCODONE  ER Tablet 20 milliGRAM(s) Oral every 12 hours  pantoprazole  Injectable 40 milliGRAM(s) IV Push every 12 hours  polyethylene glycol 3350 17 Gram(s) Oral daily    PRN MEDICATIONS  acetaminophen     Tablet .. 650 milliGRAM(s) Oral every 6 hours PRN  HYDROmorphone  Injectable 1 milliGRAM(s) IV Push every 3 hours PRN    VITALS  T(F): 98.2 (05-21-25 @ 04:30), Max: 99.6 (05-20-25 @ 19:54)  HR: 90 (05-21-25 @ 04:30) (71 - 101)  BP: 99/60 (05-21-25 @ 04:30) (99/60 - 129/64)  RR: 18 (05-21-25 @ 04:30) (18 - 20)  SpO2: 98% (05-21-25 @ 04:30) (94% - 98%)    PHYSICAL EXAM  GENERAL: NAD, lying in bed comfortably  HEAD:  Atraumatic, normocephalic  EYES: EOMI, PERRLA, conjunctiva and sclera clear  ENT: Moist mucous membranes  NECK: Supple   HEART: Regular rate and rhythm, no murmurs  LUNGS: Unlabored respirations.  Clear to auscultation bilaterally, no wheezing  ABDOMEN: Soft, nondistended, +BS, +PEG, tenderness around PEG site  EXTREMITIES: No clubbing, cyanosis, or edema  NERVOUS SYSTEM:  A&Ox3, decreased b/l LE strength  SKIN: No rashes or lesions    LABS             8.0    6.31  )-----------( 54       ( 05-21-25 @ 06:48 )             25.0     137  |  103  |  17  -------------------------<  109   05-21-25 @ 06:48  4.4  |  24  |  0.5    Ca      8.6     05-21-25 @ 06:48  Phos   4.6     05-20-25 @ 05:57  Mg     2.5     05-21-25 @ 06:48    TPro  5.5  /  Alb  3.1  /  TBili  0.8  /  DBili  x   /  AST  35  /  ALT  18  /  AlkPhos  301  /  GGT  x     05-21-25 @ 06:48    PT/INR - ( 05-20-25 @ 05:57 )   PT: 17.90 sec[H];   INR: 1.50 ratio[H]  PTT - ( 05-20-25 @ 05:57 )  PTT:37.9 sec      Urinalysis Basic - ( 21 May 2025 06:48 )    Color: x / Appearance: x / SG: x / pH: x  Gluc: 109 mg/dL / Ketone: x  / Bili: x / Urobili: x   Blood: x / Protein: x / Nitrite: x   Leuk Esterase: x / RBC: x / WBC x   Sq Epi: x / Non Sq Epi: x / Bacteria: x          IMAGING

## 2025-05-21 NOTE — PROGRESS NOTE ADULT - ASSESSMENT
59y male with PMHx of HTN, hiatal hernia, HLD, CAD s/p CABG in Jan 2024, paroxysmal A. fib not on AC, former smoker (30 pack years, quit 1yr ago), recently diagnosed with colitis and esophageal adenocarcinoma s/p stent placement who presents for weakness. The patient was recently discharged from [facility] on 5/6 , and has been progressively deteriorating. Found to have hgb 7.6 lower than last admission reported melena, thrombocytopenia w/ elevated INR/PTT.     #Reported Melena likely due to Upper GI Bleed likely Secondary to  Malignancy   #Thrombocytopenia, elevated INR/PTT  #Recently diagnosed Esophageal adenocarcinoma   #Acute blood loss anemia  - EGD 4/25: A friable circumferential mass seen from the GE junction at 38cm to 28 cm of mid esophagus. Multiple biopsies were obtained. Oozing of blood was noted from the whole mass.  - c/w pantoprazole 40mg IV BID  - s/p EGD w/ peg and stent removal   - . Patient supposed to have PET scan OP, Surg Onc Dr Yost   - keep Hgb>7.5  -keep active T&S  -5/17 transfuse another unit PRBCs (did not get blood yesterday due to fevers)  -5/18 H/h still low, s/p another another unit    #Weakness   #?Metastatic dz of C/T/L spine on MRI  #Acute on chronic lower back pain   - c/w Dilaudid 1mg Q4, Oxycodone 5mg Q4 PRN  Med Onc recommends Bone Scan and BM Bx by IR  Neuro recommends LP (confirm leptomeningeal spread)  s/p BM Bx and LP (high protein)  Bone scan negative for bony mets  d/w onc: might need inpt chemo pending BM Bx results    #Decreased PO intake 2/2 esophageal adenocarcinoma  - s/p PEG    #Fever on 5/16  pancultured  CXR w/ ?LLL opacity ?aspiration  started on Cefepime 5/16  aspiration precautions  CT A/p w/out obvious source but nonspec changes  5/21 hold Cefepime as completed 7d course and no obvious source and new hallucinations    #Hallucinations  #Suicidal ideations  hold cefepime  Psych eval  1:1 when wife is not here until cleared by psych however pt denies plans and I think he is talking out of frustration    #?L sided fascial droop  #Diffuse dural thickening  - Pt and spouse do not know if chronic.   - no focal defecits except b/l distal weakness   < from: MR Head w/wo IV Cont (05.14.25 @ 09:48) >  1.  No acute infarct or intracranial hemorrhage.    2.  Mild smooth diffuse dural thickening over the cerebral convexities.   This finding is nonspecific and can reflect intracranial hypotension or   recent spinal procedures. In the setting of known neoplasm cannot exclude   dural metastatic disease.    3.  Heterogeneous marrow signal of the calvarium, nonspecific.    < end of copied text >    #3.8 cm wide R iliac artery aneurysm- stable  - vascular o/p- surgery was scheduled with Dr. Tavera     #HTN  #CAD s/p CABG in Jan 2024  #Paroxysmal A.fib  - c/w home meds  - Hold plavix    #DVT PPX: SCDs  #GI PPX: PPI BID  #Diet: PO, plus supplement w/ TF  #Code Status: Full  #Activity Order: IAT    High risk pt. Px is guarded (spouse aware)    #Progress Note Handoff  Pending: Clinical improvement and stability__x___PT_  LP, BM Bx results, Psych  Pt/Family discussion: Pt/family informed and agree with the current plan  Disposition: Home__vs    Nursing Home    To be determined____x____    My note supersedes the residents note should a discrepancy arise.    Chart and notes personally reviewed.  Care Discussed with Consultants/Other Providers/ Housestaff [ x] YES [ ] NO   Radiology, labs, old records personally reviewed.    discussed w/ housestaff, nursing, case management, spouse, neuro, palliative    Attestation Statements:    Attestation Statements:  Risk Statement (NON-critical care).     On this date of service, level of risk to patient is considered: High.     Due to: pt with illness causing risk to life or bodily fxn    Time-based billing (NON-critical care).     50 minutes spent on total encounter. The necessity of the time spent during the encounter on this date of service was due to:     time spent on review of labs, imaging studies, old records, obtaining history, personally examining patient, counselling and communicating with patient/ family, entering orders for medications/tests/etc, discussions with other health care providers, documentation in electronic health records, independent interpretation of labs, imaging/procedure results and care coordination.

## 2025-05-21 NOTE — PROGRESS NOTE ADULT - ATTENDING COMMENTS
60yo man with recently diagnosed esophageal adenocarcinoma who presents with generalized weakness. Exam stable from prior eval with proximal weakness in b/l UEs and LEs with preserved sensation and reflexes; mentation intact with no deficits in orientation, attention, concentration, language, comprehension (including complex commands), 2/3 object recall. MRI brain showing pachymeningeal enhancement and MRI spinal w/ signal enhancement of vertebral bodies (cervical, thoracic, and lumbar) suspicious for metastatic disease. Myopathy workup negative thus far. CSF with elevated protein, borderline low glucose, 1 cell. Concern for possible paraneoplastic syndrome vs myopathy; also evidence of inflammatory process in CNS - given recent cancer diagnosis and MRI findings, concern for dural metastatic disease; unlikely infectious etiology though other inflammatory causes cannot be ruled out. Recs as above.

## 2025-05-21 NOTE — PROGRESS NOTE ADULT - ASSESSMENT
59M w/ Esophageal Cancer (dx Apr2025) s/p stent, CAD s/p [id number] (Jan2024), Afib not on AC, HLD is admitted for weakness, acute blood loss anemia/melena requiring pRBC transfusion. Patient completed endoscopy 5/14 for removal of esophageal stent (due to discomfort) and for PEG placement. Palliative consulted to assist w/ cancer-related pain management. Significantly, MR C/T/L spine is highly suspicious for diffusely metastatic disease in C/T/L spine and sacrum/iliac bones.    Plan  -Full code (not discussed today)  -Pain reportedly worsening.  - Now reporting nightmares and delusions  --> recommend psychiatry consultation (patient accepting of recommendation)  --> agree with stopping cefepime as it maybe inciting agent  --> will rotate off oxycodone ER to Transdermal Fentanyl Patch should this be contributing and to try and obtain better pain control  - last dose of Oxycodone ER 20mg at 6pm, and start Transdermal Fentanyl Patch 50mcg/hr q72hr patch.  - Continue with Hydromorphone 1mg IV q3hr PRN severe pain  -bowel regimen  -continue olanzapine  - will follow for pain and GOC as appropriate  - patient was evaluated w/ Palliative Attending Dr. Kavya Davis MD  Palliative Medicine Fellow  [facility]   [phone]

## 2025-05-21 NOTE — PROGRESS NOTE ADULT - SUBJECTIVE AND OBJECTIVE BOX
JONAS CELESTIN 59y Male  MRN#: [id number]   Hospital Day: 9d    Oncology following for metastatic esophageal ca     REVIEW OF SYMPTOMS:  had depressive thought this am   concern fo confusion/ hallucination noted on hcart review  still in pain- twisted his hamstring     OBJECTIVE  PAST MEDICAL & SURGICAL HISTORY  HTN (hypertension)    Hiatal hernia    HLD (hyperlipidemia)    CAD (coronary artery disease)    Paroxysmal atrial fibrillation    History of colitis    S/P CABG (coronary artery bypass graft)      ALLERGIES:  penicillins (Unknown)    MEDICATIONS:  STANDING MEDICATIONS  atorvastatin 80 milliGRAM(s) Oral at bedtime  chlorhexidine 2% Cloths 1 Application(s) Topical <User Schedule>  fentaNYL   Patch  50 MICROgram(s)/Hr 1 Patch Transdermal every 72 hours  heparin   Injectable 5000 Unit(s) SubCutaneous every 12 hours  lactated ringers. 1000 milliLiter(s) IV Continuous <Continuous>  lidocaine   4% Patch 1 Patch Transdermal daily  metoprolol tartrate 12.5 milliGRAM(s) Oral two times a day  OLANZapine 2.5 milliGRAM(s) Oral daily  oxyCODONE  ER Tablet 20 milliGRAM(s) Oral once  pantoprazole  Injectable 40 milliGRAM(s) IV Push every 12 hours  polyethylene glycol 3350 17 Gram(s) Oral daily    PRN MEDICATIONS  acetaminophen     Tablet .. 650 milliGRAM(s) Oral every 6 hours PRN  HYDROmorphone  Injectable 1 milliGRAM(s) IV Push every 3 hours PRN      VITAL SIGNS: Last 24 Hours  T(C): 36.8 (21 May 2025 13:00), Max: 37.6 (20 May 2025 19:54)  T(F): 98.3 (21 May 2025 13:00), Max: 99.6 (20 May 2025 19:54)  HR: 88 (21 May 2025 13:00) (88 - 101)  BP: 117/70 (21 May 2025 13:00) (99/60 - 117/70)  BP(mean): 85 (21 May 2025 13:00) (73 - 85)  RR: 18 (21 May 2025 13:00) (18 - 18)  SpO2: 97% (21 May 2025 13:00) (94% - 98%)    LABS:                        8.0    6.31  )-----------( 54       ( 21 May 2025 06:48 )             25.0     05-21    137  |  103  |  17  ----------------------------<  109[H]  4.4   |  24  |  0.5[L]    Ca    8.6      21 May 2025 06:48  Phos  4.6     05-20  Mg     2.5     05-21    TPro  5.5[L]  /  Alb  3.1[L]  /  TBili  0.8  /  DBili  x   /  AST  35  /  ALT  18  /  AlkPhos  301[H]  05-21    PT/INR - ( 20 May 2025 05:57 )   PT: 17.90 sec;   INR: 1.50 ratio         PTT - ( 20 May 2025 05:57 )  PTT:37.9 sec  Urinalysis Basic - ( 21 May 2025 06:48 )    Color: x / Appearance: x / SG: x / pH: x  Gluc: 109 mg/dL / Ketone: x  / Bili: x / Urobili: x   Blood: x / Protein: x / Nitrite: x   Leuk Esterase: x / RBC: x / WBC x   Sq Epi: x / Non Sq Epi: x / Bacteria: x    PHYSICAL EXAM  Gen alert oriented, able to participate in conversation  HEENT mucosa moist   CVS s1,s2  Rs b/l equal air entry  Abd soft  Ext trace edema       Imaging/Procedure   EGD: Friable circumferential mass seen from the GE junction at 38cm to 28 cm of mid esophagus.  Another 5mm lesion was noted at 20cm from incisors in the proximal esophagus.  	Erosions in the stomach compatible with erosive gastritis. (Biopsy).  	A pulsating ulcerated lesion was noted in the fundus. Biopsy was not obtained.  	Normal mucosa in the whole examined duodenum. (Biopsy).    Colonoscopy Impressions:  	Multiple semi-pedunculated polyps were seen in the left colon. A 2cm polyp was seen in the descending colon, biopsy was obtained. Polyps were not removed in the setting of poor prep.  	External hemorrhoids.  	Solid stool noted in the whole colon. Cecum was not reached    CT chest   No definite evidence of thoracic metastatic disease.  2.  Small bilateral pleural effusions.  3.  Marked circumferential thickening of the distal esophagus/hiatal   hernia could be related to provided history of suspected esophageal   cancer versus esophagitis.    Ct abdomen   Redemonstrated prominent submucosal fat/bowel wall thickening involving   descending colon, possibly related to prior episodes of colitis.    MRI Brain   Mild smooth diffuse dural thickening over the cerebral convexities.   This finding is nonspecific and can reflect intracranial hypotension or   recent spinal procedures. In the setting of known neoplasm cannot exclude   dural metastatic disease.    BOne scan   No focal increased uptake of bone tracer was identified to suggest   metastatic bone disease, particularly within the thoracic spine,   correlating with the abnormal MRI findings.  2. Bone scan shows diffusely increased uptake throughout the skeleton,   including the skull, ribs, humeri, femurs, and tibiae. This pattern may   represent a hypermetabolic state or anemia-related bone marrow expansion.    PATHOLOGY   BM biopsy : Metastatic esophageal carcinoma involving bone marrow, > 95% of the marrow cellularity.

## 2025-05-21 NOTE — PROGRESS NOTE ADULT - SUBJECTIVE AND OBJECTIVE BOX
HPI:  59M w/ Esophageal Cancer (dx Apr2025) s/p stent, CAD s/p [id number] (Jan2024), Afib not on AC, HLD is admitted for weakness, acute blood loss anemia/melena requiring pRBC transfusion. Patient completed endoscopy 5/14 for removal of esophageal stent (due to discomfort) and for PEG placement. Palliative consulted to assist w/ cancer-related pain management.    Interval History  -Patient reports pain has become progressively worse since undergoing LP, biopsy and further testing. He is not able to sleep due to pain.  - Additionally, he is concerned that he is having nightmares and strange dreams and when he wakes up he has delusion that the dream is true reality. No reported hallucinations, no reported plan for self harm.  - He reports that in the past he has dealt with insomnia and depression and believes that he maybe having issues now with sleep and delusion due to the pressure of diagnosis he has received.    ADVANCE DIRECTIVES:    [x ] Full Code [ ] DNR  MOLST  [ ]  Living Will  [ ]   DECISION MAKER(s):  [ ] Health Care Proxy(s)  [ x] Surrogate(s)  [ ] Guardian           Name(s): Phone Number(s):  Wife: Shannan Reddy    BASELINE (I)ADL(s) (prior to admission):  [place]: [ ]Total  [ ] Moderate [x ]Dependent    Allergies  penicillins (Unknown)    MEDICATIONS  (STANDING):  atorvastatin 80 milliGRAM(s) Oral at bedtime  chlorhexidine 2% Cloths 1 Application(s) Topical <User Schedule>  fentaNYL   Patch  50 MICROgram(s)/Hr 1 Patch Transdermal every 72 hours  heparin   Injectable 5000 Unit(s) SubCutaneous every 12 hours  lactated ringers. 1000 milliLiter(s) (60 mL/Hr) IV Continuous <Continuous>  lidocaine   4% Patch 1 Patch Transdermal daily  metoprolol tartrate 12.5 milliGRAM(s) Oral two times a day  OLANZapine 2.5 milliGRAM(s) Oral daily  oxyCODONE  ER Tablet 20 milliGRAM(s) Oral once  pantoprazole  Injectable 40 milliGRAM(s) IV Push every 12 hours  polyethylene glycol 3350 17 Gram(s) Oral daily    MEDICATIONS  (PRN):  acetaminophen     Tablet .. 650 milliGRAM(s) Oral every 6 hours PRN Temp greater or equal to 38C (100.4F), Mild Pain (1 - 3)  HYDROmorphone  Injectable 1 milliGRAM(s) IV Push every 3 hours PRN Severe Pain (7 - 10)    PRESENT SYMPTOMS: [ ]Unable to obtain due to poor mentation   Source if other than patient:  [ ]Family   [ ]Team   [ X]All other review of systems negative including pain and dyspnea unless noted below    All components of pain assessment below addressed. Blank spaces indicate that the patient did/could not complete the assessment.  Pain: [ x]yes [ ]no  QOL impact - cant sleep, difficulty getting out of bed   Location -  thoracic level of mid back                   Aggravating factors - position in bed  Quality - sharp  Radiation - none reported  Timing- constant  Severity (0-10 scale): at times 10/10, after iv dilaudid will be between 3-6/10. he feels pain has been worsening over the last 3 d  Minimal acceptable level (0-10 scale):     Dyspnea:                           [ x]None[ ]Mild [ ]Moderate [ ]Severe   Anxiety:                             [ ]None[ ]Mild [ x]Moderate [ ]Severe   Fatigue:                             [ ]None[ ]Mild [ ]Moderate [x ]Severe   Nausea:                             [x ]None[ ]Mild [ ]Moderate [ ]Severe   Loss of appetite:              [x ]None[ ]Mild [ ]Moderate [ ]Severe   Constipation:                    [ x]None[ ]Mild [ ]Moderate [ ]Severe    Other Symptoms:    PHYSICAL EXAM:  Vital Signs Last 24 Hrs  T(C): 36.8 (21 May 2025 13:00), Max: 37.6 (20 May 2025 19:54)  T(F): 98.3 (21 May 2025 13:00), Max: 99.6 (20 May 2025 19:54)  HR: 88 (21 May 2025 13:00) (71 - 101)  BP: 117/70 (21 May 2025 13:00) (99/60 - 117/70)  BP(mean): 85 (21 May 2025 13:00) (73 - 85)  RR: 18 (21 May 2025 13:00) (18 - 18)  SpO2: 97% (21 May 2025 13:00) (94% - 98%)    Parameters below as of 21 May 2025 13:00  Patient On (Oxygen Delivery Method): room air     I&O's Summary    20 May 2025 07:01  -  21 May 2025 07:00  --------------------------------------------------------  IN: 685 mL / OUT: 0 mL / NET: 685 mL    GENERAL:  [ ] No acute distress [ ]Lethargic  [ ]Unarousable  [x ]Verbal  [ ]Non-Verbal [ ]Cachexia    BEHAVIORAL/PSYCH:  [ ]Alert and Oriented x  [ x] Anxiety [ ] Delirium [ ] Agitation [ ] Calm   EYES: [x ] No scleral icterus [ ] Scleral icterus [ ] Closed  ENMT:  [ ]Dry mouth  [ ]No external oral lesions [ ] No external ear or nose lesions  CARDIOVASCULAR:  [ ]Regular [ ]Irregular [ ]Tachy [x ]Not Tachy  [ ]Rodney [ ] Edema [ ] No edema  PULMONARY:  [ ]Tachypnea  [ ]Audible excessive secretions [x ] No labored breathing [ ] labored breathing  GASTROINTESTINAL: [ ]Soft  [ ]Distended  [ x]Not distended [ ]Non tender [ ]Tender  MUSCULOSKELETAL: [ ]No clubbing [ ] clubbing  [ ] No cyanosis [ ] cyanosis  NEUROLOGIC: [ ]No focal deficits  [ x]Follows commands  [ ]Does not follow commands  [ ]Cognitive impairment  [ ]Dysphagia  [ ]Dysarthria  [ ]Paresis   SKIN: [ ] Jaundiced [ ] Non-jaundiced [ ]Rash [ ]No Rash [ ] Warm [ ] Dry  MISC/LINES: [ ] ET tube [ ] Trach [ ]NGT/OGT [x ]PEG [ ]Epps    CRITICAL CARE:  [ ] Shock Present  [ ]Septic [ ]Cardiogenic [ ]Neurologic [ ]Hypovolemic  [ ]  Vasopressors [ ]  Inotropes   [ ]Respiratory failure present [ ]Mechanical ventilation [ ]Non-invasive ventilatory support [ ]High flow  [ ]Acute  [ ]Chronic [ ]Hypoxic  [ ]Hypercarbic [ ]Other  [ ]Other organ failure     LABS: reviewed by me                        8.0    6.31  )-----------( 54       ( 21 May 2025 06:48 )             25.0   05-21    137  |  103  |  17  ----------------------------<  109[H]  4.4   |  24  |  0.5[L]    Ca    8.6      21 May 2025 06:48  Phos  4.6     05-20  Mg     2.5     05-21    TPro  5.5[L]  /  Alb  3.1[L]  /  TBili  0.8  /  DBili  x   /  AST  35  /  ALT  18  /  AlkPhos  301[H]  05-21  PT/INR - ( 20 May 2025 05:57 )   PT: 17.90 sec;   INR: 1.50 ratio    PTT - ( 20 May 2025 05:57 )  PTT:37.9 sec    Urinalysis Basic - ( 21 May 2025 06:48 )  Color: x / Appearance: x / SG: x / pH: x  Gluc: 109 mg/dL / Ketone: x  / Bili: x / Urobili: x   Blood: x / Protein: x / Nitrite: x   Leuk Esterase: x / RBC: x / WBC x   Sq Epi: x / Non Sq Epi: x / Bacteria: x      RADIOLOGY & ADDITIONAL STUDIES:    EKG:      PROTEIN CALORIE MALNUTRITION PRESENT: [ ]mild [ ]moderate [ ]severe [ ]underweight [ ]morbid obesity  https://www.andeal.org/vault/2440/web/files/ONC/Table_Clinical%20Characteristics%20to%20Document%20Malnutrition-White%20JV%20et%20al%244042.pdf    Height (cm): 180.3 (05-20-25 @ 09:53), 180 (05-01-25 @ 11:47), 180.3 (04-05-25 @ 13:20)  Weight (kg): 84.5 (05-14-25 @ 14:45), 89 (05-01-25 @ 11:47), 90.3 (04-03-25 @ 21:22)  BMI (kg/m2): 26 (05-20-25 @ 09:53), 26.1 (05-14-25 @ 14:45), 27.5 (05-01-25 @ 11:47)    [ ]PPSV2 < or = to 30% [ ]significant weight loss  [ ]poor nutritional intake  [ ]anasarca      [ ]Artificial Nutrition      REFERRALS:   [ ]Chaplaincy  [ ]Hospice  [ ]Child Life  [ ]Social Work  [ ]Case management [ ]Holistic Therapy     Patient discussed with primary medical team MD  Palliative care education provided to patient and/or family    Goals of Care Document:    HPI:  59M w/ Esophageal Cancer (dx Apr2025) s/p stent, CAD s/p [id number] (Jan2024), Afib not on AC, HLD is admitted for weakness, acute blood loss anemia/melena requiring pRBC transfusion. Patient completed endoscopy 5/14 for removal of esophageal stent (due to discomfort) and for PEG placement. Palliative consulted to assist w/ cancer-related pain management.    Interval History  -Patient reports pain has become progressively worse since undergoing LP, biopsy and further testing. He is not able to sleep due to pain.  - Additionally, he is concerned that he is having nightmares and strange dreams and when he wakes up he has delusion that the dream is true reality. No reported hallucinations, no reported plan for self harm.  - He reports that in the past he has dealt with insomnia and depression and believes that he maybe having issues now with sleep and delusion due to the pressure of diagnosis he has received.    ADVANCE DIRECTIVES:    [x ] Full Code [ ] DNR  MOLST  [ ]  Living Will  [ ]   DECISION MAKER(s):  [ ] Health Care Proxy(s)  [ x] Surrogate(s)  [ ] Guardian           Name(s): Phone Number(s):  Wife: Shannan Reddy    BASELINE (I)ADL(s) (prior to admission):  [place]: [ ]Total  [ ] Moderate [x ]Dependent    Allergies  penicillins (Unknown)    MEDICATIONS  (STANDING):  atorvastatin 80 milliGRAM(s) Oral at bedtime  chlorhexidine 2% Cloths 1 Application(s) Topical <User Schedule>  fentaNYL   Patch  50 MICROgram(s)/Hr 1 Patch Transdermal every 72 hours  heparin   Injectable 5000 Unit(s) SubCutaneous every 12 hours  lactated ringers. 1000 milliLiter(s) (60 mL/Hr) IV Continuous <Continuous>  lidocaine   4% Patch 1 Patch Transdermal daily  metoprolol tartrate 12.5 milliGRAM(s) Oral two times a day  OLANZapine 2.5 milliGRAM(s) Oral daily  oxyCODONE  ER Tablet 20 milliGRAM(s) Oral once  pantoprazole  Injectable 40 milliGRAM(s) IV Push every 12 hours  polyethylene glycol 3350 17 Gram(s) Oral daily    MEDICATIONS  (PRN):  acetaminophen     Tablet .. 650 milliGRAM(s) Oral every 6 hours PRN Temp greater or equal to 38C (100.4F), Mild Pain (1 - 3)  HYDROmorphone  Injectable 1 milliGRAM(s) IV Push every 3 hours PRN Severe Pain (7 - 10)    PRESENT SYMPTOMS: [ ]Unable to obtain due to poor mentation   Source if other than patient:  [ ]Family   [ ]Team   [ X]All other review of systems negative including pain and dyspnea unless noted below    All components of pain assessment below addressed. Blank spaces indicate that the patient did/could not complete the assessment.  Pain: [ x]yes [ ]no  QOL impact - cant sleep, difficulty getting out of bed   Location -  thoracic level of mid back                   Aggravating factors - position in bed  Quality - sharp  Radiation - none reported  Timing- constant  Severity (0-10 scale): at times 10/10, after iv dilaudid will be between 3-6/10. he feels pain has been worsening over the last 3 d  Minimal acceptable level (0-10 scale):     Dyspnea:                           [ x]None[ ]Mild [ ]Moderate [ ]Severe   Anxiety:                             [ ]None[ ]Mild [ x]Moderate [ ]Severe   Fatigue:                             [ ]None[ ]Mild [ ]Moderate [x ]Severe   Nausea:                             [x ]None[ ]Mild [ ]Moderate [ ]Severe   Loss of appetite:              [x ]None[ ]Mild [ ]Moderate [ ]Severe   Constipation:                    [ x]None[ ]Mild [ ]Moderate [ ]Severe    Other Symptoms:    PHYSICAL EXAM:  Vital Signs Last 24 Hrs  T(C): 36.8 (21 May 2025 13:00), Max: 37.6 (20 May 2025 19:54)  T(F): 98.3 (21 May 2025 13:00), Max: 99.6 (20 May 2025 19:54)  HR: 88 (21 May 2025 13:00) (71 - 101)  BP: 117/70 (21 May 2025 13:00) (99/60 - 117/70)  BP(mean): 85 (21 May 2025 13:00) (73 - 85)  RR: 18 (21 May 2025 13:00) (18 - 18)  SpO2: 97% (21 May 2025 13:00) (94% - 98%)    Parameters below as of 21 May 2025 13:00  Patient On (Oxygen Delivery Method): room air     I&O's Summary    20 May 2025 07:01  -  21 May 2025 07:00  --------------------------------------------------------  IN: 685 mL / OUT: 0 mL / NET: 685 mL    GENERAL:  [ ] No acute distress [ ]Lethargic  [ ]Unarousable  [x ]Verbal  [ ]Non-Verbal [ ]Cachexia    BEHAVIORAL/PSYCH:  [ ]Alert and Oriented x  [ x] Anxiety [ ] Delirium [ ] Agitation [ ] Calm   EYES: [x ] No scleral icterus [ ] Scleral icterus [ ] Closed  ENMT:  [ ]Dry mouth  [ ]No external oral lesions [ ] No external ear or nose lesions  CARDIOVASCULAR:  [ ]Regular [ ]Irregular [ ]Tachy [x ]Not Tachy  [ ]Rodney [ ] Edema [ ] No edema  PULMONARY:  [ ]Tachypnea  [ ]Audible excessive secretions [x ] No labored breathing [ ] labored breathing  GASTROINTESTINAL: [ ]Soft  [ ]Distended  [ x]Not distended [ ]Non tender [ ]Tender  MUSCULOSKELETAL: [ ]No clubbing [ ] clubbing  [ ] No cyanosis [ ] cyanosis  NEUROLOGIC: [ ]No focal deficits  [ x]Follows commands  [ ]Does not follow commands  [ ]Cognitive impairment  [ ]Dysphagia  [ ]Dysarthria  [ ]Paresis   SKIN: [ ] Jaundiced [ ] Non-jaundiced [ ]Rash [ ]No Rash [ ] Warm [ ] Dry  MISC/LINES: [ ] ET tube [ ] Trach [ ]NGT/OGT [x ]PEG [ ]Epps    LABS: reviewed by me - BMP grossly WNL, BMP showing anemia, LFTs grossly WNL with low total protein                        8.0    6.31  )-----------( 54       ( 21 May 2025 06:48 )             25.0   05-21    137  |  103  |  17  ----------------------------<  109[H]  4.4   |  24  |  0.5[L]    Ca    8.6      21 May 2025 06:48  Phos  4.6     05-20  Mg     2.5     05-21    TPro  5.5[L]  /  Alb  3.1[L]  /  TBili  0.8  /  DBili  x   /  AST  35  /  ALT  18  /  AlkPhos  301[H]  05-21  PT/INR - ( 20 May 2025 05:57 )   PT: 17.90 sec;   INR: 1.50 ratio    PTT - ( 20 May 2025 05:57 )  PTT:37.9 sec    Urinalysis Basic - ( 21 May 2025 06:48 )  Color: x / Appearance: x / SG: x / pH: x  Gluc: 109 mg/dL / Ketone: x  / Bili: x / Urobili: x   Blood: x / Protein: x / Nitrite: x   Leuk Esterase: x / RBC: x / WBC x   Sq Epi: x / Non Sq Epi: x / Bacteria: x      RADIOLOGY & ADDITIONAL STUDIES:    EKG:      PROTEIN CALORIE MALNUTRITION PRESENT: [ ]mild [ ]moderate [ ]severe [ ]underweight [ ]morbid obesity  https://www.andeal.org/vault/2440/web/files/ONC/Table_Clinical%20Characteristics%20to%20Document%20Malnutrition-White%20JV%20et%20al%202012.pdf    Height (cm): 180.3 (05-20-25 @ 09:53), 180 (05-01-25 @ 11:47), 180.3 (04-05-25 @ 13:20)  Weight (kg): 84.5 (05-14-25 @ 14:45), 89 (05-01-25 @ 11:47), 90.3 (04-03-25 @ 21:22)  BMI (kg/m2): 26 (05-20-25 @ 09:53), 26.1 (05-14-25 @ 14:45), 27.5 (05-01-25 @ 11:47)    [ ]PPSV2 < or = to 30% [ ]significant weight loss  [ ]poor nutritional intake  [ ]anasarca      [ ]Artificial Nutrition      REFERRALS:   [ ]Chaplaincy  [ ]Hospice  [ ]Child Life  [ ]Social Work  [ ]Case management [ ]Holistic Therapy     Patient discussed with primary medical team MD  Palliative care education provided to patient and/or family

## 2025-05-21 NOTE — PROGRESS NOTE ADULT - ATTENDING COMMENTS
The patient was seen. Agree with above.  58 yo male was recently diagnosed with adenocarcinoma of esophagus, dysphagia on PEG feeding, poor PS 4, bed bounded.  MRI and bone scan showed diffuse abnormal BM signal.   BM biopsy confirmed metastatic carcinoma involvement.    Discussed BM finding and management options with the patient and his wife. Given advanced metastatic disease with BM involvement, thrombocytopenia and poor performance status, he would not be able to tolerate chemotherapy. Palliative care is recommended. Hospice care was discussed. The patient will discuss with his wife and let us know his decision. Continue supportive care and pain management.

## 2025-05-21 NOTE — PROGRESS NOTE ADULT - ASSESSMENT
60yo M with PMHx of HTN, hiatal hernia, HLD, CAD s/p CABG, paroxysmal A. fib not on AC, former smoker (30 pack years, quit 1yr ago), recently diagnosed with colitis and esophageal adenocarcinoma who presents with b/l weakness. Neurology consulted proximal > distal leg > arm weakness. MRI brain w/ diffuse dural thickening and MRI spinal w/ signal enhancement of vertebral bodies (cervical, thoracic, and lumbar) suspicious for metastatic dz. Now PEG (05/14) dependent for poor nutritional intake,  Normal (05/19) LP OP, elevated CSF protein and borderline low glucose. LP appeared traumatic w/ insignificant CC. Paraneoplastic markers pending. Wife reported episode of confusion overnight, however, he appeared well, in good spirit, intact cognition, language, fairly reduce strength in deltoids and triceps otherwise no neurological deficit.       RECS  - No further inpatient Neurological w/u   - pending remaining CSF studies   - Identify and treat delirium   - can try melatonin 3-5mg po HS for sleep-wake cycle   - avoid excessive night time interruption    60yo M with PMHx of HTN, hiatal hernia, HLD, CAD s/p CABG, paroxysmal A. fib not on AC, former smoker (30 pack years, quit 1yr ago), recently diagnosed with colitis and esophageal adenocarcinoma who presents with b/l weakness. Neurology consulted proximal > distal leg > arm weakness. MRI brain w/ diffuse dural thickening and MRI spinal w/ signal enhancement of vertebral bodies (cervical, thoracic, and lumbar) suspicious for metastatic dz. Now PEG (05/14) dependent for poor nutritional intake,  Normal (05/19) LP OP, elevated CSF protein and borderline low glucose. LP appeared traumatic w/ insignificant CC. Paraneoplastic markers pending. Wife reported episode of confusion overnight, however, he appeared well, in good spirit, intact cognition, language, fairly reduce strength in deltoids and triceps otherwise no neurological deficit.       RECS  - pending remaining CSF studies   - pending myomarker panel  - Identify and treat delirium   - can try melatonin 3-5mg po HS for sleep-wake cycle   - avoid excessive night time interruption

## 2025-05-21 NOTE — PROGRESS NOTE ADULT - SUBJECTIVE AND OBJECTIVE BOX
Neurology Progress Note    Interval History:    Wife at bedside reported that he was confused overnight and this morning. Pt himself is unsure however, improved from PTA      PAST MEDICAL & SURGICAL HISTORY:  HTN (hypertension)    Hiatal hernia    HLD (hyperlipidemia)    CAD (coronary artery disease)    Paroxysmal atrial fibrillation    History of colitis    S/P CABG (coronary artery bypass graft)        Medications:  acetaminophen     Tablet .. 650 milliGRAM(s) Oral every 6 hours PRN  atorvastatin 80 milliGRAM(s) Oral at bedtime  cefepime   IVPB 2000 milliGRAM(s) IV Intermittent every 12 hours  cefepime   IVPB      chlorhexidine 2% Cloths 1 Application(s) Topical <User Schedule>  heparin   Injectable 5000 Unit(s) SubCutaneous every 12 hours  HYDROmorphone  Injectable 1 milliGRAM(s) IV Push every 3 hours PRN  lactated ringers. 1000 milliLiter(s) IV Continuous <Continuous>  lidocaine   4% Patch 1 Patch Transdermal daily  metoprolol tartrate 12.5 milliGRAM(s) Oral two times a day  OLANZapine 2.5 milliGRAM(s) Oral daily  oxyCODONE  ER Tablet 20 milliGRAM(s) Oral every 12 hours  pantoprazole  Injectable 40 milliGRAM(s) IV Push every 12 hours  polyethylene glycol 3350 17 Gram(s) Oral daily      Vital Signs Last 24 Hrs  T(C): 36.8 (21 May 2025 04:30), Max: 37.6 (20 May 2025 19:54)  T(F): 98.2 (21 May 2025 04:30), Max: 99.6 (20 May 2025 19:54)  HR: 90 (21 May 2025 04:30) (71 - 101)  BP: 99/60 (21 May 2025 04:30) (99/60 - 129/64)  BP(mean): 73 (21 May 2025 04:30) (73 - 81)  RR: 18 (21 May 2025 04:30) (18 - 20)  SpO2: 98% (21 May 2025 04:30) (94% - 98%)    Parameters below as of 21 May 2025 04:30  Patient On (Oxygen Delivery Method): room air        Neurological Exam:   Mental status: Awake, alert and oriented x3.  intact immediate recall, 2/3 @ 3mins. Naming, repetition and comprehension intact.  Attention/concentration intact (serial 7s and reversed WORLD).  No dysarthria, no aphasia.  Follows complex cross commands   Cranial nerves: Pupils equally round and reactive to light, visual fields full, no nystagmus, extraocular muscles intact, V1 through V3 intact bilaterally and symmetric, face symmetric, hearing intact to finger rub, palate elevation symmetric, tongue was midline.  Motor:  MRC grading 5/5 b/l UE except 5-/5 b/l Deltoid/triceps. 5/5 b/l LE.  Normal tone and bulk.  No abnormal movements.    Sensation: Intact to light touch in all extremities   Coordination: No dysmetria on finger-to-nose   Reflexes: 2+ in bilateral UE/LE, downgoing toes bilaterally.   Gait: deferred     Labs:  CBC Full  -  ( 21 May 2025 06:48 )  WBC Count : 6.31 K/uL  RBC Count : 2.93 M/uL  Hemoglobin : 8.0 g/dL  Hematocrit : 25.0 %  Platelet Count - Automated : 54 K/uL  Mean Cell Volume : 85.3 fL  Mean Cell Hemoglobin : 27.3 pg  Mean Cell Hemoglobin Concentration : 32.0 g/dL      05-21    137  |  103  |  17  ----------------------------<  109[H]  4.4   |  24  |  0.5[L]    Ca    8.6      21 May 2025 06:48  Phos  4.6     05-20  Mg     2.5     05-21    TPro  5.5[L]  /  Alb  3.1[L]  /  TBili  0.8  /  DBili  x   /  AST  35  /  ALT  18  /  AlkPhos  301[H]  05-21    LIVER FUNCTIONS - ( 21 May 2025 06:48 )  Alb: 3.1 g/dL / Pro: 5.5 g/dL / ALK PHOS: 301 U/L / ALT: 18 U/L / AST: 35 U/L / GGT: x           PT/INR - ( 20 May 2025 05:57 )   PT: 17.90 sec;   INR: 1.50 ratio         PTT - ( 20 May 2025 05:57 )  PTT:37.9 sec    Protein, CSF: 118 mg/dL (05.19.25 @ 15:40)  Glucose, CSF: 42 mg/dL (05.19.25 @ 15:40)  Cerebrospinal Fluid Cell Count-1 (05.19.25 @ 15:40)    Tube Type: Tube 1   RBC Count - Spinal Fluid: 170 /uL   CSF Color: No Color   CSF Appearance: Clear   CSF Neutrophils: N/A %   Appearance Spun: Colorless   Total Nucleated Cell Count, CSF: 1 /uL    Lactate Dehydrogenase, CSF: 34: Test Repeated  Reference Ranges have NOT been established for CSF LDH.  The  has not determined the efficacy of this test when  performed on CSF specimens. The performance characteristics of this test  were determined by [facility] [employer] [facility] [employer]. U/L (05.19.25 @ 15:40)

## 2025-05-21 NOTE — PROGRESS NOTE ADULT - ASSESSMENT
59M  hiatal hernia, CAD s/p CABG in Jan 2024, paroxysmal A. 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   Reports Abdominal pain, diarrhea, lost 28lbs in the last month  former smoker (30 pack years, quit 1yr ago)  EGD showed friable circumferential mass mid esophagus and another 5 mm lesion in proximal esophagus , path    Cardia nodule, Signet ring cell carcinoma,  Esophageal nodule at 22 cm, biopsy: - Signet ring cell carcinoma.   Was planned for outpatient PET scan and appointment with Dr Sanchez   MRI and bone scan as above. concern for metastatic bone disease     #Decreased PO intake  #Weakness  #MRI concerning for metastatic disease   esophageal stent placed during prior admission. Pt reports did not help. 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 BM biopsy  consistent with marrow involvement. Discussed with him and his wife that this now upstages his cancer to stage IV. Considering his prolonged hospital stay, extent of disease, cytopenia with BM involvement, hospice is reasonable. It would be difficult to tolerate chemotherapy due to his cytopenias, poor performance status, poor PO intake and chemotherapy might make his symptoms worse. He wishes to discuss with his wife.   F/up palliative for GOC  C/w tube feeds   C/w pain management

## 2025-05-21 NOTE — PROGRESS NOTE ADULT - ASSESSMENT
59y male with PMHx of HTN, hiatal hernia, HLD, CAD s/p CABG in Jan 2024, paroxysmal A. fib not on AC, former smoker (30 pack years, quit 1yr ago), recently diagnosed with colitis and esophageal adenocarcinoma s/p stent placement who presents for weakness. The patient was recently discharged from [facility] on 5/6 , and has been progressively deteriorating. Found to have hgb 7.6 lower than last admission reported melena, thrombocytopenia w/ elevated INR/PTT.     #Reported Melena likely due to Upper GI Bleed likely Secondary to  Malignancy   #Thrombocytopenia, elevated INR/PTT  #Recently diagnosed Esophageal adenocarcinoma   #Acute blood loss anemia  - EGD 4/25: A friable circumferential mass seen from the GE junction at 38cm to 28 cm of mid esophagus. Multiple biopsies were obtained. Oozing of blood was noted from the whole mass.  - c/w pantoprazole 40mg IV BID  - s/p EGD w/ peg and stent removal   - . Patient supposed to have PET scan OP, Surg Onc Dr Yost   - keep Hgb>7.5  -keep active T&S  -5/17 transfuse another unit PRBCs (did not get blood yesterday due to fevers)  -5/18 H/h still low, will transfuse another unit  - 5/21 H/H stable - Hb 8.0 in AM  - Bone marrow biopsy yesterday  - f/u BM biopsy path    #Weakness   #Diffuse metastatic dz of C/T/L spine on MRI  #Acute on chronic lower back pain   - c/w Dilaudid 1mg Q4, Oxycodone 5mg Q4 PRN  - Med Onc recommends Bone Scan and BM Bx by IR  -Neuro recommends LP  -will arrange for both BM Bx and LP to be done by IR   -Bone scan - No focal increased uptake of bone tracer was identified to suggest   metastatic bone disease, particularly within the thoracic spine,   correlating with the abnormal MRI findings.  -LP done - f/u cell ct, glc, prot, cytology, flow cytometry, paraneoplastic panel sent  -H/o might need inpt chemo  - Palliative f/u for pain control    #Acute agitation  #Hx of depression  - Overnight pt with emotional breakdown, thinking about hurting himself but says would not act on his thoughts and has no plan of hurting himself  - Reports hx of depression   - Could be adjustment disorder given recent cancer diagnosis vs hospital acquired delirium   - Psych consult    #Decreased PO intake 2/2 esophageal adenocarcinoma  - s/p PEG    #Fever  pancultured  CXR w/ ?LLL opacity ?aspiration  c/w Cefepime   aspiration precautions  CT A/p - There is thickening of the subcutaneous fat and musculature at the entry   of the G-tube. G-tube within the stomach. No drainable collection. There is a hiatal hernia with significant thickening of the wall of the distal esophagus and herniated stomach. Active inflammatory process may be present.  3.9 cm right common iliac artery aneurysm.      #?L sided fascial droop  #Diffuse dural thickening  - Pt and spouse do not know if chronic.   - no focal defecits except b/l distal weakness   < from: MR Head w/wo IV Cont (05.14.25 @ 09:48) >  1.  No acute infarct or intracranial hemorrhage.    2.  Mild smooth diffuse dural thickening over the cerebral convexities.   This finding is nonspecific and can reflect intracranial hypotension or   recent spinal procedures. In the setting of known neoplasm cannot exclude   dural metastatic disease.    3.  Heterogeneous marrow signal of the calvarium, nonspecific.    < end of copied text >    #3.8 cm wide R iliac artery aneurysm- stable  - vascular o/p- surgery was scheduled with Dr. Tavera     #HTN  #CAD s/p CABG in Jan 2024  #Paroxysmal A.fib  - c/w home meds  - Hold plavix per cardio    #DVT PPX: SCD. Added Heparin SQ  #GI PPX: PPI BID  #Diet: PO, plus supplement w/ TF  #Code Status: Full  #Activity Order: IAT    pending: LP fluid studies, BM biopsy path 59y male with PMHx of HTN, hiatal hernia, HLD, CAD s/p CABG in Jan 2024, paroxysmal A. fib not on AC, former smoker (30 pack years, quit 1yr ago), recently diagnosed with colitis and esophageal adenocarcinoma s/p stent placement who presents for weakness. The patient was recently discharged from [facility] on 5/6 , and has been progressively deteriorating. Found to have hgb 7.6 lower than last admission reported melena, thrombocytopenia w/ elevated INR/PTT.     #Reported Melena likely due to Upper GI Bleed likely Secondary to  Malignancy   #Thrombocytopenia, elevated INR/PTT  #Recently diagnosed Esophageal adenocarcinoma   #Acute blood loss anemia  - EGD 4/25: A friable circumferential mass seen from the GE junction at 38cm to 28 cm of mid esophagus. Multiple biopsies were obtained. Oozing of blood was noted from the whole mass.  - c/w pantoprazole 40mg IV BID  - s/p EGD w/ peg and stent removal   - . Patient supposed to have PET scan OP, Surg Onc Dr Yost   - keep Hgb>7.5  -keep active T&S  -5/17 transfuse another unit PRBCs (did not get blood yesterday due to fevers)  -5/18 H/h still low, will transfuse another unit  - 5/21 H/H stable - Hb 8.0 in AM  - Bone marrow biopsy yesterday  - f/u BM biopsy path    #Weakness   #Diffuse metastatic dz of C/T/L spine on MRI  #Acute on chronic lower back pain   - c/w Dilaudid 1mg Q4, Oxycodone 5mg Q4 PRN  - Med Onc recommends Bone Scan and BM Bx by IR  -Neuro recommends LP  -will arrange for both BM Bx and LP to be done by IR   -Bone scan - No focal increased uptake of bone tracer was identified to suggest   metastatic bone disease, particularly within the thoracic spine,   correlating with the abnormal MRI findings.  -LP done - f/u cell ct, glc, prot, cytology, flow cytometry, paraneoplastic panel sent  -H/o might need inpt chemo  - Palliative f/u for pain control    #Acute agitation  #Hx of depression  - Overnight pt with emotional breakdown, thinking about hurting himself but says would not act on his thoughts and has no plan of hurting himself  - Reports hx of depression   - Could be adjustment disorder given recent cancer diagnosis vs hospital acquired delirium   - Currently wife present at bedside, after wife leaves put him on constant observation. RN notified  - Psych consult    #Decreased PO intake 2/2 esophageal adenocarcinoma  - s/p PEG    #Fever  pancultured  CXR w/ ?LLL opacity ?aspiration  c/w Cefepime   aspiration precautions  CT A/p - There is thickening of the subcutaneous fat and musculature at the entry   of the G-tube. G-tube within the stomach. No drainable collection. There is a hiatal hernia with significant thickening of the wall of the distal esophagus and herniated stomach. Active inflammatory process may be present.  3.9 cm right common iliac artery aneurysm.      #?L sided fascial droop  #Diffuse dural thickening  - Pt and spouse do not know if chronic.   - no focal defecits except b/l distal weakness   < from: MR Head w/wo IV Cont (05.14.25 @ 09:48) >  1.  No acute infarct or intracranial hemorrhage.    2.  Mild smooth diffuse dural thickening over the cerebral convexities.   This finding is nonspecific and can reflect intracranial hypotension or   recent spinal procedures. In the setting of known neoplasm cannot exclude   dural metastatic disease.    3.  Heterogeneous marrow signal of the calvarium, nonspecific.    < end of copied text >    #3.8 cm wide R iliac artery aneurysm- stable  - vascular o/p- surgery was scheduled with Dr. Tavera     #HTN  #CAD s/p CABG in Jan 2024  #Paroxysmal A.fib  - c/w home meds  - Hold plavix per cardio    #DVT PPX: SCD. Added Heparin SQ  #GI PPX: PPI BID  #Diet: PO, plus supplement w/ TF  #Code Status: Full  #Activity Order: IAT    pending: LP fluid studies, BM biopsy path

## 2025-05-22 ENCOUNTER — NON-APPOINTMENT (OUTPATIENT)
Age: 60
End: 2025-05-22

## 2025-05-22 LAB — FLOW CYTOMETRY FINAL REPORT: SIGNIFICANT CHANGE UP

## 2025-05-22 PROCEDURE — 99497 ADVNCD CARE PLAN 30 MIN: CPT | Mod: 25

## 2025-05-22 PROCEDURE — 99233 SBSQ HOSP IP/OBS HIGH 50: CPT

## 2025-05-22 PROCEDURE — 99232 SBSQ HOSP IP/OBS MODERATE 35: CPT

## 2025-05-22 RX ORDER — SENNA 187 MG
2 TABLET ORAL AT BEDTIME
Refills: 0 | Status: DISCONTINUED | OUTPATIENT
Start: 2025-05-22 | End: 2025-05-23

## 2025-05-22 RX ORDER — HALOPERIDOL 10 MG/1
2 TABLET ORAL EVERY 6 HOURS
Refills: 0 | Status: DISCONTINUED | OUTPATIENT
Start: 2025-05-22 | End: 2025-05-25

## 2025-05-22 RX ORDER — MELATONIN 5 MG
5 TABLET ORAL AT BEDTIME
Refills: 0 | Status: DISCONTINUED | OUTPATIENT
Start: 2025-05-22 | End: 2025-05-25

## 2025-05-22 RX ADMIN — Medication 1 MILLIGRAM(S): at 17:53

## 2025-05-22 RX ADMIN — Medication 1 MILLIGRAM(S): at 13:06

## 2025-05-22 RX ADMIN — Medication 5 MILLIGRAM(S): at 21:16

## 2025-05-22 RX ADMIN — Medication 1 MILLIGRAM(S): at 18:30

## 2025-05-22 RX ADMIN — ATORVASTATIN CALCIUM 80 MILLIGRAM(S): 80 TABLET, FILM COATED ORAL at 21:17

## 2025-05-22 RX ADMIN — Medication 1 MILLIGRAM(S): at 09:43

## 2025-05-22 RX ADMIN — Medication 40 MILLIGRAM(S): at 17:53

## 2025-05-22 RX ADMIN — POLYETHYLENE GLYCOL 3350 17 GRAM(S): 17 POWDER, FOR SOLUTION ORAL at 12:30

## 2025-05-22 RX ADMIN — Medication 40 MILLIGRAM(S): at 05:49

## 2025-05-22 RX ADMIN — METOPROLOL SUCCINATE 12.5 MILLIGRAM(S): 50 TABLET, EXTENDED RELEASE ORAL at 17:52

## 2025-05-22 RX ADMIN — METOPROLOL SUCCINATE 12.5 MILLIGRAM(S): 50 TABLET, EXTENDED RELEASE ORAL at 05:49

## 2025-05-22 RX ADMIN — Medication 1 MILLIGRAM(S): at 10:30

## 2025-05-22 RX ADMIN — OLANZAPINE 2.5 MILLIGRAM(S): 10 TABLET ORAL at 13:05

## 2025-05-22 RX ADMIN — Medication 1 MILLIGRAM(S): at 14:00

## 2025-05-22 RX ADMIN — Medication 1 MILLIGRAM(S): at 03:04

## 2025-05-22 RX ADMIN — Medication 2 TABLET(S): at 21:17

## 2025-05-22 RX ADMIN — Medication 1 MILLIGRAM(S): at 21:16

## 2025-05-22 RX ADMIN — Medication 1 APPLICATION(S): at 05:53

## 2025-05-22 NOTE — PROGRESS NOTE ADULT - ATTENDING COMMENTS
Pain better controlled today after transition to fentanyl patch yesterday  Patient made himself DNR/DNI today  Patient/family open to hospice referral  WIll follow

## 2025-05-22 NOTE — PROGRESS NOTE ADULT - ASSESSMENT
59y male with PMHx of HTN, hiatal hernia, HLD, CAD s/p CABG in Jan 2024, paroxysmal A. fib not on AC, former smoker (30 pack years, quit 1yr ago), recently diagnosed with colitis and esophageal adenocarcinoma s/p stent placement who presents for weakness. The patient was recently discharged from [facility] on 5/6 , and has been progressively deteriorating. Found to have hgb 7.6 lower than last admission reported melena, thrombocytopenia w/ elevated INR/PTT.     #Reported Melena likely due to Upper GI Bleed likely Secondary to  Malignancy   #Thrombocytopenia, elevated INR/PTT  #Recently diagnosed Esophageal adenocarcinoma   #Acute blood loss anemia  - EGD 4/25: A friable circumferential mass seen from the GE junction at 38cm to 28 cm of mid esophagus. Multiple biopsies were obtained. Oozing of blood was noted from the whole mass.  - c/w pantoprazole 40mg IV BID  - s/p EGD w/ peg and stent removal   - . Patient supposed to have PET scan OP, Surg Onc Dr Yost   - keep Hgb>7.5  -keep active T&S  -5/17 transfuse another unit PRBCs (did not get blood yesterday due to fevers)  -5/18 H/h still low, will transfuse another unit  - 5/21 H/H stable - Hb 8.0 in AM  - Bone marrow biopsy yesterday  - BM biopsy  consistent with marrow involvement  - Given bone marrow involement, pt is stage IV  - Per heme/onc it would be difficult to tolerate chemotherapy due to his cytopenias, poor performance status, poor PO intake and chemotherapy might make his symptoms worse  - Per heme/onc patient is hospice appropriate    #Weakness   #Diffuse metastatic dz of C/T/L spine on MRI  #Acute on chronic lower back pain   - c/w Dilaudid 1mg Q4, Oxycodone 5mg Q4 PRN  - Med Onc recommends Bone Scan and BM Bx by IR  -Neuro recommends LP  -will arrange for both BM Bx and LP to be done by IR   -Bone scan - No focal increased uptake of bone tracer was identified to suggest   metastatic bone disease, particularly within the thoracic spine,   correlating with the abnormal MRI findings.  -LP done - f/u cell ct, glc, prot, cytology, flow cytometry, paraneoplastic panel sent  -CSF with elevated protein, borderline low glucose, 1 cell. Concern for possible paraneoplastic syndrome vs myopathy; also evidence of inflammatory process in CNS - given recent cancer diagnosis and MRI findings, concern for dural metastatic disease  - Palliative following for pain control    #Acute agitation  #Hx of depression  - Overnight pt with emotional breakdown, thinking about hurting himself but says would not act on his thoughts and has no plan of hurting himself  - Reports hx of depression   - Could be adjustment disorder given recent cancer diagnosis vs hospital acquired delirium   - Currently wife present at bedside, after wife leaves put him on constant observation. RN notified  - Psych consult    #Decreased PO intake 2/2 esophageal adenocarcinoma  - s/p PEG    #Fever  pancultured  CXR w/ ?LLL opacity ?aspiration  c/w Cefepime   aspiration precautions  CT A/p - There is thickening of the subcutaneous fat and musculature at the entry   of the G-tube. G-tube within the stomach. No drainable collection. There is a hiatal hernia with significant thickening of the wall of the distal esophagus and herniated stomach. Active inflammatory process may be present.  3.9 cm right common iliac artery aneurysm.      #?L sided fascial droop  #Diffuse dural thickening  - Pt and spouse do not know if chronic.   - no focal defecits except b/l distal weakness   < from: MR Head w/wo IV Cont (05.14.25 @ 09:48) >  1.  No acute infarct or intracranial hemorrhage.    2.  Mild smooth diffuse dural thickening over the cerebral convexities.   This finding is nonspecific and can reflect intracranial hypotension or   recent spinal procedures. In the setting of known neoplasm cannot exclude   dural metastatic disease.    3.  Heterogeneous marrow signal of the calvarium, nonspecific.    < end of copied text >    #3.8 cm wide R iliac artery aneurysm- stable  - vascular o/p- surgery was scheduled with Dr. Tavera     #HTN  #CAD s/p CABG in Jan 2024  #Paroxysmal A.fib  - c/w home meds  - Hold plavix per cardio    #DVT PPX: SCD. Added Heparin SQ  #GI PPX: PPI BID  #Diet: PO, plus supplement w/ TF  #Code Status: Full  #Activity Order: IAT

## 2025-05-22 NOTE — PROGRESS NOTE ADULT - SUBJECTIVE AND OBJECTIVE BOX
HPI:  59M w/ Esophageal Cancer (dx Apr2025) s/p stent, CAD s/p [id number] (Jan2024), Afib not on AC, HLD is admitted for weakness, acute blood loss anemia/melena requiring pRBC transfusion. Patient completed endoscopy 5/14 for removal of esophageal stent (due to discomfort) and for PEG placement. Palliative consulted to assist w/ cancer-related pain management.    Interval History  -24hr Opioid requirement (8am-8am): TDF 50mcg/hr started 5/21 18:00, Oxycodone ER 20mg x1, Hydromorphone 1mg IVx5  - patient feels pain is improving since switching to TDF patch (detailed below).  - Sleep is reported to be better and he feels not confused w/ delusions like the day prior.  - no sob, n/v. He reports constipation but is passing gas  - patient reports that he spoke with onc yesterday and was informed that biopsy confirmed cancer in bone marrow. He and his wife are agreeable with hospice referral  - primary team discussed with patient advanced directives and he has transitioned to DNR/DNI    ADVANCE DIRECTIVES:    [x ] Full Code [ ] DNR  MOLST  [ ]  Living Will  [ ]   DECISION MAKER(s):  [ ] Health Care Proxy(s)  [ x] Surrogate(s)  [ ] Guardian           Name(s): Phone Number(s):  Wife: Shannan Reddy    BASELINE (I)ADL(s) (prior to admission):  [place]: [ ]Total  [ ] Moderate [x ]Dependent    Allergies  penicillins (Unknown)    MEDICATIONS  (STANDING):  atorvastatin 80 milliGRAM(s) Oral at bedtime  chlorhexidine 2% Cloths 1 Application(s) Topical <User Schedule>  fentaNYL   Patch  50 MICROgram(s)/Hr 1 Patch Transdermal every 72 hours  heparin   Injectable 5000 Unit(s) SubCutaneous every 12 hours  lactated ringers. 1000 milliLiter(s) (60 mL/Hr) IV Continuous <Continuous>  lidocaine   4% Patch 1 Patch Transdermal daily  metoprolol tartrate 12.5 milliGRAM(s) Oral two times a day  OLANZapine 2.5 milliGRAM(s) Oral daily  pantoprazole  Injectable 40 milliGRAM(s) IV Push every 12 hours  polyethylene glycol 3350 17 Gram(s) Oral daily    MEDICATIONS  (PRN):  acetaminophen     Tablet .. 650 milliGRAM(s) Oral every 6 hours PRN Temp greater or equal to 38C (100.4F), Mild Pain (1 - 3)  HYDROmorphone  Injectable 1 milliGRAM(s) IV Push every 3 hours PRN Severe Pain (7 - 10)    PRESENT SYMPTOMS: [ ]Unable to obtain due to poor mentation   Source if other than patient:  [ ]Family   [ ]Team   [ X]All other review of systems negative including pain and dyspnea unless noted below    All components of pain assessment below addressed. Blank spaces indicate that the patient did/could not complete the assessment.  Pain: [ x]yes [ ]no  QOL impact - limits ability to get out of bed  Location -  mid back                  Aggravating factors - position in bed  Quality - dull  Radiation - not reported  Timing- constant with intermittent severity  Severity (0-10 scale): 3/10  Minimal acceptable level (0-10 scale):     Dyspnea:                           [x ]None[ ]Mild [ ]Moderate [ ]Severe   Anxiety:                             [ ]None[ ]Mild [ ]Moderate [ ]Severe   Fatigue:                             [ ]None[ ]Mild [ ]Moderate [ ]Severe   Nausea:                             [ ]None[ ]Mild [ ]Moderate [ ]Severe   Loss of appetite:              [ ]None[ ]Mild [ ]Moderate [ ]Severe   Constipation:                    [ ]None[ ]Mild [ ]Moderate [ ]Severe    Other Symptoms:    PHYSICAL EXAM:  Vital Signs Last 24 Hrs  T(C): 37.3 (22 May 2025 04:59), Max: 37.4 (21 May 2025 21:19)  T(F): 99.1 (22 May 2025 04:59), Max: 99.3 (21 May 2025 21:19)  HR: 102 (22 May 2025 04:59) (88 - 102)  BP: 104/74 (22 May 2025 04:59) (104/74 - 117/70)  BP(mean): 85 (21 May 2025 13:00) (85 - 85)  RR: 18 (22 May 2025 04:59) (18 - 18)  SpO2: 95% (22 May 2025 04:59) (95% - 97%)    Parameters below as of 22 May 2025 04:59  Patient On (Oxygen Delivery Method): room air    GENERAL:  [ x] No acute distress [ ]Lethargic  [ ]Unarousable  [ x]Verbal  [ ]Non-Verbal [ ]Cachexia    BEHAVIORAL/PSYCH:  [ ]Alert and Oriented x  [ ] Anxiety [ ] Delirium [ ] Agitation [ ] Calm   EYES: [x ] No scleral icterus [ ] Scleral icterus [ ] Closed  ENMT:  [ ]Dry mouth  [ ]No external oral lesions [ ] No external ear or nose lesions  CARDIOVASCULAR:  [ ]Regular [ ]Irregular [ ]Tachy [x ]Not Tachy  [ ]Rodney [ ] Edema [ ] No edema  PULMONARY:  [ ]Tachypnea  [ ]Audible excessive secretions [x ] No labored breathing [ ] labored breathing  GASTROINTESTINAL: [ ]Soft  [ ]Distended  [x ]Not distended [ ]Non tender [ ]Tender  MUSCULOSKELETAL: [ ]No clubbing [ ] clubbing  [ ] No cyanosis [ ] cyanosis  NEUROLOGIC: [ ]No focal deficits  [x ]Follows commands  [ ]Does not follow commands  [ ]Cognitive impairment  [ ]Dysphagia  [ ]Dysarthria  [ ]Paresis   SKIN: [ ] Jaundiced [ ] Non-jaundiced [ ]Rash [ ]No Rash [ ] Warm [ ] Dry  MISC/LINES: [ ] ET tube [ ] Trach [ ]NGT/OGT [ x]PEG [ ]Epps    CRITICAL CARE:  [ ] Shock Present  [ ]Septic [ ]Cardiogenic [ ]Neurologic [ ]Hypovolemic  [ ]  Vasopressors [ ]  Inotropes   [ ]Respiratory failure present [ ]Mechanical ventilation [ ]Non-invasive ventilatory support [ ]High flow  [ ]Acute  [ ]Chronic [ ]Hypoxic  [ ]Hypercarbic [ ]Other  [ ]Other organ failure     LABS: reviewed by me                        8.0    6.31  )-----------( 54       ( 21 May 2025 06:48 )             25.0   05-21    137  |  103  |  17  ----------------------------<  109[H]  4.4   |  24  |  0.5[L]    Ca    8.6      21 May 2025 06:48  Mg     2.5     05-21    TPro  5.5[L]  /  Alb  3.1[L]  /  TBili  0.8  /  DBili  x   /  AST  35  /  ALT  18  /  AlkPhos  301[H]  05-21    Urinalysis Basic - ( 21 May 2025 06:48 )  Color: x / Appearance: x / SG: x / pH: x  Gluc: 109 mg/dL / Ketone: x  / Bili: x / Urobili: x   Blood: x / Protein: x / Nitrite: x   Leuk Esterase: x / RBC: x / WBC x   Sq Epi: x / Non Sq Epi: x / Bacteria: x    RADIOLOGY & ADDITIONAL STUDIES: reviewed by me  EKG: reviewed by me    PROTEIN CALORIE MALNUTRITION PRESENT: [ ]mild [ ]moderate [ ]severe [ ]underweight [ ]morbid obesity  https://www.andeal.org/vault/2440/web/files/ONC/Table_Clinical%20Characteristics%20to%20Document%20Malnutrition-White%20JV%20et%20al%202012.pdf    Height (cm): 180.3 (05-20-25 @ 09:53), 180 (05-01-25 @ 11:47), 180.3 (04-05-25 @ 13:20)  Weight (kg): 84.5 (05-14-25 @ 14:45), 89 (05-01-25 @ 11:47), 90.3 (04-03-25 @ 21:22)  BMI (kg/m2): 26 (05-20-25 @ 09:53), 26.1 (05-14-25 @ 14:45), 27.5 (05-01-25 @ 11:47)    [ ]PPSV2 < or = to 30% [ ]significant weight loss  [ ]poor nutritional intake  [ ]anasarca      [ ]Artificial Nutrition      REFERRALS:   [ ]Chaplaincy  [ ]Hospice  [ ]Child Life  [ ]Social Work  [ ]Case management [ ]Holistic Therapy     Patient discussed with primary medical team MD  Palliative care education provided to patient and/or family    Goals of Care Document:    HPI:  59M w/ Esophageal Cancer (dx Apr2025) s/p stent, CAD s/p [id number] (Jan2024), Afib not on AC, HLD is admitted for weakness, acute blood loss anemia/melena requiring pRBC transfusion. Patient completed endoscopy 5/14 for removal of esophageal stent (due to discomfort) and for PEG placement. Palliative consulted to assist w/ cancer-related pain management.    Interval History  -24hr Opioid requirement (8am-8am): TDF 50mcg/hr started 5/21 18:00, Oxycodone ER 20mg x1, Hydromorphone 1mg IVx5  - patient feels pain is improving since switching to TDF patch (detailed below).  - Sleep is reported to be better and he feels not confused w/ delusions like the day prior.  - no sob, n/v. He reports constipation but is passing gas  - patient reports that he spoke with onc yesterday and was informed that biopsy confirmed cancer in bone marrow. He and his wife are agreeable with hospice referral  - primary team discussed with patient advanced directives and he has transitioned to DNR/DNI    ADVANCE DIRECTIVES:    [x ] Full Code [ ] DNR  MOLST  [ ]  Living Will  [ ]   DECISION MAKER(s):  [ ] Health Care Proxy(s)  [ x] Surrogate(s)  [ ] Guardian           Name(s): Phone Number(s):  Wife: Shannan Reddy    BASELINE (I)ADL(s) (prior to admission):  [place]: [ ]Total  [ ] Moderate [x ]Dependent    Allergies  penicillins (Unknown)    MEDICATIONS  (STANDING):  atorvastatin 80 milliGRAM(s) Oral at bedtime  chlorhexidine 2% Cloths 1 Application(s) Topical <User Schedule>  fentaNYL   Patch  50 MICROgram(s)/Hr 1 Patch Transdermal every 72 hours  heparin   Injectable 5000 Unit(s) SubCutaneous every 12 hours  lactated ringers. 1000 milliLiter(s) (60 mL/Hr) IV Continuous <Continuous>  lidocaine   4% Patch 1 Patch Transdermal daily  metoprolol tartrate 12.5 milliGRAM(s) Oral two times a day  OLANZapine 2.5 milliGRAM(s) Oral daily  pantoprazole  Injectable 40 milliGRAM(s) IV Push every 12 hours  polyethylene glycol 3350 17 Gram(s) Oral daily    MEDICATIONS  (PRN):  acetaminophen     Tablet .. 650 milliGRAM(s) Oral every 6 hours PRN Temp greater or equal to 38C (100.4F), Mild Pain (1 - 3)  HYDROmorphone  Injectable 1 milliGRAM(s) IV Push every 3 hours PRN Severe Pain (7 - 10)    PRESENT SYMPTOMS: [ ]Unable to obtain due to poor mentation   Source if other than patient:  [ ]Family   [ ]Team   [ X]All other review of systems negative including pain and dyspnea unless noted below    All components of pain assessment below addressed. Blank spaces indicate that the patient did/could not complete the assessment.  Pain: [ x]yes [ ]no  QOL impact - limits ability to get out of bed  Location -  mid back                  Aggravating factors - position in bed  Quality - dull  Radiation - not reported  Timing- constant with intermittent severity  Severity (0-10 scale): 3/10  Minimal acceptable level (0-10 scale):     Dyspnea:                           [x ]None[ ]Mild [ ]Moderate [ ]Severe   Anxiety:                             [ ]None[ ]Mild [ ]Moderate [ ]Severe   Fatigue:                             [ ]None[ ]Mild [ ]Moderate [ ]Severe   Nausea:                             [ ]None[ ]Mild [ ]Moderate [ ]Severe   Loss of appetite:              [ ]None[ ]Mild [ ]Moderate [ ]Severe   Constipation:                    [ ]None[ ]Mild [ ]Moderate [ ]Severe    Other Symptoms:    PHYSICAL EXAM:  Vital Signs Last 24 Hrs  T(C): 37.3 (22 May 2025 04:59), Max: 37.4 (21 May 2025 21:19)  T(F): 99.1 (22 May 2025 04:59), Max: 99.3 (21 May 2025 21:19)  HR: 102 (22 May 2025 04:59) (88 - 102)  BP: 104/74 (22 May 2025 04:59) (104/74 - 117/70)  BP(mean): 85 (21 May 2025 13:00) (85 - 85)  RR: 18 (22 May 2025 04:59) (18 - 18)  SpO2: 95% (22 May 2025 04:59) (95% - 97%)    Parameters below as of 22 May 2025 04:59  Patient On (Oxygen Delivery Method): room air    GENERAL:  [ x] No acute distress [ ]Lethargic  [ ]Unarousable  [ x]Verbal  [ ]Non-Verbal [ ]Cachexia    BEHAVIORAL/PSYCH:  [ ]Alert and Oriented x  [ ] Anxiety [ ] Delirium [ ] Agitation [ ] Calm   EYES: [x ] No scleral icterus [ ] Scleral icterus [ ] Closed  ENMT:  [ ]Dry mouth  [ ]No external oral lesions [ ] No external ear or nose lesions  CARDIOVASCULAR:  [ ]Regular [ ]Irregular [ ]Tachy [x ]Not Tachy  [ ]Rodney [ ] Edema [ ] No edema  PULMONARY:  [ ]Tachypnea  [ ]Audible excessive secretions [x ] No labored breathing [ ] labored breathing  GASTROINTESTINAL: [ ]Soft  [ ]Distended  [x ]Not distended [ ]Non tender [ ]Tender  MUSCULOSKELETAL: [ ]No clubbing [ ] clubbing  [ ] No cyanosis [ ] cyanosis  NEUROLOGIC: [ ]No focal deficits  [x ]Follows commands  [ ]Does not follow commands  [ ]Cognitive impairment  [ ]Dysphagia  [ ]Dysarthria  [ ]Paresis   SKIN: [ ] Jaundiced [ ] Non-jaundiced [ ]Rash [ ]No Rash [ ] Warm [ ] Dry  MISC/LINES: [ ] ET tube [ ] Trach [ ]NGT/OGT [ x]PEG [ ]Epps    CRITICAL CARE:  [ ] Shock Present  [ ]Septic [ ]Cardiogenic [ ]Neurologic [ ]Hypovolemic  [ ]  Vasopressors [ ]  Inotropes   [ ]Respiratory failure present [ ]Mechanical ventilation [ ]Non-invasive ventilatory support [ ]High flow  [ ]Acute  [ ]Chronic [ ]Hypoxic  [ ]Hypercarbic [ ]Other  [ ]Other organ failure     LABS: reviewed by me - ANDREY grossly WNL, CBC shows anemia, LFTs shows low total protein/albumin                        8.0    6.31  )-----------( 54       ( 21 May 2025 06:48 )             25.0   05-21    137  |  103  |  17  ----------------------------<  109[H]  4.4   |  24  |  0.5[L]    Ca    8.6      21 May 2025 06:48  Mg     2.5     05-21    TPro  5.5[L]  /  Alb  3.1[L]  /  TBili  0.8  /  DBili  x   /  AST  35  /  ALT  18  /  AlkPhos  301[H]  05-21    Urinalysis Basic - ( 21 May 2025 06:48 )  Color: x / Appearance: x / SG: x / pH: x  Gluc: 109 mg/dL / Ketone: x  / Bili: x / Urobili: x   Blood: x / Protein: x / Nitrite: x   Leuk Esterase: x / RBC: x / WBC x   Sq Epi: x / Non Sq Epi: x / Bacteria: x    RADIOLOGY & ADDITIONAL STUDIES: reviewed by me  EKG: reviewed by me    PROTEIN CALORIE MALNUTRITION PRESENT: [ ]mild [ ]moderate [ ]severe [ ]underweight [ ]morbid obesity  https://www.andeal.org/vault/2440/web/files/ONC/Table_Clinical%20Characteristics%20to%20Document%20Malnutrition-White%20JV%20et%20al%202012.pdf    Height (cm): 180.3 (05-20-25 @ 09:53), 180 (05-01-25 @ 11:47), 180.3 (04-05-25 @ 13:20)  Weight (kg): 84.5 (05-14-25 @ 14:45), 89 (05-01-25 @ 11:47), 90.3 (04-03-25 @ 21:22)  BMI (kg/m2): 26 (05-20-25 @ 09:53), 26.1 (05-14-25 @ 14:45), 27.5 (05-01-25 @ 11:47)    [ ]PPSV2 < or = to 30% [ ]significant weight loss  [ ]poor nutritional intake  [ ]anasarca      [ ]Artificial Nutrition      REFERRALS:   [ ]Chaplaincy  [ ]Hospice  [ ]Child Life  [ ]Social Work  [ ]Case management [ ]Holistic Therapy     Patient discussed with primary medical team MD  Palliative care education provided to patient and/or family

## 2025-05-22 NOTE — PROGRESS NOTE ADULT - ASSESSMENT
59y male with PMHx of HTN, hiatal hernia, HLD, CAD s/p CABG in Jan 2024, paroxysmal A. fib not on AC, former smoker (30 pack years, quit 1yr ago), recently diagnosed with colitis and esophageal adenocarcinoma s/p stent placement who presents for weakness. The patient was recently discharged from [facility] on 5/6 , and has been progressively deteriorating. Found to have hgb 7.6 lower than last admission reported melena, thrombocytopenia w/ elevated INR/PTT.     #Reported Melena likely due to Upper GI Bleed likely Secondary to  Malignancy   #Thrombocytopenia, elevated INR/PTT  #Recently diagnosed Esophageal adenocarcinoma   #Acute blood loss anemia  - EGD 4/25: A friable circumferential mass seen from the GE junction at 38cm to 28 cm of mid esophagus. Multiple biopsies were obtained. Oozing of blood was noted from the whole mass.  - c/w pantoprazole 40mg IV BID  - s/p EGD w/ peg and stent removal   - . Patient supposed to have PET scan OP, Surg Onc Dr Yost   - keep Hgb>7.5  -keep active T&S  -5/17 transfuse another unit PRBCs (did not get blood yesterday due to fevers)  -5/18 H/h still low, s/p another another unit  5/22 BM Bx w/ >95% infiltration of marrow by esophageal cancer. Onc recommends hospice    #Weakness   #?Metastatic dz of C/T/L spine on MRI  #Acute on chronic lower back pain   - c/w Dilaudid 1mg Q4, Oxycodone 5mg Q4 PRN  Med Onc recommends Bone Scan and BM Bx by IR  Neuro recommends LP (confirm leptomeningeal spread)  s/p BM Bx and LP (high protein)  Bone scan negative for bony mets      #Decreased PO intake 2/2 esophageal adenocarcinoma  - s/p PEG    #Fever on 5/16  pancultured  CXR w/ ?LLL opacity ?aspiration  started on Cefepime 5/16  aspiration precautions  CT A/p w/out obvious source but nonspec changes  5/21 hold Cefepime as completed 7d course and no obvious source and new hallucinations    #Hallucinations  #Suicidal ideations  hold cefepime  Psych eval    #?L sided fascial droop  #Diffuse dural thickening  - Pt and spouse do not know if chronic.   - no focal defecits except b/l distal weakness   < from: MR Head w/wo IV Cont (05.14.25 @ 09:48) >  1.  No acute infarct or intracranial hemorrhage.    2.  Mild smooth diffuse dural thickening over the cerebral convexities.   This finding is nonspecific and can reflect intracranial hypotension or   recent spinal procedures. In the setting of known neoplasm cannot exclude   dural metastatic disease.    3.  Heterogeneous marrow signal of the calvarium, nonspecific.    < end of copied text >    #3.8 cm wide R iliac artery aneurysm- stable  #HTN  #CAD s/p CABG in Jan 2024  #Paroxysmal A.fib  - c/w home meds  - Hold plavix    #DVT PPX: SCDs  #GI PPX: PPI BID  #Diet: PO, plus supplement w/ TF  #Code Status: Full  #Activity Order: IAT    High risk pt. Px is grave (pt/spouse aware)    GOC: 5/22 Pt agreed to DNR/DNI. No further blood draws. Hospice c/s pending    #Progress Note Handoff  Pending: Hospice c/s   Pt/Family discussion: Pt/family informed and agree with the current plan  Disposition: Home__vs    Nursing Home    To be determined____x____    My note supersedes the residents note should a discrepancy arise.    Chart and notes personally reviewed.  Care Discussed with Consultants/Other Providers/ Housestaff [ x] YES [ ] NO   Radiology, labs, old records personally reviewed.    discussed w/ housestaff, nursing, case management, spouse    Attestation Statements:    Attestation Statements:  Risk Statement (NON-critical care).     On this date of service, level of risk to patient is considered: High.     Due to: pt with illness causing risk to life or bodily fxn    Time-based billing (NON-critical care).     50 minutes spent on total encounter. The necessity of the time spent during the encounter on this date of service was due to:     time spent on review of labs, imaging studies, old records, obtaining history, personally examining patient, counselling and communicating with patient/ family, entering orders for medications/tests/etc, discussions with other health care providers, documentation in electronic health records, independent interpretation of labs, imaging/procedure results and care coordination.

## 2025-05-22 NOTE — PROGRESS NOTE ADULT - ASSESSMENT
59M w/ Esophageal Cancer (dx Apr2025) s/p stent, CAD s/p [id number] (Jan2024), Afib not on AC, HLD is admitted for weakness, acute blood loss anemia/melena requiring pRBC transfusion. Patient completed endoscopy 5/14 for removal of esophageal stent (due to discomfort) and for PEG placement. Palliative consulted to assist w/ cancer-related pain management. Significantly, MR C/T/L spine is highly suspicious for diffusely metastatic disease in C/T/L spine and sacrum/iliac bones now confirmed w/ bone biopsy. Patient elects for hospice referral and is now DNR/DNI    Plan  - DNR/DNI with plan for hospice referral- primary team completed advanced directives conversation  -Pain improving- continue w/ Transdermal Fentanyl Patch 50mcg/hr q72hr (initially placed 5/21 18:00) & Hydromorphone 1mg IVq3hr prn severe pain. Will follow up tomorrow to determine if any further dose titration required  - Nightmares and delusions reported resolved  --> would still recommend psychiatry consultation (patient accepting of recommendation); now off cefepime which may have been contributory  -bowel regimen- started senna  -continue olanzapine  - will follow for pain and GOC as appropriate  - patient was evaluated w/ Palliative Attending Dr. Kavya Davis MD  Palliative Medicine Fellow  [facility]   [phone]

## 2025-05-22 NOTE — PROGRESS NOTE ADULT - SUBJECTIVE AND OBJECTIVE BOX
SUBJECTIVE/OVERNIGHT EVENTS  Today is hospital day 10d. This morning patient was seen and examined at bedside, resting comfortably in bed. No acute or major events overnight.    MEDICATIONS  STANDING MEDICATIONS  atorvastatin 80 milliGRAM(s) Oral at bedtime  chlorhexidine 2% Cloths 1 Application(s) Topical <User Schedule>  fentaNYL   Patch  50 MICROgram(s)/Hr 1 Patch Transdermal every 72 hours  heparin   Injectable 5000 Unit(s) SubCutaneous every 12 hours  lactated ringers. 1000 milliLiter(s) IV Continuous <Continuous>  lidocaine   4% Patch 1 Patch Transdermal daily  metoprolol tartrate 12.5 milliGRAM(s) Oral two times a day  OLANZapine 2.5 milliGRAM(s) Oral daily  pantoprazole  Injectable 40 milliGRAM(s) IV Push every 12 hours  polyethylene glycol 3350 17 Gram(s) Oral daily    PRN MEDICATIONS  acetaminophen     Tablet .. 650 milliGRAM(s) Oral every 6 hours PRN  HYDROmorphone  Injectable 1 milliGRAM(s) IV Push every 3 hours PRN    VITALS  T(F): 99.1 (05-22-25 @ 04:59), Max: 99.3 (05-21-25 @ 21:19)  HR: 102 (05-22-25 @ 04:59) (88 - 102)  BP: 104/74 (05-22-25 @ 04:59) (104/74 - 117/70)  RR: 18 (05-22-25 @ 04:59) (18 - 18)  SpO2: 95% (05-22-25 @ 04:59) (95% - 97%)    PHYSICAL EXAM  GENERAL: NAD, lying in bed comfortably  HEAD:  Atraumatic, normocephalic  EYES: EOMI, PERRLA, conjunctiva and sclera clear  ENT: Moist mucous membranes  NECK: Supple   HEART: Regular rate and rhythm, no murmurs  LUNGS: Unlabored respirations.  Clear to auscultation bilaterally, no wheezing  ABDOMEN: Soft, nondistended, +BS, +PEG, tenderness around PEG site  EXTREMITIES: No clubbing, cyanosis, or edema  NERVOUS SYSTEM:  A&Ox3, decreased b/l LE strength  SKIN: No rashes or lesions    LABS             8.0    6.31  )-----------( 54       ( 05-21-25 @ 06:48 )             25.0     137  |  103  |  17  -------------------------<  109   05-21-25 @ 06:48  4.4  |  24  |  0.5    Ca      8.6     05-21-25 @ 06:48  Mg     2.5     05-21-25 @ 06:48    TPro  5.5  /  Alb  3.1  /  TBili  0.8  /  DBili  x   /  AST  35  /  ALT  18  /  AlkPhos  301  /  GGT  x     05-21-25 @ 06:48        Urinalysis Basic - ( 21 May 2025 06:48 )    Color: x / Appearance: x / SG: x / pH: x  Gluc: 109 mg/dL / Ketone: x  / Bili: x / Urobili: x   Blood: x / Protein: x / Nitrite: x   Leuk Esterase: x / RBC: x / WBC x   Sq Epi: x / Non Sq Epi: x / Bacteria: x          IMAGING

## 2025-05-22 NOTE — GOALS OF CARE CONVERSATION - ADVANCED CARE PLANNING - CONVERSATION DETAILS
spoke with patient and we had an extensive conversation about patient 's care and current condition.     Discussed full vs limited medical management. Extensively explained that full medical management would involve intubating the patient if medically indicated and performing chest compressions in an attempt to resuscitate if the patient were to arrest. Further explained the concept of limited medical management, such as DNR/DNI. Further explained that DNR/DNI would not preclude medical management up until the point of arrest/intubation (such as antibiotics, IV fluids, blood draws, etc.). Patient/family expressed understanding of above. Decision made, patient is now DNR/DNI. spoke with patient and we had an extensive conversation about patient 's care and current condition.     Discussed full vs limited medical management. Extensively explained that full medical management would involve intubating the patient if medically indicated and performing chest compressions in an attempt to resuscitate if the patient were to arrest. Further explained the concept of limited medical management, such as DNR/DNI. Further explained that DNR/DNI would not preclude medical management up until the point of arrest/intubation (such as antibiotics, IV fluids, blood draws, etc.). Patient/family expressed understanding of above. Decision made, patient is now DNR/DNI.    Attending Addendum    Long d/w pt. His priority is comfort. He agrees with hospice consult, DNR/DNI. No further blood draws. Does not want escalation of care.

## 2025-05-22 NOTE — PROGRESS NOTE ADULT - SUBJECTIVE AND OBJECTIVE BOX
Patient is a 59y old  Male who presents with a chief complaint of Progressive weakness (13 May 2025 17:51)    INTERVAL HPI/OVERNIGHT EVENTS: Patient was examined and seen at bedside. This morning pt is resting in bed and reports pain is getting under better control. Started on Fentanyl patch yesterday.  Wife at bedside.   ROS: Denies CP, SOB, new weakness  All other systems reviewed and are within normal limits.  InitialHPI:  59y male with PMHx of HTN, hiatal hernia, HLD, CAD s/p CABG in Jan 2024, paroxysmal A. fib not on AC, former smoker (30 pack years, quit 1yr ago), recently diagnosed with colitis and esophageal adenocarcinoma s/p stent placement who presents for weakness. The patient was recently discharged from [facility] on 5/6 , and has been progressively deteriorating. States he has been unable to tolerate p.o. and has been unable to walk over the past few days. Wife at bedside reports that he has not been able to follow-up with oncology due to the weakness.  On ROS the patient also states he has been having black tarry stools that started 1 day prior to his EGD. He had 3 black BMs today. He denies any mucosal bleeding or blood in the urine.  He denies any fevers, chills, chest pain, shortness of breath, palpitations, headache, vision changes, nausea, vomiting, abdominal pain, hematuria, falls.  He is supposed to have PET scan tomorrow and follow with Dr. Sanchez in 2 days.    T(F): 98.5 (05-12-25 @ 21:05), Max: 98.6 (05-12-25 @ 19:48)  HR: 97 (05-12-25 @ 21:05) (88 - 104)  BP: 111/58 (05-12-25 @ 21:05) (111/58 - 132/75)  RR: 18 (05-12-25 @ 21:05) (18 - 20)  SpO2: 99% (05-12-25 @ 21:05) (98% - 100%) on RA    Labs: Hg 7.6 (baseline ~10), MCV 78, slight schistocytes on smear,  Plt 92, PT/INR >40/6.2, PTT 51.8, ALK phos 273.      Imaging:    CT Chest w/ IV Cont  (4/26 - prior admission)  IMPRESSION:  1.  No definite evidence of thoracic metastatic disease.  2.  Small bilateral pleural effusions.  3.  Marked circumferential thickening of the distal esophagus/hiatal   hernia could be related to provided history of suspected esophageal   cancer versus esophagitis. (12 May 2025 21:41)    PAST MEDICAL & SURGICAL HISTORY:  HTN (hypertension)      Hiatal hernia      HLD (hyperlipidemia)      CAD (coronary artery disease)      Paroxysmal atrial fibrillation      History of colitis      S/P CABG (coronary artery bypass graft)      General: NAD, AA0x3, ?Lt facial droop, appears very debilitated and weak  HEENT:  EOMI, no LAD  CV: S1 S2  Resp: decreased breath sounds at bases  GI: epig, lower sternal tenderness/ND/S +BS; +PEG  MS: no clubbing/cyanosis/edema, + pulses b/l  Neuro: LE 2/5 distally, 4/5 prox. UE 5-/5          Home Medications:  clopidogrel 75 mg oral tablet: 1 tab(s) orally once a day (13 May 2025 01:36)  Lipitor 80 mg oral tablet: 1 tab(s) orally once a day (13 May 2025 01:36)  Metoprolol Tartrate 25 mg oral tablet: 0.5 tab(s) orally 2 times a day (13 May 2025 01:36)  Multiple Vitamins oral tablet, chewable: 1 tab(s) orally once a day (13 May 2025 01:36)  Pepcid 20 mg oral tablet: 1 tab(s) orally once a day (13 May 2025 01:36)    MEDICATIONS  (STANDING):  atorvastatin 80 milliGRAM(s) Oral at bedtime  chlorhexidine 2% Cloths 1 Application(s) Topical <User Schedule>  fentaNYL   Patch  50 MICROgram(s)/Hr 1 Patch Transdermal every 72 hours  heparin   Injectable 5000 Unit(s) SubCutaneous every 12 hours  lactated ringers. 1000 milliLiter(s) (60 mL/Hr) IV Continuous <Continuous>  lidocaine   4% Patch 1 Patch Transdermal daily  melatonin 5 milliGRAM(s) Oral at bedtime  metoprolol tartrate 12.5 milliGRAM(s) Oral two times a day  pantoprazole  Injectable 40 milliGRAM(s) IV Push every 12 hours  polyethylene glycol 3350 17 Gram(s) Oral daily  senna 2 Tablet(s) Oral at bedtime    MEDICATIONS  (PRN):  acetaminophen     Tablet .. 650 milliGRAM(s) Oral every 6 hours PRN Temp greater or equal to 38C (100.4F), Mild Pain (1 - 3)  haloperidol     Tablet 2 milliGRAM(s) Oral every 6 hours PRN agitation  HYDROmorphone  Injectable 1 milliGRAM(s) IV Push every 3 hours PRN Severe Pain (7 - 10)    Vital Signs Last 24 Hrs  T(C): 36.7 (22 May 2025 13:08), Max: 37.4 (21 May 2025 21:19)  T(F): 98.1 (22 May 2025 13:08), Max: 99.3 (21 May 2025 21:19)  HR: 94 (22 May 2025 13:08) (89 - 102)  BP: 101/65 (22 May 2025 13:08) (101/65 - 116/72)  BP(mean): 77 (22 May 2025 13:08) (77 - 77)  RR: 18 (22 May 2025 13:08) (18 - 18)  SpO2: 97% (22 May 2025 13:08) (95% - 97%)    Parameters below as of 22 May 2025 13:08  Patient On (Oxygen Delivery Method): room air      CAPILLARY BLOOD GLUCOSE        LABS:                        8.0    6.31  )-----------( 54       ( 21 May 2025 06:48 )             25.0     05-21    137  |  103  |  17  ----------------------------<  109[H]  4.4   |  24  |  0.5[L]    Ca    8.6      21 May 2025 06:48  Mg     2.5     05-21    TPro  5.5[L]  /  Alb  3.1[L]  /  TBili  0.8  /  DBili  x   /  AST  35  /  ALT  18  /  AlkPhos  301[H]  05-21    LIVER FUNCTIONS - ( 21 May 2025 06:48 )  Alb: 3.1 g/dL / Pro: 5.5 g/dL / ALK PHOS: 301 U/L / ALT: 18 U/L / AST: 35 U/L / GGT: x                 Urinalysis Basic - ( 21 May 2025 06:48 )    Color: x / Appearance: x / SG: x / pH: x  Gluc: 109 mg/dL / Ketone: x  / Bili: x / Urobili: x   Blood: x / Protein: x / Nitrite: x   Leuk Esterase: x / RBC: x / WBC x   Sq Epi: x / Non Sq Epi: x / Bacteria: x              Consultant Notes Reviewed:  [x ] YES  [ ] NO  Care Discussed with Consultants/Other Providers/ Housestaff [ x] YES  [ ] NO  Radiology, labs, EKGs, new studies personally reviewed.

## 2025-05-22 NOTE — PROGRESS NOTE ADULT - NS ATTEST RISK PROBLEM GEN_ALL_CORE FT
1.   [ ] 1 or more chronic illnesses with severe exacerbation, progression, or side effects of treatment  [ ] 1 acute or chronic illnesses or injuries that may pose a threat to life or bodily function    2.  [x ] Personally review and interpretation of  image or testing   [x ] Discussion of management or test interpretation with external physician/other qualified health care professional\appropriate source (not separately reported)    3. [ ] Drug therapy requiring intensive monitoring for toxicity   [ ] Decision regarding elective major surgery, treatment, or procedure with identified patient or procedure risk factors   [ ] Decision regarding emergency major surgery, treatment, or procedure   [ ] Decision regarding hospitalization or escalation of hospital-level of care  [ ] Decision not to resuscitate, not to intubate, or to de-escalate care because of poor prognosis   [ ] Decision to proceed or not with artificial nutrition   [ x] Parenteral controlled substance

## 2025-05-23 VITALS
OXYGEN SATURATION: 97 % | RESPIRATION RATE: 18 BRPM | TEMPERATURE: 98 F | HEART RATE: 98 BPM | SYSTOLIC BLOOD PRESSURE: 121 MMHG | DIASTOLIC BLOOD PRESSURE: 77 MMHG

## 2025-05-23 DIAGNOSIS — Z63.4 DISAPPEARANCE AND DEATH OF FAMILY MEMBER: ICD-10-CM

## 2025-05-23 LAB
CULTURE RESULTS: SIGNIFICANT CHANGE UP
SPECIMEN SOURCE: SIGNIFICANT CHANGE UP

## 2025-05-23 PROCEDURE — 99232 SBSQ HOSP IP/OBS MODERATE 35: CPT

## 2025-05-23 PROCEDURE — 90792 PSYCH DIAG EVAL W/MED SRVCS: CPT | Mod: 95

## 2025-05-23 PROCEDURE — 99233 SBSQ HOSP IP/OBS HIGH 50: CPT | Mod: 25

## 2025-05-23 PROCEDURE — 99497 ADVNCD CARE PLAN 30 MIN: CPT

## 2025-05-23 RX ORDER — DEXAMETHASONE 0.5 MG/1
4 TABLET ORAL DAILY
Refills: 0 | Status: DISCONTINUED | OUTPATIENT
Start: 2025-05-23 | End: 2025-05-25

## 2025-05-23 RX ORDER — HYDROMORPHONE/SOD CHLOR,ISO/PF 2 MG/10 ML
2 SYRINGE (ML) INJECTION
Refills: 0 | Status: DISCONTINUED | OUTPATIENT
Start: 2025-05-23 | End: 2025-05-25

## 2025-05-23 RX ORDER — SENNA 187 MG
2 TABLET ORAL
Refills: 0 | Status: DISCONTINUED | OUTPATIENT
Start: 2025-05-23 | End: 2025-05-25

## 2025-05-23 RX ORDER — HYDROMORPHONE/SOD CHLOR,ISO/PF 2 MG/10 ML
2 SYRINGE (ML) INJECTION
Refills: 0 | Status: DISCONTINUED | OUTPATIENT
Start: 2025-05-23 | End: 2025-05-23

## 2025-05-23 RX ADMIN — METOPROLOL SUCCINATE 12.5 MILLIGRAM(S): 50 TABLET, EXTENDED RELEASE ORAL at 18:18

## 2025-05-23 RX ADMIN — Medication 1 MILLIGRAM(S): at 06:34

## 2025-05-23 RX ADMIN — Medication 40 MILLIGRAM(S): at 18:17

## 2025-05-23 RX ADMIN — Medication 40 MILLIGRAM(S): at 06:40

## 2025-05-23 RX ADMIN — Medication 2 MILLIGRAM(S): at 12:46

## 2025-05-23 RX ADMIN — Medication 2 MILLIGRAM(S): at 16:05

## 2025-05-23 RX ADMIN — Medication 2 MILLIGRAM(S): at 09:15

## 2025-05-23 RX ADMIN — Medication 1 MILLIGRAM(S): at 00:07

## 2025-05-23 RX ADMIN — Medication 2 MILLIGRAM(S): at 20:55

## 2025-05-23 RX ADMIN — DEXAMETHASONE 4 MILLIGRAM(S): 0.5 TABLET ORAL at 12:46

## 2025-05-23 RX ADMIN — Medication 1 APPLICATION(S): at 06:49

## 2025-05-23 RX ADMIN — Medication 650 MILLIGRAM(S): at 02:26

## 2025-05-23 RX ADMIN — METOPROLOL SUCCINATE 12.5 MILLIGRAM(S): 50 TABLET, EXTENDED RELEASE ORAL at 06:35

## 2025-05-23 RX ADMIN — Medication 2 MILLIGRAM(S): at 14:35

## 2025-05-23 RX ADMIN — Medication 1 MILLIGRAM(S): at 03:17

## 2025-05-23 SDOH — SOCIAL STABILITY - SOCIAL INSECURITY: DISSAPEARANCE AND DEATH OF FAMILY MEMBER: Z63.4

## 2025-05-23 NOTE — BH CONSULTATION LIAISON ASSESSMENT NOTE - HPI (INCLUDE ILLNESS QUALITY, SEVERITY, DURATION, TIMING, CONTEXT, MODIFYING FACTORS, ASSOCIATED SIGNS AND SYMPTOMS)
Mr Reddy is a 59 year old  man, resides with hs wife , has no children is a semi-retired   with no history of a psychiatric illness but recently diagnosed with Esophageal cancer who was admitted to the medical floor for the evaluation of weakness . According to the medical team, patient made suicidal statements 3 days ago and has since been informed that he has metastasis to the spine and has been referred for hospice care  .Psychiatry  consult was called for suicidal ideations.   Patient seen and interviewed. 1 to 1 at bedside.    Upon approach, patient is observed to be lying on his hospital bed, calm, cooperative, well oriented to time, place and person.  Mr Reddy is a 59 year old  man, resides with hs wife , has no children is a semi-retired   with no history of a psychiatric illness but recently diagnosed with Esophageal cancer who was admitted to the medical floor for the evaluation of weakness . According to the medical team, patient made suicidal statements 3 days ago and has since been informed that he has metastasis to the spine and has been referred for hospice care  .Psychiatry  consult was called for suicidal ideations.   Patient seen and interviewed. 1 to 1 at bedside.    Upon approach, patient is observed to be lying on his hospital bed, calm, cooperative, well oriented to time, place and person.   Patient reports that he informed staff that he wanted to kill himself because it is so noisy in the hospital , especially at night , and is frustrated that he was been in the hospital for so long and also because he is in a lot of pain al the time.   He reports that he did not intend to kill himself as he made the statement in the context of his frustrations.   He reports that he was recently diagnosed with esophageal cancer and felt more depressed when he did not know what was abuse of his symptoms at that time. he reports that not knowing was very distressing for him. Patient reports that since he is aware of his diagnosis an his prognosis , he is less depressed and and is not as distressed as before . He reports that he has poor sleep but has good appetite although he can not swallow . he states " I have a tube down my throat , and a PEG tube , I take liquid food " Patient however denies other acute symptoms of depression and current suicidal ideations .   Patient became tearful for sometime, reporting that he is angry that " this happened to me ", but he realizes that it could happen to anybody . he   Patient denies acute symptoms of psychosis and dontae . he reports episodic use of marijuana , about 10 times in the last 1 year . He however denies current use of other illicit drugs oe alcohol.   Patient denies being in the care of an outpatient psychiatrist and denies ever being on any psychiatric medication.   Patient was offered medication to target insomnia however patient refused stating " I would rather leave that to the pain management and palliative care team, they already have something worked put ".   Patient reports that he does not need any psychiatric intervention for now . He reports that he has very good social support in his wife's side of the family ut they

## 2025-05-23 NOTE — PROGRESS NOTE ADULT - SUBJECTIVE AND OBJECTIVE BOX
SUBJECTIVE/OVERNIGHT EVENTS  Today is hospital day 11d. This morning patient was seen and examined at bedside, resting comfortably in bed. No acute or major events overnight.      MEDICATIONS  STANDING MEDICATIONS  atorvastatin 80 milliGRAM(s) Oral at bedtime  chlorhexidine 2% Cloths 1 Application(s) Topical <User Schedule>  fentaNYL   Patch  50 MICROgram(s)/Hr 1 Patch Transdermal every 72 hours  heparin   Injectable 5000 Unit(s) SubCutaneous every 12 hours  HYDROmorphone  Injectable 2 milliGRAM(s) IV Push every 3 hours  lidocaine   4% Patch 1 Patch Transdermal daily  melatonin 5 milliGRAM(s) Oral at bedtime  metoprolol tartrate 12.5 milliGRAM(s) Oral two times a day  pantoprazole  Injectable 40 milliGRAM(s) IV Push every 12 hours  polyethylene glycol 3350 17 Gram(s) Oral daily  senna 2 Tablet(s) Oral at bedtime    PRN MEDICATIONS  acetaminophen     Tablet .. 650 milliGRAM(s) Oral every 6 hours PRN  haloperidol     Tablet 2 milliGRAM(s) Oral every 6 hours PRN    VITALS  T(F): 98.2 (05-23-25 @ 04:55), Max: 98.2 (05-23-25 @ 04:55)  HR: 99 (05-23-25 @ 04:55) (85 - 99)  BP: 105/68 (05-23-25 @ 04:55) (101/65 - 109/68)  RR: 18 (05-23-25 @ 04:55) (18 - 18)  SpO2: 96% (05-23-25 @ 04:55) (96% - 97%)    PHYSICAL EXAM  GENERAL: NAD, lying in bed comfortably  HEAD:  Atraumatic, normocephalic  EYES: EOMI, PERRLA, conjunctiva and sclera clear  ENT: Moist mucous membranes  NECK: Supple   HEART: Regular rate and rhythm, no murmurs  LUNGS: Unlabored respirations.  Clear to auscultation bilaterally, no wheezing  ABDOMEN: Soft, nondistended, +BS, +PEG, tenderness around PEG site  EXTREMITIES: No clubbing, cyanosis, or edema  NERVOUS SYSTEM:  A&Ox3, decreased b/l LE strength  SKIN: No rashes or lesions    LABS                    IMAGING SUBJECTIVE/OVERNIGHT EVENTS  Today is hospital day 11d. This morning patient was seen and examined at bedside, resting comfortably in bed. No acute or major events overnight.      MEDICATIONS  STANDING MEDICATIONS  atorvastatin 80 milliGRAM(s) Oral at bedtime  chlorhexidine 2% Cloths 1 Application(s) Topical <User Schedule>  fentaNYL   Patch  50 MICROgram(s)/Hr 1 Patch Transdermal every 72 hours  heparin   Injectable 5000 Unit(s) SubCutaneous every 12 hours  HYDROmorphone  Injectable 2 milliGRAM(s) IV Push every 3 hours  lidocaine   4% Patch 1 Patch Transdermal daily  melatonin 5 milliGRAM(s) Oral at bedtime  metoprolol tartrate 12.5 milliGRAM(s) Oral two times a day  pantoprazole  Injectable 40 milliGRAM(s) IV Push every 12 hours  polyethylene glycol 3350 17 Gram(s) Oral daily  senna 2 Tablet(s) Oral at bedtime    PRN MEDICATIONS  acetaminophen     Tablet .. 650 milliGRAM(s) Oral every 6 hours PRN  haloperidol     Tablet 2 milliGRAM(s) Oral every 6 hours PRN    VITALS  T(F): 98.2 (05-23-25 @ 04:55), Max: 98.2 (05-23-25 @ 04:55)  HR: 99 (05-23-25 @ 04:55) (85 - 99)  BP: 105/68 (05-23-25 @ 04:55) (101/65 - 109/68)  RR: 18 (05-23-25 @ 04:55) (18 - 18)  SpO2: 96% (05-23-25 @ 04:55) (96% - 97%)    PHYSICAL EXAM  GENERAL: well nourished, appears to be in pain  HEAD:  Atraumatic, normocephalic  EYES: EOMI, PERRLA, conjunctiva and sclera clear  ENT: Moist mucous membranes  NECK: Supple   HEART: Regular rate and rhythm, no murmurs  LUNGS: Unlabored respirations.  Clear to auscultation bilaterally, no wheezing  ABDOMEN: Soft, nondistended, +BS, +PEG, tenderness around PEG site  EXTREMITIES: No clubbing, cyanosis, or edema  NERVOUS SYSTEM:  A&Ox3, decreased b/l LE strength  SKIN: No rashes or lesions    LABS                    IMAGING

## 2025-05-23 NOTE — BH CONSULTATION LIAISON ASSESSMENT NOTE - RISK ASSESSMENT
Risk factors fo suicide  in this patient are his chronic medical problems , severe acute medical problems , however he denies current suicidal ideations, denies past suicide attempts , denies current substance ue

## 2025-05-23 NOTE — PROGRESS NOTE ADULT - ATTENDING COMMENTS
Patient seen at bedside  Increased pain overnight and 7 PRNs used  Primary team changed dilaudid to 2mg IV q3h standing - will make PRN  continue fentanyl patch at current dose  add dexamethasone 4mg IV daily that can be discontinued on discharge    As per discussion above, patient now comfort measures only.    If patient is discharged home with hospice prior to being seeing by palliative care next week, please stop IV dilaudid and discharge on oxycodone 20mg q4h PRN For pain

## 2025-05-23 NOTE — PROGRESS NOTE ADULT - ASSESSMENT
59M w/ Esophageal Cancer (dx Apr2025) s/p stent, CAD s/p SCZHw3F (Jan2024), Afib not on AC, HLD is admitted for weakness, acute blood loss anemia/melena requiring pRBC transfusion. Patient completed endoscopy 5/14 for removal of esophageal stent (due to discomfort) and for PEG placement. Palliative consulted to assist w/ cancer-related pain management. Significantly, MR C/T/L spine is highly suspicious for diffusely metastatic disease in C/T/L spine and sacrum/iliac bones now confirmed w/ bone biopsy. Patient elects for hospice referral and is now DNR/DNI- and he agrees w/ cmo    Plan  - MOLST completed again to reflect DNR/DNI/CMO  -Pain worse overngith- continue w/ Transdermal Fentanyl Patch 50mcg/hr q72hr (initially placed 5/21 18:00), Hydromorphone 2mg IVq3hr prn severe pain. Starting dexamethasone 4mg daily as an adjunct for spine pain from mets  - Nightmares and delusions reported resolved  --> would still recommend psychiatry consultation (patient accepting of recommendation); now off cefepime which may have been contributory  -bowel regimen- increasing senna to BID, c/w miralax  - continue olanzapine  - will follow for pain and GOC are clear  - patient was evaluated w/ Palliative Attending Dr. Kavya Davis MD  Palliative Medicine Fellow  [facility]   [phone]

## 2025-05-23 NOTE — PROGRESS NOTE ADULT - ASSESSMENT
59y male with PMHx of HTN, hiatal hernia, HLD, CAD s/p CABG in Jan 2024, paroxysmal A. fib not on AC, former smoker (30 pack years, quit 1yr ago), recently diagnosed with colitis and esophageal adenocarcinoma s/p stent placement who presents for weakness. The patient was recently discharged from [facility] on 5/6 , and has been progressively deteriorating. Found to have hgb 7.6 lower than last admission reported melena, thrombocytopenia w/ elevated INR/PTT.     #Reported Melena likely due to Upper GI Bleed likely Secondary to  Malignancy   #Thrombocytopenia, elevated INR/PTT  #Recently diagnosed Esophageal adenocarcinoma   #Acute blood loss anemia  - EGD 4/25: A friable circumferential mass seen from the GE junction at 38cm to 28 cm of mid esophagus. Multiple biopsies were obtained. Oozing of blood was noted from the whole mass.  - c/w pantoprazole 40mg IV BID  - s/p EGD w/ peg and stent removal   - . Patient supposed to have PET scan OP, Surg Onc Dr Yost   - keep Hgb>7.5  -keep active T&S  -5/17 transfuse another unit PRBCs (did not get blood yesterday due to fevers)  -5/18 H/h still low, s/p another another unit  5/22 BM Bx w/ >95% infiltration of marrow by esophageal cancer. Onc recommends hospice  5/23 CMO now. Hospice admission on Sunday. Spouse needs education on feed administration. Feeds need to be delivered to the house.    #Weakness   #?Metastatic dz of C/T/L spine on MRI  #Acute on chronic lower back pain   Med Onc recommends Bone Scan and BM Bx by IR  Neuro recommends LP (confirm leptomeningeal spread)  s/p BM Bx and LP (high protein)        #Decreased PO intake 2/2 esophageal adenocarcinoma  - s/p PEG    #Fever on 5/16  pancultured  CXR w/ ?LLL opacity ?aspiration  started on Cefepime 5/16  aspiration precautions  CT A/p w/out obvious source but nonspec changes  5/21 hold Cefepime as completed 7d course and no obvious source and new hallucinations    #Hallucinations  #Suicidal ideations  hold cefepime  Psych eval appreciated    #?L sided fascial droop  #Diffuse dural thickening  - Pt and spouse do not know if chronic.   - no focal defecits except b/l distal weakness   < from: MR Head w/wo IV Cont (05.14.25 @ 09:48) >  1.  No acute infarct or intracranial hemorrhage.    2.  Mild smooth diffuse dural thickening over the cerebral convexities.   This finding is nonspecific and can reflect intracranial hypotension or   recent spinal procedures. In the setting of known neoplasm cannot exclude   dural metastatic disease.    3.  Heterogeneous marrow signal of the calvarium, nonspecific.    < end of copied text >    #3.8 cm wide R iliac artery aneurysm- stable  #HTN  #CAD s/p CABG in Jan 2024  #Paroxysmal A.fib  - c/w home meds  - Hold plavix    #DVT PPX: SCDs  #GI PPX: PPI BID  #Diet: PO, plus supplement w/ TF  #Code Status: Full  #Activity Order: IAT    High risk pt. Px is grave (pt/spouse aware)    GOC: 5/23 Pt agreed to CMO. Hospice admission on Sunday. Spouse needs education on feed administration. Feeds need to be delivered to the house. Pain meds need to be changed to PO and TD.    My note supersedes the residents note should a discrepancy arise.    Chart and notes personally reviewed.  Care Discussed with Consultants/Other Providers/ Housestaff [ x] YES [ ] NO   Radiology, labs, old records personally reviewed.    discussed w/ housestaff, nursing, case management, spouse, palliative    Attestation Statements:    Attestation Statements:  Risk Statement (NON-critical care).     On this date of service, level of risk to patient is considered: High.     Due to: pt with illness causing risk to life or bodily fxn    Time-based billing (NON-critical care).     35 minutes spent on total encounter. The necessity of the time spent during the encounter on this date of service was due to:     time spent on review of labs, imaging studies, old records, obtaining history, personally examining patient, counselling and communicating with patient/ family, entering orders for medications/tests/etc, discussions with other health care providers, documentation in electronic health records, independent interpretation of labs, imaging/procedure results and care coordination.

## 2025-05-23 NOTE — BH CONSULTATION LIAISON ASSESSMENT NOTE - CURRENT MEDICATION
I personally spent MEDICATIONS  (STANDING):  chlorhexidine 2% Cloths 1 Application(s) Topical <User Schedule>  dexAMETHasone  Injectable 4 milliGRAM(s) IV Push daily  fentaNYL   Patch  50 MICROgram(s)/Hr 1 Patch Transdermal every 72 hours  HYDROmorphone  Injectable 2 milliGRAM(s) IV Push every 3 hours  melatonin 5 milliGRAM(s) Oral at bedtime  metoprolol tartrate 12.5 milliGRAM(s) Oral two times a day  pantoprazole  Injectable 40 milliGRAM(s) IV Push every 12 hours  polyethylene glycol 3350 17 Gram(s) Oral daily  senna 2 Tablet(s) Oral two times a day    MEDICATIONS  (PRN):  acetaminophen     Tablet .. 650 milliGRAM(s) Oral every 6 hours PRN Temp greater or equal to 38C (100.4F), Mild Pain (1 - 3)  haloperidol     Tablet 2 milliGRAM(s) Oral every 6 hours PRN agitation

## 2025-05-23 NOTE — CHART NOTE - NSCHARTNOTEFT_GEN_A_CORE
PALLIATIVE MEDICINE INTERDISCIPLINARY TEAM NOTE    Provider:                                            Met with: [  x ] Patient  [  x ] Family  [   ] Other:    Primary Language: [  x ] English [   ] Other*:                      *Interpretation provided by:    SUPPORT DIAGNOSES            (Check all that apply)    [   ] EOL issues  [ x  ] Advanced Illness  [   ] Cultural / spiritual concerns  [   ] Pain / suffering  [   ] Dementia / AMS  [   ] Other:  [   ] AD issues  [   ] Grief / loss / sadness  [   ] Discharge issues  [ x  ] Distress / coping    PSYCHOSOCIAL ASSESSMENT OF PATIENT         (Check all that apply)    [ x  ] Initial Assessment            [   ] Reassessment          [   ] Not Applicable this visit    Pain/suffering acuity:  [  x ] None to mild (0-3)           [   ] Moderate (4-6)        [   ] High (7-10)    Mental Status:  [  x ] Alert/oriented (x3)          [   ] Confused/Altered(x2/x1)         [   ] Non-resp    Functional status:  [   ] Independent w ADLs      [ x  ] Needs Assistance             [   ] Bedbound/Full Care    Coping:  [   ] Coping well                     [ x  ] Coping w/difficulty            [   ] Poor coping    Support system:  [ x  ] Strong                              [   ] Adequate                        [   ] Inadequate      Past history and medications for:     [ ] Anxiety       [ ] Depression    [ ] Sleep disorders       SERVICE PROVIDED  [   ]Discharge support / facilitation  [   ]AD / goals of care counseling                                 [   ]EOL / death / bereavement counseling  [x   ]Counseling / support                                            [   ] Family meeting  [   ]Prayer / sacrament / ritual                                      [   ] Referral   [   ]Other                                                                       NOTE and Plan of Care (PoC):    patient is a 60 y/o M with pmhx of esophageal cancer s/p stent, CAD s/p CABG, afib, HLD, presenting with c/o weakness, acute blood anemia/melena. met with patient and wife at bedside. Briefly reviewed role of palliative SW with patient and wife. psychosocial support provided. patient open to on going support. will follow. [id number]
Patient was found by RN to be in severe emotional distress, sobbing and reporting hallucinations/doubles of himself. NIH wnl.   Patient examined at bedside, has tangential speech but not responding to external stimuli.   Patient noted that he had depression many years ago but stopped seeing a psychiatrist. He notes that he wants to hurt himself but does not have a plan to do so.   Will place psychiatry eval
Registered Dietitian Follow-Up     Patient Profile Reviewed                           Yes [x]   No []     Nutrition History Previously Obtained        Yes [x]  No []       Pertinent Subjective Information: Pt reports not feeling any huger cues despite trying to eat. Pt currently working on eating dinner with family. Current bedscale 87 kg.      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  #Acute on chronic lower back pain   #Fever    Diet order:   Diet, Pureed:   DASH/TLC {Sodium & Cholesterol Restricted} (DASH)  Tube Feeding Modality: Gastrostomy  TwoCal HN (TWOCALHNRTH)  Total Volume for 24 Hours (mL): 300  Bolus  Total Volume of Bolus (mL):  100  Total # of Feeds: 1  Tube Feed Frequency: Every 8 hours   Tube Feed Start Time: 12:00  Bolus Feed Rate (mL per Hour): 100   Bolus Feed Duration (in Hours): 1  Bolus Feed Instructions:  increase rate by 20 ml/hr every 6 hours until goal rate of 300ml q6 hours reached.Oral feeds as tolerated supplement with PEG feeds as needed  Free Water Flush  Bolus   Total Volume per Flush (mL): 50   Frequency: Every 6 Hours   Total Daily Volume of Flush (mL): 150  Free Water Flush Instructions:  25 mL pre and post feeds (05-17-25 @ 12:14) [Active]  Diet, NPO after Midnight:      NPO Start Date: 12-May-2025,   NPO Start Time: 23:59  Except Medications (05-12-25 @ 23:10) [Active]    Anthropometrics:  Height (cm): 180.3 (05-20-25)  Weight (kg): 84.5 (05-14-25)  BMI (kg/m2): 26 (05-20-25)  IBW: 78.18 kg using HAMWI equation   UBW: 100 kg    Daily 87 kg (5/20) ; 84.5 kg (5/14)   +2.8% Weight Change in 1 week.    MEDICATIONS  (STANDING):  atorvastatin 80 milliGRAM(s) Oral at bedtime  cefepime   IVPB 2000 milliGRAM(s) IV Intermittent every 12 hours  cefepime   IVPB      chlorhexidine 2% Cloths 1 Application(s) Topical <User Schedule>  lactated ringers. 1000 milliLiter(s) (60 mL/Hr) IV Continuous <Continuous>  lidocaine   4% Patch 1 Patch Transdermal daily  metoprolol tartrate 12.5 milliGRAM(s) Oral two times a day  OLANZapine 2.5 milliGRAM(s) Oral daily  oxyCODONE  ER Tablet 20 milliGRAM(s) Oral every 12 hours  pantoprazole  Injectable 40 milliGRAM(s) IV Push every 12 hours  polyethylene glycol 3350 17 Gram(s) Oral daily    Pertinent Labs: 05-20 @ 05:57: Na 137, BUN 15, Cr 0.6[L], BG 93, K+ 4.7, Phos 4.6, Mg 2.4, Alk Phos 318[H], ALT/SGPT 17, AST/SGOT 39, HbA1c --    Physical Findings:  - Appearance: AOx3    - GI function:  Last BM 5/18 - dark color, constipation   - Tubes:  PEG  - Oral/Mouth cavity: Pureed w/ PEG  - Edema:  none reported   -skin:   intact     05-19-25 @ 07:01  -  05-20-25 @ 07:00  --------------------------------------------------------  IN: 0 mL / OUT: 300 mL / NET: -300 mL    Nutrition Requirements:  w/ consideration for cancer, BMI, Age, and Comorbidities.   Weight Used: 84.5 kg      Estimated Energy Needs    Continue [30-35 kcal/kg]  Adjust []  Adjusted Energy Recommendations: 0350-0401  kcal/day      Estimated Protein Needs    Continue [1.3-1.5gm/kg ]  Adjust []  Adjusted Protein Recommendations:  110-127 gm/day      Estimated Fluid Needs        Continue [x]  Adjust []  Adjusted Fluid Recommendations:  1mL/kcal/day     Nutrient Intake: Per pt, no hunger cues. Discussed eating     Per x24 hours- 278 mL TF TwoCal. 556 kcal, 23 grams protein, 195 mL free water.   Food:    [x] Previous Nutrition Diagnosis:  Severe protein-calorie malnutrition in the context of chronic illness or injury related to poor PO intake  as evidenced by 15% weight loss x 2 months, PO <75% needs >1 month              [x] Ongoing          [] Resolved    [] No active nutrition diagnosis identified at this time     Nutrition Education: Encouragement and praise provided     Goal/Expected Outcome: pt to increase meal intake to 25-50% PO.      Indicator/Monitoring: weekly anthroprometrics, GI S/S, -Lytes (Phos, Mag, K+), BM, I/Os, Labs, NFPF, GI fxn. Energy intake and tolerance. TF tolerance    Recommendation:   -c/w current diet order. Provide PO during Day and recommended provide feeds overnight.   -defer to SLP for diet texture.   -maintain aspiration precautions  -HOB >45 w/ feeds    Tiffanie Lunar x [phone] or Via TEAMS    high risk Follow Up .
met with PT and wife: Shannan at bedside. PT noted he was comfortable. Pt's wife reports that DME was delivered pending d/c to home with hospice. Questions answered and support rendered. Will follow. [phone]
Neurology team visited patient today. However, patient was out for procedure and not seen. Wife at bedside had no questions at this time. Will f/u 05/21
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 Patient had esophageal stent placed during previous admission. Patient presents for weakness, unable to tolerate PO intake and unable to walk for the past few days. Patient had 3 black BM during current admission. Found to have Hgb of 7.6, which was lower than last admission.  Bone biopsy confirmed suspicion for metastatic disease. Patient was made CMO today. Plan is for patient to be discharged to hospice.        Diet order: Diet, Pureed:   DASH/TLC {Sodium & Cholesterol Restricted} (DASH)  Tube Feeding Modality: Gastrostomy  TwoCal HN (TWOCALHNRTH)  Total Volume for 24 Hours (mL): 300  Bolus  Total Volume of Bolus (mL):  100  Total # of Feeds: 1  Tube Feed Frequency: Every 8 hours   Tube Feed Start Time: 12:00  Bolus Feed Rate (mL per Hour): 100   Bolus Feed Duration (in Hours): 1  Bolus Feed Instructions:  increase rate by 20 ml/hr every 6 hours until goal rate of 300ml q6 hours reached.Oral feeds as tolerated supplement with PEG feeds as needed  Free Water Flush  Bolus   Total Volume per Flush (mL): 50   Frequency: Every 6 Hours   Total Daily Volume of Flush (mL): 150  Free Water Flush Instructions:  25 mL pre and post feeds (05-17-25 @ 12:14) [Active]  Diet, NPO after Midnight:      NPO Start Date: 12-May-2025,   NPO Start Time: 23:59  Except Medications (05-12-25 @ 23:10) [Active]      Patient noted with anemia, mild hypomagnesemia, low creatinine, elevated ALP.     Anthropometrics:  Height (cm): 180.3 (05-20-25 @ 09:53)  Weight: 84.5kg (last RD note on 5/20/25 documented updated weight of 87kg, reflective of potential desirable weight gain)  BMI: 26.8 (using updated weight)    Daily   % Weight Change    MEDICATIONS  (STANDING):  chlorhexidine 2% Cloths 1 Application(s) Topical <User Schedule>  dexAMETHasone  Injectable 4 milliGRAM(s) IV Push daily  fentaNYL   Patch  50 MICROgram(s)/Hr 1 Patch Transdermal every 72 hours  melatonin 5 milliGRAM(s) Oral at bedtime  metoprolol tartrate 12.5 milliGRAM(s) Oral two times a day  pantoprazole  Injectable 40 milliGRAM(s) IV Push every 12 hours  polyethylene glycol 3350 17 Gram(s) Oral daily  senna 2 Tablet(s) Oral two times a day    MEDICATIONS  (PRN):  acetaminophen     Tablet .. 650 milliGRAM(s) Oral every 6 hours PRN Temp greater or equal to 38C (100.4F), Mild Pain (1 - 3)  haloperidol     Tablet 2 milliGRAM(s) Oral every 6 hours PRN agitation  HYDROmorphone  Injectable 2 milliGRAM(s) IV Push every 3 hours PRN moderate/severe pain (4-10)    Pertinent Labs:   Finger Sticks:    Physical Findings:  - Appearance: alert  - GI function: No s/s of dysfunction per patient; patient is not sure of their last BM; RN was not sure either. RN reports patient has been refusing stool softeners. Patient denies abdominal pain or feelings of fullness from PEG feeds. Last documented BM was on 5/18/25 per flowsheet.  - Tubes: PEG  - Oral/Mouth cavity: Per SLP note recommendations on 5/15/25: PEG as primary; Pleasure feeds by mouth - Full Liquid diet - refer to GI if patient can be cleared for solid foods by mouth for pleasure (low risk of pharyngeal dysphagia)  - Edema: none  - Skin: intact; no pressure injuries     Nutrition Requirements: Meeting estimated nutrient needs is not a goal consistent with goals of patients that are CMO. Although, per RN hospice on 5/23/25, hospice requires feeds to be ordered and spouse is to be educated on PEG management.   Weight Used: previous 84.5kg     Estimated Energy Needs    Continue []  Adjust [x]  n/a  Estimated Protein Needs    Continue []  Adjust [x]   n/a  Estimated Fluid Needs        Continue []  Adjust [x]  n/a    Nutrient Intake: Patient reports consuming 0-25% of PO meals due to lack of appetite. Current tube feed regimen provides 1200mL total volume, 2400kcal, 101g protein, 840mL free water (meets 102% of lower end of range for kcal needs and 92% of lower end of range for protein needs)       [x] Previous Nutrition Diagnosis: Severe PCM            [x] Ongoing          [] Resolved    [x] New active nutrition diagnosis identified at this time: Altered GI Function related to pain medication as evidenced by no BM for 5 days     Nutrition Education: Met with patient prior to CMO order being entered. Discussed PO diet order and tube feed regimen. Explained that the tube feed regimen meets 100% of his needs and that he should not be concerned about meeting needs via PO intake. Encouraged patient to focus on consuming foods he enjoys. Discussed importance of receiving stool softeners given patient he has not had a BM for 5 days especially in the context of sufficient nutrition intake from PEG feeds (as well as the context of receiving pain medication which may affect GI motility). Patient verbalized understanding.      Goal/Expected Outcome: Patient to continue to receive and tolerate >85%-<105% of estimated nutrient needs within 4 days    Interventions:  -If patient elects, continue current PO diet order for pleasure   -Continue tube feed regimen if patient chooses  -Continue current FWF regimen if continuing tube feed regimen  -Continue with stool softeners PRN; patient should be encouraged to receive stool softeners in the context of maximizing patient comfort- sufficient education is necessary for adherence - consider providing via PEG if patient does not want via PO    Monitor:  -PO intake  -Diet/Diet texture and Tube feed tolerance  -Weight  -Nutrition related labs  -Nutrition focused physical findings  -GI function     Low Nutrition Risk Follow Up    Yosi Harding RD via Teams
met with patient at bedside. patient's wife also at bedside. patient reports having a hard time sleeping at night with c/o racing thoughts that impede his ability to rest/sleep. psychosocial support provided. patient open to taking medication to help sleep at night.   discussed with palliative attending.   [phone]
oncology following for esophageal ca     Peripheral smear reviewed: no plts clumps noted, but on manual count plts more than what is reported.   Please obtain coag profile, d dimer, and fibrinogen   F/up GI   Consult palliative for pain management.     Discussed with Dr pena

## 2025-05-23 NOTE — BH CONSULTATION LIAISON ASSESSMENT NOTE - NSBHCONSULTFOLLOWAFTERCARE_PSY_A_CORE FT
Upon discharge , patient can be referred for outpatient psychotherapy services if he is amenable to this service later in his hospitalization

## 2025-05-23 NOTE — BH CONSULTATION LIAISON ASSESSMENT NOTE - SUMMARY
At this time, patient is not considered an imminent danger to himself or others and does not need inpatient psychiatric hospitalization.   Mr Reddy is a 59 year old man with no history of a psychiatric illness but recently diagnosed with Esophageal cancer who was admitted to the medical floor for the evaluation of weakness . According to the medical team, patient made suicidal statements 3 days ago and has since been informed that he has metastasis to the spine and has been referred for hospice care  .Psychiatry  consult was called for suicidal ideations.     It appears that patient made the suicidal statement in the context of expressing his frustration with n    At this time, patient is not considered an imminent danger to himself or others and does not need inpatient psychiatric hospitalization.   Mr Reddy is a 59 year old man with no history of a psychiatric illness but recently diagnosed with Esophageal cancer who was admitted to the medical floor for the evaluation of weakness . According to the medical team, patient made suicidal statements 3 days ago and has since been informed that he has metastasis to the spine and has been referred for hospice care  .Psychiatry  consult was called for suicidal ideations.   It appears that patient made the suicidal statement in the context of expressing his frustration with his perception of the noisy environment in the hospital especially at night and his distress about this severe medical problems. It does not appear that patient had any intent to end his life nor is it an indication of a psychiatric illness. Patient however reports having feelings of dysphoria given the diagnosis of cancer , the associated pain and the fact that he understands that he is going to die soon. He however denies acute symptoms suggestive of a major depressive disorder including current suicidal ideations, intent or plan. Patient appears to be experiencing normal bereavement of his life prior to the onset of his symptoms and seems to have accepted the prognosis of his medical illness and seems to be dealing with this stressors with mature defense mechanism such as humor. he would benefit from medications to help him target insomnia however he refuses and wants to be managed by the palliative care and pain management team.   At this time, patient is not considered an imminent danger to himself or others and does not need inpatient psychiatric hospitalization.

## 2025-05-23 NOTE — PROGRESS NOTE ADULT - CONVERSATION DETAILS
d/w wife new findings on the MRIs. Wife became very emotional. She is aware of options but wants aggressive treatment. Emotional support provided.
- Today we informed the patient of the results of the MR spine indicating diffuse metastases in C/T/L spine and sacrum/iliac bones.  - He was overwhelmed but able to hear and participate in conversation. He asked if he was dying from the cancer and we validated his concern and informed that we would not expect cure as he now as metastases to spine. We discussed that it is important that he complete further evaluation as directed by med-onc to determine what treatments maybe available to potentially extend quality time or help with lessening pain.  - We discussed that time is unfortunately expected to be shorter than we would hope for now that we have identified that the cancer has spread.  - Important to the patient would be able to improve mobility and be home with his wife. He would want to walk again if possible or spend time with his wife outdoors. He would like to try and eat things he likes like pizza. Additionally, he emphasized that he does not want to suffer and have uncontrolled pain. He is terrified with the thought that he may end up like his father who  from cancer in significant pain.  - We left the conversation with plan to help work on his pain regimen to improve his comfort and assist with helping him have more energy to try and accomplish the things he would like to do. Additionally, we supported that he work with med-onc team to complete further evaluation to determine what treatments maybe available.  - Last, he reports having really only his wife in his life and otherwise has been  from other family. He and Shannan accepted that  referral and he declined  consultation as he reports being an atheist.
Spoke with patient at bedside. He notes plan is for discharge home to hospice. We discussed GOC and comfort measures only at length. All questions answered. Patient wishes to transition to comfort measures only in anticipation to hospice.

## 2025-05-23 NOTE — HOSPICE CARE NOTE - CONVESATION DETAILS
Please send patient home with feedings as hospice requires needs to order and they may be delayed a week. Please educate spouse on management of peg prior to D/C. Recommended D/C Sunday afternoon. Hospice admission visit on Monday

## 2025-05-23 NOTE — PROGRESS NOTE ADULT - SUBJECTIVE AND OBJECTIVE BOX
HPI:  59M w/ Esophageal Cancer (dx Apr2025) s/p stent, CAD s/p [id number] (Jan2024), Afib not on AC, HLD is admitted for weakness, acute blood loss anemia/melena requiring pRBC transfusion. Patient completed endoscopy 5/14 for removal of esophageal stent (due to discomfort) and for PEG placement. Palliative consulted to assist w/ cancer-related pain management.    Interval History  -24hr Opioid requirement (8am-8am): TDF 50mcg/hr started 5/21 18:00, Hydromorphone 1mg IVx7  - patient reports pain was worse yesterday evening and required frequent dosing  - team increased hydromorphone to 2mg IV q3hr and patient feels dose is better. pain went from 8-10/10 to 4-5/10 with new dose  - no sob  - he is preparing for home with hospice, agrees w/ cmo  - reports experiencing some constipation    ADVANCE DIRECTIVES:    [] Full Code [ x] DNR/DNI/CMO  MOLST  [x ]  Living Will  [ ]   DECISION MAKER(s):  [ ] Health Care Proxy(s)  [ x] Surrogate(s)  [ ] Guardian           Name(s): Phone Number(s):  Wife: Shannan Reddy    BASELINE (I)ADL(s) (prior to admission):  [place]: [ ]Total  [ ] Moderate [x ]Dependent    Allergies  penicillins (Unknown)    MEDICATIONS  (STANDING):  chlorhexidine 2% Cloths 1 Application(s) Topical <User Schedule>  dexAMETHasone  Injectable 4 milliGRAM(s) IV Push daily  fentaNYL   Patch  50 MICROgram(s)/Hr 1 Patch Transdermal every 72 hours  HYDROmorphone  Injectable 2 milliGRAM(s) IV Push every 3 hours  melatonin 5 milliGRAM(s) Oral at bedtime  metoprolol tartrate 12.5 milliGRAM(s) Oral two times a day  pantoprazole  Injectable 40 milliGRAM(s) IV Push every 12 hours  polyethylene glycol 3350 17 Gram(s) Oral daily  senna 2 Tablet(s) Oral at bedtime    MEDICATIONS  (PRN):  acetaminophen     Tablet .. 650 milliGRAM(s) Oral every 6 hours PRN Temp greater or equal to 38C (100.4F), Mild Pain (1 - 3)  haloperidol     Tablet 2 milliGRAM(s) Oral every 6 hours PRN agitation    PRESENT SYMPTOMS: [ ]Unable to obtain due to poor mentation   Source if other than patient:  [ ]Family   [ ]Team   [ X]All other review of systems negative including pain and dyspnea unless noted below    All components of pain assessment below addressed. Blank spaces indicate that the patient did/could not complete the assessment.  Pain: [x ]yes [ ]no  QOL impact - limits ability to get out of bed  Location -     mid back               Aggravating factors - positioning in bed  Quality - sharp  Radiation - none reported  Timing- intermittent  Severity (0-10 scale): at times 8-10/10 and improved to 4-5/10 w/ new hydromorphone dosing  Minimal acceptable level (0-10 scale):     Dyspnea:                           [x ]None[ ]Mild [ ]Moderate [ ]Severe   Anxiety:                             [ ]None[ ]Mild [ ]Moderate [ ]Severe   Fatigue:                             [ ]None[ ]Mild [ ]Moderate [ ]Severe   Nausea:                             [ ]None[ ]Mild [ ]Moderate [ ]Severe   Loss of appetite:              [ ]None[ ]Mild [ ]Moderate [ ]Severe   Constipation:                    [ ]None[ ]Mild [ ]Moderate [ ]Severe    Other Symptoms:    PHYSICAL EXAM:  Vital Signs Last 24 Hrs  T(C): 36.8 (23 May 2025 04:55), Max: 36.8 (23 May 2025 04:55)  T(F): 98.2 (23 May 2025 04:55), Max: 98.2 (23 May 2025 04:55)  HR: 99 (23 May 2025 04:55) (85 - 99)  BP: 105/68 (23 May 2025 04:55) (105/68 - 109/68)  BP(mean): 80 (23 May 2025 04:55) (80 - 87)  RR: 18 (23 May 2025 04:55) (18 - 18)  SpO2: 96% (23 May 2025 04:55) (96% - 96%)    Parameters below as of 23 May 2025 04:55  Patient On (Oxygen Delivery Method): room air     I&O's Summary    22 May 2025 07:01  -  23 May 2025 07:00  --------------------------------------------------------  IN: 1000 mL / OUT: 0 mL / NET: 1000 mL    GENERAL:  [ x] No acute distress [ ]Lethargic  [ ]Unarousable  [ x]Verbal  [ ]Non-Verbal [ ]Cachexia    BEHAVIORAL/PSYCH:  [ ]Alert and Oriented x  [ ] Anxiety [ ] Delirium [ ] Agitation [ ] Calm   EYES: [x ] No scleral icterus [ ] Scleral icterus [ ] Closed  ENMT:  [ ]Dry mouth  [ ]No external oral lesions [ ] No external ear or nose lesions  CARDIOVASCULAR:  [ ]Regular [ ]Irregular [ ]Tachy [x ]Not Tachy  [ ]Rodney [ ] Edema [ ] No edema  PULMONARY:  [ ]Tachypnea  [ ]Audible excessive secretions [x ] No labored breathing [ ] labored breathing  GASTROINTESTINAL: [ ]Soft  [ ]Distended  [x ]Not distended [ ]Non tender [ ]Tender  MUSCULOSKELETAL: [ ]No clubbing [ ] clubbing  [ ] No cyanosis [ ] cyanosis  NEUROLOGIC: [ ]No focal deficits  [x ]Follows commands  [ ]Does not follow commands  [ ]Cognitive impairment  [ ]Dysphagia  [ ]Dysarthria  [ ]Paresis   SKIN: [ ] Jaundiced [ ] Non-jaundiced [ ]Rash [ ]No Rash [ ] Warm [ ] Dry  MISC/LINES: [ ] ET tube [ ] Trach [ ]NGT/OGT [ x]PEG [ ]Epps    LABS: reviewed by me    RADIOLOGY & ADDITIONAL STUDIES:     EKG:       PROTEIN CALORIE MALNUTRITION PRESENT: [ ]mild [ ]moderate [ ]severe [ ]underweight [ ]morbid obesity  https://www.andeal.org/vault/2440/web/files/ONC/Table_Clinical%20Characteristics%20to%20Document%20Malnutrition-White%20JV%20et%20al%202012.pdf    Height (cm): 180.3 (05-20-25 @ 09:53), 180 (05-01-25 @ 11:47), 180.3 (04-05-25 @ 13:20)  Weight (kg): 84.5 (05-14-25 @ 14:45), 89 (05-01-25 @ 11:47), 90.3 (04-03-25 @ 21:22)  BMI (kg/m2): 26 (05-20-25 @ 09:53), 26.1 (05-14-25 @ 14:45), 27.5 (05-01-25 @ 11:47)    [ ]PPSV2 < or = to 30% [ ]significant weight loss  [ ]poor nutritional intake  [ ]anasarca      [ ]Artificial Nutrition      REFERRALS:   [ ]Chaplaincy  [ ]Hospice  [ ]Child Life  [ ]Social Work  [ ]Case management [ ]Holistic Therapy     Patient discussed with primary medical team MD  Palliative care education provided to patient and/or family    Goals of Care Document:    HPI:  59M w/ Esophageal Cancer (dx Apr2025) s/p stent, CAD s/p [id number] (Jan2024), Afib not on AC, HLD is admitted for weakness, acute blood loss anemia/melena requiring pRBC transfusion. Patient completed endoscopy 5/14 for removal of esophageal stent (due to discomfort) and for PEG placement. Palliative consulted to assist w/ cancer-related pain management.    Interval History  -24hr Opioid requirement (8am-8am): TDF 50mcg/hr started 5/21 18:00, Hydromorphone 1mg IVx7  - patient reports pain was worse yesterday evening and required frequent dosing  - team increased hydromorphone to 2mg IV q3hr and patient feels dose is better. pain went from 8-10/10 to 4-5/10 with new dose  - no sob  - he is preparing for home with hospice, agrees w/ cmo  - reports experiencing some constipation    ADVANCE DIRECTIVES:    [] Full Code [ x] DNR/DNI/CMO  MOLST  [x ]  Living Will  [ ]   DECISION MAKER(s):  [ ] Health Care Proxy(s)  [ x] Surrogate(s)  [ ] Guardian           Name(s): Phone Number(s):  Wife: Shannan Reddy    BASELINE (I)ADL(s) (prior to admission):  [place]: [ ]Total  [ ] Moderate [x ]Dependent    Allergies  penicillins (Unknown)    MEDICATIONS  (STANDING):  chlorhexidine 2% Cloths 1 Application(s) Topical <User Schedule>  dexAMETHasone  Injectable 4 milliGRAM(s) IV Push daily  fentaNYL   Patch  50 MICROgram(s)/Hr 1 Patch Transdermal every 72 hours  HYDROmorphone  Injectable 2 milliGRAM(s) IV Push every 3 hours  melatonin 5 milliGRAM(s) Oral at bedtime  metoprolol tartrate 12.5 milliGRAM(s) Oral two times a day  pantoprazole  Injectable 40 milliGRAM(s) IV Push every 12 hours  polyethylene glycol 3350 17 Gram(s) Oral daily  senna 2 Tablet(s) Oral at bedtime    MEDICATIONS  (PRN):  acetaminophen     Tablet .. 650 milliGRAM(s) Oral every 6 hours PRN Temp greater or equal to 38C (100.4F), Mild Pain (1 - 3)  haloperidol     Tablet 2 milliGRAM(s) Oral every 6 hours PRN agitation    PRESENT SYMPTOMS: [ ]Unable to obtain due to poor mentation   Source if other than patient:  [ ]Family   [ ]Team   [ X]All other review of systems negative including pain and dyspnea unless noted below    All components of pain assessment below addressed. Blank spaces indicate that the patient did/could not complete the assessment.  Pain: [x ]yes [ ]no  QOL impact - limits ability to get out of bed  Location -     mid back               Aggravating factors - positioning in bed  Quality - sharp  Radiation - none reported  Timing- intermittent  Severity (0-10 scale): at times 8-10/10 and improved to 4-5/10 w/ new hydromorphone dosing  Minimal acceptable level (0-10 scale):     Dyspnea:                           [x ]None[ ]Mild [ ]Moderate [ ]Severe   Anxiety:                             [ ]None[ ]Mild [ ]Moderate [ ]Severe   Fatigue:                             [ ]None[ ]Mild [ ]Moderate [ ]Severe   Nausea:                             [ ]None[ ]Mild [ ]Moderate [ ]Severe   Loss of appetite:              [ ]None[ ]Mild [ ]Moderate [ ]Severe   Constipation:                    [ ]None[ ]Mild [ ]Moderate [ ]Severe    Other Symptoms:    PHYSICAL EXAM:  Vital Signs Last 24 Hrs  T(C): 36.8 (23 May 2025 04:55), Max: 36.8 (23 May 2025 04:55)  T(F): 98.2 (23 May 2025 04:55), Max: 98.2 (23 May 2025 04:55)  HR: 99 (23 May 2025 04:55) (85 - 99)  BP: 105/68 (23 May 2025 04:55) (105/68 - 109/68)  BP(mean): 80 (23 May 2025 04:55) (80 - 87)  RR: 18 (23 May 2025 04:55) (18 - 18)  SpO2: 96% (23 May 2025 04:55) (96% - 96%)    Parameters below as of 23 May 2025 04:55  Patient On (Oxygen Delivery Method): room air     I&O's Summary    22 May 2025 07:01  -  23 May 2025 07:00  --------------------------------------------------------  IN: 1000 mL / OUT: 0 mL / NET: 1000 mL    GENERAL:  [ x] No acute distress [ ]Lethargic  [ ]Unarousable  [ x]Verbal  [ ]Non-Verbal [ ]Cachexia    BEHAVIORAL/PSYCH:  [ ]Alert and Oriented x  [ ] Anxiety [ ] Delirium [ ] Agitation [ ] Calm   EYES: [x ] No scleral icterus [ ] Scleral icterus [ ] Closed  ENMT:  [ ]Dry mouth  [ ]No external oral lesions [ ] No external ear or nose lesions  CARDIOVASCULAR:  [ ]Regular [ ]Irregular [ ]Tachy [x ]Not Tachy  [ ]Rodney [ ] Edema [ ] No edema  PULMONARY:  [ ]Tachypnea  [ ]Audible excessive secretions [x ] No labored breathing [ ] labored breathing  GASTROINTESTINAL: [ ]Soft  [ ]Distended  [x ]Not distended [ ]Non tender [ ]Tender  MUSCULOSKELETAL: [ ]No clubbing [ ] clubbing  [ ] No cyanosis [ ] cyanosis  NEUROLOGIC: [ ]No focal deficits  [x ]Follows commands  [ ]Does not follow commands  [ ]Cognitive impairment  [ ]Dysphagia  [ ]Dysarthria  [ ]Paresis   SKIN: [ ] Jaundiced [ ] Non-jaundiced [ ]Rash [ ]No Rash [ ] Warm [ ] Dry  MISC/LINES: [ ] ET tube [ ] Trach [ ]NGT/OGT [ x]PEG [ ]Epps    LABS: reviewed by me                                      CAPILLARY BLOOD GLUCOSE                  RADIOLOGY & ADDITIONAL STUDIES:     EKG:       PROTEIN CALORIE MALNUTRITION PRESENT: [ ]mild [ ]moderate [ ]severe [ ]underweight [ ]morbid obesity  https://www.andeal.org/vault/1930/web/files/ONC/Table_Clinical%20Characteristics%20to%20Document%20Malnutrition-White%20JV%20et%20al%202012.pdf    Height (cm): 180.3 (05-20-25 @ 09:53), 180 (05-01-25 @ 11:47), 180.3 (04-05-25 @ 13:20)  Weight (kg): 84.5 (05-14-25 @ 14:45), 89 (05-01-25 @ 11:47), 90.3 (04-03-25 @ 21:22)  BMI (kg/m2): 26 (05-20-25 @ 09:53), 26.1 (05-14-25 @ 14:45), 27.5 (05-01-25 @ 11:47)    [ ]PPSV2 < or = to 30% [ ]significant weight loss  [ ]poor nutritional intake  [ ]anasarca      [ ]Artificial Nutrition      REFERRALS:   [ ]Chaplaincy  [ ]Hospice  [ ]Child Life  [x ]Social Work  [ ]Case management [ ]Holistic Therapy     Patient discussed with primary medical team MD  Palliative care education provided to patient and/or family    Goals of Care Document:

## 2025-05-23 NOTE — HOSPICE CARE NOTE - CONVESATION DETAILS
Intake has been informed that patient has recent peg tube placed with Two Jeff  ml a day, 100 ml bolus 3 x a day. Discussed with Dr. Muniz. Requested CM have education provided to spouse at beside prior to D/C on how to mange peg tube feeds and requested ample supply of feeds be sent home on D/C as hospice needs to orders feeds and it could take up to 1 week to be delivered.

## 2025-05-23 NOTE — CHART NOTE - NSCHARTNOTESELECT_GEN_ALL_CORE
NST Consult and RD f/u/Nutrition Services
Neurology/Event Note
Palliative Care - Social Work/Event Note
hem onc
Event Note
Follow Up/Nutrition Services
Palliative Care - Social Work/Event Note
Palliative Care- Social Work/Event Note

## 2025-05-23 NOTE — BH CONSULTATION LIAISON ASSESSMENT NOTE - NSBHCHARTREVIEWVS_PSY_A_CORE FT
Vital Signs Last 24 Hrs  T(C): 36.8 (23 May 2025 04:55), Max: 36.8 (23 May 2025 04:55)  T(F): 98.2 (23 May 2025 04:55), Max: 98.2 (23 May 2025 04:55)  HR: 99 (23 May 2025 04:55) (85 - 99)  BP: 105/68 (23 May 2025 04:55) (105/68 - 109/68)  BP(mean): 80 (23 May 2025 04:55) (80 - 87)  RR: 18 (23 May 2025 04:55) (18 - 18)  SpO2: 96% (23 May 2025 04:55) (96% - 96%)    Parameters below as of 23 May 2025 04:55  Patient On (Oxygen Delivery Method): room air

## 2025-05-23 NOTE — PROGRESS NOTE ADULT - SUBJECTIVE AND OBJECTIVE BOX
Patient is a 59y old  Male who presents with a chief complaint of Progressive weakness (13 May 2025 17:51)    INTERVAL HPI/OVERNIGHT EVENTS: Patient was examined and seen at bedside. This afternoon pt is resting in bed and reports pain is better after increasing Dilaudid to 2mg Q3.  Wife at bedside.   ROS: Denies CP, SOB, new weakness  All other systems reviewed and are within normal limits.  InitialHPI:  59y male with PMHx of HTN, hiatal hernia, HLD, CAD s/p CABG in Jan 2024, paroxysmal A. fib not on AC, former smoker (30 pack years, quit 1yr ago), recently diagnosed with colitis and esophageal adenocarcinoma s/p stent placement who presents for weakness. The patient was recently discharged from [facility] on 5/6 , and has been progressively deteriorating. States he has been unable to tolerate p.o. and has been unable to walk over the past few days. Wife at bedside reports that he has not been able to follow-up with oncology due to the weakness.  On ROS the patient also states he has been having black tarry stools that started 1 day prior to his EGD. He had 3 black BMs today. He denies any mucosal bleeding or blood in the urine.  He denies any fevers, chills, chest pain, shortness of breath, palpitations, headache, vision changes, nausea, vomiting, abdominal pain, hematuria, falls.  He is supposed to have PET scan tomorrow and follow with Dr. Sanchez in 2 days.    T(F): 98.5 (05-12-25 @ 21:05), Max: 98.6 (05-12-25 @ 19:48)  HR: 97 (05-12-25 @ 21:05) (88 - 104)  BP: 111/58 (05-12-25 @ 21:05) (111/58 - 132/75)  RR: 18 (05-12-25 @ 21:05) (18 - 20)  SpO2: 99% (05-12-25 @ 21:05) (98% - 100%) on RA    Labs: Hg 7.6 (baseline ~10), MCV 78, slight schistocytes on smear,  Plt 92, PT/INR >40/6.2, PTT 51.8, ALK phos 273.      Imaging:    CT Chest w/ IV Cont  (4/26 - prior admission)  IMPRESSION:  1.  No definite evidence of thoracic metastatic disease.  2.  Small bilateral pleural effusions.  3.  Marked circumferential thickening of the distal esophagus/hiatal   hernia could be related to provided history of suspected esophageal   cancer versus esophagitis. (12 May 2025 21:41)    PAST MEDICAL & SURGICAL HISTORY:  HTN (hypertension)      Hiatal hernia      HLD (hyperlipidemia)      CAD (coronary artery disease)      Paroxysmal atrial fibrillation      History of colitis      S/P CABG (coronary artery bypass graft)      General: NAD, AA0x3, ?Lt facial droop, appears very debilitated and weak  HEENT:  EOMI, no LAD  CV: S1 S2  Resp: decreased breath sounds at bases  GI: epig, lower sternal tenderness/ND/S +BS; +PEG  MS: no clubbing/cyanosis/edema, + pulses b/l  Neuro: LE 2/5 distally, 4/5 prox. UE 5-/5          Home Medications:  clopidogrel 75 mg oral tablet: 1 tab(s) orally once a day (13 May 2025 01:36)  Lipitor 80 mg oral tablet: 1 tab(s) orally once a day (13 May 2025 01:36)  Metoprolol Tartrate 25 mg oral tablet: 0.5 tab(s) orally 2 times a day (13 May 2025 01:36)  Multiple Vitamins oral tablet, chewable: 1 tab(s) orally once a day (13 May 2025 01:36)  Pepcid 20 mg oral tablet: 1 tab(s) orally once a day (13 May 2025 01:36)    MEDICATIONS  (STANDING):  chlorhexidine 2% Cloths 1 Application(s) Topical <User Schedule>  dexAMETHasone  Injectable 4 milliGRAM(s) IV Push daily  fentaNYL   Patch  50 MICROgram(s)/Hr 1 Patch Transdermal every 72 hours  melatonin 5 milliGRAM(s) Oral at bedtime  metoprolol tartrate 12.5 milliGRAM(s) Oral two times a day  pantoprazole  Injectable 40 milliGRAM(s) IV Push every 12 hours  polyethylene glycol 3350 17 Gram(s) Oral daily  senna 2 Tablet(s) Oral two times a day    MEDICATIONS  (PRN):  acetaminophen     Tablet .. 650 milliGRAM(s) Oral every 6 hours PRN Temp greater or equal to 38C (100.4F), Mild Pain (1 - 3)  haloperidol     Tablet 2 milliGRAM(s) Oral every 6 hours PRN agitation  HYDROmorphone  Injectable 2 milliGRAM(s) IV Push every 3 hours PRN moderate/severe pain (4-10)    Vital Signs Last 24 Hrs  T(C): 36.6 (23 May 2025 14:01), Max: 36.8 (23 May 2025 04:55)  T(F): 97.8 (23 May 2025 14:01), Max: 98.2 (23 May 2025 04:55)  HR: 90 (23 May 2025 14:01) (85 - 99)  BP: 107/64 (23 May 2025 14:01) (105/68 - 109/68)  BP(mean): 78 (23 May 2025 14:01) (78 - 87)  RR: 18 (23 May 2025 14:01) (18 - 18)  SpO2: 97% (23 May 2025 14:01) (96% - 97%)    Parameters below as of 23 May 2025 14:01  Patient On (Oxygen Delivery Method): room air      CAPILLARY BLOOD GLUCOSE        LABS:                            Consultant Notes Reviewed:  [x ] YES  [ ] NO  Care Discussed with Consultants/Other Providers/ Housestaff [ x] YES  [ ] NO  Radiology, labs, EKGs, new studies personally reviewed.

## 2025-05-23 NOTE — PROGRESS NOTE ADULT - ASSESSMENT
59y male with PMHx of HTN, hiatal hernia, HLD, CAD s/p CABG in Jan 2024, paroxysmal A. fib not on AC, former smoker (30 pack years, quit 1yr ago), recently diagnosed with colitis and esophageal adenocarcinoma s/p stent placement who presents for weakness. The patient was recently discharged from [facility] on 5/6 , and has been progressively deteriorating. Found to have hgb 7.6 lower than last admission reported melena, thrombocytopenia w/ elevated INR/PTT.     #Reported Melena likely due to Upper GI Bleed likely Secondary to  Malignancy   #Thrombocytopenia, elevated INR/PTT  #Recently diagnosed Esophageal adenocarcinoma   #Acute blood loss anemia  - EGD 4/25: A friable circumferential mass seen from the GE junction at 38cm to 28 cm of mid esophagus. Multiple biopsies were obtained. Oozing of blood was noted from the whole mass.  - c/w pantoprazole 40mg IV BID  - s/p EGD w/ peg and stent removal   - . Patient supposed to have PET scan OP, Surg Onc Dr Yost   - keep Hgb>7.5  -keep active T&S  -5/17 transfuse another unit PRBCs (did not get blood yesterday due to fevers)  -5/18 H/h still low, will transfuse another unit  - 5/21 H/H stable - Hb 8.0 in AM  - Bone marrow biopsy yesterday  - BM biopsy  consistent with marrow involvement  - Given bone marrow involement, pt is stage IV  - Per heme/onc it would be difficult to tolerate chemotherapy due to his cytopenias, poor performance status, poor PO intake and chemotherapy might make his symptoms worse  - Per heme/onc patient is hospice appropriate  - dc daily labs  - Hospice referral    #Weakness   #Diffuse metastatic dz of C/T/L spine on MRI  #Acute on chronic lower back pain   - c/w Dilaudid 1mg Q4, Oxycodone 5mg Q4 PRN  - Med Onc recommends Bone Scan and BM Bx by IR  -Neuro recommends LP  -will arrange for both BM Bx and LP to be done by IR   -Bone scan - No focal increased uptake of bone tracer was identified to suggest   metastatic bone disease, particularly within the thoracic spine,   correlating with the abnormal MRI findings.  -LP done - f/u cell ct, glc, prot, cytology, flow cytometry, paraneoplastic panel sent  -CSF with elevated protein, borderline low glucose, 1 cell. Concern for possible paraneoplastic syndrome vs myopathy; also evidence of inflammatory process in CNS - given recent cancer diagnosis and MRI findings, concern for dural metastatic disease  - Pain uncontrolled, increase dilaudid to 2mg q3h PRN, c/w fentanyl 50mcg patch q72h  - Palliative following for pain control      #Acute agitation  #Hx of depression  - Overnight pt with emotional breakdown, thinking about hurting himself but says would not act on his thoughts and has no plan of hurting himself  - Reports hx of depression   - Could be adjustment disorder given recent cancer diagnosis vs hospital acquired delirium   - Currently wife present at bedside, after wife leaves put him on constant observation. RN notified  - dc olanzapine per psych, start haldol 2mg q6h PRN for agitation  - f/u Psych consult    #Decreased PO intake 2/2 esophageal adenocarcinoma  - s/p PEG    #Fever  pancultured  CXR w/ ?LLL opacity ?aspiration  c/w Cefepime   aspiration precautions  CT A/p - There is thickening of the subcutaneous fat and musculature at the entry   of the G-tube. G-tube within the stomach. No drainable collection. There is a hiatal hernia with significant thickening of the wall of the distal esophagus and herniated stomach. Active inflammatory process may be present.  3.9 cm right common iliac artery aneurysm.      #?L sided fascial droop  #Diffuse dural thickening  - Pt and spouse do not know if chronic.   - no focal defecits except b/l distal weakness   < from: MR Head w/wo IV Cont (05.14.25 @ 09:48) >  1.  No acute infarct or intracranial hemorrhage.    2.  Mild smooth diffuse dural thickening over the cerebral convexities.   This finding is nonspecific and can reflect intracranial hypotension or   recent spinal procedures. In the setting of known neoplasm cannot exclude   dural metastatic disease.    3.  Heterogeneous marrow signal of the calvarium, nonspecific.    < end of copied text >    #3.8 cm wide R iliac artery aneurysm- stable  - vascular o/p- surgery was scheduled with Dr. Tavera     #HTN  #CAD s/p CABG in Jan 2024  #Paroxysmal A.fib  - c/w home meds  - Hold plavix per cardio    #DVT PPX: SCD. Added Heparin SQ  #GI PPX: PPI BID  #Diet: PO, plus supplement w/ TF  #Code Status: Full  #Activity Order: IAT 59y male with PMHx of HTN, hiatal hernia, HLD, CAD s/p CABG in Jan 2024, paroxysmal A. fib not on AC, former smoker (30 pack years, quit 1yr ago), recently diagnosed with colitis and esophageal adenocarcinoma s/p stent placement who presents for weakness. The patient was recently discharged from [facility] on 5/6 , and has been progressively deteriorating. Found to have hgb 7.6 lower than last admission reported melena, thrombocytopenia w/ elevated INR/PTT.     #Reported Melena likely due to Upper GI Bleed likely Secondary to  Malignancy   #Thrombocytopenia, elevated INR/PTT  #Recently diagnosed Esophageal adenocarcinoma   #Acute blood loss anemia  - EGD 4/25: A friable circumferential mass seen from the GE junction at 38cm to 28 cm of mid esophagus. Multiple biopsies were obtained. Oozing of blood was noted from the whole mass.  - c/w pantoprazole 40mg IV BID  - s/p EGD w/ peg and stent removal   - Patient supposed to have PET scan OP, Surg Onc Dr Yost   - keep Hgb>7.5  -keep active T&S  -5/17 transfuse another unit PRBCs (did not get blood yesterday due to fevers)  -5/18 H/h still low, will transfuse another unit  - 5/21 H/H stable - Hb 8.0 in AM  - Bone marrow biopsy yesterday  - BM biopsy  consistent with marrow involvement  - Given bone marrow involvement, pt is stage IV  - Per heme/onc it would be difficult to tolerate chemotherapy due to his cytopenias, poor performance status, poor PO intake and chemotherapy might make his symptoms worse  - Per heme/onc patient is hospice appropriate  - dc daily labs  - Hospice referral    #Weakness   #Diffuse metastatic dz of C/T/L spine on MRI  #Acute on chronic lower back pain   - MRI cervical/thoracic/lumbar spine - 2.  Diffusely heterogeneous signal and enhancement of the cervical/thoracic/lumbar   vertebral bodies, sacrum and ilial bones suspicious for metastatic disease. Background of diffusely T1 hypointense infiltrative marrow signal.  - MR head - Mild smooth diffuse dural thickening over the cerebral convexities.   - Med Onc recommends Bone Scan and BM Bx by IR  - Neuro recommends LP  -Bone scan - No focal increased uptake of bone tracer was identified to suggest   metastatic bone disease, particularly within the thoracic spine,   correlating with the abnormal MRI findings.  -LP done - f/u cell ct, glc, prot, cytology, flow cytometry, paraneoplastic panel sent  -CSF with elevated protein, borderline low glucose, 1 cell. Concern for possible paraneoplastic syndrome vs myopathy; also evidence of inflammatory process in CNS - given recent cancer diagnosis and MRI findings, concern for dural metastatic disease  - Pain uncontrolled, increase dilaudid to 2mg q3h PRN, c/w fentanyl 50mcg patch q72h  - Palliative following for pain control    #Acute agitation  #Hx of depression  - Overnight pt with emotional breakdown, thinking about hurting himself but says would not act on his thoughts and has no plan of hurting himself  - Reports hx of depression   - Could be adjustment disorder given recent cancer diagnosis vs hospital acquired delirium   - Currently wife present at bedside, after wife leaves put him on constant observation. RN notified  - dc olanzapine per psych, start haldol 2mg q6h PRN for agitation  - f/u Psych consult    #Decreased PO intake 2/2 esophageal adenocarcinoma  - s/p PEG    #Fever  pancultured  -CXR w/ ?LLL opacity ?aspiration  -Completed 7 day course, dc cefepime, contributing to delirium  aspiration precautions  -CT A/p - There is thickening of the subcutaneous fat and musculature at the entry   of the G-tube. G-tube within the stomach. No drainable collection. There is a hiatal hernia with significant thickening of the wall of the distal esophagus and herniated stomach. Active inflammatory process may be present.  -3.9 cm right common iliac artery aneurysm.      #?L sided fascial droop  #Diffuse dural thickening  - Pt and spouse do not know if chronic.   - no focal defecits except b/l distal weakness   < from: MR Head w/wo IV Cont (05.14.25 @ 09:48) >  1.  No acute infarct or intracranial hemorrhage.    2.  Mild smooth diffuse dural thickening over the cerebral convexities.   This finding is nonspecific and can reflect intracranial hypotension or   recent spinal procedures. In the setting of known neoplasm cannot exclude   dural metastatic disease.    3.  Heterogeneous marrow signal of the calvarium, nonspecific.    < end of copied text >    #3.8 cm wide R iliac artery aneurysm- stable  - vascular o/p- surgery was scheduled with Dr. Tavera     #HTN  #CAD s/p CABG in Jan 2024  #Paroxysmal A.fib  - c/w home meds  - Hold plavix per cardio    #DVT PPX: SCD. Added Heparin SQ  #GI PPX: PPI BID  #Diet: PO, plus supplement w/ TF  #Code Status: Full  #Activity Order: IAT
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
## 2025-05-24 PROCEDURE — 99232 SBSQ HOSP IP/OBS MODERATE 35: CPT

## 2025-05-24 RX ORDER — MELATONIN 5 MG
1 TABLET ORAL
Qty: 0 | Refills: 0 | DISCHARGE
Start: 2025-05-24

## 2025-05-24 RX ORDER — SENNA 187 MG
2 TABLET ORAL
Qty: 0 | Refills: 0 | DISCHARGE
Start: 2025-05-24

## 2025-05-24 RX ORDER — POLYETHYLENE GLYCOL 3350 17 G/17G
17 POWDER, FOR SOLUTION ORAL
Qty: 0 | Refills: 0 | DISCHARGE
Start: 2025-05-24

## 2025-05-24 RX ORDER — SENNA 187 MG
2 TABLET ORAL ONCE
Refills: 0 | Status: COMPLETED | OUTPATIENT
Start: 2025-05-24 | End: 2025-05-24

## 2025-05-24 RX ORDER — HALOPERIDOL 10 MG/1
1 TABLET ORAL
Qty: 0 | Refills: 0 | DISCHARGE
Start: 2025-05-24

## 2025-05-24 RX ORDER — POLYETHYLENE GLYCOL 3350 17 G/17G
17 POWDER, FOR SOLUTION ORAL ONCE
Refills: 0 | Status: COMPLETED | OUTPATIENT
Start: 2025-05-24 | End: 2025-05-24

## 2025-05-24 RX ADMIN — Medication 2 MILLIGRAM(S): at 21:26

## 2025-05-24 RX ADMIN — Medication 2 MILLIGRAM(S): at 00:06

## 2025-05-24 RX ADMIN — DEXAMETHASONE 4 MILLIGRAM(S): 0.5 TABLET ORAL at 05:23

## 2025-05-24 RX ADMIN — Medication 2 MILLIGRAM(S): at 11:32

## 2025-05-24 RX ADMIN — Medication 2 MILLIGRAM(S): at 18:14

## 2025-05-24 RX ADMIN — Medication 2 MILLIGRAM(S): at 14:32

## 2025-05-24 RX ADMIN — Medication 1 APPLICATION(S): at 05:22

## 2025-05-24 RX ADMIN — METOPROLOL SUCCINATE 12.5 MILLIGRAM(S): 50 TABLET, EXTENDED RELEASE ORAL at 05:23

## 2025-05-24 RX ADMIN — POLYETHYLENE GLYCOL 3350 17 GRAM(S): 17 POWDER, FOR SOLUTION ORAL at 11:35

## 2025-05-24 RX ADMIN — Medication 2 TABLET(S): at 18:14

## 2025-05-24 RX ADMIN — Medication 2 MILLIGRAM(S): at 07:38

## 2025-05-24 RX ADMIN — Medication 40 MILLIGRAM(S): at 05:23

## 2025-05-24 RX ADMIN — METOPROLOL SUCCINATE 12.5 MILLIGRAM(S): 50 TABLET, EXTENDED RELEASE ORAL at 18:14

## 2025-05-24 RX ADMIN — Medication 650 MILLIGRAM(S): at 11:35

## 2025-05-24 RX ADMIN — Medication 2 MILLIGRAM(S): at 03:23

## 2025-05-24 RX ADMIN — Medication 40 MILLIGRAM(S): at 18:14

## 2025-05-24 RX ADMIN — Medication 2 MILLIGRAM(S): at 21:56

## 2025-05-24 RX ADMIN — Medication 2 TABLET(S): at 11:36

## 2025-05-24 NOTE — PROGRESS NOTE ADULT - NUTRITIONAL ASSESSMENT
This patient has been assessed with a concern for Malnutrition and has been determined to have a diagnosis/diagnoses of Severe protein-calorie malnutrition.    This patient is being managed with:   Diet NPO after Midnight-     NPO Start Date: 19-May-2025   NPO Start Time: 23:59  Entered: May 19 2025  1:24PM    Diet Pureed-  DASH/TLC {Sodium & Cholesterol Restricted} (DASH)  Tube Feeding Modality: Gastrostomy  TwoCal HN (TWOCALHNRTH)  Total Volume for 24 Hours (mL): 300  Bolus  Total Volume of Bolus (mL):  100  Total # of Feeds: 1  Tube Feed Frequency: Every 8 hours   Tube Feed Start Time: 12:00  Bolus Feed Rate (mL per Hour): 100   Bolus Feed Duration (in Hours): 1  Bolus Feed Instructions:  increase rate by 20 ml/hr every 6 hours until goal rate of 300ml q6 hours reached.Oral feeds as tolerated supplement with PEG feeds as needed  Free Water Flush  Bolus   Total Volume per Flush (mL): 50   Frequency: Every 6 Hours   Total Daily Volume of Flush (mL): 150  Free Water Flush Instructions:  25 mL pre and post feeds  Entered: May 17 2025 12:13PM    Diet NPO after Midnight-     NPO Start Date: 12-May-2025   NPO Start Time: 23:59  Except Medications  Entered: May 12 2025 11:09PM  
This patient has been assessed with a concern for Malnutrition and has been determined to have a diagnosis/diagnoses of Severe protein-calorie malnutrition.    This patient is being managed with:   Diet Pureed-  DASH/TLC {Sodium & Cholesterol Restricted} (DASH)  Tube Feeding Modality: Gastrostomy  TwoCal HN (TWOCALHNRTH)  Total Volume for 24 Hours (mL): 300  Bolus  Total Volume of Bolus (mL):  100  Total # of Feeds: 1  Tube Feed Frequency: Every 8 hours   Tube Feed Start Time: 12:00  Bolus Feed Rate (mL per Hour): 100   Bolus Feed Duration (in Hours): 1  Bolus Feed Instructions:  increase rate by 20 ml/hr every 6 hours until goal rate of 300ml q6 hours reached.Oral feeds as tolerated supplement with PEG feeds as needed  Free Water Flush  Bolus   Total Volume per Flush (mL): 50   Frequency: Every 6 Hours   Total Daily Volume of Flush (mL): 150  Free Water Flush Instructions:  25 mL pre and post feeds  Entered: May 17 2025 12:13PM    Diet NPO after Midnight-     NPO Start Date: 12-May-2025   NPO Start Time: 23:59  Except Medications  Entered: May 12 2025 11:09PM  
This patient has been assessed with a concern for Malnutrition and has been determined to have a diagnosis/diagnoses of Severe protein-calorie malnutrition.    This patient is being managed with:   Diet Pureed-  DASH/TLC {Sodium & Cholesterol Restricted} (DASH)  Tube Feeding Modality: Gastrostomy  TwoCal HN (TWOCALHNRTH)  Total Volume for 24 Hours (mL): 400  Bolus  Total Volume of Bolus (mL):  100  Total # of Feeds: 1  Tube Feed Frequency: Every 6 hours   Tube Feed Start Time: 12:00  Bolus Feed Rate (mL per Hour): 100   Bolus Feed Duration (in Hours): 1  Bolus Feed Instructions:  increase rate by 20 ml/hr every 6 hours until goal rate of 300ml q6 hours reached.Oral feeds as tolerated supplement with PEG feeds as needed  Free Water Flush  Bolus   Total Volume per Flush (mL): 50   Frequency: Every 6 Hours   Total Daily Volume of Flush (mL): 150  Free Water Flush Instructions:  25 mL pre and post feeds  Entered: May 15 2025  3:13PM    Diet NPO after Midnight-     NPO Start Date: 12-May-2025   NPO Start Time: 23:59  Except Medications  Entered: May 12 2025 11:09PM  
This patient has been assessed with a concern for Malnutrition and has been determined to have a diagnosis/diagnoses of Severe protein-calorie malnutrition.    This patient is being managed with:   Diet NPO after Midnight-     NPO Start Date: 18-May-2025   NPO Start Time: 23:59  Entered: May 18 2025 12:47PM    Diet Pureed-  DASH/TLC {Sodium & Cholesterol Restricted} (DASH)  Tube Feeding Modality: Gastrostomy  TwoCal HN (TWOCALHNRTH)  Total Volume for 24 Hours (mL): 300  Bolus  Total Volume of Bolus (mL):  100  Total # of Feeds: 1  Tube Feed Frequency: Every 8 hours   Tube Feed Start Time: 12:00  Bolus Feed Rate (mL per Hour): 100   Bolus Feed Duration (in Hours): 1  Bolus Feed Instructions:  increase rate by 20 ml/hr every 6 hours until goal rate of 300ml q6 hours reached.Oral feeds as tolerated supplement with PEG feeds as needed  Free Water Flush  Bolus   Total Volume per Flush (mL): 50   Frequency: Every 6 Hours   Total Daily Volume of Flush (mL): 150  Free Water Flush Instructions:  25 mL pre and post feeds  Entered: May 17 2025 12:13PM    Diet NPO after Midnight-     NPO Start Date: 12-May-2025   NPO Start Time: 23:59  Except Medications  Entered: May 12 2025 11:09PM  
This patient has been assessed with a concern for Malnutrition and has been determined to have a diagnosis/diagnoses of Severe protein-calorie malnutrition.    This patient is being managed with:   Diet Pureed-  DASH/TLC {Sodium & Cholesterol Restricted} (DASH)  Tube Feeding Modality: Gastrostomy  TwoCal HN (TWOCALHNRTH)  Total Volume for 24 Hours (mL): 300  Bolus  Total Volume of Bolus (mL):  100  Total # of Feeds: 1  Tube Feed Frequency: Every 8 hours   Tube Feed Start Time: 12:00  Bolus Feed Rate (mL per Hour): 100   Bolus Feed Duration (in Hours): 1  Bolus Feed Instructions:  increase rate by 20 ml/hr every 6 hours until goal rate of 300ml q6 hours reached.Oral feeds as tolerated supplement with PEG feeds as needed  Free Water Flush  Bolus   Total Volume per Flush (mL): 50   Frequency: Every 6 Hours   Total Daily Volume of Flush (mL): 150  Free Water Flush Instructions:  25 mL pre and post feeds  Entered: May 17 2025 12:13PM    Diet NPO after Midnight-     NPO Start Date: 12-May-2025   NPO Start Time: 23:59  Except Medications  Entered: May 12 2025 11:09PM  
This patient has been assessed with a concern for Malnutrition and has been determined to have a diagnosis/diagnoses of Severe protein-calorie malnutrition.    This patient is being managed with:   Diet Pureed-  DASH/TLC {Sodium & Cholesterol Restricted} (DASH)  Tube Feeding Modality: Gastrostomy  TwoCal HN (TWOCALHNRTH)  Total Volume for 24 Hours (mL): 400  Bolus  Total Volume of Bolus (mL):  100  Total # of Feeds: 1  Tube Feed Frequency: Every 6 hours   Tube Feed Start Time: 12:00  Bolus Feed Rate (mL per Hour): 100   Bolus Feed Duration (in Hours): 1  Bolus Feed Instructions:  increase rate by 20 ml/hr every 6 hours until goal rate of 300ml q6 hours reached.Oral feeds as tolerated supplement with PEG feeds as needed  Free Water Flush  Bolus   Total Volume per Flush (mL): 50   Frequency: Every 6 Hours   Total Daily Volume of Flush (mL): 150  Free Water Flush Instructions:  25 mL pre and post feeds  Entered: May 15 2025  3:13PM    Diet NPO after Midnight-     NPO Start Date: 12-May-2025   NPO Start Time: 23:59  Except Medications  Entered: May 12 2025 11:09PM  
This patient has been assessed with a concern for Malnutrition and has been determined to have a diagnosis/diagnoses of Severe protein-calorie malnutrition.    This patient is being managed with:   Diet NPO after Midnight-     NPO Start Date: 19-May-2025   NPO Start Time: 23:59  Entered: May 19 2025  1:24PM    Diet Pureed-  DASH/TLC {Sodium & Cholesterol Restricted} (DASH)  Tube Feeding Modality: Gastrostomy  TwoCal HN (TWOCALHNRTH)  Total Volume for 24 Hours (mL): 300  Bolus  Total Volume of Bolus (mL):  100  Total # of Feeds: 1  Tube Feed Frequency: Every 8 hours   Tube Feed Start Time: 12:00  Bolus Feed Rate (mL per Hour): 100   Bolus Feed Duration (in Hours): 1  Bolus Feed Instructions:  increase rate by 20 ml/hr every 6 hours until goal rate of 300ml q6 hours reached.Oral feeds as tolerated supplement with PEG feeds as needed  Free Water Flush  Bolus   Total Volume per Flush (mL): 50   Frequency: Every 6 Hours   Total Daily Volume of Flush (mL): 150  Free Water Flush Instructions:  25 mL pre and post feeds  Entered: May 17 2025 12:13PM    Diet NPO after Midnight-     NPO Start Date: 12-May-2025   NPO Start Time: 23:59  Except Medications  Entered: May 12 2025 11:09PM  
This patient has been assessed with a concern for Malnutrition and has been determined to have a diagnosis/diagnoses of Severe protein-calorie malnutrition.    This patient is being managed with:   Diet NPO-  Except Medications  Tube Feeding Modality: Gastrostomy  Jevity 1.2 Jeff (JEVITY1.2RTH)  Total Volume for 24 Hours (mL): 1350  Bolus  Total # of Feeds: 3  Tube Feed Frequency: Every 8 hours   Tube Feed Start Time: 18:00  Bolus Feed Rate (mL per Hour): 75   Bolus Feed Duration (in Hours): 4  Free Water Flush  Bolus   Total Volume per Flush (mL): 250   Total Volume of Bolus (mL):  450   Frequency: Every 4 Hours  Entered: May 14 2025  5:31PM    Diet NPO after Midnight-     NPO Start Date: 12-May-2025   NPO Start Time: 23:59  Except Medications  Entered: May 12 2025 11:09PM  
This patient has been assessed with a concern for Malnutrition and has been determined to have a diagnosis/diagnoses of Severe protein-calorie malnutrition.    This patient is being managed with:   Diet Pureed-  DASH/TLC {Sodium & Cholesterol Restricted} (DASH)  Tube Feeding Modality: Gastrostomy  TwoCal HN (TWOCALHNRTH)  Total Volume for 24 Hours (mL): 300  Bolus  Total Volume of Bolus (mL):  100  Total # of Feeds: 1  Tube Feed Frequency: Every 8 hours   Tube Feed Start Time: 12:00  Bolus Feed Rate (mL per Hour): 100   Bolus Feed Duration (in Hours): 1  Bolus Feed Instructions:  increase rate by 20 ml/hr every 6 hours until goal rate of 300ml q6 hours reached.Oral feeds as tolerated supplement with PEG feeds as needed  Free Water Flush  Bolus   Total Volume per Flush (mL): 50   Frequency: Every 6 Hours   Total Daily Volume of Flush (mL): 150  Free Water Flush Instructions:  25 mL pre and post feeds  Entered: May 17 2025 12:13PM    Diet NPO after Midnight-     NPO Start Date: 12-May-2025   NPO Start Time: 23:59  Except Medications  Entered: May 12 2025 11:09PM  
This patient has been assessed with a concern for Malnutrition and has been determined to have a diagnosis/diagnoses of Severe protein-calorie malnutrition.    This patient is being managed with:   Diet Pureed-  DASH/TLC {Sodium & Cholesterol Restricted} (DASH)  Tube Feeding Modality: Gastrostomy  TwoCal HN (TWOCALHNRTH)  Total Volume for 24 Hours (mL): 400  Bolus  Total Volume of Bolus (mL):  100  Total # of Feeds: 1  Tube Feed Frequency: Every 6 hours   Tube Feed Start Time: 12:00  Bolus Feed Rate (mL per Hour): 100   Bolus Feed Duration (in Hours): 1  Bolus Feed Instructions:  increase rate by 20 ml/hr every 6 hours until goal rate of 300ml q6 hours reached.Oral feeds as tolerated supplement with PEG feeds as needed  Free Water Flush  Bolus   Total Volume per Flush (mL): 50   Frequency: Every 6 Hours   Total Daily Volume of Flush (mL): 150  Free Water Flush Instructions:  25 mL pre and post feeds  Entered: May 15 2025  3:13PM    Diet NPO after Midnight-     NPO Start Date: 12-May-2025   NPO Start Time: 23:59  Except Medications  Entered: May 12 2025 11:09PM  
This patient has been assessed with a concern for Malnutrition and has been determined to have a diagnosis/diagnoses of Severe protein-calorie malnutrition.    This patient is being managed with:   Diet Pureed-  DASH/TLC {Sodium & Cholesterol Restricted} (DASH)  Tube Feeding Modality: Gastrostomy  TwoCal HN (TWOCALHNRTH)  Total Volume for 24 Hours (mL): 400  Bolus  Total Volume of Bolus (mL):  100  Total # of Feeds: 1  Tube Feed Frequency: Every 6 hours   Tube Feed Start Time: 12:00  Bolus Feed Rate (mL per Hour): 100   Bolus Feed Duration (in Hours): 1  Bolus Feed Instructions:  increase rate by 20 ml/hr every 6 hours until goal rate of 300ml q6 hours reached.Oral feeds as tolerated supplement with PEG feeds as needed  Free Water Flush  Bolus   Total Volume per Flush (mL): 50   Frequency: Every 6 Hours   Total Daily Volume of Flush (mL): 150  Free Water Flush Instructions:  25 mL pre and post feeds  Entered: May 15 2025  3:13PM    Diet NPO after Midnight-     NPO Start Date: 12-May-2025   NPO Start Time: 23:59  Except Medications  Entered: May 12 2025 11:09PM  
This patient has been assessed with a concern for Malnutrition and has been determined to have a diagnosis/diagnoses of Severe protein-calorie malnutrition.    This patient is being managed with:   Diet Pureed-  DASH/TLC {Sodium & Cholesterol Restricted} (DASH)  Tube Feeding Modality: Gastrostomy  TwoCal HN (TWOCALHNRTH)  Total Volume for 24 Hours (mL): 300  Bolus  Total Volume of Bolus (mL):  100  Total # of Feeds: 1  Tube Feed Frequency: Every 8 hours   Tube Feed Start Time: 12:00  Bolus Feed Rate (mL per Hour): 100   Bolus Feed Duration (in Hours): 1  Bolus Feed Instructions:  increase rate by 20 ml/hr every 6 hours until goal rate of 300ml q6 hours reached.Oral feeds as tolerated supplement with PEG feeds as needed  Free Water Flush  Bolus   Total Volume per Flush (mL): 50   Frequency: Every 6 Hours   Total Daily Volume of Flush (mL): 150  Free Water Flush Instructions:  25 mL pre and post feeds  Entered: May 17 2025 12:13PM    Diet NPO after Midnight-     NPO Start Date: 12-May-2025   NPO Start Time: 23:59  Except Medications  Entered: May 12 2025 11:09PM  
This patient has been assessed with a concern for Malnutrition and has been determined to have a diagnosis/diagnoses of Severe protein-calorie malnutrition.    This patient is being managed with:   Diet Pureed-  DASH/TLC {Sodium & Cholesterol Restricted} (DASH)  Tube Feeding Modality: Gastrostomy  TwoCal HN (TWOCALHNRTH)  Total Volume for 24 Hours (mL): 400  Bolus  Total Volume of Bolus (mL):  100  Total # of Feeds: 1  Tube Feed Frequency: Every 6 hours   Tube Feed Start Time: 12:00  Bolus Feed Rate (mL per Hour): 100   Bolus Feed Duration (in Hours): 1  Bolus Feed Instructions:  increase rate by 20 ml/hr every 6 hours until goal rate of 300ml q6 hours reached.Oral feeds as tolerated supplement with PEG feeds as needed  Free Water Flush  Bolus   Total Volume per Flush (mL): 50   Frequency: Every 6 Hours   Total Daily Volume of Flush (mL): 150  Free Water Flush Instructions:  25 mL pre and post feeds  Entered: May 15 2025  3:13PM    Diet NPO after Midnight-     NPO Start Date: 12-May-2025   NPO Start Time: 23:59  Except Medications  Entered: May 12 2025 11:09PM

## 2025-05-24 NOTE — PROGRESS NOTE ADULT - ASSESSMENT
59y male with PMHx of HTN, hiatal hernia, HLD, CAD s/p CABG in Jan 2024, paroxysmal A. fib not on AC, former smoker (30 pack years, quit 1yr ago), recently diagnosed with colitis and esophageal adenocarcinoma s/p stent placement who presents for weakness. The patient was recently discharged from [facility] on 5/6 , and has been progressively deteriorating. Found to have hgb 7.6 lower than last admission reported melena, thrombocytopenia w/ elevated INR/PTT.     #Reported Melena likely due to Upper GI Bleed likely Secondary to  Malignancy   #Thrombocytopenia, elevated INR/PTT  #Recently diagnosed Esophageal adenocarcinoma   #Acute blood loss anemia  - EGD 4/25: A friable circumferential mass seen from the GE junction at 38cm to 28 cm of mid esophagus. Multiple biopsies were obtained. Oozing of blood was noted from the whole mass.  - c/w pantoprazole 40mg IV BID  - s/p EGD w/ peg and stent removal   - Patient supposed to have PET scan OP, Surg Onc Dr Yost   - keep Hgb>7.5  -keep active T&S  -5/17 transfuse another unit PRBCs (did not get blood yesterday due to fevers)  -5/18 H/h still low, will transfuse another unit  - 5/21 H/H stable - Hb 8.0 in AM  - Bone marrow biopsy yesterday  - BM biopsy  consistent with marrow involvement  - Given bone marrow involvement, pt is stage IV  - Per heme/onc it would be difficult to tolerate chemotherapy due to his cytopenias, poor performance status, poor PO intake and chemotherapy might make his symptoms worse  - Per heme/onc patient is hospice appropriate  - dc daily labs  - Hospice referral  - Now CMO   -  Hospice admission on Sunday. Spouse needs education on feed administration. Feeds need to be delivered to the house. Pain meds need to be changed to PO and TD.      #Weakness   #Diffuse metastatic dz of C/T/L spine on MRI  #Acute on chronic lower back pain   - MRI cervical/thoracic/lumbar spine - 2.  Diffusely heterogeneous signal and enhancement of the cervical/thoracic/lumbar   vertebral bodies, sacrum and ilial bones suspicious for metastatic disease. Background of diffusely T1 hypointense infiltrative marrow signal.  - MR head - Mild smooth diffuse dural thickening over the cerebral convexities.   - Med Onc recommends Bone Scan and BM Bx by IR  - Neuro recommends LP  -Bone scan - No focal increased uptake of bone tracer was identified to suggest   metastatic bone disease, particularly within the thoracic spine,   correlating with the abnormal MRI findings.  -LP done - f/u cell ct, glc, prot, cytology, flow cytometry, paraneoplastic panel sent  -CSF with elevated protein, borderline low glucose, 1 cell. Concern for possible paraneoplastic syndrome vs myopathy; also evidence of inflammatory process in CNS - given recent cancer diagnosis and MRI findings, concern for dural metastatic disease  - Pain uncontrolled, increase dilaudid to 2mg q3h PRN, c/w fentanyl 50mcg patch q72h  - start  dexamethasone 4mg QD for spine pain from mets  - Palliative following for pain control  - Pain meds need to be changed to PO and TD for hospice admission    #Acute agitation  #Hx of depression  - Overnight pt with emotional breakdown, thinking about hurting himself but says would not act on his thoughts and has no plan of hurting himself  - Reports hx of depression   - Could be adjustment disorder given recent cancer diagnosis vs hospital acquired delirium   - Currently wife present at bedside, after wife leaves put him on constant observation. RN notified  - dc olanzapine per psych, start haldol 2mg q6h PRN for agitation  - Psych consult - no indication for standing psychotropic medications  at this time, melatonin 5 –10 mg P.O bedtime, OP psychotherapy     #Decreased PO intake 2/2 esophageal adenocarcinoma  - s/p PEG    #Fever  pancultured  -CXR w/ ?LLL opacity ?aspiration  -Completed 7 day course, dc cefepime, contributing to delirium  aspiration precautions  -CT A/p - There is thickening of the subcutaneous fat and musculature at the entry   of the G-tube. G-tube within the stomach. No drainable collection. There is a hiatal hernia with significant thickening of the wall of the distal esophagus and herniated stomach. Active inflammatory process may be present.  -3.9 cm right common iliac artery aneurysm.      #?L sided fascial droop  #Diffuse dural thickening  - Pt and spouse do not know if chronic.   - no focal defecits except b/l distal weakness   < from: MR Head w/wo IV Cont (05.14.25 @ 09:48) >  1.  No acute infarct or intracranial hemorrhage.    2.  Mild smooth diffuse dural thickening over the cerebral convexities.   This finding is nonspecific and can reflect intracranial hypotension or   recent spinal procedures. In the setting of known neoplasm cannot exclude   dural metastatic disease.    3.  Heterogeneous marrow signal of the calvarium, nonspecific.    < end of copied text >    #3.8 cm wide R iliac artery aneurysm- stable  - vascular o/p- surgery was scheduled with Dr. Tavera     #HTN  #CAD s/p CABG in Jan 2024  #Paroxysmal A.fib  - c/w home meds  - Hold plavix per cardio    #DVT PPX: SCD. Added Heparin SQ  #GI PPX: PPI BID  #Diet: PO, plus supplement w/ TF  #Code Status: Full  #Activity Order: IAT

## 2025-05-24 NOTE — PROGRESS NOTE ADULT - ATTENDING COMMENTS
59y male with PMHx of HTN, hiatal hernia, HLD, CAD s/p CABG in Jan 2024, paroxysmal A. fib not on AC, former smoker (30 pack years, quit 1yr ago), recently diagnosed with colitis and esophageal adenocarcinoma s/p stent placement who presents for weakness. The patient was recently discharged from [facility] on 5/6 , and has been progressively deteriorating. Found to have hgb 7.6 lower than last admission reported melena, thrombocytopenia w/ elevated INR/PTT.       #Reported Melena likely due to Upper GI Bleed likely Secondary to  Malignancy   #Thrombocytopenia, elevated INR/PTT  #Recently diagnosed Esophageal adenocarcinoma   #Acute blood loss anemia  - EGD 4/25: A friable circumferential mass seen from the GE junction at 38cm to 28 cm of mid esophagus. Multiple biopsies were obtained. Oozing of blood was noted from the whole mass.  - c/w pantoprazole 40mg IV BID  - s/p EGD w/ peg and stent removal   - . Patient supposed to have PET scan OP, Surg Onc Dr Yost   - keep Hgb>7.5  -keep active T&S  -5/17 transfuse 1 unit PRBCs    -5/18 H/h still low, s/p another another unit  5/22 BM Bx w/ >95% infiltration of marrow by esophageal cancer. Onc recommends hospice  5/23 CMO now. Hospice admission on Sunday. Spouse needs education on feed administration. Feeds need to be delivered to the house.    #Weakness   #?Metastatic dz of C/T/L spine on MRI  #Acute on chronic lower back pain   Med Onc recommends Bone Scan and BM Bx by IR  Neuro recommends LP (confirm leptomeningeal spread)  s/p BM Bx and LP (high protein)    #Decreased PO intake 2/2 esophageal adenocarcinoma  - s/p PEG    #Fever on 5/16  pancultured  CXR w/ ?LLL opacity ?aspiration  started on Cefepime 5/16  aspiration precautions  CT A/p w/out obvious source but nonspec changes  5/21 hold Cefepime as completed 7d course and no obvious source and new hallucinations    #Hallucinations  #Suicidal ideations  hold cefepime  Psych eval appreciated    #?L sided fascial droop  #Diffuse dural thickening  - Pt and spouse do not know if chronic.   - no focal defects except b/l distal weakness   < from: MR Head w/wo IV Cont (05.14.25 @ 09:48) >  1.  No acute infarct or intracranial hemorrhage.    2.  Mild smooth diffuse dural thickening over the cerebral convexities.   This finding is nonspecific and can reflect intracranial hypotension or   recent spinal procedures. In the setting of known neoplasm cannot exclude   dural metastatic disease.    3.  Heterogeneous marrow signal of the calvarium, nonspecific.    #3.8 cm wide R iliac artery aneurysm- stable  #HTN  #CAD s/p CABG in Jan 2024  #Paroxysmal A.fib  - c/w home meds  - Hold plavix    #DVT PPX: SCDs

## 2025-05-24 NOTE — PROGRESS NOTE ADULT - REASON FOR ADMISSION
Progressive weakness
Progressive weakness; esophageal cancer
Esophageal cancer, weakness

## 2025-05-24 NOTE — PROGRESS NOTE ADULT - SUBJECTIVE AND OBJECTIVE BOX
SUBJECTIVE/OVERNIGHT EVENTS  Today is hospital day 12d. This morning patient was seen and examined at bedside, resting comfortably in bed. No acute or major events overnight.    MEDICATIONS  STANDING MEDICATIONS  chlorhexidine 2% Cloths 1 Application(s) Topical <User Schedule>  dexAMETHasone  Injectable 4 milliGRAM(s) IV Push daily  fentaNYL   Patch  50 MICROgram(s)/Hr 1 Patch Transdermal every 72 hours  melatonin 5 milliGRAM(s) Oral at bedtime  metoprolol tartrate 12.5 milliGRAM(s) Oral two times a day  pantoprazole  Injectable 40 milliGRAM(s) IV Push every 12 hours  polyethylene glycol 3350 17 Gram(s) Oral daily  senna 2 Tablet(s) Oral two times a day    PRN MEDICATIONS  acetaminophen     Tablet .. 650 milliGRAM(s) Oral every 6 hours PRN  haloperidol     Tablet 2 milliGRAM(s) Oral every 6 hours PRN  HYDROmorphone  Injectable 2 milliGRAM(s) IV Push every 3 hours PRN    VITALS  T(F): 97.6 (05-23-25 @ 20:33), Max: 97.8 (05-23-25 @ 14:01)  HR: 98 (05-23-25 @ 20:33) (90 - 98)  BP: 121/77 (05-23-25 @ 20:33) (107/64 - 121/77)  RR: 18 (05-23-25 @ 20:33) (18 - 18)  SpO2: 97% (05-23-25 @ 20:33) (97% - 97%)    PHYSICAL EXAM  GENERAL: well nourished, appears to be in pain  HEAD:  Atraumatic, normocephalic  EYES: EOMI, PERRLA, conjunctiva and sclera clear  ENT: Moist mucous membranes  NECK: Supple   HEART: Regular rate and rhythm, no murmurs  LUNGS: Unlabored respirations.  Clear to auscultation bilaterally, no wheezing  ABDOMEN: Soft, nondistended, +BS, +PEG, tenderness around PEG site  EXTREMITIES: No clubbing, cyanosis, or edema  NERVOUS SYSTEM:  A&Ox3, decreased b/l LE strength  SKIN: No rashes or lesions    LABS                    IMAGING

## 2025-05-25 PROCEDURE — 99233 SBSQ HOSP IP/OBS HIGH 50: CPT

## 2025-05-25 PROCEDURE — 99239 HOSP IP/OBS DSCHRG MGMT >30: CPT

## 2025-05-25 RX ORDER — OXYCODONE HYDROCHLORIDE 30 MG/1
1 TABLET ORAL
Qty: 16 | Refills: 0
Start: 2025-05-25 | End: 2025-05-26

## 2025-05-25 RX ORDER — OXYCODONE HYDROCHLORIDE 30 MG/1
20 TABLET ORAL
Refills: 0 | Status: DISCONTINUED | OUTPATIENT
Start: 2025-05-25 | End: 2025-05-25

## 2025-05-25 RX ORDER — SENNA 187 MG
2 TABLET ORAL
Qty: 60 | Refills: 0
Start: 2025-05-25 | End: 2025-06-23

## 2025-05-25 RX ORDER — POLYETHYLENE GLYCOL 3350 17 G/17G
17 POWDER, FOR SOLUTION ORAL
Qty: 1020 | Refills: 0
Start: 2025-05-25 | End: 2025-06-23

## 2025-05-25 RX ORDER — DEXAMETHASONE 0.5 MG/1
4 TABLET ORAL DAILY
Refills: 0 | Status: DISCONTINUED | OUTPATIENT
Start: 2025-05-25 | End: 2025-05-25

## 2025-05-25 RX ORDER — DEXAMETHASONE 0.5 MG/1
1 TABLET ORAL
Qty: 30 | Refills: 0
Start: 2025-05-25 | End: 2025-06-23

## 2025-05-25 RX ORDER — FENTANYL CITRATE-0.9 % NACL/PF 100MCG/2ML
1 SYRINGE (ML) INTRAVENOUS
Qty: 0 | Refills: 0 | DISCHARGE
Start: 2025-05-25

## 2025-05-25 RX ORDER — FENTANYL CITRATE-0.9 % NACL/PF 100MCG/2ML
1 SYRINGE (ML) INTRAVENOUS
Refills: 0 | Status: DISCONTINUED | OUTPATIENT
Start: 2025-05-25 | End: 2025-05-25

## 2025-05-25 RX ADMIN — Medication 650 MILLIGRAM(S): at 13:06

## 2025-05-25 RX ADMIN — Medication 40 MILLIGRAM(S): at 05:31

## 2025-05-25 RX ADMIN — DEXAMETHASONE 4 MILLIGRAM(S): 0.5 TABLET ORAL at 05:32

## 2025-05-25 RX ADMIN — METOPROLOL SUCCINATE 12.5 MILLIGRAM(S): 50 TABLET, EXTENDED RELEASE ORAL at 05:32

## 2025-05-25 RX ADMIN — Medication 2 MILLIGRAM(S): at 01:00

## 2025-05-25 RX ADMIN — POLYETHYLENE GLYCOL 3350 17 GRAM(S): 17 POWDER, FOR SOLUTION ORAL at 12:15

## 2025-05-25 RX ADMIN — METOPROLOL SUCCINATE 12.5 MILLIGRAM(S): 50 TABLET, EXTENDED RELEASE ORAL at 17:18

## 2025-05-25 RX ADMIN — OXYCODONE HYDROCHLORIDE 20 MILLIGRAM(S): 30 TABLET ORAL at 17:59

## 2025-05-25 RX ADMIN — Medication 2 MILLIGRAM(S): at 01:30

## 2025-05-25 RX ADMIN — Medication 650 MILLIGRAM(S): at 03:04

## 2025-05-25 RX ADMIN — Medication 2 MILLIGRAM(S): at 04:32

## 2025-05-25 RX ADMIN — OXYCODONE HYDROCHLORIDE 20 MILLIGRAM(S): 30 TABLET ORAL at 12:14

## 2025-05-25 RX ADMIN — Medication 2 MILLIGRAM(S): at 08:58

## 2025-05-25 RX ADMIN — Medication 1 PATCH: at 14:52

## 2025-05-25 RX ADMIN — Medication 650 MILLIGRAM(S): at 05:00

## 2025-05-25 RX ADMIN — Medication 40 MILLIGRAM(S): at 17:18

## 2025-05-25 RX ADMIN — DEXAMETHASONE 4 MILLIGRAM(S): 0.5 TABLET ORAL at 12:15

## 2025-05-25 RX ADMIN — Medication 2 TABLET(S): at 05:32

## 2025-05-25 RX ADMIN — Medication 1 PATCH: at 19:00

## 2025-05-25 RX ADMIN — Medication 2 MILLIGRAM(S): at 05:00

## 2025-05-25 RX ADMIN — Medication 1 APPLICATION(S): at 05:33

## 2025-05-25 RX ADMIN — Medication 2 TABLET(S): at 17:18

## 2025-05-25 NOTE — PROGRESS NOTE ADULT - SUBJECTIVE AND OBJECTIVE BOX
HPI:  59M w/ Esophageal Cancer (dx Apr2025) s/p stent, CAD s/p [id number] (Jan2024), Afib not on AC, HLD is admitted for weakness, acute blood loss anemia/melena requiring pRBC transfusion. Patient completed endoscopy 5/14 for removal of esophageal stent (due to discomfort) and for PEG placement. Palliative consulted to assist w/ cancer-related pain management.    Interval History  -Patient with significant PRN use over 24 hours  -Plan for discharge home today with hospice    ADVANCE DIRECTIVES:    [] Full Code [ x] DNR/DNI/CMO  MOLST  [x ]  Living Will  [ ]   DECISION MAKER(s):  [ ] Health Care Proxy(s)  [ x] Surrogate(s)  [ ] Guardian           Name(s): Phone Number(s):  Wife: Shannan Reddy    BASELINE (I)ADL(s) (prior to admission):  [place]: [ ]Total  [ ] Moderate [x ]Dependent    Allergies  penicillins (Unknown)    MEDICATIONS  (STANDING):  chlorhexidine 2% Cloths 1 Application(s) Topical <User Schedule>  dexAMETHasone     Tablet 4 milliGRAM(s) Oral daily  fentaNYL   Patch  75 MICROgram(s)/Hr 1 Patch Transdermal every 72 hours  melatonin 5 milliGRAM(s) Oral at bedtime  metoprolol tartrate 12.5 milliGRAM(s) Oral two times a day  pantoprazole  Injectable 40 milliGRAM(s) IV Push every 12 hours  polyethylene glycol 3350 17 Gram(s) Oral daily  senna 2 Tablet(s) Oral two times a day    MEDICATIONS  (PRN):  acetaminophen     Tablet .. 650 milliGRAM(s) Oral every 6 hours PRN Temp greater or equal to 38C (100.4F), Mild Pain (1 - 3)  haloperidol     Tablet 2 milliGRAM(s) Oral every 6 hours PRN agitation  oxyCODONE    IR 20 milliGRAM(s) Oral every 3 hours PRN moderate/severe pain (4-10)      PRESENT SYMPTOMS: [ ]Unable to obtain due to poor mentation   Source if other than patient:  [ ]Family   [ ]Team   [ X]All other review of systems negative including pain and dyspnea unless noted below    All components of pain assessment below addressed. Blank spaces indicate that the patient did/could not complete the assessment.  Pain: [x ]yes [ ]no  QOL impact - limits ability to get out of bed  Location -     mid back               Aggravating factors - positioning in bed  Quality - sharp  Radiation - none reported  Timing- intermittent  Severity (0-10 scale): no number given at time of visit  Minimal acceptable level (0-10 scale):     Dyspnea:                           [x ]None[ ]Mild [ ]Moderate [ ]Severe   Anxiety:                             [ ]None[ ]Mild [ ]Moderate [ ]Severe   Fatigue:                             [ ]None[ ]Mild [ ]Moderate [ ]Severe   Nausea:                             [ ]None[ ]Mild [ ]Moderate [ ]Severe   Loss of appetite:              [ ]None[ ]Mild [ ]Moderate [ ]Severe   Constipation:                    [ ]None[ ]Mild [ ]Moderate [ ]Severe    Other Symptoms:    PHYSICAL EXAM:  Vital Signs Last 24 Hrs  T(C): --  T(F): --  HR: --  BP: --  BP(mean): --  RR: --  SpO2: --        GENERAL:  [ x] No acute distress [ ]Lethargic  [ ]Unarousable  [ x]Verbal  [ ]Non-Verbal [ ]Cachexia    BEHAVIORAL/PSYCH:  [ ]Alert and Oriented x  [ ] Anxiety [ ] Delirium [ ] Agitation [ ] Calm   EYES: [x ] No scleral icterus [ ] Scleral icterus [ ] Closed  ENMT:  [ ]Dry mouth  [ ]No external oral lesions [ ] No external ear or nose lesions  CARDIOVASCULAR:  [ ]Regular [ ]Irregular [ ]Tachy [x ]Not Tachy  [ ]Rodney [ ] Edema [ ] No edema  PULMONARY:  [ ]Tachypnea  [ ]Audible excessive secretions [x ] No labored breathing [ ] labored breathing  GASTROINTESTINAL: [ ]Soft  [ ]Distended  [x ]Not distended [ ]Non tender [ ]Tender  MUSCULOSKELETAL: [ ]No clubbing [ ] clubbing  [ ] No cyanosis [ ] cyanosis  NEUROLOGIC: [ ]No focal deficits  [x ]Follows commands  [ ]Does not follow commands  [ ]Cognitive impairment  [ ]Dysphagia  [ ]Dysarthria  [ ]Paresis   SKIN: [ ] Jaundiced [ ] Non-jaundiced [ ]Rash [ ]No Rash [ ] Warm [ ] Dry  MISC/LINES: [ ] ET tube [ ] Trach [ ]NGT/OGT [ x]PEG [ ]Epps    LABS: reviewed by me                                      CAPILLARY BLOOD GLUCOSE                  RADIOLOGY & ADDITIONAL STUDIES:     EKG:       PROTEIN CALORIE MALNUTRITION PRESENT: [ ]mild [ ]moderate [ ]severe [ ]underweight [ ]morbid obesity  https://www.andeal.org/vault/2440/web/files/ONC/Table_Clinical%20Characteristics%20to%20Document%20Malnutrition-White%20JV%20et%20al%202012.pdf    Height (cm): 180.3 (05-20-25 @ 09:53), 180 (05-01-25 @ 11:47), 180.3 (04-05-25 @ 13:20)  Weight (kg): 84.5 (05-14-25 @ 14:45), 89 (05-01-25 @ 11:47), 90.3 (04-03-25 @ 21:22)  BMI (kg/m2): 26 (05-20-25 @ 09:53), 26.1 (05-14-25 @ 14:45), 27.5 (05-01-25 @ 11:47)    [ ]PPSV2 < or = to 30% [ ]significant weight loss  [ ]poor nutritional intake  [ ]anasarca      [ ]Artificial Nutrition      REFERRALS:   [ ]Chaplaincy  [ ]Hospice  [ ]Child Life  [x ]Social Work  [ ]Case management [ ]Holistic Therapy     Patient discussed with primary medical team MD  Palliative care education provided to patient and/or family     CHF (congestive heart failure)    HTN (hypertension)

## 2025-05-25 NOTE — PROGRESS NOTE ADULT - TIME BILLING
Time above includes time spent preparing to see the patient, obtaining and/or reviewing separately obtained history, performing a medically appropriate examination and/or evaluation, counseling and educating the patient/family/caregiver, referring and communicating with other health care professionals (when not separately reported), documenting clinical information in the electronic or other health record, independently interpreting results (not separately reported) and communicating results to the patient/family/caregiver, and/or care coordination (not separately reported).
-I independently reviewed the completed labs ( CBC, CMP, cultures  along with sensitivities ) and imaging (CT/CXR as available with independent interpretation )  -My assessment required my independent history taking  -Time excludes teaching time.  -I independently reviewed and interpretated all prior notes, tests results.  -I discussed my diagnostic/therapeutic recommendations with the primary team housestaff/Attending  -I assisted in the decision regarding continued need for hospitalization / or escalation of care as needed.
time spent on review of labs, imaging studies, old records, obtaining history, personally examining patient, counselling and communicating with patient, entering orders for medications/tests/etc, discussions with other health care providers, documentation in electronic health records, independent interpretation of labs, imaging/procedure results and care coordination.
I have personally seen and examined this patient. I have reviewed all pertinent clinical information and reviewed all relevant imaging ( and noted the impression from the Radiologist ) and diagnostic studies personally. I counseled the patient about the diagnostic testing and treatment plan. I discussed my recommendations with the primary team. Time spent teaching was excluded.
Review of imaging and chart; obtaining history; examination of patient; discussion and coordination of care.
above.  Total time spent includes but is not limited to personal review of patient chart, available results, collateral information (if necessary) and examination of patient at bedside and time spent formulating assessment and recommendations independent of teaching time.
Time above includes time spent preparing to see the patient, obtaining and/or reviewing separately obtained history, performing a medically appropriate examination and/or evaluation, counseling and educating the patient/family/caregiver, referring and communicating with other health care professionals (when not separately reported), documenting clinical information in the electronic or other health record, independently interpreting results (not separately reported) and communicating results to the patient/family/caregiver, and/or care coordination (not separately reported).

## 2025-05-25 NOTE — PROGRESS NOTE ADULT - PROBLEM SELECTOR PROBLEM 4
Esophageal cancer

## 2025-05-25 NOTE — PROGRESS NOTE ADULT - PROBLEM SELECTOR PLAN 4
med-onc consulted and assisting w/ recommendations
med-onc consulted and assisting w/ recommendations  - bone biopsy concerns metastatic disease
med-onc consulted and assisting w/ recommendations
med-onc consulted and assisting w/ recommendations  - bone biopsy concerns metastatic disease
med-onc consulted and assisting w/ recommendations  - bone biopsy concerns metastatic disease
med-onc consulted and assisting w/ recommendations  - bone biopsy completed by IR and pending path  - NM bone survey did not identify uptake which would be expected of metastatic bone disease  - LP did not report malignant cell on cytology
med-onc consulted and assisting w/ recommendations

## 2025-05-25 NOTE — PROGRESS NOTE ADULT - PROBLEM SELECTOR PLAN 3
MR C/T/L spine is highly suspicious for diffusely metastatic disease in C/T/L spine and sacrum/iliac bones No cord compression reported  - bone biopsy confirms cancer bm involvement
MR Spine has identified diffusely metastatic disease of the spine, sacrum and iliac bones. no report of cord compression
MR C/T/L spine is highly suspicious for diffusely metastatic disease in C/T/L spine and sacrum/iliac bones No cord compression reported  - neurology consulted and assisting w/ management  - LP completed w/ some studies pending
MR C/T/L spine is highly suspicious for diffusely metastatic disease in C/T/L spine and sacrum/iliac bones No cord compression reported  - bone biopsy confirms cancer bm involvement
MR Spine has identified diffusely metastatic disease of the spine, sacrum and iliac bones. no report of cord compression
MR C/T/L spine is highly suspicious for diffusely metastatic disease in C/T/L spine and sacrum/iliac bones No cord compression reported  - bone biopsy confirms cancer bm involvement
MR Spine has identified diffusely metastatic disease of the spine, sacrum and iliac bones. no report of cord compression

## 2025-05-25 NOTE — PROGRESS NOTE ADULT - PROBLEM SELECTOR PLAN 2
c/w olanzapine and megesterol
c/w olanzapine

## 2025-05-25 NOTE — PROGRESS NOTE ADULT - PROBLEM SELECTOR PLAN 5
Complex medical decision making / symptom management in the setting of malignancy    Will continue to follow for ongoing GOC discussion / titration of palliative regimen. Emotional support provided, questions answered.  Active Psychosocial Referrals:  [x]Social Work/Case management []PT/OT []Chaplaincy []Hospice []Ethics  Coping: [] well [x] with difficulty [] poor coping [] unable to assess  Support system: [] strong [x] adequate [] inadequate.
Complex medical decision making / symptom management in the setting of malignancy    Will continue to follow for ongoing GOC discussion / titration of palliative regimen. Emotional support provided, questions answered.  Active Psychosocial Referrals:  [x]Social Work/Case management []PT/OT []Chaplaincy [x]Hospice []Ethics  Coping: [x] well [] with difficulty [] poor coping [] unable to assess  Support system: [] strong [x] adequate [] inadequate.
Complex medical decision making / symptom management in the setting of malignancy    Will continue to follow for ongoing GOC discussion / titration of palliative regimen. Emotional support provided, questions answered.  Active Psychosocial Referrals:  [x]Social Work/Case management []PT/OT []Chaplaincy [x]Hospice []Ethics  Coping: [x] well [] with difficulty [] poor coping [] unable to assess  Support system: [] strong [x] adequate [] inadequate.
Complex medical decision making / symptom management in the setting of malignancy    Will continue to follow for ongoing GOC discussion / titration of palliative regimen. Emotional support provided, questions answered.  Active Psychosocial Referrals:  [x]Social Work/Case management []PT/OT []Chaplaincy []Hospice []Ethics  Coping: [] well [x] with difficulty [] poor coping [] unable to assess  Support system: [] strong [x] adequate [] inadequate.
Complex medical decision making / symptom management in the setting of malignancy    Will continue to follow for ongoing GOC discussion / titration of palliative regimen. Emotional support provided, questions answered.  Active Psychosocial Referrals:  [x]Social Work/Case management []PT/OT []Chaplaincy [x]Hospice []Ethics  Coping: [x] well [] with difficulty [] poor coping [] unable to assess  Support system: [] strong [x] adequate [] inadequate.
Complex medical decision making / symptom management in the setting of malignancy    Will continue to follow for ongoing GOC discussion / titration of palliative regimen. Emotional support provided, questions answered.  Active Psychosocial Referrals:  [x]Social Work/Case management []PT/OT []Chaplaincy []Hospice []Ethics  Coping: [] well [x] with difficulty [] poor coping [] unable to assess  Support system: [] strong [x] adequate [] inadequate.
Complex medical decision making / symptom management in the setting of malignancy    Will continue to follow for ongoing GOC discussion / titration of palliative regimen. Emotional support provided, questions answered.  Active Psychosocial Referrals:  [x]Social Work/Case management []PT/OT []Chaplaincy []Hospice []Ethics  Coping: [] well [x] with difficulty [] poor coping [] unable to assess  Support system: [] strong [x] adequate [] inadequate.

## 2025-05-25 NOTE — PROGRESS NOTE ADULT - ASSESSMENT
59M w/ Esophageal Cancer (dx Apr2025) s/p stent, CAD s/p [id number] (Jan2024), Afib not on AC, HLD is admitted for weakness, acute blood loss anemia/melena requiring pRBC transfusion. Patient completed endoscopy 5/14 for removal of esophageal stent (due to discomfort) and for PEG placement. Palliative consulted to assist w/ cancer-related pain management. Significantly, MR C/T/L spine is highly suspicious for diffusely metastatic disease in C/T/L spine and sacrum/iliac bones now confirmed w/ bone biopsy. Patient elects for hospice referral and is now DNR/DNI- and he agrees w/ cmo    Patient seen at bedside. Notes pain well controlled, but significant PRN use in 24 hours.  WIll increase fentanyl patch in anticipation of discharge.    Plan  -DNR/DNI  -Comfort measures only  -No additional IVF blood draws, vital signs, pressors, antibiotics, escalation of oxygen, escalation of care  -patient continuing tube feeds  -increase fentanyl patch to 75mcg/hour q72h  -stop IV dilaudid  -start oxycodone 20mg q3h PRN for moderate/severe pain (4-10)  -change dexamethasone to 4mg PO daily  -pan for discharge to hospice  -will follow

## 2025-05-25 NOTE — DISCHARGE NOTE NURSING/CASE MANAGEMENT/SOCIAL WORK - PATIENT PORTAL LINK FT
You can access the FollowMyHealth Patient Portal offered by [facility] by registering at the following website: [email or web address]. By joining [employer]’s FollowMyHealth portal, you will also be able to view your health information using other applications (apps) compatible with our system.

## 2025-05-25 NOTE — PROGRESS NOTE ADULT - PROBLEM SELECTOR PLAN 1
regimen as noted above

## 2025-05-25 NOTE — PROGRESS NOTE ADULT - PROVIDER SPECIALTY LIST ADULT
Internal Medicine
Intervent Radiology
Palliative Care
Heme/Onc
Hospitalist
Internal Medicine
Palliative Care
Heme/Onc
Infectious Disease
Internal Medicine
Internal Medicine
Neurology
Palliative Care
Heme/Onc
Internal Medicine
Neurology
Internal Medicine
Palliative Care
Infectious Disease
Palliative Care

## 2025-05-25 NOTE — DISCHARGE NOTE NURSING/CASE MANAGEMENT/SOCIAL WORK - FINANCIAL ASSISTANCE
[facility] provides services at a reduced cost to those who are determined to be eligible through [facility]’s financial assistance program. Information regarding [facility]’s financial assistance program can be found by going to [email or web address] or by calling [phone].

## 2025-05-27 LAB
AMPHIPHYSIN AB TITR CSF: NEGATIVE — SIGNIFICANT CHANGE UP
AMPHIPHYSIN AB TITR CSF: NEGATIVE — SIGNIFICANT CHANGE UP
CV2 IGG TITR CSF: NEGATIVE — SIGNIFICANT CHANGE UP
CV2 IGG TITR CSF: NEGATIVE — SIGNIFICANT CHANGE UP
GLIAL NUC TYPE 1 AB TITR CSF: NEGATIVE — SIGNIFICANT CHANGE UP
GLIAL NUC TYPE 1 AB TITR CSF: NEGATIVE — SIGNIFICANT CHANGE UP
HU1 AB TITR CSF IF: NEGATIVE — SIGNIFICANT CHANGE UP
HU1 AB TITR CSF IF: NEGATIVE — SIGNIFICANT CHANGE UP
HU2 AB TITR CSF IF: NEGATIVE — SIGNIFICANT CHANGE UP
HU2 AB TITR CSF IF: NEGATIVE — SIGNIFICANT CHANGE UP
HU3 AB TITR CSF: NEGATIVE — SIGNIFICANT CHANGE UP
HU3 AB TITR CSF: NEGATIVE — SIGNIFICANT CHANGE UP
IFA NOTES: SIGNIFICANT CHANGE UP
IFA NOTES: SIGNIFICANT CHANGE UP
PARANEOPLASTIC INTERPRETATION, CSF: SIGNIFICANT CHANGE UP
PARANEOPLASTIC INTERPRETATION, CSF: SIGNIFICANT CHANGE UP
PCA-TR AB TITR CSF: NEGATIVE — SIGNIFICANT CHANGE UP
PCA-TR AB TITR CSF: NEGATIVE — SIGNIFICANT CHANGE UP
PURKINJE CELL CYTOPLASMIC AB TYPE 2: NEGATIVE — SIGNIFICANT CHANGE UP
PURKINJE CELL CYTOPLASMIC AB TYPE 2: NEGATIVE — SIGNIFICANT CHANGE UP
PURKINJE CELLS AB TITR CSF IF: NEGATIVE — SIGNIFICANT CHANGE UP
PURKINJE CELLS AB TITR CSF IF: NEGATIVE — SIGNIFICANT CHANGE UP

## 2025-05-28 ENCOUNTER — APPOINTMENT (OUTPATIENT)
Dept: GASTROENTEROLOGY | Facility: CLINIC | Age: 60
End: 2025-05-28

## 2025-05-28 ENCOUNTER — NON-APPOINTMENT (OUTPATIENT)
Age: 60
End: 2025-05-28

## 2025-05-30 LAB — CHROM ANALY OVERALL INTERP SPEC-IMP: SIGNIFICANT CHANGE UP

## 2025-06-03 LAB
EJ: NEGATIVE — SIGNIFICANT CHANGE UP
ENA JO1 AB SER IA-ACNC: <20 UNITS — SIGNIFICANT CHANGE UP
ENA PM/SCL AB SER-ACNC: <20 UNITS — SIGNIFICANT CHANGE UP
ENA SM+RNP AB SER IA-ACNC: <20 UNITS — SIGNIFICANT CHANGE UP
ENA SS-A IGG SER QL: <20 UNITS — SIGNIFICANT CHANGE UP
FIBRILLARIN AB SER QL: NEGATIVE — SIGNIFICANT CHANGE UP
KU AB SER QL: NEGATIVE — SIGNIFICANT CHANGE UP
MDA-5 (P140)(CADM-140): <20 UNITS — SIGNIFICANT CHANGE UP
MI2 AB SER QL: NEGATIVE — SIGNIFICANT CHANGE UP
NXP-2 (P140): <20 UNITS — SIGNIFICANT CHANGE UP
OJ AB SER QL: NEGATIVE — SIGNIFICANT CHANGE UP
PL12 AB SER QL: NEGATIVE — SIGNIFICANT CHANGE UP
PL7 AB SER QL: NEGATIVE — SIGNIFICANT CHANGE UP
SRP AB SERPL QL: NEGATIVE — SIGNIFICANT CHANGE UP
TIF GAMMA (P155/140): <20 UNITS — SIGNIFICANT CHANGE UP
U2 SNRNP AB SER QL: NEGATIVE — SIGNIFICANT CHANGE UP

## 2025-06-30 ENCOUNTER — APPOINTMENT (OUTPATIENT)
Dept: VASCULAR SURGERY | Facility: HOSPITAL | Age: 60
End: 2025-06-30